# Patient Record
Sex: FEMALE | Race: OTHER | HISPANIC OR LATINO
[De-identification: names, ages, dates, MRNs, and addresses within clinical notes are randomized per-mention and may not be internally consistent; named-entity substitution may affect disease eponyms.]

---

## 2020-11-11 ENCOUNTER — TRANSCRIPTION ENCOUNTER (OUTPATIENT)
Age: 60
End: 2020-11-11

## 2020-12-01 ENCOUNTER — TRANSCRIPTION ENCOUNTER (OUTPATIENT)
Age: 60
End: 2020-12-01

## 2020-12-16 ENCOUNTER — APPOINTMENT (OUTPATIENT)
Dept: INTERNAL MEDICINE | Facility: CLINIC | Age: 60
End: 2020-12-16
Payer: MEDICAID

## 2020-12-16 VITALS
RESPIRATION RATE: 16 BRPM | SYSTOLIC BLOOD PRESSURE: 108 MMHG | DIASTOLIC BLOOD PRESSURE: 70 MMHG | WEIGHT: 129 LBS | HEIGHT: 59 IN | HEART RATE: 94 BPM | TEMPERATURE: 96.1 F | BODY MASS INDEX: 26 KG/M2 | OXYGEN SATURATION: 97 %

## 2020-12-16 PROCEDURE — 99072 ADDL SUPL MATRL&STAF TM PHE: CPT

## 2020-12-16 PROCEDURE — 99204 OFFICE O/P NEW MOD 45 MIN: CPT

## 2020-12-16 NOTE — HEALTH RISK ASSESSMENT
[Yes] : Yes [Monthly or less (1 pt)] : Monthly or less (1 point) [1 or 2 (0 pts)] : 1 or 2 (0 points) [Never (0 pts)] : Never (0 points) [No] : In the past 12 months have you used drugs other than those required for medical reasons? No [0] : 2) Feeling down, depressed, or hopeless: Not at all (0) [] : No [[id number]] : 0 [Patient reported mammogram was normal] : Patient reported mammogram was normal [Patient reported colonoscopy was abnormal] : Patient reported colonoscopy was abnormal [MammogramDate] : 10/20 [ColonoscopyDate] : 2018 [ColonoscopyComments] : polyps

## 2020-12-16 NOTE — HISTORY OF PRESENT ILLNESS
[FreeTextEntry1] : Patient comes in to establish care.\par  [de-identified] : Patient is a 52-year-old female history of diabetes mellitus type 2, CAD status post 3 stents, GERD, hypertension, hyperlipidemia, covid 19 infection comes in to establish care. Patient presented to the urgent care November 11 with fever and chills and tested positive for SARS CoV-2 on 11/11/20. Patient notes she had symptoms of fever chills and myalgias for about 7 days and then improved. She denies any cough or shortness of breath. Her daughter and daughter's wife both also tested positive as well.\par Patient has a history of familial polyposis and has multiple colonoscopies. She does need a new gastroenterologist and cardiologist. She has not seen her primary care physician for 6 months or more in [place] and is overdue for blood work as well.\par Patient denies any cp, sob,abdominal pain, nausea, vomiting, palpitations, fever, chills, constipation, diarrhea.\par

## 2020-12-16 NOTE — ASSESSMENT
[FreeTextEntry1] : 1.Covid 19 infection: Now resolved. Patient with positive antibodies and discussed significance of this. All questions answered.\par \par 2.diabetes mellitus type 2: Discussed diabetic diet, continue on metformin 1000 mg b.i.d. and recheck hemoglobin A1c.\par \par 3.CAD status post 3 stents: Continue on Plavix and statin and follow up with cardiology.\par \par 4.hyperlipidemia: Continue on statin and ezetimibe, recheck lipids.\par \par 5.familial polyposis: With history of colectomy. Given referral for new gastroenterologist this patient will need repeat colonoscopy at this time.\par \par 6.hypertension: Continue on metoprolol  mg once daily.

## 2021-01-11 LAB
25(OH)D3 SERPL-MCNC: 20.1 NG/ML
ALBUMIN SERPL ELPH-MCNC: 4.8 G/DL
ALP BLD-CCNC: 131 U/L
ALT SERPL-CCNC: 36 U/L
ANION GAP SERPL CALC-SCNC: 11 MMOL/L
AST SERPL-CCNC: 33 U/L
BASOPHILS # BLD AUTO: 0.07 K/UL
BASOPHILS NFR BLD AUTO: 1 %
BILIRUB SERPL-MCNC: 0.2 MG/DL
BUN SERPL-MCNC: 25 MG/DL
CALCIUM SERPL-MCNC: 9.6 MG/DL
CHLORIDE SERPL-SCNC: 104 MMOL/L
CHOLEST SERPL-MCNC: 301 MG/DL
CO2 SERPL-SCNC: 26 MMOL/L
CREAT SERPL-MCNC: 0.78 MG/DL
EOSINOPHIL # BLD AUTO: 0.68 K/UL
EOSINOPHIL NFR BLD AUTO: 10.2 %
ESTIMATED AVERAGE GLUCOSE: 157 MG/DL
GLUCOSE SERPL-MCNC: 102 MG/DL
HBA1C MFR BLD HPLC: 7.1 %
HCT VFR BLD CALC: 42.7 %
HDLC SERPL-MCNC: 46 MG/DL
HGB BLD-MCNC: 13.7 G/DL
IMM GRANULOCYTES NFR BLD AUTO: 0.3 %
LDLC SERPL CALC-MCNC: 209 MG/DL
LYMPHOCYTES # BLD AUTO: 2.33 K/UL
LYMPHOCYTES NFR BLD AUTO: 34.8 %
MAN DIFF?: NORMAL
MCHC RBC-ENTMCNC: 31.3 PG
MCHC RBC-ENTMCNC: 32.1 GM/DL
MCV RBC AUTO: 97.5 FL
MONOCYTES # BLD AUTO: 0.53 K/UL
MONOCYTES NFR BLD AUTO: 7.9 %
NEUTROPHILS # BLD AUTO: 3.06 K/UL
NEUTROPHILS NFR BLD AUTO: 45.8 %
NONHDLC SERPL-MCNC: 256 MG/DL
PLATELET # BLD AUTO: 226 K/UL
POTASSIUM SERPL-SCNC: 4.5 MMOL/L
PROT SERPL-MCNC: 8.2 G/DL
RBC # BLD: 4.38 M/UL
RBC # FLD: 12.8 %
SODIUM SERPL-SCNC: 140 MMOL/L
TRIGL SERPL-MCNC: 235 MG/DL
TSH SERPL-ACNC: 2.5 UIU/ML
VIT B12 SERPL-MCNC: 277 PG/ML
WBC # FLD AUTO: 6.69 K/UL

## 2021-01-13 RX ORDER — ROSUVASTATIN CALCIUM 40 MG/1
40 TABLET, FILM COATED ORAL DAILY
Refills: 0 | Status: DISCONTINUED | COMMUNITY
End: 2021-01-13

## 2021-01-14 ENCOUNTER — NON-APPOINTMENT (OUTPATIENT)
Age: 61
End: 2021-01-14

## 2021-01-29 ENCOUNTER — APPOINTMENT (OUTPATIENT)
Dept: CARDIOLOGY | Facility: CLINIC | Age: 61
End: 2021-01-29
Payer: MEDICAID

## 2021-01-29 VITALS
DIASTOLIC BLOOD PRESSURE: 83 MMHG | HEART RATE: 82 BPM | OXYGEN SATURATION: 99 % | BODY MASS INDEX: 27.82 KG/M2 | SYSTOLIC BLOOD PRESSURE: 126 MMHG | WEIGHT: 138 LBS | HEIGHT: 59 IN

## 2021-01-29 PROCEDURE — 93000 ELECTROCARDIOGRAM COMPLETE: CPT

## 2021-01-29 PROCEDURE — 99072 ADDL SUPL MATRL&STAF TM PHE: CPT

## 2021-01-29 PROCEDURE — 99204 OFFICE O/P NEW MOD 45 MIN: CPT

## 2021-01-29 NOTE — DISCUSSION/SUMMARY
[Likely Cardiac Ischemia (Hi Probability)] : likely cardiac ischemia (high probability) [Coronary Artery Disease] : coronary artery disease [Hyperlipidemia] : hyperlipidemia [Stable] : stable [Hypertension] : hypertension [Patient] : the patient [FreeTextEntry1] : Pt will be referred for nuclear ETT, and will begin Repatha twice monthly. Echo will be obtained to evaluate LV function

## 2021-01-29 NOTE — REASON FOR VISIT
[Initial Evaluation] : an initial evaluation of [Coronary Artery Disease] : coronary artery disease [Hyperlipidemia] : hyperlipidemia [Hypertension] : hypertension [Prior Myocardial Infarction] : a prior myocardial infarction

## 2021-01-29 NOTE — HISTORY OF PRESENT ILLNESS
[FreeTextEntry1] : 59 yo female presents for cardiac evaluation. Pt has a hisory of PCI x3. 4 years ago. Pt has a history of HTN and HLD. Pt reports that she has taken Lipitor, Crestor and Zetia which either was not effective or caused muscle pain. Pt reports recent onset of chest pain with exertion for the past 2 months. Pt doesn't smoke. Pt is a diabetic. Pt is referred to a GI by her PMD.

## 2021-01-29 NOTE — PHYSICAL EXAM
[Normal Conjunctiva] : the conjunctiva exhibited no abnormalities [Eyelids - No Xanthelasma] : the eyelids demonstrated no xanthelasmas [Normal Oral Mucosa] : normal oral mucosa [No Oral Pallor] : no oral pallor [No Oral Cyanosis] : no oral cyanosis [Normal Jugular Venous A Waves Present] : normal jugular venous A waves present [Normal Jugular Venous V Waves Present] : normal jugular venous V waves present [No Jugular Venous Faria A Waves] : no jugular venous faria A waves [Heart Rate And Rhythm] : heart rate and rhythm were normal [Heart Sounds] : normal S1 and S2 [Murmurs] : no murmurs present [Respiration, Rhythm And Depth] : normal respiratory rhythm and effort [Exaggerated Use Of Accessory Muscles For Inspiration] : no accessory muscle use [Auscultation Breath Sounds / Voice Sounds] : lungs were clear to auscultation bilaterally [Abdomen Soft] : soft [Abdomen Tenderness] : non-tender [Abdomen Mass (___ Cm)] : no abdominal mass palpated [Abnormal Walk] : normal gait [Gait - Sufficient For Exercise Testing] : the gait was sufficient for exercise testing [Nail Clubbing] : no clubbing of the fingernails [Cyanosis, Localized] : no localized cyanosis [Petechial Hemorrhages (___cm)] : no petechial hemorrhages [Skin Color & Pigmentation] : normal skin color and pigmentation [] : no rash [No Venous Stasis] : no venous stasis [Skin Lesions] : no skin lesions [No Skin Ulcers] : no skin ulcer [No Xanthoma] : no  xanthoma was observed [Oriented To Time, Place, And Person] : oriented to person, place, and time [Affect] : the affect was normal [Mood] : the mood was normal [No Anxiety] : not feeling anxious

## 2021-02-11 ENCOUNTER — APPOINTMENT (OUTPATIENT)
Dept: INTERNAL MEDICINE | Facility: CLINIC | Age: 61
End: 2021-02-11
Payer: MEDICAID

## 2021-02-11 ENCOUNTER — NON-APPOINTMENT (OUTPATIENT)
Age: 61
End: 2021-02-11

## 2021-02-11 DIAGNOSIS — M54.6 PAIN IN THORACIC SPINE: ICD-10-CM

## 2021-02-11 PROCEDURE — 99214 OFFICE O/P EST MOD 30 MIN: CPT | Mod: 95

## 2021-02-11 NOTE — HISTORY OF PRESENT ILLNESS
[Home] : at home, [unfilled] , at the time of the visit. [Medical Office: ([facility])___] : at the medical office located in  [Verbal consent obtained from patient] : the patient, [unfilled] [Family Member] : family member [FreeTextEntry1] : FU [de-identified] : LIBAN DIAZ is a 60 year F who calls in for a follow up visit.\par Patient is a 60-year-old female history of diabetes mellitus type 2, CAD status post stents, GERD, hypertension, hyperlipidemia called in for followup visit. Patient recently had covid 19 infection in November of 2020 and has since recovered. She has questions regarding the vaccine and if she is eligible at this time.\par Patient did see cardiology and was told to start on Repatha due to side effects of muscle aches with statins but has not had insurance approval yet.\par Patient also has been having upper thoracic back pain bilaterally and at the shoulders.\par She has been in menopause for about 10 years.\par She also notes hyperpigmentation changes over her upper back that are been going on for the past 4 months and at times is painful. No injury or trauma to this area.\par Patient denies any cp, sob,abdominal pain, nausea, vomiting, palpitations, fever, chills, constipation, diarrhea.\par

## 2021-02-11 NOTE — ASSESSMENT
[FreeTextEntry1] : 1.health maintenance: Will mail her covid 19 letter of eligibility for vaccine.\par \par 2.diabetes mellitus type 2: Discussed recent blood work. Hemoglobin A1c doing well at 7.1. Continue on metformin 1000 mg twice daily.\par \par 3.CAD status post stents: With uncontrolled lipids, she is on atorvastatin 80 mg from Crestor and is trying to get insurance approval for repack it.\par \par 4.thoracic back pain: Obtain thoracic x-ray to rule out any degenerative disc disease. Patient's daughter request for bone density as well.\par \par 5.hyperpigmentation: Discuss chronic skin changes, will refer to dermatology at this time.

## 2021-02-17 ENCOUNTER — APPOINTMENT (OUTPATIENT)
Dept: GASTROENTEROLOGY | Facility: CLINIC | Age: 61
End: 2021-02-17
Payer: MEDICAID

## 2021-02-17 VITALS
BODY MASS INDEX: 26.81 KG/M2 | DIASTOLIC BLOOD PRESSURE: 93 MMHG | SYSTOLIC BLOOD PRESSURE: 143 MMHG | WEIGHT: 133 LBS | HEART RATE: 89 BPM | TEMPERATURE: 98 F | HEIGHT: 59 IN

## 2021-02-17 PROCEDURE — 99072 ADDL SUPL MATRL&STAF TM PHE: CPT

## 2021-02-17 PROCEDURE — 99203 OFFICE O/P NEW LOW 30 MIN: CPT

## 2021-02-17 NOTE — ASSESSMENT
[FreeTextEntry1] : 59 yo female with history of polyposis syndrome. Will obtain outsider records, and arrange for colonoscopy and upper endoscopy.

## 2021-02-17 NOTE — HISTORY OF PRESENT ILLNESS
[de-identified] : Ms. LIBAN DIAZ is a 60 year old female with history of colon resection for cancer. Patient is unclear of history but may have familial polyposis and undergoes yearly colonoscopies. Patient has no complaints of abdominal pain. Patient does note frequent diarrhea which dates to her surgery many years ago. She does note frequent heartburn as well. Patient reports that the daughter underwent total colectomy. Patient has history of CAD and had last coronary stent six years ago. \par

## 2021-03-05 ENCOUNTER — NON-APPOINTMENT (OUTPATIENT)
Age: 61
End: 2021-03-05

## 2021-03-05 ENCOUNTER — APPOINTMENT (OUTPATIENT)
Dept: CARDIOLOGY | Facility: CLINIC | Age: 61
End: 2021-03-05
Payer: MEDICAID

## 2021-03-05 VITALS
HEART RATE: 77 BPM | HEIGHT: 59 IN | WEIGHT: 135 LBS | SYSTOLIC BLOOD PRESSURE: 134 MMHG | BODY MASS INDEX: 27.21 KG/M2 | OXYGEN SATURATION: 97 % | DIASTOLIC BLOOD PRESSURE: 76 MMHG

## 2021-03-05 PROCEDURE — 99214 OFFICE O/P EST MOD 30 MIN: CPT

## 2021-03-05 PROCEDURE — 93000 ELECTROCARDIOGRAM COMPLETE: CPT

## 2021-03-05 PROCEDURE — 99072 ADDL SUPL MATRL&STAF TM PHE: CPT

## 2021-03-05 NOTE — HISTORY OF PRESENT ILLNESS
[FreeTextEntry1] : 59 yo female presents for cardiac evaluation. Pt has a hisory of PCI x3. 4 years ago. Pt has a history of HTN and HLD. Pt reports that she has taken Lipitor, Crestor and Zetia which either was not effective or caused muscle pain. Pt has not yet scheduled an ETT or Echo. Pt plans to have EGD and colonoscopy. Pt has continued to have chest pain for the past two months.

## 2021-03-05 NOTE — PHYSICAL EXAM

## 2021-03-05 NOTE — DISCUSSION/SUMMARY
[Likely Cardiac Ischemia (Hi Probability)] : likely cardiac ischemia (high probability) [Coronary Artery Disease] : coronary artery disease [Hyperlipidemia] : hyperlipidemia [Stable] : stable [Hypertension] : hypertension [Patient] : the patient [FreeTextEntry1] : Pt will schedule ETT and Echo as previously discussed. F/U in 2 months. Pt can stop Plavix but not ASA prior to GI procedures.

## 2021-03-31 ENCOUNTER — APPOINTMENT (OUTPATIENT)
Dept: CARDIOLOGY | Facility: CLINIC | Age: 61
End: 2021-03-31
Payer: MEDICAID

## 2021-03-31 PROCEDURE — 99072 ADDL SUPL MATRL&STAF TM PHE: CPT

## 2021-03-31 PROCEDURE — 93306 TTE W/DOPPLER COMPLETE: CPT

## 2021-04-09 ENCOUNTER — APPOINTMENT (OUTPATIENT)
Dept: CARDIOLOGY | Facility: CLINIC | Age: 61
End: 2021-04-09
Payer: MEDICAID

## 2021-04-09 PROCEDURE — 93015 CV STRESS TEST SUPVJ I&R: CPT

## 2021-04-09 PROCEDURE — 99072 ADDL SUPL MATRL&STAF TM PHE: CPT

## 2021-04-24 ENCOUNTER — TRANSCRIPTION ENCOUNTER (OUTPATIENT)
Age: 61
End: 2021-04-24

## 2021-05-11 DIAGNOSIS — Z00.00 ENCOUNTER FOR GENERAL ADULT MEDICAL EXAMINATION W/OUT ABNORMAL FINDINGS: ICD-10-CM

## 2021-05-13 ENCOUNTER — APPOINTMENT (OUTPATIENT)
Dept: CARDIOLOGY | Facility: CLINIC | Age: 61
End: 2021-05-13

## 2021-05-18 ENCOUNTER — APPOINTMENT (OUTPATIENT)
Dept: INTERNAL MEDICINE | Facility: CLINIC | Age: 61
End: 2021-05-18

## 2021-06-03 ENCOUNTER — APPOINTMENT (OUTPATIENT)
Dept: CARDIOLOGY | Facility: CLINIC | Age: 61
End: 2021-06-03

## 2021-06-11 ENCOUNTER — TRANSCRIPTION ENCOUNTER (OUTPATIENT)
Age: 61
End: 2021-06-11

## 2021-06-25 ENCOUNTER — TRANSCRIPTION ENCOUNTER (OUTPATIENT)
Age: 61
End: 2021-06-25

## 2021-07-01 ENCOUNTER — APPOINTMENT (OUTPATIENT)
Dept: CARDIOLOGY | Facility: CLINIC | Age: 61
End: 2021-07-01
Payer: MEDICAID

## 2021-07-01 PROCEDURE — 93015 CV STRESS TEST SUPVJ I&R: CPT

## 2021-07-01 PROCEDURE — 78452 HT MUSCLE IMAGE SPECT MULT: CPT

## 2021-07-01 PROCEDURE — A9500: CPT

## 2021-07-08 ENCOUNTER — APPOINTMENT (OUTPATIENT)
Dept: INTERNAL MEDICINE | Facility: CLINIC | Age: 61
End: 2021-07-08
Payer: MEDICAID

## 2021-07-08 VITALS
TEMPERATURE: 98.2 F | WEIGHT: 130 LBS | DIASTOLIC BLOOD PRESSURE: 92 MMHG | SYSTOLIC BLOOD PRESSURE: 154 MMHG | OXYGEN SATURATION: 94 % | RESPIRATION RATE: 16 BRPM | HEART RATE: 101 BPM | HEIGHT: 59 IN | BODY MASS INDEX: 26.21 KG/M2

## 2021-07-08 VITALS — DIASTOLIC BLOOD PRESSURE: 88 MMHG | SYSTOLIC BLOOD PRESSURE: 140 MMHG

## 2021-07-08 PROCEDURE — 99214 OFFICE O/P EST MOD 30 MIN: CPT

## 2021-07-08 NOTE — ASSESSMENT
[FreeTextEntry1] : 1.left abdominal and flank pain: Discussed doing urinalysis, blood work and obtaining CT abdomen and pelvis to rule out acute pathology. Discussed signs and symptoms warrant urgent evaluation. Will followup regarding results with patient.\par Call for new or worsening symptoms.\par

## 2021-07-08 NOTE — HISTORY OF PRESENT ILLNESS
[FreeTextEntry8] : Ms. LIBAN DIAZ is a 61 year F who comes in for an acute visit.\par Patient has a history of colectomy over 30 years ago for history of familial polyposis. She's had increased amount of left flank and lower abdominal pain over the last one month. She states it's aching in nature and worsened when she walks. She's taken Tylenol and Aleve with mild relief of symptoms. She denies any associated constipation or diarrhea, nausea or vomiting, fever or chills.\par Patient denies any cp, sob, palpitations, cough.\par

## 2021-07-13 ENCOUNTER — TRANSCRIPTION ENCOUNTER (OUTPATIENT)
Age: 61
End: 2021-07-13

## 2021-07-13 ENCOUNTER — NON-APPOINTMENT (OUTPATIENT)
Age: 61
End: 2021-07-13

## 2021-07-14 ENCOUNTER — TRANSCRIPTION ENCOUNTER (OUTPATIENT)
Age: 61
End: 2021-07-14

## 2021-07-15 ENCOUNTER — APPOINTMENT (OUTPATIENT)
Dept: GASTROENTEROLOGY | Facility: CLINIC | Age: 61
End: 2021-07-15
Payer: MEDICAID

## 2021-07-15 VITALS
HEIGHT: 59 IN | HEART RATE: 97 BPM | SYSTOLIC BLOOD PRESSURE: 137 MMHG | WEIGHT: 128 LBS | DIASTOLIC BLOOD PRESSURE: 87 MMHG | BODY MASS INDEX: 25.8 KG/M2

## 2021-07-15 PROCEDURE — 99214 OFFICE O/P EST MOD 30 MIN: CPT

## 2021-07-15 NOTE — ASSESSMENT
[FreeTextEntry1] : Patient with subtotal colectomy with left lower quadrant pain and suggestion of anastomotic stricturing, as well as hydronephrosis. We'll begin Augmentin for any suggestion of diverticulitis use milk of magnesia to attempt to open up the stricture in a schedule a colonoscopy within several days after Augmentin has had a chance to quiet down any potential infection. She will also increase amount of fluid for the possibility of the kidney stone resulting in hydronephrosis and pain

## 2021-07-15 NOTE — PHYSICAL EXAM
[General Appearance - Alert] : alert [General Appearance - Well Nourished] : well nourished [PERRL With Normal Accommodation] : pupils were equal in size, round, and reactive to light [Hearing Threshold Finger Rub Not [place]] : hearing was normal [] : no respiratory distress [FreeTextEntry1] : midline scar no hernia present. Left lower quadrant fullness with tenderness no rebound No fluctuation [Cervical Lymph Nodes Enlarged Posterior Bilaterally] : posterior cervical [Cervical Lymph Nodes Enlarged Anterior Bilaterally] : anterior cervical [Supraclavicular Lymph Nodes Enlarged Bilaterally] : supraclavicular [Axillary Lymph Nodes Enlarged Bilaterally] : axillary [Femoral Lymph Nodes Enlarged Bilaterally] : femoral [Inguinal Lymph Nodes Enlarged Bilaterally] : inguinal

## 2021-07-15 NOTE — HISTORY OF PRESENT ILLNESS
[FreeTextEntry1] : The patient is a 61-year-old female who has had a subtotal colectomy for familial polyposis syndrome. 20 years ago. Her last colonoscopy was 3 years ago and reportedly polyps were present. She has been having left lower quadrant to left mid abdominal pain for 2 weeks and it is persistent and severe. She is moving her bowels. There is no nausea. She has been taking  mobic for pain with minimal assistance. She denies fevers, or rectal bleeding. She denies any significant change in bowel habits

## 2021-07-16 ENCOUNTER — NON-APPOINTMENT (OUTPATIENT)
Age: 61
End: 2021-07-16

## 2021-07-19 LAB
ALBUMIN SERPL ELPH-MCNC: 4.5 G/DL
ALP BLD-CCNC: 136 U/L
ALT SERPL-CCNC: 23 U/L
ANION GAP SERPL CALC-SCNC: 14 MMOL/L
APPEARANCE: CLEAR
AST SERPL-CCNC: 23 U/L
BACTERIA: NEGATIVE
BASOPHILS # BLD AUTO: 0.05 K/UL
BASOPHILS NFR BLD AUTO: 0.7 %
BILIRUB SERPL-MCNC: 0.3 MG/DL
BILIRUBIN URINE: NEGATIVE
BLOOD URINE: NEGATIVE
BUN SERPL-MCNC: 17 MG/DL
CALCIUM SERPL-MCNC: 10.3 MG/DL
CHLORIDE SERPL-SCNC: 101 MMOL/L
CHOLEST SERPL-MCNC: 294 MG/DL
CO2 SERPL-SCNC: 25 MMOL/L
COLOR: NORMAL
CREAT SERPL-MCNC: 0.91 MG/DL
EOSINOPHIL # BLD AUTO: 0.47 K/UL
EOSINOPHIL NFR BLD AUTO: 6.5 %
ESTIMATED AVERAGE GLUCOSE: 166 MG/DL
GLUCOSE QUALITATIVE U: NEGATIVE
GLUCOSE SERPL-MCNC: 164 MG/DL
HBA1C MFR BLD HPLC: 7.4 %
HCT VFR BLD CALC: 42 %
HDLC SERPL-MCNC: 47 MG/DL
HGB BLD-MCNC: 13.3 G/DL
HYALINE CASTS: 1 /LPF
IMM GRANULOCYTES NFR BLD AUTO: 0.4 %
KETONES URINE: NEGATIVE
LDLC SERPL CALC-MCNC: 181 MG/DL
LEUKOCYTE ESTERASE URINE: NEGATIVE
LYMPHOCYTES # BLD AUTO: 1.93 K/UL
LYMPHOCYTES NFR BLD AUTO: 26.5 %
MAN DIFF?: NORMAL
MCHC RBC-ENTMCNC: 31.1 PG
MCHC RBC-ENTMCNC: 31.7 GM/DL
MCV RBC AUTO: 98.1 FL
MICROSCOPIC-UA: NORMAL
MONOCYTES # BLD AUTO: 0.51 K/UL
MONOCYTES NFR BLD AUTO: 7 %
NEUTROPHILS # BLD AUTO: 4.29 K/UL
NEUTROPHILS NFR BLD AUTO: 58.9 %
NITRITE URINE: NEGATIVE
NONHDLC SERPL-MCNC: 247 MG/DL
PH URINE: 5
PLATELET # BLD AUTO: 216 K/UL
POTASSIUM SERPL-SCNC: 4.2 MMOL/L
PROT SERPL-MCNC: 7.9 G/DL
PROTEIN URINE: NEGATIVE
RBC # BLD: 4.28 M/UL
RBC # FLD: 12.7 %
RED BLOOD CELLS URINE: 0 /HPF
SODIUM SERPL-SCNC: 140 MMOL/L
SPECIFIC GRAVITY URINE: 1.01
SQUAMOUS EPITHELIAL CELLS: 1 /HPF
TRIGL SERPL-MCNC: 330 MG/DL
TSH SERPL-ACNC: 2.25 UIU/ML
UROBILINOGEN URINE: NORMAL
WBC # FLD AUTO: 7.28 K/UL
WHITE BLOOD CELLS URINE: 1 /HPF

## 2021-07-20 ENCOUNTER — NON-APPOINTMENT (OUTPATIENT)
Age: 61
End: 2021-07-20

## 2021-07-22 ENCOUNTER — NON-APPOINTMENT (OUTPATIENT)
Age: 61
End: 2021-07-22

## 2021-07-22 ENCOUNTER — APPOINTMENT (OUTPATIENT)
Dept: CARDIOLOGY | Facility: CLINIC | Age: 61
End: 2021-07-22
Payer: MEDICAID

## 2021-07-22 VITALS
HEART RATE: 79 BPM | DIASTOLIC BLOOD PRESSURE: 70 MMHG | HEIGHT: 59 IN | OXYGEN SATURATION: 98 % | WEIGHT: 126 LBS | SYSTOLIC BLOOD PRESSURE: 120 MMHG | BODY MASS INDEX: 25.4 KG/M2

## 2021-07-22 PROCEDURE — 93000 ELECTROCARDIOGRAM COMPLETE: CPT

## 2021-07-22 PROCEDURE — 99214 OFFICE O/P EST MOD 30 MIN: CPT

## 2021-07-22 RX ORDER — EVOLOCUMAB 140 MG/ML
140 INJECTION, SOLUTION SUBCUTANEOUS
Qty: 2 | Refills: 5 | Status: DISCONTINUED | COMMUNITY
Start: 2021-01-29 | End: 2021-07-22

## 2021-07-22 NOTE — DISCUSSION/SUMMARY
[FreeTextEntry1] : HLD: Recent labs reviewed Lipids poorly controlled on  Atorvastatin 80 MG will repeat Lipid panel now consider Repatha ( LDL goal 70 )\par \par CAD: No active cardiac complaints \par \par HTN: Controlled \par \par DM: Medical management refer to Endo Dr Quiñones \par \par OV 3 months

## 2021-07-22 NOTE — PHYSICAL EXAM
[Normal Conjunctiva] : normal conjunctiva [Normal S1, S2] : normal S1, S2 [Soft] : abdomen soft [Non Tender] : non-tender [Normal Gait] : normal gait [No Edema] : no edema [No Rash] : no rash [Alert and Oriented] : alert and oriented [Normal] : well developed, well nourished, no acute distress

## 2021-07-22 NOTE — HISTORY OF PRESENT ILLNESS
[FreeTextEntry1] : 62 Y/O female PMH: CAD S/P  PCI x3. approx 4 years ago, HTN,  HLD. Pt reports that she has taken Lipitor, Crestor and Zetia caused muscle pain. She is currently taking and tolerating Simvastatin.  Reports she was in [facility] ER with C/O LLQ discomfort had CT scan followed with GI Colonoscopy/Endoscopy advised \par \par Nuclear stress test 7/1/21 Non ischemic\par Echo NL LV FX

## 2021-07-28 ENCOUNTER — APPOINTMENT (OUTPATIENT)
Dept: COLORECTAL SURGERY | Facility: CLINIC | Age: 61
End: 2021-07-28
Payer: MEDICAID

## 2021-07-28 VITALS
TEMPERATURE: 97.8 F | OXYGEN SATURATION: 94 % | RESPIRATION RATE: 16 BRPM | DIASTOLIC BLOOD PRESSURE: 94 MMHG | HEIGHT: 59 IN | BODY MASS INDEX: 25.8 KG/M2 | HEART RATE: 90 BPM | WEIGHT: 128 LBS | SYSTOLIC BLOOD PRESSURE: 159 MMHG

## 2021-07-28 DIAGNOSIS — Z63.5 DISRUPTION OF FAMILY BY SEPARATION AND DIVORCE: ICD-10-CM

## 2021-07-28 DIAGNOSIS — Z86.79 PERSONAL HISTORY OF OTHER DISEASES OF THE CIRCULATORY SYSTEM: ICD-10-CM

## 2021-07-28 DIAGNOSIS — Z86.010 PERSONAL HISTORY OF COLONIC POLYPS: ICD-10-CM

## 2021-07-28 DIAGNOSIS — Z87.09 PERSONAL HISTORY OF OTHER DISEASES OF THE RESPIRATORY SYSTEM: ICD-10-CM

## 2021-07-28 DIAGNOSIS — Z82.3 FAMILY HISTORY OF STROKE: ICD-10-CM

## 2021-07-28 DIAGNOSIS — Z87.19 PERSONAL HISTORY OF OTHER DISEASES OF THE DIGESTIVE SYSTEM: ICD-10-CM

## 2021-07-28 PROCEDURE — 99204 OFFICE O/P NEW MOD 45 MIN: CPT

## 2021-07-28 SDOH — SOCIAL STABILITY - SOCIAL INSECURITY: DISRUPTION OF FAMILY BY SEPARATION AND DIVORCE: Z63.5

## 2021-07-28 NOTE — HISTORY OF PRESENT ILLNESS
[FreeTextEntry1] : Ms. Mejia to the office for consultation. She reports a 1 month history of left lower quadrant pain, bloating and decrease in bowel movements.  Approximately 30 years earlier, she had a total abdominal colectomy with ileorectal anastomosis at [facility].  Her baseline bowel function after the surgery was approximately 4 bowel movements per day.  1 month earlier, she began noticing increased difficulties with passing gas and stool with a decrease in bowel movements from 4 to 1 per day.  She was seen at [facility] where a CT was performed, placed on oral stool softeners which have been ineffective and advised to follow-up with a gastroenterologist for a scope.  She has since seen GI and is scheduled to undergo a flexible sigmoidoscopy soon.  In the interim, she continues to have symptoms.

## 2021-07-28 NOTE — PHYSICAL EXAM
[No Rash or Lesion] : No rash or lesion [Alert] : alert [Oriented to Person] : oriented to person [Oriented to Place] : oriented to place [Oriented to Time] : oriented to time [Calm] : calm [de-identified] : soft, NT, ND [de-identified] : No apparent distress [de-identified] : Normocephalic atraumatic [de-identified] : Moving all extremities x4

## 2021-07-28 NOTE — ASSESSMENT
[FreeTextEntry1] : Ms. Mejia presents to the office for for consultation secondary to a 1 month history of progressive bloating, decreased frequency in bowel movements and concern, by review of medical records, for anastomotic stricturing.  This would be a little unusual given the remote history of surgery.  Regardless, I agree with the work-up including flexible sigmoidoscopy.  We will await the results and in the interim, will obtain a CT A/P as additional work-up.  Once both results are available for me to review, she will return to the office for further discussion if surgery will be needed.  To address the issues of constipation, advised MiraLAX twice daily.  Patient and daughter understand and are agreeable with plan of care.

## 2021-08-02 ENCOUNTER — APPOINTMENT (OUTPATIENT)
Dept: GASTROENTEROLOGY | Facility: AMBULATORY MEDICAL SERVICES | Age: 61
End: 2021-08-02
Payer: MEDICAID

## 2021-08-02 ENCOUNTER — RESULT REVIEW (OUTPATIENT)
Age: 61
End: 2021-08-02

## 2021-08-02 PROCEDURE — 45380 COLONOSCOPY AND BIOPSY: CPT

## 2021-08-02 PROCEDURE — 43239 EGD BIOPSY SINGLE/MULTIPLE: CPT

## 2021-08-16 ENCOUNTER — OUTPATIENT (OUTPATIENT)
Dept: OUTPATIENT SERVICES | Facility: HOSPITAL | Age: 61
LOS: 1 days | End: 2021-08-16
Payer: MEDICAID

## 2021-08-16 ENCOUNTER — APPOINTMENT (OUTPATIENT)
Dept: MRI IMAGING | Facility: CLINIC | Age: 61
End: 2021-08-16
Payer: MEDICAID

## 2021-08-16 ENCOUNTER — APPOINTMENT (OUTPATIENT)
Dept: CT IMAGING | Facility: CLINIC | Age: 61
End: 2021-08-16
Payer: MEDICAID

## 2021-08-16 DIAGNOSIS — R10.30 LOWER ABDOMINAL PAIN, UNSPECIFIED: ICD-10-CM

## 2021-08-16 DIAGNOSIS — Z98.0 INTESTINAL BYPASS AND ANASTOMOSIS STATUS: ICD-10-CM

## 2021-08-16 PROCEDURE — 74183 MRI ABD W/O CNTR FLWD CNTR: CPT | Mod: 26

## 2021-08-16 PROCEDURE — 82565 ASSAY OF CREATININE: CPT

## 2021-08-16 PROCEDURE — 74177 CT ABD & PELVIS W/CONTRAST: CPT | Mod: 26

## 2021-08-16 PROCEDURE — 74177 CT ABD & PELVIS W/CONTRAST: CPT

## 2021-08-16 PROCEDURE — A9585: CPT

## 2021-08-16 PROCEDURE — 74183 MRI ABD W/O CNTR FLWD CNTR: CPT

## 2021-08-17 ENCOUNTER — TRANSCRIPTION ENCOUNTER (OUTPATIENT)
Age: 61
End: 2021-08-17

## 2021-08-19 ENCOUNTER — APPOINTMENT (OUTPATIENT)
Dept: INTERNAL MEDICINE | Facility: CLINIC | Age: 61
End: 2021-08-19
Payer: MEDICAID

## 2021-08-19 VITALS
BODY MASS INDEX: 25.4 KG/M2 | HEART RATE: 96 BPM | HEIGHT: 59 IN | OXYGEN SATURATION: 97 % | SYSTOLIC BLOOD PRESSURE: 116 MMHG | TEMPERATURE: 98.5 F | DIASTOLIC BLOOD PRESSURE: 78 MMHG | WEIGHT: 126 LBS

## 2021-08-19 DIAGNOSIS — Z87.898 PERSONAL HISTORY OF OTHER SPECIFIED CONDITIONS: ICD-10-CM

## 2021-08-19 DIAGNOSIS — R10.30 LOWER ABDOMINAL PAIN, UNSPECIFIED: ICD-10-CM

## 2021-08-19 DIAGNOSIS — Z87.19 PERSONAL HISTORY OF OTHER DISEASES OF THE DIGESTIVE SYSTEM: ICD-10-CM

## 2021-08-19 DIAGNOSIS — Z11.59 ENCOUNTER FOR SCREENING FOR OTHER VIRAL DISEASES: ICD-10-CM

## 2021-08-19 PROCEDURE — 99213 OFFICE O/P EST LOW 20 MIN: CPT

## 2021-08-19 RX ORDER — POLYETHYLENE GLYCOL 3350 17 G/17G
17 POWDER, FOR SOLUTION ORAL TWICE DAILY
Qty: 30 | Refills: 5 | Status: DISCONTINUED | COMMUNITY
Start: 2021-07-28 | End: 2021-08-19

## 2021-08-19 RX ORDER — OMEPRAZOLE 20 MG/1
20 CAPSULE, DELAYED RELEASE ORAL DAILY
Qty: 180 | Refills: 1 | Status: DISCONTINUED | COMMUNITY
End: 2021-08-19

## 2021-08-19 RX ORDER — CLOPIDOGREL BISULFATE 75 MG/1
75 TABLET, FILM COATED ORAL DAILY
Qty: 90 | Refills: 1 | Status: DISCONTINUED | COMMUNITY
End: 2021-08-19

## 2021-08-19 RX ORDER — MELOXICAM 15 MG/1
15 TABLET ORAL
Qty: 30 | Refills: 2 | Status: DISCONTINUED | COMMUNITY
Start: 2021-07-08 | End: 2021-08-19

## 2021-08-19 RX ORDER — EZETIMIBE 10 MG/1
10 TABLET ORAL DAILY
Qty: 90 | Refills: 1 | Status: DISCONTINUED | COMMUNITY
End: 2021-08-19

## 2021-08-19 NOTE — ASSESSMENT
[FreeTextEntry1] : F/up  with Dr. Esqueda , GI\par F/up Dr. Lee, cardiology , regarding  restarting Anticoagulation, off Plavix \par CBC drawn today \par Pt to get records from [facility] to be scanned in to chart \par f/up with results\par OV 6- 8 weeks

## 2021-08-19 NOTE — REVIEW OF SYSTEMS
[Negative] : Neurological [Abdominal Pain] : abdominal pain [Fever] : no fever [Chills] : no chills [Fatigue] : no fatigue [Nausea] : no nausea [Constipation] : no constipation [Diarrhea] : diarrhea [Vomiting] : no vomiting [Heartburn] : no heartburn [FreeTextEntry7] : see HPI

## 2021-08-19 NOTE — PHYSICAL EXAM
[No Acute Distress] : no acute distress [Well Nourished] : well nourished [Well Developed] : well developed [No Lymphadenopathy] : no lymphadenopathy [Supple] : supple [No Respiratory Distress] : no respiratory distress  [No Accessory Muscle Use] : no accessory muscle use [Clear to Auscultation] : lungs were clear to auscultation bilaterally [Normal Rate] : normal rate  [Regular Rhythm] : with a regular rhythm [Normal S1, S2] : normal S1 and S2 [Coordination Grossly Intact] : coordination grossly intact [No Focal Deficits] : no focal deficits [Normal Gait] : normal gait [Normal Affect] : the affect was normal [Alert and Oriented x3] : oriented to person, place, and time [Normal Insight/Judgement] : insight and judgment were intact [Soft] : abdomen soft [Non-distended] : non-distended [Normal Bowel Sounds] : normal bowel sounds [de-identified] : LLQ mildly tneder to deep palpation, no guarding

## 2021-08-19 NOTE — HISTORY OF PRESENT ILLNESS
[FreeTextEntry1] : f/u  [de-identified] : Pt here for followup. She is a 61 yr. old female with a h/ of sutotal colectomy for familial polyposis syndrome 20 years ago . She was hospitalized at [facility] recently for  rectal bleeding ,  s/p colonoscopy with Dr. Esqueda 8/2/21 multiple polyps were biopsied  in the colon. Pathology showed adenomatous polyps. Approximately  1 week  later, pt went to  the [facility] with rectal bleed. She required a transfusion. Was discharged , to followup with Dr. Esqueda and Dr. Lee. CT  of abdomen revealed extrahepatic biliary duct dilitation with questionable polypoid lesion  at ampulla level. \par Per pt , Plavix was dc'd at discharge. she arrives today , feels stable, no further rectal  bleeding ,  complains of fatigue . Denies N/V, diarrhea, constipation .

## 2021-08-22 LAB
BASOPHILS # BLD AUTO: 0.03 K/UL
BASOPHILS NFR BLD AUTO: 0.6 %
EOSINOPHIL # BLD AUTO: 0.69 K/UL
EOSINOPHIL NFR BLD AUTO: 14.5 %
HCT VFR BLD CALC: 31.1 %
HGB BLD-MCNC: 9.7 G/DL
IMM GRANULOCYTES NFR BLD AUTO: 0.2 %
LYMPHOCYTES # BLD AUTO: 1.48 K/UL
LYMPHOCYTES NFR BLD AUTO: 31 %
MAN DIFF?: NORMAL
MCHC RBC-ENTMCNC: 31.2 GM/DL
MCHC RBC-ENTMCNC: 31.2 PG
MCV RBC AUTO: 100 FL
MONOCYTES # BLD AUTO: 0.36 K/UL
MONOCYTES NFR BLD AUTO: 7.5 %
NEUTROPHILS # BLD AUTO: 2.2 K/UL
NEUTROPHILS NFR BLD AUTO: 46.2 %
PLATELET # BLD AUTO: 250 K/UL
RBC # BLD: 3.11 M/UL
RBC # FLD: 14.2 %
WBC # FLD AUTO: 4.77 K/UL

## 2021-08-23 ENCOUNTER — NON-APPOINTMENT (OUTPATIENT)
Age: 61
End: 2021-08-23

## 2021-08-24 ENCOUNTER — APPOINTMENT (OUTPATIENT)
Dept: CARDIOLOGY | Facility: CLINIC | Age: 61
End: 2021-08-24
Payer: MEDICAID

## 2021-08-24 VITALS
TEMPERATURE: 98.2 F | HEART RATE: 89 BPM | HEIGHT: 59 IN | BODY MASS INDEX: 25.8 KG/M2 | SYSTOLIC BLOOD PRESSURE: 120 MMHG | WEIGHT: 128 LBS | OXYGEN SATURATION: 99 % | DIASTOLIC BLOOD PRESSURE: 78 MMHG

## 2021-08-24 DIAGNOSIS — Z98.0 INTESTINAL BYPASS AND ANASTOMOSIS STATUS: ICD-10-CM

## 2021-08-24 PROCEDURE — 99214 OFFICE O/P EST MOD 30 MIN: CPT

## 2021-08-24 NOTE — HISTORY OF PRESENT ILLNESS
[FreeTextEntry1] : 60 Y/O female PMH: CAD S/P PCI x3. approx 4 years ago, HTN,  HLD. Pt has previously taken Lipitor, Crestor and Zetia which caused muscle pain. She is currently taking and tolerating atorvastatin.  Patient reports a 2 month history of left lower quadrant pain, bloating and decrease in bowel movements.  Approximately 30 years earlier, she had a total abdominal colectomy with ileorectal anastomosis at [facility]. She was seen [place] [facility] [place] was performed, she was placed on oral stool softeners which have been ineffective and advised to follow-up with a gastroenterologist for a scope.  She has since seen GI and is status post colonoscopy 8/2/21 with  which showed multiple polyps biopsied in the colon.  Pathology showed adematous polyps.  One week later she was admitted to [facility] with GIB which required transfusion.  CT Abdomen showed extrahepatic biliary duct dilitation with questionable polypoid lesion at ampulla level.  Patient had blood work done 8/19/21 which revealed H&H 9.7 and 31.1\par \par Presently she offers no complaints of cp/sob/palpitations/edema or syncope.  She fatigues easily.  She does not have a follow up with  until 10/28/21.  She has been off of her plavix since being discharged from the hospital.  I have requested hospital records.  \par \par Nuclear stress test 7/1/21 Non ischemic\par Echo NL LV FX\par \par

## 2021-08-24 NOTE — DISCUSSION/SUMMARY
[FreeTextEntry1] : Recent GIB: patient s/p colonoscopy (multiple polyps biopsied in colon; pathology showed adematous polyps) s/p hospitalization for GIB one week later which required blood transfusion.  Patient has been off plavix since hospitalization.  Most recent H&H 9.7 and 31.1.  Patient advised to follow up with GI either today or tomorrow.  Ideally, plavix should be restarted once deemed safe from GI standpoint.  Labs ordered\par \par HLD: Recent labs reviewed Lipids poorly controlled on  Atorvastatin 80 MG will repeat Lipid panel. Consider Repatha ( LDL goal 70 )\par \par CAD: No active cardiac complaints.  Continue statin, BB. Plavix on hold for GIB (see above)\par \par HTN: Controlled \par \par DM: Medical management refer to Endo Dr Quiñones \par \par OV 1 month

## 2021-08-24 NOTE — PHYSICAL EXAM
[Normal Conjunctiva] : normal conjunctiva [Normal S1, S2] : normal S1, S2 [Soft] : abdomen soft [Non Tender] : non-tender [Normal Gait] : normal gait [No Edema] : no edema [No Rash] : no rash [Normal] : moves all extremities, no focal deficits, normal speech [Alert and Oriented] : alert and oriented [Normal Venous Pressure] : normal venous pressure [No Carotid Bruit] : no carotid bruit [No Murmur] : no murmur [No Rub] : no rub [de-identified] : pale

## 2021-08-26 ENCOUNTER — RX RENEWAL (OUTPATIENT)
Age: 61
End: 2021-08-26

## 2021-08-27 ENCOUNTER — RX RENEWAL (OUTPATIENT)
Age: 61
End: 2021-08-27

## 2021-09-02 ENCOUNTER — LABORATORY RESULT (OUTPATIENT)
Age: 61
End: 2021-09-02

## 2021-09-02 ENCOUNTER — APPOINTMENT (OUTPATIENT)
Dept: OBGYN | Facility: CLINIC | Age: 61
End: 2021-09-02
Payer: MEDICAID

## 2021-09-02 VITALS
DIASTOLIC BLOOD PRESSURE: 66 MMHG | BODY MASS INDEX: 25.13 KG/M2 | HEIGHT: 60 IN | WEIGHT: 128 LBS | SYSTOLIC BLOOD PRESSURE: 112 MMHG

## 2021-09-02 DIAGNOSIS — Z01.419 ENCOUNTER FOR GYNECOLOGICAL EXAMINATION (GENERAL) (ROUTINE) W/OUT ABNORMAL FINDINGS: ICD-10-CM

## 2021-09-02 DIAGNOSIS — Z12.39 ENCOUNTER FOR OTHER SCREENING FOR MALIGNANT NEOPLASM OF BREAST: ICD-10-CM

## 2021-09-02 PROCEDURE — 99386 PREV VISIT NEW AGE 40-64: CPT

## 2021-09-03 LAB — HPV HIGH+LOW RISK DNA PNL CVX: DETECTED

## 2021-09-06 PROBLEM — Z01.419 ENCOUNTER FOR ANNUAL ROUTINE GYNECOLOGICAL EXAMINATION: Status: RESOLVED | Noted: 2021-09-02 | Resolved: 2021-09-16

## 2021-09-13 ENCOUNTER — NON-APPOINTMENT (OUTPATIENT)
Age: 61
End: 2021-09-13

## 2021-09-29 ENCOUNTER — RX RENEWAL (OUTPATIENT)
Age: 61
End: 2021-09-29

## 2021-09-29 LAB
25(OH)D3 SERPL-MCNC: 17.9 NG/ML
ALBUMIN SERPL ELPH-MCNC: 4.2 G/DL
ALP BLD-CCNC: 124 U/L
ALT SERPL-CCNC: 20 U/L
ANION GAP SERPL CALC-SCNC: 9 MMOL/L
APPEARANCE: CLEAR
AST SERPL-CCNC: 17 U/L
BACTERIA: NEGATIVE
BASOPHILS # BLD AUTO: 0.04 K/UL
BASOPHILS NFR BLD AUTO: 0.7 %
BILIRUB SERPL-MCNC: 0.2 MG/DL
BILIRUBIN URINE: NEGATIVE
BLOOD URINE: NEGATIVE
BUN SERPL-MCNC: 29 MG/DL
CALCIUM OXALATE CRYSTALS: ABNORMAL
CALCIUM SERPL-MCNC: 9.4 MG/DL
CHLORIDE SERPL-SCNC: 107 MMOL/L
CHOLEST SERPL-MCNC: 253 MG/DL
CO2 SERPL-SCNC: 26 MMOL/L
COLOR: YELLOW
CREAT SERPL-MCNC: 0.76 MG/DL
EOSINOPHIL # BLD AUTO: 0.66 K/UL
EOSINOPHIL NFR BLD AUTO: 11.8 %
ESTIMATED AVERAGE GLUCOSE: 137 MG/DL
GLUCOSE QUALITATIVE U: NEGATIVE
GLUCOSE SERPL-MCNC: 137 MG/DL
HBA1C MFR BLD HPLC: 6.4 %
HCT VFR BLD CALC: 31.6 %
HCV AB SER QL: NONREACTIVE
HCV S/CO RATIO: 0.15 S/CO
HDLC SERPL-MCNC: 40 MG/DL
HGB BLD-MCNC: 9.6 G/DL
HYALINE CASTS: 0 /LPF
IMM GRANULOCYTES NFR BLD AUTO: 0.2 %
KETONES URINE: NEGATIVE
LDLC SERPL CALC-MCNC: 176 MG/DL
LEUKOCYTE ESTERASE URINE: NEGATIVE
LYMPHOCYTES # BLD AUTO: 1.85 K/UL
LYMPHOCYTES NFR BLD AUTO: 33 %
MAN DIFF?: NORMAL
MCHC RBC-ENTMCNC: 28.9 PG
MCHC RBC-ENTMCNC: 30.4 GM/DL
MCV RBC AUTO: 95.2 FL
MICROSCOPIC-UA: NORMAL
MONOCYTES # BLD AUTO: 0.58 K/UL
MONOCYTES NFR BLD AUTO: 10.3 %
NEUTROPHILS # BLD AUTO: 2.47 K/UL
NEUTROPHILS NFR BLD AUTO: 44 %
NITRITE URINE: NEGATIVE
NONHDLC SERPL-MCNC: 213 MG/DL
PH URINE: 6
PLATELET # BLD AUTO: 204 K/UL
POTASSIUM SERPL-SCNC: 4.5 MMOL/L
PROT SERPL-MCNC: 7.3 G/DL
PROTEIN URINE: ABNORMAL
RBC # BLD: 3.32 M/UL
RBC # FLD: 14.1 %
RED BLOOD CELLS URINE: 3 /HPF
SODIUM SERPL-SCNC: 142 MMOL/L
SPECIFIC GRAVITY URINE: 1.03
SQUAMOUS EPITHELIAL CELLS: 13 /HPF
TRIGL SERPL-MCNC: 190 MG/DL
TSH SERPL-ACNC: 1.83 UIU/ML
UROBILINOGEN URINE: NORMAL
WBC # FLD AUTO: 5.61 K/UL
WHITE BLOOD CELLS URINE: 3 /HPF

## 2021-09-30 ENCOUNTER — NON-APPOINTMENT (OUTPATIENT)
Age: 61
End: 2021-09-30

## 2021-10-08 ENCOUNTER — NON-APPOINTMENT (OUTPATIENT)
Age: 61
End: 2021-10-08

## 2021-10-08 ENCOUNTER — RX CHANGE (OUTPATIENT)
Age: 61
End: 2021-10-08

## 2021-10-08 ENCOUNTER — APPOINTMENT (OUTPATIENT)
Dept: CARDIOLOGY | Facility: CLINIC | Age: 61
End: 2021-10-08
Payer: MEDICAID

## 2021-10-08 VITALS
WEIGHT: 128 LBS | HEIGHT: 60 IN | HEART RATE: 85 BPM | BODY MASS INDEX: 25.13 KG/M2 | SYSTOLIC BLOOD PRESSURE: 110 MMHG | OXYGEN SATURATION: 100 % | DIASTOLIC BLOOD PRESSURE: 70 MMHG

## 2021-10-08 PROCEDURE — 99215 OFFICE O/P EST HI 40 MIN: CPT

## 2021-10-08 PROCEDURE — 93000 ELECTROCARDIOGRAM COMPLETE: CPT

## 2021-10-08 RX ORDER — TRAMADOL HYDROCHLORIDE 50 MG/1
50 TABLET, COATED ORAL 3 TIMES DAILY
Qty: 15 | Refills: 0 | Status: DISCONTINUED | COMMUNITY
Start: 2021-07-15 | End: 2021-10-08

## 2021-10-08 NOTE — PHYSICAL EXAM
[Normal Conjunctiva] : normal conjunctiva [Normal Venous Pressure] : normal venous pressure [No Carotid Bruit] : no carotid bruit [Normal S1, S2] : normal S1, S2 [No Murmur] : no murmur [No Rub] : no rub [Soft] : abdomen soft [Non Tender] : non-tender [Normal Gait] : normal gait [No Edema] : no edema [No Rash] : no rash [Normal] : moves all extremities, no focal deficits, normal speech [Alert and Oriented] : alert and oriented [de-identified] : pale

## 2021-10-08 NOTE — HISTORY OF PRESENT ILLNESS
[FreeTextEntry1] : 62 Y/O female PMH: CAD S/P PCI x3. approx 4 years ago, HTN,  HLD. Pt has previously taken Lipitor, Crestor and Zetia which caused muscle pain. She is currently taking and tolerating atorvastatin but recent labs reveal inadequate results with elevated LDL on maximum dose .\par Pt reports feeling fatigued and has attributed to anemia\par Presently she offers no complaints of cp/sob/palpitations/edema or syncope.  .  She does not have a follow up with  until 10/28/21.  She has been off of her plavix since being discharged from the hospital but she is taking daily ASA . \par \par Nuclear stress test 7/1/21 Non ischemic\par Echo NL LV FX\par \par

## 2021-10-08 NOTE — DISCUSSION/SUMMARY
[FreeTextEntry1] : .  Patient advised to follow up with GI either today or tomorrow.  Pt will continue ASA\par HLD: Recent labs reviewed Lipids poorly controlled on  Atorvastatin 80 MG . Repatha ordered ( LDL goal 70 )\par \par CAD: No active cardiac complaints.  Continue statin 40 mg with Repatha BB. Plavix on hold for GIB (see above)\par \par HTN: Controlled \par \par DM: Medical management refer to Endo.\par OV 1 month

## 2021-10-12 ENCOUNTER — RX CHANGE (OUTPATIENT)
Age: 61
End: 2021-10-12

## 2021-10-21 ENCOUNTER — RX RENEWAL (OUTPATIENT)
Age: 61
End: 2021-10-21

## 2021-10-21 RX ORDER — EVOLOCUMAB 140 MG/ML
140 INJECTION, SOLUTION SUBCUTANEOUS
Qty: 6 | Refills: 5 | Status: DISCONTINUED | COMMUNITY
Start: 2021-10-08 | End: 2021-10-21

## 2021-10-28 ENCOUNTER — APPOINTMENT (OUTPATIENT)
Dept: GASTROENTEROLOGY | Facility: CLINIC | Age: 61
End: 2021-10-28
Payer: MEDICAID

## 2021-10-28 VITALS
SYSTOLIC BLOOD PRESSURE: 115 MMHG | WEIGHT: 127 LBS | DIASTOLIC BLOOD PRESSURE: 76 MMHG | BODY MASS INDEX: 24.94 KG/M2 | HEIGHT: 60 IN | HEART RATE: 80 BPM

## 2021-10-28 DIAGNOSIS — R10.32 LEFT LOWER QUADRANT PAIN: ICD-10-CM

## 2021-10-28 PROCEDURE — 99214 OFFICE O/P EST MOD 30 MIN: CPT

## 2021-10-28 NOTE — ASSESSMENT
[FreeTextEntry1] : #1 abnormal MRCP with periampullary lesion in a patient who is diagnosed with familial polyposis. Will schedule upper endoscopy for evaluation and consider EUS.\par #2 rectal adenomas we'll repeat colonoscopy in February, 2022\par #3 left lower quadrant, discomfort. We'll try a course of hyoscyamine

## 2021-10-28 NOTE — HISTORY OF PRESENT ILLNESS
[FreeTextEntry1] : Left lower quadrant discomfort persists. Recent MRCP done for abnormal CAT scan of the common bile duct showed possibly a wilma-ampullary lesion, and a common bile duct of 1.5. A recent colonoscopy for her rectal stump showed multiple adenomatous polyps. One of which had a villoglandular feature post polypectomy. She had bleeding secondary to Plavix use. She is now off Plavix. She denies any chest pain, shortness of breath. She denies any constipation, but her stools were soft on Metamucil

## 2021-10-28 NOTE — PHYSICAL EXAM
[General Appearance - Alert] : alert [General Appearance - Well Nourished] : well nourished [PERRL With Normal Accommodation] : pupils were equal in size, round, and reactive to light [Hearing Threshold Finger Rub Not [place]] : hearing was normal [FreeTextEntry1] : midline scar no hernia present. Left lower quadrant fullness with tenderness no rebound No fluctuation [Cervical Lymph Nodes Enlarged Posterior Bilaterally] : posterior cervical [Cervical Lymph Nodes Enlarged Anterior Bilaterally] : anterior cervical [Supraclavicular Lymph Nodes Enlarged Bilaterally] : supraclavicular [Axillary Lymph Nodes Enlarged Bilaterally] : axillary [Femoral Lymph Nodes Enlarged Bilaterally] : femoral [Inguinal Lymph Nodes Enlarged Bilaterally] : inguinal [Skin Color & Pigmentation] : normal skin color and pigmentation [Skin Turgor] : normal skin turgor [] : no rash

## 2021-10-30 ENCOUNTER — LABORATORY RESULT (OUTPATIENT)
Age: 61
End: 2021-10-30

## 2021-11-03 ENCOUNTER — APPOINTMENT (OUTPATIENT)
Dept: GASTROENTEROLOGY | Facility: AMBULATORY MEDICAL SERVICES | Age: 61
End: 2021-11-03
Payer: MEDICAID

## 2021-11-03 ENCOUNTER — RESULT REVIEW (OUTPATIENT)
Age: 61
End: 2021-11-03

## 2021-11-03 PROCEDURE — 43239 EGD BIOPSY SINGLE/MULTIPLE: CPT

## 2021-11-08 ENCOUNTER — APPOINTMENT (OUTPATIENT)
Dept: INTERNAL MEDICINE | Facility: CLINIC | Age: 61
End: 2021-11-08
Payer: MEDICAID

## 2021-11-08 VITALS
TEMPERATURE: 98.1 F | SYSTOLIC BLOOD PRESSURE: 112 MMHG | HEART RATE: 99 BPM | WEIGHT: 127 LBS | DIASTOLIC BLOOD PRESSURE: 76 MMHG | OXYGEN SATURATION: 97 % | RESPIRATION RATE: 16 BRPM | HEIGHT: 60 IN | BODY MASS INDEX: 24.94 KG/M2

## 2021-11-08 DIAGNOSIS — K62.5 HEMORRHAGE OF ANUS AND RECTUM: ICD-10-CM

## 2021-11-08 PROCEDURE — 99214 OFFICE O/P EST MOD 30 MIN: CPT | Mod: 25

## 2021-11-09 LAB
ALBUMIN SERPL ELPH-MCNC: 4.1 G/DL
ALP BLD-CCNC: 122 U/L
ALT SERPL-CCNC: 24 U/L
ANION GAP SERPL CALC-SCNC: 12 MMOL/L
AST SERPL-CCNC: 22 U/L
BASOPHILS # BLD AUTO: 0.04 K/UL
BASOPHILS NFR BLD AUTO: 0.6 %
BILIRUB SERPL-MCNC: 0.2 MG/DL
BUN SERPL-MCNC: 25 MG/DL
CALCIUM SERPL-MCNC: 9.6 MG/DL
CHLORIDE SERPL-SCNC: 105 MMOL/L
CK SERPL-CCNC: 236 U/L
CO2 SERPL-SCNC: 24 MMOL/L
CREAT SERPL-MCNC: 0.94 MG/DL
EOSINOPHIL # BLD AUTO: 0.81 K/UL
EOSINOPHIL NFR BLD AUTO: 11.6 %
ERYTHROCYTE [SEDIMENTATION RATE] IN BLOOD BY WESTERGREN METHOD: 49 MM/HR
GLUCOSE SERPL-MCNC: 149 MG/DL
HCT VFR BLD CALC: 36.9 %
HGB BLD-MCNC: 11.3 G/DL
IMM GRANULOCYTES NFR BLD AUTO: 0.1 %
LYMPHOCYTES # BLD AUTO: 2.22 K/UL
LYMPHOCYTES NFR BLD AUTO: 31.8 %
MAN DIFF?: NORMAL
MCHC RBC-ENTMCNC: 27.8 PG
MCHC RBC-ENTMCNC: 30.6 GM/DL
MCV RBC AUTO: 90.7 FL
MONOCYTES # BLD AUTO: 0.7 K/UL
MONOCYTES NFR BLD AUTO: 10 %
NEUTROPHILS # BLD AUTO: 3.21 K/UL
NEUTROPHILS NFR BLD AUTO: 45.9 %
PLATELET # BLD AUTO: 223 K/UL
POTASSIUM SERPL-SCNC: 4.6 MMOL/L
PROT SERPL-MCNC: 7.6 G/DL
RBC # BLD: 4.07 M/UL
RBC # FLD: 15.6 %
SODIUM SERPL-SCNC: 141 MMOL/L
URATE SERPL-MCNC: 4.8 MG/DL
WBC # FLD AUTO: 6.99 K/UL

## 2021-11-09 NOTE — HISTORY OF PRESENT ILLNESS
[FreeTextEntry8] : This is a 61-year-old female with a history of CAD, diabetes, hypertension, rectal bleed on Plavix in August 2021, abnormal MRCP with periampullary lesion followed by GI, previously has taken Lipitor, Crestor and Zetia which caused muscle pain, tolerating atorvastatin, and now also on Praluent.\par \par Patient awoke with pain in the right lateral ankle area 3 weeks ago.  Denies injury to the ankle.  She denies muscle pain.  Denies fever or chills.

## 2021-11-09 NOTE — PHYSICAL EXAM
[No Acute Distress] : no acute distress [Well Nourished] : well nourished [Well Developed] : well developed [Normal Sclera/Conjunctiva] : normal sclera/conjunctiva [Normal Outer Ear/Nose] : the outer ears and nose were normal in appearance [No JVD] : no jugular venous distention [No Lymphadenopathy] : no lymphadenopathy [Supple] : supple [No Respiratory Distress] : no respiratory distress  [No Accessory Muscle Use] : no accessory muscle use [Clear to Auscultation] : lungs were clear to auscultation bilaterally [Normal Rate] : normal rate  [Regular Rhythm] : with a regular rhythm [Normal S1, S2] : normal S1 and S2 [No Edema] : there was no peripheral edema [No Extremity Clubbing/Cyanosis] : no extremity clubbing/cyanosis [Soft] : abdomen soft [Non Tender] : non-tender [Non-distended] : non-distended [No HSM] : no HSM [Normal Bowel Sounds] : normal bowel sounds [Normal Anterior Cervical Nodes] : no anterior cervical lymphadenopathy [No Rash] : no rash [Coordination Grossly Intact] : coordination grossly intact [No Focal Deficits] : no focal deficits [Normal Gait] : normal gait [Normal Affect] : the affect was normal [Normal Insight/Judgement] : insight and judgment were intact [de-identified] : 2+ swelling of the lateral right ankle area.  Positive tenderness right lateral malleolus of R ankle.

## 2021-11-09 NOTE — ASSESSMENT
[FreeTextEntry1] : #1  3-week history of pain in the lateral right ankle area with swelling and tenderness on exam.  ? sprain, however patient denies injury.  ? gout, ? other.  Patient will rest right ankle, avoid weightbearing, ice as needed, compress with Ace wrap.  Try Tylenol as needed pain ( history of GI bleed on Plavix).  For x-ray of right ankle.  Labs for CBC, CMP, uric acid, CPK.  Pt referred to see Dr. Casarez, rheumatology,  ASAP. \par \par 11/9/21: pt also for US doppler of RLE.\par \par 11/9/21:  Case d/w'ed with rheumatology, Dr Casarez.  Recommend not stop lipitor.  Consider gout or pseudogout, ? autoimmune arthritis.  Pt given rx for medrol dose solange as recommended by Dr Casarez (instructed to take w food, continue omeprazole, strict no sweets while on, watch stools closely and ensure are brown.  all d/w'ed with patient's daughter on telephone today), pt to see Dr Mcneal, rheumatology, tomorrow AM, for evaluation and OK treating with medrol dose solange.

## 2021-11-10 ENCOUNTER — APPOINTMENT (OUTPATIENT)
Dept: RHEUMATOLOGY | Facility: CLINIC | Age: 61
End: 2021-11-10
Payer: MEDICAID

## 2021-11-10 ENCOUNTER — NON-APPOINTMENT (OUTPATIENT)
Age: 61
End: 2021-11-10

## 2021-11-10 VITALS
WEIGHT: 133 LBS | OXYGEN SATURATION: 96 % | DIASTOLIC BLOOD PRESSURE: 91 MMHG | BODY MASS INDEX: 26.11 KG/M2 | SYSTOLIC BLOOD PRESSURE: 150 MMHG | HEIGHT: 60 IN | TEMPERATURE: 97.2 F | HEART RATE: 106 BPM

## 2021-11-10 PROCEDURE — 99204 OFFICE O/P NEW MOD 45 MIN: CPT

## 2021-11-17 ENCOUNTER — APPOINTMENT (OUTPATIENT)
Dept: INTERNAL MEDICINE | Facility: CLINIC | Age: 61
End: 2021-11-17
Payer: MEDICAID

## 2021-11-17 VITALS
OXYGEN SATURATION: 99 % | TEMPERATURE: 98.4 F | SYSTOLIC BLOOD PRESSURE: 140 MMHG | WEIGHT: 133 LBS | HEART RATE: 86 BPM | DIASTOLIC BLOOD PRESSURE: 92 MMHG | BODY MASS INDEX: 26.11 KG/M2 | HEIGHT: 60 IN

## 2021-11-17 PROCEDURE — 99214 OFFICE O/P EST MOD 30 MIN: CPT | Mod: 25

## 2021-11-17 PROCEDURE — 96127 BRIEF EMOTIONAL/BEHAV ASSMT: CPT

## 2021-11-17 NOTE — ASSESSMENT
[FreeTextEntry1] : 1.right ankle pain and swelling: Repeat right ankle x-ray, obtain right lower leg venous duplex to rule out DVT as this was not done. All blood work reviewed. Uric acid negative with question gout. Discussed possibility of ligament or tendon injury however patient notes no trauma or fall. Will refer to orthopedic foot and ankle at this time as she may need MRI of the ankle. Discussed all with patient and her daughter.\par obtain RF factor.\par Call for new or worsening symptoms.\par Discussed signs and symptoms to warrant urgent evaluation with patient.\par

## 2021-11-17 NOTE — HISTORY OF PRESENT ILLNESS
[FreeTextEntry1] : FU [de-identified] : LIBAN DIAZ is a 61 year F who comes in for a follow up visit.\par Patient notes waking up on the weekend of November 6 with right lateral ankle swelling and pain. She's not states that she had any injury or common to this area. She did see urgent care an x-ray at that time was negative. She subsequently saw another doctor and repeat x-ray and ultrasound was ordered and she did not have these done. She saw rheumatology last week and all blood work done showed mildly elevated sedimentation rate. She was treated with Medrol Dosepak with mildly helped symptoms however after medications finishes having more pain and swelling again.

## 2021-11-18 ENCOUNTER — APPOINTMENT (OUTPATIENT)
Dept: RADIOLOGY | Facility: CLINIC | Age: 61
End: 2021-11-18
Payer: MEDICAID

## 2021-11-18 ENCOUNTER — RX RENEWAL (OUTPATIENT)
Age: 61
End: 2021-11-18

## 2021-11-18 ENCOUNTER — OUTPATIENT (OUTPATIENT)
Dept: OUTPATIENT SERVICES | Facility: HOSPITAL | Age: 61
LOS: 1 days | End: 2021-11-18
Payer: MEDICAID

## 2021-11-18 ENCOUNTER — APPOINTMENT (OUTPATIENT)
Dept: ORTHOPEDIC SURGERY | Facility: CLINIC | Age: 61
End: 2021-11-18
Payer: MEDICAID

## 2021-11-18 ENCOUNTER — APPOINTMENT (OUTPATIENT)
Dept: ULTRASOUND IMAGING | Facility: CLINIC | Age: 61
End: 2021-11-18
Payer: MEDICAID

## 2021-11-18 ENCOUNTER — NON-APPOINTMENT (OUTPATIENT)
Age: 61
End: 2021-11-18

## 2021-11-18 DIAGNOSIS — M25.571 PAIN IN RIGHT ANKLE AND JOINTS OF RIGHT FOOT: ICD-10-CM

## 2021-11-18 DIAGNOSIS — S86.309A UNSPECIFIED INJURY OF MUSCLE(S) AND TENDON(S) OF PERONEAL MUSCLE GROUP AT LOWER LEG LEVEL, UNSPECIFIED LEG, INITIAL ENCOUNTER: ICD-10-CM

## 2021-11-18 LAB — RHEUMATOID FACT SER QL: <10 IU/ML

## 2021-11-18 PROCEDURE — 99203 OFFICE O/P NEW LOW 30 MIN: CPT

## 2021-11-18 PROCEDURE — 73600 X-RAY EXAM OF ANKLE: CPT

## 2021-11-18 PROCEDURE — 73600 X-RAY EXAM OF ANKLE: CPT | Mod: 26,RT

## 2021-11-18 PROCEDURE — 93971 EXTREMITY STUDY: CPT | Mod: 26,RT

## 2021-11-18 PROCEDURE — 93971 EXTREMITY STUDY: CPT

## 2021-11-18 NOTE — HISTORY OF PRESENT ILLNESS
[FreeTextEntry1] : 61-year-old female presents to the office for right ankle pain.  Her pain began approximately 2 weeks ago and she cannot site any injury or trauma to attribute her pain.  Today she rates her pain an 8/10 and describes it as a sharp pain located along the lateral aspect of her right ankle that is worsened with walking and standing.  She reports that rest helps improve her pain and says that she has not attempted to take any medications or tried any topical anesthetic creams at this time.  She has no other complaints.

## 2021-11-18 NOTE — PHYSICAL EXAM
[de-identified] : Right Ankle Physical Examination:\par \par General: Alert and oriented x3.  In no acute distress.  Pleasant in nature with a normal affect.  No apparent respiratory distress. \par Erythema, Warmth, Rubor: Negative\par Swelling: Positive swelling lateral ankle over peroneal tendons.\par \par ROM: with pain\par 1. Dorsiflexion: 10 degrees\par 2. Plantarflexion: 40 degrees\par 3. Inversion: 10 degrees\par 4. Eversion: 10 degrees\par \par Tenderness to Palpation: \par 1. Lateral Malleolus: Negative\par 2. Medial Malleolus: Negative\par 3. Proximal Fibular Pain: Negative\par 4. Heel Pain: Negative\par 5. Cuboid: Positive\par 6. Navicular: Negative\par 7. Tibiotalar Joint: Negative\par 8. Subtalar Joint: Negative\par 9. Posterior Recess: Negative\par \par Tendon Pain:\par 1. Achilles: Negative\par 2. Peroneals: Positive\par 3. Posterior Tibialis: Negative\par 4. Tibialis Anterior: Negative\par \par Ligament Pain:\par 1. ATFL: Positive\par 2. CFL: Negative \par 3. PTFL: Negative\par 4. Deltoid Ligaments: Negative\par 5. Lis Franc Ligament: Negative\par \par Stability: \par 1. Anterior Drawer: Negative\par 2. Posterior Drawer: Negative\par \par Strength: 5/5 TA/GS/EHL\par \par Pulses: 2+ DP/PT Pulses\par \par Neuro: Intact motor and sensory\par \par Additional Test:\par 1. Calcaneal Squeeze Test: Negative\par 2. Syndesmosis Squeeze Test: Negative [de-identified] : No imaging performed.

## 2021-11-18 NOTE — REASON FOR VISIT
[Initial Visit] : an initial visit for [Ankle Pain] : ankle pain [FreeTextEntry2] : right ankle pain

## 2021-11-18 NOTE — DISCUSSION/SUMMARY
[de-identified] : Assessment: Right ankle injury/pain: \par \par Plan:\par 1.  Patient scheduled to receive an MRI of right ankle.\par 2.  NSAIDs/Tylenol as needed for pain. \par 3.  ASO brace provided to the patient, to be used as directed for stability and support.\par 4. Continue with ice and heat therapy. \par 5. All questions answered.  Follow-up post MRI to be reviewed in office. The patient understood the treatment plan.

## 2021-11-19 ENCOUNTER — NON-APPOINTMENT (OUTPATIENT)
Age: 61
End: 2021-11-19

## 2021-11-22 ENCOUNTER — NON-APPOINTMENT (OUTPATIENT)
Age: 61
End: 2021-11-22

## 2021-11-29 ENCOUNTER — APPOINTMENT (OUTPATIENT)
Dept: ORTHOPEDIC SURGERY | Facility: CLINIC | Age: 61
End: 2021-11-29
Payer: MEDICAID

## 2021-11-29 PROCEDURE — ZZZZZ: CPT

## 2021-12-02 ENCOUNTER — RX RENEWAL (OUTPATIENT)
Age: 61
End: 2021-12-02

## 2021-12-08 ENCOUNTER — APPOINTMENT (OUTPATIENT)
Dept: INTERNAL MEDICINE | Facility: CLINIC | Age: 61
End: 2021-12-08

## 2021-12-08 ENCOUNTER — APPOINTMENT (OUTPATIENT)
Dept: RHEUMATOLOGY | Facility: CLINIC | Age: 61
End: 2021-12-08

## 2021-12-08 ENCOUNTER — APPOINTMENT (OUTPATIENT)
Dept: ORTHOPEDIC SURGERY | Facility: CLINIC | Age: 61
End: 2021-12-08

## 2021-12-12 ENCOUNTER — APPOINTMENT (OUTPATIENT)
Dept: MRI IMAGING | Facility: CLINIC | Age: 61
End: 2021-12-12

## 2021-12-21 ENCOUNTER — APPOINTMENT (OUTPATIENT)
Dept: GASTROENTEROLOGY | Facility: CLINIC | Age: 61
End: 2021-12-21

## 2021-12-22 ENCOUNTER — RX RENEWAL (OUTPATIENT)
Age: 61
End: 2021-12-22

## 2021-12-23 ENCOUNTER — RX RENEWAL (OUTPATIENT)
Age: 61
End: 2021-12-23

## 2022-01-04 ENCOUNTER — APPOINTMENT (OUTPATIENT)
Dept: INTERNAL MEDICINE | Facility: CLINIC | Age: 62
End: 2022-01-04
Payer: MEDICAID

## 2022-01-04 VITALS
SYSTOLIC BLOOD PRESSURE: 144 MMHG | BODY MASS INDEX: 26.5 KG/M2 | HEIGHT: 60 IN | OXYGEN SATURATION: 99 % | WEIGHT: 135 LBS | TEMPERATURE: 98.1 F | HEART RATE: 85 BPM | DIASTOLIC BLOOD PRESSURE: 96 MMHG

## 2022-01-04 DIAGNOSIS — M25.571 PAIN IN RIGHT ANKLE AND JOINTS OF RIGHT FOOT: ICD-10-CM

## 2022-01-04 PROCEDURE — 99214 OFFICE O/P EST MOD 30 MIN: CPT

## 2022-01-04 NOTE — HISTORY OF PRESENT ILLNESS
[FreeTextEntry8] : Ms. LIBAN DIAZ is a 61 year F who comes in for an acute visit.\par Pt notes cough for the past week with productive yellow phlegm, wheezing. She has hx of asthma for which she used to take albuterol prn. SHe has not had exacerbation in over a year. Her 2 older sons are sick too but tested negative for covid. \par Pt with no known exposure to covid 19.\par \par She notes continued right ankle pain, MRI ankle ordered by ortho denied by insurance. She also has all over body/joint pain especially in hands. She wonders if cannabis would help her. \par Patient denies any cp, sob,abdominal pain, nausea, vomiting, palpitations, fever, chills, constipation, diarrhea.\par \par

## 2022-01-04 NOTE — ASSESSMENT
[FreeTextEntry1] : 1.Asthma exacerbation: start on albuterol prn, prednisone taper. Went over instructions and side effects of medication.\par Covid 19 PCR obtained in office, advised to quarantine until results return. \par Call for new or worsening symptoms.\par Discussed signs and symptoms to warrant urgent evaluation with patient.\par \par 2.Right ankle pain/swelling: f/u with ortho on treatment as MRI ankle denied, start on prednisone taper. \par \par 3.Joint pain: pt request referral for medical marijuana. \par

## 2022-01-06 ENCOUNTER — APPOINTMENT (OUTPATIENT)
Dept: CARDIOLOGY | Facility: CLINIC | Age: 62
End: 2022-01-06
Payer: MEDICAID

## 2022-01-06 ENCOUNTER — NON-APPOINTMENT (OUTPATIENT)
Age: 62
End: 2022-01-06

## 2022-01-06 VITALS
HEART RATE: 93 BPM | OXYGEN SATURATION: 97 % | SYSTOLIC BLOOD PRESSURE: 138 MMHG | BODY MASS INDEX: 26.31 KG/M2 | HEIGHT: 60 IN | DIASTOLIC BLOOD PRESSURE: 86 MMHG | WEIGHT: 134 LBS

## 2022-01-06 LAB — SARS-COV-2 N GENE NPH QL NAA+PROBE: NOT DETECTED

## 2022-01-06 PROCEDURE — 93000 ELECTROCARDIOGRAM COMPLETE: CPT

## 2022-01-06 PROCEDURE — 99214 OFFICE O/P EST MOD 30 MIN: CPT | Mod: 25

## 2022-01-06 NOTE — HISTORY OF PRESENT ILLNESS
[FreeTextEntry1] : 60 Y/O female PMH:  CAD S/P PCI x3, HTN, HLD, diabetes, rectal bleed not on Plavix here today in routine cardiac follow up claims to be feeling well no CP/SOB/Palpitations \par \par Nuclear stress test 7/1/21 Non ischemic\par Echo NL LV FX\par

## 2022-01-06 NOTE — DISCUSSION/SUMMARY
[FreeTextEntry1] : CAD: no active cardiac complaints \par \par HLD: Continue current medications will repeat lipid profile now\par \par HTN: Controlled\par \par Anemia/GI bleed followed with GI\par \par testing ordered \par \par OV 3 months

## 2022-01-17 ENCOUNTER — APPOINTMENT (OUTPATIENT)
Dept: ULTRASOUND IMAGING | Facility: CLINIC | Age: 62
End: 2022-01-17
Payer: MEDICAID

## 2022-01-17 ENCOUNTER — RESULT REVIEW (OUTPATIENT)
Age: 62
End: 2022-01-17

## 2022-01-17 ENCOUNTER — OUTPATIENT (OUTPATIENT)
Dept: OUTPATIENT SERVICES | Facility: HOSPITAL | Age: 62
LOS: 1 days | End: 2022-01-17
Payer: MEDICAID

## 2022-01-17 DIAGNOSIS — I25.10 ATHEROSCLEROTIC HEART DISEASE OF NATIVE CORONARY ARTERY WITHOUT ANGINA PECTORIS: ICD-10-CM

## 2022-01-17 PROCEDURE — 93880 EXTRACRANIAL BILAT STUDY: CPT

## 2022-01-17 PROCEDURE — 76775 US EXAM ABDO BACK WALL LIM: CPT

## 2022-01-17 PROCEDURE — 76775 US EXAM ABDO BACK WALL LIM: CPT | Mod: 26

## 2022-01-17 PROCEDURE — 93880 EXTRACRANIAL BILAT STUDY: CPT | Mod: 26

## 2022-01-18 LAB
CHOLEST SERPL-MCNC: 205 MG/DL
HDLC SERPL-MCNC: 51 MG/DL
LDLC SERPL CALC-MCNC: 127 MG/DL
NONHDLC SERPL-MCNC: 154 MG/DL
TRIGL SERPL-MCNC: 135 MG/DL

## 2022-01-21 DIAGNOSIS — K21.9 GASTRO-ESOPHAGEAL REFLUX DISEASE W/OUT ESOPHAGITIS: ICD-10-CM

## 2022-02-08 ENCOUNTER — RX RENEWAL (OUTPATIENT)
Age: 62
End: 2022-02-08

## 2022-03-21 ENCOUNTER — RX RENEWAL (OUTPATIENT)
Age: 62
End: 2022-03-21

## 2022-03-22 ENCOUNTER — RX RENEWAL (OUTPATIENT)
Age: 62
End: 2022-03-22

## 2022-04-01 ENCOUNTER — RX RENEWAL (OUTPATIENT)
Age: 62
End: 2022-04-01

## 2022-04-04 ENCOUNTER — NON-APPOINTMENT (OUTPATIENT)
Age: 62
End: 2022-04-04

## 2022-04-04 LAB
ALBUMIN SERPL ELPH-MCNC: 4.6 G/DL
ALP BLD-CCNC: 127 U/L
ALT SERPL-CCNC: 40 U/L
ANION GAP SERPL CALC-SCNC: 11 MMOL/L
AST SERPL-CCNC: 35 U/L
BILIRUB SERPL-MCNC: 0.3 MG/DL
BUN SERPL-MCNC: 17 MG/DL
CALCIUM SERPL-MCNC: 9.6 MG/DL
CHLORIDE SERPL-SCNC: 104 MMOL/L
CHOLEST SERPL-MCNC: 106 MG/DL
CK SERPL-CCNC: 197 U/L
CO2 SERPL-SCNC: 26 MMOL/L
CREAT SERPL-MCNC: 0.81 MG/DL
EGFR: 84 ML/MIN/1.73M2
GLUCOSE SERPL-MCNC: 158 MG/DL
HDLC SERPL-MCNC: 47 MG/DL
LDLC SERPL CALC-MCNC: 32 MG/DL
NONHDLC SERPL-MCNC: 59 MG/DL
POTASSIUM SERPL-SCNC: 4.5 MMOL/L
PROT SERPL-MCNC: 7.7 G/DL
SODIUM SERPL-SCNC: 142 MMOL/L
TRIGL SERPL-MCNC: 136 MG/DL

## 2022-04-20 ENCOUNTER — NON-APPOINTMENT (OUTPATIENT)
Age: 62
End: 2022-04-20

## 2022-05-12 ENCOUNTER — APPOINTMENT (OUTPATIENT)
Dept: CARDIOLOGY | Facility: CLINIC | Age: 62
End: 2022-05-12

## 2022-05-18 ENCOUNTER — RX RENEWAL (OUTPATIENT)
Age: 62
End: 2022-05-18

## 2022-05-26 ENCOUNTER — APPOINTMENT (OUTPATIENT)
Dept: INTERNAL MEDICINE | Facility: CLINIC | Age: 62
End: 2022-05-26
Payer: MEDICAID

## 2022-05-26 VITALS — DIASTOLIC BLOOD PRESSURE: 84 MMHG | SYSTOLIC BLOOD PRESSURE: 138 MMHG

## 2022-05-26 VITALS
OXYGEN SATURATION: 98 % | DIASTOLIC BLOOD PRESSURE: 90 MMHG | HEART RATE: 103 BPM | HEIGHT: 60 IN | BODY MASS INDEX: 25.91 KG/M2 | WEIGHT: 132 LBS | TEMPERATURE: 98.3 F | SYSTOLIC BLOOD PRESSURE: 146 MMHG

## 2022-05-26 PROCEDURE — 99214 OFFICE O/P EST MOD 30 MIN: CPT

## 2022-05-26 RX ORDER — PREDNISONE 20 MG/1
20 TABLET ORAL
Qty: 23 | Refills: 0 | Status: DISCONTINUED | COMMUNITY
Start: 2021-11-17 | End: 2022-05-26

## 2022-05-26 RX ORDER — DICLOFENAC SODIUM 20 MG/G
2 SOLUTION TOPICAL
Qty: 1 | Refills: 4 | Status: DISCONTINUED | COMMUNITY
Start: 2021-11-18 | End: 2022-05-26

## 2022-05-26 RX ORDER — MELOXICAM 15 MG/1
15 TABLET ORAL DAILY
Qty: 30 | Refills: 2 | Status: DISCONTINUED | COMMUNITY
Start: 2021-11-18 | End: 2022-05-26

## 2022-05-26 RX ORDER — METHYLPREDNISOLONE 4 MG/1
4 TABLET ORAL
Qty: 1 | Refills: 0 | Status: DISCONTINUED | COMMUNITY
Start: 2021-11-09 | End: 2022-05-26

## 2022-05-26 NOTE — HISTORY OF PRESENT ILLNESS
[FreeTextEntry1] : FU [de-identified] : NUBIA DIAZ is a 62 year F who comes in for a follow up visit.\par Pt notes about 3 weeks ago had "swelling all over body" that self resolved.\par She notes 1 week she had nausea/vomiting and dizziness and then stopped Metformin and felt better. She has been taking Metformin for many years. \par Per  she is on 2 medications for HLD. \par She does have hx of insomnia and doesn't sleep well for the past few months, averaging 3-4 hours/night.\par Patient denies any cp, sob,abdominal pain, nausea, vomiting, palpitations, fever, chills, constipation, diarrhea.\par

## 2022-05-26 NOTE — ASSESSMENT
[FreeTextEntry1] : 1.hypertension: Well-controlled on metoprolol  mg once daily. Check blood pressure daily, if greater than 150/90, call MD. Keep 2 gm low sodium diet, exercise as tolerated.\par \par 2.hyperlipidemia: Continue on Praluent 75 mg weekly and atorvastatin 80 mg and follow-up with cardiology, recent lipids reviewed. Patient advised on low cholesterol diet-decrease in white carbs and exercise 150 minutes per week.\par \par 3.diabetes mellitus type 2: Unable to tolerate metformin and recently stopped 1 week ago due to side effects, recheck hemoglobin A1c and will need to start new diabetic medication.\par \par 4.insomnia with depression: Discussed starting on trazodone 50 mg at bedtime. Went over instructions and side effects of medication.\par \par 5.familial polyposis: Overdue for colonoscopy since February 2022, repeat upper endoscopy and November 2022 with GI.  Advised to follow-up with GI promptly.

## 2022-06-03 ENCOUNTER — APPOINTMENT (OUTPATIENT)
Dept: CARDIOLOGY | Facility: CLINIC | Age: 62
End: 2022-06-03
Payer: MEDICAID

## 2022-06-03 ENCOUNTER — NON-APPOINTMENT (OUTPATIENT)
Age: 62
End: 2022-06-03

## 2022-06-03 VITALS
OXYGEN SATURATION: 97 % | WEIGHT: 132.28 LBS | HEART RATE: 94 BPM | BODY MASS INDEX: 25.97 KG/M2 | DIASTOLIC BLOOD PRESSURE: 90 MMHG | SYSTOLIC BLOOD PRESSURE: 130 MMHG | HEIGHT: 60 IN

## 2022-06-03 VITALS — DIASTOLIC BLOOD PRESSURE: 82 MMHG | SYSTOLIC BLOOD PRESSURE: 126 MMHG

## 2022-06-03 PROCEDURE — 93000 ELECTROCARDIOGRAM COMPLETE: CPT

## 2022-06-03 PROCEDURE — 99214 OFFICE O/P EST MOD 30 MIN: CPT

## 2022-06-03 RX ORDER — METFORMIN HYDROCHLORIDE 1000 MG/1
1000 TABLET, COATED ORAL TWICE DAILY
Qty: 180 | Refills: 1 | Status: DISCONTINUED | COMMUNITY
End: 2022-06-03

## 2022-06-03 NOTE — HISTORY OF PRESENT ILLNESS
[FreeTextEntry1] : 61 Y/O female PMH:  CAD S/P PCI x3, HTN, HLD, diabetes, rectal bleed not on Plavix here today in routine cardiac follow up claims to be feeling well no CP/SOB/Palpitations. C/o HA x 2 weeks ?seasonal allergies \par \par Nuclear stress test 7/1/21 Non ischemic\par Echo NL LV FX\par

## 2022-06-03 NOTE — PHYSICAL EXAM
[Normal S1, S2] : normal S1, S2 [Soft] : abdomen soft [Non Tender] : non-tender [Normal Gait] : normal gait [No Edema] : no edema [Normal] : moves all extremities, no focal deficits, normal speech [Alert and Oriented] : alert and oriented [Well Developed] : well developed [Well Nourished] : well nourished [Clear Lung Fields] : clear lung fields

## 2022-06-03 NOTE — DISCUSSION/SUMMARY
[FreeTextEntry1] : CAD: no active cardiac complaints.  EKG done to evaluate ischemia\par \par HLD: Continue current medications LDL at goal\par \par HTN: Controlled\par \par Anemia/GI bleed followed with GI\par \par HA: most likely due to seasonal allergies, recommend otc allegra or zyrtec without decongestant\par \par OV 3 months

## 2022-06-04 LAB
25(OH)D3 SERPL-MCNC: 17.5 NG/ML
BASOPHILS # BLD AUTO: 0.04 K/UL
BASOPHILS NFR BLD AUTO: 0.5 %
EOSINOPHIL # BLD AUTO: 1.25 K/UL
EOSINOPHIL NFR BLD AUTO: 16.7 %
ESTIMATED AVERAGE GLUCOSE: 180 MG/DL
HBA1C MFR BLD HPLC: 7.9 %
HCT VFR BLD CALC: 44.1 %
HGB BLD-MCNC: 13.9 G/DL
IMM GRANULOCYTES NFR BLD AUTO: 0.3 %
LYMPHOCYTES # BLD AUTO: 1.94 K/UL
LYMPHOCYTES NFR BLD AUTO: 25.9 %
MAN DIFF?: NORMAL
MCHC RBC-ENTMCNC: 30.5 PG
MCHC RBC-ENTMCNC: 31.5 GM/DL
MCV RBC AUTO: 96.7 FL
MONOCYTES # BLD AUTO: 0.57 K/UL
MONOCYTES NFR BLD AUTO: 7.6 %
NEUTROPHILS # BLD AUTO: 3.66 K/UL
NEUTROPHILS NFR BLD AUTO: 49 %
PLATELET # BLD AUTO: 228 K/UL
RBC # BLD: 4.56 M/UL
RBC # FLD: 13.8 %
TSH SERPL-ACNC: 1.85 UIU/ML
WBC # FLD AUTO: 7.48 K/UL

## 2022-06-06 ENCOUNTER — NON-APPOINTMENT (OUTPATIENT)
Age: 62
End: 2022-06-06

## 2022-06-10 ENCOUNTER — APPOINTMENT (OUTPATIENT)
Dept: INTERNAL MEDICINE | Facility: CLINIC | Age: 62
End: 2022-06-10
Payer: MEDICAID

## 2022-06-10 VITALS
SYSTOLIC BLOOD PRESSURE: 132 MMHG | OXYGEN SATURATION: 99 % | RESPIRATION RATE: 16 BRPM | HEIGHT: 60 IN | DIASTOLIC BLOOD PRESSURE: 80 MMHG | BODY MASS INDEX: 26.11 KG/M2 | TEMPERATURE: 97.7 F | WEIGHT: 133 LBS | HEART RATE: 98 BPM

## 2022-06-10 DIAGNOSIS — R68.89 OTHER GENERAL SYMPTOMS AND SIGNS: ICD-10-CM

## 2022-06-10 DIAGNOSIS — Z20.828 CONTACT WITH AND (SUSPECTED) EXPOSURE TO OTHER VIRAL COMMUNICABLE DISEASES: ICD-10-CM

## 2022-06-10 LAB
FLUAV SPEC QL CULT: NEGATIVE
FLUBV AG SPEC QL IA: NEGATIVE

## 2022-06-10 PROCEDURE — 87804 INFLUENZA ASSAY W/OPTIC: CPT | Mod: 59,QW

## 2022-06-10 PROCEDURE — 99214 OFFICE O/P EST MOD 30 MIN: CPT | Mod: 25

## 2022-06-10 NOTE — HISTORY OF PRESENT ILLNESS
[[employer] ] : provided by [employer]   [Interpreters_IDNumber] : [id number] [TWNoteComboBox1] : [place] [FreeTextEntry8] : \par 61 yo F DM, HTN, asthma, HLD, CAD, GERD, familial polyposis, insomnia, vit d def for acute visit\par \par cc: needs diabetes medication\par \par c/o nasal congestion x 3 d\par +HA x 2d\par +HA, body aches since this morning\par \par Taking Tylenol which helps sx's- last this morning\par \par Denies fever, chills, cough, CP, sob or dizziness.\par \par +sick grand-daughter who she takes care of her- was dx'd with flu infection\par \par Reports is socially distancing and using precautions for covid prevention.\par -hx pfizer series, hx booster 1/22\par -no hx flu shot 2021\par \par \par DM- reports hx metformin but off x 2 weeks- states stopped 2/2 GI upset, notes sx's since resolved.  States told by PMD can start new medication, but not sure of what.\par -wants to avoid metformin all together\par -no hx prior oral meds\par -no home fs checked, has no machine at home\par -adherent with ADA diet\par -exercise: walks 30 mins 3x/wk - done w/o sx's or limitations\par -does not see optho, needs referral.  Denies vision trouble- uses reading glasses.\par -denies foot complaints.\par \par Reports stable wt in past year.\par Reports nl appetite and BMs.\par Denies GI complaints.\par \par

## 2022-06-10 NOTE — ASSESSMENT
[FreeTextEntry1] : \par 61 yo F DM, HTN, asthma, HLD, CAD, GERD, familial polyposis, insomnia, vit d def for acute visit\par \par \par flu-sx's (s/p flu exposure) - day 3 of sx's.  POCT flu negative.  VSS and nontoxic appearing.\par -not candidate for Tamiflu\par -check RVP swab/collected\par -advised rest, increased hydration and OTC meds for sx's \par -advised prompt medica eval if sx's worsen or new sx's arise (advised to check for fever prior to taking Tylenol)\par \par DM- 5/22 A1c 7.9\par -hx intolerance to metformin 2/2 GI sx's, declines lower dose\par -willing to start Januvia 100mg qd\par -advised ADA diet, exercise, wt loss\par -advised home fs monitoring (Rx's for supplies escribed) and to f/u in 2 weeks with PMD for FS review\par -optho referral for screening\par \par Pt's cell: [phone]

## 2022-06-10 NOTE — PHYSICAL EXAM
[No Acute Distress] : no acute distress [Well-Appearing] : well-appearing [Normal Sclera/Conjunctiva] : normal sclera/conjunctiva [PERRL] : pupils equal round and reactive to light [EOMI] : extraocular movements intact [Normal Outer Ear/Nose] : the outer ears and nose were normal in appearance [Normal Oropharynx] : the oropharynx was normal [Normal TMs] : both tympanic membranes were normal [Normal Nasal Mucosa] : the nasal mucosa was normal [No Lymphadenopathy] : no lymphadenopathy [Supple] : supple [No Respiratory Distress] : no respiratory distress  [Clear to Auscultation] : lungs were clear to auscultation bilaterally [Normal Rate] : normal rate  [Regular Rhythm] : with a regular rhythm [Normal S1, S2] : normal S1 and S2 [No Murmur] : no murmur heard [No Edema] : there was no peripheral edema [Soft] : abdomen soft [Non Tender] : non-tender [No HSM] : no HSM [Normal Supraclavicular Nodes] : no supraclavicular lymphadenopathy [Normal Posterior Cervical Nodes] : no posterior cervical lymphadenopathy [Normal Anterior Cervical Nodes] : no anterior cervical lymphadenopathy [No CVA Tenderness] : no CVA  tenderness [No Spinal Tenderness] : no spinal tenderness [No Joint Swelling] : no joint swelling [Grossly Normal Strength/Tone] : grossly normal strength/tone [No Rash] : no rash [No Focal Deficits] : no focal deficits [Normal Gait] : normal gait [Normal Affect] : the affect was normal [Alert and Oriented x3] : oriented to person, place, and time [de-identified] : no photophobia [de-identified] : no sinus tenderness, no visible nasal d/c

## 2022-06-10 NOTE — REVIEW OF SYSTEMS
[Negative] : Integumentary [FreeTextEntry4] : see HPI [FreeTextEntry9] : see HPI [de-identified] : see HPI

## 2022-06-13 ENCOUNTER — NON-APPOINTMENT (OUTPATIENT)
Age: 62
End: 2022-06-13

## 2022-06-13 LAB
RAPID RVP RESULT: DETECTED
RV+EV RNA SPEC QL NAA+PROBE: DETECTED
SARS-COV-2 RNA PNL RESP NAA+PROBE: NOT DETECTED

## 2022-06-16 ENCOUNTER — RX RENEWAL (OUTPATIENT)
Age: 62
End: 2022-06-16

## 2022-06-20 ENCOUNTER — NON-APPOINTMENT (OUTPATIENT)
Age: 62
End: 2022-06-20

## 2022-06-21 ENCOUNTER — RX CHANGE (OUTPATIENT)
Age: 62
End: 2022-06-21

## 2022-06-30 ENCOUNTER — RX RENEWAL (OUTPATIENT)
Age: 62
End: 2022-06-30

## 2022-07-01 ENCOUNTER — RX RENEWAL (OUTPATIENT)
Age: 62
End: 2022-07-01

## 2022-07-05 ENCOUNTER — APPOINTMENT (OUTPATIENT)
Dept: INTERNAL MEDICINE | Facility: CLINIC | Age: 62
End: 2022-07-05

## 2022-07-05 VITALS — DIASTOLIC BLOOD PRESSURE: 90 MMHG | SYSTOLIC BLOOD PRESSURE: 138 MMHG

## 2022-07-05 VITALS
TEMPERATURE: 98.1 F | DIASTOLIC BLOOD PRESSURE: 94 MMHG | SYSTOLIC BLOOD PRESSURE: 122 MMHG | BODY MASS INDEX: 25.72 KG/M2 | OXYGEN SATURATION: 98 % | WEIGHT: 131 LBS | HEART RATE: 93 BPM | HEIGHT: 60 IN

## 2022-07-05 PROCEDURE — 99214 OFFICE O/P EST MOD 30 MIN: CPT

## 2022-07-05 PROCEDURE — 82962 GLUCOSE BLOOD TEST: CPT

## 2022-07-05 RX ORDER — SITAGLIPTIN 100 MG/1
100 TABLET, FILM COATED ORAL DAILY
Qty: 1 | Refills: 3 | Status: DISCONTINUED | COMMUNITY
Start: 2022-06-10 | End: 2022-07-05

## 2022-07-05 RX ORDER — LANCETS
EACH MISCELLANEOUS
Qty: 1 | Refills: 1 | Status: ACTIVE | COMMUNITY
Start: 2022-07-05 | End: 1900-01-01

## 2022-07-05 RX ORDER — BLOOD-GLUCOSE METER
W/DEVICE KIT MISCELLANEOUS
Qty: 1 | Refills: 0 | Status: ACTIVE | COMMUNITY
Start: 2022-07-05 | End: 1900-01-01

## 2022-07-05 NOTE — HISTORY OF PRESENT ILLNESS
[FreeTextEntry1] : FU [de-identified] : NUBIA DIAZ is a 62 year F who comes in for a follow up visit.\par Patient states she is feeling well on alogliptin and is not having any side effects.  She does not check her blood sugars as she states the pharmacy never received leukosis meter prescription.\par She notes trazodone 100 mg has not needed and her sleep and she continues to wake 3-4 times in evening.\par Patient denies any cp, sob,abdominal pain, nausea, vomiting, palpitations, fever, chills, constipation, diarrhea.\par

## 2022-07-05 NOTE — ASSESSMENT
[FreeTextEntry1] : 1.diabetes mellitus type 2: Doing well on alogliptin 25 mg once daily, recheck hemoglobin A1c in 2 to 3 months. Pt advised to keep diabetic diet, decrease carbs and increase dietary protein intake.\par Exercise as tolerated 3-4 times a week.\par Blood glucose 163, patient ate bagel today.  Discussed dietary changes to make.\par \par 2.insomnia: No relief with trazodone, melatonin and other over-the-counter medications.  Try Lunesta 1 to 2 mg as needed at bedtime.  Follow-up in 3 months. Went over instructions and side effects of medication.\par \par 3.depression: Counseling provided to the patient.

## 2022-07-06 LAB — GLUCOSE BLDC GLUCOMTR-MCNC: 163

## 2022-07-07 ENCOUNTER — RX CHANGE (OUTPATIENT)
Age: 62
End: 2022-07-07

## 2022-07-24 ENCOUNTER — NON-APPOINTMENT (OUTPATIENT)
Age: 62
End: 2022-07-24

## 2022-08-01 ENCOUNTER — NON-APPOINTMENT (OUTPATIENT)
Age: 62
End: 2022-08-01

## 2022-08-04 ENCOUNTER — RX RENEWAL (OUTPATIENT)
Age: 62
End: 2022-08-04

## 2022-08-04 ENCOUNTER — NON-APPOINTMENT (OUTPATIENT)
Age: 62
End: 2022-08-04

## 2022-08-08 ENCOUNTER — RX RENEWAL (OUTPATIENT)
Age: 62
End: 2022-08-08

## 2022-08-09 ENCOUNTER — APPOINTMENT (OUTPATIENT)
Dept: GASTROENTEROLOGY | Facility: CLINIC | Age: 62
End: 2022-08-09

## 2022-08-09 VITALS
SYSTOLIC BLOOD PRESSURE: 141 MMHG | WEIGHT: 130 LBS | HEIGHT: 60 IN | DIASTOLIC BLOOD PRESSURE: 91 MMHG | HEART RATE: 68 BPM | BODY MASS INDEX: 25.52 KG/M2

## 2022-08-09 PROCEDURE — 99214 OFFICE O/P EST MOD 30 MIN: CPT

## 2022-08-09 NOTE — REVIEW OF SYSTEMS
[Abdominal Pain] : abdominal pain [Negative] : Heme/Lymph [FreeTextEntry7] : Right lower quadrant discomfort, which has been there for several years. unchanged

## 2022-08-09 NOTE — HISTORY OF PRESENT ILLNESS
[FreeTextEntry1] : She has a history of familial polyposis had multiple polyps remaining in her remnant of the rectum who was supposed to have followup sigmoidoscopy 6 months ago. Appears today for her procedure. Will schedule as soon. She also had a dilated common bile duct. It is unclear whether she followed up with endoscopic ultrasound at this time will followup with sonogram. After her colonoscopy

## 2022-08-09 NOTE — PHYSICAL EXAM
[General Appearance - Alert] : alert [General Appearance - Well Nourished] : well nourished [PERRL With Normal Accommodation] : pupils were equal in size, round, and reactive to light [Hearing Threshold Finger Rub Not [place]] : hearing was normal [FreeTextEntry1] : midline scar no hernia present. Left lower quadrant fullness with tenderness no rebound No fluctuation [Cervical Lymph Nodes Enlarged Posterior Bilaterally] : posterior cervical [Cervical Lymph Nodes Enlarged Anterior Bilaterally] : anterior cervical [Supraclavicular Lymph Nodes Enlarged Bilaterally] : supraclavicular [Axillary Lymph Nodes Enlarged Bilaterally] : axillary [Femoral Lymph Nodes Enlarged Bilaterally] : femoral [Inguinal Lymph Nodes Enlarged Bilaterally] : inguinal [Skin Color & Pigmentation] : normal skin color and pigmentation [Skin Turgor] : normal skin turgor [] : no rash

## 2022-08-09 NOTE — ASSESSMENT
[FreeTextEntry1] : #1 familial polyposis syndrome, status post subtotal colectomy with rectal remnant containing multiple, polyps, we'll schedule sigmoidoscopy ASAP\par #2 dilated common bile duct after this procedure. We'll schedule sonogram and will repeat MRI

## 2022-08-12 ENCOUNTER — RX RENEWAL (OUTPATIENT)
Age: 62
End: 2022-08-12

## 2022-08-18 ENCOUNTER — RX CHANGE (OUTPATIENT)
Age: 62
End: 2022-08-18

## 2022-08-19 ENCOUNTER — RX CHANGE (OUTPATIENT)
Age: 62
End: 2022-08-19

## 2022-08-29 ENCOUNTER — RESULT REVIEW (OUTPATIENT)
Age: 62
End: 2022-08-29

## 2022-08-29 ENCOUNTER — APPOINTMENT (OUTPATIENT)
Dept: GASTROENTEROLOGY | Facility: AMBULATORY MEDICAL SERVICES | Age: 62
End: 2022-08-29

## 2022-08-29 PROCEDURE — 45380 COLONOSCOPY AND BIOPSY: CPT | Mod: GC

## 2022-08-30 ENCOUNTER — RX RENEWAL (OUTPATIENT)
Age: 62
End: 2022-08-30

## 2022-09-12 ENCOUNTER — RX RENEWAL (OUTPATIENT)
Age: 62
End: 2022-09-12

## 2022-09-29 ENCOUNTER — RX RENEWAL (OUTPATIENT)
Age: 62
End: 2022-09-29

## 2022-10-06 ENCOUNTER — APPOINTMENT (OUTPATIENT)
Dept: CARDIOLOGY | Facility: CLINIC | Age: 62
End: 2022-10-06

## 2022-10-06 VITALS
BODY MASS INDEX: 25.19 KG/M2 | TEMPERATURE: 96.3 F | OXYGEN SATURATION: 97 % | SYSTOLIC BLOOD PRESSURE: 150 MMHG | DIASTOLIC BLOOD PRESSURE: 78 MMHG | HEART RATE: 100 BPM | WEIGHT: 129 LBS

## 2022-10-06 PROCEDURE — 99214 OFFICE O/P EST MOD 30 MIN: CPT | Mod: 25

## 2022-10-06 PROCEDURE — 93000 ELECTROCARDIOGRAM COMPLETE: CPT

## 2022-10-06 NOTE — DISCUSSION/SUMMARY
[Coronary Artery Disease] : coronary artery disease [Stable] : stable [Hypertension] : hypertension [FreeTextEntry1] : CAD: no active cardiac complaints.  EKG done to evaluate ischemia\par \par HLD: Continue current medications LDL at goal\par \par HTN: Controlled\par \par Anemia/GI bleed followed with GI\par \par \par OV 4 months  [EKG obtained to assist in diagnosis and management of assessed problem(s)] : EKG obtained to assist in diagnosis and management of assessed problem(s)

## 2022-10-06 NOTE — PHYSICAL EXAM
[Well Developed] : well developed [Well Nourished] : well nourished [Normal S1, S2] : normal S1, S2 [Clear Lung Fields] : clear lung fields [Soft] : abdomen soft [Non Tender] : non-tender [Normal Gait] : normal gait [No Edema] : no edema [Normal] : moves all extremities, no focal deficits, normal speech [Alert and Oriented] : alert and oriented

## 2022-10-06 NOTE — HISTORY OF PRESENT ILLNESS
[FreeTextEntry1] : 61 Y/O female PMH:  CAD S/P PCI x3, HTN, HLD, diabetes, rectal bleed not on Plavix here today in routine cardiac follow up claims to be feeling well no CP/SOB/Palpitations. Pt is not exercising but is taking care of her grandchild. \par Nuclear stress test 7/1/21 Non ischemic\par Echo NL LV FX\par

## 2022-10-11 ENCOUNTER — APPOINTMENT (OUTPATIENT)
Dept: INTERNAL MEDICINE | Facility: CLINIC | Age: 62
End: 2022-10-11

## 2022-10-20 ENCOUNTER — APPOINTMENT (OUTPATIENT)
Dept: INTERNAL MEDICINE | Facility: CLINIC | Age: 62
End: 2022-10-20

## 2022-10-20 VITALS
HEART RATE: 69 BPM | SYSTOLIC BLOOD PRESSURE: 130 MMHG | BODY MASS INDEX: 25.72 KG/M2 | TEMPERATURE: 97.9 F | WEIGHT: 131 LBS | OXYGEN SATURATION: 99 % | HEIGHT: 60 IN | DIASTOLIC BLOOD PRESSURE: 90 MMHG

## 2022-10-20 DIAGNOSIS — D12.6 BENIGN NEOPLASM OF COLON, UNSPECIFIED: ICD-10-CM

## 2022-10-20 DIAGNOSIS — L65.9 NONSCARRING HAIR LOSS, UNSPECIFIED: ICD-10-CM

## 2022-10-20 DIAGNOSIS — Z23 ENCOUNTER FOR IMMUNIZATION: ICD-10-CM

## 2022-10-20 PROCEDURE — 90686 IIV4 VACC NO PRSV 0.5 ML IM: CPT

## 2022-10-20 PROCEDURE — 99214 OFFICE O/P EST MOD 30 MIN: CPT | Mod: 25

## 2022-10-20 PROCEDURE — G0008: CPT

## 2022-10-20 RX ORDER — ATORVASTATIN CALCIUM 80 MG/1
80 TABLET, FILM COATED ORAL DAILY
Qty: 30 | Refills: 2 | Status: DISCONTINUED | COMMUNITY
Start: 2021-01-13 | End: 2022-10-20

## 2022-10-20 RX ORDER — PRAMOXINE HYDROCHLORIDE HYDROCORTISONE ACETATE 100; 100 MG/10G; MG/10G
1-1 AEROSOL, FOAM TOPICAL TWICE DAILY
Qty: 30 | Refills: 3 | Status: DISCONTINUED | COMMUNITY
Start: 2021-08-02 | End: 2022-10-20

## 2022-10-20 RX ORDER — HYDROCORTISONE 25 MG/G
2.5 OINTMENT TOPICAL TWICE DAILY
Qty: 1 | Refills: 0 | Status: DISCONTINUED | COMMUNITY
Start: 2022-08-19 | End: 2022-10-20

## 2022-10-20 RX ORDER — NIRMATRELVIR AND RITONAVIR 300-100 MG
20 X 150 MG & KIT ORAL
Qty: 30 | Refills: 0 | Status: COMPLETED | COMMUNITY
Start: 2022-08-02

## 2022-10-20 NOTE — HISTORY OF PRESENT ILLNESS
[FreeTextEntry1] : FU [de-identified] : NUBIA DIAZ is a 62 year F who comes in for a follow up visit.\par Pt with hx of familial polyposis recently had colonoscopy done in August and will need repeat in 1 year.  She is also due for upper EGD next month and will need to make follow-up with GI.\par Pt with right lower back pain that began 2 weeks ago, non radiating that worsens when walking, no recent injury/trauma. She has tried Tylenol with only minimal pain relief. Denies any numbness/tingling, urinary or bowel incontinence or saddle anesthesia.  She also notes some pain in the left side of her neck.\par Patient also notes significant hair loss evenly distributed on her scalp for the past 3 months.\par Patient notes that Lunesta does give her about 4 hours of sleep and is willing to try higher dose medication.\par Patient denies any cp, sob,abdominal pain, nausea, vomiting, palpitations, fever, chills, constipation, diarrhea.\par

## 2022-10-20 NOTE — ASSESSMENT
[FreeTextEntry1] : 1.hair loss: Advised patient to restart on vitamin D weekly and given referral to dermatology at this time.\par \par 2.lower back and neck pain: Discussed heat therapy and to try tizanidine as needed and not to mix with Lunesta and she is agreeable to do so. Went over instructions and side effects of medication.\par \par 3.insomnia: Increase Lunesta to 4 mg nightly follow-up in 3 months, Went over instructions and side effects of medication.\par \par 4.familial polyposis: Recent colonoscopy reviewed, advised following up with GI for upper EGD and MRI of the abdomen as well.\par \par 5.  Diabetes mellitus type 2: Continue with medications and discussed blood work to obtain. Pt advised to keep diabetic diet, decrease carbs and increase dietary protein intake.\par Exercise as tolerated 3-4 times a week.\par

## 2022-10-21 ENCOUNTER — RX RENEWAL (OUTPATIENT)
Age: 62
End: 2022-10-21

## 2022-10-25 ENCOUNTER — RX RENEWAL (OUTPATIENT)
Age: 62
End: 2022-10-25

## 2022-11-02 LAB
25(OH)D3 SERPL-MCNC: 25.3 NG/ML
ALBUMIN SERPL ELPH-MCNC: 4.9 G/DL
ALP BLD-CCNC: 115 U/L
ALT SERPL-CCNC: 31 U/L
ANION GAP SERPL CALC-SCNC: 11 MMOL/L
AST SERPL-CCNC: 25 U/L
BASOPHILS # BLD AUTO: 0.04 K/UL
BASOPHILS NFR BLD AUTO: 0.6 %
BILIRUB SERPL-MCNC: 0.2 MG/DL
BUN SERPL-MCNC: 29 MG/DL
CALCIUM SERPL-MCNC: 10.2 MG/DL
CHLORIDE SERPL-SCNC: 106 MMOL/L
CHOLEST SERPL-MCNC: 240 MG/DL
CO2 SERPL-SCNC: 25 MMOL/L
CREAT SERPL-MCNC: 0.62 MG/DL
EGFR: 102 ML/MIN/1.73M2
EOSINOPHIL # BLD AUTO: 0.64 K/UL
EOSINOPHIL NFR BLD AUTO: 9.2 %
ESTIMATED AVERAGE GLUCOSE: 154 MG/DL
GLUCOSE SERPL-MCNC: 117 MG/DL
HBA1C MFR BLD HPLC: 7 %
HCT VFR BLD CALC: 41.4 %
HDLC SERPL-MCNC: 48 MG/DL
HGB BLD-MCNC: 13.2 G/DL
IMM GRANULOCYTES NFR BLD AUTO: 0.1 %
LDLC SERPL CALC-MCNC: 134 MG/DL
LYMPHOCYTES # BLD AUTO: 2.26 K/UL
LYMPHOCYTES NFR BLD AUTO: 32.5 %
MAN DIFF?: NORMAL
MCHC RBC-ENTMCNC: 31.5 PG
MCHC RBC-ENTMCNC: 31.9 GM/DL
MCV RBC AUTO: 98.8 FL
MONOCYTES # BLD AUTO: 0.69 K/UL
MONOCYTES NFR BLD AUTO: 9.9 %
NEUTROPHILS # BLD AUTO: 3.32 K/UL
NEUTROPHILS NFR BLD AUTO: 47.7 %
NONHDLC SERPL-MCNC: 192 MG/DL
PLATELET # BLD AUTO: 207 K/UL
POTASSIUM SERPL-SCNC: 4.3 MMOL/L
PROT SERPL-MCNC: 8.2 G/DL
RBC # BLD: 4.19 M/UL
RBC # FLD: 13.8 %
SODIUM SERPL-SCNC: 142 MMOL/L
TRIGL SERPL-MCNC: 293 MG/DL
TSH SERPL-ACNC: 2.46 UIU/ML
WBC # FLD AUTO: 6.96 K/UL

## 2022-11-03 ENCOUNTER — NON-APPOINTMENT (OUTPATIENT)
Age: 62
End: 2022-11-03

## 2022-11-08 ENCOUNTER — RX RENEWAL (OUTPATIENT)
Age: 62
End: 2022-11-08

## 2022-11-15 ENCOUNTER — RX RENEWAL (OUTPATIENT)
Age: 62
End: 2022-11-15

## 2022-11-17 ENCOUNTER — APPOINTMENT (OUTPATIENT)
Dept: INTERNAL MEDICINE | Facility: CLINIC | Age: 62
End: 2022-11-17

## 2022-11-22 ENCOUNTER — APPOINTMENT (OUTPATIENT)
Dept: INTERNAL MEDICINE | Facility: CLINIC | Age: 62
End: 2022-11-22

## 2022-11-22 ENCOUNTER — RESULT REVIEW (OUTPATIENT)
Age: 62
End: 2022-11-22

## 2022-11-22 VITALS
DIASTOLIC BLOOD PRESSURE: 90 MMHG | TEMPERATURE: 98.2 F | OXYGEN SATURATION: 98 % | HEIGHT: 60 IN | SYSTOLIC BLOOD PRESSURE: 124 MMHG | WEIGHT: 130 LBS | HEART RATE: 78 BPM | BODY MASS INDEX: 25.52 KG/M2

## 2022-11-22 DIAGNOSIS — M25.50 PAIN IN UNSPECIFIED JOINT: ICD-10-CM

## 2022-11-22 PROCEDURE — 99213 OFFICE O/P EST LOW 20 MIN: CPT

## 2022-11-22 RX ORDER — TIZANIDINE 2 MG/1
2 TABLET ORAL
Qty: 15 | Refills: 0 | Status: DISCONTINUED | COMMUNITY
Start: 2022-10-20 | End: 2022-11-22

## 2022-11-22 RX ORDER — ERGOCALCIFEROL 1.25 MG/1
1.25 MG CAPSULE ORAL
Qty: 8 | Refills: 0 | Status: DISCONTINUED | COMMUNITY
Start: 2022-06-06 | End: 2022-11-22

## 2022-11-23 NOTE — ASSESSMENT
[FreeTextEntry1] : 1.lower back pain: Advised to obtain lumbar spine x-rays at this time.  Start on Medrol Dosepak. Went over instructions and side effects of medication.\par \par 2.joint pain and hand pain: Obtain bilateral hand x-rays, discussed blood work to obtain and given referral to rheumatology.

## 2022-11-23 NOTE — HISTORY OF PRESENT ILLNESS
[FreeTextEntry8] : Ms. NUBIA DIAZ is a 62 year F who comes in for an acute visit.\par Patient states she continues to have allover body pain most painful in the lower back with numbness and tingling as well down her lower back.  She states that tizanidine did not help for pain relief.  She denies any urinary or bowel incontinence.  States that the pain has made it hard for her to go to work.\par She also notes bilateral hand pain and achiness for the past few months.\par Patient denies any cp, sob,abdominal pain, nausea, vomiting, palpitations, fever, chills, constipation, diarrhea.\par

## 2022-11-25 ENCOUNTER — OUTPATIENT (OUTPATIENT)
Dept: OUTPATIENT SERVICES | Facility: HOSPITAL | Age: 62
LOS: 1 days | End: 2022-11-25
Payer: MEDICAID

## 2022-11-25 ENCOUNTER — APPOINTMENT (OUTPATIENT)
Dept: RADIOLOGY | Facility: CLINIC | Age: 62
End: 2022-11-25

## 2022-11-25 DIAGNOSIS — Z00.8 ENCOUNTER FOR OTHER GENERAL EXAMINATION: ICD-10-CM

## 2022-11-25 PROCEDURE — 73120 X-RAY EXAM OF HAND: CPT | Mod: 26,50

## 2022-11-25 PROCEDURE — 72100 X-RAY EXAM L-S SPINE 2/3 VWS: CPT | Mod: 26

## 2022-11-25 PROCEDURE — 72100 X-RAY EXAM L-S SPINE 2/3 VWS: CPT

## 2022-11-25 PROCEDURE — 73120 X-RAY EXAM OF HAND: CPT

## 2022-12-06 ENCOUNTER — RX RENEWAL (OUTPATIENT)
Age: 62
End: 2022-12-06

## 2022-12-12 LAB
ANA SER IF-ACNC: NEGATIVE
CRP SERPL-MCNC: <3 MG/L
RHEUMATOID FACT SER QL: 11 IU/ML

## 2022-12-14 ENCOUNTER — NON-APPOINTMENT (OUTPATIENT)
Age: 62
End: 2022-12-14

## 2022-12-21 ENCOUNTER — RX RENEWAL (OUTPATIENT)
Age: 62
End: 2022-12-21

## 2023-01-10 ENCOUNTER — RX RENEWAL (OUTPATIENT)
Age: 63
End: 2023-01-10

## 2023-01-11 ENCOUNTER — RX RENEWAL (OUTPATIENT)
Age: 63
End: 2023-01-11

## 2023-01-16 ENCOUNTER — RX RENEWAL (OUTPATIENT)
Age: 63
End: 2023-01-16

## 2023-01-20 ENCOUNTER — APPOINTMENT (OUTPATIENT)
Dept: INTERNAL MEDICINE | Facility: CLINIC | Age: 63
End: 2023-01-20
Payer: MEDICAID

## 2023-01-20 VITALS
HEART RATE: 80 BPM | DIASTOLIC BLOOD PRESSURE: 64 MMHG | SYSTOLIC BLOOD PRESSURE: 126 MMHG | TEMPERATURE: 98 F | BODY MASS INDEX: 25.91 KG/M2 | WEIGHT: 132 LBS | OXYGEN SATURATION: 98 % | HEIGHT: 60 IN

## 2023-01-20 DIAGNOSIS — E55.9 VITAMIN D DEFICIENCY, UNSPECIFIED: ICD-10-CM

## 2023-01-20 DIAGNOSIS — M79.643 PAIN IN UNSPECIFIED HAND: ICD-10-CM

## 2023-01-20 DIAGNOSIS — M54.50 LOW BACK PAIN, UNSPECIFIED: ICD-10-CM

## 2023-01-20 PROCEDURE — 99214 OFFICE O/P EST MOD 30 MIN: CPT

## 2023-01-20 NOTE — HISTORY OF PRESENT ILLNESS
[FreeTextEntry1] : FU [de-identified] : NUBIA DIAZ is a 62 year F who comes in for a follow up visit.\par Pt had lumbar and hand xrays which we reviewed today which were negative.\par Also had inflammatory bw done which was normal as well.\par She continues to have numbness of b/l hands. \par She also continues to have lower back pain.  She noted hardened nodes? below her right knees especially when on her knees to to bend down.  She states that these have been near for many months.\par Patient denies any cp, sob,abdominal pain, nausea, vomiting, palpitations, fever, chills, constipation, diarrhea.\par

## 2023-01-20 NOTE — INTERPRETER SERVICES
[[employer] ] : provided by [employer]   [Interpreters_IDNumber] : [id number] [Interpreters_FullName] : Kellie [TWNoteComboBox1] : [place]

## 2023-01-20 NOTE — ASSESSMENT
[FreeTextEntry1] : 1.lower back pain: Recent x-rays reviewed, she is agreeable to start with physical therapy at this time..\par \par 2.joint pain and hand pain: Recent hand x-ray reviewed.  Discussed orthopedic referral at this time for upper extremity.  She will follow-up with rheumatology as well next month regarding knees and does not wish for additional imaging at this time.

## 2023-01-23 ENCOUNTER — RX RENEWAL (OUTPATIENT)
Age: 63
End: 2023-01-23

## 2023-01-27 ENCOUNTER — RX RENEWAL (OUTPATIENT)
Age: 63
End: 2023-01-27

## 2023-01-31 ENCOUNTER — NON-APPOINTMENT (OUTPATIENT)
Age: 63
End: 2023-01-31

## 2023-01-31 ENCOUNTER — APPOINTMENT (OUTPATIENT)
Dept: DERMATOLOGY | Facility: CLINIC | Age: 63
End: 2023-01-31
Payer: MEDICAID

## 2023-01-31 ENCOUNTER — RX RENEWAL (OUTPATIENT)
Age: 63
End: 2023-01-31

## 2023-01-31 DIAGNOSIS — L65.8 OTHER SPECIFIED NONSCARRING HAIR LOSS: ICD-10-CM

## 2023-01-31 DIAGNOSIS — L43.8 OTHER LICHEN PLANUS: ICD-10-CM

## 2023-01-31 PROCEDURE — 99203 OFFICE O/P NEW LOW 30 MIN: CPT

## 2023-01-31 RX ORDER — METHYLPREDNISOLONE 4 MG/1
4 TABLET ORAL
Qty: 1 | Refills: 0 | Status: DISCONTINUED | COMMUNITY
Start: 2022-11-22 | End: 2023-01-31

## 2023-01-31 NOTE — PHYSICAL EXAM
[FreeTextEntry3] : AAOx3, pleasant, NAD, no visual lymphadenopathy\par hair, scalp, face, nose, eyelids, ears, lips, oropharynx, neck, chest, abdomen, back, right arm, left arm, nails, and hands examined with all normal findings,\par pertinent findings include:\par \par thinning of hair\par hyperpigmentation on b/l temples and upper back

## 2023-01-31 NOTE — HISTORY OF PRESENT ILLNESS
[FreeTextEntry1] : hair loss [de-identified] : 62 year old female here for hair loss and pigmentary loss.

## 2023-01-31 NOTE — ASSESSMENT
[FreeTextEntry1] : hair loss\par female pattern hair loss\par education and prognosis\par discussed topical minoxidil; SED\par defers PO maylin and oral minoxidil\par defers PRP\par \par LPP\par education and prognosis

## 2023-02-09 ENCOUNTER — RX RENEWAL (OUTPATIENT)
Age: 63
End: 2023-02-09

## 2023-02-14 ENCOUNTER — APPOINTMENT (OUTPATIENT)
Dept: RHEUMATOLOGY | Facility: CLINIC | Age: 63
End: 2023-02-14
Payer: MEDICAID

## 2023-02-14 VITALS
OXYGEN SATURATION: 99 % | SYSTOLIC BLOOD PRESSURE: 138 MMHG | TEMPERATURE: 96.9 F | BODY MASS INDEX: 25.52 KG/M2 | DIASTOLIC BLOOD PRESSURE: 80 MMHG | WEIGHT: 130 LBS | HEART RATE: 101 BPM | HEIGHT: 60 IN

## 2023-02-14 PROCEDURE — 99214 OFFICE O/P EST MOD 30 MIN: CPT | Mod: 25

## 2023-02-14 PROCEDURE — 96372 THER/PROPH/DIAG INJ SC/IM: CPT

## 2023-02-14 RX ORDER — TIZANIDINE 4 MG/1
4 TABLET ORAL
Qty: 30 | Refills: 0 | Status: DISCONTINUED | COMMUNITY
Start: 2023-02-14 | End: 2023-02-14

## 2023-02-14 RX ORDER — TRIAMCINOLONE ACETONIDE 80 MG/ML
80 INJECTION, SUSPENSION INTRA-ARTICULAR; INTRAMUSCULAR
Qty: 8 | Refills: 0 | Status: COMPLETED | OUTPATIENT
Start: 2023-02-14

## 2023-02-14 RX ORDER — TRAZODONE HYDROCHLORIDE 50 MG/1
50 TABLET ORAL
Qty: 60 | Refills: 3 | Status: DISCONTINUED | COMMUNITY
Start: 2022-05-26 | End: 2023-02-14

## 2023-02-14 RX ADMIN — TRIAMCINOLONE ACETONIDE 0 MG/ML: 80 INJECTION, SUSPENSION INTRA-ARTICULAR; INTRAMUSCULAR at 00:00

## 2023-02-16 ENCOUNTER — NON-APPOINTMENT (OUTPATIENT)
Age: 63
End: 2023-02-16

## 2023-02-16 ENCOUNTER — APPOINTMENT (OUTPATIENT)
Dept: ORTHOPEDIC SURGERY | Facility: CLINIC | Age: 63
End: 2023-02-16
Payer: MEDICAID

## 2023-02-16 VITALS — WEIGHT: 132 LBS | HEIGHT: 59 IN | BODY MASS INDEX: 26.61 KG/M2

## 2023-02-16 DIAGNOSIS — M76.51 PATELLAR TENDINITIS, RIGHT KNEE: ICD-10-CM

## 2023-02-16 DIAGNOSIS — M25.561 PAIN IN RIGHT KNEE: ICD-10-CM

## 2023-02-16 DIAGNOSIS — M76.52 PATELLAR TENDINITIS, RIGHT KNEE: ICD-10-CM

## 2023-02-16 DIAGNOSIS — M25.562 PAIN IN RIGHT KNEE: ICD-10-CM

## 2023-02-16 PROCEDURE — 99213 OFFICE O/P EST LOW 20 MIN: CPT

## 2023-02-16 PROCEDURE — 73564 X-RAY EXAM KNEE 4 OR MORE: CPT | Mod: 50

## 2023-02-16 NOTE — PHYSICAL EXAM
[de-identified] : General Appearance: normal without acute distress\par Mental: Alert and oriented x 3\par Psych/affect: appropriate, cooperative\par Gait: Normal gait\par \par Bilateral knee\par Inspection: no effusion, no erythema.  Prominent tibial tubercles with callus\par Wounds: None.\par Alignment: neutral.\par Palpation: Tender palpation over tibial tubercle and patella tendon.\par ROM active (in degrees): 0-140 without crepitus or pain through the arc of motion\par Ligamentous laxity: stable to varus stress test, stable to valgus stress test. Negative Lachman's test\par Meniscal Test: negative McMurrays, negative Adriana.\par Muscle Test: good quad strength.  No pain with resisted extension [de-identified] : 2/16/2023–bilateral knee xrays, standing AP/Lateral and Merchant films, and 45 degree PA standing view taken today in the office are reviewed and demonstrate preserved joint space, no abnormalities

## 2023-02-16 NOTE — DISCUSSION/SUMMARY
[de-identified] : Bilateral patella tendinitis\par \par Extensive discussion of the natural history of this issue was had with the patient.  We discussed the treatment options focusing on conservative therapy which includes anti-inflammatories, physical therapy/home exercise, & activity modification.  Suggested Voltaren gel and Cho-Pat straps.  Patient to follow-up as needed.

## 2023-02-16 NOTE — HISTORY OF PRESENT ILLNESS
[de-identified] : Patient presents with bilateral knee pain.  States its on the anterior aspect of her knee around her patella tendon and tibial tubercle.  Has been going on for about 6 months.  Denies any inciting event pain with squatting and kneeling.  Takes Advil and Aleve but does not help that much.  Is doing physical therapy with no help.  Said she tried mobile pick but no relief in that either.  Has diabetes.  Recently underwent rheumatologic work-up, results are not yet back.

## 2023-02-20 ENCOUNTER — RX RENEWAL (OUTPATIENT)
Age: 63
End: 2023-02-20

## 2023-02-21 LAB
25(OH)D3 SERPL-MCNC: 43.7 NG/ML
ALBUMIN SERPL ELPH-MCNC: 4.5 G/DL
ALP BLD-CCNC: 131 U/L
ALT SERPL-CCNC: 28 U/L
ANION GAP SERPL CALC-SCNC: 15 MMOL/L
AST SERPL-CCNC: 23 U/L
BASOPHILS # BLD AUTO: 0.07 K/UL
BASOPHILS NFR BLD AUTO: 0.8 %
BILIRUB SERPL-MCNC: 0.2 MG/DL
BUN SERPL-MCNC: 22 MG/DL
CALCIUM SERPL-MCNC: 10.3 MG/DL
CHLORIDE SERPL-SCNC: 102 MMOL/L
CHOLEST SERPL-MCNC: 190 MG/DL
CO2 SERPL-SCNC: 23 MMOL/L
CREAT SERPL-MCNC: 0.81 MG/DL
EGFR: 83 ML/MIN/1.73M2
EOSINOPHIL # BLD AUTO: 0.42 K/UL
EOSINOPHIL NFR BLD AUTO: 4.5 %
ESTIMATED AVERAGE GLUCOSE: 146 MG/DL
GLUCOSE SERPL-MCNC: 169 MG/DL
HBA1C MFR BLD HPLC: 6.7 %
HCT VFR BLD CALC: 42.5 %
HDLC SERPL-MCNC: 54 MG/DL
HGB BLD-MCNC: 13.5 G/DL
IMM GRANULOCYTES NFR BLD AUTO: 0.3 %
LDLC SERPL CALC-MCNC: 94 MG/DL
LYMPHOCYTES # BLD AUTO: 2.3 K/UL
LYMPHOCYTES NFR BLD AUTO: 24.7 %
MAN DIFF?: NORMAL
MCHC RBC-ENTMCNC: 31.8 GM/DL
MCHC RBC-ENTMCNC: 32.1 PG
MCV RBC AUTO: 101 FL
MONOCYTES # BLD AUTO: 0.75 K/UL
MONOCYTES NFR BLD AUTO: 8.1 %
NEUTROPHILS # BLD AUTO: 5.74 K/UL
NEUTROPHILS NFR BLD AUTO: 61.6 %
NONHDLC SERPL-MCNC: 136 MG/DL
PLATELET # BLD AUTO: 225 K/UL
POTASSIUM SERPL-SCNC: 5 MMOL/L
PROT SERPL-MCNC: 7.9 G/DL
RBC # BLD: 4.21 M/UL
RBC # FLD: 13.1 %
SODIUM SERPL-SCNC: 140 MMOL/L
TRIGL SERPL-MCNC: 212 MG/DL
TSH SERPL-ACNC: 2.34 UIU/ML
WBC # FLD AUTO: 9.31 K/UL

## 2023-02-22 ENCOUNTER — NON-APPOINTMENT (OUTPATIENT)
Age: 63
End: 2023-02-22

## 2023-03-02 ENCOUNTER — RX RENEWAL (OUTPATIENT)
Age: 63
End: 2023-03-02

## 2023-03-07 ENCOUNTER — APPOINTMENT (OUTPATIENT)
Dept: RHEUMATOLOGY | Facility: CLINIC | Age: 63
End: 2023-03-07
Payer: MEDICAID

## 2023-03-10 ENCOUNTER — OUTPATIENT (OUTPATIENT)
Dept: OUTPATIENT SERVICES | Facility: HOSPITAL | Age: 63
LOS: 1 days | End: 2023-03-10
Payer: MEDICAID

## 2023-03-10 ENCOUNTER — APPOINTMENT (OUTPATIENT)
Dept: RADIOLOGY | Facility: CLINIC | Age: 63
End: 2023-03-10
Payer: MEDICAID

## 2023-03-10 DIAGNOSIS — M25.50 PAIN IN UNSPECIFIED JOINT: ICD-10-CM

## 2023-03-10 PROCEDURE — 73110 X-RAY EXAM OF WRIST: CPT

## 2023-03-10 PROCEDURE — 73130 X-RAY EXAM OF HAND: CPT | Mod: 26,50

## 2023-03-10 PROCEDURE — 73130 X-RAY EXAM OF HAND: CPT

## 2023-03-10 PROCEDURE — 73110 X-RAY EXAM OF WRIST: CPT | Mod: 26,50

## 2023-03-14 ENCOUNTER — APPOINTMENT (OUTPATIENT)
Dept: RHEUMATOLOGY | Facility: CLINIC | Age: 63
End: 2023-03-14
Payer: MEDICAID

## 2023-03-14 ENCOUNTER — LABORATORY RESULT (OUTPATIENT)
Age: 63
End: 2023-03-14

## 2023-03-14 VITALS
BODY MASS INDEX: 26.26 KG/M2 | TEMPERATURE: 97.6 F | HEART RATE: 76 BPM | SYSTOLIC BLOOD PRESSURE: 126 MMHG | DIASTOLIC BLOOD PRESSURE: 76 MMHG | OXYGEN SATURATION: 96 % | WEIGHT: 130 LBS

## 2023-03-14 PROCEDURE — 99214 OFFICE O/P EST MOD 30 MIN: CPT

## 2023-03-15 LAB
ALBUMIN SERPL ELPH-MCNC: 4.3 G/DL
ALP BLD-CCNC: 121 U/L
ALT SERPL-CCNC: 26 U/L
ANION GAP SERPL CALC-SCNC: 14 MMOL/L
AST SERPL-CCNC: 20 U/L
BASOPHILS # BLD AUTO: 0.06 K/UL
BASOPHILS NFR BLD AUTO: 0.9 %
BILIRUB SERPL-MCNC: 0.2 MG/DL
BUN SERPL-MCNC: 25 MG/DL
CALCIUM SERPL-MCNC: 9.8 MG/DL
CHLORIDE SERPL-SCNC: 105 MMOL/L
CO2 SERPL-SCNC: 23 MMOL/L
CREAT SERPL-MCNC: 0.85 MG/DL
CRP SERPL-MCNC: <3 MG/L
EGFR: 78 ML/MIN/1.73M2
EOSINOPHIL # BLD AUTO: 1 K/UL
EOSINOPHIL NFR BLD AUTO: 14.7 %
ERYTHROCYTE [SEDIMENTATION RATE] IN BLOOD BY WESTERGREN METHOD: 31 MM/HR
GLUCOSE SERPL-MCNC: 155 MG/DL
HCT VFR BLD CALC: 42.4 %
HGB BLD-MCNC: 13.1 G/DL
IMM GRANULOCYTES NFR BLD AUTO: 0.4 %
LYMPHOCYTES # BLD AUTO: 2.24 K/UL
LYMPHOCYTES NFR BLD AUTO: 32.8 %
MAN DIFF?: NORMAL
MCHC RBC-ENTMCNC: 30.9 GM/DL
MCHC RBC-ENTMCNC: 31.6 PG
MCV RBC AUTO: 102.2 FL
MONOCYTES # BLD AUTO: 0.5 K/UL
MONOCYTES NFR BLD AUTO: 7.3 %
NEUTROPHILS # BLD AUTO: 2.99 K/UL
NEUTROPHILS NFR BLD AUTO: 43.9 %
PLATELET # BLD AUTO: 197 K/UL
POTASSIUM SERPL-SCNC: 4.6 MMOL/L
PROT SERPL-MCNC: 7.4 G/DL
RBC # BLD: 4.15 M/UL
RBC # FLD: 13.1 %
RHEUMATOID FACT SER QL: 10 IU/ML
SODIUM SERPL-SCNC: 142 MMOL/L
WBC # FLD AUTO: 6.82 K/UL

## 2023-03-16 ENCOUNTER — RX RENEWAL (OUTPATIENT)
Age: 63
End: 2023-03-16

## 2023-03-16 LAB
ALBUMIN MFR SERPL ELPH: 56.4 %
ALBUMIN SERPL-MCNC: 4.2 G/DL
ALBUMIN/GLOB SERPL: 1.3 RATIO
ALPHA1 GLOB MFR SERPL ELPH: 3.7 %
ALPHA1 GLOB SERPL ELPH-MCNC: 0.3 G/DL
ALPHA2 GLOB MFR SERPL ELPH: 9.9 %
ALPHA2 GLOB SERPL ELPH-MCNC: 0.7 G/DL
B-GLOBULIN MFR SERPL ELPH: 13.2 %
B-GLOBULIN SERPL ELPH-MCNC: 1 G/DL
CCP AB SER IA-ACNC: <8 UNITS
GAMMA GLOB FLD ELPH-MCNC: 1.2 G/DL
GAMMA GLOB MFR SERPL ELPH: 16.8 %
INTERPRETATION SERPL IEP-IMP: NORMAL
M PROTEIN SPEC IFE-MCNC: NORMAL
PROT SERPL-MCNC: 7.4 G/DL
PROT SERPL-MCNC: 7.4 G/DL
RF+CCP IGG SER-IMP: NEGATIVE

## 2023-03-21 LAB — G6PD SER-CCNC: 16.5 U/G HGB

## 2023-03-31 ENCOUNTER — APPOINTMENT (OUTPATIENT)
Dept: RHEUMATOLOGY | Facility: CLINIC | Age: 63
End: 2023-03-31
Payer: MEDICAID

## 2023-03-31 VITALS
HEART RATE: 112 BPM | OXYGEN SATURATION: 98 % | BODY MASS INDEX: 26.66 KG/M2 | DIASTOLIC BLOOD PRESSURE: 80 MMHG | WEIGHT: 132 LBS | SYSTOLIC BLOOD PRESSURE: 122 MMHG | TEMPERATURE: 97.6 F

## 2023-03-31 PROCEDURE — 99214 OFFICE O/P EST MOD 30 MIN: CPT

## 2023-04-04 ENCOUNTER — RX RENEWAL (OUTPATIENT)
Age: 63
End: 2023-04-04

## 2023-04-18 ENCOUNTER — RX RENEWAL (OUTPATIENT)
Age: 63
End: 2023-04-18

## 2023-04-18 RX ORDER — ESZOPICLONE 3 MG/1
3 TABLET, FILM COATED ORAL
Qty: 30 | Refills: 1 | Status: DISCONTINUED | COMMUNITY
Start: 2022-07-05 | End: 2023-04-18

## 2023-04-20 ENCOUNTER — APPOINTMENT (OUTPATIENT)
Dept: INTERNAL MEDICINE | Facility: CLINIC | Age: 63
End: 2023-04-20
Payer: MEDICAID

## 2023-04-20 VITALS
WEIGHT: 133 LBS | TEMPERATURE: 98.6 F | HEIGHT: 59 IN | DIASTOLIC BLOOD PRESSURE: 88 MMHG | SYSTOLIC BLOOD PRESSURE: 128 MMHG | HEART RATE: 96 BPM | BODY MASS INDEX: 26.81 KG/M2 | OXYGEN SATURATION: 98 %

## 2023-04-20 DIAGNOSIS — M79.672 PAIN IN LEFT FOOT: ICD-10-CM

## 2023-04-20 PROCEDURE — 99213 OFFICE O/P EST LOW 20 MIN: CPT

## 2023-04-20 NOTE — HISTORY OF PRESENT ILLNESS
[FreeTextEntry1] : FU [de-identified] : NUBIA DIAZ is a 62 year F who comes in for a follow up visit.\par Pt had fall on Sunday 4/16 when walking down stairs, missed step and fell down few steps, causing foot to bend forward with hyperextension. No other injury, head trauma or LOC.  PT went to  and left foot and ankle xray negative. \par Patient denies any cp, sob,abdominal pain, nausea, vomiting, palpitations, fever, chills, constipation, diarrhea.\par

## 2023-04-20 NOTE — ASSESSMENT
[FreeTextEntry1] : 1.Foot pain: s/p fall, recent left foot and ankle xray at  negative per patient. advised Rest, Ice, Compression and elevation, start on naproxen 500 mg bid, Went over instructions and side effects of medication.\par if no improvement, refer to ortho foot/ankle dr carrera. \par All questions answered.\par

## 2023-05-02 ENCOUNTER — EMERGENCY (EMERGENCY)
Facility: HOSPITAL | Age: 63
LOS: 1 days | Discharge: HOME | End: 2023-05-02
Attending: STUDENT IN AN ORGANIZED HEALTH CARE EDUCATION/TRAINING PROGRAM | Admitting: EMERGENCY MEDICINE
Payer: MEDICAID

## 2023-05-02 VITALS
OXYGEN SATURATION: 97 % | TEMPERATURE: 98 F | DIASTOLIC BLOOD PRESSURE: 86 MMHG | SYSTOLIC BLOOD PRESSURE: 160 MMHG | WEIGHT: 132.94 LBS | HEART RATE: 83 BPM | RESPIRATION RATE: 18 BRPM | HEIGHT: 59 IN

## 2023-05-02 LAB
BASOPHILS # BLD AUTO: 0.05 K/UL — SIGNIFICANT CHANGE UP (ref 0–0.2)
BASOPHILS NFR BLD AUTO: 0.7 % — SIGNIFICANT CHANGE UP (ref 0–2)
EOSINOPHIL # BLD AUTO: 0.62 K/UL — HIGH (ref 0–0.5)
EOSINOPHIL NFR BLD AUTO: 8.8 % — HIGH (ref 0–6)
HCT VFR BLD CALC: 39.4 % — SIGNIFICANT CHANGE UP (ref 34.5–45)
HGB BLD-MCNC: 12.9 G/DL — SIGNIFICANT CHANGE UP (ref 11.5–15.5)
IMM GRANULOCYTES NFR BLD AUTO: 0.3 % — SIGNIFICANT CHANGE UP (ref 0–0.9)
LYMPHOCYTES # BLD AUTO: 2.28 K/UL — SIGNIFICANT CHANGE UP (ref 1–3.3)
LYMPHOCYTES # BLD AUTO: 32.4 % — SIGNIFICANT CHANGE UP (ref 13–44)
MCHC RBC-ENTMCNC: 31.7 PG — SIGNIFICANT CHANGE UP (ref 27–34)
MCHC RBC-ENTMCNC: 32.7 GM/DL — SIGNIFICANT CHANGE UP (ref 32–36)
MCV RBC AUTO: 96.8 FL — SIGNIFICANT CHANGE UP (ref 80–100)
MONOCYTES # BLD AUTO: 0.62 K/UL — SIGNIFICANT CHANGE UP (ref 0–0.9)
MONOCYTES NFR BLD AUTO: 8.8 % — SIGNIFICANT CHANGE UP (ref 2–14)
NEUTROPHILS # BLD AUTO: 3.45 K/UL — SIGNIFICANT CHANGE UP (ref 1.8–7.4)
NEUTROPHILS NFR BLD AUTO: 49 % — SIGNIFICANT CHANGE UP (ref 43–77)
NRBC # BLD: 0 /100 WBCS — SIGNIFICANT CHANGE UP (ref 0–0)
PLATELET # BLD AUTO: 198 K/UL — SIGNIFICANT CHANGE UP (ref 150–400)
RBC # BLD: 4.07 M/UL — SIGNIFICANT CHANGE UP (ref 3.8–5.2)
RBC # FLD: 13 % — SIGNIFICANT CHANGE UP (ref 10.3–14.5)
WBC # BLD: 7.04 K/UL — SIGNIFICANT CHANGE UP (ref 3.8–10.5)
WBC # FLD AUTO: 7.04 K/UL — SIGNIFICANT CHANGE UP (ref 3.8–10.5)

## 2023-05-02 PROCEDURE — 93010 ELECTROCARDIOGRAM REPORT: CPT

## 2023-05-02 PROCEDURE — 99285 EMERGENCY DEPT VISIT HI MDM: CPT

## 2023-05-02 PROCEDURE — 71045 X-RAY EXAM CHEST 1 VIEW: CPT | Mod: 26

## 2023-05-02 NOTE — ED PROVIDER NOTE - NSFOLLOWUPINSTRUCTIONS_ED_ALL_ED_FT
-- You should update your primary care physician on your Emergency Department visit and follow up with them.  If you do not have a physician or have difficulty following up, please call: [phone] to obtain a [facility] doctor or specialist who can provide follow up.    -- Return to the ER for worsening or persistent symptoms, and/or ANY NEW OR CONCERNING SYMPTOMS.

## 2023-05-02 NOTE — ED PROVIDER NOTE - NSICDXPASTMEDICALHX_GEN_ALL_CORE_FT
PAST MEDICAL HISTORY:  Asthma     CAD (coronary artery disease)     DM (diabetes mellitus)     HTN (hypertension)

## 2023-05-02 NOTE — ED ADULT NURSE NOTE - NSICDXPASTMEDICALHX_GEN_ALL_CORE_FT
Addendum Note by Ham Churchill MD at 1/23/2018 11:59 PM     Author: Ham Churchill MD Service: -- Author Type: Physician    Filed: 1/26/2018  1:13 PM Date of Service: 1/23/2018 11:59 PM Status: Signed    : Ham Churchill MD (Physician)    Encounter addended by: Ham Churchill MD on: 1/26/2018  1:13 PM<BR>     Actions taken: Diagnosis association updated,  activity accessed              
English
PAST MEDICAL HISTORY:  Asthma     CAD (coronary artery disease)     DM (diabetes mellitus)     HTN (hypertension)

## 2023-05-02 NOTE — ED ADULT NURSE NOTE - OBJECTIVE STATEMENT
received pt stable alert oriented x4 no distress c/o chest pain on and off for few days pt evaluated and placed on cardiac monitor ekg done and blood work drawned and sent to lab

## 2023-05-02 NOTE — ED ADULT NURSE NOTE - NSIMPLEMENTINTERV_GEN_ALL_ED
Implemented All Fall Risk Interventions:  [place] to call system. Call bell, personal items and telephone within reach. Instruct patient to call for assistance. Room bathroom lighting operational. Non-slip footwear when patient is off stretcher. Physically safe environment: no spills, clutter or unnecessary equipment. Stretcher in lowest position, wheels locked, appropriate side rails in place. Provide visual cue, wrist band, yellow gown, etc. Monitor gait and stability. Monitor for mental status changes and reorient to person, place, and time. Review medications for side effects contributing to fall risk. Reinforce activity limits and safety measures with patient and family.

## 2023-05-02 NOTE — ED PROVIDER NOTE - PATIENT PORTAL LINK FT
You can access the FollowMyHealth Patient Portal offered by [facility] by registering at the following website: [email or web address]. By joining [email or web address]’s FollowMyHealth portal, you will also be able to view your health information using other applications (apps) compatible with our system.

## 2023-05-02 NOTE — ED PROVIDER NOTE - CLINICAL SUMMARY MEDICAL DECISION MAKING FREE TEXT BOX
62 year old female with a history of DM, HLD, HTN presents with chest pain x 1 week.  H/o 3 stents placed 7 years ago.  Today pan radiated to left arm,  Check labs, CE x 2, CXR, EKG, Am cardiology consult

## 2023-05-02 NOTE — ED PROVIDER NOTE - OBJECTIVE STATEMENT
62-year-old female with a history of DM, HLD, CAD with 3 stents, asthma presents with chest pain.  Italian translation provided by daughter at bedside.  Patient reports intermittent chest pain for 1 week that worsened today.  Patient states sharp and burning left-sided chest pain that radiates to her left upper arm.  No radiation to the jaw or back.  She has been taking naproxen with no relief, last taken 3 days ago.  Associated with nausea, no V/D, SOB/diaphoresis.  Patient had 3 stents placed 7 years ago.  PMD Dr. Prasad, cardiology Dr. Lee in [place]

## 2023-05-02 NOTE — ED PROVIDER NOTE - RESPIRATORY, MLM
Notification Instructions: Patient will be notified of biopsy results. However, patient instructed to call the office if not contacted within 2 weeks. Breath sounds clear and equal bilaterally.

## 2023-05-03 VITALS
RESPIRATION RATE: 17 BRPM | TEMPERATURE: 98 F | OXYGEN SATURATION: 96 % | DIASTOLIC BLOOD PRESSURE: 72 MMHG | SYSTOLIC BLOOD PRESSURE: 118 MMHG | HEART RATE: 71 BPM

## 2023-05-03 LAB
ALBUMIN SERPL ELPH-MCNC: 3.5 G/DL — SIGNIFICANT CHANGE UP (ref 3.3–5)
ALP SERPL-CCNC: 115 U/L — SIGNIFICANT CHANGE UP (ref 40–120)
ALT FLD-CCNC: 38 U/L — SIGNIFICANT CHANGE UP (ref 12–78)
ANION GAP SERPL CALC-SCNC: 3 MMOL/L — LOW (ref 5–17)
APTT BLD: 29.1 SEC — SIGNIFICANT CHANGE UP (ref 27.5–35.5)
AST SERPL-CCNC: 34 U/L — SIGNIFICANT CHANGE UP (ref 15–37)
BILIRUB SERPL-MCNC: 0.3 MG/DL — SIGNIFICANT CHANGE UP (ref 0.2–1.2)
BUN SERPL-MCNC: 26 MG/DL — HIGH (ref 7–23)
CALCIUM SERPL-MCNC: 9.1 MG/DL — SIGNIFICANT CHANGE UP (ref 8.5–10.1)
CHLORIDE SERPL-SCNC: 103 MMOL/L — SIGNIFICANT CHANGE UP (ref 96–108)
CK MB BLD-MCNC: 1.2 % — SIGNIFICANT CHANGE UP (ref 0–3.5)
CK MB BLD-MCNC: 1.3 % — SIGNIFICANT CHANGE UP (ref 0–3.5)
CK MB CFR SERPL CALC: 1.8 NG/ML — SIGNIFICANT CHANGE UP (ref 0–3.6)
CK MB CFR SERPL CALC: 1.9 NG/ML — SIGNIFICANT CHANGE UP (ref 0–3.6)
CK SERPL-CCNC: 137 U/L — SIGNIFICANT CHANGE UP (ref 26–192)
CK SERPL-CCNC: 164 U/L — SIGNIFICANT CHANGE UP (ref 26–192)
CO2 SERPL-SCNC: 32 MMOL/L — HIGH (ref 22–31)
CREAT SERPL-MCNC: 0.94 MG/DL — SIGNIFICANT CHANGE UP (ref 0.5–1.3)
D DIMER BLD IA.RAPID-MCNC: 155 NG/ML DDU — SIGNIFICANT CHANGE UP
EGFR: 69 ML/MIN/1.73M2 — SIGNIFICANT CHANGE UP
GLUCOSE SERPL-MCNC: 266 MG/DL — HIGH (ref 70–99)
INR BLD: 0.91 RATIO — SIGNIFICANT CHANGE UP (ref 0.88–1.16)
NT-PROBNP SERPL-SCNC: 17 PG/ML — SIGNIFICANT CHANGE UP (ref 0–125)
POTASSIUM SERPL-MCNC: 4.4 MMOL/L — SIGNIFICANT CHANGE UP (ref 3.5–5.3)
POTASSIUM SERPL-SCNC: 4.4 MMOL/L — SIGNIFICANT CHANGE UP (ref 3.5–5.3)
PROT SERPL-MCNC: 7.4 G/DL — SIGNIFICANT CHANGE UP (ref 6–8.3)
PROTHROM AB SERPL-ACNC: 10.6 SEC — SIGNIFICANT CHANGE UP (ref 10.5–13.4)
SODIUM SERPL-SCNC: 138 MMOL/L — SIGNIFICANT CHANGE UP (ref 135–145)
TROPONIN I, HIGH SENSITIVITY RESULT: 3.2 NG/L — SIGNIFICANT CHANGE UP
TROPONIN I, HIGH SENSITIVITY RESULT: 3.9 NG/L — SIGNIFICANT CHANGE UP

## 2023-05-03 PROCEDURE — 84484 ASSAY OF TROPONIN QUANT: CPT

## 2023-05-03 PROCEDURE — 80053 COMPREHEN METABOLIC PANEL: CPT

## 2023-05-03 PROCEDURE — 82550 ASSAY OF CK (CPK): CPT

## 2023-05-03 PROCEDURE — 36415 COLL VENOUS BLD VENIPUNCTURE: CPT

## 2023-05-03 PROCEDURE — 93005 ELECTROCARDIOGRAM TRACING: CPT

## 2023-05-03 PROCEDURE — 96374 THER/PROPH/DIAG INJ IV PUSH: CPT

## 2023-05-03 PROCEDURE — 71045 X-RAY EXAM CHEST 1 VIEW: CPT

## 2023-05-03 PROCEDURE — 83880 ASSAY OF NATRIURETIC PEPTIDE: CPT

## 2023-05-03 PROCEDURE — 99284 EMERGENCY DEPT VISIT MOD MDM: CPT

## 2023-05-03 PROCEDURE — 85379 FIBRIN DEGRADATION QUANT: CPT

## 2023-05-03 PROCEDURE — 85610 PROTHROMBIN TIME: CPT

## 2023-05-03 PROCEDURE — 99285 EMERGENCY DEPT VISIT HI MDM: CPT | Mod: 25

## 2023-05-03 PROCEDURE — 82553 CREATINE MB FRACTION: CPT

## 2023-05-03 PROCEDURE — 85025 COMPLETE CBC W/AUTO DIFF WBC: CPT

## 2023-05-03 PROCEDURE — 85730 THROMBOPLASTIN TIME PARTIAL: CPT

## 2023-05-03 RX ORDER — ASPIRIN/CALCIUM CARB/MAGNESIUM 324 MG
324 TABLET ORAL ONCE
Refills: 0 | Status: COMPLETED | OUTPATIENT
Start: 2023-05-03 | End: 2023-05-03

## 2023-05-03 RX ORDER — FAMOTIDINE 10 MG/ML
20 INJECTION INTRAVENOUS ONCE
Refills: 0 | Status: COMPLETED | OUTPATIENT
Start: 2023-05-03 | End: 2023-05-03

## 2023-05-03 RX ADMIN — Medication 30 MILLILITER(S): at 01:09

## 2023-05-03 RX ADMIN — Medication 324 MILLIGRAM(S): at 00:41

## 2023-05-03 RX ADMIN — FAMOTIDINE 20 MILLIGRAM(S): 10 INJECTION INTRAVENOUS at 01:09

## 2023-05-03 NOTE — CONSULT NOTE ADULT - ASSESSMENT
62-year-old female with a history of DM, HLD, CAD with 3 stents, asthma presents with chest pain.  Mohawk translation provided by daughter at bedside.  Patient reports intermittent chest pain for 1 week that worsened today.  Patient states sharp and burning left-sided chest pain that radiates to her left upper arm.  No radiation to the jaw or back.  She has been taking naproxen with no relief, last taken 3 days ago.  Associated with nausea, no V/D, SOB/diaphoresis.  Patient had 3 stents placed 7 years ago.  PMD Dr. Prasad, cardiology Dr. Lee in [place]    CP:  atypical in nature  CP worse with palpation on exam  cardiac enzymes neg x2  ECG shows NSR without ST-T wave abnormalities - unchanged from previous 06/2022  TTE 03/2021 EF 60%, mild MR/TR  Nuc stress test 07/2021 normal myocardial perfusion  There are no s/s HF or acute ischemia  Pt can safely be discharged from a cardiac standpoint.  I advised that the pt f/u with her outpt cardiologist, Dr FAM Lee    Thank you for this consult!

## 2023-05-03 NOTE — CONSULT NOTE ADULT - SUBJECTIVE AND OBJECTIVE BOX
[facility] - Tania Bright, Levi, Eloise Burns Goodger  Office Number: [phone]    Initial Consult Note    CHIEF COMPLAINT: Patient is a 62y old  Female who presents with a chief complaint of CP    HPI: 62-year-old female with a history of DM, HLD, CAD with 3 stents, asthma presents with chest pain.  Hebrew translation provided by daughter at bedside.  Patient reports intermittent chest pain for 1 week that worsened today.  Patient states sharp and burning left-sided chest pain that radiates to her left upper arm.  No radiation to the jaw or back.  She has been taking naproxen with no relief, last taken 3 days ago.  Associated with nausea, no V/D, SOB/diaphoresis.  Patient had 3 stents placed 7 years ago.  PMD Dr. Prasad, cardiology Dr. Lee in [place]      PAST MEDICAL & SURGICAL HISTORY:  CAD (coronary artery disease)      DM (diabetes mellitus)      HTN (hypertension)      Asthma          SOCIAL HISTORY:  No tobacco, ethanol, or drug abuse.    FAMILY HISTORY:    No family history of acute MI or sudden cardiac death.    MEDICATIONS  (STANDING):    MEDICATIONS  (PRN):      Allergies    No Known Allergies    Intolerances        REVIEW OF SYSTEMS:    CONSTITUTIONAL: No weakness, fevers or chills  EYES/ENT: No visual changes;  No vertigo or throat pain   NECK: No pain or stiffness  RESPIRATORY: No cough, wheezing, hemoptysis; No shortness of breath  CARDIOVASCULAR: + chest pain, no palpitations  GASTROINTESTINAL: No abdominal pain. No nausea, vomiting, or hematemesis; No diarrhea or constipation. No melena or hematochezia.  GENITOURINARY: No dysuria, frequency or hematuria  NEUROLOGICAL: No numbness or weakness  SKIN: No itching or rash  All other review of systems is negative unless indicated above    VITAL SIGNS:   Vital Signs Last 24 Hrs  T(C): 36.4 (03 May 2023 06:55), Max: 36.4 (02 May 2023 22:59)  T(F): 97.6 (03 May 2023 06:55), Max: 97.6 (03 May 2023 06:55)  HR: 71 (03 May 2023 06:55) (71 - 84)  BP: 118/72 (03 May 2023 06:55) (117/63 - 160/86)  BP(mean): --  RR: 17 (03 May 2023 06:55) (16 - 18)  SpO2: 96% (03 May 2023 06:55) (96% - 98%)    Parameters below as of 03 May 2023 06:55  Patient On (Oxygen Delivery Method): room air        I&O's Summary      On Exam:    Constitutional: NAD, alert and oriented x 3  Lungs:  Non-labored, breath sounds are clear bilaterally, No wheezing, rales or rhonchi  Cardiovascular: RRR.  S1 and S2 positive.  No murmurs, rubs, gallops or clicks  Gastrointestinal: Bowel Sounds present, soft, nontender.   Lymph: No peripheral edema. No cervical lymphadenopathy.  Neurological: Alert, no focal deficits  Skin: No rashes or ulcers   Psych:  Mood & affect appropriate.    LABS: All Labs Reviewed:                        12.9   7.04  )-----------( 198      ( 02 May 2023 23:40 )             39.4     02 May 2023 23:40    138    |  103    |  26     ----------------------------<  266    4.4     |  32     |  0.94     Ca    9.1        02 May 2023 23:40    TPro  7.4    /  Alb  3.5    /  TBili  0.3    /  DBili  x      /  AST  34     /  ALT  38     /  AlkPhos  115    02 May 2023 23:40    PT/INR - ( 02 May 2023 23:40 )   PT: 10.6 sec;   INR: 0.91 ratio         PTT - ( 02 May 2023 23:40 )  PTT:29.1 sec  CARDIAC MARKERS ( 03 May 2023 02:37 )  x     / x     / 137 U/L / x     / 1.8 ng/mL  CARDIAC MARKERS ( 02 May 2023 23:40 )  x     / x     / 164 U/L / x     / 1.9 ng/mL      Blood Culture:         RADIOLOGY:    EKG:

## 2023-05-03 NOTE — ED ADULT NURSE REASSESSMENT NOTE - NS ED NURSE REASSESS COMMENT FT1
pt medicated as ordered and awaiting cardio in am and repeat troponin done awaiting result pt with no c/o of pain at present

## 2023-05-03 NOTE — ED ADULT NURSE REASSESSMENT NOTE - NS ED NURSE REASSESS COMMENT FT1
Pt received lying comfortable in bed breathing room air in no acute distress. A&OX4 and denies pain. Pt awaiting cardiology in the am. Maintained on cardiac monitoring, NSR. Monitoring ongoing. DESHAWN ColindresRN

## 2023-05-18 ENCOUNTER — RX RENEWAL (OUTPATIENT)
Age: 63
End: 2023-05-18

## 2023-05-19 ENCOUNTER — NON-APPOINTMENT (OUTPATIENT)
Age: 63
End: 2023-05-19

## 2023-05-19 ENCOUNTER — APPOINTMENT (OUTPATIENT)
Dept: CARDIOLOGY | Facility: CLINIC | Age: 63
End: 2023-05-19
Payer: MEDICAID

## 2023-05-19 VITALS
HEART RATE: 98 BPM | OXYGEN SATURATION: 98 % | WEIGHT: 136 LBS | BODY MASS INDEX: 27.42 KG/M2 | SYSTOLIC BLOOD PRESSURE: 132 MMHG | HEIGHT: 59 IN | DIASTOLIC BLOOD PRESSURE: 62 MMHG

## 2023-05-19 PROCEDURE — 93000 ELECTROCARDIOGRAM COMPLETE: CPT

## 2023-05-19 PROCEDURE — 99214 OFFICE O/P EST MOD 30 MIN: CPT | Mod: 25

## 2023-05-19 NOTE — DISCUSSION/SUMMARY
[Coronary Artery Disease] : coronary artery disease [Stable] : stable [Hypertension] : hypertension [FreeTextEntry1] : CAD: Pt reports chest symptoms similar to those prior to her PCI 8 years ago. Suggest cardiac catheterization\par \par HLD: Continue current medications LDL at goal\par \par HTN: Controlled\par \par \par \par \par OV 4 months  [EKG obtained to assist in diagnosis and management of assessed problem(s)] : EKG obtained to assist in diagnosis and management of assessed problem(s)

## 2023-05-19 NOTE — HISTORY OF PRESENT ILLNESS
[FreeTextEntry1] : 64 Y/O female PMH:  CAD S/P PCI x3, HTN, HLD, diabetes, rectal bleed not on Plavix here today in routine cardiac follow up claims to be feeling chest discomfort at rest and with exertion. Pt notices this when she taking care of her grandchildren. Pt intermittently reports palpitations without syncope. She has reported chest discomfort for 1 month.

## 2023-05-24 LAB
ALBUMIN SERPL ELPH-MCNC: 4.5 G/DL
ALP BLD-CCNC: 89 U/L
ALT SERPL-CCNC: 35 U/L
ANION GAP SERPL CALC-SCNC: 15 MMOL/L
AST SERPL-CCNC: 33 U/L
BILIRUB SERPL-MCNC: 0.4 MG/DL
BUN SERPL-MCNC: 15 MG/DL
CALCIUM SERPL-MCNC: 10.1 MG/DL
CHLORIDE SERPL-SCNC: 103 MMOL/L
CO2 SERPL-SCNC: 23 MMOL/L
CREAT SERPL-MCNC: 0.85 MG/DL
CRP SERPL-MCNC: 4 MG/L
EGFR: 78 ML/MIN/1.73M2
ERYTHROCYTE [SEDIMENTATION RATE] IN BLOOD BY WESTERGREN METHOD: 21 MM/HR
GLUCOSE SERPL-MCNC: 166 MG/DL
HAV IGM SER QL: NONREACTIVE
HBV CORE IGM SER QL: NONREACTIVE
HBV SURFACE AG SER QL: NONREACTIVE
HCV AB SER QL: NONREACTIVE
HCV S/CO RATIO: 0.09 S/CO
POTASSIUM SERPL-SCNC: 4.6 MMOL/L
PROT SERPL-MCNC: 7.4 G/DL
SODIUM SERPL-SCNC: 141 MMOL/L

## 2023-05-25 LAB
ALBUMIN SERPL ELPH-MCNC: 4.7 G/DL
ALP BLD-CCNC: 92 U/L
ALT SERPL-CCNC: 35 U/L
ANION GAP SERPL CALC-SCNC: 14 MMOL/L
APTT BLD: 29.6 SEC
AST SERPL-CCNC: 31 U/L
BILIRUB SERPL-MCNC: 0.4 MG/DL
BUN SERPL-MCNC: 15 MG/DL
CALCIUM SERPL-MCNC: 9.9 MG/DL
CHLORIDE SERPL-SCNC: 104 MMOL/L
CO2 SERPL-SCNC: 24 MMOL/L
CREAT SERPL-MCNC: 0.85 MG/DL
EGFR: 78 ML/MIN/1.73M2
GLUCOSE SERPL-MCNC: 161 MG/DL
INR PPP: 0.98 RATIO
POTASSIUM SERPL-SCNC: 4.6 MMOL/L
PROT SERPL-MCNC: 7.3 G/DL
PT BLD: 11.4 SEC
SODIUM SERPL-SCNC: 143 MMOL/L

## 2023-05-26 ENCOUNTER — RX RENEWAL (OUTPATIENT)
Age: 63
End: 2023-05-26

## 2023-05-30 ENCOUNTER — APPOINTMENT (OUTPATIENT)
Dept: RHEUMATOLOGY | Facility: CLINIC | Age: 63
End: 2023-05-30
Payer: MEDICAID

## 2023-05-30 VITALS
BODY MASS INDEX: 27.21 KG/M2 | TEMPERATURE: 97.2 F | SYSTOLIC BLOOD PRESSURE: 114 MMHG | OXYGEN SATURATION: 97 % | HEART RATE: 97 BPM | HEIGHT: 59 IN | DIASTOLIC BLOOD PRESSURE: 76 MMHG | WEIGHT: 135 LBS

## 2023-05-30 DIAGNOSIS — M79.10 MYALGIA, UNSPECIFIED SITE: ICD-10-CM

## 2023-05-30 LAB
M TB IFN-G BLD-IMP: NEGATIVE
QUANTIFERON TB PLUS MITOGEN MINUS NIL: >10 IU/ML
QUANTIFERON TB PLUS NIL: 0.03 IU/ML
QUANTIFERON TB PLUS TB1 MINUS NIL: 0 IU/ML
QUANTIFERON TB PLUS TB2 MINUS NIL: -0.01 IU/ML

## 2023-05-30 PROCEDURE — 20552 NJX 1/MLT TRIGGER POINT 1/2: CPT

## 2023-05-30 PROCEDURE — 99214 OFFICE O/P EST MOD 30 MIN: CPT | Mod: 25

## 2023-05-30 RX ORDER — CYCLOBENZAPRINE HYDROCHLORIDE 5 MG/1
5 TABLET, FILM COATED ORAL
Qty: 60 | Refills: 0 | Status: DISCONTINUED | COMMUNITY
Start: 2023-02-14 | End: 2023-05-30

## 2023-05-30 RX ORDER — LIDOCAINE HYDROCHLORIDE 20 MG/ML
2 INJECTION, SOLUTION INFILTRATION; PERINEURAL
Refills: 0 | Status: COMPLETED | OUTPATIENT
Start: 2023-05-30

## 2023-05-30 RX ADMIN — LIDOCAINE HYDROCHLORIDE %: 20 INJECTION, SOLUTION INFILTRATION; PERINEURAL at 00:00

## 2023-05-30 NOTE — ASU PATIENT PROFILE, ADULT - FALL HARM RISK - UNIVERSAL INTERVENTIONS
Bed in lowest position, wheels locked, appropriate side rails in place/Call bell, personal items and telephone in reach/Instruct patient to call for assistance before getting out of bed or chair/Non-slip footwear when patient is out of bed/[place] to call system/Physically safe environment - no spills, clutter or unnecessary equipment/Purposeful Proactive Rounding/Room/bathroom lighting operational, light cord in reach

## 2023-05-31 ENCOUNTER — OUTPATIENT (OUTPATIENT)
Dept: OUTPATIENT SERVICES | Facility: HOSPITAL | Age: 63
LOS: 1 days | Discharge: HOME | End: 2023-05-31
Payer: MEDICAID

## 2023-05-31 ENCOUNTER — RX RENEWAL (OUTPATIENT)
Age: 63
End: 2023-05-31

## 2023-05-31 VITALS
OXYGEN SATURATION: 100 % | SYSTOLIC BLOOD PRESSURE: 142 MMHG | RESPIRATION RATE: 16 BRPM | HEIGHT: 59 IN | TEMPERATURE: 98 F | DIASTOLIC BLOOD PRESSURE: 74 MMHG | WEIGHT: 134.92 LBS | HEART RATE: 77 BPM

## 2023-05-31 VITALS
DIASTOLIC BLOOD PRESSURE: 81 MMHG | RESPIRATION RATE: 16 BRPM | SYSTOLIC BLOOD PRESSURE: 153 MMHG | HEART RATE: 95 BPM | OXYGEN SATURATION: 99 %

## 2023-05-31 DIAGNOSIS — I25.10 ATHEROSCLEROTIC HEART DISEASE OF NATIVE CORONARY ARTERY WITHOUT ANGINA PECTORIS: ICD-10-CM

## 2023-05-31 PROCEDURE — 93458 L HRT ARTERY/VENTRICLE ANGIO: CPT

## 2023-05-31 PROCEDURE — 92978 ENDOLUMINL IVUS OCT C 1ST: CPT | Mod: 26,LC

## 2023-05-31 PROCEDURE — C1725: CPT

## 2023-05-31 PROCEDURE — C1894: CPT

## 2023-05-31 PROCEDURE — 93572 IV DOP VEL&/PRESS C FLO EA: CPT | Mod: 26,52,LC

## 2023-05-31 PROCEDURE — C1769: CPT

## 2023-05-31 PROCEDURE — C1753: CPT

## 2023-05-31 PROCEDURE — 93799 UNLISTED CV SVC/PROCEDURE: CPT | Mod: LC

## 2023-05-31 PROCEDURE — 93571 IV DOP VEL&/PRESS C FLO 1ST: CPT | Mod: 26,52,LD

## 2023-05-31 PROCEDURE — 92978 ENDOLUMINL IVUS OCT C 1ST: CPT | Mod: LC

## 2023-05-31 PROCEDURE — C1887: CPT

## 2023-05-31 PROCEDURE — 93458 L HRT ARTERY/VENTRICLE ANGIO: CPT | Mod: 26

## 2023-05-31 RX ORDER — CLOPIDOGREL BISULFATE 75 MG/1
600 TABLET, FILM COATED ORAL ONCE
Refills: 0 | Status: COMPLETED | OUTPATIENT
Start: 2023-05-31 | End: 2023-05-31

## 2023-05-31 RX ORDER — SODIUM CHLORIDE 9 MG/ML
1000 INJECTION INTRAMUSCULAR; INTRAVENOUS; SUBCUTANEOUS
Refills: 0 | Status: DISCONTINUED | OUTPATIENT
Start: 2023-05-31 | End: 2023-05-31

## 2023-05-31 RX ORDER — CLOPIDOGREL BISULFATE 75 MG/1
1 TABLET, FILM COATED ORAL
Qty: 30 | Refills: 8
Start: 2023-05-31

## 2023-05-31 RX ORDER — SODIUM CHLORIDE 9 MG/ML
250 INJECTION INTRAMUSCULAR; INTRAVENOUS; SUBCUTANEOUS ONCE
Refills: 0 | Status: COMPLETED | OUTPATIENT
Start: 2023-05-31 | End: 2023-05-31

## 2023-05-31 RX ORDER — ASPIRIN/CALCIUM CARB/MAGNESIUM 324 MG
81 TABLET ORAL ONCE
Refills: 0 | Status: COMPLETED | OUTPATIENT
Start: 2023-05-31 | End: 2023-05-31

## 2023-05-31 RX ORDER — ACETAMINOPHEN 500 MG
1000 TABLET ORAL ONCE
Refills: 0 | Status: COMPLETED | OUTPATIENT
Start: 2023-05-31 | End: 2023-05-31

## 2023-05-31 RX ADMIN — SODIUM CHLORIDE 120 MILLILITER(S): 9 INJECTION INTRAMUSCULAR; INTRAVENOUS; SUBCUTANEOUS at 16:23

## 2023-05-31 RX ADMIN — Medication 81 MILLIGRAM(S): at 17:32

## 2023-05-31 RX ADMIN — CLOPIDOGREL BISULFATE 600 MILLIGRAM(S): 75 TABLET, FILM COATED ORAL at 17:31

## 2023-05-31 RX ADMIN — Medication 1000 MILLIGRAM(S): at 17:51

## 2023-05-31 RX ADMIN — SODIUM CHLORIDE 250 MILLILITER(S): 9 INJECTION INTRAMUSCULAR; INTRAVENOUS; SUBCUTANEOUS at 13:30

## 2023-05-31 RX ADMIN — Medication 400 MILLIGRAM(S): at 17:42

## 2023-05-31 NOTE — PACU DISCHARGE NOTE - COMMENTS
Patient alert and oriented x 4, no c/o chest pain or sob.  vs baseline.  Pain in right wrist now is 4/10 s/p IV Tylenol.  Patient instructed to use warm compress this evening if she experiences wrist discomfort.  Patient discharge home as per orders. PIVL removed. No evidence of infiltration noted at discharge. Patient skin intact, pt ambulating around unit tolerating well. All paperwork reviewed with pt Rx given with understanding, pt to follow up as directed patient verbalized understanding to all and without concerns at this time and medication instructions

## 2023-05-31 NOTE — H&P ADULT - NSICDXPASTMEDICALHX_GEN_ALL_CORE_FT
PAST MEDICAL HISTORY:  Benign essential HTN     CAD (coronary artery disease)     HLD (hyperlipidemia)     Stented coronary artery     Type 2 diabetes mellitus     
PAST MEDICAL HISTORY:  Benign essential HTN     CAD (coronary artery disease)     HLD (hyperlipidemia)     Stented coronary artery     Type 2 diabetes mellitus
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
## 2023-05-31 NOTE — H&P ADULT - ASSESSMENT
62 y/o F with PMHx of CAD/PCI, DM, HLd, GERD, HTN, Lichen pigmentosus presented to cardiology with c/o CP/ palpitations at rest and exertion x 1 month similar in nature of when she lasted needed stents 8 years ago. Exercise stress test done, patient with ST depressions aVF V4-V6 during exercise. Symptoms persistent, patient referred for cardiac cath      ASA class: II  Creatinine: 0.85  GFR: 78  Bleeding  Risk score: 1.0  Mauri Score: 4

## 2023-05-31 NOTE — H&P ADULT - PROBLEM SELECTOR PLAN 1
a/w CP, palpitations   -plan for cardiac cath for ischemic work up  - LUZ protocol pre hydration: NS 250cc IV bolus x1   -Consent obtained for cardiac catheterization w/ coronary angiogram and possible stent placement, with possible sedation and analgia. Pt is competent, has capacity, and understands risks and benefits of procedure. Risks and benefits discussed. Risk discussed included, but not limited to MI, stroke, mortality, major bleeding, arrythmia, or infection. All questions answered

## 2023-05-31 NOTE — H&P ADULT - HISTORY OF PRESENT ILLNESS
64 y/o F with PMHx of CAD/PCI, DM, HLd, GERD, HTN, Lichen pigmentosus presented to cardiology with c/o CP/ palpitations at rest and exertion x 1 month similar in nature of when she lasted needed stents 8 years ago. Exercise stress test done, patient with ST depressions aVF V4-V6 during exercise. Symptoms persistent, patient referred for cardiac cath  
63yr old female PMH HTN, HLD, T2DM, CAD, stents, rectal bleed

## 2023-05-31 NOTE — PROGRESS NOTE ADULT - SUBJECTIVE AND OBJECTIVE BOX
Nurse Practitioner Progress note:     HPI:  62 y/o F with PMHx of CAD/PCI, DM, HLd, GERD, HTN, Lichen pigmentosus presented to cardiology with c/o CP/ palpitations at rest and exertion x 1 month similar in nature of when she lasted needed stents 8 years ago. Exercise stress test done, patient with ST depressions aVF V4-V6 during exercise. Symptoms persistent, patient referred for cardiac cath   (31 May 2023 13:10)      T(C): 36.6 (05-31-23 @ 13:11), Max: 36.6 (05-31-23 @ 13:11)  HR: 79 (05-31-23 @ 17:10) (71 - 83)  BP: 146/84 (05-31-23 @ 17:10) (111/69 - 146/84)  RR: 16 (05-31-23 @ 17:10) (16 - 16)  SpO2: 99% (05-31-23 @ 17:10) (95% - 100%)  Wt(kg): --    PHYSICAL EXAM:  Neurologic: Non-focal, AxOx3.  No neuro deficits  Vascular: Peripheral pulses palpable 2+ bilaterally  Procedure Site: Rt. radial band removed by RN manual pressure applied x20 minutes site benign soft no bleeding no hematoma +1 radial no c/o numbness/tingling, <3sec cap refill, fingers/hand warm to touch     	  PROCEDURE RESULTS:  S/P C  LAD/Circ lesion noted failed PCI unable to cross with [place].        ASSESSMENT/PLAN: 	  62 y/o F with PMHx of CAD/PCI, DM, HLd, GERD, HTN, Lichen pigmentosus presented to cardiology with c/o CP/ palpitations at rest and exertion x 1 month similar in nature of when she lasted needed stents 8 years ago. Exercise stress test done, patient with ST depressions aVF V4-V6 during exercise. Symptoms persistent, patient referred for cardiac cath   S/P LHC  LAD/Circ lesion noted failed PCI unable to cross with [place]. Pt. scheduled to have staged PCI at [facility] 6/12/23    -VS, labs, diet, activity as per post cath orders  -IV hydration  -Encourage PO fluids  -Sedation instructions reviewed with patient   -Pt. loaded with Plavix 600mg PO x1 RX for Plavix 75mg daily submitted to pharmacy   -Continue current medications  -Pt. to be discharged home today  -Plan of care D/W pt. and MD  -Post cath instructions reviewed with pt., pt. verbalizes and understands instructions  -Follow-up with attending        Nurse Practitioner Progress note:     HPI:  62 y/o F with PMHx of CAD/PCI, DM, HLd, GERD, HTN, Lichen pigmentosus presented to cardiology with c/o CP/ palpitations at rest and exertion x 1 month similar in nature of when she lasted needed stents 8 years ago. Exercise stress test done, patient with ST depressions aVF V4-V6 during exercise. Symptoms persistent, patient referred for cardiac cath   (31 May 2023 13:10)      T(C): 36.6 (05-31-23 @ 13:11), Max: 36.6 (05-31-23 @ 13:11)  HR: 79 (05-31-23 @ 17:10) (71 - 83)  BP: 146/84 (05-31-23 @ 17:10) (111/69 - 146/84)  RR: 16 (05-31-23 @ 17:10) (16 - 16)  SpO2: 99% (05-31-23 @ 17:10) (95% - 100%)  Wt(kg): --    PHYSICAL EXAM:  Neurologic: Non-focal, AxOx3.  No neuro deficits  Vascular: Peripheral pulses palpable 2+ bilaterally  Procedure Site: Rt. radial band removed by RN manual pressure applied x20 minutes site benign soft no bleeding no hematoma +1 radial no c/o numbness/tingling, <3sec cap refill, fingers/hand warm to touch     	  PROCEDURE RESULTS:  S/P LHC  LAD/Circ lesion noted failed PCI unable to cross with [place].  < from: Cardiac Catheterization (05.31.23 @ 14:23) >   Impression     Diagnostic Conclusions   1. Severe focal calcified OM1 (IVUS revealing 270 degree of calcium) with   IFR of .88, with focal step up at this location.   2. Physiologically significant disease of LAD with angiographically   moderate disease, however diffuse pattern from distal LAD to proximal.   IFR   .76.   Interventional Conclusions  . Recommend PCI of focal Cx lesion which is distribution of ST segment   changes. WIll need rotational atherectomy and femoral approach.  Medical management of LAD diffuse disease at this time.          ASSESSMENT/PLAN: 	  62 y/o F with PMHx of CAD/PCI, DM, HLd, GERD, HTN, Lichen pigmentosus presented to cardiology with c/o CP/ palpitations at rest and exertion x 1 month similar in nature of when she lasted needed stents 8 years ago. Exercise stress test done, patient with ST depressions aVF V4-V6 during exercise. Symptoms persistent, patient referred for cardiac cath   S/P [facility]  LAD/Circ lesion noted failed PCI unable to cross with [place]. Pt. scheduled to have staged PCI at [facility] 6/12/23    -VS, labs, diet, activity as per post cath orders  -IV hydration  -Encourage PO fluids  -Sedation instructions reviewed with patient   -Pt. loaded with Plavix 600mg PO x1 RX for Plavix 75mg daily submitted to pharmacy   -Continue current medications  -Pt. to be discharged home today  -Plan of care D/W pt. and MD  -Post cath instructions reviewed with pt., pt. verbalizes and understands instructions  -Follow-up with attending/cardiologist         Nurse Practitioner Progress note:     HPI:  62 y/o F with PMHx of CAD/PCI, DM, HLd, GERD, HTN, Lichen pigmentosus presented to cardiology with c/o CP/ palpitations at rest and exertion x 1 month similar in nature of when she lasted needed stents 8 years ago. Exercise stress test done, patient with ST depressions aVF V4-V6 during exercise. Symptoms persistent, patient referred for cardiac cath   (31 May 2023 13:10)      T(C): 36.6 (05-31-23 @ 13:11), Max: 36.6 (05-31-23 @ 13:11)  HR: 79 (05-31-23 @ 17:10) (71 - 83)  BP: 146/84 (05-31-23 @ 17:10) (111/69 - 146/84)  RR: 16 (05-31-23 @ 17:10) (16 - 16)  SpO2: 99% (05-31-23 @ 17:10) (95% - 100%)  Wt(kg): --    PHYSICAL EXAM:  Neurologic: Non-focal, AxOx3.  No neuro deficits  Vascular: Peripheral pulses palpable 2+ bilaterally  Procedure Site: Rt. radial band removed by RN manual pressure applied x20 minutes site benign soft no bleeding no hematoma +1 radial no c/o numbness/tingling, <3sec cap refill, fingers/hand warm to touch     	  PROCEDURE RESULTS:  S/P LHC  LAD/Circ lesion noted failed PCI unable to cross with [place].  < from: Cardiac Catheterization (05.31.23 @ 14:23) >   Impression     Diagnostic Conclusions   1. Severe focal calcified OM1 (IVUS revealing 270 degree of calcium) with   IFR of .88, with focal step up at this location.   2. Physiologically significant disease of LAD with angiographically   moderate disease, however diffuse pattern from distal LAD to proximal.   IFR   .76.   Interventional Conclusions  . Recommend PCI of focal Cx lesion which is distribution of ST segment   changes. WIll need rotational atherectomy and femoral approach.  Medical management of LAD diffuse disease at this time.          ASSESSMENT/PLAN: 	  62 y/o F with PMHx of CAD/PCI, DM, HLd, GERD, HTN, Lichen pigmentosus presented to cardiology with c/o CP/ palpitations at rest and exertion x 1 month similar in nature of when she lasted needed stents 8 years ago. Exercise stress test done, patient with ST depressions aVF V4-V6 during exercise. Symptoms persistent, patient referred for cardiac cath   S/P [facility]  LAD/Circ lesion noted failed PCI unable to cross with [place]. Pt. scheduled to have staged PCI at [facility] 6/12/23  Pt. can not tolerate statins, therefor she is on Praluent for her HLD     -VS, labs, diet, activity as per post cath orders  -IV hydration  -Encourage PO fluids  -Sedation instructions reviewed with patient   -Pt. loaded with Plavix 600mg PO x1 RX for Plavix 75mg daily submitted to pharmacy   -Continue current medications  -Pt. to be discharged home today  -Plan of care D/W pt. and MD  -Post cath instructions reviewed with pt., pt. verbalizes and understands instructions  -Follow-up with attending/cardiologist

## 2023-06-01 PROBLEM — I10 ESSENTIAL (PRIMARY) HYPERTENSION: Chronic | Status: ACTIVE | Noted: 2023-05-30

## 2023-06-01 PROBLEM — E11.9 TYPE 2 DIABETES MELLITUS WITHOUT COMPLICATIONS: Chronic | Status: ACTIVE | Noted: 2023-05-30

## 2023-06-01 PROBLEM — E78.5 HYPERLIPIDEMIA, UNSPECIFIED: Chronic | Status: ACTIVE | Noted: 2023-05-30

## 2023-06-01 PROBLEM — I25.10 ATHEROSCLEROTIC HEART DISEASE OF NATIVE CORONARY ARTERY WITHOUT ANGINA PECTORIS: Chronic | Status: ACTIVE | Noted: 2023-05-30

## 2023-06-01 PROBLEM — Z95.5 PRESENCE OF CORONARY ANGIOPLASTY IMPLANT AND GRAFT: Chronic | Status: ACTIVE | Noted: 2023-05-30

## 2023-06-02 ENCOUNTER — APPOINTMENT (OUTPATIENT)
Dept: CARDIOLOGY | Facility: CLINIC | Age: 63
End: 2023-06-02
Payer: MEDICAID

## 2023-06-02 VITALS
HEART RATE: 99 BPM | WEIGHT: 136 LBS | SYSTOLIC BLOOD PRESSURE: 126 MMHG | BODY MASS INDEX: 27.42 KG/M2 | OXYGEN SATURATION: 96 % | DIASTOLIC BLOOD PRESSURE: 70 MMHG | HEIGHT: 59 IN

## 2023-06-02 DIAGNOSIS — I10 ESSENTIAL (PRIMARY) HYPERTENSION: ICD-10-CM

## 2023-06-02 DIAGNOSIS — Z95.5 PRESENCE OF CORONARY ANGIOPLASTY IMPLANT AND GRAFT: ICD-10-CM

## 2023-06-02 DIAGNOSIS — I25.118 ATHEROSCLEROTIC HEART DISEASE OF NATIVE CORONARY ARTERY WITH OTHER FORMS OF ANGINA PECTORIS: ICD-10-CM

## 2023-06-02 PROCEDURE — 93000 ELECTROCARDIOGRAM COMPLETE: CPT

## 2023-06-02 PROCEDURE — 99214 OFFICE O/P EST MOD 30 MIN: CPT | Mod: 25

## 2023-06-02 NOTE — DISCUSSION/SUMMARY
[Coronary Artery Disease] : coronary artery disease [Stable] : stable [Hypertension] : hypertension [FreeTextEntry1] : CAD: Plan rotational athrectomy.\par \par HLD: Continue current medications LDL at goal\par \par HTN: Controlled\par \par \par \par \par OV 2 months  [EKG obtained to assist in diagnosis and management of assessed problem(s)] : EKG obtained to assist in diagnosis and management of assessed problem(s)

## 2023-06-02 NOTE — HISTORY OF PRESENT ILLNESS
[FreeTextEntry1] : 64 Y/O female PMH:  CAD S/P PCI x3, HTN, HLD, diabetes, rectal bleed not on Plavix here today after recent cardiac cath revealing diffuse calcification. Pt anticipates rotational athrectomy 6/12 at [place]. Pt denies chest  pain at this time. Dtr present at visit.

## 2023-06-12 ENCOUNTER — INPATIENT (INPATIENT)
Facility: HOSPITAL | Age: 63
LOS: 0 days | Discharge: HOME | DRG: 247 | End: 2023-06-13
Attending: STUDENT IN AN ORGANIZED HEALTH CARE EDUCATION/TRAINING PROGRAM | Admitting: STUDENT IN AN ORGANIZED HEALTH CARE EDUCATION/TRAINING PROGRAM
Payer: MEDICAID

## 2023-06-12 ENCOUNTER — TRANSCRIPTION ENCOUNTER (OUTPATIENT)
Age: 63
End: 2023-06-12

## 2023-06-12 VITALS
TEMPERATURE: 98 F | SYSTOLIC BLOOD PRESSURE: 141 MMHG | RESPIRATION RATE: 16 BRPM | OXYGEN SATURATION: 98 % | HEART RATE: 77 BPM | DIASTOLIC BLOOD PRESSURE: 73 MMHG | HEIGHT: 59 IN | WEIGHT: 134.92 LBS

## 2023-06-12 DIAGNOSIS — Z95.5 PRESENCE OF CORONARY ANGIOPLASTY IMPLANT AND GRAFT: Chronic | ICD-10-CM

## 2023-06-12 DIAGNOSIS — Z90.49 ACQUIRED ABSENCE OF OTHER SPECIFIED PARTS OF DIGESTIVE TRACT: Chronic | ICD-10-CM

## 2023-06-12 DIAGNOSIS — I25.10 ATHEROSCLEROTIC HEART DISEASE OF NATIVE CORONARY ARTERY WITHOUT ANGINA PECTORIS: ICD-10-CM

## 2023-06-12 LAB
ALBUMIN SERPL ELPH-MCNC: 4.6 G/DL — SIGNIFICANT CHANGE UP (ref 3.3–5)
ALP SERPL-CCNC: 106 U/L — SIGNIFICANT CHANGE UP (ref 40–120)
ALT FLD-CCNC: 32 U/L — SIGNIFICANT CHANGE UP (ref 10–45)
ANION GAP SERPL CALC-SCNC: 14 MMOL/L — SIGNIFICANT CHANGE UP (ref 5–17)
AST SERPL-CCNC: 28 U/L — SIGNIFICANT CHANGE UP (ref 10–40)
BILIRUB SERPL-MCNC: 0.3 MG/DL — SIGNIFICANT CHANGE UP (ref 0.2–1.2)
BUN SERPL-MCNC: 22 MG/DL — SIGNIFICANT CHANGE UP (ref 7–23)
CALCIUM SERPL-MCNC: 9.2 MG/DL — SIGNIFICANT CHANGE UP (ref 8.4–10.5)
CHLORIDE SERPL-SCNC: 104 MMOL/L — SIGNIFICANT CHANGE UP (ref 96–108)
CO2 SERPL-SCNC: 24 MMOL/L — SIGNIFICANT CHANGE UP (ref 22–31)
CREAT SERPL-MCNC: 0.77 MG/DL — SIGNIFICANT CHANGE UP (ref 0.5–1.3)
EGFR: 88 ML/MIN/1.73M2 — SIGNIFICANT CHANGE UP
GLUCOSE BLDC GLUCOMTR-MCNC: 101 MG/DL — HIGH (ref 70–99)
GLUCOSE BLDC GLUCOMTR-MCNC: 129 MG/DL — HIGH (ref 70–99)
GLUCOSE BLDC GLUCOMTR-MCNC: 132 MG/DL — HIGH (ref 70–99)
GLUCOSE SERPL-MCNC: 128 MG/DL — HIGH (ref 70–99)
HCT VFR BLD CALC: 41.5 % — SIGNIFICANT CHANGE UP (ref 34.5–45)
HGB BLD-MCNC: 13.2 G/DL — SIGNIFICANT CHANGE UP (ref 11.5–15.5)
MCHC RBC-ENTMCNC: 31.8 GM/DL — LOW (ref 32–36)
MCHC RBC-ENTMCNC: 31.9 PG — SIGNIFICANT CHANGE UP (ref 27–34)
MCV RBC AUTO: 100.2 FL — HIGH (ref 80–100)
NRBC # BLD: 0 /100 WBCS — SIGNIFICANT CHANGE UP (ref 0–0)
PLATELET # BLD AUTO: 204 K/UL — SIGNIFICANT CHANGE UP (ref 150–400)
POTASSIUM SERPL-MCNC: 3.5 MMOL/L — SIGNIFICANT CHANGE UP (ref 3.5–5.3)
POTASSIUM SERPL-SCNC: 3.5 MMOL/L — SIGNIFICANT CHANGE UP (ref 3.5–5.3)
PROT SERPL-MCNC: 7.5 G/DL — SIGNIFICANT CHANGE UP (ref 6–8.3)
RBC # BLD: 4.14 M/UL — SIGNIFICANT CHANGE UP (ref 3.8–5.2)
RBC # FLD: 12.9 % — SIGNIFICANT CHANGE UP (ref 10.3–14.5)
SODIUM SERPL-SCNC: 142 MMOL/L — SIGNIFICANT CHANGE UP (ref 135–145)
WBC # BLD: 6.39 K/UL — SIGNIFICANT CHANGE UP (ref 3.8–10.5)
WBC # FLD AUTO: 6.39 K/UL — SIGNIFICANT CHANGE UP (ref 3.8–10.5)

## 2023-06-12 PROCEDURE — 92928 PRQ TCAT PLMT NTRAC ST 1 LES: CPT | Mod: LC

## 2023-06-12 PROCEDURE — 99152 MOD SED SAME PHYS/QHP 5/>YRS: CPT

## 2023-06-12 PROCEDURE — 93010 ELECTROCARDIOGRAM REPORT: CPT

## 2023-06-12 RX ORDER — ISOSORBIDE MONONITRATE 60 MG/1
30 TABLET, EXTENDED RELEASE ORAL DAILY
Refills: 0 | Status: DISCONTINUED | OUTPATIENT
Start: 2023-06-12 | End: 2023-06-13

## 2023-06-12 RX ORDER — METOPROLOL TARTRATE 50 MG
100 TABLET ORAL DAILY
Refills: 0 | Status: DISCONTINUED | OUTPATIENT
Start: 2023-06-13 | End: 2023-06-13

## 2023-06-12 RX ORDER — ASPIRIN/CALCIUM CARB/MAGNESIUM 324 MG
81 TABLET ORAL ONCE
Refills: 0 | Status: COMPLETED | OUTPATIENT
Start: 2023-06-12 | End: 2023-06-12

## 2023-06-12 RX ORDER — ASPIRIN/CALCIUM CARB/MAGNESIUM 324 MG
81 TABLET ORAL DAILY
Refills: 0 | Status: DISCONTINUED | OUTPATIENT
Start: 2023-06-13 | End: 2023-06-13

## 2023-06-12 RX ORDER — PANTOPRAZOLE SODIUM 20 MG/1
40 TABLET, DELAYED RELEASE ORAL
Refills: 0 | Status: DISCONTINUED | OUTPATIENT
Start: 2023-06-12 | End: 2023-06-13

## 2023-06-12 RX ORDER — HYOSCYAMINE SULFATE 0.13 MG
1 TABLET ORAL
Refills: 0 | DISCHARGE

## 2023-06-12 RX ORDER — OMEPRAZOLE 10 MG/1
1 CAPSULE, DELAYED RELEASE ORAL
Refills: 0 | DISCHARGE

## 2023-06-12 RX ORDER — SODIUM CHLORIDE 9 MG/ML
1000 INJECTION INTRAMUSCULAR; INTRAVENOUS; SUBCUTANEOUS
Refills: 0 | Status: DISCONTINUED | OUTPATIENT
Start: 2023-06-12 | End: 2023-06-13

## 2023-06-12 RX ORDER — CLOPIDOGREL BISULFATE 75 MG/1
1 TABLET, FILM COATED ORAL
Refills: 0 | DISCHARGE

## 2023-06-12 RX ORDER — CLOPIDOGREL BISULFATE 75 MG/1
75 TABLET, FILM COATED ORAL DAILY
Refills: 0 | Status: DISCONTINUED | OUTPATIENT
Start: 2023-06-12 | End: 2023-06-13

## 2023-06-12 RX ORDER — ACETAMINOPHEN 500 MG
1000 TABLET ORAL ONCE
Refills: 0 | Status: COMPLETED | OUTPATIENT
Start: 2023-06-12 | End: 2023-06-12

## 2023-06-12 RX ORDER — CYCLOBENZAPRINE HYDROCHLORIDE 10 MG/1
1 TABLET, FILM COATED ORAL
Refills: 0 | DISCHARGE

## 2023-06-12 RX ORDER — ISOSORBIDE MONONITRATE 60 MG/1
1 TABLET, EXTENDED RELEASE ORAL
Qty: 30 | Refills: 0
Start: 2023-06-12 | End: 2023-07-11

## 2023-06-12 RX ORDER — SODIUM CHLORIDE 9 MG/ML
250 INJECTION INTRAMUSCULAR; INTRAVENOUS; SUBCUTANEOUS ONCE
Refills: 0 | Status: COMPLETED | OUTPATIENT
Start: 2023-06-12 | End: 2023-06-12

## 2023-06-12 RX ADMIN — Medication 81 MILLIGRAM(S): at 11:45

## 2023-06-12 RX ADMIN — Medication 400 MILLIGRAM(S): at 19:28

## 2023-06-12 RX ADMIN — ISOSORBIDE MONONITRATE 30 MILLIGRAM(S): 60 TABLET, EXTENDED RELEASE ORAL at 19:28

## 2023-06-12 RX ADMIN — CLOPIDOGREL BISULFATE 75 MILLIGRAM(S): 75 TABLET, FILM COATED ORAL at 11:45

## 2023-06-12 RX ADMIN — SODIUM CHLORIDE 75 MILLILITER(S): 9 INJECTION INTRAMUSCULAR; INTRAVENOUS; SUBCUTANEOUS at 14:47

## 2023-06-12 RX ADMIN — Medication 1000 MILLIGRAM(S): at 19:43

## 2023-06-12 RX ADMIN — SODIUM CHLORIDE 500 MILLILITER(S): 9 INJECTION INTRAMUSCULAR; INTRAVENOUS; SUBCUTANEOUS at 11:25

## 2023-06-12 RX ADMIN — SODIUM CHLORIDE 75 MILLILITER(S): 9 INJECTION INTRAMUSCULAR; INTRAVENOUS; SUBCUTANEOUS at 11:25

## 2023-06-12 NOTE — H&P CARDIOLOGY - NSICDXPASTMEDICALHX_GEN_ALL_CORE_FT
PAST MEDICAL HISTORY:  Benign essential HTN     CAD (coronary artery disease)     HLD (hyperlipidemia)     Stented coronary artery     Type 2 diabetes mellitus      PAST MEDICAL HISTORY:  Benign essential HTN     CAD (coronary artery disease)     FAP (familial adenomatous polyposis)     GERD (gastroesophageal reflux disease)     H/O lichen planus     History of rectal bleeding     HLD (hyperlipidemia)     Insomnia     Stented coronary artery     Type 2 diabetes mellitus

## 2023-06-12 NOTE — DISCHARGE NOTE PROVIDER - HOSPITAL COURSE
HPI:  62 y/o female with PMH of CAD/PCI, DM2, HLD, GERD, HTN, FAP, rectal bleed, Lichen pigmentosus presented to [facility] 5/31/23 with c/o CP/ palpitations at rest and exertion x 1 month similar in nature of when she lasted needed stents 8 years ago. Exercise stress test done, patient with ST depressions aVF V4-V6 during exercise. On ASA/Plavix. Pt is s/p unsuccessful PCI on 5/31/23.    PROCEDURE RESULTS:  S/P [facility]  LAD/Circ lesion noted failed PCI unable to cross with [place].  < from: Cardiac Catheterization (05.31.23 @ 14:23) >   Impression     Diagnostic Conclusions   1. Severe focal calcified OM1 (IVUS revealing 270 degree of calcium) with   IFR of .88, with focal step up at this location.   2. Physiologically significant disease of LAD with angiographically   moderate disease, however diffuse pattern from distal LAD to proximal.   IFR   .76.   Interventional Conclusions  . Recommend PCI of focal Cx lesion which is distribution of ST segment   changes. WIll need rotational atherectomy and femoral approach.  Medical management of LAD diffuse disease at this time.      Pt presents for PCI today. (12 Jun 2023 10:52)    Patient successfully underwent [facility] - 1 KATLIN to Cx via RFA  RFA access site stable without bleeding, edema, or hematoma  c/o chest pain without ischemic changes on EKG, Imdur 30mg daily added to the regimen, and patient tolerated well  Patient remains stable for D/C with close follow up with Cards in 2 weeks.

## 2023-06-12 NOTE — DISCHARGE NOTE PROVIDER - NSDCCPTREATMENT_GEN_ALL_CORE_FT
PRINCIPAL PROCEDURE  Procedure: Left heart cardiac cath  Findings and Treatment: Cath Lab Report    Interventional Cardiologist:   Ryan Blackmon MD   Fellow:                        Love Santillan MD   Referring Physician:           Shelton Lee MD   Procedures Performed   Procedures:               1.    Arterial Access - Right Femoral   2.    Ultrasound Guided Access   3.    PCI: KATLIN   Indications:               Atypical chest pain   Staged Procedures   PCI Status:               elective   Conclusions:   Successul KATLIN placement to Cx resulting in XIOMARA 3 flow and no chest  pain at the end of the case.  Recommendations:   Maintain on DAPT for 6 months. Recommed aggressive medical management  for CAD as per ACC/AHA guidelines. Findings  relayed to patient and patient's cardiologist.

## 2023-06-12 NOTE — DISCHARGE NOTE PROVIDER - NSDCMRMEDTOKEN_GEN_ALL_CORE_FT
Albuterol (Eqv-Proventil HFA) 90 mcg/inh inhalation aerosol: 2 puff(s) inhaled every 6 hours as needed for  bronchospasm  alogliptin 25 mg oral tablet: 1 tab(s) orally once a day  aspirin 81 mg oral tablet: 1 tab(s) orally once a day  clopidogrel 75 mg oral tablet: 1 tab(s) orally once a day MDD: 30  cyclobenzaprine 5 mg oral tablet: 1 tab(s) orally once a day (at bedtime) as needed for  sleep  eszopiclone 2 mg oral tablet: 1 tab(s) orally once a day (at bedtime) as needed for  sleep  hyoscyamine 0.375 mg oral capsule, extended release: 1 tab(s) orally once a day  metoprolol succinate 100 mg oral capsule, extended release: 1 cap(s) orally once a day  omeprazole 40 mg oral delayed release capsule: 1 tab(s) orally once a day  Praluent Pen 75 mg/mL subcutaneous solution: 75 milligram(s) subcutaneously every 2 weeks   Albuterol (Eqv-Proventil HFA) 90 mcg/inh inhalation aerosol: 2 puff(s) inhaled every 6 hours as needed for  bronchospasm  alogliptin 25 mg oral tablet: 1 tab(s) orally once a day  aspirin 81 mg oral tablet: 1 tab(s) orally once a day  clopidogrel 75 mg oral tablet: 1 tab(s) orally once a day MDD: 30  cyclobenzaprine 5 mg oral tablet: 1 tab(s) orally once a day (at bedtime) as needed for  sleep  eszopiclone 2 mg oral tablet: 1 tab(s) orally once a day (at bedtime) as needed for  sleep  hyoscyamine 0.375 mg oral capsule, extended release: 1 tab(s) orally once a day  isosorbide mononitrate 30 mg oral tablet, extended release: 1 tab(s) orally once a day  metoprolol succinate 100 mg oral capsule, extended release: 1 cap(s) orally once a day  omeprazole 40 mg oral delayed release capsule: 1 tab(s) orally once a day  Praluent Pen 75 mg/mL subcutaneous solution: 75 milligram(s) subcutaneously every 2 weeks

## 2023-06-12 NOTE — CHART NOTE - NSCHARTNOTEFT_GEN_A_CORE
Removal of Femoral Sheath    Pulses in the right lower extremity are palpable. The patient was placed in the supine position. The insertion site was identified and the sutures were removed per protocol.  The 6_ [place] femoral sheath was then removed. Direct pressure was applied for  __20____ minutes by PERCY Hawk ACP.     Monitoring of the right groin and both lower extremities including neuro-vascular checks and vital signs every 15 minutes x 4, then every 30 minutes x 2, then every 1 hour x 2 then q4 x 72 hours was ordered.    Complications: None    Comments: Patient straight catheterized prior to sheath pull due to inability to void. All reportable signs and symptoms educated to patient via teach back.   Pulse oximeter placed on ipsilateral extremity to sheath.
Endorsed chest pain 5/10 radiating to her back, which is similar to her previous presentation before [facility] today.  Trial of IV Tylenol given.  12 leads EKG without ischemic changes  Patient seen and evaluated at bedside, eating dinner in NAD, VSS    Discussed with Dr. Blackmon, who also evaluated the patient at bedside.  Plans to keep her overnight with Imdur 30mg daily STAT dose now.  Likely D/C in AM  Patient and family made aware of the plan and agrees on the plan above

## 2023-06-12 NOTE — H&P CARDIOLOGY - HISTORY OF PRESENT ILLNESS
62 y/o female with PMH of CAD/PCI, DM2, HLD, GERD, HTN, Lichen pigmentosus presented to [facility] 5/31/23 with c/o CP/ palpitations at rest and exertion x 1 month similar in nature of when she lasted needed stents 8 years ago. Exercise stress test done, patient with ST depressions aVF V4-V6 during exercise. Pt is s/p unsuccessful PCI on 5/31/23.    PROCEDURE RESULTS:  S/P C  LAD/Circ lesion noted failed PCI unable to cross with [place].  < from: Cardiac Catheterization (05.31.23 @ 14:23) >   Impression     Diagnostic Conclusions   1. Severe focal calcified OM1 (IVUS revealing 270 degree of calcium) with   IFR of .88, with focal step up at this location.   2. Physiologically significant disease of LAD with angiographically   moderate disease, however diffuse pattern from distal LAD to proximal.   IFR   .76.   Interventional Conclusions  . Recommend PCI of focal Cx lesion which is distribution of ST segment   changes. WIll need rotational atherectomy and femoral approach.  Medical management of LAD diffuse disease at this time.      Pt presents for PCI today. 64 y/o female with PMH of CAD/PCI, DM2, HLD, GERD, HTN, FAP, rectal bleed, Lichen pigmentosus presented to [facility] 5/31/23 with c/o CP/ palpitations at rest and exertion x 1 month similar in nature of when she lasted needed stents 8 years ago. Exercise stress test done, patient with ST depressions aVF V4-V6 during exercise. On ASA/Plavix. Pt is s/p unsuccessful PCI on 5/31/23.    PROCEDURE RESULTS:  S/P C  LAD/Circ lesion noted failed PCI unable to cross with [place].  < from: Cardiac Catheterization (05.31.23 @ 14:23) >   Impression     Diagnostic Conclusions   1. Severe focal calcified OM1 (IVUS revealing 270 degree of calcium) with   IFR of .88, with focal step up at this location.   2. Physiologically significant disease of LAD with angiographically   moderate disease, however diffuse pattern from distal LAD to proximal.   IFR   .76.   Interventional Conclusions  . Recommend PCI of focal Cx lesion which is distribution of ST segment   changes. WIll need rotational atherectomy and femoral approach.  Medical management of LAD diffuse disease at this time.      Pt presents for PCI today.

## 2023-06-12 NOTE — DISCHARGE NOTE PROVIDER - CARE PROVIDER_API CALL
Shelton Lee  Cardiovascular Disease  [address], [address]  [place] [address]  Phone: [phone]  Fax: [phone]  Follow Up Time: 2 weeks

## 2023-06-12 NOTE — DISCHARGE NOTE PROVIDER - NSDCFUADDINST_GEN_ALL_CORE_FT
Wound Care:   the day AFTER your procedure remove bandage GENTLY, and clean using  mild soap and gentle warm, water stream, pat dry. leave OPEN to air. YOU MAY SHOWER   DO NOT apply lotions, creams, ointments, powder, perfumes to your incision site  DO NOT SOAK your site for 1 week ( no baths, no pools, no tubs, etc...)  Check  your groin and /or wrist daily. A small amount of bruising, and soreness are normal    ACTIVITY: for 24 hours   - DO NOT DRIVE  - DO NOT make any important decisions or sign legal documents   - DO NOT operate heavy machineries   - you may resume sexual activity in 48 hours, unless otherwise instructed by your cardiologist     Your procedure was done through the GROIN: for the NEXT 5 DAYS  - Limit climbing stairs, DO NOT soak in bathtub or pool  - no strenuous activities, pushing, pulling, straining  - Do not lift anything 10lbs or heavier     MEDICATION:   take your medications as explained ( see discharge paperwork)   If you received a STENT, you will be taking antiplatelet medications to KEEP YOUR STENT OPEN ( eg: Aspirin, Plavix, Brilinta, Effient, etc).  Take as prescribed DO NOT STOP taking them without consulting with your cardiologist first.     Follow heart healthy diet recommended by your doctor, if you smoke STOP SMOKING ( may call [phone] for center of tobacco control if you need assistance)     CALL your doctor to make appointment in 2 WEEKS     ***CALL YOUR DOCTOR***  if you experience: fever, chills, body aches, or severe pain, swelling, redness, heat or yellow discharge at incision site  If you experience bleeding or excruciating pain at the procedural site, swelling ( golf ball size) at your procedural site  If you experience CHEST PAIN  If you experience extremity numbness, tingling, temperature change ( of your procedural site)   If you are unable to reach your doctor, you may contact:   -Cardiology Office at [facility] at [phone] or   - [facility] [phone]  - [facility] [phone]

## 2023-06-12 NOTE — ASU DISCHARGE PLAN (ADULT/PEDIATRIC) - ASU DC SPECIAL INSTRUCTIONSFT
Wound Care:   the day AFTER your procedure remove bandage GENTLY, and clean using  mild soap and gentle warm, water stream, pat dry. leave OPEN to air. YOU MAY SHOWER   DO NOT apply lotions, creams, ointments, powder, perfumes to your incision site  DO NOT SOAK your site for 1 week ( no baths, no pools, no tubs, etc...)  Check  your groin and /or wrist daily.A small amount of bruising, and soarness are normal    ACTIVITY: for 24 hours   - DO NOT DRIVE  - DO NOT make any important decisions or sign legal documents   - DO NOT operate heavy machineries   - you may resume sexual activity in 48 hours, unless otherwise instructed by your cardiologist     If your procedure was done through the WRIST: for the NEXT 3DAYS:  - avoid pushing, pulling, with that affected wrist   - avoid repeated movement of that hand and wrist ( eg: typing, hammering)  - DO NOT LIFT anything more than 5 lbs     If your procedure was done through the GROIN: for the NEXT 5 DAYS  - Limit climbing stairs, DO NOT soak in bathtub or pool  - no strenuous activities, pushing, pulling, straining  - Do not lift anything 10lbs or heavier     MEDICATION:   take your medications as explained ( see discharge paperwork)   If you received a STENT, you will be taking antiplatelet medications to KEEP YOUR STENT OPEN ( eg: Aspirin, Plavix, Brilinta, Effient, etc).  Take as prescribed DO NOT STOP taking them without consulting with your cardiologist first.     Follow heart healthy diet recommended by your doctor, , if you smoke STOP SMOKING ( may call [phone] for center of tobacco control if you need assistance)     CALL your doctor to make appointment in 2 WEEKS     ***CALL YOUR DOCTOR***  if you experience: fever, chills, body aches, or severe pain, swelling, redness, heat or yellow discharge at incision site  If you experience Bleeding or excruciating pain at the procedural site, swelling ( golf ball size) at your procedural site  If you experience CHEST PAIN  If you experience extremity numbness, tingling, temperature change ( of your procedural site)   If you are unable to reach your doctor, you may contact:   -Cardiology Office at [facility] at [phone] or   - [facility] [phone]  - [facility] [phone]

## 2023-06-12 NOTE — ASU DISCHARGE PLAN (ADULT/PEDIATRIC) - CARE PROVIDER_API CALL
Shelton Lee  Cardiovascular Disease  [address], [address]  [place] [address]  Phone: [phone]  Fax: [phone]  Follow Up Time:

## 2023-06-12 NOTE — DISCHARGE NOTE PROVIDER - NSDCFUSCHEDAPPT_GEN_ALL_CORE_FT
Gerda Mcneal  [facility]  [address]  Scheduled Appointment: 08/08/2023

## 2023-06-12 NOTE — ASU PATIENT PROFILE, ADULT - FALL HARM RISK - UNIVERSAL INTERVENTIONS
Bed in lowest position, wheels locked, appropriate side rails in place/Call bell, personal items and telephone in reach/Instruct patient to call for assistance before getting out of bed or chair/Non-slip footwear when patient is out of bed/[place] to call system/Physically safe environment - no spills, clutter or unnecessary equipment/Purposeful Proactive Rounding/Room/bathroom lighting operational, light cord in reach

## 2023-06-12 NOTE — ASU PATIENT PROFILE, ADULT - NSICDXPASTMEDICALHX_GEN_ALL_CORE_FT
PAST MEDICAL HISTORY:  Benign essential HTN     CAD (coronary artery disease)     HLD (hyperlipidemia)     Stented coronary artery     Type 2 diabetes mellitus

## 2023-06-12 NOTE — DISCHARGE NOTE PROVIDER - NSDCCPCAREPLAN_GEN_ALL_CORE_FT
PRINCIPAL DISCHARGE DIAGNOSIS  Diagnosis: CAD (coronary artery disease)  Assessment and Plan of Treatment: Coronary artery disease is a condition where the arteries the supply the heart muscle get clogged with fatty deposits & puts you at risk for a heart attack  Call your doctor if you have any new pain, pressure, or discomfort in the center of your chest, pain, tingling or discomfort in arms, back, neck, jaw, or stomach, shortness of breath, nausea, vomiting, burping or heartburn, sweating, cold and clammy skin, racing or abnormal heartbeat for more than 10 minutes or if they keep coming & going.  Call 911 and do not tr to get to hospital by care  You can help yourself with lifestyle changes (quitting smoking if you smoke), eat lots of fruits & vegetables & low fat dairy products, not a lot of meat & fatty foods, walk or some form of physical activity most days of the week, lose weight if you are overweight  Take your cardiac medication as prescribed to lower cholesterol, to lower blood pressure, aspirin to prevent blood clots, and diabetes control  Make sure to keep appointments with doctor for cardiac follow up care        SECONDARY DISCHARGE DIAGNOSES  Diagnosis: HLD (hyperlipidemia)  Assessment and Plan of Treatment: Cholesterol is a substance that is found in the blood. Everyone has some. It is needed for good health. The problem is, people sometimes have too much cholesterol. Compared with people with normal cholesterol, people with high cholesterol have a higher risk of heart attack, stroke, and other health problems. The higher your cholesterol, the higher your risk of these problems.  Not everyone who has high cholesterol needs medicines. Your doctor will decide if you need them based on your age, family history, and other health concerns.  The medicines most often used to treat high cholesterol are called "statins."  You should probably take a statin if you:   - Already had a heart attack or stroke   - Have known heart disease   - Have diabetes   - Have a condition called" peripheral artery disease," which makes it painful to walk, and happens when the arteries in your legs get clogged with fatty deposits   - Have an "abdominal aortic aneurysm," which is a widening of the main artery in the belly  Most people with any of the conditions listed above should take a statin no matter what their cholesterol level is.  If your doctor or nurse prescribes a statin, it's important to keep taking it. The medicine might not make you feel any different. But it can help prevent heart attack, stroke, and death  You can help lower your cholesterol by doing these things:   - You can lower your LDL, or "bad," cholesterol by avoiding red meat, butter, fried foods, cheese, and other foods that have a lot of saturated fat.   - You can lower triglycerides by avoiding sugary foods, fried foods, and excess alcohol.   - If you are overweight, it can help to lose weight. Your doctor or nurse can help you do this in a healthy way.   - Try to get regular physical activity. Even gentle forms of exercise, like walking, are good for your health.  Even if these steps do little to change your cholesterol, they can improve your health in many other ways      Diagnosis: HTN (hypertension)  Assessment and Plan of Treatment: Hypertension, also known simply as "high blood pressure" is very common, however can lead to many significant complications if left uncontrolled. When the blood pressure is elevated, the force the blood puts on the walls of the arteries is high and can lead to artery damage. Also, when the heart muscle has to pump blood against a high blood pressure, it thickens and enlarges, just like any muscle does when it has to do more work (think of a weight ). When the blood pressure is very high, people may feel a headache or tired. Some people can feel pounding in their head or have blurry vision. Hearing the heart beating in the ear especially at night can be a sign of high blood pressure. Eventually, symptoms of stroke, heart attack, heart failure or irregular heartbeats can occur  - Exercise: Doing cardiovascular exercise such as running, biking or swimming at least 30 minutes per day most days of the week is recommended to help keep blood pressure healthy  - Lose weight: Maintaining a normal BMI (body mass index) is very important in keeping blood pressure readings normal   - Avoid salt: Sodium in the diet increases the blood pressure in many ways. Salt comes in many foods, so just because you don't add salt to your food it does not mean that you are eating a low salt diet. Read labels and keep sodium intake to less than 2000 mg per day   - Avoid alcohol: Even 1 or 2 alcoholic drinks can significantly increase blood pressure   - DASH Diet: The DASH diet has been shown to reduce blood pressure   - Take all medication as prescribed.   - Follow up with your medical doctor for routine blood pressure monitoring at your next visit.   - Notify your doctor if you have any of the following symptoms:    - Dizziness, Lightheadedness, Blurry vision, Headache, Chest pain, Shortness of breath

## 2023-06-13 ENCOUNTER — TRANSCRIPTION ENCOUNTER (OUTPATIENT)
Age: 63
End: 2023-06-13

## 2023-06-13 VITALS
RESPIRATION RATE: 17 BRPM | TEMPERATURE: 99 F | DIASTOLIC BLOOD PRESSURE: 62 MMHG | SYSTOLIC BLOOD PRESSURE: 108 MMHG | OXYGEN SATURATION: 100 % | HEART RATE: 86 BPM

## 2023-06-13 PROCEDURE — 80053 COMPREHEN METABOLIC PANEL: CPT

## 2023-06-13 PROCEDURE — 36415 COLL VENOUS BLD VENIPUNCTURE: CPT

## 2023-06-13 PROCEDURE — 85027 COMPLETE CBC AUTOMATED: CPT

## 2023-06-13 PROCEDURE — C1894: CPT

## 2023-06-13 PROCEDURE — 93005 ELECTROCARDIOGRAM TRACING: CPT

## 2023-06-13 PROCEDURE — C1874: CPT

## 2023-06-13 PROCEDURE — C1769: CPT

## 2023-06-13 PROCEDURE — 82962 GLUCOSE BLOOD TEST: CPT

## 2023-06-13 PROCEDURE — C9600: CPT | Mod: LC

## 2023-06-13 PROCEDURE — C1887: CPT

## 2023-06-13 PROCEDURE — C1725: CPT

## 2023-06-13 RX ADMIN — PANTOPRAZOLE SODIUM 40 MILLIGRAM(S): 20 TABLET, DELAYED RELEASE ORAL at 06:13

## 2023-06-13 RX ADMIN — Medication 81 MILLIGRAM(S): at 06:13

## 2023-06-13 RX ADMIN — CLOPIDOGREL BISULFATE 75 MILLIGRAM(S): 75 TABLET, FILM COATED ORAL at 06:13

## 2023-06-13 NOTE — DISCHARGE NOTE NURSING/CASE MANAGEMENT/SOCIAL WORK - PATIENT PORTAL LINK FT
You can access the FollowMyHealth Patient Portal offered by [facility] by registering at the following website: [email or web address]. By joining [employer]’s FollowMyHealth portal, you will also be able to view your health information using other applications (apps) compatible with our system.

## 2023-06-13 NOTE — PROGRESS NOTE ADULT - ASSESSMENT
62 y/o female with PMH of CAD/PCI, DM2, HLD, GERD, HTN, FAP, rectal bleed, Lichen pigmentosus transferred from [facility] with CP/ palpitations with unsuccessful PCI on 5/31 is now transferred to [facility] s/p [facility] with 1 KATLIN to Cx.     # CAD   s/p [facility] - 1 KATLIN to LCx via RFA on 6/12/23 with Dr. Blackmon     - Right femoral access site is stable without swelling, bleeding, or hematoma    - Continue close monitoring of femoral access site and clinical status     - Continue Aspirin and Plavix     - Continue Pralvent 75mg every 2 weeks     - Imdur 30mg daily added to the regimen, and patient tolerated well and no further CP reported    # HTN    - Normotensive     - Continue antihypertensive medications    - DASH diet       - Recommends heart healthy diet which includes a variety of fruits and vegetables, whole grains, low fat dairy products, legumes and skinless poultry and fish; food prepared with little or no salt and minimize processed foods    - Avoid using NSAIDs  (Aleve, Motrin, ibuprofen, naproxen) while on DAPT, please utilize Tylenol for pain control (not to exceed 4gm in 24 hours)    - Keep K > 4 and Mg > 2    - Patient to follow up with Cards in 2 weeks post discharge      - Reviewed and reinforced with patient:  wound care instructions, activities dos and donts, medication compliance specifically antiplatelet therapy given stent/s.    - Patient aware to take DAPT  as prescribed and DO NOT STOP taking without consulting cardiologist first or STENT/s WILL CLOSE  - Reviewed and reinforced with patient:  site complications ( eg: bleeding, excruciating pain at the procedural site, large swelling (golf ball sized), extremity numbness, tingling, temperature change), or CHEST PAIN; pt aware that if any of those occur he/she must call cardiologist IMMEDIATELY or 911 or go to nearest emergency room   - Reviewed and reinforced a heart healthy diet, Smoking Cessation  - Patient verbalizes understanding of ALL OF THE ABOVE, and gives positive feedback     # dispo    - Anticipating early D/C in AM

## 2023-06-13 NOTE — DISCHARGE NOTE NURSING/CASE MANAGEMENT/SOCIAL WORK - NSDCPEFALRISK_GEN_ALL_CORE
For information on Fall & Injury Prevention, visit: [email or web address] OR  [email or web address] OR  https://www.cdc.gov/steadi/patient.html

## 2023-06-13 NOTE — PROGRESS NOTE ADULT - SUBJECTIVE AND OBJECTIVE BOX
[facility] INVASIVE CARDIOLOGY- (Javier Reynolds, Kimberlee, Tia, Linsey, Dayo, Neymar, Jimmy, Santos)   CARDIAC [facility] TEAM   [phone]      CHIEF COMPLAINT: Patient is a 63y old  Female who presents with a chief complaint of chest pain  HPI:  62 y/o female with PMH of CAD/PCI, DM2, HLD, GERD, HTN, FAP, rectal bleed, Lichen pigmentosus presented to [facility] 5/31/23 with c/o CP/ palpitations at rest and exertion x 1 month similar in nature of when she lasted needed stents 8 years ago. Exercise stress test done, patient with ST depressions aVF V4-V6 during exercise. On ASA/Plavix. Pt is s/p unsuccessful PCI on 5/31/23.    PROCEDURE RESULTS:  S/P LHC  LAD/Circ lesion noted failed PCI unable to cross with [place].  < from: Cardiac Catheterization (05.31.23 @ 14:23) >   Impression     Diagnostic Conclusions   1. Severe focal calcified OM1 (IVUS revealing 270 degree of calcium) with   IFR of .88, with focal step up at this location.   2. Physiologically significant disease of LAD with angiographically   moderate disease, however diffuse pattern from distal LAD to proximal.   IFR   .76.   Interventional Conclusions  . Recommend PCI of focal Cx lesion which is distribution of ST segment   changes. WIll need rotational atherectomy and femoral approach.  Medical management of LAD diffuse disease at this time.      Pt presents for PCI today. (12 Jun 2023 10:52)      Subjective/Observations:   Patient feels well - no current complaints- Denies chest pain, SOB, palpitation, N/V  No pain, numbness, tingling or swelling around the access site      Review of Systems all WNL except below indicated:      MEDICATIONS  (STANDING):  Aspirin 81 milliGRAM(s) Oral daily  clopidogrel Tablet 75 milliGRAM(s) Oral daily  isosorbide   mononitrate ER Tablet (IMDUR) 30 milliGRAM(s) Oral daily  sodium chloride 0.9%. 1000 milliLiter(s) (75 mL/Hr) IV Continuous <Continuous>  sodium chloride 0.9%. 1000 milliLiter(s) (75 mL/Hr) IV Continuous <Continuous>    MEDICATIONS  (PRN):      Allergies    No Known Allergies    Intolerances        Vital Signs Last 24 Hrs  T(C): 36.7 (12 Jun 2023 20:20), Max: 36.7 (12 Jun 2023 20:20)  T(F): 98 (12 Jun 2023 20:20), Max: 98 (12 Jun 2023 20:20)  HR: 86 (12 Jun 2023 20:20) (64 - 98)  BP: 109/72 (12 Jun 2023 20:20) (99/61 - 144/77)  BP(mean): 84 (12 Jun 2023 20:20) (73 - 103)  RR: 17 (12 Jun 2023 20:20) (15 - 18)  SpO2: 100% (12 Jun 2023 20:20) (95% - 100%)    Parameters below as of 12 Jun 2023 20:20  Patient On (Oxygen Delivery Method): room air        FOCUSED PHYSICAL EXAM:  Cardiovascular: Regular, S1 and S2, No murmurs, rubs, gallops or clicks  Pulmonary: Non-labored, breath sounds are clear bilaterally, No wheezing, rales or rhonchi  cath site: Right groin access site - stable without edema, bleeding or hematoma. soft, + pulses     LABS: All Labs Reviewed:                        13.2   6.39  )-----------( 204      ( 12 Jun 2023 11:46 )             41.5     12 Jun 2023 11:46    142    |  104    |  22     ----------------------------<  128    3.5     |  24     |  0.77     Ca    9.2        12 Jun 2023 11:46    TPro  7.5    /  Alb  4.6    /  TBili  0.3    /  DBili  x      /  AST  28     /  ALT  32     /  AlkPhos  106    12 Jun 2023 11:46              RESULTS:    CATH REPORT:  < from: Cardiac Catheterization (06.12.23 @ 12:47) >  Cath Lab Report    Interventional Cardiologist:   Ryan Blackmon MD   Fellow:                        Love Santillan MD   Referring Physician:           Shelton Lee MD     Procedures Performed   Procedures:               1.    Arterial Access - Right Femoral     2. Ultrasound Guided Access   3.    PCI: KATLIN     Indications:               Atypical chest pain   Staged Procedures     PCI Status:               elective     Conclusions:   Successul KATLIN placement to Cx resulting in XIOMARA 3 flow and no chest  pain at the end of the case.    Recommendations:     Maintain on DAPT for 6 months. Recommed aggressive medical management  for CAD as per ACC/AHA guidelines. Findings  relayed to patient and patient's cardiologist.        < end of copied text >

## 2023-06-14 ENCOUNTER — NON-APPOINTMENT (OUTPATIENT)
Age: 63
End: 2023-06-14

## 2023-06-14 PROBLEM — D12.6 BENIGN NEOPLASM OF COLON, UNSPECIFIED: Chronic | Status: ACTIVE | Noted: 2023-06-12

## 2023-06-14 PROBLEM — Z87.19 PERSONAL HISTORY OF OTHER DISEASES OF THE DIGESTIVE SYSTEM: Chronic | Status: ACTIVE | Noted: 2023-06-12

## 2023-06-14 PROBLEM — K21.9 GASTRO-ESOPHAGEAL REFLUX DISEASE WITHOUT ESOPHAGITIS: Chronic | Status: ACTIVE | Noted: 2023-06-12

## 2023-06-14 PROBLEM — G47.00 INSOMNIA, UNSPECIFIED: Chronic | Status: ACTIVE | Noted: 2023-06-12

## 2023-06-14 PROBLEM — Z87.2 PERSONAL HISTORY OF DISEASES OF THE SKIN AND SUBCUTANEOUS TISSUE: Chronic | Status: ACTIVE | Noted: 2023-06-12

## 2023-06-15 ENCOUNTER — APPOINTMENT (OUTPATIENT)
Dept: INTERNAL MEDICINE | Facility: CLINIC | Age: 63
End: 2023-06-15

## 2023-06-19 ENCOUNTER — INPATIENT (INPATIENT)
Facility: HOSPITAL | Age: 63
LOS: 0 days | Discharge: HOME | DRG: 313 | End: 2023-06-20
Attending: HOSPITALIST | Admitting: HOSPITALIST
Payer: MEDICAID

## 2023-06-19 VITALS
RESPIRATION RATE: 20 BRPM | HEART RATE: 91 BPM | TEMPERATURE: 98 F | DIASTOLIC BLOOD PRESSURE: 98 MMHG | WEIGHT: 134.92 LBS | HEIGHT: 59 IN | SYSTOLIC BLOOD PRESSURE: 168 MMHG | OXYGEN SATURATION: 98 %

## 2023-06-19 DIAGNOSIS — Z90.49 ACQUIRED ABSENCE OF OTHER SPECIFIED PARTS OF DIGESTIVE TRACT: Chronic | ICD-10-CM

## 2023-06-19 DIAGNOSIS — Z95.5 PRESENCE OF CORONARY ANGIOPLASTY IMPLANT AND GRAFT: Chronic | ICD-10-CM

## 2023-06-19 LAB
BASE EXCESS BLDV CALC-SCNC: 3.2 MMOL/L — HIGH (ref -2–3)
CA-I SERPL-SCNC: 1.25 MMOL/L — SIGNIFICANT CHANGE UP (ref 1.15–1.33)
CHLORIDE BLDV-SCNC: 106 MMOL/L — SIGNIFICANT CHANGE UP (ref 96–108)
CO2 BLDV-SCNC: 32 MMOL/L — HIGH (ref 22–26)
GAS PNL BLDV: 138 MMOL/L — SIGNIFICANT CHANGE UP (ref 136–145)
GAS PNL BLDV: SIGNIFICANT CHANGE UP
GAS PNL BLDV: SIGNIFICANT CHANGE UP
GLUCOSE BLDV-MCNC: 149 MG/DL — HIGH (ref 70–99)
HCO3 BLDV-SCNC: 30 MMOL/L — HIGH (ref 22–29)
HCT VFR BLDA CALC: 42 % — SIGNIFICANT CHANGE UP (ref 34.5–46.5)
HGB BLD CALC-MCNC: 13.9 G/DL — SIGNIFICANT CHANGE UP (ref 11.7–16.1)
LACTATE BLDV-MCNC: 0.7 MMOL/L — SIGNIFICANT CHANGE UP (ref 0.5–2)
PCO2 BLDV: 53 MMHG — HIGH (ref 39–42)
PH BLDV: 7.36 — SIGNIFICANT CHANGE UP (ref 7.32–7.43)
PO2 BLDV: 22 MMHG — LOW (ref 25–45)
POTASSIUM BLDV-SCNC: 4.2 MMOL/L — SIGNIFICANT CHANGE UP (ref 3.5–5.1)
SAO2 % BLDV: 25.2 % — LOW (ref 67–88)

## 2023-06-19 PROCEDURE — 99285 EMERGENCY DEPT VISIT HI MDM: CPT

## 2023-06-19 RX ORDER — ASPIRIN/CALCIUM CARB/MAGNESIUM 324 MG
243 TABLET ORAL ONCE
Refills: 0 | Status: COMPLETED | OUTPATIENT
Start: 2023-06-19 | End: 2023-06-19

## 2023-06-19 RX ADMIN — Medication 243 MILLIGRAM(S): at 23:45

## 2023-06-19 NOTE — ED PROVIDER NOTE - NSTIMEPROVIDERCAREINITIATE_GEN_ER
19-Jun-2023 23:34
No cyanosis/No edema/No splints/No immobilization/No casts/Full range of motion with no contractures/No tenderness/No erythema/No clubbing

## 2023-06-19 NOTE — ED PROVIDER NOTE - CLINICAL SUMMARY MEDICAL DECISION MAKING FREE TEXT BOX
63-year-old female with a past medical history of CAD s/p stent (6/12/2023, T2DM, HLD, GERD, HTN, FAP, presents with 1 week of chest pain after her cardiac catheterization.  Concern for ongoing ischemia given patient is somewhat ill-appearing.  Labs, EKG, CXR, Tylenol, Aspirin ordered.

## 2023-06-19 NOTE — ED PROVIDER NOTE - NSICDXPASTMEDICALHX_GEN_ALL_CORE_FT
PAST MEDICAL HISTORY:  Benign essential HTN     CAD (coronary artery disease)     FAP (familial adenomatous polyposis)     GERD (gastroesophageal reflux disease)     H/O lichen planus     History of rectal bleeding     HLD (hyperlipidemia)     Insomnia     Stented coronary artery     Type 2 diabetes mellitus

## 2023-06-19 NOTE — ED PROVIDER NOTE - RAPID ASSESSMENT
Santa Hong, Attending Physician: 63F y/o female with PMH of CAD/PCI, DM2, HLD, GERD, HTN, FAP, rectal bleed, Lichen pigmentosus presented s/p PCI 6/12/2023 1 KATLIN to Cx here for palpitations and headache. Patient on new medication, Imdur as of recent discharge. No bloody stools. +malaise.     Patient was rapidly assessed via a telemedicine and/or role of Quick Triage Doctor; a limited history, physical exam and assessment was performed. The patient will be seen and further evaluated in the main emergency department. The remainder of care and evaluation will be conducted by the primary emergency medicine team. Receiving team will follow up on labs, imaging and serially reassess patient as indicated. All further decisions regarding patient care, evaluation and disposition are at the discretion of the receiving primary emergency department team.

## 2023-06-19 NOTE — ED PROVIDER NOTE - PHYSICAL EXAMINATION
GENERAL: well appearing in no acute distress, ill-appearing  HEAD: normocephalic, atraumatic  HEENT: normal conjunctiva, oral mucosa moist, uvula midline  CARDIAC: regular rate and rhythm, normal S1S2, no appreciable murmurs  PULM: normal breath sounds, clear to ascultation bilaterally, no rales, rhonchi, wheezing  GI: abdomen nondistended, soft, nontender  : no suprapubic tenderness  NEURO: moving all 4 extremities, no focal deficits, normal speech, AAOx3   MSK: no peripheral edema, no calf tenderness b/l  SKIN: well-perfused, extremities warm  PSYCH: appropriate mood and affect

## 2023-06-19 NOTE — ED PROVIDER NOTE - IV ALTEPLASE INCLUSION HIDDEN
show
PRINCIPAL DISCHARGE DIAGNOSIS  Diagnosis: Back pain  Assessment and Plan of Treatment: -You were found to have disc buldging with pain likely related to muscle spasms. PLease continue with physical therapy, please follow up with neurology and pain management as soon as possible once discharged      SECONDARY DISCHARGE DIAGNOSES  Diagnosis: Hyperlipidemia  Assessment and Plan of Treatment:     Diagnosis: Essential hypertension  Assessment and Plan of Treatment:     Diagnosis: Type 2 diabetes mellitus  Assessment and Plan of Treatment:

## 2023-06-19 NOTE — ED PROVIDER NOTE - OBJECTIVE STATEMENT
63-year-old female with a history of CAD s/p PCI, T2DM, HLD, GERD, HTN, FAP presents with 1 week of chest pain.  Patient was admitted here 1 week ago for cardiac cath s/p KATLIN placement to left circumflex.  Patient reports she feels poorly similarly to how she felt before the stent was placed.  Patient's daughter notes that she has looked more pale and more tired before convincing her mother to present to the ED this afternoon.  Patient also endorses a mild headache.  Patient has no SOB, lightheadedness, V/D/abdominal pain, dysuria, peripheral edema, fever/chills.

## 2023-06-19 NOTE — ED PROVIDER NOTE - PROGRESS NOTE DETAILS
Repeat EKG unremarkable. Patient feeling nauseous.  Repeat EKG, Zofran ordered.  Spoke with cardiology fellow.  Cardiology will be down to evaluate patient. Cardiology evaluated patient and would like admission for telemetry monitoring.  Patient continues to have a headache, CT head ordered. Patient is admitted to medicine telemetry for ongoing cardiology follow-up. Repeat troponin ordered for trend.

## 2023-06-19 NOTE — ED PROVIDER NOTE - ATTENDING CONTRIBUTION TO CARE
MD Pearson:  patient seen and evaluated personally.   I agree with the History & Physical,  Impression & Plan other than what was detailed in my note.  MD Pearson  64 y/o female with PMH of CAD/PCI, DM2, HLD, GERD, HTN, FAP, rectal bleed, s/p KATLIN to circumflex 1 week ago by Dr. Walter, presenting to ed w/ cc of chest pain, afebrile vitals stable  non toxic well appearing, NC/AT,  conjunctiva non conjected, sclera anicteric, moist mucous membranes, neck supple, heart sounds, normal, no mrg, lungs cta b/l no wrr, abd soft non distended w/ no tenderness, no visual deformities of extremities, axox3, , normal mood and affect, no s/o psuedoaneurysm, ekg as read by me is nsr w/ no sig st changes, agree w/ qdoc workup cbc, cmp, trop, cxr, ekg, will discuss w/ cardiology . MD Pearson:  patient seen and evaluated personally.   I agree with the History & Physical,  Impression & Plan other than what was detailed in my note.  MD Pearson  62 y/o female with PMH of CAD/PCI, DM2, HLD, GERD, HTN, FAP, rectal bleed, s/p KATLIN to circumflex 1 week ago by Dr. Walter, presenting to ed w/ cc of chest pain feels like burning, no associated f/c n/v, sob, feels like prior episodes, has been going on x 1 week,  also having mild slow onset posterior ha, afebrile vitals stable  non toxic well appearing, NC/AT,  conjunctiva non conjected, sclera anicteric, moist mucous membranes, neck supple, heart sounds, normal, no mrg, lungs cta b/l no wrr, abd soft non distended w/ no tenderness, no visual deformities of extremities, axox3, , normal mood and affect, no s/o psuedoaneurysm, good femoral pulse, some bruising to leg  ekg as read by me is nsr w/ no sig st changes, agree w/ qdoc workup cbc, cmp, trop, cxr, ekg, will discuss w/ cardiology .

## 2023-06-20 ENCOUNTER — TRANSCRIPTION ENCOUNTER (OUTPATIENT)
Age: 63
End: 2023-06-20

## 2023-06-20 ENCOUNTER — RX RENEWAL (OUTPATIENT)
Age: 63
End: 2023-06-20

## 2023-06-20 VITALS
SYSTOLIC BLOOD PRESSURE: 122 MMHG | HEART RATE: 69 BPM | RESPIRATION RATE: 18 BRPM | TEMPERATURE: 98 F | OXYGEN SATURATION: 95 % | DIASTOLIC BLOOD PRESSURE: 79 MMHG

## 2023-06-20 DIAGNOSIS — R07.9 CHEST PAIN, UNSPECIFIED: ICD-10-CM

## 2023-06-20 DIAGNOSIS — K21.9 GASTRO-ESOPHAGEAL REFLUX DISEASE WITHOUT ESOPHAGITIS: ICD-10-CM

## 2023-06-20 DIAGNOSIS — Z29.9 ENCOUNTER FOR PROPHYLACTIC MEASURES, UNSPECIFIED: ICD-10-CM

## 2023-06-20 DIAGNOSIS — E11.9 TYPE 2 DIABETES MELLITUS WITHOUT COMPLICATIONS: ICD-10-CM

## 2023-06-20 DIAGNOSIS — E78.5 HYPERLIPIDEMIA, UNSPECIFIED: ICD-10-CM

## 2023-06-20 DIAGNOSIS — I25.119 ATHEROSCLEROTIC HEART DISEASE OF NATIVE CORONARY ARTERY WITH UNSPECIFIED ANGINA PECTORIS: ICD-10-CM

## 2023-06-20 DIAGNOSIS — G43.909 MIGRAINE, UNSPECIFIED, NOT INTRACTABLE, WITHOUT STATUS MIGRAINOSUS: ICD-10-CM

## 2023-06-20 DIAGNOSIS — Z87.898 PERSONAL HISTORY OF OTHER SPECIFIED CONDITIONS: ICD-10-CM

## 2023-06-20 DIAGNOSIS — I10 ESSENTIAL (PRIMARY) HYPERTENSION: ICD-10-CM

## 2023-06-20 LAB
ALBUMIN SERPL ELPH-MCNC: 4.5 G/DL — SIGNIFICANT CHANGE UP (ref 3.3–5)
ALP SERPL-CCNC: 106 U/L — SIGNIFICANT CHANGE UP (ref 40–120)
ALT FLD-CCNC: 34 U/L — SIGNIFICANT CHANGE UP (ref 10–45)
ANION GAP SERPL CALC-SCNC: 13 MMOL/L — SIGNIFICANT CHANGE UP (ref 5–17)
APTT BLD: 29.4 SEC — SIGNIFICANT CHANGE UP (ref 27.5–35.5)
AST SERPL-CCNC: 29 U/L — SIGNIFICANT CHANGE UP (ref 10–40)
BASOPHILS # BLD AUTO: 0.05 K/UL — SIGNIFICANT CHANGE UP (ref 0–0.2)
BASOPHILS NFR BLD AUTO: 0.8 % — SIGNIFICANT CHANGE UP (ref 0–2)
BILIRUB SERPL-MCNC: 0.2 MG/DL — SIGNIFICANT CHANGE UP (ref 0.2–1.2)
BUN SERPL-MCNC: 16 MG/DL — SIGNIFICANT CHANGE UP (ref 7–23)
CALCIUM SERPL-MCNC: 9.6 MG/DL — SIGNIFICANT CHANGE UP (ref 8.4–10.5)
CHLORIDE SERPL-SCNC: 105 MMOL/L — SIGNIFICANT CHANGE UP (ref 96–108)
CK MB BLD-MCNC: 1.9 % — SIGNIFICANT CHANGE UP (ref 0–3.5)
CK MB CFR SERPL CALC: 2.6 NG/ML — SIGNIFICANT CHANGE UP (ref 0–3.8)
CK SERPL-CCNC: 134 U/L — SIGNIFICANT CHANGE UP (ref 25–170)
CO2 SERPL-SCNC: 23 MMOL/L — SIGNIFICANT CHANGE UP (ref 22–31)
CREAT SERPL-MCNC: 0.75 MG/DL — SIGNIFICANT CHANGE UP (ref 0.5–1.3)
EGFR: 91 ML/MIN/1.73M2 — SIGNIFICANT CHANGE UP
EOSINOPHIL # BLD AUTO: 0.55 K/UL — HIGH (ref 0–0.5)
EOSINOPHIL NFR BLD AUTO: 9 % — HIGH (ref 0–6)
GLUCOSE BLDC GLUCOMTR-MCNC: 105 MG/DL — HIGH (ref 70–99)
GLUCOSE BLDC GLUCOMTR-MCNC: 111 MG/DL — HIGH (ref 70–99)
GLUCOSE BLDC GLUCOMTR-MCNC: 93 MG/DL — SIGNIFICANT CHANGE UP (ref 70–99)
GLUCOSE SERPL-MCNC: 147 MG/DL — HIGH (ref 70–99)
HCT VFR BLD CALC: 41.3 % — SIGNIFICANT CHANGE UP (ref 34.5–45)
HGB BLD-MCNC: 13.3 G/DL — SIGNIFICANT CHANGE UP (ref 11.5–15.5)
IMM GRANULOCYTES NFR BLD AUTO: 0.3 % — SIGNIFICANT CHANGE UP (ref 0–0.9)
INR BLD: 0.96 RATIO — SIGNIFICANT CHANGE UP (ref 0.88–1.16)
LYMPHOCYTES # BLD AUTO: 2.52 K/UL — SIGNIFICANT CHANGE UP (ref 1–3.3)
LYMPHOCYTES # BLD AUTO: 41.4 % — SIGNIFICANT CHANGE UP (ref 13–44)
MCHC RBC-ENTMCNC: 31.9 PG — SIGNIFICANT CHANGE UP (ref 27–34)
MCHC RBC-ENTMCNC: 32.2 GM/DL — SIGNIFICANT CHANGE UP (ref 32–36)
MCV RBC AUTO: 99 FL — SIGNIFICANT CHANGE UP (ref 80–100)
MONOCYTES # BLD AUTO: 0.54 K/UL — SIGNIFICANT CHANGE UP (ref 0–0.9)
MONOCYTES NFR BLD AUTO: 8.9 % — SIGNIFICANT CHANGE UP (ref 2–14)
NEUTROPHILS # BLD AUTO: 2.4 K/UL — SIGNIFICANT CHANGE UP (ref 1.8–7.4)
NEUTROPHILS NFR BLD AUTO: 39.6 % — LOW (ref 43–77)
NRBC # BLD: 0 /100 WBCS — SIGNIFICANT CHANGE UP (ref 0–0)
NT-PROBNP SERPL-SCNC: <36 PG/ML — SIGNIFICANT CHANGE UP (ref 0–300)
PLATELET # BLD AUTO: 209 K/UL — SIGNIFICANT CHANGE UP (ref 150–400)
POTASSIUM SERPL-MCNC: 4.7 MMOL/L — SIGNIFICANT CHANGE UP (ref 3.5–5.3)
POTASSIUM SERPL-SCNC: 4.7 MMOL/L — SIGNIFICANT CHANGE UP (ref 3.5–5.3)
PROT SERPL-MCNC: 7.7 G/DL — SIGNIFICANT CHANGE UP (ref 6–8.3)
PROTHROM AB SERPL-ACNC: 11.1 SEC — SIGNIFICANT CHANGE UP (ref 10.5–13.4)
RBC # BLD: 4.17 M/UL — SIGNIFICANT CHANGE UP (ref 3.8–5.2)
RBC # FLD: 12.8 % — SIGNIFICANT CHANGE UP (ref 10.3–14.5)
SODIUM SERPL-SCNC: 141 MMOL/L — SIGNIFICANT CHANGE UP (ref 135–145)
TROPONIN T, HIGH SENSITIVITY RESULT: 7 NG/L — SIGNIFICANT CHANGE UP (ref 0–51)
TROPONIN T, HIGH SENSITIVITY RESULT: 7 NG/L — SIGNIFICANT CHANGE UP (ref 0–51)
WBC # BLD: 6.08 K/UL — SIGNIFICANT CHANGE UP (ref 3.8–10.5)
WBC # FLD AUTO: 6.08 K/UL — SIGNIFICANT CHANGE UP (ref 3.8–10.5)

## 2023-06-20 PROCEDURE — 84132 ASSAY OF SERUM POTASSIUM: CPT

## 2023-06-20 PROCEDURE — 82550 ASSAY OF CK (CPK): CPT

## 2023-06-20 PROCEDURE — 82553 CREATINE MB FRACTION: CPT

## 2023-06-20 PROCEDURE — 83605 ASSAY OF LACTIC ACID: CPT

## 2023-06-20 PROCEDURE — 99285 EMERGENCY DEPT VISIT HI MDM: CPT

## 2023-06-20 PROCEDURE — 70450 CT HEAD/BRAIN W/O DYE: CPT | Mod: MA

## 2023-06-20 PROCEDURE — 82947 ASSAY GLUCOSE BLOOD QUANT: CPT

## 2023-06-20 PROCEDURE — 82803 BLOOD GASES ANY COMBINATION: CPT

## 2023-06-20 PROCEDURE — 93306 TTE W/DOPPLER COMPLETE: CPT

## 2023-06-20 PROCEDURE — 85610 PROTHROMBIN TIME: CPT

## 2023-06-20 PROCEDURE — 82330 ASSAY OF CALCIUM: CPT

## 2023-06-20 PROCEDURE — 93356 MYOCRD STRAIN IMG SPCKL TRCK: CPT

## 2023-06-20 PROCEDURE — 99223 1ST HOSP IP/OBS HIGH 75: CPT

## 2023-06-20 PROCEDURE — 85014 HEMATOCRIT: CPT

## 2023-06-20 PROCEDURE — 99222 1ST HOSP IP/OBS MODERATE 55: CPT

## 2023-06-20 PROCEDURE — 82962 GLUCOSE BLOOD TEST: CPT

## 2023-06-20 PROCEDURE — 83880 ASSAY OF NATRIURETIC PEPTIDE: CPT

## 2023-06-20 PROCEDURE — 85730 THROMBOPLASTIN TIME PARTIAL: CPT

## 2023-06-20 PROCEDURE — 85025 COMPLETE CBC W/AUTO DIFF WBC: CPT

## 2023-06-20 PROCEDURE — 71046 X-RAY EXAM CHEST 2 VIEWS: CPT | Mod: 26

## 2023-06-20 PROCEDURE — 80053 COMPREHEN METABOLIC PANEL: CPT

## 2023-06-20 PROCEDURE — 71046 X-RAY EXAM CHEST 2 VIEWS: CPT

## 2023-06-20 PROCEDURE — 93306 TTE W/DOPPLER COMPLETE: CPT | Mod: 26

## 2023-06-20 PROCEDURE — 85018 HEMOGLOBIN: CPT

## 2023-06-20 PROCEDURE — 70450 CT HEAD/BRAIN W/O DYE: CPT | Mod: 26,MA

## 2023-06-20 PROCEDURE — 84484 ASSAY OF TROPONIN QUANT: CPT

## 2023-06-20 PROCEDURE — 82435 ASSAY OF BLOOD CHLORIDE: CPT

## 2023-06-20 PROCEDURE — 84295 ASSAY OF SERUM SODIUM: CPT

## 2023-06-20 RX ORDER — DEXTROSE 50 % IN WATER 50 %
15 SYRINGE (ML) INTRAVENOUS ONCE
Refills: 0 | Status: DISCONTINUED | OUTPATIENT
Start: 2023-06-20 | End: 2023-06-20

## 2023-06-20 RX ORDER — DEXTROSE 50 % IN WATER 50 %
25 SYRINGE (ML) INTRAVENOUS ONCE
Refills: 0 | Status: DISCONTINUED | OUTPATIENT
Start: 2023-06-20 | End: 2023-06-20

## 2023-06-20 RX ORDER — ACETAMINOPHEN 500 MG
975 TABLET ORAL ONCE
Refills: 0 | Status: COMPLETED | OUTPATIENT
Start: 2023-06-20 | End: 2023-06-20

## 2023-06-20 RX ORDER — DEXTROSE 50 % IN WATER 50 %
12.5 SYRINGE (ML) INTRAVENOUS ONCE
Refills: 0 | Status: DISCONTINUED | OUTPATIENT
Start: 2023-06-20 | End: 2023-06-20

## 2023-06-20 RX ORDER — ZOLPIDEM TARTRATE 10 MG/1
5 TABLET ORAL AT BEDTIME
Refills: 0 | Status: DISCONTINUED | OUTPATIENT
Start: 2023-06-20 | End: 2023-06-20

## 2023-06-20 RX ORDER — GLUCAGON INJECTION, SOLUTION 0.5 MG/.1ML
1 INJECTION, SOLUTION SUBCUTANEOUS ONCE
Refills: 0 | Status: DISCONTINUED | OUTPATIENT
Start: 2023-06-20 | End: 2023-06-20

## 2023-06-20 RX ORDER — METOPROLOL TARTRATE 50 MG
100 TABLET ORAL DAILY
Refills: 0 | Status: DISCONTINUED | OUTPATIENT
Start: 2023-06-20 | End: 2023-06-20

## 2023-06-20 RX ORDER — INSULIN LISPRO 100/ML
VIAL (ML) SUBCUTANEOUS
Refills: 0 | Status: DISCONTINUED | OUTPATIENT
Start: 2023-06-20 | End: 2023-06-20

## 2023-06-20 RX ORDER — HYOSCYAMINE SULFATE 0.13 MG
0.25 TABLET ORAL DAILY
Refills: 0 | Status: DISCONTINUED | OUTPATIENT
Start: 2023-06-20 | End: 2023-06-20

## 2023-06-20 RX ORDER — LANOLIN ALCOHOL/MO/W.PET/CERES
3 CREAM (GRAM) TOPICAL AT BEDTIME
Refills: 0 | Status: DISCONTINUED | OUTPATIENT
Start: 2023-06-20 | End: 2023-06-20

## 2023-06-20 RX ORDER — MAGNESIUM SULFATE 500 MG/ML
2 VIAL (ML) INJECTION ONCE
Refills: 0 | Status: COMPLETED | OUTPATIENT
Start: 2023-06-20 | End: 2023-06-20

## 2023-06-20 RX ORDER — CLOPIDOGREL BISULFATE 75 MG/1
75 TABLET, FILM COATED ORAL DAILY
Refills: 0 | Status: DISCONTINUED | OUTPATIENT
Start: 2023-06-20 | End: 2023-06-20

## 2023-06-20 RX ORDER — KETOROLAC TROMETHAMINE 30 MG/ML
15 SYRINGE (ML) INJECTION EVERY 8 HOURS
Refills: 0 | Status: DISCONTINUED | OUTPATIENT
Start: 2023-06-20 | End: 2023-06-20

## 2023-06-20 RX ORDER — SODIUM CHLORIDE 9 MG/ML
1000 INJECTION, SOLUTION INTRAVENOUS
Refills: 0 | Status: DISCONTINUED | OUTPATIENT
Start: 2023-06-20 | End: 2023-06-20

## 2023-06-20 RX ORDER — CYCLOBENZAPRINE HYDROCHLORIDE 10 MG/1
1 TABLET, FILM COATED ORAL
Qty: 21 | Refills: 0
Start: 2023-06-20 | End: 2023-06-26

## 2023-06-20 RX ORDER — CYCLOBENZAPRINE HYDROCHLORIDE 10 MG/1
5 TABLET, FILM COATED ORAL THREE TIMES A DAY
Refills: 0 | Status: DISCONTINUED | OUTPATIENT
Start: 2023-06-20 | End: 2023-06-20

## 2023-06-20 RX ORDER — DIPHENHYDRAMINE HCL 50 MG
25 CAPSULE ORAL ONCE
Refills: 0 | Status: COMPLETED | OUTPATIENT
Start: 2023-06-20 | End: 2023-06-20

## 2023-06-20 RX ORDER — IBUPROFEN 200 MG
1 TABLET ORAL
Qty: 3 | Refills: 0
Start: 2023-06-20 | End: 2023-06-20

## 2023-06-20 RX ORDER — ONDANSETRON 8 MG/1
4 TABLET, FILM COATED ORAL ONCE
Refills: 0 | Status: COMPLETED | OUTPATIENT
Start: 2023-06-20 | End: 2023-06-20

## 2023-06-20 RX ORDER — CYCLOBENZAPRINE HYDROCHLORIDE 10 MG/1
1 TABLET, FILM COATED ORAL
Refills: 0 | DISCHARGE

## 2023-06-20 RX ORDER — ALBUTEROL 90 UG/1
2 AEROSOL, METERED ORAL EVERY 6 HOURS
Refills: 0 | Status: DISCONTINUED | OUTPATIENT
Start: 2023-06-20 | End: 2023-06-20

## 2023-06-20 RX ORDER — PANTOPRAZOLE SODIUM 20 MG/1
40 TABLET, DELAYED RELEASE ORAL
Refills: 0 | Status: DISCONTINUED | OUTPATIENT
Start: 2023-06-20 | End: 2023-06-20

## 2023-06-20 RX ORDER — INSULIN LISPRO 100/ML
VIAL (ML) SUBCUTANEOUS AT BEDTIME
Refills: 0 | Status: DISCONTINUED | OUTPATIENT
Start: 2023-06-20 | End: 2023-06-20

## 2023-06-20 RX ORDER — ACETAMINOPHEN 500 MG
650 TABLET ORAL EVERY 6 HOURS
Refills: 0 | Status: DISCONTINUED | OUTPATIENT
Start: 2023-06-20 | End: 2023-06-20

## 2023-06-20 RX ORDER — ASPIRIN/CALCIUM CARB/MAGNESIUM 324 MG
81 TABLET ORAL DAILY
Refills: 0 | Status: DISCONTINUED | OUTPATIENT
Start: 2023-06-20 | End: 2023-06-20

## 2023-06-20 RX ADMIN — Medication 0.25 MILLIGRAM(S): at 12:43

## 2023-06-20 RX ADMIN — Medication 15 MILLIGRAM(S): at 11:38

## 2023-06-20 RX ADMIN — ONDANSETRON 4 MILLIGRAM(S): 8 TABLET, FILM COATED ORAL at 01:51

## 2023-06-20 RX ADMIN — CLOPIDOGREL BISULFATE 75 MILLIGRAM(S): 75 TABLET, FILM COATED ORAL at 11:37

## 2023-06-20 RX ADMIN — Medication 975 MILLIGRAM(S): at 01:48

## 2023-06-20 RX ADMIN — Medication 100 MILLIGRAM(S): at 11:37

## 2023-06-20 RX ADMIN — Medication 25 GRAM(S): at 06:11

## 2023-06-20 RX ADMIN — Medication 975 MILLIGRAM(S): at 09:00

## 2023-06-20 RX ADMIN — Medication 25 MILLIGRAM(S): at 01:47

## 2023-06-20 RX ADMIN — Medication 81 MILLIGRAM(S): at 11:37

## 2023-06-20 NOTE — H&P ADULT - NSHPREVIEWOFSYSTEMS_GEN_ALL_CORE
Review of Systems:   CONSTITUTIONAL: No fever or chills   EYES: No eye pain, visual disturbances  ENMT:  No difficulty hearing,   NECK: No pain or stiffness  RESPIRATORY: No cough, No shortness of breath  CARDIOVASCULAR: + chest pain, no palpitations,  GASTROINTESTINAL: No abdominal or epigastric pain. No nausea, vomiting,  GENITOURINARY: No dysuria,   NEUROLOGICAL: No headaches,   SKIN: No rashes  ENDOCRINE: No heat or cold intolerance  MUSCULOSKELETAL: No joint pain or swelling;   PSYCHIATRIC: No depression,   ALLERY AND IMMUNOLOGIC: No hives or eczema

## 2023-06-20 NOTE — H&P ADULT - NSHPADDITIONALINFOADULT_GEN_ALL_CORE
d/w [facility] Reid Xolair Pregnancy And Lactation Text: This medication is Pregnancy Category B and is considered safe during pregnancy. This medication is excreted in breast milk.

## 2023-06-20 NOTE — DISCHARGE NOTE PROVIDER - NSDCFUADDAPPT_GEN_ALL_CORE_FT
Follow up with cardiology in 3 days of discharge. Follow up with cardiology in 3 days of discharge.  Hold Imdur and discuss side effects of a Headache with cardiologist before resuming.  Take Motrin x 1 days ( take with food)and follow up with cardiologist.  Return to the ER if symptoms worsens

## 2023-06-20 NOTE — ED ADULT NURSE NOTE - NSFALLUNIVINTERV_ED_ALL_ED
Bed/Stretcher in lowest position, wheels locked, appropriate side rails in place/Call bell, personal items and telephone in reach/Instruct patient to call for assistance before getting out of bed/chair/stretcher/Non-slip footwear applied when patient is off stretcher/[place] to call system/Physically safe environment - no spills, clutter or unnecessary equipment/Purposeful proactive rounding/Room/bathroom lighting operational, light cord in reach

## 2023-06-20 NOTE — H&P ADULT - ASSESSMENT
63F h/o HTN, HLD, DM2, GERD, FAP s/p colon resection, h/o GI bleed from FAP, Lichen pigmentosis, CAD s/p recent PCI to cx on 6/13/23 presented with atypical chest pain.

## 2023-06-20 NOTE — H&P ADULT - PROBLEM SELECTOR PLAN 1
recent PCI - EKG without acute ischemic changes and troponin negative x 2  chest pain atypical and unlikely due to ischemia - pain reproducible on exam   will give toradol 15mg q8 atc - discussed possible reflux symptoms with medication and to notify for symptoms   increase flexeril tid prn   TTE per cardiology  c/w asa, plavix, bb

## 2023-06-20 NOTE — DISCHARGE NOTE PROVIDER - HOSPITAL COURSE
63F h/o HTN, HLD, DM2, GERD, FAP s/p colon resection, h/o GI bleed from FAP, Lichen pigmentosis, CAD s/p recent PCI to cx on 6/13/23 presented with atypical chest pain.        Problem/Plan - 1:  ·  Problem: Coronary artery disease with angina pectoris.   ·  Plan: recent PCI - EKG without acute ischemic changes and troponin negative x 2  chest pain atypical and unlikely due to ischemia - pain reproducible on exam   increase flexeril tid prn   TTE per cardiology  c/w asa, plavix, bb.     Problem/Plan - 2:  ·  Problem: History of headache.   ·  Plan: likely due to Imdur   will hold for now.     Problem/Plan - 3:  ·  Problem: DM2 (diabetes mellitus, type 2).   ·  Plan: monitor FS  Continue  home alogliptin.     Problem/Plan - 4:  ·  Problem: HTN (hypertension).   ·  Plan: monitor bp   c/w toprol.     Problem/Plan - 5:  ·  Problem: GERD (gastroesophageal reflux disease).   ·  Plan: c/w PPI      Problem/Plan - 6:  ·  Problem: HLD (hyperlipidemia).   ·  Plan: on praluent [id number] at home.     Problem/Plan - 7:  ·  Problem: Prophylactic measure.   ·  Plan: dvt proph - encourage ambulation                      63F h/o HTN, HLD, DM2, GERD, FAP s/p colon resection, h/o GI bleed from FAP, Lichen pigmentosis, CAD s/p recent PCI to cx on 6/13/23 presented with atypical chest pain.        Problem/Plan - 1:  ·  Problem: Coronary artery disease with angina pectoris.   ·  Plan: recent PCI - EKG without acute ischemic changes and troponin negative x 2  chest pain atypical and unlikely due to ischemia - pain reproducible on exam   increase flexeril tid prn   Motrin 400mg every 8hrs x 1 day- take with food  TTE per cardiology  c/w asa, plavix, bb.     Problem/Plan - 2:  ·  Problem: History of headache.   ·  Plan: likely due to Imdur   will hold for now.     Problem/Plan - 3:  ·  Problem: DM2 (diabetes mellitus, type 2).   ·  Plan: monitor FS  Continue  home alogliptin.     Problem/Plan - 4:  ·  Problem: HTN (hypertension).   ·  Plan: monitor bp   c/w toprol.     Problem/Plan - 5:  ·  Problem: GERD (gastroesophageal reflux disease).   ·  Plan: c/w PPI      Problem/Plan - 6:  ·  Problem: HLD (hyperlipidemia).   ·  Plan: on praluent [id number] at home.     Problem/Plan - 7:  ·  Problem: Prophylactic measure.   ·  Plan: dvt proph - encourage ambulation

## 2023-06-20 NOTE — H&P ADULT - PROBLEM SELECTOR PROBLEM 6
Is the patient currently in the state of [place]? YES    Visit mode:VIDEO    If the visit is dropped, the patient can be reconnected by: VIDEO VISIT: Send to e-mail at: [email or web address]    Will anyone else be joining the visit? NO      How would you like to obtain your AVS? MyChart    Are changes needed to the allergy or medication list? NO    Reason for visit: Follow up, discuss medication related to depression and anxiety         HLD (hyperlipidemia)

## 2023-06-20 NOTE — ED ADULT NURSE REASSESSMENT NOTE - NS ED NURSE REASSESS COMMENT FT1
Report received from Mart OSORIO. Patient seen resting in bed, easily arousable. Patient reports headache at this time--magnesium still infusing. Patient otherwise has no complaints at this time. Safety and comfort provided.

## 2023-06-20 NOTE — DISCHARGE NOTE PROVIDER - NSDCCPCAREPLAN_GEN_ALL_CORE_FT
PRINCIPAL DISCHARGE DIAGNOSIS  Diagnosis: Chest pain  Assessment and Plan of Treatment:   HOME CARE INSTRUCTIONS  For the next few days, avoid physical activities that bring on chest pain. Continue physical activities as directed.  Do not smoke.  Avoid drinking alcohol.   Only take over-the-counter or prescription medicine for pain, discomfort, or fever as directed by your caregiver.  Follow your caregiver's suggestions for further testing if your chest pain does not go away.  Keep any follow-up appointments you made. If you do not go to an appointment, you could develop lasting (chronic) problems with pain. If there is any problem keeping an appointment, you must call to reschedule.   SEEK MEDICAL CARE IF:  You think you are having problems from the medicine you are taking. Read your medicine instructions carefully.  Your chest pain does not go away, even after treatment.  You develop a rash with blisters on your chest.  SEEK IMMEDIATE MEDICAL CARE IF:  You have increased chest pain or pain that spreads to your arm, neck, jaw, back, or abdomen.   You develop shortness of breath, an increasing cough, or you are coughing up blood.  You have severe back or abdominal pain, feel nauseous, or vomit.  You develop severe weakness, fainting, or chills.  You have a fever.  THIS IS AN EMERGENCY. Do not wait to see if the pain will go away. Get medical help at once. Call your local emergency services (_____________________). Do not drive yourself to the hospital.        SECONDARY DISCHARGE DIAGNOSES  Diagnosis: Migraine headache  Assessment and Plan of Treatment: Improved    Diagnosis: DM2 (diabetes mellitus, type 2)  Assessment and Plan of Treatment: Your next appointment with endocrinologist ([facility] endocrine ph: [phone])/PMD is -   HgA1C this admission -  Make sure you get your HgA1c checked every three months.  If you take oral diabetes medications, check your blood glucose two times a day.  If you take insulin, check your blood glucose before meals and at bedtime.  It's important not to skip any meals.  Keep a log of your blood glucose results and always take it with you to your doctor appointments.  Keep a list of your current medications including injectables and over the counter medications and bring this medication list with you to all your doctor appointments.  If you have not seen your ophthalmologist this year call for appointment.  Check your feet daily for redness, sores, or openings. Do not self treat. If no improvement in two days call your primary care physician for an appointment.  Low blood sugar (hypoglycemia) is a blood sugar below 70mg/dl. Check your blood sugar if you feel signs/symptoms of hypoglycemia. If your blood sugar is below 70 take 15 grams of carbohydrates (ex 4 oz of apple juice, 3-4 glucose tablets, or 4-6 oz of regular soda) wait 15 minutes and repeat blood sugar to make sure it comes up above 70.  If your blood sugar is above 70 and you are due for a meal, have a meal.  If you are not due for a meal have a snack.  This snack helps keeps your blood sugar at a safe range.      Diagnosis: HTN (hypertension)  Assessment and Plan of Treatment:     Diagnosis: HLD (hyperlipidemia)  Assessment and Plan of Treatment:     Diagnosis: Coronary artery disease with angina pectoris  Assessment and Plan of Treatment:

## 2023-06-20 NOTE — H&P ADULT - HISTORY OF PRESENT ILLNESS
63F h/o HTN, HLD, DM2, GERD, FAP s/p colon resection, h/o GI bleed from FAP, Lichen pigmentosis, CAD s/p recent PCI to cx on 6/13/23 presented with persistent chest pain. the patient states she has similar chest pain as at the time of the stent but upon discharge still had the chest pain which did not resolve or change in intensity. denies sob. She states its 5/10 and does not worsen with exertion or movement. She denies any associated diaphoresis or nausea. she does have 6/10 headache (which was worse when she first presented to ED). The headache is occipital and began after getting her stent (at which time she started the Imdur). No recent illness, fevers, chills.     Of note she had moderate LAD disease at the time of cath and recommended for medical management.

## 2023-06-20 NOTE — H&P ADULT - NSHPPHYSICALEXAM_GEN_ALL_CORE
Vital Signs Last 24 Hrs  T(C): 36.8 (20 Jun 2023 10:10), Max: 36.8 (20 Jun 2023 10:10)  T(F): 98.2 (20 Jun 2023 10:10), Max: 98.2 (20 Jun 2023 10:10)  HR: 95 (20 Jun 2023 10:10) (76 - 98)  BP: 117/76 (20 Jun 2023 10:10) (112/86 - 168/98)  BP(mean): --  RR: 18 (20 Jun 2023 10:10) (16 - 20)  SpO2: 98% (20 Jun 2023 10:10) (96% - 98%)    Parameters below as of 20 Jun 2023 10:10  Patient On (Oxygen Delivery Method): room air    PHYSICAL EXAM:  GENERAL: NAD, breathing normal  HEAD:  Atraumatic, Normocephalic  EYES: conjunctiva and sclera clear  NECK: supple, No JVD  CHEST/LUNG: CTA b/l, no wheezing or crackles appreciated. tenderness on palpation at site with chest pain   HEART: S1 S2 RRR  ABDOMEN: +BS Soft, NT/ND  EXTREMITIES:  2+ DP Pulses, No c/c.no LE edema  NEUROLOGY: AAOx3, no facial droop, no focal deficits   SKIN: birthmark around left eye - appears as bruising

## 2023-06-20 NOTE — CONSULT NOTE ADULT - SUBJECTIVE AND OBJECTIVE BOX
CARDIOLOGY FELLOW CONSULT NOTE      HPI:   The patient is a 63-year-old female with a PMH of CAD s/p PCI, T2DM, HLD, GERD, HTN, FAP presenting  with symptoms of generalized malaise, anxiety, palpitations, burning sensation in her chest and headache. She is accompanied by her daughter who provides most of the history and interpretation. The patient had a PCI with  KATLIN placement to Cx on 6/12 and states most of her symptoms started post procedure with headache onset 5 days ago. At bedside evaluation, the patient reports generalized pain, tenderness to palpation of her chest, with her most significant symptom being headache. Reports compliance with DAPT and has also been taking Isosorbide mononitrate which is a new medication for her. Denies any trauma, falls, dyspnea, syncope, light-headedness.     PMHx:   Benign essential HTN    HLD (hyperlipidemia)    Type 2 diabetes mellitus    CAD (coronary artery disease)    Stented coronary artery    FAP (familial adenomatous polyposis)    Insomnia    GERD (gastroesophageal reflux disease)    H/O lichen planus    History of rectal bleeding        PSHx:   History of colon resection    Stented coronary artery        Allergies:  No Known Allergies        Social History:  Smoking History: None   Alcohol Use: None   Drug Use: None     REVIEW OF SYSTEMS:  CONSTITUTIONAL: No weakness, fevers or chills  EYES/ENT: No visual changes;  No dysphagia  NECK: No pain or stiffness  RESPIRATORY: No cough, wheezing, hemoptysis; No shortness of breath  CARDIOVASCULAR: + chest pain or palpitations; intermittent lower extremity edema  GASTROINTESTINAL: No abdominal or epigastric pain. No nausea, vomiting, or hematemesis; No diarrhea or constipation. No melena or hematochezia.  BACK: No back pain  GENITOURINARY: No dysuria, frequency or hematuria  NEUROLOGICAL: No numbness or weakness, + headache   SKIN: No itching, burning, rashes, or lesions   All other review of systems is negative unless indicated above.    Physical Exam:  T(F): 98 (06-20), Max: 98 (06-20)  HR: 76 (06-20) (76 - 91)  BP: 145/86 (06-20) (145/86 - 168/98)  RR: 18 (06-20)  SpO2: 96% (06-20)      Appearance: Appears to be uncomfortable secondary to headche  Eyes: pink conjunctiva  HEENT: AT/NC   Cardiovascular: RRR, S1, S2, no murmurs, rubs, or gallops; no edema; no JVD, tender to chest palpation   Respiratory: Clear to auscultation bilaterally  Gastrointestinal: soft, non-tender, non-distended   Musculoskeletal: No clubbing; no joint deformity   Neurologic: Normal speech, no facial asymmetry  Psychiatry: AAOx3, mood & affect appropriate  Skin: No rashes, ecchymoses, or cyanosis of exposed skin                                13.3   6.08  )-----------( 209      ( 19 Jun 2023 23:51 )             41.3     06-19    141  |  105  |  16  ----------------------------<  147<H>  4.7   |  23  |  0.75    Ca    9.6      19 Jun 2023 23:51    TPro  7.7  /  Alb  4.5  /  TBili  0.2  /  DBili  x   /  AST  29  /  ALT  34  /  AlkPhos  106  06-19    PT/INR - ( 19 Jun 2023 23:51 )   PT: 11.1 sec;   INR: 0.96 ratio         PTT - ( 19 Jun 2023 23:51 )  PTT:29.4 sec    CARDIAC MARKERS ( 19 Jun 2023 23:51 )  7 ng/L / x     / x     / x     / x     / x

## 2023-06-20 NOTE — H&P ADULT - PROBLEM SELECTOR PLAN 7
dvt proph - encourage ambulation and pas in bed   will hold a/c given dual antiplatelet and treatement with toradol

## 2023-06-20 NOTE — DISCHARGE NOTE NURSING/CASE MANAGEMENT/SOCIAL WORK - NSDCFUADDAPPT_GEN_ALL_CORE_FT
Follow up with cardiology in 3 days of discharge.  Hold Imdur and discuss side effects of a Headache with cardiologist before resuming.  Take Motrin x 1 days ( take with food)and follow up with cardiologist.  Return to the ER if symptoms worsens

## 2023-06-20 NOTE — ED ADULT NURSE NOTE - OBJECTIVE STATEMENT
63y female A&OX4 coming in through triage complaining of chest pain. PMHX stent, DM, HTN, HLD. Pt reports having a cath done last Monday and one stent placed in. Pt states she has had discomfort with pain and palpation. Pt states she let her cardiology know and they couldn't see her and if her pain and discomfort to come to ED. Pt states it just got unbearable and decided to get it checked out. Pt denies abd pain, N/V/D, fever, cough. Labs was drawn and sent to lab. Pt pending dispo.

## 2023-06-20 NOTE — H&P ADULT - TIME BILLING
chart reviewed. history and home medications reviewed. patient examined at bedside. findings and plan of care discussed with patient and documented. orders placed. discussed plan of care with ACP.

## 2023-06-20 NOTE — DISCHARGE NOTE PROVIDER - NSDCFUSCHEDAPPT_GEN_ALL_CORE_FT
Shelton Lee  [facility]  CARDIOLOGY [address]  Scheduled Appointment: 07/03/2023    Gerda Mcneal  [facility]  RHEUM [address] [facility]  Scheduled Appointment: 08/08/2023

## 2023-06-20 NOTE — DISCHARGE NOTE NURSING/CASE MANAGEMENT/SOCIAL WORK - PATIENT PORTAL LINK FT
You can access the FollowMyHealth Patient Portal offered by [facility] by registering at the following website: [email or web address]. By joining [facility]’s FollowMyHealth portal, you will also be able to view your health information using other applications (apps) compatible with our system.

## 2023-06-20 NOTE — DISCHARGE NOTE PROVIDER - NSDCMRMEDTOKEN_GEN_ALL_CORE_FT
Albuterol (Eqv-Proventil HFA) 90 mcg/inh inhalation aerosol: 2 puff(s) inhaled every 6 hours as needed for  bronchospasm  alogliptin 25 mg oral tablet: 1 tab(s) orally once a day  aspirin 81 mg oral tablet: 1 tab(s) orally once a day  clopidogrel 75 mg oral tablet: 1 tab(s) orally once a day MDD: 30  cyclobenzaprine 5 mg oral tablet: 1 tab(s) orally 3 times a day as needed for  muscle spasm  eszopiclone 2 mg oral tablet: 1 tab(s) orally once a day (at bedtime) as needed for  sleep  hyoscyamine 0.375 mg oral capsule, extended release: 1 tab(s) orally once a day  metoprolol succinate 100 mg oral capsule, extended release: 1 cap(s) orally once a day  omeprazole 40 mg oral delayed release capsule: 1 tab(s) orally once a day  Praluent Pen 75 mg/mL subcutaneous solution: 75 milligram(s) subcutaneously every 2 weeks   Albuterol (Eqv-Proventil HFA) 90 mcg/inh inhalation aerosol: 2 puff(s) inhaled every 6 hours as needed for  bronchospasm  alogliptin 25 mg oral tablet: 1 tab(s) orally once a day  aspirin 81 mg oral tablet: 1 tab(s) orally once a day  clopidogrel 75 mg oral tablet: 1 tab(s) orally once a day MDD: 30  cyclobenzaprine 5 mg oral tablet: 1 tab(s) orally 3 times a day as needed for  muscle spasm  eszopiclone 2 mg oral tablet: 1 tab(s) orally once a day (at bedtime) as needed for  sleep  hyoscyamine 0.375 mg oral capsule, extended release: 1 tab(s) orally once a day  ibuprofen 400 mg oral tablet: 1 tab(s) orally every 8 hours take with food  metoprolol succinate 100 mg oral capsule, extended release: 1 cap(s) orally once a day  omeprazole 40 mg oral delayed release capsule: 1 tab(s) orally once a day  Praluent Pen 75 mg/mL subcutaneous solution: 75 milligram(s) subcutaneously every 2 weeks

## 2023-06-20 NOTE — DISCHARGE NOTE PROVIDER - CARE PROVIDER_API CALL
Shelton Lee  Cardiovascular Disease  [address], [address]  [place] [address]  Phone: [phone]  Fax: [phone]  Follow Up Time:

## 2023-06-20 NOTE — CONSULT NOTE ADULT - ASSESSMENT
The patient is a 63-year-old female with a PMH of CAD s/p PCI, T2DM, HLD, GERD, HTN, FAP presenting  with generalized malaise and multiple symptoms including headache and chest discomfort. She is s/p PCI to the Cx on 6/12.     Impression and Recommendations   - The patient's symptoms do not appear to be related to her PCI   - ECG: Sinus rhythm, no significant change from prior  - Telemetry: Sinus rhythm, HR 70s-80s  - Troponin 7, BNP <6  - Please obtain CK, CKMB   - Serial EKG to assess for resolution of ST changes  - Monitor vital signs to monitor hemodynamic stability  - Continuous cardiac monitoring to monitor for arrhythmias  - Please obtain serial cardiac enzymes, Troponin to peak and  risk factor profile to include TSH, HGBA1c, Lipid profile, CMP, Mag, Phos, CBC, pBNP. Serial EKG, PRN chest pain  - Echo in AM to evaluate LV function and wall motion abnormality  - Aspirin 81 PO daily, Clopidogrel 75 PO daily  - Headaches may be related to isosorbide mononitrate use and can improve with continued use if this is truly a medication side effect, however severity of headache may warrant further workup    Sadia Lizarraga MD   Cardiology Fellow     This consult note is preliminary until cosigned by the attending cardiologist. The patient is a 63-year-old female with a PMH of CAD s/p PCI, T2DM, HLD, GERD, HTN, FAP presenting  with generalized malaise and multiple symptoms including headache and chest discomfort. She is s/p PCI to the Cx on 6/12.     Impression and Recommendations   - The patient's symptoms do not appear to be related to her PCI   - ECG: Sinus rhythm, no significant change from prior  - Telemetry: Sinus rhythm, HR 70s-80s  - Troponin 7, BNP <6  - Please obtain CK, CKMB   - Serial EKG to assess for resolution of ST changes  - Monitor vital signs to monitor hemodynamic stability  - Continuous cardiac monitoring to monitor for arrhythmias  - Please obtain serial cardiac enzymes, Troponin to peak and  risk factor profile to include TSH, HGBA1c, Lipid profile, CMP, Mag, Phos, CBC, pBNP. Serial EKG, PRN chest pain  - Echo in AM to evaluate LV function and wall motion abnormality  - Aspirin 81 PO daily, Clopidogrel 75 PO daily  - Headaches may be related to isosorbide mononitrate use and can improve with continued use if this is truly a medication side effect, however severity of headache may warrant further workup    Sadia Lizarraga MD   Cardiology Fellow     This consult note is preliminary until cosigned by the attending cardiologist.    This patient was seen and examined personally by me and the plan was discussed with the fellow and/or resident above. Amendments were made as necessary to the above. Agree with the excellent note and plan above. 63F w recent CAD s/p PCI 6/12, HTN, HL, DM2 here w ha and CP. ECG non-ischemic, trops flat. TTE pending. Cont DAPT    Yonas Phoenix MD, MPhil, [facility]  Cardiologist, [facility]  ; [facility] and [facility]  Email: [email or web address] on-service contact/call information, go to [email or web address] and use [email or web address]" to login.  Outpatient Cardiology appointments, call [phone] to arrange with a colleague; I do not have outpatient Cardiology clinic.

## 2023-06-20 NOTE — ED ADULT NURSE REASSESSMENT NOTE - NS ED NURSE REASSESS COMMENT FT1
patient reporting 6/10 headache pain at this time. Reports partial relief and feels better than before.

## 2023-06-20 NOTE — DISCHARGE NOTE NURSING/CASE MANAGEMENT/SOCIAL WORK - NSDCPEFALRISK_GEN_ALL_CORE
For information on Fall & Injury Prevention, visit: [email or web address] OR  [email or web address] OR  https://www.cdc.gov/steadi/patient.html

## 2023-06-20 NOTE — H&P ADULT - NSHPLABSRESULTS_GEN_ALL_CORE
LABS:                        13.3   6.08  )-----------( 209      ( 19 Jun 2023 23:51 )             41.3     06-19    141  |  105  |  16  ----------------------------<  147<H>  4.7   |  23  |  0.75    Ca    9.6      19 Jun 2023 23:51    TPro  7.7  /  Alb  4.5  /  TBili  0.2  /  DBili  x   /  AST  29  /  ALT  34  /  AlkPhos  106  06-19    PT/INR - ( 19 Jun 2023 23:51 )   PT: 11.1 sec;   INR: 0.96 ratio         PTT - ( 19 Jun 2023 23:51 )  PTT:29.4 sec          RADIOLOGY & ADDITIONAL TESTS:    Imaging Personally Reviewed: EKg tracing reviewed NSR@72bpm, Qv1-v2, qtc 438  CXR film reviewed - clear lungs  CT head reviewed no acute infarct or hemorrhage   Consultant(s) Notes Reviewed:    Care Discussed with Consultants/Other Providers:

## 2023-06-20 NOTE — H&P ADULT - NSICDXFAMILYHX_GEN_ALL_CORE_FT
FAMILY HISTORY:  Mother  Still living? Unknown  FH: heart disease, Age at diagnosis: Age Unknown    Sibling  Still living? Unknown  FH: heart disease, Age at diagnosis: Age Unknown

## 2023-06-21 ENCOUNTER — NON-APPOINTMENT (OUTPATIENT)
Age: 63
End: 2023-06-21

## 2023-06-22 ENCOUNTER — APPOINTMENT (OUTPATIENT)
Dept: INTERNAL MEDICINE | Facility: CLINIC | Age: 63
End: 2023-06-22
Payer: MEDICAID

## 2023-06-22 VITALS
WEIGHT: 130 LBS | HEART RATE: 98 BPM | DIASTOLIC BLOOD PRESSURE: 80 MMHG | SYSTOLIC BLOOD PRESSURE: 122 MMHG | OXYGEN SATURATION: 98 % | TEMPERATURE: 97.8 F | HEIGHT: 59 IN | RESPIRATION RATE: 16 BRPM | BODY MASS INDEX: 26.21 KG/M2

## 2023-06-22 DIAGNOSIS — R06.83 SNORING: ICD-10-CM

## 2023-06-22 PROCEDURE — 99496 TRANSJ CARE MGMT HIGH F2F 7D: CPT | Mod: 25

## 2023-06-22 RX ORDER — NAPROXEN 500 MG/1
500 TABLET ORAL
Qty: 60 | Refills: 0 | Status: DISCONTINUED | COMMUNITY
Start: 2023-04-20 | End: 2023-06-22

## 2023-06-22 RX ORDER — PREDNISONE 5 MG/1
5 TABLET ORAL
Qty: 50 | Refills: 0 | Status: DISCONTINUED | COMMUNITY
Start: 2023-03-14 | End: 2023-06-22

## 2023-06-22 NOTE — ASSESSMENT
[FreeTextEntry1] : \par 62 yo F asthma, DM, HTN, HLD, CAD s/p PCI 6/12/23, RA, IBS, GERD, familial polyposis, insomnia, vit d def for post hospital f/u\par \par \par CAD, RA- hx atypical left CP s/p inpt stay with negative w/u.  Flet CP c/w MSK etiology. Improving.\par -Continue Flexeril as needed, advised to avoid use concurrent with other sedating medication (Lunesta)\par -Continue heat/ice, advised to lessen ibuprofen use due to risk for bleeding and alternate with Tylenol as able\par -Rheum f/u pending 8/23\par -advised prompt medical evaluation if symptoms worsen or new symptoms arise\par \par hx HA- reports stable since hospital, feels has improved since off Imdur.  Nonfocal exam. c/w muscle tension. +snoring\par -6/23 CTH: no acute intracranial pathology\par -Advised heat/ice/stretching\par -Advised Tylenol as needed\par -Advised symptom diary to ID triggers\par -Advised increase hydration\par -Yearly ophtho screening advised\par -Sleep referral for r/o HORTENCIA\par -Cardiology follow-up pending 7/3/23 re: Imdur.  Office to assist with appt sooner.\par -advised prompt medical evaluation if symptoms worsen or new symptoms arise\par \par DM, HLD- 2/23 A1c 6.7\par -hx metformin in past, stopped due to n/v with use\par -on allogliptin x many yrs, denies SEs\par -on Praluent per cardiology\par -cont home fs monitoring\par -ADA diet advised\par -exercise as per cardiology clearance\par -yearly optho screening advised\par -check A1c, microalbumin\par -fasting lipids at nv\par \par \par Pt advised to f/u with PMD, Dr. Prasad for cont'd medical care\par -will relay visit to PMD\par \par Pt's cell: [phone]\par \par \par Labs drawn in office today.\par

## 2023-06-22 NOTE — PHYSICAL EXAM
[No Acute Distress] : no acute distress [Well-Appearing] : well-appearing [Normal Sclera/Conjunctiva] : normal sclera/conjunctiva [PERRL] : pupils equal round and reactive to light [EOMI] : extraocular movements intact [Normal Outer Ear/Nose] : the outer ears and nose were normal in appearance [Normal Oropharynx] : the oropharynx was normal [Normal Nasal Mucosa] : the nasal mucosa was normal [No Lymphadenopathy] : no lymphadenopathy [Supple] : supple [No Respiratory Distress] : no respiratory distress  [Clear to Auscultation] : lungs were clear to auscultation bilaterally [Normal Rate] : normal rate  [Regular Rhythm] : with a regular rhythm [Normal S1, S2] : normal S1 and S2 [No Murmur] : no murmur heard [Pedal Pulses Present] : the pedal pulses are present [No Edema] : there was no peripheral edema [Soft] : abdomen soft [Non Tender] : non-tender [No HSM] : no HSM [Normal Supraclavicular Nodes] : no supraclavicular lymphadenopathy [Normal Posterior Cervical Nodes] : no posterior cervical lymphadenopathy [Normal Anterior Cervical Nodes] : no anterior cervical lymphadenopathy [No CVA Tenderness] : no CVA  tenderness [No Spinal Tenderness] : no spinal tenderness [No Joint Swelling] : no joint swelling [Grossly Normal Strength/Tone] : grossly normal strength/tone [No Rash] : no rash [No Focal Deficits] : no focal deficits [Normal Gait] : normal gait [Normal Affect] : the affect was normal [Alert and Oriented x3] : oriented to person, place, and time [de-identified] : No temporal tenderness bilaterally [de-identified] : No photophobia [de-identified] : FROM, mild diffuse tenderness at left occipital areas along muscles, no rash or lesions [de-identified] : Mild diffuse left chest tenderness, no rash or lesions

## 2023-06-22 NOTE — HEALTH RISK ASSESSMENT
[0] : 2) Feeling down, depressed, or hopeless: Not at all (0) [PHQ-2 Negative - No further assessment needed] : PHQ-2 Negative - No further assessment needed [CXY2Bedfn] : 0

## 2023-06-22 NOTE — HISTORY OF PRESENT ILLNESS
[Post-hospitalization from ___ Hospital] : Post-hospitalization from [unfilled] Hospital [Admitted on: ___] : The patient was admitted on [unfilled] [Discharged on ___] : discharged on [unfilled] [Discharge Summary] : discharge summary [Pertinent Labs] : pertinent labs [Radiology Findings] : radiology findings [Patient Contacted By: ____] : and contacted by [unfilled] [FreeTextEntry2] : Pt declines formal .\par \par 64 yo F asthma, DM, HTN, HLD, CAD s/p PCI 6/12/23, RA, IBS, GERD, familial polyposis, insomnia, vit d def for post hospital f/u\par \par Last seen by PMD, Dr. Prasad 4/20/23 for f/u. \par \par Hx inpt at [facility] 6/19 - 6/20/22 c/o HA and chest pain\par -seen by cardio, had labs and echo- felt noncardiac CP and HA \par -states told HA possibly due to Imdur and was stopped and advised to f/u with cardiology outpt about this, has appt pending 7/3/23.  States told may also have migraine HAs, advised Motrin prn\par \par "Hospital Course: \par Discharge Date	20-Jun-2023 \par Admission Date	20-Jun-2023 05:47 \par Reason for Admission	chest pain \par Hospital Course	 \par 63F h/o HTN, HLD, DM2, GERD, FAP s/p colon resection, h/o GI bleed from FAP, \par Lichen pigmentosis, CAD s/p recent PCI to cx on 6/13/23 presented with atypical \par chest pain. \par \par \par  Problem/Plan - 1: \par -  Problem: Coronary artery disease with angina pectoris. \par -  Plan: recent PCI - EKG without acute ischemic changes and troponin negative \par x 2 \par chest pain atypical and unlikely due to ischemia - pain reproducible on exam \par increase flexeril tid prn \par Motrin 400mg every 8hrs x 1 day- take with food \par TTE per cardiology \par c/w asa, plavix, bb. \par \par Problem/Plan - 2: \par -  Problem: History of headache. \par -  Plan: likely due to Imdur \par will hold for now. \par \par  Problem/Plan - 3: \par -  Problem: DM2 (diabetes mellitus, type 2). \par -  Plan: monitor FS \par Continue  home alogliptin. \par \par  Problem/Plan - 4: \par -  Problem: HTN (hypertension). \par -  Plan: monitor bp \par c/w toprol. \par \par  Problem/Plan - 5: \par -  Problem: GERD (gastroesophageal reflux disease). \par -  Plan: c/w PPI \par \par  Problem/Plan - 6: \par -  Problem: HLD (hyperlipidemia). \par -  Plan: on praluent [id number] at home. "\par __________________________________________________________________________\par \par 6/19/23 \par cbc wnl\par cmp wnl\par , wnl\par proBNP  <36\par Trop 7--> 7\par PT/INR/PTT wnl\par \par cxr: clear lungs\par \par CTH: no acute intracranial pathology\par \par TTE (limited): nl LV fxn and size\par ________________________________________________________________\par \par Today reports is overall feeling well.\par Does not need med refills.\par \par hx atypical left CP- reports better since was in hospital.  Denies known inciting events/heavy lifting or focal trauma.\par -on Flexeril prn with help f\par -On Motrin as needed with help\par -Applying heat/ice with help\par -Cardio f/u pending 7/3/23\par -Denies exertional chest pain, dizziness, SOB or breast complaints\par \par hx HA- reports stable since hospital, feels has improved since off Imdur\par -Mainly at left posterior head area, feels "hot"\par -helped by Motrin- taking with help\par -denies dizziness, vision trouble, dysarthria, imbalance, LOC or focal weakness\par -last optho eval 2 yrs ago, plans to f/u with MD soon\par \par +snoring, +night awakenings for no clear reason\par +AM fatigue, no daytime napping\par no hx sleep eval\par \par Denies clinical bleeding.\par Denies GI or  complaints.\par Denies recent illnesses, fever, chills or cough.\par \par DM- requests referral to see endocrinologist for her diabetes\par -hx metformin in past, stopped due to n/v with use\par -on allogliptin x many yrs, denies SEs\par -adherent with ADA diet\par -fs: , denies hypoglycemia sx's\par -exercise: walks 1 h per day - last done 1.5 weeks ago\par

## 2023-06-23 ENCOUNTER — NON-APPOINTMENT (OUTPATIENT)
Age: 63
End: 2023-06-23

## 2023-06-23 LAB
CREAT SPEC-SCNC: 57 MG/DL
ESTIMATED AVERAGE GLUCOSE: 154 MG/DL
HBA1C MFR BLD HPLC: 7 %
MICROALBUMIN 24H UR DL<=1MG/L-MCNC: <1.2 MG/DL
MICROALBUMIN/CREAT 24H UR-RTO: NORMAL MG/G

## 2023-07-03 ENCOUNTER — RX RENEWAL (OUTPATIENT)
Age: 63
End: 2023-07-03

## 2023-07-03 ENCOUNTER — APPOINTMENT (OUTPATIENT)
Dept: CARDIOLOGY | Facility: CLINIC | Age: 63
End: 2023-07-03

## 2023-07-05 RX ORDER — LANCETS 28 GAUGE
EACH MISCELLANEOUS
Qty: 1 | Refills: 5 | Status: ACTIVE | COMMUNITY
Start: 2022-11-15 | End: 1900-01-01

## 2023-07-19 ENCOUNTER — RX RENEWAL (OUTPATIENT)
Age: 63
End: 2023-07-19

## 2023-07-21 ENCOUNTER — APPOINTMENT (OUTPATIENT)
Dept: INTERNAL MEDICINE | Facility: CLINIC | Age: 63
End: 2023-07-21

## 2023-08-02 ENCOUNTER — APPOINTMENT (OUTPATIENT)
Dept: ENDOCRINOLOGY | Facility: CLINIC | Age: 63
End: 2023-08-02
Payer: MEDICAID

## 2023-08-02 VITALS
DIASTOLIC BLOOD PRESSURE: 70 MMHG | WEIGHT: 133 LBS | TEMPERATURE: 97.6 F | SYSTOLIC BLOOD PRESSURE: 120 MMHG | HEIGHT: 59 IN | HEART RATE: 87 BPM | BODY MASS INDEX: 26.81 KG/M2 | OXYGEN SATURATION: 97 %

## 2023-08-02 PROCEDURE — 99204 OFFICE O/P NEW MOD 45 MIN: CPT

## 2023-08-02 NOTE — PHYSICAL EXAM
[de-identified] : General: No distress, well nourished Eyes: Normal Sclera, EOMI, PERRL ENT: Normal appearance of the nose, normal oropharynx Neck/Thyroid: No cervical lymphadenopathy, thyroid gland 20 g in size, no thyroid nodules, non-tender Respiratory: No use of accessory muscles of respiration, vesicular breath sounds heard bilaterally, no crepitations or ronchi Cardiovascular: S1 and S2 heard and normal, no S3 or S4, no murmurs, radial pulse normal bilaterally Abdomen: soft, non-tender, no masses, normal bowel sounds Musculoskeletal: No swelling or deformities of joints of hands, no pedal edema Neurological: Normal range of motion in the hands, Normal brachioradialis reflexes bilaterally Psychiatry: Patient converses normally, good judgement and insight Skin: No rashes in hands, no nodules palpated in hands  [de-identified] : DM foot exam done on 08/02/2023: No ulcers seen in feet Dorsalis pedis pulses normal bilaterally Sensation to 10 g monofilament normal in feet bilaterally

## 2023-08-02 NOTE — HISTORY OF PRESENT ILLNESS
[FreeTextEntry1] : Problems: 1. DM type 2 2. Hypertension 3. Hyperlipidemia - I defer management of this to Cardiology  DM type 2 1. Diagnosed in 2018 2. Meds: Alogliptin 25 mg po daily (no pancreatitis) No UTIs or genital candidiasis Patient had nausea and vomiting with metformin.  3. Fingersticks done twice per day - 140 to 170 , no hypoglycemic unawareness 4. On Aspirin 81 mg po daily, on atorvastatin (dose unknown), on Praluent, not ACEi/ARB 5. Complications: No DM nephropathy (normal creatinine, neg urine microalbumin panel in June 2023) No DM retinopathy (last eye exam was in 2021, patient advised on the need for annual DM eye exam) Has CAD s/p stents, had stroke in the past, No other ASCVD No foot ulcers/amputation 6. Patient never smoked cigarettes

## 2023-08-02 NOTE — REVIEW OF SYSTEMS
[FreeTextEntry2] : Constitutional: No Fever Eyes: No visual difficulty ENT: no difficulty hearing Cardiovascular: No palpitations Respiratory: No Cough Gastrointestinal: No nausea Genitourinary: No dysuria Musculoskeletal: No joint pain Neurological: No headaches Psychiatry: No sleep abnormalities Skin: No rashes Hematology: No bleeding

## 2023-08-02 NOTE — ASSESSMENT
[FreeTextEntry1] : Target: HbA1c < 7%, BP < 130/80  HbA1c is above goal so I prescribed Farxiga.  BP is at goal.   Patient is on Aspirin and statin, no indication for ACEi/ARB  Last lipid panel - Feb 2023 - LDL 94, Trig 212 Last HbA1c - 06/23/2023 - 7% Last Vitamin B12 - N/A Last urine albumin panel - June 2023 - neg Last BMP/CMP - June 2023 - Cr, K, AST, ALT N  Plan: 1. Start Farxiga 10 mg po daily 2. Fingersticks to be done once daily 3. Labs to be done in 3 months - CBC, CMP, HbA1c 4. Follow up in 3 months to review results and meter

## 2023-08-08 ENCOUNTER — RX RENEWAL (OUTPATIENT)
Age: 63
End: 2023-08-08

## 2023-08-10 ENCOUNTER — APPOINTMENT (OUTPATIENT)
Dept: INTERNAL MEDICINE | Facility: CLINIC | Age: 63
End: 2023-08-10
Payer: MEDICAID

## 2023-08-10 ENCOUNTER — NON-APPOINTMENT (OUTPATIENT)
Age: 63
End: 2023-08-10

## 2023-08-10 VITALS
TEMPERATURE: 98.2 F | HEART RATE: 85 BPM | OXYGEN SATURATION: 98 % | SYSTOLIC BLOOD PRESSURE: 122 MMHG | WEIGHT: 130 LBS | BODY MASS INDEX: 26.21 KG/M2 | HEIGHT: 59 IN | DIASTOLIC BLOOD PRESSURE: 82 MMHG

## 2023-08-10 DIAGNOSIS — G47.00 INSOMNIA, UNSPECIFIED: ICD-10-CM

## 2023-08-10 DIAGNOSIS — J45.909 UNSPECIFIED ASTHMA, UNCOMPLICATED: ICD-10-CM

## 2023-08-10 PROCEDURE — 99214 OFFICE O/P EST MOD 30 MIN: CPT

## 2023-08-10 NOTE — HISTORY OF PRESENT ILLNESS
[FreeTextEntry1] : FU [de-identified] : NUBIA DIAZ is a 63 year F who comes in for a follow up visit. Pt with hx of asthma, HTN, HLD, RA, IBS, GERD, insomnia recently admitted with chest pain and HA. Pt with CAD s/p PCI to Cx 6/23.  Pt called for Eszopiclone 2mg refill which was refilled on 7/26/23 by [facility] but per pt pharmacy does not have rx on file. Pt awoke with some chest heaviness this morning, on/off, with no associated sob, palpitations, ha or dizziness.  Patient denies any abdominal pain, nausea, vomiting, palpitations, fever, chills, constipation, diarrhea.

## 2023-08-10 NOTE — ASSESSMENT
[FreeTextEntry1] : 1.Chest pain: EKG stable, likely due to costochondritis, advised rest, tylenol and heat therapy as needed.   2.CAD: s/p PCI, advised f/u with cardiology. continue with asa and praluent.   3.DM-2: continue with alogliptin 25 mg daily and farxia 10 mg daily. Pt advised to keep diabetic diet, decrease carbs and increase dietary protein intake. Exercise as tolerated 3-4 times a week.  4.Insomnia: will check with [facility] if refill received of Lunesta 2 mg. Went over instructions and side effects of medication. [facility] I-stop reviewed [id number]

## 2023-09-15 ENCOUNTER — RX RENEWAL (OUTPATIENT)
Age: 63
End: 2023-09-15

## 2023-09-18 ENCOUNTER — RX RENEWAL (OUTPATIENT)
Age: 63
End: 2023-09-18

## 2023-10-10 ENCOUNTER — TRANSCRIPTION ENCOUNTER (OUTPATIENT)
Age: 63
End: 2023-10-10

## 2023-10-13 ENCOUNTER — INPATIENT (INPATIENT)
Facility: HOSPITAL | Age: 63
LOS: 1 days | Discharge: HOME | DRG: 199 | End: 2023-10-15
Attending: HOSPITALIST | Admitting: HOSPITALIST
Payer: MEDICAID

## 2023-10-13 ENCOUNTER — APPOINTMENT (OUTPATIENT)
Dept: CARDIOLOGY | Facility: CLINIC | Age: 63
End: 2023-10-13
Payer: MEDICAID

## 2023-10-13 VITALS
DIASTOLIC BLOOD PRESSURE: 90 MMHG | HEART RATE: 117 BPM | WEIGHT: 132 LBS | BODY MASS INDEX: 26.66 KG/M2 | OXYGEN SATURATION: 98 % | SYSTOLIC BLOOD PRESSURE: 150 MMHG

## 2023-10-13 VITALS — HEIGHT: 59 IN | WEIGHT: 132.06 LBS

## 2023-10-13 DIAGNOSIS — R07.9 CHEST PAIN, UNSPECIFIED: ICD-10-CM

## 2023-10-13 DIAGNOSIS — Z95.5 PRESENCE OF CORONARY ANGIOPLASTY IMPLANT AND GRAFT: Chronic | ICD-10-CM

## 2023-10-13 DIAGNOSIS — Z90.49 ACQUIRED ABSENCE OF OTHER SPECIFIED PARTS OF DIGESTIVE TRACT: Chronic | ICD-10-CM

## 2023-10-13 LAB
ALBUMIN SERPL ELPH-MCNC: 4.3 G/DL — SIGNIFICANT CHANGE UP (ref 3.3–5)
ALP SERPL-CCNC: 106 U/L — SIGNIFICANT CHANGE UP (ref 40–120)
ALT FLD-CCNC: 65 U/L — SIGNIFICANT CHANGE UP (ref 12–78)
ANION GAP SERPL CALC-SCNC: 8 MMOL/L — SIGNIFICANT CHANGE UP (ref 5–17)
APTT BLD: 31.4 SEC — SIGNIFICANT CHANGE UP (ref 24.5–35.6)
AST SERPL-CCNC: 34 U/L — SIGNIFICANT CHANGE UP (ref 15–37)
BASOPHILS # BLD AUTO: 0.07 K/UL — SIGNIFICANT CHANGE UP (ref 0–0.2)
BASOPHILS NFR BLD AUTO: 0.9 % — SIGNIFICANT CHANGE UP (ref 0–2)
BILIRUB SERPL-MCNC: 0.5 MG/DL — SIGNIFICANT CHANGE UP (ref 0.2–1.2)
BUN SERPL-MCNC: 21 MG/DL — SIGNIFICANT CHANGE UP (ref 7–23)
CALCIUM SERPL-MCNC: 9.6 MG/DL — SIGNIFICANT CHANGE UP (ref 8.5–10.1)
CHLORIDE SERPL-SCNC: 104 MMOL/L — SIGNIFICANT CHANGE UP (ref 96–108)
CO2 SERPL-SCNC: 27 MMOL/L — SIGNIFICANT CHANGE UP (ref 22–31)
CREAT SERPL-MCNC: 1.03 MG/DL — SIGNIFICANT CHANGE UP (ref 0.5–1.3)
D DIMER BLD IA.RAPID-MCNC: 157 NG/ML DDU — SIGNIFICANT CHANGE UP
EGFR: 62 ML/MIN/1.73M2 — SIGNIFICANT CHANGE UP
EOSINOPHIL # BLD AUTO: 0.6 K/UL — HIGH (ref 0–0.5)
EOSINOPHIL NFR BLD AUTO: 7.8 % — HIGH (ref 0–6)
GLUCOSE SERPL-MCNC: 139 MG/DL — HIGH (ref 70–99)
HCT VFR BLD CALC: 44.3 % — SIGNIFICANT CHANGE UP (ref 34.5–45)
HGB BLD-MCNC: 14.8 G/DL — SIGNIFICANT CHANGE UP (ref 11.5–15.5)
IMM GRANULOCYTES NFR BLD AUTO: 0.1 % — SIGNIFICANT CHANGE UP (ref 0–0.9)
INR BLD: 0.91 RATIO — SIGNIFICANT CHANGE UP (ref 0.85–1.18)
LYMPHOCYTES # BLD AUTO: 2.47 K/UL — SIGNIFICANT CHANGE UP (ref 1–3.3)
LYMPHOCYTES # BLD AUTO: 32.1 % — SIGNIFICANT CHANGE UP (ref 13–44)
MCHC RBC-ENTMCNC: 31.9 PG — SIGNIFICANT CHANGE UP (ref 27–34)
MCHC RBC-ENTMCNC: 33.4 GM/DL — SIGNIFICANT CHANGE UP (ref 32–36)
MCV RBC AUTO: 95.5 FL — SIGNIFICANT CHANGE UP (ref 80–100)
MONOCYTES # BLD AUTO: 0.76 K/UL — SIGNIFICANT CHANGE UP (ref 0–0.9)
MONOCYTES NFR BLD AUTO: 9.9 % — SIGNIFICANT CHANGE UP (ref 2–14)
NEUTROPHILS # BLD AUTO: 3.78 K/UL — SIGNIFICANT CHANGE UP (ref 1.8–7.4)
NEUTROPHILS NFR BLD AUTO: 49.2 % — SIGNIFICANT CHANGE UP (ref 43–77)
PLATELET # BLD AUTO: 202 K/UL — SIGNIFICANT CHANGE UP (ref 150–400)
POTASSIUM SERPL-MCNC: 3.7 MMOL/L — SIGNIFICANT CHANGE UP (ref 3.5–5.3)
POTASSIUM SERPL-SCNC: 3.7 MMOL/L — SIGNIFICANT CHANGE UP (ref 3.5–5.3)
PROT SERPL-MCNC: 8.8 GM/DL — HIGH (ref 6–8.3)
PROTHROM AB SERPL-ACNC: 10.3 SEC — SIGNIFICANT CHANGE UP (ref 9.5–13)
RBC # BLD: 4.64 M/UL — SIGNIFICANT CHANGE UP (ref 3.8–5.2)
RBC # FLD: 13.2 % — SIGNIFICANT CHANGE UP (ref 10.3–14.5)
SODIUM SERPL-SCNC: 139 MMOL/L — SIGNIFICANT CHANGE UP (ref 135–145)
TROPONIN I, HIGH SENSITIVITY RESULT: 5.47 NG/L — SIGNIFICANT CHANGE UP
WBC # BLD: 7.69 K/UL — SIGNIFICANT CHANGE UP (ref 3.8–10.5)
WBC # FLD AUTO: 7.69 K/UL — SIGNIFICANT CHANGE UP (ref 3.8–10.5)

## 2023-10-13 PROCEDURE — 93000 ELECTROCARDIOGRAM COMPLETE: CPT

## 2023-10-13 PROCEDURE — 84443 ASSAY THYROID STIM HORMONE: CPT

## 2023-10-13 PROCEDURE — 84100 ASSAY OF PHOSPHORUS: CPT

## 2023-10-13 PROCEDURE — 85025 COMPLETE CBC W/AUTO DIFF WBC: CPT

## 2023-10-13 PROCEDURE — 80053 COMPREHEN METABOLIC PANEL: CPT

## 2023-10-13 PROCEDURE — 36415 COLL VENOUS BLD VENIPUNCTURE: CPT

## 2023-10-13 PROCEDURE — 93010 ELECTROCARDIOGRAM REPORT: CPT

## 2023-10-13 PROCEDURE — 99285 EMERGENCY DEPT VISIT HI MDM: CPT

## 2023-10-13 PROCEDURE — 71046 X-RAY EXAM CHEST 2 VIEWS: CPT | Mod: 26

## 2023-10-13 PROCEDURE — 84484 ASSAY OF TROPONIN QUANT: CPT

## 2023-10-13 PROCEDURE — 80061 LIPID PANEL: CPT

## 2023-10-13 PROCEDURE — 93306 TTE W/DOPPLER COMPLETE: CPT

## 2023-10-13 PROCEDURE — 83036 HEMOGLOBIN GLYCOSYLATED A1C: CPT

## 2023-10-13 PROCEDURE — G0378: CPT

## 2023-10-13 PROCEDURE — 93005 ELECTROCARDIOGRAM TRACING: CPT

## 2023-10-13 PROCEDURE — 86803 HEPATITIS C AB TEST: CPT

## 2023-10-13 PROCEDURE — 80076 HEPATIC FUNCTION PANEL: CPT

## 2023-10-13 PROCEDURE — 83735 ASSAY OF MAGNESIUM: CPT

## 2023-10-13 PROCEDURE — 99215 OFFICE O/P EST HI 40 MIN: CPT | Mod: 25

## 2023-10-13 PROCEDURE — 82962 GLUCOSE BLOOD TEST: CPT

## 2023-10-13 RX ORDER — MORPHINE SULFATE 50 MG/1
2 CAPSULE, EXTENDED RELEASE ORAL ONCE
Refills: 0 | Status: DISCONTINUED | OUTPATIENT
Start: 2023-10-13 | End: 2023-10-13

## 2023-10-13 RX ORDER — ASPIRIN/CALCIUM CARB/MAGNESIUM 324 MG
324 TABLET ORAL ONCE
Refills: 0 | Status: COMPLETED | OUTPATIENT
Start: 2023-10-13 | End: 2023-10-13

## 2023-10-13 RX ORDER — ONDANSETRON 8 MG/1
4 TABLET, FILM COATED ORAL EVERY 4 HOURS
Refills: 0 | Status: DISCONTINUED | OUTPATIENT
Start: 2023-10-13 | End: 2023-10-14

## 2023-10-13 RX ORDER — ACETAMINOPHEN 500 MG
650 TABLET ORAL EVERY 6 HOURS
Refills: 0 | Status: DISCONTINUED | OUTPATIENT
Start: 2023-10-13 | End: 2023-10-15

## 2023-10-13 RX ADMIN — Medication 324 MILLIGRAM(S): at 20:56

## 2023-10-13 NOTE — ED STATDOCS - CLINICAL SUMMARY MEDICAL DECISION MAKING FREE TEXT BOX
EKG nonischemic.  CXR clear.  Initial Troponin WNL.  No concern for aortic dissection, PE.  Concern for possible unstable angina.  Given full ASA.  Admit to medicine tele discussed with Dr. Flower.

## 2023-10-13 NOTE — ED ADULT NURSE NOTE - CHIEF COMPLAINT QUOTE
Sent in by MD Lucas for evaluation of CP and HTN. States BP was 140/90 at the office, hx HTN takes Metoprolol. Self ambulated into ED with no distress noted.

## 2023-10-13 NOTE — ED STATDOCS - OBJECTIVE STATEMENT
64 y/o female with a PMHx of  HTN on Metoprolol, h/o lichen planus, GERD, FAP, CAD s/p stents, HLD, DM2, RA,  h/o colon resection; presenting to the ED sent in by Dr. Lee for evaluation of chest pain onset this morning and HTN, states BP was 140/90 in office. Endorses SOB, fatigue, LE swelling. Denies cough. Took 81mg ASA in the morning

## 2023-10-13 NOTE — ED STATDOCS - PROGRESS NOTE DETAILS
Patient seen and evaluated, ED attending note and orders reviewed, will continue with patient follow up and care -Beatriz Resendiz PA-C labs WNL, trop negative, CXR with NAD, given pt's high risk hx of CAD with stents and continued pain radiating to L arm will admit pt to tele for further evaluation  Beatriz Resendiz PA-C

## 2023-10-13 NOTE — ED ADULT NURSE NOTE - OBJECTIVE STATEMENT
Pt presents to ED c/o chest pain since this morning. Pt was sent in for eval by Dr Lee from office. Pt states bp was 140/90  in office. Endorses SOB and LE swelling.

## 2023-10-14 DIAGNOSIS — R07.9 CHEST PAIN, UNSPECIFIED: ICD-10-CM

## 2023-10-14 DIAGNOSIS — I16.0 HYPERTENSIVE URGENCY: ICD-10-CM

## 2023-10-14 LAB
A1C WITH ESTIMATED AVERAGE GLUCOSE RESULT: 7.2 % — HIGH (ref 4–5.6)
ALBUMIN SERPL ELPH-MCNC: 3.6 G/DL — SIGNIFICANT CHANGE UP (ref 3.3–5)
ALP SERPL-CCNC: 101 U/L — SIGNIFICANT CHANGE UP (ref 40–120)
ALT FLD-CCNC: 110 U/L — HIGH (ref 12–78)
ANION GAP SERPL CALC-SCNC: 8 MMOL/L — SIGNIFICANT CHANGE UP (ref 5–17)
AST SERPL-CCNC: 154 U/L — HIGH (ref 15–37)
BASOPHILS # BLD AUTO: 0.05 K/UL — SIGNIFICANT CHANGE UP (ref 0–0.2)
BASOPHILS NFR BLD AUTO: 0.6 % — SIGNIFICANT CHANGE UP (ref 0–2)
BILIRUB SERPL-MCNC: 0.8 MG/DL — SIGNIFICANT CHANGE UP (ref 0.2–1.2)
BUN SERPL-MCNC: 22 MG/DL — SIGNIFICANT CHANGE UP (ref 7–23)
CALCIUM SERPL-MCNC: 9 MG/DL — SIGNIFICANT CHANGE UP (ref 8.5–10.1)
CHLORIDE SERPL-SCNC: 106 MMOL/L — SIGNIFICANT CHANGE UP (ref 96–108)
CHOLEST SERPL-MCNC: 224 MG/DL — HIGH
CO2 SERPL-SCNC: 24 MMOL/L — SIGNIFICANT CHANGE UP (ref 22–31)
CREAT SERPL-MCNC: 0.8 MG/DL — SIGNIFICANT CHANGE UP (ref 0.5–1.3)
EGFR: 84 ML/MIN/1.73M2 — SIGNIFICANT CHANGE UP
EOSINOPHIL # BLD AUTO: 0.63 K/UL — HIGH (ref 0–0.5)
EOSINOPHIL NFR BLD AUTO: 8.2 % — HIGH (ref 0–6)
ESTIMATED AVERAGE GLUCOSE: 160 MG/DL — HIGH (ref 68–114)
GLUCOSE BLDC GLUCOMTR-MCNC: 109 MG/DL — HIGH (ref 70–99)
GLUCOSE BLDC GLUCOMTR-MCNC: 124 MG/DL — HIGH (ref 70–99)
GLUCOSE BLDC GLUCOMTR-MCNC: 150 MG/DL — HIGH (ref 70–99)
GLUCOSE SERPL-MCNC: 157 MG/DL — HIGH (ref 70–99)
HCT VFR BLD CALC: 40.5 % — SIGNIFICANT CHANGE UP (ref 34.5–45)
HCV AB S/CO SERPL IA: 0.07 S/CO — SIGNIFICANT CHANGE UP (ref 0–0.99)
HCV AB SERPL-IMP: SIGNIFICANT CHANGE UP
HDLC SERPL-MCNC: 48 MG/DL — LOW
HGB BLD-MCNC: 13.5 G/DL — SIGNIFICANT CHANGE UP (ref 11.5–15.5)
IMM GRANULOCYTES NFR BLD AUTO: 0.4 % — SIGNIFICANT CHANGE UP (ref 0–0.9)
LIPID PNL WITH DIRECT LDL SERPL: 145 MG/DL — HIGH
LYMPHOCYTES # BLD AUTO: 1.46 K/UL — SIGNIFICANT CHANGE UP (ref 1–3.3)
LYMPHOCYTES # BLD AUTO: 18.9 % — SIGNIFICANT CHANGE UP (ref 13–44)
MAGNESIUM SERPL-MCNC: 2.2 MG/DL — SIGNIFICANT CHANGE UP (ref 1.6–2.6)
MCHC RBC-ENTMCNC: 32 PG — SIGNIFICANT CHANGE UP (ref 27–34)
MCHC RBC-ENTMCNC: 33.3 GM/DL — SIGNIFICANT CHANGE UP (ref 32–36)
MCV RBC AUTO: 96 FL — SIGNIFICANT CHANGE UP (ref 80–100)
MONOCYTES # BLD AUTO: 0.59 K/UL — SIGNIFICANT CHANGE UP (ref 0–0.9)
MONOCYTES NFR BLD AUTO: 7.7 % — SIGNIFICANT CHANGE UP (ref 2–14)
NEUTROPHILS # BLD AUTO: 4.95 K/UL — SIGNIFICANT CHANGE UP (ref 1.8–7.4)
NEUTROPHILS NFR BLD AUTO: 64.2 % — SIGNIFICANT CHANGE UP (ref 43–77)
NON HDL CHOLESTEROL: 176 MG/DL — HIGH
PHOSPHATE SERPL-MCNC: 4.3 MG/DL — SIGNIFICANT CHANGE UP (ref 2.5–4.5)
PLATELET # BLD AUTO: 183 K/UL — SIGNIFICANT CHANGE UP (ref 150–400)
POTASSIUM SERPL-MCNC: 3.9 MMOL/L — SIGNIFICANT CHANGE UP (ref 3.5–5.3)
POTASSIUM SERPL-SCNC: 3.9 MMOL/L — SIGNIFICANT CHANGE UP (ref 3.5–5.3)
PROT SERPL-MCNC: 7.5 GM/DL — SIGNIFICANT CHANGE UP (ref 6–8.3)
RBC # BLD: 4.22 M/UL — SIGNIFICANT CHANGE UP (ref 3.8–5.2)
RBC # FLD: 13.2 % — SIGNIFICANT CHANGE UP (ref 10.3–14.5)
SODIUM SERPL-SCNC: 138 MMOL/L — SIGNIFICANT CHANGE UP (ref 135–145)
TRIGL SERPL-MCNC: 172 MG/DL — HIGH
TROPONIN I, HIGH SENSITIVITY RESULT: 4.94 NG/L — SIGNIFICANT CHANGE UP
TSH SERPL-MCNC: 1.68 UU/ML — SIGNIFICANT CHANGE UP (ref 0.34–4.82)
WBC # BLD: 7.71 K/UL — SIGNIFICANT CHANGE UP (ref 3.8–10.5)
WBC # FLD AUTO: 7.71 K/UL — SIGNIFICANT CHANGE UP (ref 3.8–10.5)

## 2023-10-14 PROCEDURE — 93306 TTE W/DOPPLER COMPLETE: CPT | Mod: 26

## 2023-10-14 PROCEDURE — 99233 SBSQ HOSP IP/OBS HIGH 50: CPT

## 2023-10-14 PROCEDURE — 93010 ELECTROCARDIOGRAM REPORT: CPT

## 2023-10-14 PROCEDURE — 99497 ADVNCD CARE PLAN 30 MIN: CPT | Mod: 25

## 2023-10-14 PROCEDURE — 99223 1ST HOSP IP/OBS HIGH 75: CPT

## 2023-10-14 RX ORDER — ALOGLIPTIN 12.5 MG/1
1 TABLET, FILM COATED ORAL
Refills: 0 | DISCHARGE

## 2023-10-14 RX ORDER — OMEPRAZOLE 10 MG/1
1 CAPSULE, DELAYED RELEASE ORAL
Refills: 0 | DISCHARGE

## 2023-10-14 RX ORDER — GLUCAGON INJECTION, SOLUTION 0.5 MG/.1ML
1 INJECTION, SOLUTION SUBCUTANEOUS ONCE
Refills: 0 | Status: DISCONTINUED | OUTPATIENT
Start: 2023-10-14 | End: 2023-10-15

## 2023-10-14 RX ORDER — METOPROLOL TARTRATE 50 MG
5 TABLET ORAL ONCE
Refills: 0 | Status: COMPLETED | OUTPATIENT
Start: 2023-10-14 | End: 2023-10-14

## 2023-10-14 RX ORDER — METOPROLOL TARTRATE 50 MG
50 TABLET ORAL DAILY
Refills: 0 | Status: DISCONTINUED | OUTPATIENT
Start: 2023-10-14 | End: 2023-10-15

## 2023-10-14 RX ORDER — DEXTROSE 50 % IN WATER 50 %
25 SYRINGE (ML) INTRAVENOUS ONCE
Refills: 0 | Status: DISCONTINUED | OUTPATIENT
Start: 2023-10-14 | End: 2023-10-15

## 2023-10-14 RX ORDER — DEXTROSE 50 % IN WATER 50 %
15 SYRINGE (ML) INTRAVENOUS ONCE
Refills: 0 | Status: DISCONTINUED | OUTPATIENT
Start: 2023-10-14 | End: 2023-10-15

## 2023-10-14 RX ORDER — ALBUTEROL 90 UG/1
2 AEROSOL, METERED ORAL EVERY 6 HOURS
Refills: 0 | Status: DISCONTINUED | OUTPATIENT
Start: 2023-10-14 | End: 2023-10-15

## 2023-10-14 RX ORDER — ASPIRIN/CALCIUM CARB/MAGNESIUM 324 MG
81 TABLET ORAL DAILY
Refills: 0 | Status: DISCONTINUED | OUTPATIENT
Start: 2023-10-14 | End: 2023-10-15

## 2023-10-14 RX ORDER — SODIUM CHLORIDE 9 MG/ML
1000 INJECTION, SOLUTION INTRAVENOUS
Refills: 0 | Status: DISCONTINUED | OUTPATIENT
Start: 2023-10-14 | End: 2023-10-15

## 2023-10-14 RX ORDER — ALPRAZOLAM 0.25 MG
0.25 TABLET ORAL ONCE
Refills: 0 | Status: DISCONTINUED | OUTPATIENT
Start: 2023-10-14 | End: 2023-10-14

## 2023-10-14 RX ORDER — ESZOPICLONE 2 MG/1
1 TABLET, COATED ORAL
Refills: 0 | DISCHARGE

## 2023-10-14 RX ORDER — ENOXAPARIN SODIUM 100 MG/ML
40 INJECTION SUBCUTANEOUS EVERY 24 HOURS
Refills: 0 | Status: DISCONTINUED | OUTPATIENT
Start: 2023-10-14 | End: 2023-10-15

## 2023-10-14 RX ORDER — NITROGLYCERIN 6.5 MG
0.4 CAPSULE, EXTENDED RELEASE ORAL
Refills: 0 | Status: DISCONTINUED | OUTPATIENT
Start: 2023-10-14 | End: 2023-10-15

## 2023-10-14 RX ORDER — TRAMADOL HYDROCHLORIDE 50 MG/1
25 TABLET ORAL ONCE
Refills: 0 | Status: DISCONTINUED | OUTPATIENT
Start: 2023-10-14 | End: 2023-10-14

## 2023-10-14 RX ORDER — HYOSCYAMINE SULFATE 0.13 MG
1 TABLET ORAL
Refills: 0 | DISCHARGE

## 2023-10-14 RX ORDER — CLOPIDOGREL BISULFATE 75 MG/1
75 TABLET, FILM COATED ORAL DAILY
Refills: 0 | Status: DISCONTINUED | OUTPATIENT
Start: 2023-10-14 | End: 2023-10-15

## 2023-10-14 RX ORDER — ACETAMINOPHEN 500 MG
1000 TABLET ORAL ONCE
Refills: 0 | Status: COMPLETED | OUTPATIENT
Start: 2023-10-14 | End: 2023-10-14

## 2023-10-14 RX ORDER — ALIROCUMAB 75 MG/ML
75 INJECTION, SOLUTION SUBCUTANEOUS
Refills: 0 | DISCHARGE

## 2023-10-14 RX ORDER — ALBUTEROL 90 UG/1
2 AEROSOL, METERED ORAL
Refills: 0 | DISCHARGE

## 2023-10-14 RX ORDER — DEXTROSE 50 % IN WATER 50 %
12.5 SYRINGE (ML) INTRAVENOUS ONCE
Refills: 0 | Status: DISCONTINUED | OUTPATIENT
Start: 2023-10-14 | End: 2023-10-15

## 2023-10-14 RX ORDER — ZOLPIDEM TARTRATE 10 MG/1
5 TABLET ORAL AT BEDTIME
Refills: 0 | Status: DISCONTINUED | OUTPATIENT
Start: 2023-10-14 | End: 2023-10-15

## 2023-10-14 RX ORDER — METOPROLOL TARTRATE 50 MG
1 TABLET ORAL
Refills: 0 | DISCHARGE

## 2023-10-14 RX ORDER — INSULIN LISPRO 100/ML
VIAL (ML) SUBCUTANEOUS
Refills: 0 | Status: DISCONTINUED | OUTPATIENT
Start: 2023-10-14 | End: 2023-10-15

## 2023-10-14 RX ORDER — TRAMADOL HYDROCHLORIDE 50 MG/1
25 TABLET ORAL DAILY
Refills: 0 | Status: DISCONTINUED | OUTPATIENT
Start: 2023-10-14 | End: 2023-10-15

## 2023-10-14 RX ORDER — IBUPROFEN 200 MG
400 TABLET ORAL ONCE
Refills: 0 | Status: COMPLETED | OUTPATIENT
Start: 2023-10-14 | End: 2023-10-14

## 2023-10-14 RX ADMIN — ENOXAPARIN SODIUM 40 MILLIGRAM(S): 100 INJECTION SUBCUTANEOUS at 20:13

## 2023-10-14 RX ADMIN — ONDANSETRON 4 MILLIGRAM(S): 8 TABLET, FILM COATED ORAL at 10:49

## 2023-10-14 RX ADMIN — Medication 400 MILLIGRAM(S): at 01:45

## 2023-10-14 RX ADMIN — Medication 81 MILLIGRAM(S): at 10:50

## 2023-10-14 RX ADMIN — Medication 400 MILLIGRAM(S): at 13:29

## 2023-10-14 RX ADMIN — ONDANSETRON 4 MILLIGRAM(S): 8 TABLET, FILM COATED ORAL at 01:54

## 2023-10-14 RX ADMIN — Medication 0.4 MILLIGRAM(S): at 01:20

## 2023-10-14 RX ADMIN — Medication 1000 MILLIGRAM(S): at 02:15

## 2023-10-14 RX ADMIN — CLOPIDOGREL BISULFATE 75 MILLIGRAM(S): 75 TABLET, FILM COATED ORAL at 10:49

## 2023-10-14 RX ADMIN — ZOLPIDEM TARTRATE 5 MILLIGRAM(S): 10 TABLET ORAL at 20:12

## 2023-10-14 RX ADMIN — Medication 650 MILLIGRAM(S): at 20:11

## 2023-10-14 RX ADMIN — TRAMADOL HYDROCHLORIDE 25 MILLIGRAM(S): 50 TABLET ORAL at 18:03

## 2023-10-14 RX ADMIN — Medication 50 MILLIGRAM(S): at 10:50

## 2023-10-14 RX ADMIN — TRAMADOL HYDROCHLORIDE 25 MILLIGRAM(S): 50 TABLET ORAL at 14:36

## 2023-10-14 RX ADMIN — MORPHINE SULFATE 2 MILLIGRAM(S): 50 CAPSULE, EXTENDED RELEASE ORAL at 01:12

## 2023-10-14 RX ADMIN — MORPHINE SULFATE 2 MILLIGRAM(S): 50 CAPSULE, EXTENDED RELEASE ORAL at 00:05

## 2023-10-14 RX ADMIN — Medication 5 MILLIGRAM(S): at 01:08

## 2023-10-14 RX ADMIN — Medication 0.25 MILLIGRAM(S): at 02:04

## 2023-10-14 RX ADMIN — ENOXAPARIN SODIUM 40 MILLIGRAM(S): 100 INJECTION SUBCUTANEOUS at 01:07

## 2023-10-14 RX ADMIN — Medication 400 MILLIGRAM(S): at 12:09

## 2023-10-14 RX ADMIN — Medication 0.4 MILLIGRAM(S): at 01:08

## 2023-10-14 RX ADMIN — Medication 650 MILLIGRAM(S): at 06:41

## 2023-10-14 NOTE — PROGRESS NOTE ADULT - ASSESSMENT
63-year-old female with a past medical history of hypertension hyperlipidemia diabetes type 2 GERD FAP status post colon resection history of GI bleed from FAP lichen pigment ptosis CAD status post PCI who presents to ED secondary to persistent chest pain. Admitted due to:    #Chest pain concerning for ACS  #Hypertensive urgency  -trops neg  -appreciate cards input, ok for outpt f/u    #Headache  -tylenol prn    #Diabetes with hyperglycemia  -A1c 7.2  -SSI, monitor BG    #transaminitis  -trend, possibly in setting of transient hypotension    #DVT ppx- SCDs    Possible d/c 10/15 if LFTs downtrending

## 2023-10-14 NOTE — H&P ADULT - ASSESSMENT
Patient is a 63-year-old female with a past medical history of hypertension hyperlipidemia diabetes type 2 GERD FAP status post colon resection history of GI bleed from FAP lichen pigment ptosis CAD status post PCI who presents to ED secondary to persistent chest pain. Admitted due to:      #Chest pain concerning for ACS  #Hypertensive urgency  Admit to telemetry  Continue with aspirin and beta-blocker  Cardiology consult placed  Echocardiogram for a.m.  N.p.o. after midnight in case of intervention  Nitroglycerin as needed chest pain  EKG as needed chest pain  Zofran as needed nausea/vomiting  Tylenol as needed pain, fever  Continue with Plavix  Lovenox for DVT prophylaxis    #Headache  Resolved status post IV Tylenol  Exacerbated by nitroglycerin  Tylenol as needed  Patient neurologically intact    #Diabetes with hyperglycemia  When diet advanced, carb restricted diet  Insulin sliding scale  Follow-up A1c  Accu-Cheks every 6 hours while n.p.o.  Accu-Cheks before every meal and at bedtime once diet advanced

## 2023-10-14 NOTE — H&P ADULT - TIME BILLING
A minimum of 75 min was spent completing this admission not including time spent discussing advanced care planning or discussing goals of care with a minimum of 36 min spent face to face with patient while in ED unit on admission, counseling patient on current acute on chronic conditions and discussing plan and coordination of care including diagnostic imaging such as TTE, diagnostic lab values which were ordered and reasoning behind each and consult for cardiology.

## 2023-10-14 NOTE — H&P ADULT - NEUROLOGICAL
normal/cranial nerves II-XII intact/sensation intact/responds to pain/responds to verbal commands/cranial nerves intact/no spontaneous movement

## 2023-10-14 NOTE — PATIENT PROFILE ADULT - FALL HARM RISK - RISK INTERVENTIONS
Bed in lowest position, wheels locked, appropriate side rails in place/Call bell, personal items and telephone in reach/Instruct patient to call for assistance before getting out of bed or chair/Non-slip footwear when patient is out of bed/Gay to call system/Physically safe environment - no spills, clutter or unnecessary equipment/Purposeful Proactive Rounding/Room/bathroom lighting operational, light cord in reach

## 2023-10-14 NOTE — H&P ADULT - HISTORY OF PRESENT ILLNESS
Patient is a 63-year-old female with a past medical history of hypertension hyperlipidemia diabetes type 2 GERD FAP status post colon resection history of GI bleed from FAP lichen pigment ptosis CAD status post PCI who presents to ED secondary to persistent chest pain.  Patient states pain is present throughout chest, pressure-like, nonradiating, associated with headache, and elevated blood pressure.  Patient states that she came from cardiologist office after having noted to have elevated blood pressure.    Review of systems-denies palpitations, shortness of breath, nausea, vomiting, abdominal pain, dysuria, leg swelling, jaw claudication, cough, weight gain, increased abdominal girth, orthopnea, constipation/diarrhea, visual/hearing/gait disturbance, paresthesias.  Patient endorses chest pain, generalized headache, elevated blood pressure.    ED course: Patient seen and evaluated in ED.  First troponin within normal limits.  EKG within normal limits.  Patient noted to have elevated blood pressure to 155/106.  Patient admitted to medicine for rule out ACS.      Hospital course: Patient seen and evaluated in ED by hospitalist.  At time of exam patient wanting to leave AMA.  Patient stated she had headache and was not treated for headache or blood pressure.  Patient given 1 mg of morphine with Zofran and Tylenol and stated relief.  Patient arrived to floor with blood pressure of 167/103.  5 mg IV Lopressor ordered.  Patient remains hyperspace tensive despite this.  Patient complains of chest pain and was given nitroglycerin x2.  Blood pressure dropped to 97/61 and headache recurred.  Patient given IV Tylenol with relief of headache.  EKG repeated and normal sinus rhythm without any ST-T abnormalities.  Blood pressure improved to 111/61.  Patient given 0.25 of p.o. Xanax for anxiety and insomnia.

## 2023-10-15 ENCOUNTER — TRANSCRIPTION ENCOUNTER (OUTPATIENT)
Age: 63
End: 2023-10-15

## 2023-10-15 VITALS
DIASTOLIC BLOOD PRESSURE: 65 MMHG | TEMPERATURE: 98 F | SYSTOLIC BLOOD PRESSURE: 116 MMHG | HEART RATE: 91 BPM | RESPIRATION RATE: 19 BRPM | OXYGEN SATURATION: 95 %

## 2023-10-15 LAB
ALBUMIN SERPL ELPH-MCNC: 3.4 G/DL — SIGNIFICANT CHANGE UP (ref 3.3–5)
ALP SERPL-CCNC: 109 U/L — SIGNIFICANT CHANGE UP (ref 40–120)
ALT FLD-CCNC: 92 U/L — HIGH (ref 12–78)
AST SERPL-CCNC: 48 U/L — HIGH (ref 15–37)
BILIRUB DIRECT SERPL-MCNC: <0.1 MG/DL — SIGNIFICANT CHANGE UP (ref 0–0.3)
BILIRUB INDIRECT FLD-MCNC: >0.3 MG/DL — SIGNIFICANT CHANGE UP (ref 0.2–1)
BILIRUB SERPL-MCNC: 0.4 MG/DL — SIGNIFICANT CHANGE UP (ref 0.2–1.2)
GLUCOSE BLDC GLUCOMTR-MCNC: 153 MG/DL — HIGH (ref 70–99)
GLUCOSE BLDC GLUCOMTR-MCNC: 181 MG/DL — HIGH (ref 70–99)
PROT SERPL-MCNC: 7.4 GM/DL — SIGNIFICANT CHANGE UP (ref 6–8.3)

## 2023-10-15 PROCEDURE — 99233 SBSQ HOSP IP/OBS HIGH 50: CPT

## 2023-10-15 PROCEDURE — 99239 HOSP IP/OBS DSCHRG MGMT >30: CPT

## 2023-10-15 RX ORDER — TRAMADOL HYDROCHLORIDE 50 MG/1
25 TABLET ORAL ONCE
Refills: 0 | Status: DISCONTINUED | OUTPATIENT
Start: 2023-10-15 | End: 2023-10-15

## 2023-10-15 RX ADMIN — Medication 50 MILLIGRAM(S): at 09:32

## 2023-10-15 RX ADMIN — Medication 2: at 12:18

## 2023-10-15 RX ADMIN — Medication 81 MILLIGRAM(S): at 09:32

## 2023-10-15 RX ADMIN — CLOPIDOGREL BISULFATE 75 MILLIGRAM(S): 75 TABLET, FILM COATED ORAL at 09:33

## 2023-10-15 RX ADMIN — TRAMADOL HYDROCHLORIDE 25 MILLIGRAM(S): 50 TABLET ORAL at 12:13

## 2023-10-15 RX ADMIN — TRAMADOL HYDROCHLORIDE 25 MILLIGRAM(S): 50 TABLET ORAL at 05:01

## 2023-10-15 RX ADMIN — TRAMADOL HYDROCHLORIDE 25 MILLIGRAM(S): 50 TABLET ORAL at 05:30

## 2023-10-15 RX ADMIN — Medication 2: at 08:20

## 2023-10-15 NOTE — DISCHARGE NOTE NURSING/CASE MANAGEMENT/SOCIAL WORK - PATIENT PORTAL LINK FT
You can access the FollowMyHealth Patient Portal offered by [facility] by registering at the following website: [email or web address]. By joining [employer]’s FollowMyHealth portal, you will also be able to view your health information using other applications (apps) compatible with our system.

## 2023-10-15 NOTE — PROGRESS NOTE ADULT - PROBLEM SELECTOR PLAN 2
No further symptoms. ECG normal. No arrhythmia. Doubt ACS. Follow labs, elevated LFTs.
No further symptoms. ECG normal. No arrhythmia. Doubt ACS. Follow labs, elevated LFTs.

## 2023-10-15 NOTE — PROGRESS NOTE ADULT - SUBJECTIVE AND OBJECTIVE BOX
HOSPITALIST ATTENDING PROGRESS NOTE    Chart and meds reviewed.  Patient seen and examined.    CC: CP    Subjective: Denies CP, SOB.     All other systems reviewed and found to be negative with the exception of what has been described above.    MEDICATIONS  (STANDING):  aspirin enteric coated 81 milliGRAM(s) Oral daily  clopidogrel Tablet 75 milliGRAM(s) Oral daily  dextrose 5%. 1000 milliLiter(s) (50 mL/Hr) IV Continuous <Continuous>  dextrose 5%. 1000 milliLiter(s) (100 mL/Hr) IV Continuous <Continuous>  dextrose 50% Injectable 25 Gram(s) IV Push once  dextrose 50% Injectable 25 Gram(s) IV Push once  dextrose 50% Injectable 12.5 Gram(s) IV Push once  enoxaparin Injectable 40 milliGRAM(s) SubCutaneous every 24 hours  glucagon  Injectable 1 milliGRAM(s) IntraMuscular once  insulin lispro (ADMELOG) corrective regimen sliding scale   SubCutaneous three times a day before meals  metoprolol succinate ER 50 milliGRAM(s) Oral daily  traMADol 25 milliGRAM(s) Oral daily  zolpidem 5 milliGRAM(s) Oral at bedtime    MEDICATIONS  (PRN):  acetaminophen     Tablet .. 650 milliGRAM(s) Oral every 6 hours PRN Temp greater or equal to 38C (100.4F), Mild Pain (1 - 3), Moderate Pain (4 - 6)  albuterol    90 MICROgram(s) HFA Inhaler 2 Puff(s) Inhalation every 6 hours PRN Shortness of Breath and/or Wheezing  dextrose Oral Gel 15 Gram(s) Oral once PRN Blood Glucose LESS THAN 70 milliGRAM(s)/deciliter  nitroglycerin     SubLingual 0.4 milliGRAM(s) SubLingual every 5 minutes PRN Chest Pain      VITALS:  T(F): 97.4 (10-14-23 @ 10:54), Max: 98.2 (10-13-23 @ 19:47)  HR: 87 (10-14-23 @ 10:54) (79 - 104)  BP: 131/73 (10-14-23 @ 10:54) (97/61 - 167/103)  RR: 18 (10-14-23 @ 10:54) (16 - 18)  SpO2: 97% (10-14-23 @ 10:54) (97% - 98%)  Wt(kg): --    I&O's Summary      CAPILLARY BLOOD GLUCOSE      POCT Blood Glucose.: 109 mg/dL (14 Oct 2023 12:34)  POCT Blood Glucose.: 124 mg/dL (14 Oct 2023 08:54)  POCT Blood Glucose.: 150 mg/dL (14 Oct 2023 01:18)      PHYSICAL EXAM:  Gen: NAD  HEENT:  pupils equal and reactive, EOMI, no oropharyngeal lesions, erythema, exudates, oral thrush  NECK:   supple, no carotid bruits, no palpable lymph nodes, no thyromegaly  CV:  +S1, +S2, regular, no murmurs or rubs  RESP:   lungs clear to auscultation bilaterally, no wheezing, rales, rhonchi, good air entry bilaterally  BREAST:  not examined  GI:  abdomen soft, non-tender, non-distended, normal BS, no bruits, no abdominal masses, no palpable masses  RECTAL:  not examined  :  not examined  MSK:   normal muscle tone, no atrophy, no rigidity, no contractions  EXT:  no clubbing, no cyanosis, no edema, no calf pain, swelling or erythema  VASCULAR:  pulses equal and symmetric in the upper and lower extremities  NEURO:  AAOX3, no focal neurological deficits, follows all commands, able to move extremities spontaneously  SKIN:  no ulcers, lesions or rashes    LABS:                            13.5   7.71  )-----------( 183      ( 14 Oct 2023 06:33 )             40.5     10-14    138  |  106  |  22  ----------------------------<  157<H>  3.9   |  24  |  0.80    Ca    9.0      14 Oct 2023 06:33  Phos  4.3     10-14  Mg     2.2     10-14    TPro  7.5  /  Alb  3.6  /  TBili  0.8  /  DBili  x   /  AST  154<H>  /  ALT  110<H>  /  AlkPhos  101  10-14        LIVER FUNCTIONS - ( 14 Oct 2023 06:33 )  Alb: 3.6 g/dL / Pro: 7.5 gm/dL / ALK PHOS: 101 U/L / ALT: 110 U/L / AST: 154 U/L / GGT: x           PT/INR - ( 13 Oct 2023 20:12 )   PT: 10.3 sec;   INR: 0.91 ratio         PTT - ( 13 Oct 2023 20:12 )  PTT:31.4 sec  Urinalysis Basic - ( 14 Oct 2023 06:33 )    Color: x / Appearance: x / SG: x / pH: x  Gluc: 157 mg/dL / Ketone: x  / Bili: x / Urobili: x   Blood: x / Protein: x / Nitrite: x   Leuk Esterase: x / RBC: x / WBC x   Sq Epi: x / Non Sq Epi: x / Bacteria: x    CULTURES:  no new    Additional results/Imaging, I have personally reviewed:  Echo 10/14/23; Estimated left ventricular ejection fraction is 55-60 %. The left ventricle is normal in size, wall motion and contractility as seen in limited views. Mild to moderate concentric left ventricular hypertrophy is present. The aortic valve is trileaflet with thin pliable leaflets. The mitral valve leaflets appear thin and normal. Mild (1+) mitral regurgitation is present. EA reversal of the mitral inflow consistent with reduced compliance of the left ventricle. Normal appearing tricuspid valve structure. Mild (1+) tricuspid valve regurgitation is present.    Telemetry, personally reviewed:  10/14/23: sinus 70-90, d/c tele
PCP:    REQUESTING PHYSICIAN:    REASON FOR CONSULT:    CHIEF COMPLAINT:    HPI:    Patient is a 63-year-old female with a past medical history of hypertension hyperlipidemia diabetes type 2 GERD FAP status post colon resection history of GI bleed from FAP lichen pigment ptosis CAD status post PCI who presents to ED secondary to persistent chest pain.  Patient states pain is present throughout chest, pressure-like, nonradiating, associated with headache, and elevated blood pressure.  Patient states that she came from cardiologist office after having noted to have elevated blood pressure.    Review of systems-denies palpitations, shortness of breath, nausea, vomiting, abdominal pain, dysuria, leg swelling, jaw claudication, cough, weight gain, increased abdominal girth, orthopnea, constipation/diarrhea, visual/hearing/gait disturbance, paresthesias.  Patient endorses chest pain, generalized headache, elevated blood pressure.    ED course: Patient seen and evaluated in ED.  First troponin within normal limits.  EKG within normal limits.  Patient noted to have elevated blood pressure to 155/106.  Patient admitted to medicine for rule out ACS.      Hospital course: Patient seen and evaluated in ED by hospitalist.  At time of exam patient wanting to leave AMA.  Patient stated she had headache and was not treated for headache or blood pressure.  Patient given 1 mg of morphine with Zofran and Tylenol and stated relief.  Patient arrived to floor with blood pressure of 167/103.  5 mg IV Lopressor ordered.  Patient remains hyperspace tensive despite this.  Patient complains of chest pain and was given nitroglycerin x2.  Blood pressure dropped to 97/61 and headache recurred.  Patient given IV Tylenol with relief of headache.  EKG repeated and normal sinus rhythm without any ST-T abnormalities.  Blood pressure improved to 111/61.  Patient given 0.25 of p.o. Xanax for anxiety and insomnia. Cardiology follow up. Seen in the office yesterday with above complaints. Feels improved this am. No chest pain. NSR. Labs reviewed.    (14 Oct 2023 02:10)  10/15/23: Pt feels improved. BP improved. No chest pain     PAST MEDICAL & SURGICAL HISTORY:  Benign essential HTN      HLD (hyperlipidemia)      Type 2 diabetes mellitus      CAD (coronary artery disease)      Stented coronary artery      FAP (familial adenomatous polyposis)      Insomnia      GERD (gastroesophageal reflux disease)      H/O lichen planus      History of rectal bleeding      History of colon resection      Stented coronary artery          SOCIAL HISTORY:    FAMILY HISTORY:  FH: heart disease (Mother, Sibling)        ALLERGIES:  Allergies    No Known Allergies    Intolerances        MEDICATIONS:    MEDICATIONS  (STANDING):  aspirin enteric coated 81 milliGRAM(s) Oral daily  clopidogrel Tablet 75 milliGRAM(s) Oral daily  dextrose 5%. 1000 milliLiter(s) (50 mL/Hr) IV Continuous <Continuous>  dextrose 5%. 1000 milliLiter(s) (100 mL/Hr) IV Continuous <Continuous>  dextrose 50% Injectable 25 Gram(s) IV Push once  dextrose 50% Injectable 25 Gram(s) IV Push once  dextrose 50% Injectable 12.5 Gram(s) IV Push once  enoxaparin Injectable 40 milliGRAM(s) SubCutaneous every 24 hours  glucagon  Injectable 1 milliGRAM(s) IntraMuscular once  insulin lispro (ADMELOG) corrective regimen sliding scale   SubCutaneous three times a day before meals  metoprolol succinate ER 50 milliGRAM(s) Oral daily  traMADol 25 milliGRAM(s) Oral daily  zolpidem 5 milliGRAM(s) Oral at bedtime    MEDICATIONS  (PRN):  acetaminophen     Tablet .. 650 milliGRAM(s) Oral every 6 hours PRN Temp greater or equal to 38C (100.4F), Mild Pain (1 - 3), Moderate Pain (4 - 6)  albuterol    90 MICROgram(s) HFA Inhaler 2 Puff(s) Inhalation every 6 hours PRN Shortness of Breath and/or Wheezing  dextrose Oral Gel 15 Gram(s) Oral once PRN Blood Glucose LESS THAN 70 milliGRAM(s)/deciliter  nitroglycerin     SubLingual 0.4 milliGRAM(s) SubLingual every 5 minutes PRN Chest Pain        Vital Signs Last 24 Hrs  T(C): 36.6 (15 Oct 2023 08:25), Max: 36.6 (15 Oct 2023 08:25)  T(F): 97.8 (15 Oct 2023 08:25), Max: 97.8 (15 Oct 2023 08:25)  HR: 91 (15 Oct 2023 08:25) (82 - 91)  BP: 116/65 (15 Oct 2023 08:25) (116/65 - 119/72)  BP(mean): --  RR: 19 (15 Oct 2023 08:25) (18 - 19)  SpO2: 95% (15 Oct 2023 08:25) (95% - 95%)    Parameters below as of 15 Oct 2023 08:25  Patient On (Oxygen Delivery Method): room air    Daily     Daily I&O's Summary      PHYSICAL EXAM:    Constitutional: NAD, awake and alert, well-developed  HEENT: PERR, EOMI,  No oral cyananosis.  Neck:  supple,  No JVD  Respiratory: Breath sounds are clear bilaterally, No wheezing, rales or rhonchi  Cardiovascular: S1 and S2, regular rate and rhythm, no Murmurs, gallops or rubs  Gastrointestinal: Bowel Sounds present, soft, nontender.   Extremities: No peripheral edema. No clubbing or cyanosis.  Vascular: 2+ peripheral pulses  Neurological: A/O x 3, no focal deficits  Musculoskeletal: no calf tenderness.  Skin: No rashes.      LABS: All Labs Reviewed:                        13.5   7.71  )-----------( 183      ( 14 Oct 2023 06:33 )             40.5                         14.8   7.69  )-----------( 202      ( 13 Oct 2023 20:12 )             44.3     14 Oct 2023 06:33    138    |  106    |  22     ----------------------------<  157    3.9     |  24     |  0.80   13 Oct 2023 20:12    139    |  104    |  21     ----------------------------<  139    3.7     |  27     |  1.03     Ca    9.0        14 Oct 2023 06:33  Ca    9.6        13 Oct 2023 20:12  Phos  4.3       14 Oct 2023 06:33  Mg     2.2       14 Oct 2023 06:33    TPro  7.4    /  Alb  3.4    /  TBili  0.4    /  DBili  <0.1   /  AST  48     /  ALT  92     /  AlkPhos  109    15 Oct 2023 06:19  TPro  7.5    /  Alb  3.6    /  TBili  0.8    /  DBili  x      /  AST  154    /  ALT  110    /  AlkPhos  101    14 Oct 2023 06:33  TPro  8.8    /  Alb  4.3    /  TBili  0.5    /  DBili  x      /  AST  34     /  ALT  65     /  AlkPhos  106    13 Oct 2023 20:12    PT/INR - ( 13 Oct 2023 20:12 )   PT: 10.3 sec;   INR: 0.91 ratio         PTT - ( 13 Oct 2023 20:12 )  PTT:31.4 sec      Blood Culture:     10-14 @ 06:33  TSH: 1.68      RADIOLOGY/EKG:< from: 12 Lead ECG (06.19.23 @ 19:49) >  Diagnosis Line NORMAL SINUS RHYTHM  NONSPECIFIC ST ABNORMALITY  ABNORMAL ECG  WHEN COMPARED WITH ECG OF 12-JUN-2023 18:42,  NO SIGNIFICANT CHANGE WAS FOUND  Confirmed by MOE Oshea Zlata ([id number]) on 6/20/2023 5:30:03 PM    < end of copied text >        ECHO/CARDIAC CATHTERIZATION/STRESS TEST:  
PCP:    REQUESTING PHYSICIAN:    REASON FOR CONSULT:    CHIEF COMPLAINT:    HPI:    Patient is a 63-year-old female with a past medical history of hypertension hyperlipidemia diabetes type 2 GERD FAP status post colon resection history of GI bleed from FAP lichen pigment ptosis CAD status post PCI who presents to ED secondary to persistent chest pain.  Patient states pain is present throughout chest, pressure-like, nonradiating, associated with headache, and elevated blood pressure.  Patient states that she came from cardiologist office after having noted to have elevated blood pressure.    Review of systems-denies palpitations, shortness of breath, nausea, vomiting, abdominal pain, dysuria, leg swelling, jaw claudication, cough, weight gain, increased abdominal girth, orthopnea, constipation/diarrhea, visual/hearing/gait disturbance, paresthesias.  Patient endorses chest pain, generalized headache, elevated blood pressure.    ED course: Patient seen and evaluated in ED.  First troponin within normal limits.  EKG within normal limits.  Patient noted to have elevated blood pressure to 155/106.  Patient admitted to medicine for rule out ACS.      Hospital course: Patient seen and evaluated in ED by hospitalist.  At time of exam patient wanting to leave AMA.  Patient stated she had headache and was not treated for headache or blood pressure.  Patient given 1 mg of morphine with Zofran and Tylenol and stated relief.  Patient arrived to floor with blood pressure of 167/103.  5 mg IV Lopressor ordered.  Patient remains hyperspace tensive despite this.  Patient complains of chest pain and was given nitroglycerin x2.  Blood pressure dropped to 97/61 and headache recurred.  Patient given IV Tylenol with relief of headache.  EKG repeated and normal sinus rhythm without any ST-T abnormalities.  Blood pressure improved to 111/61.  Patient given 0.25 of p.o. Xanax for anxiety and insomnia. Cardiology follow up. Seen in the office yesterday with above complaints. Feels improved this am. No chest pain. NSR. Labs reviewed.    (14 Oct 2023 02:10)      PAST MEDICAL & SURGICAL HISTORY:  Benign essential HTN      HLD (hyperlipidemia)      Type 2 diabetes mellitus      CAD (coronary artery disease)      Stented coronary artery      FAP (familial adenomatous polyposis)      Insomnia      GERD (gastroesophageal reflux disease)      H/O lichen planus      History of rectal bleeding      History of colon resection      Stented coronary artery          SOCIAL HISTORY:    FAMILY HISTORY:  FH: heart disease (Mother, Sibling)        ALLERGIES:  Allergies    No Known Allergies    Intolerances        MEDICATIONS:    MEDICATIONS  (STANDING):  aspirin enteric coated 81 milliGRAM(s) Oral daily  clopidogrel Tablet 75 milliGRAM(s) Oral daily  dextrose 5%. 1000 milliLiter(s) (50 mL/Hr) IV Continuous <Continuous>  dextrose 5%. 1000 milliLiter(s) (100 mL/Hr) IV Continuous <Continuous>  dextrose 50% Injectable 25 Gram(s) IV Push once  dextrose 50% Injectable 25 Gram(s) IV Push once  dextrose 50% Injectable 12.5 Gram(s) IV Push once  enoxaparin Injectable 40 milliGRAM(s) SubCutaneous every 24 hours  glucagon  Injectable 1 milliGRAM(s) IntraMuscular once  insulin lispro (ADMELOG) corrective regimen sliding scale   SubCutaneous three times a day before meals  metoprolol succinate ER 50 milliGRAM(s) Oral daily  ondansetron Injectable 4 milliGRAM(s) IV Push every 4 hours  zolpidem 5 milliGRAM(s) Oral at bedtime    MEDICATIONS  (PRN):  acetaminophen     Tablet .. 650 milliGRAM(s) Oral every 6 hours PRN Temp greater or equal to 38C (100.4F), Mild Pain (1 - 3), Moderate Pain (4 - 6)  albuterol    90 MICROgram(s) HFA Inhaler 2 Puff(s) Inhalation every 6 hours PRN Shortness of Breath and/or Wheezing  dextrose Oral Gel 15 Gram(s) Oral once PRN Blood Glucose LESS THAN 70 milliGRAM(s)/deciliter  nitroglycerin     SubLingual 0.4 milliGRAM(s) SubLingual every 5 minutes PRN Chest Pain        Vital Signs Last 24 Hrs  T(C): 36.5 (14 Oct 2023 00:58), Max: 36.8 (13 Oct 2023 19:47)  T(F): 97.7 (14 Oct 2023 00:58), Max: 98.2 (13 Oct 2023 19:47)  HR: 79 (14 Oct 2023 01:40) (79 - 104)  BP: 111/61 (14 Oct 2023 01:40) (97/61 - 167/103)  BP(mean): 132 (14 Oct 2023 00:33) (132 - 132)  RR: 18 (14 Oct 2023 00:58) (16 - 18)  SpO2: 97% (14 Oct 2023 00:58) (97% - 98%)    Parameters below as of 14 Oct 2023 00:58  Patient On (Oxygen Delivery Method): room air    Daily Height in cm: 149.86 (13 Oct 2023 19:37)    Daily I&O's Summary      PHYSICAL EXAM:    Constitutional: NAD, awake and alert, well-developed  HEENT: PERR, EOMI,  No oral cyananosis.  Neck:  supple,  No JVD  Respiratory: Breath sounds are clear bilaterally, No wheezing, rales or rhonchi  Cardiovascular: S1 and S2, regular rate and rhythm, no Murmurs, gallops or rubs  Gastrointestinal: Bowel Sounds present, soft, nontender.   Extremities: No peripheral edema. No clubbing or cyanosis.  Vascular: 2+ peripheral pulses  Neurological: A/O x 3, no focal deficits  Musculoskeletal: no calf tenderness.  Skin: No rashes.      LABS: All Labs Reviewed:                        13.5   7.71  )-----------( 183      ( 14 Oct 2023 06:33 )             40.5                         14.8   7.69  )-----------( 202      ( 13 Oct 2023 20:12 )             44.3     14 Oct 2023 06:33    138    |  106    |  22     ----------------------------<  157    3.9     |  24     |  0.80   13 Oct 2023 20:12    139    |  104    |  21     ----------------------------<  139    3.7     |  27     |  1.03     Ca    9.0        14 Oct 2023 06:33  Ca    9.6        13 Oct 2023 20:12  Phos  4.3       14 Oct 2023 06:33  Mg     2.2       14 Oct 2023 06:33    TPro  7.5    /  Alb  3.6    /  TBili  0.8    /  DBili  x      /  AST  154    /  ALT  110    /  AlkPhos  101    14 Oct 2023 06:33  TPro  8.8    /  Alb  4.3    /  TBili  0.5    /  DBili  x      /  AST  34     /  ALT  65     /  AlkPhos  106    13 Oct 2023 20:12    PT/INR - ( 13 Oct 2023 20:12 )   PT: 10.3 sec;   INR: 0.91 ratio         PTT - ( 13 Oct 2023 20:12 )  PTT:31.4 sec      Blood Culture:     10-14 @ 06:33  TSH: 1.68      RADIOLOGY/EKG:      ECHO/CARDIAC CATHTERIZATION/STRESS TEST:

## 2023-10-15 NOTE — DISCHARGE NOTE PROVIDER - NSDCCPCAREPLAN_GEN_ALL_CORE_FT
PRINCIPAL DISCHARGE DIAGNOSIS  Diagnosis: Chest pain  Assessment and Plan of Treatment: Follow up in the office with cardiology.

## 2023-10-15 NOTE — DISCHARGE NOTE NURSING/CASE MANAGEMENT/SOCIAL WORK - NSDCPEFALRISK_GEN_ALL_CORE
For information on Fall & Injury Prevention, visit: [email or web address] OR  [email or web address] OR  https://www.cdc.gov/steadi/patient.html

## 2023-10-15 NOTE — DISCHARGE NOTE PROVIDER - HOSPITAL COURSE
CC: CP  HPI and Hospital Course: 63-year-old female with a past medical history of hypertension hyperlipidemia diabetes type 2 GERD FAP status post colon resection history of GI bleed from FAP lichen pigment ptosis CAD status post PCI who presents to ED secondary to persistent chest pain. Admitted due to:    #Chest pain concerning for ACS  #Hypertensive urgency  -trops neg  -appreciate cards input, ok for outpt f/u    #Headache  -tylenol prn    #Diabetes with hyperglycemia  -A1c 7.2  -SSI, monitor BG    #transaminitis  -possibly in setting of transient hypotension  -resolved    #DVT ppx- SCDs    No needs on d/c.  I have spent 31 min on day of d/c coordinating care.

## 2023-10-15 NOTE — DISCHARGE NOTE PROVIDER - NSDCPNSUBOBJ_GEN_ALL_CORE
VITALS:  T(F): 97.8 (10-15-23 @ 08:25), Max: 97.8 (10-15-23 @ 08:25)  HR: 91 (10-15-23 @ 08:25) (82 - 91)  BP: 116/65 (10-15-23 @ 08:25) (116/65 - 119/72)  RR: 19 (10-15-23 @ 08:25) (18 - 19)  SpO2: 95% (10-15-23 @ 08:25) (95% - 95%)    PHYSICAL EXAM:  Gen: NAD  HEENT:  pupils equal and reactive, EOMI, no oropharyngeal lesions, erythema, exudates, oral thrush  NECK:   supple, no carotid bruits, no palpable lymph nodes, no thyromegaly  CV:  +S1, +S2, regular, no murmurs or rubs  RESP:   lungs clear to auscultation bilaterally, no wheezing, rales, rhonchi, good air entry bilaterally  BREAST:  not examined  GI:  abdomen soft, non-tender, non-distended, normal BS, no bruits, no abdominal masses, no palpable masses  RECTAL:  not examined  :  not examined  MSK:   normal muscle tone, no atrophy, no rigidity, no contractions  EXT:  no clubbing, no cyanosis, no edema, no calf pain, swelling or erythema  VASCULAR:  pulses equal and symmetric in the upper and lower extremities  NEURO:  AAOX3, no focal neurological deficits, follows all commands, able to move extremities spontaneously  SKIN:  no ulcers, lesions or rashes    Echo 10/14/23; Estimated left ventricular ejection fraction is 55-60 %. The left ventricle is normal in size, wall motion and contractility as seen in limited views. Mild to moderate concentric left ventricular hypertrophy is present. The aortic valve is trileaflet with thin pliable leaflets. The mitral valve leaflets appear thin and normal. Mild (1+) mitral regurgitation is present. EA reversal of the mitral inflow consistent with reduced compliance of the left ventricle. Normal appearing tricuspid valve structure. Mild (1+) tricuspid valve regurgitation is present.

## 2023-10-15 NOTE — DISCHARGE NOTE PROVIDER - CARE PROVIDER_API CALL
Shelton Lee  Cardiovascular Disease  [address], [address]  [place] [address]  Phone: [phone]  Fax: [phone]  Follow Up Time: 1 week

## 2023-10-15 NOTE — DISCHARGE NOTE PROVIDER - NSDCADMDATE_GEN_ALL_CORE_FT
Please call pt, ensure that he has restarted his blood pressure and cholesterol medication.  Emphasize that his kidney function has worsened from previous and his cholesterol is high.  We should recheck his labs in 3 months, sooner if symptomatic.     YAMILETH Tineo 13-Oct-2023 23:46

## 2023-10-15 NOTE — DISCHARGE NOTE PROVIDER - NSDCFUSCHEDAPPT_GEN_ALL_CORE_FT
Gerda Mcneal  [facility]  RHEUM [address]  Scheduled Appointment: 10/24/2023    Rosa Liao  [facility]  ENDOCRIN [address]  Scheduled Appointment: 11/02/2023    Shannan Prasad  [facility]  INTMED [address]  Scheduled Appointment: 11/09/2023    Nisha Hawthorne  [facility]  GASTRO [address]  Scheduled Appointment: 11/30/2023

## 2023-10-15 NOTE — PROGRESS NOTE ADULT - PROBLEM SELECTOR PLAN 1
Blood pressure improved today. Continue current medications. Echo reviewed. Will follow as opt
Blood pressure improved today. Continue current medications. Echo pending.

## 2023-10-15 NOTE — ED ADULT TRIAGE NOTE - CHIEF COMPLAINT QUOTE
Sent in by MD Lucas for evaluation of CP and HTN. States BP was 140/90 at the office, hx HTN takes Metoprolol. Self ambulated into ED with no distress noted. (E4) spontaneous

## 2023-10-15 NOTE — DISCHARGE NOTE PROVIDER - NSDCMRMEDTOKEN_GEN_ALL_CORE_FT
ALBUTEROL HFA 90 MCG INHALER: INHALE 1 TO 2 PUFFS BY MOUTH EVERY 4 TO 6 HOURS AS NEEDED  aspirin 81 mg oral tablet: 1 tab(s) orally once a day  CLOPIDOGREL 75 MG TABLET: TAKE 1 TABLET BY MOUTH EVERY DAY  ESZOPICLONE 2 MG TABLET: TAKE 1 TABLET AT BEDTIME AS NEEDED FOR SLEEP.  FARXIGA 10 MG TABLET: TAKE 1 TABLET BY MOUTH EVERY DAY IN THE MORNING  METOPROLOL SUCC  MG TAB: TAKE 1 TABLET BY MOUTH EVERY DAY  PRALUENT 75 MG/ML PEN: INJECT CONTENTS OF PREFILLED AUTO INJECTOR EVERY TWO WEEKS  TIZANIDINE HCL 4 MG TABLET: TAKE 1 TAB AT BEDTIME AS NEEDED FOR MYALGIA

## 2023-10-17 ENCOUNTER — RX RENEWAL (OUTPATIENT)
Age: 63
End: 2023-10-17

## 2023-10-18 DIAGNOSIS — Z79.82 LONG TERM (CURRENT) USE OF ASPIRIN: ICD-10-CM

## 2023-10-18 DIAGNOSIS — Z79.02 LONG TERM (CURRENT) USE OF ANTITHROMBOTICS/ANTIPLATELETS: ICD-10-CM

## 2023-10-18 DIAGNOSIS — R74.01 ELEVATION OF LEVELS OF LIVER TRANSAMINASE LEVELS: ICD-10-CM

## 2023-10-18 DIAGNOSIS — I10 ESSENTIAL (PRIMARY) HYPERTENSION: ICD-10-CM

## 2023-10-18 DIAGNOSIS — F41.9 ANXIETY DISORDER, UNSPECIFIED: ICD-10-CM

## 2023-10-18 DIAGNOSIS — G47.00 INSOMNIA, UNSPECIFIED: ICD-10-CM

## 2023-10-18 DIAGNOSIS — E78.5 HYPERLIPIDEMIA, UNSPECIFIED: ICD-10-CM

## 2023-10-18 DIAGNOSIS — E11.65 TYPE 2 DIABETES MELLITUS WITH HYPERGLYCEMIA: ICD-10-CM

## 2023-10-18 DIAGNOSIS — I16.0 HYPERTENSIVE URGENCY: ICD-10-CM

## 2023-10-18 LAB
ALBUMIN SERPL ELPH-MCNC: 4.8 G/DL
ALP BLD-CCNC: 112 U/L
ALT SERPL-CCNC: 58 U/L
ANION GAP SERPL CALC-SCNC: 13 MMOL/L
AST SERPL-CCNC: 40 U/L
BILIRUB SERPL-MCNC: 0.5 MG/DL
BUN SERPL-MCNC: 20 MG/DL
CALCIUM SERPL-MCNC: 10 MG/DL
CHLORIDE SERPL-SCNC: 98 MMOL/L
CO2 SERPL-SCNC: 26 MMOL/L
CREAT SERPL-MCNC: 0.89 MG/DL
EGFR: 74 ML/MIN/1.73M2
GLUCOSE SERPL-MCNC: 144 MG/DL
POTASSIUM SERPL-SCNC: 5 MMOL/L
PROT SERPL-MCNC: 7.9 G/DL
SODIUM SERPL-SCNC: 137 MMOL/L

## 2023-10-19 ENCOUNTER — NON-APPOINTMENT (OUTPATIENT)
Age: 63
End: 2023-10-19

## 2023-10-19 LAB
ESTIMATED AVERAGE GLUCOSE: 169 MG/DL
HBA1C MFR BLD HPLC: 7.5 %

## 2023-10-23 ENCOUNTER — NON-APPOINTMENT (OUTPATIENT)
Age: 63
End: 2023-10-23

## 2023-10-23 ENCOUNTER — APPOINTMENT (OUTPATIENT)
Dept: CARDIOLOGY | Facility: CLINIC | Age: 63
End: 2023-10-23
Payer: MEDICAID

## 2023-10-23 ENCOUNTER — APPOINTMENT (OUTPATIENT)
Dept: FAMILY MEDICINE | Facility: CLINIC | Age: 63
End: 2023-10-23

## 2023-10-23 VITALS
HEART RATE: 74 BPM | OXYGEN SATURATION: 99 % | WEIGHT: 128 LBS | SYSTOLIC BLOOD PRESSURE: 118 MMHG | HEIGHT: 59 IN | DIASTOLIC BLOOD PRESSURE: 70 MMHG | BODY MASS INDEX: 25.8 KG/M2

## 2023-10-23 DIAGNOSIS — R05.9 COUGH, UNSPECIFIED: ICD-10-CM

## 2023-10-23 DIAGNOSIS — Z09 ENCOUNTER FOR FOLLOW-UP EXAMINATION AFTER COMPLETED TREATMENT FOR CONDITIONS OTHER THAN MALIGNANT NEOPLASM: ICD-10-CM

## 2023-10-23 PROCEDURE — 93000 ELECTROCARDIOGRAM COMPLETE: CPT

## 2023-10-23 PROCEDURE — 99214 OFFICE O/P EST MOD 30 MIN: CPT | Mod: 25

## 2023-10-23 RX ORDER — TIZANIDINE 4 MG/1
4 TABLET ORAL
Qty: 30 | Refills: 1 | Status: DISCONTINUED | COMMUNITY
Start: 2023-05-30 | End: 2023-10-23

## 2023-10-23 RX ORDER — CHOLECALCIFEROL (VITAMIN D3) 1250 MCG
1.25 MG CAPSULE ORAL
Qty: 8 | Refills: 5 | Status: DISCONTINUED | COMMUNITY
Start: 2022-10-20 | End: 2023-10-23

## 2023-10-23 RX ORDER — LEFLUNOMIDE 10 MG/1
10 TABLET, FILM COATED ORAL DAILY
Qty: 30 | Refills: 1 | Status: DISCONTINUED | COMMUNITY
Start: 2023-05-30 | End: 2023-10-23

## 2023-10-23 RX ORDER — CYCLOBENZAPRINE HCL 5 MG
TABLET ORAL
Refills: 0 | Status: DISCONTINUED | COMMUNITY
End: 2023-10-23

## 2023-10-24 ENCOUNTER — APPOINTMENT (OUTPATIENT)
Dept: RHEUMATOLOGY | Facility: CLINIC | Age: 63
End: 2023-10-24
Payer: MEDICAID

## 2023-10-24 VITALS
BODY MASS INDEX: 25.8 KG/M2 | DIASTOLIC BLOOD PRESSURE: 76 MMHG | HEIGHT: 59 IN | SYSTOLIC BLOOD PRESSURE: 110 MMHG | HEART RATE: 84 BPM | WEIGHT: 128 LBS | OXYGEN SATURATION: 95 % | TEMPERATURE: 98 F

## 2023-10-24 PROCEDURE — 99214 OFFICE O/P EST MOD 30 MIN: CPT

## 2023-10-25 ENCOUNTER — APPOINTMENT (OUTPATIENT)
Dept: FAMILY MEDICINE | Facility: CLINIC | Age: 63
End: 2023-10-25

## 2023-10-31 LAB
ALBUMIN SERPL ELPH-MCNC: 4.5 G/DL
ALP BLD-CCNC: 102 U/L
ALT SERPL-CCNC: 25 U/L
ANION GAP SERPL CALC-SCNC: 11 MMOL/L
AST SERPL-CCNC: 21 U/L
BASOPHILS # BLD AUTO: 0.04 K/UL
BASOPHILS NFR BLD AUTO: 0.5 %
BILIRUB SERPL-MCNC: 0.3 MG/DL
BUN SERPL-MCNC: 21 MG/DL
CALCIUM SERPL-MCNC: 9.7 MG/DL
CHLORIDE SERPL-SCNC: 104 MMOL/L
CO2 SERPL-SCNC: 24 MMOL/L
CREAT SERPL-MCNC: 0.86 MG/DL
CRP SERPL-MCNC: <3 MG/L
EGFR: 77 ML/MIN/1.73M2
EOSINOPHIL # BLD AUTO: 0.82 K/UL
EOSINOPHIL NFR BLD AUTO: 10.3 %
GLUCOSE SERPL-MCNC: 119 MG/DL
HCT VFR BLD CALC: 41.2 %
HGB BLD-MCNC: 13.8 G/DL
IMM GRANULOCYTES NFR BLD AUTO: 0.4 %
LYMPHOCYTES # BLD AUTO: 2.2 K/UL
LYMPHOCYTES NFR BLD AUTO: 27.7 %
MAN DIFF?: NORMAL
MCHC RBC-ENTMCNC: 32.3 PG
MCHC RBC-ENTMCNC: 33.5 GM/DL
MCV RBC AUTO: 96.5 FL
MONOCYTES # BLD AUTO: 0.57 K/UL
MONOCYTES NFR BLD AUTO: 7.2 %
NEUTROPHILS # BLD AUTO: 4.27 K/UL
NEUTROPHILS NFR BLD AUTO: 53.9 %
PLATELET # BLD AUTO: 202 K/UL
POTASSIUM SERPL-SCNC: 4.8 MMOL/L
PROT SERPL-MCNC: 7.6 G/DL
RBC # BLD: 4.27 M/UL
RBC # FLD: 13.3 %
SODIUM SERPL-SCNC: 139 MMOL/L
WBC # FLD AUTO: 7.93 K/UL

## 2023-11-01 LAB — ERYTHROCYTE [SEDIMENTATION RATE] IN BLOOD BY WESTERGREN METHOD: 41 MM/HR

## 2023-11-02 ENCOUNTER — APPOINTMENT (OUTPATIENT)
Dept: ENDOCRINOLOGY | Facility: CLINIC | Age: 63
End: 2023-11-02
Payer: MEDICAID

## 2023-11-02 VITALS
HEIGHT: 59 IN | WEIGHT: 129 LBS | HEART RATE: 73 BPM | BODY MASS INDEX: 26 KG/M2 | OXYGEN SATURATION: 97 % | RESPIRATION RATE: 17 BRPM | SYSTOLIC BLOOD PRESSURE: 118 MMHG | DIASTOLIC BLOOD PRESSURE: 74 MMHG | TEMPERATURE: 97.2 F

## 2023-11-02 PROCEDURE — 99214 OFFICE O/P EST MOD 30 MIN: CPT

## 2023-11-09 ENCOUNTER — APPOINTMENT (OUTPATIENT)
Dept: INTERNAL MEDICINE | Facility: CLINIC | Age: 63
End: 2023-11-09

## 2023-11-30 ENCOUNTER — APPOINTMENT (OUTPATIENT)
Dept: GASTROENTEROLOGY | Facility: CLINIC | Age: 63
End: 2023-11-30

## 2023-12-11 RX ORDER — ALOGLIPTIN 25 MG/1
25 TABLET, FILM COATED ORAL
Qty: 90 | Refills: 3 | Status: ACTIVE | COMMUNITY
Start: 2022-06-20 | End: 1900-01-01

## 2024-01-03 ENCOUNTER — NON-APPOINTMENT (OUTPATIENT)
Age: 64
End: 2024-01-03

## 2024-01-04 ENCOUNTER — APPOINTMENT (OUTPATIENT)
Dept: CARDIOLOGY | Facility: CLINIC | Age: 64
End: 2024-01-04
Payer: MEDICAID

## 2024-01-04 PROCEDURE — 78452 HT MUSCLE IMAGE SPECT MULT: CPT

## 2024-01-04 PROCEDURE — A9500: CPT

## 2024-01-04 PROCEDURE — 93015 CV STRESS TEST SUPVJ I&R: CPT

## 2024-01-25 LAB
ALBUMIN SERPL ELPH-MCNC: 4.4 G/DL
ALP BLD-CCNC: 126 U/L
ALT SERPL-CCNC: 36 U/L
ANION GAP SERPL CALC-SCNC: 13 MMOL/L
AST SERPL-CCNC: 35 U/L
BASOPHILS # BLD AUTO: 0.04 K/UL
BASOPHILS NFR BLD AUTO: 0.6 %
BILIRUB SERPL-MCNC: 0.2 MG/DL
BUN SERPL-MCNC: 22 MG/DL
CALCIUM SERPL-MCNC: 9.5 MG/DL
CHLORIDE SERPL-SCNC: 103 MMOL/L
CHOLEST SERPL-MCNC: 235 MG/DL
CO2 SERPL-SCNC: 25 MMOL/L
CREAT SERPL-MCNC: 0.9 MG/DL
EGFR: 73 ML/MIN/1.73M2
EOSINOPHIL # BLD AUTO: 1.09 K/UL
EOSINOPHIL NFR BLD AUTO: 17.1 %
ESTIMATED AVERAGE GLUCOSE: 131 MG/DL
GLUCOSE SERPL-MCNC: 125 MG/DL
HBA1C MFR BLD HPLC: 6.2 %
HCT VFR BLD CALC: 46.4 %
HDLC SERPL-MCNC: 43 MG/DL
HGB BLD-MCNC: 14.4 G/DL
IMM GRANULOCYTES NFR BLD AUTO: 0.2 %
LDLC SERPL CALC-MCNC: 154 MG/DL
LYMPHOCYTES # BLD AUTO: 1.97 K/UL
LYMPHOCYTES NFR BLD AUTO: 31 %
MAN DIFF?: NORMAL
MCHC RBC-ENTMCNC: 31 GM/DL
MCHC RBC-ENTMCNC: 31.2 PG
MCV RBC AUTO: 100.7 FL
MONOCYTES # BLD AUTO: 0.61 K/UL
MONOCYTES NFR BLD AUTO: 9.6 %
NEUTROPHILS # BLD AUTO: 2.64 K/UL
NEUTROPHILS NFR BLD AUTO: 41.5 %
NONHDLC SERPL-MCNC: 192 MG/DL
PLATELET # BLD AUTO: 216 K/UL
POTASSIUM SERPL-SCNC: 4.7 MMOL/L
PROT SERPL-MCNC: 7.3 G/DL
RBC # BLD: 4.61 M/UL
RBC # FLD: 12.8 %
SODIUM SERPL-SCNC: 141 MMOL/L
TRIGL SERPL-MCNC: 211 MG/DL
WBC # FLD AUTO: 6.36 K/UL

## 2024-01-26 ENCOUNTER — APPOINTMENT (OUTPATIENT)
Dept: RHEUMATOLOGY | Facility: CLINIC | Age: 64
End: 2024-01-26

## 2024-01-26 VITALS
OXYGEN SATURATION: 96 % | SYSTOLIC BLOOD PRESSURE: 122 MMHG | BODY MASS INDEX: 24.8 KG/M2 | HEART RATE: 86 BPM | HEIGHT: 59 IN | WEIGHT: 123 LBS | DIASTOLIC BLOOD PRESSURE: 82 MMHG

## 2024-01-26 RX ORDER — LIDOCAINE HYDROCHLORIDE 20 MG/ML
2 INJECTION, SOLUTION INFILTRATION; PERINEURAL
Refills: 0 | Status: COMPLETED | OUTPATIENT
Start: 2024-01-26

## 2024-01-26 RX ADMIN — LIDOCAINE HYDROCHLORIDE %: 20 INJECTION, SOLUTION INFILTRATION; PERINEURAL at 00:00

## 2024-02-02 ENCOUNTER — NON-APPOINTMENT (OUTPATIENT)
Age: 64
End: 2024-02-02

## 2024-02-02 ENCOUNTER — APPOINTMENT (OUTPATIENT)
Dept: ENDOCRINOLOGY | Facility: CLINIC | Age: 64
End: 2024-02-02
Payer: MEDICAID

## 2024-02-02 ENCOUNTER — APPOINTMENT (OUTPATIENT)
Dept: CARDIOLOGY | Facility: CLINIC | Age: 64
End: 2024-02-02
Payer: MEDICAID

## 2024-02-02 VITALS
OXYGEN SATURATION: 99 % | BODY MASS INDEX: 25.04 KG/M2 | WEIGHT: 124 LBS | DIASTOLIC BLOOD PRESSURE: 80 MMHG | SYSTOLIC BLOOD PRESSURE: 124 MMHG | HEART RATE: 84 BPM

## 2024-02-02 VITALS
WEIGHT: 124 LBS | OXYGEN SATURATION: 98 % | BODY MASS INDEX: 25 KG/M2 | HEART RATE: 100 BPM | TEMPERATURE: 97.4 F | DIASTOLIC BLOOD PRESSURE: 70 MMHG | HEIGHT: 59 IN | SYSTOLIC BLOOD PRESSURE: 120 MMHG | RESPIRATION RATE: 18 BRPM

## 2024-02-02 DIAGNOSIS — R79.89 OTHER SPECIFIED ABNORMAL FINDINGS OF BLOOD CHEMISTRY: ICD-10-CM

## 2024-02-02 PROCEDURE — G2211 COMPLEX E/M VISIT ADD ON: CPT | Mod: NC,1L

## 2024-02-02 PROCEDURE — 99214 OFFICE O/P EST MOD 30 MIN: CPT | Mod: 25

## 2024-02-02 PROCEDURE — 93000 ELECTROCARDIOGRAM COMPLETE: CPT

## 2024-02-02 PROCEDURE — 99214 OFFICE O/P EST MOD 30 MIN: CPT

## 2024-02-02 RX ORDER — ROSUVASTATIN CALCIUM 10 MG/1
10 TABLET, FILM COATED ORAL
Qty: 90 | Refills: 3 | Status: ACTIVE | COMMUNITY
Start: 2024-02-02 | End: 1900-01-01

## 2024-02-02 NOTE — HISTORY OF PRESENT ILLNESS
[FreeTextEntry1] : Problems: 1. DM type 2 2. Hypertension 3. Hyperlipidemia - I defer management of this to Cardiology  DM type 2 1. Diagnosed in 2018 2. Meds: Alogliptin 25 mg po daily (no pancreatitis) Farxiga 10 mg po daily - No UTIs or genital candidiasis Patient had nausea and vomiting with metformin.  3. Fingersticks done twice per day - 150s , no hypoglycemic unawareness 4. On Aspirin 81 mg po daily, not on statin, on Praluent, not ACEi/ARB 5. Complications: No DM nephropathy (normal creatinine, neg urine microalbumin panel in June 2023) No DM retinopathy (last eye exam was in October 2023, patient advised on the need for annual DM eye exam) Has CAD s/p stents, had stroke in the past, No other ASCVD No foot ulcers/amputation 6. Patient never smoked cigarettes

## 2024-02-02 NOTE — HISTORY OF PRESENT ILLNESS
[FreeTextEntry1] : This is a 62 y/o female PMH: hypertension hyperlipidemia, DM, GERD, status post colon resection history of GI bleed, CAD status post PCI-Cx 6/2023 here today for follow up. She denies chest pain or shortness of breath. Pt reports that she exercises by walking. No tobacco. Medications were reviewed. Endo and Rheum notes were reviewed.

## 2024-02-02 NOTE — PHYSICAL EXAM

## 2024-02-02 NOTE — DISCUSSION/SUMMARY
[FreeTextEntry1] : 63-year-old female with a past medical history of CAD multivessel CAD s/p PCI KATLIN Cx 6/2023 with 60% LAD disease, Pt is hemodynamically stable. She is anticipating colonoscopy. She will wait until her year anniversary and suspend Plavix for 5 days and continue ASA. Continue to exercise. [EKG obtained to assist in diagnosis and management of assessed problem(s)] : EKG obtained to assist in diagnosis and management of assessed problem(s)

## 2024-02-02 NOTE — ASSESSMENT
[FreeTextEntry1] : Target: HbA1c < 7%, BP < 130/80  HbA1c is at goal BP is at goal.  Patient is on Aspirin, no indication for ACEi/ARB - I defer management of statin to Cardiology who prescribes Praluent  Last lipid panel - Jan 2024 - , Trig 211 Last HbA1c - 01/24/2024 - 6.2% Last Vitamin B12 - N/A Last urine albumin panel - June 2023 - neg Last BMP/CMP - Jan 2024 - Cr, K, AST, ALT N  Plan: 1. Continue Farxiga 10 mg po daily and Alogliptin 2. Fingersticks to be done once daily 3. Labs to be done in 3 months - CBC, CMP, HbA1c 4. Follow up in 3 months to review results and meter.

## 2024-02-07 RX ORDER — ALIROCUMAB 150 MG/ML
150 INJECTION, SOLUTION SUBCUTANEOUS
Qty: 6 | Refills: 2 | Status: ACTIVE | COMMUNITY
Start: 2021-10-20 | End: 1900-01-01

## 2024-03-07 ENCOUNTER — RX RENEWAL (OUTPATIENT)
Age: 64
End: 2024-03-07

## 2024-03-11 ENCOUNTER — APPOINTMENT (OUTPATIENT)
Dept: INTERNAL MEDICINE | Facility: CLINIC | Age: 64
End: 2024-03-11
Payer: MEDICAID

## 2024-03-11 VITALS
SYSTOLIC BLOOD PRESSURE: 122 MMHG | DIASTOLIC BLOOD PRESSURE: 74 MMHG | HEART RATE: 91 BPM | TEMPERATURE: 97.8 F | HEIGHT: 59 IN | OXYGEN SATURATION: 96 % | BODY MASS INDEX: 25.6 KG/M2 | WEIGHT: 127 LBS

## 2024-03-11 DIAGNOSIS — G47.9 SLEEP DISORDER, UNSPECIFIED: ICD-10-CM

## 2024-03-11 PROCEDURE — 99214 OFFICE O/P EST MOD 30 MIN: CPT

## 2024-03-11 PROCEDURE — G2211 COMPLEX E/M VISIT ADD ON: CPT | Mod: NC,1L

## 2024-03-11 RX ORDER — ALCOHOL ANTISEPTIC PADS
70 PADS, MEDICATED (EA) TOPICAL
Qty: 1 | Refills: 5 | Status: ACTIVE | COMMUNITY
Start: 2022-06-10 | End: 1900-01-01

## 2024-03-11 RX ORDER — BLOOD SUGAR DIAGNOSTIC
STRIP MISCELLANEOUS
Qty: 100 | Refills: 5 | Status: ACTIVE | COMMUNITY
Start: 2022-07-05 | End: 1900-01-01

## 2024-03-11 NOTE — HISTORY OF PRESENT ILLNESS
[FreeTextEntry1] : Follow up. [de-identified] : 63 year female presents to the office for follow up on HTN, HLD, DM, CAD.  Recent labs done by Rheum reviewed. , , Praluent increased by Cardiology to 150mg every 2 weeks. Continues on Rosuvastatin 10mg daily. Continues on Metoprolol 100mg daily for HTN. Last A1C 6.2, currently taking Alogliptin 25mg daily and Farxiga 10mg daily, follows Endo. Denies chest pain, palpitations, SOB, fever, chills, dizziness, lightheadedness, blurry vision.

## 2024-03-11 NOTE — ASSESSMENT
[FreeTextEntry1] : 1.HTN: Continue Metoprolol 100mg daily. Limit sodium intake to 2g daily, weekly exercise of 150 minutes or more.  2. HLD: Praluent increased by Cardio, continue Rosuvastatin, low fat/low cholesterol diet.  3. DM: Continue Alogliptin and Farxiga, A1C improved per labs. Continue follow up with Endo. Low carb, low sugar diet.  4. Sleep Disorder:  The patient has a moderate to high pre-test probability of Obstructive Sleep Apnea. This assessment is based on a high STOP-BANG score, medical history and clinical examination. As a result, diagnostic polysomnography is recommended to determine the presence and severity of this disorder. Patient consent received for WATCHPAT.  Follow up 3 months, CPE due.

## 2024-03-11 NOTE — PHYSICAL EXAM
[Normal] : normal rate, regular rhythm, normal S1 and S2 and no murmur heard [Coordination Grossly Intact] : coordination grossly intact [Normal Gait] : normal gait

## 2024-03-13 RX ORDER — METOPROLOL SUCCINATE 100 MG/1
100 TABLET, EXTENDED RELEASE ORAL
Qty: 90 | Refills: 1 | Status: ACTIVE | COMMUNITY
Start: 2021-11-18 | End: 1900-01-01

## 2024-03-18 RX ORDER — DAPAGLIFLOZIN 10 MG/1
10 TABLET, FILM COATED ORAL DAILY
Qty: 90 | Refills: 3 | Status: ACTIVE | COMMUNITY
Start: 2023-08-02 | End: 1900-01-01

## 2024-03-31 ENCOUNTER — INPATIENT (INPATIENT)
Facility: HOSPITAL | Age: 64
LOS: 1 days | Discharge: HOME | DRG: 54 | End: 2024-04-02
Attending: STUDENT IN AN ORGANIZED HEALTH CARE EDUCATION/TRAINING PROGRAM | Admitting: INTERNAL MEDICINE
Payer: MEDICAID

## 2024-03-31 VITALS — WEIGHT: 132.94 LBS | HEIGHT: 59 IN

## 2024-03-31 DIAGNOSIS — Z95.5 PRESENCE OF CORONARY ANGIOPLASTY IMPLANT AND GRAFT: Chronic | ICD-10-CM

## 2024-03-31 DIAGNOSIS — Z90.49 ACQUIRED ABSENCE OF OTHER SPECIFIED PARTS OF DIGESTIVE TRACT: Chronic | ICD-10-CM

## 2024-03-31 DIAGNOSIS — G45.9 TRANSIENT CEREBRAL ISCHEMIC ATTACK, UNSPECIFIED: ICD-10-CM

## 2024-03-31 LAB
ALBUMIN SERPL ELPH-MCNC: 3.6 G/DL — SIGNIFICANT CHANGE UP (ref 3.3–5)
ALP SERPL-CCNC: 118 U/L — SIGNIFICANT CHANGE UP (ref 40–120)
ALT FLD-CCNC: 35 U/L — SIGNIFICANT CHANGE UP (ref 12–78)
ANION GAP SERPL CALC-SCNC: 2 MMOL/L — LOW (ref 5–17)
APTT BLD: 27.9 SEC — SIGNIFICANT CHANGE UP (ref 24.5–35.6)
AST SERPL-CCNC: 31 U/L — SIGNIFICANT CHANGE UP (ref 15–37)
BASOPHILS # BLD AUTO: 0.05 K/UL — SIGNIFICANT CHANGE UP (ref 0–0.2)
BASOPHILS NFR BLD AUTO: 0.7 % — SIGNIFICANT CHANGE UP (ref 0–2)
BILIRUB SERPL-MCNC: 0.3 MG/DL — SIGNIFICANT CHANGE UP (ref 0.2–1.2)
BLD GP AB SCN SERPL QL: SIGNIFICANT CHANGE UP
BUN SERPL-MCNC: 19 MG/DL — SIGNIFICANT CHANGE UP (ref 7–23)
CALCIUM SERPL-MCNC: 9.4 MG/DL — SIGNIFICANT CHANGE UP (ref 8.5–10.1)
CHLORIDE SERPL-SCNC: 109 MMOL/L — HIGH (ref 96–108)
CO2 SERPL-SCNC: 29 MMOL/L — SIGNIFICANT CHANGE UP (ref 22–31)
CREAT SERPL-MCNC: 0.81 MG/DL — SIGNIFICANT CHANGE UP (ref 0.5–1.3)
EGFR: 83 ML/MIN/1.73M2 — SIGNIFICANT CHANGE UP
EOSINOPHIL # BLD AUTO: 1.26 K/UL — HIGH (ref 0–0.5)
EOSINOPHIL NFR BLD AUTO: 18.6 % — HIGH (ref 0–6)
GLUCOSE BLDC GLUCOMTR-MCNC: 172 MG/DL — HIGH (ref 70–99)
GLUCOSE SERPL-MCNC: 205 MG/DL — HIGH (ref 70–99)
HCT VFR BLD CALC: 40.8 % — SIGNIFICANT CHANGE UP (ref 34.5–45)
HGB BLD-MCNC: 13.1 G/DL — SIGNIFICANT CHANGE UP (ref 11.5–15.5)
IMM GRANULOCYTES NFR BLD AUTO: 0.4 % — SIGNIFICANT CHANGE UP (ref 0–0.9)
INR BLD: 0.87 RATIO — SIGNIFICANT CHANGE UP (ref 0.85–1.18)
LYMPHOCYTES # BLD AUTO: 2.04 K/UL — SIGNIFICANT CHANGE UP (ref 1–3.3)
LYMPHOCYTES # BLD AUTO: 30 % — SIGNIFICANT CHANGE UP (ref 13–44)
MCHC RBC-ENTMCNC: 30.7 PG — SIGNIFICANT CHANGE UP (ref 27–34)
MCHC RBC-ENTMCNC: 32.1 GM/DL — SIGNIFICANT CHANGE UP (ref 32–36)
MCV RBC AUTO: 95.6 FL — SIGNIFICANT CHANGE UP (ref 80–100)
MONOCYTES # BLD AUTO: 0.68 K/UL — SIGNIFICANT CHANGE UP (ref 0–0.9)
MONOCYTES NFR BLD AUTO: 10 % — SIGNIFICANT CHANGE UP (ref 2–14)
NEUTROPHILS # BLD AUTO: 2.73 K/UL — SIGNIFICANT CHANGE UP (ref 1.8–7.4)
NEUTROPHILS NFR BLD AUTO: 40.3 % — LOW (ref 43–77)
PLATELET # BLD AUTO: 185 K/UL — SIGNIFICANT CHANGE UP (ref 150–400)
POTASSIUM SERPL-MCNC: 3.9 MMOL/L — SIGNIFICANT CHANGE UP (ref 3.5–5.3)
POTASSIUM SERPL-SCNC: 3.9 MMOL/L — SIGNIFICANT CHANGE UP (ref 3.5–5.3)
PROT SERPL-MCNC: 7.8 GM/DL — SIGNIFICANT CHANGE UP (ref 6–8.3)
PROTHROM AB SERPL-ACNC: 9.9 SEC — SIGNIFICANT CHANGE UP (ref 9.5–13)
RBC # BLD: 4.27 M/UL — SIGNIFICANT CHANGE UP (ref 3.8–5.2)
RBC # FLD: 13.4 % — SIGNIFICANT CHANGE UP (ref 10.3–14.5)
SODIUM SERPL-SCNC: 140 MMOL/L — SIGNIFICANT CHANGE UP (ref 135–145)
TROPONIN I, HIGH SENSITIVITY RESULT: 3.99 NG/L — SIGNIFICANT CHANGE UP
WBC # BLD: 6.79 K/UL — SIGNIFICANT CHANGE UP (ref 3.8–10.5)
WBC # FLD AUTO: 6.79 K/UL — SIGNIFICANT CHANGE UP (ref 3.8–10.5)

## 2024-03-31 PROCEDURE — 82962 GLUCOSE BLOOD TEST: CPT

## 2024-03-31 PROCEDURE — 97163 PT EVAL HIGH COMPLEX 45 MIN: CPT | Mod: GP

## 2024-03-31 PROCEDURE — 70496 CT ANGIOGRAPHY HEAD: CPT | Mod: 26,MC

## 2024-03-31 PROCEDURE — 85730 THROMBOPLASTIN TIME PARTIAL: CPT

## 2024-03-31 PROCEDURE — 36415 COLL VENOUS BLD VENIPUNCTURE: CPT

## 2024-03-31 PROCEDURE — 85610 PROTHROMBIN TIME: CPT

## 2024-03-31 PROCEDURE — 70450 CT HEAD/BRAIN W/O DYE: CPT | Mod: 26,MC,59

## 2024-03-31 PROCEDURE — 83036 HEMOGLOBIN GLYCOSYLATED A1C: CPT

## 2024-03-31 PROCEDURE — 99291 CRITICAL CARE FIRST HOUR: CPT

## 2024-03-31 PROCEDURE — 0042T: CPT | Mod: MC

## 2024-03-31 PROCEDURE — 93005 ELECTROCARDIOGRAM TRACING: CPT

## 2024-03-31 PROCEDURE — 70551 MRI BRAIN STEM W/O DYE: CPT | Mod: MC

## 2024-03-31 PROCEDURE — 80048 BASIC METABOLIC PNL TOTAL CA: CPT

## 2024-03-31 PROCEDURE — 80061 LIPID PANEL: CPT

## 2024-03-31 PROCEDURE — 93010 ELECTROCARDIOGRAM REPORT: CPT

## 2024-03-31 PROCEDURE — 97116 GAIT TRAINING THERAPY: CPT | Mod: GP

## 2024-03-31 PROCEDURE — 70498 CT ANGIOGRAPHY NECK: CPT | Mod: 26,MC

## 2024-03-31 PROCEDURE — 93306 TTE W/DOPPLER COMPLETE: CPT

## 2024-03-31 PROCEDURE — 93971 EXTREMITY STUDY: CPT | Mod: LT

## 2024-03-31 PROCEDURE — 93970 EXTREMITY STUDY: CPT

## 2024-03-31 PROCEDURE — 92523 SPEECH SOUND LANG COMPREHEN: CPT | Mod: GN

## 2024-03-31 PROCEDURE — 92610 EVALUATE SWALLOWING FUNCTION: CPT | Mod: GN

## 2024-03-31 RX ORDER — ASPIRIN/CALCIUM CARB/MAGNESIUM 324 MG
324 TABLET ORAL ONCE
Refills: 0 | Status: COMPLETED | OUTPATIENT
Start: 2024-03-31 | End: 2024-03-31

## 2024-03-31 RX ORDER — ACETAMINOPHEN 500 MG
1000 TABLET ORAL ONCE
Refills: 0 | Status: COMPLETED | OUTPATIENT
Start: 2024-03-31 | End: 2024-04-01

## 2024-03-31 RX ADMIN — Medication 324 MILLIGRAM(S): at 22:34

## 2024-03-31 NOTE — ED PROVIDER NOTE - OBJECTIVE STATEMENT
64 y/o female with PMHx of CAD s/p x5 stents, DM2, HTN, HLD, s/p colon resection; presents to ED c/o sudden onset LUE weakness and numbness, LLE numbness at 14:00 while on cruise with family yesterday. Arrived in [place] today and took patient directly to ED for evaluation.

## 2024-03-31 NOTE — ED PROVIDER NOTE - CRITICAL CARE ATTENDING CONTRIBUTION TO CARE
Critical care time spent evaluating and reevaluating patient, ordering and interpreting diagnostics, ordering therapeutics, speaking to pt family admitting and consulting MDs and documenting. Aleks FARRELL

## 2024-03-31 NOTE — ED PROVIDER NOTE - CLINICAL SUMMARY MEDICAL DECISION MAKING FREE TEXT BOX
Pt with hx htn, dm, cardiac stents who was on cruise yesterday and suddenly developed right arm and right leg numbness and right leg weakness. Code stroke called. Labs, ct. telestoke eval. Admission. Pt with hx htn, dm, cardiac stents who was on cruise yesterday and suddenly developed  left arm and left leg numbness and left arm weakness. Code stroke called. Labs, ct. telestroke eval. Admission.

## 2024-03-31 NOTE — ED ADULT TRIAGE NOTE - CHIEF COMPLAINT QUOTE
Patient ambulatory to ER c/o left arm numbness/weakness. Patient reports symptoms starting yesterday. +BEFAST. Patient endorsing SOB and rapid heart rate. Hx of 5 stents, HTN, on Plavix. Recent travel on cruise and airplane. Dr. Fink called for stroke eval. Code stroke called @ 2116

## 2024-04-01 LAB
A1C WITH ESTIMATED AVERAGE GLUCOSE RESULT: 6.9 % — HIGH (ref 4–5.6)
ABO RH CONFIRMATION: SIGNIFICANT CHANGE UP
ANION GAP SERPL CALC-SCNC: 5 MMOL/L — SIGNIFICANT CHANGE UP (ref 5–17)
APTT BLD: 27.6 SEC — SIGNIFICANT CHANGE UP (ref 24.5–35.6)
BUN SERPL-MCNC: 21 MG/DL — SIGNIFICANT CHANGE UP (ref 7–23)
CALCIUM SERPL-MCNC: 9.1 MG/DL — SIGNIFICANT CHANGE UP (ref 8.5–10.1)
CHLORIDE SERPL-SCNC: 112 MMOL/L — HIGH (ref 96–108)
CHOLEST SERPL-MCNC: 118 MG/DL — SIGNIFICANT CHANGE UP
CO2 SERPL-SCNC: 25 MMOL/L — SIGNIFICANT CHANGE UP (ref 22–31)
CREAT SERPL-MCNC: 0.77 MG/DL — SIGNIFICANT CHANGE UP (ref 0.5–1.3)
EGFR: 88 ML/MIN/1.73M2 — SIGNIFICANT CHANGE UP
ESTIMATED AVERAGE GLUCOSE: 151 MG/DL — HIGH (ref 68–114)
GLUCOSE BLDC GLUCOMTR-MCNC: 148 MG/DL — HIGH (ref 70–99)
GLUCOSE BLDC GLUCOMTR-MCNC: 154 MG/DL — HIGH (ref 70–99)
GLUCOSE BLDC GLUCOMTR-MCNC: 164 MG/DL — HIGH (ref 70–99)
GLUCOSE SERPL-MCNC: 159 MG/DL — HIGH (ref 70–99)
HDLC SERPL-MCNC: 42 MG/DL — LOW
INR BLD: 0.85 RATIO — SIGNIFICANT CHANGE UP (ref 0.85–1.18)
LIPID PNL WITH DIRECT LDL SERPL: 48 MG/DL — SIGNIFICANT CHANGE UP
NON HDL CHOLESTEROL: 77 MG/DL — SIGNIFICANT CHANGE UP
POTASSIUM SERPL-MCNC: 3.8 MMOL/L — SIGNIFICANT CHANGE UP (ref 3.5–5.3)
POTASSIUM SERPL-SCNC: 3.8 MMOL/L — SIGNIFICANT CHANGE UP (ref 3.5–5.3)
PROTHROM AB SERPL-ACNC: 9.6 SEC — SIGNIFICANT CHANGE UP (ref 9.5–13)
SODIUM SERPL-SCNC: 142 MMOL/L — SIGNIFICANT CHANGE UP (ref 135–145)
TRIGL SERPL-MCNC: 170 MG/DL — HIGH

## 2024-04-01 PROCEDURE — 93970 EXTREMITY STUDY: CPT | Mod: 26

## 2024-04-01 PROCEDURE — 93971 EXTREMITY STUDY: CPT | Mod: 26,LT

## 2024-04-01 PROCEDURE — 93010 ELECTROCARDIOGRAM REPORT: CPT

## 2024-04-01 PROCEDURE — 99223 1ST HOSP IP/OBS HIGH 75: CPT

## 2024-04-01 PROCEDURE — 93306 TTE W/DOPPLER COMPLETE: CPT | Mod: 26

## 2024-04-01 PROCEDURE — 70551 MRI BRAIN STEM W/O DYE: CPT | Mod: 26

## 2024-04-01 RX ORDER — ASPIRIN/CALCIUM CARB/MAGNESIUM 324 MG
81 TABLET ORAL DAILY
Refills: 0 | Status: DISCONTINUED | OUTPATIENT
Start: 2024-04-01 | End: 2024-04-02

## 2024-04-01 RX ORDER — MIRTAZAPINE 45 MG/1
15 TABLET, ORALLY DISINTEGRATING ORAL AT BEDTIME
Refills: 0 | Status: DISCONTINUED | OUTPATIENT
Start: 2024-04-01 | End: 2024-04-02

## 2024-04-01 RX ORDER — ALBUTEROL 90 UG/1
2 AEROSOL, METERED ORAL EVERY 6 HOURS
Refills: 0 | Status: DISCONTINUED | OUTPATIENT
Start: 2024-04-01 | End: 2024-04-02

## 2024-04-01 RX ORDER — SODIUM CHLORIDE 9 MG/ML
1000 INJECTION, SOLUTION INTRAVENOUS
Refills: 0 | Status: DISCONTINUED | OUTPATIENT
Start: 2024-04-01 | End: 2024-04-02

## 2024-04-01 RX ORDER — TIZANIDINE 4 MG/1
0 TABLET ORAL
Qty: 0 | Refills: 0 | DISCHARGE

## 2024-04-01 RX ORDER — DEXTROSE 50 % IN WATER 50 %
25 SYRINGE (ML) INTRAVENOUS ONCE
Refills: 0 | Status: DISCONTINUED | OUTPATIENT
Start: 2024-04-01 | End: 2024-04-02

## 2024-04-01 RX ORDER — ATORVASTATIN CALCIUM 80 MG/1
80 TABLET, FILM COATED ORAL AT BEDTIME
Refills: 0 | Status: DISCONTINUED | OUTPATIENT
Start: 2024-04-01 | End: 2024-04-02

## 2024-04-01 RX ORDER — GLUCAGON INJECTION, SOLUTION 0.5 MG/.1ML
1 INJECTION, SOLUTION SUBCUTANEOUS ONCE
Refills: 0 | Status: DISCONTINUED | OUTPATIENT
Start: 2024-04-01 | End: 2024-04-02

## 2024-04-01 RX ORDER — INSULIN LISPRO 100/ML
VIAL (ML) SUBCUTANEOUS AT BEDTIME
Refills: 0 | Status: DISCONTINUED | OUTPATIENT
Start: 2024-04-01 | End: 2024-04-02

## 2024-04-01 RX ORDER — INFLUENZA VIRUS VACCINE 15; 15; 15; 15 UG/.5ML; UG/.5ML; UG/.5ML; UG/.5ML
0.5 SUSPENSION INTRAMUSCULAR ONCE
Refills: 0 | Status: DISCONTINUED | OUTPATIENT
Start: 2024-04-01 | End: 2024-04-02

## 2024-04-01 RX ORDER — DEXTROSE 50 % IN WATER 50 %
15 SYRINGE (ML) INTRAVENOUS ONCE
Refills: 0 | Status: DISCONTINUED | OUTPATIENT
Start: 2024-04-01 | End: 2024-04-02

## 2024-04-01 RX ORDER — ACETAMINOPHEN 500 MG
650 TABLET ORAL EVERY 6 HOURS
Refills: 0 | Status: DISCONTINUED | OUTPATIENT
Start: 2024-04-01 | End: 2024-04-02

## 2024-04-01 RX ORDER — DEXTROSE 50 % IN WATER 50 %
12.5 SYRINGE (ML) INTRAVENOUS ONCE
Refills: 0 | Status: DISCONTINUED | OUTPATIENT
Start: 2024-04-01 | End: 2024-04-02

## 2024-04-01 RX ORDER — INSULIN LISPRO 100/ML
VIAL (ML) SUBCUTANEOUS
Refills: 0 | Status: DISCONTINUED | OUTPATIENT
Start: 2024-04-01 | End: 2024-04-02

## 2024-04-01 RX ORDER — PANTOPRAZOLE SODIUM 20 MG/1
40 TABLET, DELAYED RELEASE ORAL
Refills: 0 | Status: DISCONTINUED | OUTPATIENT
Start: 2024-04-01 | End: 2024-04-02

## 2024-04-01 RX ORDER — CLOPIDOGREL BISULFATE 75 MG/1
75 TABLET, FILM COATED ORAL DAILY
Refills: 0 | Status: DISCONTINUED | OUTPATIENT
Start: 2024-04-01 | End: 2024-04-02

## 2024-04-01 RX ORDER — ATORVASTATIN CALCIUM 80 MG/1
40 TABLET, FILM COATED ORAL AT BEDTIME
Refills: 0 | Status: DISCONTINUED | OUTPATIENT
Start: 2024-04-01 | End: 2024-04-01

## 2024-04-01 RX ADMIN — Medication 650 MILLIGRAM(S): at 23:15

## 2024-04-01 RX ADMIN — Medication 1: at 13:54

## 2024-04-01 RX ADMIN — PANTOPRAZOLE SODIUM 40 MILLIGRAM(S): 20 TABLET, DELAYED RELEASE ORAL at 06:01

## 2024-04-01 RX ADMIN — Medication 650 MILLIGRAM(S): at 22:14

## 2024-04-01 RX ADMIN — Medication 1: at 08:35

## 2024-04-01 RX ADMIN — ATORVASTATIN CALCIUM 80 MILLIGRAM(S): 80 TABLET, FILM COATED ORAL at 22:14

## 2024-04-01 RX ADMIN — Medication 650 MILLIGRAM(S): at 11:41

## 2024-04-01 RX ADMIN — CLOPIDOGREL BISULFATE 75 MILLIGRAM(S): 75 TABLET, FILM COATED ORAL at 09:08

## 2024-04-01 RX ADMIN — Medication 81 MILLIGRAM(S): at 09:08

## 2024-04-01 RX ADMIN — Medication 400 MILLIGRAM(S): at 00:51

## 2024-04-01 RX ADMIN — MIRTAZAPINE 15 MILLIGRAM(S): 45 TABLET, ORALLY DISINTEGRATING ORAL at 22:15

## 2024-04-01 NOTE — SWALLOW BEDSIDE ASSESSMENT ADULT - COMMENTS
The pt was admitted to  with headache, LUE/LLE numbness, LUE swelling and bilateral lower extremity swelling, This profile is superimposed upon a history of HTN, HLD, DM type 2, CAD s/p stent placement, GERD, lichen planus, familial polyposis s/p colonic resection, and prior GIB. The pt was admitted to  with headache, LUE/LLE numbness, LUE swelling and bilateral lower extremity swelling. CTH negative, This profile is superimposed upon a history of HTN, HLD, DM type 2, CAD s/p stent placement, GERD, lichen planus, familial polyposis s/p colonic resection, and prior GIB.

## 2024-04-01 NOTE — H&P ADULT - NEUROLOGICAL
decreased sensation in LUE  / LLE/cranial nerves II-XII intact/responds to pain/responds to verbal commands

## 2024-04-01 NOTE — SWALLOW BEDSIDE ASSESSMENT ADULT - SWALLOW EVAL: PROGNOSIS
2) Pt demonstrates functional Oropharyngeal Swallowing integrity for age. NO behavioral aspiration signs exhibited. No change in O2 sats noted. Odynophagia was denied. Pt's oropharyngeal swallowing integrity is stable for age.

## 2024-04-01 NOTE — PHYSICAL THERAPY INITIAL EVALUATION ADULT - PERTINENT HX OF CURRENT PROBLEM, REHAB EVAL
Pt admitted to  secondary to HA, and left UE/LE numbness. CT head: neg. CTA head/neck: neg. MRI of head is ordered, and dopplers bilateral LE's ordered.

## 2024-04-01 NOTE — SWALLOW BEDSIDE ASSESSMENT ADULT - SWALLOW EVAL: DIAGNOSIS
1) The pt was alert and interactive. She c/o feeling as though her LUE "is heavy". She was able to verbalize during communicative probes without evidence of a primary motor speech or primary linguistic pathology. Pt is communicatively competent and at speech-language baseline.

## 2024-04-01 NOTE — ED ADULT NURSE NOTE - OBJECTIVE STATEMENT
Pt presents to the ED AO X 4, c/o left sided weakness/numbess and a headache since yesterday. Last known well 1300 on 3/30. Pt reports slight dizziness. Pt denies n/v/d, chest pain, Pt reports SOB. O2 100% on room air. Respirations even and unlabored. No facial droop. Clear speech. No drift in extremities. Decreased hand  strength on left hand. Slight sensory loss on left side.

## 2024-04-01 NOTE — CONSULT NOTE ADULT - ASSESSMENT
62 y/o F w/ PMH of HTN, dyslipidemia, DM2, tachycardia, GERD, CAD s/p PCI, h/o GIB, familial adenomatous polyopsis s/p colon resection, lichen planus, p/w L arm numbness, and weakness, and LLE numbness which lasted all afternoon and resolved and then returned again yesterday.  Sx's started  during  a cruise with family 2 days ago.  Pt. arrived in [place] yesterday and family took patient directcly to the ED.  Pt. is also c/o HA 8/10 occipital since yesterday and has been seeing L eye squggly lines and having intermittent blurry vision x 2 months.  There is no h/o  migraines/HA's.  Denies dysarthria, facial droop, unsteady gait.  She did have L facial heaviness/numbness yesterday which has resolved.  Code stroke was called upon arrival.  NIHSS 1 for decreased sensation.  CT head/CTA/CTP was neg for acute bleed, ischemia, LVO, significant stenosis.   She was not a candidate for IV thrombolytics because she was OOTW.  On PE she has decreased sensation to LUE/LLE, LUE drift, weakness L side LUE>LLE.  She does also have a HA/visual disturbances which sounds migraine like.        #possible R sided stroke-she does have multiple risk factors, could be migraine variant as well however no hs of headaches/migraines      Recommendations  -F/U MRI brain  -F/U official TTE with bubble study  -monitor on tele  -check LDL, A1c  -Continue ASA, Plavix, HD statin  -Neurochecks/VS q 4, FS q 6 x 24 hours  -DVT prophylaxis  -dysphagia screen  -PT/OT eval  -will follow    D/W Dr. Flower       62 y/o F w/ PMH of HTN, dyslipidemia, DM2, tachycardia, GERD, CAD s/p PCI, h/o GIB, familial adenomatous polyopsis s/p colon resection, lichen planus, p/w L arm numbness, and weakness, and LLE numbness which lasted all afternoon and resolved and then returned again yesterday.  Sx's started  during  a cruise with family 2 days ago.  Pt. arrived in [place] yesterday and family took patient directly to the ED.  Pt. is also c/o HA 8/10 occipital since yesterday and has been seeing L eye squggly lines and having intermittent blurry vision x 2 months.  There is no h/o  migraines/HA's.  Denies dysarthria, facial droop, unsteady gait.  She did have L facial heaviness/numbness yesterday which has resolved.  Code stroke was called upon arrival.  NIHSS 1 for decreased sensation.  CT head/CTA/CTP was neg for acute bleed, ischemia, LVO, significant stenosis.   She was not a candidate for IV thrombolytics because she was OOTW.  On PE she has decreased sensation to LUE/LLE, LUE drift, weakness L side LUE>LLE.  She does also have a HA/visual disturbances which sounds migraine like.        #possible R sided stroke-she does have multiple risk factors, could be migraine variant as well however no hs of headaches/migraines      Recommendations  -F/U MRI brain  -F/U official TTE with bubble study  -monitor on tele  -check LDL, A1c  -Continue ASA, Plavix, HD statin  -Neurochecks/VS q 4, FS q 6 x 24 hours  -DVT prophylaxis  -dysphagia screen  -PT/OT eval  -will follow    D/W Dr. Flower

## 2024-04-01 NOTE — CONSULT NOTE ADULT - SUBJECTIVE AND OBJECTIVE BOX
CC: paresthesias      HPI:  62 y/o F w/ PMH of HTN, dyslipidemia, DM2, GERD, CAD s/p PCI, h/o GIB, familial adenomatous polyopsis s/p colon resection, lichen planus, p/w paresthesias. Patient states that yesterday in afternoon he had numbness of LUE and LLE. The numbness lasted all afternoon, and resolved spontaneously. Today patient had numbness of LUE and LLE again. States that she also had some L facial heaviness. Patient also c/o HA. Denies nausea, vomiting, abdominal pain, fever, chills. Patient also c/o LUE swelling and B/L LE swelling that started yesterday.       PSH: colon resection, PCI     Social Hx: Denies tobacco / etoh / drugs     Family Hx: Mother - heart disease    (01 Apr 2024 00:58)      PAST MEDICAL & SURGICAL HISTORY:  Benign essential HTN      HLD (hyperlipidemia)      Type 2 diabetes mellitus      CAD (coronary artery disease)      Stented coronary artery      FAP (familial adenomatous polyposis)      Insomnia      GERD (gastroesophageal reflux disease)      H/O lichen planus      History of rectal bleeding      History of colon resection      Stented coronary artery          FAMILY HISTORY:  FH: heart disease (Mother, Sibling)        Social Hx:  Nonsmoker, no drug or alcohol use    MEDICATIONS  (STANDING):  aspirin  chewable 81 milliGRAM(s) Oral daily  atorvastatin 80 milliGRAM(s) Oral at bedtime  clopidogrel Tablet 75 milliGRAM(s) Oral daily  dextrose 5%. 1000 milliLiter(s) (50 mL/Hr) IV Continuous <Continuous>  dextrose 5%. 1000 milliLiter(s) (100 mL/Hr) IV Continuous <Continuous>  dextrose 50% Injectable 12.5 Gram(s) IV Push once  dextrose 50% Injectable 25 Gram(s) IV Push once  dextrose 50% Injectable 25 Gram(s) IV Push once  glucagon  Injectable 1 milliGRAM(s) IntraMuscular once  influenza   Vaccine 0.5 milliLiter(s) IntraMuscular once  insulin lispro (ADMELOG) corrective regimen sliding scale   SubCutaneous at bedtime  insulin lispro (ADMELOG) corrective regimen sliding scale   SubCutaneous three times a day before meals  mirtazapine 15 milliGRAM(s) Oral at bedtime  pantoprazole    Tablet 40 milliGRAM(s) Oral before breakfast       Allergies    No Known Allergies    Intolerances        ROS: Pertinent positives in HPI, all other ROS were reviewed and are negative.      Vital Signs Last 24 Hrs  T(C): 36.5 (01 Apr 2024 09:03), Max: 36.9 (31 Mar 2024 21:20)  T(F): 97.7 (01 Apr 2024 09:03), Max: 98.5 (31 Mar 2024 21:20)  HR: 100 (01 Apr 2024 09:03) (89 - 106)  BP: 123/80 (01 Apr 2024 09:03) (114/77 - 154/99)  BP(mean): 89 (01 Apr 2024 01:01) (89 - 126)  RR: 18 (01 Apr 2024 09:03) (18 - 22)  SpO2: 93% (01 Apr 2024 09:03) (93% - 100%)        Constitutional: awake, NAD  HEAD: Normocephalic   Neck: Supple.  Extremities:  no edema  Musculoskeletal: , no abnormal movements  Skin: No rashes    Neurological exam:  HF: A x O x 3. Appropriately interactive, normal affect. Speech fluent, No Aphasia or paraphasic errors. Naming /repetition intact   CN: YVETTE, EOMI, VFF, facial sensation normal, no NLFD, tongue midline, Palate moves equally, SCM equal bilaterally  Motor: No pronator drift, Strength 5/5 in all 4 ext, normal bulk and tone, no tremor, rigidity or bradykinesia.    Sens: Intact to light touch / PP/ VS/ JS    Reflexes: Symmetric and normal . BJ 2+, BR 2+, KJ 2+, AJ 2+, downgoing toes b/l  Coord:  No FNFA, dysmetria, SHANICE intact   Gait/Balance: Normal/Cannot test    NIHSS:          Labs:   04-01    142  |  112<H>  |  21  ----------------------------<  159<H>  3.8   |  25  |  0.77    Ca    9.1      01 Apr 2024 06:46    TPro  7.8  /  Alb  3.6  /  TBili  0.3  /  DBili  x   /  AST  31  /  ALT  35  /  AlkPhos  118  03-31 04-01 Chol 118 LDL -- HDL 42<L> Trig 170<H>                          13.1   6.79  )-----------( 185      ( 31 Mar 2024 21:24 )             40.8       Radiology:    < from: CT Brain Perfusion Maps Stroke (03.31.24 @ 21:35) >  IMPRESSION:    CTA NECK:  No significant stenosis of the cervical carotid arteries based   on NASCET criteria. Patent cervical vertebral arteries. No evidence of   cervical carotid or vertebral artery dissection.    CTA HEAD:  Nohigh-grade stenosis or occlusion of the major proximal   arterial branches.    CT PERFUSION:  Perfusion imaging shows no evidence of a core infarct or   tissue at risk.    < from: CT Brain Stroke Protocol (03.31.24 @ 21:25) >      IMPRESSION:    No acute intracranial hemorrhage, mass effect, or CT evidence of an acute   vascular territorial infarct.    Findings were discussed with Dr. Fink of the emergency Department at   9:30 PM on 3/31/2024.            A/P:    No IV tpa given because…    -ASA/PLAVIX  -Atorvastatin  -DVT prophylaxis  -Dysphagia screen  -Speech and swallow eval  -PT eval/ rehab eval    Mangement d/w Pt / family /     Total Critical Care Time spent:   CC: paresthesias      HPI:  64 y/o F w/ PMH of HTN, dyslipidemia, DM2, GERD, CAD s/p PCI, h/o GIB, familial adenomatous polyopsis s/p colon resection, lichen planus, p/w L arm numbness, and weakness, and LLE numbness which lasted all afternoon and resolved and then returned again yesterday.  Sx's started  during  a cruise with family 2 days ago.  Pt. arrived in [place] yesterday and family took patient directcly to the ED.  Pt. is also c/o HA 8/10 occipital since yesterday and has been seeing L eye squggly lines and having intermittent blurry vision x 2 months.  There is no h/o  migraines/HA's.  Denies dysarthria, facial droop, unsteady gait.  She did have L facial heaviness/numbness yesterday which has resolved.  Code stroke was called upon arrival.  NIHSS 1 for decreased sensation.  CT head/CTA/CTP was neg for acute bleed, ischemia, LVO, significant stenosis.   She was not a candidate for IV thrombolytics because she was OOTW.        PSH: colon resection, PCI     Social Hx: Denies tobacco / etoh / drugs     Family Hx: Mother - heart disease    (01 Apr 2024 00:58)      PAST MEDICAL & SURGICAL HISTORY:  Benign essential HTN      HLD (hyperlipidemia)      Type 2 diabetes mellitus      CAD (coronary artery disease)      Stented coronary artery      FAP (familial adenomatous polyposis)      Insomnia      GERD (gastroesophageal reflux disease)      H/O lichen planus      History of rectal bleeding      History of colon resection      Stented coronary artery          FAMILY HISTORY:  FH: heart disease (Mother, Sibling)  Sister CVA at age 60        Social Hx:  Nonsmoker, no drug or alcohol use    MEDICATIONS  (STANDING):  aspirin  chewable 81 milliGRAM(s) Oral daily  atorvastatin 80 milliGRAM(s) Oral at bedtime  clopidogrel Tablet 75 milliGRAM(s) Oral daily  dextrose 5%. 1000 milliLiter(s) (50 mL/Hr) IV Continuous <Continuous>  dextrose 5%. 1000 milliLiter(s) (100 mL/Hr) IV Continuous <Continuous>  dextrose 50% Injectable 12.5 Gram(s) IV Push once  dextrose 50% Injectable 25 Gram(s) IV Push once  dextrose 50% Injectable 25 Gram(s) IV Push once  glucagon  Injectable 1 milliGRAM(s) IntraMuscular once  influenza   Vaccine 0.5 milliLiter(s) IntraMuscular once  insulin lispro (ADMELOG) corrective regimen sliding scale   SubCutaneous at bedtime  insulin lispro (ADMELOG) corrective regimen sliding scale   SubCutaneous three times a day before meals  mirtazapine 15 milliGRAM(s) Oral at bedtime  pantoprazole    Tablet 40 milliGRAM(s) Oral before breakfast       Allergies  No Known Allergies        ROS: Pertinent positives in HPI, all other ROS were reviewed and are negative.      Vital Signs Last 24 Hrs  T(C): 36.5 (01 Apr 2024 09:03), Max: 36.9 (31 Mar 2024 21:20)  T(F): 97.7 (01 Apr 2024 09:03), Max: 98.5 (31 Mar 2024 21:20)  HR: 100 (01 Apr 2024 09:03) (89 - 106)  BP: 123/80 (01 Apr 2024 09:03) (114/77 - 154/99)  BP(mean): 89 (01 Apr 2024 01:01) (89 - 126)  RR: 18 (01 Apr 2024 09:03) (18 - 22)  SpO2: 93% (01 Apr 2024 09:03) (93% - 100%)        Constitutional: awake, NAD  HEAD: Normocephalic   Neck: Supple.  Extremities:  no edema  Musculoskeletal: , no abnormal movements  Skin: No rashes    Neurological exam:  HF: A x O x 3. Appropriately interactive, normal affect. Speech fluent, No Aphasia or paraphasic errors. Naming /repetition intact   CN: YVETTE, EOMI, VFF, facial sensation normal, no NLFD, tongue midline, Palate moves equally, SCM equal bilaterally  Motor: LUE drift, Strength 5/5 in R side, 4/5 LUE, 5-/5 LLE  Sens: Decreased to light touch LUE, LLE  Reflexes: Symmetric and normal, downgoing toes b/l  Coord:  NO FTNA R side, HTS intact R side, neg HTS LLE, unable to test LUE for FTNA 2/2 LUE pain  Gait/Balance: Cannot test    NIHSS: 1 for decreased sensation          Labs:   04-01    142  |  112<H>  |  21  ----------------------------<  159<H>  3.8   |  25  |  0.77    Ca    9.1      01 Apr 2024 06:46    TPro  7.8  /  Alb  3.6  /  TBili  0.3  /  DBili  x   /  AST  31  /  ALT  35  /  AlkPhos  118  03-31    04-01 Chol 118 LDL -- HDL 42<L> Trig 170<H>                          13.1   6.79  )-----------( 185      ( 31 Mar 2024 21:24 )             40.8       Radiology:    < from: CT Brain Perfusion Maps Stroke (03.31.24 @ 21:35) >  IMPRESSION:    CTA NECK:  No significant stenosis of the cervical carotid arteries based   on NASCET criteria. Patent cervical vertebral arteries. No evidence of   cervical carotid or vertebral artery dissection.    CTA HEAD:  Nohigh-grade stenosis or occlusion of the major proximal   arterial branches.    CT PERFUSION:  Perfusion imaging shows no evidence of a core infarct or   tissue at risk.    < from: CT Brain Stroke Protocol (03.31.24 @ 21:25) >      IMPRESSION:    No acute intracranial hemorrhage, mass effect, or CT evidence of an acute   vascular territorial infarct.    Findings were discussed with Dr. Fink of the emergency Department at   9:30 PM on 3/31/2024.            A/P:    No IV tpa given because…    -ASA/PLAVIX  -Atorvastatin  -DVT prophylaxis  -Dysphagia screen  -Speech and swallow eval  -PT eval/ rehab eval    Mangement d/w Pt / family /     Total Critical Care Time spent:   CC: paresthesias      HPI:  64 y/o F w/ PMH of HTN, dyslipidemia, DM2, GERD, CAD s/p PCI, h/o GIB, familial adenomatous polyopsis s/p colon resection, lichen planus, p/w L arm numbness, and weakness, and LLE numbness which lasted all afternoon and resolved and then returned again yesterday.  Sx's started  during  a cruise with family 2 days ago.  Pt. arrived in [place] yesterday and family took patient directcly to the ED.  Pt. is also c/o HA 8/10 occipital since yesterday and has been seeing L eye squggly lines and having intermittent blurry vision x 2 months.  There is no h/o  migraines/HA's.  Denies dysarthria, facial droop, unsteady gait.  She did have L facial heaviness/numbness yesterday which has resolved.  Code stroke was called upon arrival.  NIHSS 1 for decreased sensation.  CT head/CTA/CTP was neg for acute bleed, ischemia, LVO, significant stenosis.   She was not a candidate for IV thrombolytics because she was OOTW.        PSH: colon resection, PCI     Social Hx: Denies tobacco / etoh / drugs     Family Hx: Mother - heart disease    (01 Apr 2024 00:58)      PAST MEDICAL & SURGICAL HISTORY:  Benign essential HTN      HLD (hyperlipidemia)      Type 2 diabetes mellitus      CAD (coronary artery disease)      Stented coronary artery      FAP (familial adenomatous polyposis)      Insomnia      GERD (gastroesophageal reflux disease)      H/O lichen planus      History of rectal bleeding      History of colon resection      Stented coronary artery          FAMILY HISTORY:  FH: heart disease (Mother, Sibling)  Sister CVA at age 60        Social Hx:  Nonsmoker, no drug or alcohol use    MEDICATIONS  (STANDING):  aspirin  chewable 81 milliGRAM(s) Oral daily  atorvastatin 80 milliGRAM(s) Oral at bedtime  clopidogrel Tablet 75 milliGRAM(s) Oral daily  glucagon  Injectable 1 milliGRAM(s) IntraMuscular once  influenza   Vaccine 0.5 milliLiter(s) IntraMuscular once  insulin lispro (ADMELOG) corrective regimen sliding scale   SubCutaneous at bedtime  insulin lispro (ADMELOG) corrective regimen sliding scale   SubCutaneous three times a day before meals  mirtazapine 15 milliGRAM(s) Oral at bedtime  pantoprazole    Tablet 40 milliGRAM(s) Oral before breakfast       Allergies  No Known Allergies        ROS: Pertinent positives in HPI, all other ROS were reviewed and are negative.      Vital Signs Last 24 Hrs  T(C): 36.5 (01 Apr 2024 09:03), Max: 36.9 (31 Mar 2024 21:20)  T(F): 97.7 (01 Apr 2024 09:03), Max: 98.5 (31 Mar 2024 21:20)  HR: 100 (01 Apr 2024 09:03) (89 - 106)  BP: 123/80 (01 Apr 2024 09:03) (114/77 - 154/99)  BP(mean): 89 (01 Apr 2024 01:01) (89 - 126)  RR: 18 (01 Apr 2024 09:03) (18 - 22)  SpO2: 93% (01 Apr 2024 09:03) (93% - 100%)        Constitutional: awake, NAD  HEAD: Normocephalic   Neck: Supple.  Extremities:  no edema  Musculoskeletal: , no abnormal movements  Skin: No rashes    Neurological exam:  HF: A x O x 3. Appropriately interactive, normal affect. Speech fluent, No Aphasia or paraphasic errors. Naming /repetition intact   CN: YVETTE, EOMI, VFF, facial sensation normal, no NLFD, tongue midline, Palate moves equally, SCM equal bilaterally  Motor: LUE drift, Strength 5/5 in R side, 4/5 LUE, 5-/5 LLE  Sens: Decreased to light touch LUE, LLE  Reflexes: Symmetric and normal, downgoing toes b/l  Coord:  NO FTNA R side, HTS intact R side, neg HTS LLE, unable to test LUE for FTNA 2/2 LUE pain  Gait/Balance: Cannot test    NIHSS: 1 for decreased sensation          Labs:   04-01    142  |  112<H>  |  21  ----------------------------<  159<H>  3.8   |  25  |  0.77    Ca    9.1      01 Apr 2024 06:46    TPro  7.8  /  Alb  3.6  /  TBili  0.3  /  DBili  x   /  AST  31  /  ALT  35  /  AlkPhos  118  03-31 04-01 Chol 118 LDL -- HDL 42<L> Trig 170<H>                          13.1   6.79  )-----------( 185      ( 31 Mar 2024 21:24 )             40.8       Radiology:    < from: CT Brain Perfusion Maps Stroke (03.31.24 @ 21:35) >  IMPRESSION:    CTA NECK:  No significant stenosis of the cervical carotid arteries based   on NASCET criteria. Patent cervical vertebral arteries. No evidence of   cervical carotid or vertebral artery dissection.    CTA HEAD:  Nohigh-grade stenosis or occlusion of the major proximal   arterial branches.    CT PERFUSION:  Perfusion imaging shows no evidence of a core infarct or   tissue at risk.    < from: CT Brain Stroke Protocol (03.31.24 @ 21:25) >      IMPRESSION:    No acute intracranial hemorrhage, mass effect, or CT evidence of an acute   vascular territorial infarct.    Findings were discussed with Dr. Fink of the emergency Department at   9:30 PM on 3/31/2024.

## 2024-04-01 NOTE — H&P ADULT - CARDIOVASCULAR
normal/regular rate and rhythm/S1 S2 present/no gallops/no rub/no murmur
Need for prophylactic measure
MAGNUS (acute kidney injury)

## 2024-04-01 NOTE — ED ADULT NURSE REASSESSMENT NOTE - NS ED NURSE REASSESS COMMENT FT1
Received report from previous RN Tangela. Patient alert and oriented, no complaints or signs of distress noted. VSS. Plan of care explained. Safety measures in place.

## 2024-04-01 NOTE — PHYSICAL THERAPY INITIAL EVALUATION ADULT - MANUAL MUSCLE TESTING RESULTS, REHAB EVAL
Right UE and LE strength WNL. Left UE 3-3+/5 throughout grossly, LLE strength 3+/5 throughout grossly, DF 3/5.

## 2024-04-01 NOTE — SWALLOW BEDSIDE ASSESSMENT ADULT - SWALLOW EVAL: CRITERIA FOR SKILLED INTERVENTION MET
DO NOT FEEL THAT ACUTE SPEECH PATHOLOGY FOLLOW UP WOULD CHANGE CLINICAL MANAGEMENT/OUTCOME IN HOSPITAL SETTING. PT'S SPEECH-LANGUAGE AND OROPHARYNGEAL SWALLOWING INTEGRITY ARE FELT TO BE FUNCTIONAL/AT BASELINE/ARE MAXIMIZED. GIVEN ABOVE, THIS SERVICE WILL NOT ACTIVELY FOLLOW. RECONSULT PRN SHOULD STATUS CHANGE AND CONDITION WARRANT.

## 2024-04-01 NOTE — ED ADULT NURSE NOTE - NSFALLHARMRISKINTERV_ED_ALL_ED

## 2024-04-01 NOTE — CHART NOTE - NSCHARTNOTEFT_GEN_A_CORE
Admission HPI: 64 y/o F w/ PMH of HTN, dyslipidemia, DM2, GERD, CAD s/p PCI, h/o GIB, familial adenomatous polyopsis s/p colon resection, lichen planus, p/w L arm numbness, and weakness, and LLE numbness which lasted all afternoon and resolved and then returned again yesterday.  Sx's started  during  a cruise with family 2 days ago.  Pt. arrived in [place] yesterday and family took patient directcly to the ED.  Pt. is also c/o HA 8/10 occipital since yesterday and has been seeing L eye squggly lines and having intermittent blurry vision x 2 months.  There is no h/o  migraines/HA's.  Denies dysarthria, facial droop, unsteady gait.  She did have L facial heaviness/numbness yesterday which has resolved.  Code stroke was called upon arrival.  NIHSS 1 for decreased sensation.  CT head/CTA/CTP was neg for acute bleed, ischemia, LVO, significant stenosis.   She was not a candidate for IV thrombolytics because she was OOTW.      Patient seen and examined at bedside.  MRI, TTE pending. Refer to H&P from this morning for further plan. Admission HPI: 62 y/o F w/ PMH of HTN, dyslipidemia, DM2, GERD, CAD s/p PCI, h/o GIB, familial adenomatous polyopsis s/p colon resection, lichen planus, p/w L arm numbness, and weakness, and LLE numbness which lasted all afternoon and resolved and then returned again yesterday.  Sx's started  during  a cruise with family 2 days ago.  Pt. arrived in [place] yesterday and family took patient directcly to the ED.  Pt. is also c/o HA 8/10 occipital since yesterday and has been seeing L eye squggly lines and having intermittent blurry vision x 2 months.  There is no h/o  migraines/HA's.  Denies dysarthria, facial droop, unsteady gait.  She did have L facial heaviness/numbness yesterday which has resolved.  Code stroke was called upon arrival.  NIHSS 1 for decreased sensation.  CT head/CTA/CTP was neg for acute bleed, ischemia, LVO, significant stenosis.   She was not a candidate for IV thrombolytics because she was OOTW.    Patient seen and examined at bedside. Left arm sx still present. MRI, TTE pending. Refer to H&P from this morning for further plan.

## 2024-04-01 NOTE — SWALLOW BEDSIDE ASSESSMENT ADULT - SLP GENERAL OBSERVATIONS
The pt was alert and interactive. She c/o feeling as though her LUE "is heavy". She was able to verbalize during communicative probes without evidence of a primary motor speech or primary linguistic pathology. Pt is communicatively competent and at speech-language baseline.

## 2024-04-01 NOTE — ED ADULT NURSE NOTE - TEMPLATE LIST FOR HEAD TO TOE ASSESSMENT
Patient Instructions by Ethel Lui CMA at 11/29/18 04:21 PM     Author:  Ethel Lui CMA Service:  (none) Author Type:  Certified Medical Assistant     Filed:  11/29/18 04:27 PM Encounter Date:  11/29/2018 Status:  Signed     :  Ethel Lui CMA (Certified Medical Assistant)            PATIENT INFORMATION   --Wax was removed from both ears  --Ears look good, no fluid or infection    Follow-Up  -- Make an appointment with SHELTON Canales the second week of January 2019  -- Please review the handout you received at this visit. If you have any questions, please feel free to contact us.    Additional Educational Resources:  For additional resources regarding your symptoms, diagnosis, or further health information, please visit the Health Resources section on [email or web address] or the Online Health Resources section in [email or web address].        Revision History        User Key Date/Time User Provider Type Action    > [N/A] 11/29/18 04:27 PM Ethel Lui CMA Certified Medical Assistant Sign            
Neuro

## 2024-04-01 NOTE — CONSULT NOTE ADULT - NS ATTEND AMEND GEN_ALL_CORE FT
64 y/o woman PMH of HTN, dyslipidemia, DM2, tachycardia, GERD, CAD s/p PCI, h/o GIB, familial adenomatous polyposis s/p colon resection, lichen planus, ADM c/o left sided weakness, numbness. CT head/CTA/CTP was neg for acute bleed, ischemia, LVO, significant stenosis.  R/o CVA, MR head pending. ? Migraine w aura. Agree with above.

## 2024-04-01 NOTE — PHYSICAL THERAPY INITIAL EVALUATION ADULT - PLANNED THERAPY INTERVENTIONS, PT EVAL
Stair training. Eval, amb, transfers, bed mobility x 15'. Pt left in bed with all observation section equipment/material  intact and bed alarm activated. Pt c/o pain LUE and LLE 4/10. Will cont to follow./bed mobility training/gait training/ROM/strengthening/transfer training

## 2024-04-01 NOTE — H&P ADULT - HISTORY OF PRESENT ILLNESS
64 y/o F w/ PMH of HTN, dyslipidemia, DM2, GERD, CAD s/p PCI, h/o GIB, familial adenomatous polyopsis s/p colon resection, lichen planus, p/w paresthesias. Patient states that yesterday in afternoon he had numbness of LUE and LLE. The numbness lasted all afternoon, and resolved spontaneously. Today patient had numbness of LUE and LLE again. States that she also had some L facial heaviness. Patient also c/o HA. Denies nausea, vomiting, abdominal pain, fever, chills. Patient also c/o LUE swelling and B/L LE swelling that started yesterday.       PSH: colon resection, PCI     Social Hx: Denies tobacco / etoh / drugs     Family Hx: Mother - heart disease

## 2024-04-01 NOTE — PHYSICAL THERAPY INITIAL EVALUATION ADULT - GENERAL OBSERVATIONS, REHAB EVAL
Pt found supine in bed with tele monitor and bed alarm activated. Pt with no c/o pain. Pt c/o pain LUE and LE 4/10.

## 2024-04-01 NOTE — SWALLOW BEDSIDE ASSESSMENT ADULT - NS SPL SWALLOW CLINIC TRIAL FT
The patient demonstrated functional Oropharyngeal Swallowing integrity for age. NO behavioral aspiration signs exhibited. No change in O2 sats noted. Odynophagia was denied. Pt's oropharyngeal swallowing integrity is stable for age.

## 2024-04-01 NOTE — PHYSICAL THERAPY INITIAL EVALUATION ADULT - ACTIVE RANGE OF MOTION EXAMINATION, REHAB EVAL
Left shoulder flex ~ 100 degrees./Left UE Active ROM was WFL (within functional limits)/Right UE Active ROM was WFL (within functional limits)/Left LE Active ROM was WFL (within functional limits)/Right LE Active ROM was WFL (within functional limits)

## 2024-04-01 NOTE — PHYSICAL THERAPY INITIAL EVALUATION ADULT - GAIT TRAINING, PT EVAL
By D/C: Pt will amb without device 200' with independence. By D/C: Pt will ascend/descend 5 steps with HR, step to gait pattern, and independence.

## 2024-04-01 NOTE — H&P ADULT - ASSESSMENT
64 y/o F w/ PMH of HTN, dyslipidemia, DM2, GERD, CAD s/p PCI, h/o GIB, familial adenomatous polyopsis s/p colon resection, lichen planus, p/w paresthesias    *Paresthesias -  R/o TIA  -On ASA / Plavix   -Statin  -Neuro checks  -Neuro consult  -CT:  -MRI   -Tele monitoring  -Echo  -EKG  -PT consult  -Speech and swallow consult  -Fall precautions   -Will hold anti-hypertensives overnight     *LUE swelling / B/L LE swelling  -U/S to r/o DVT  -Echo     *DM2  -Humalog ISS     *H/o GERD / CAD /lichen planus  -C/w home meds and f/u outpatient for further management     *DVT ppx   -SCDs if U/S of LEs is negative     >75 mins required for admission    62 y/o F w/ PMH of HTN, dyslipidemia, DM2, GERD, CAD s/p PCI, h/o GIB, familial adenomatous polyopsis s/p colon resection, lichen planus, p/w paresthesias    *Paresthesias -  R/o TIA  -On ASA / Plavix   -Statin  -Neuro checks  -Neuro consult  -CT: No acute findings   -MRI   -Tele monitoring  -Echo  -EKG  -PT consult  -Speech and swallow consult  -Fall precautions   -Will hold anti-hypertensives overnight     *LUE swelling / B/L LE swelling  -U/S to r/o DVT  -Echo     *DM2  -Humalog ISS     *H/o GERD / CAD /lichen planus  -C/w home meds and f/u outpatient for further management     *DVT ppx   -SCDs if U/S of LEs is negative     >75 mins required for admission

## 2024-04-02 ENCOUNTER — TRANSCRIPTION ENCOUNTER (OUTPATIENT)
Age: 64
End: 2024-04-02

## 2024-04-02 VITALS
OXYGEN SATURATION: 92 % | DIASTOLIC BLOOD PRESSURE: 79 MMHG | HEART RATE: 102 BPM | RESPIRATION RATE: 18 BRPM | TEMPERATURE: 98 F | SYSTOLIC BLOOD PRESSURE: 122 MMHG

## 2024-04-02 LAB
GLUCOSE BLDC GLUCOMTR-MCNC: 172 MG/DL — HIGH (ref 70–99)
GLUCOSE BLDC GLUCOMTR-MCNC: 175 MG/DL — HIGH (ref 70–99)

## 2024-04-02 PROCEDURE — 99239 HOSP IP/OBS DSCHRG MGMT >30: CPT

## 2024-04-02 RX ORDER — KETOROLAC TROMETHAMINE 30 MG/ML
15 SYRINGE (ML) INJECTION ONCE
Refills: 0 | Status: DISCONTINUED | OUTPATIENT
Start: 2024-04-02 | End: 2024-04-02

## 2024-04-02 RX ORDER — METOCLOPRAMIDE HCL 10 MG
10 TABLET ORAL ONCE
Refills: 0 | Status: COMPLETED | OUTPATIENT
Start: 2024-04-02 | End: 2024-04-02

## 2024-04-02 RX ORDER — MAGNESIUM SULFATE 500 MG/ML
1 VIAL (ML) INJECTION ONCE
Refills: 0 | Status: COMPLETED | OUTPATIENT
Start: 2024-04-02 | End: 2024-04-02

## 2024-04-02 RX ORDER — DIPHENHYDRAMINE HCL 50 MG
25 CAPSULE ORAL ONCE
Refills: 0 | Status: COMPLETED | OUTPATIENT
Start: 2024-04-02 | End: 2024-04-02

## 2024-04-02 RX ADMIN — Medication 81 MILLIGRAM(S): at 09:08

## 2024-04-02 RX ADMIN — CLOPIDOGREL BISULFATE 75 MILLIGRAM(S): 75 TABLET, FILM COATED ORAL at 09:09

## 2024-04-02 RX ADMIN — Medication 1: at 08:18

## 2024-04-02 RX ADMIN — Medication 15 MILLIGRAM(S): at 12:25

## 2024-04-02 RX ADMIN — PANTOPRAZOLE SODIUM 40 MILLIGRAM(S): 20 TABLET, DELAYED RELEASE ORAL at 06:24

## 2024-04-02 RX ADMIN — Medication 100 GRAM(S): at 12:26

## 2024-04-02 RX ADMIN — Medication 650 MILLIGRAM(S): at 08:18

## 2024-04-02 RX ADMIN — Medication 10 MILLIGRAM(S): at 12:26

## 2024-04-02 RX ADMIN — Medication 1: at 12:53

## 2024-04-02 NOTE — DISCHARGE NOTE PROVIDER - NSDCCPCAREPLAN_GEN_ALL_CORE_FT
PRINCIPAL DISCHARGE DIAGNOSIS  Diagnosis: Paresthesias  Assessment and Plan of Treatment: Your workup was negative for an acute stroke. Follow up with Dr. Flower within 2-3 weeks of discharge if you continue to experience migraines.      SECONDARY DISCHARGE DIAGNOSES  Diagnosis: PFO (patent foramen ovale)  Assessment and Plan of Treatment: Your Echocardiogram showed findings a patent foramen ovale. Patent foramen ovale (PFO) is a small communication between the upper right and left chambers of your heart. Please follow up with Dr. Lee on your scheduled appointment on 04/05 at 8:45 AM for further management.

## 2024-04-02 NOTE — DISCHARGE NOTE NURSING/CASE MANAGEMENT/SOCIAL WORK - PATIENT PORTAL LINK FT
You can access the FollowMyHealth Patient Portal offered by [facility] by registering at the following website: [email or web address]. By joining [facility]’s FollowMyHealth portal, you will also be able to view your health information using other applications (apps) compatible with our system.

## 2024-04-02 NOTE — DISCHARGE NOTE PROVIDER - CARE PROVIDERS DIRECT ADDRESSES
[email or web address]

## 2024-04-02 NOTE — DISCHARGE NOTE PROVIDER - PROVIDER TOKENS
PROVIDER:[TOKEN:[[id number]],SCHEDULEDAPPT:[04/05/2024],SCHEDULEDAPPTTIME:[08:45 AM],ESTABLISHEDPATIENT:[T]],PROVIDER:[TOKEN:[[id number]],FOLLOWUP:[1 month]]

## 2024-04-02 NOTE — DISCHARGE NOTE PROVIDER - NSDCCPTREATMENT_GEN_ALL_CORE_FT
PRINCIPAL PROCEDURE  Procedure: Transthoracic echocardiography (TTE)  Findings and Treatment: Miscellaneous   A bubble study was performed with and without valsalva. Suboptimal image   quality; however, a small number of bubbles are seen in the left heart   following valsalva suggesting the presence of a PFO; no clear shunt seen   with color Doppler.   Impression   Summary   The left ventricle is normal in size, wall thickness, wall motion and   contractility. Estimated left ventricular ejection fraction is 55 %.   Mild diastolic dysfunction (stage I).   A bubble study was performed with and without valsalva. Suboptimal image   quality; however, a small number of bubbles are seen in the left heart   following valsalva suggesting the presence of a PFO; no clear shunt seen   with color Doppler.

## 2024-04-02 NOTE — DISCHARGE NOTE PROVIDER - NSDCCAREPROVSEEN_GEN_ALL_CORE_FT
Jake Tomlinson, Anshu Flower, Solitario Zamora, Karen Meza, Benjamin De Los Santos, Jeet Lisa, Morgan RIOS

## 2024-04-02 NOTE — DISCHARGE NOTE PROVIDER - NSDCMRMEDTOKEN_GEN_ALL_CORE_FT
ALBUTEROL HFA 90 MCG INHALER: 1 pump(s) inhaled every 6 hours INHALE 1 TO 2 PUFFS BY MOUTH EVERY 4 TO 6 HOURS AS NEEDED  alogliptin 25 mg oral tablet: 1 tab(s) orally once a day  aspirin 81 mg oral tablet: 1 tab(s) orally once a day  CLOPIDOGREL 75 MG TABLET: 1 tab(s) orally once a day TAKE 1 TABLET BY MOUTH EVERY DAY  ESZOPICLONE 2 MG TABLET: TAKE 1 TABLET AT BEDTIME AS NEEDED FOR SLEEP.  FARXIGA 10 MG TABLET: TAKE 1 TABLET BY MOUTH EVERY DAY IN THE MORNING  lisinopril 10 mg oral tablet: 1 tab(s) orally once a day  METOPROLOL SUCC  MG TAB: TAKE 1 TABLET BY MOUTH EVERY DAY  mirtazapine 15 mg oral tablet: 1 tab(s) orally once a day (at bedtime)  pantoprazole 20 mg oral delayed release tablet: 1 tab(s) orally once a day  PRALUENT 75 MG/ML PEN: INJECT CONTENTS OF PREFILLED AUTO INJECTOR EVERY TWO WEEKS  rosuvastatin 10 mg oral capsule: 1 cap(s) orally once a day

## 2024-04-02 NOTE — DISCHARGE NOTE PROVIDER - CARE PROVIDER_API CALL
Shelton Lee  Cardiovascular Disease  [address], [address]  [place] [address]  Phone: [phone]  Fax: [phone]  Established Patient  Scheduled Appointment: 04/05/2024 08:45 AM    Solitario Flower  Neurology  [address], [address]  [place] [address]  Phone: [phone]  Fax: [phone]  Follow Up Time: 1 month

## 2024-04-02 NOTE — DISCHARGE NOTE PROVIDER - HOSPITAL COURSE
Admission HPI: 64 y/o F w/ PMH of HTN, dyslipidemia, DM2, GERD, CAD s/p PCI, h/o GIB, familial adenomatous polyopsis s/p colon resection, lichen planus, p/w paresthesias. Patient states that yesterday in afternoon he had numbness of LUE and LLE. The numbness lasted all afternoon, and resolved spontaneously. Today patient had numbness of LUE and LLE again. States that she also had some L facial heaviness. Patient also c/o HA. Denies nausea, vomiting, abdominal pain, fever, chills. Patient also c/o LUE swelling and B/L LE swelling that started yesterday. Admitted for CVA workup.     4/2: Patient seen and examined at bedside. Reports generalized headache, otherwise L sided arm pain improved. No further complaints. Stable for discharge home. Discussed with Neurology - suspecting that symptoms were from an ocular migraine.     Hospital course (by problem):   *Paresthesias -  R/o TIA  -On ASA / Plavix / Statin for CAD, continue  -Neuro consult appreciated; suspicion for ocular migraine.   -CT: No acute findings   -MRI negative for CVA  -Tele monitoring  -Echo noted, normal EF but PFO noted. Follows with Dr. Lee. Continue ASA and follow up outpatient  -PT consult; recommending outpatient PT  -Speech and swallow consult appreciated  -Stable for discharge    #PFO  -Continue ASA and follow up with Dr. Lee on scheduled appointment     *LUE swelling / B/L LE swelling  -LUE and bilateral LE ultrasound negative for DVT  -Echo noted, normal EF    *DM2  -Humalog ISS inpatient    *H/o GERD / CAD /lichen planus  -C/w home meds and f/u outpatient for further management     Patient was discharged to: Home    Physical exam at the time of discharge:  LOS: 2d    VITALS:   T(C): 36.9 (04-02-24 @ 12:20), Max: 36.9 (04-02-24 @ 12:20)  HR: 114 (04-02-24 @ 12:20) (97 - 114)  BP: 119/94 (04-02-24 @ 12:20) (112/73 - 119/94)  RR: 18 (04-02-24 @ 12:20) (18 - 18)  SpO2: 97% (04-02-24 @ 12:20) (93% - 97%)    PHYSICAL EXAM:  GENERAL: No acute distress  HEAD:  Atraumatic, Normocephalic  EYES: EOMI, PERRLA, conjunctiva and sclera clear  ENMT: No tonsillar erythema, exudates, or enlargement; MMM  NECK: Supple, No JVD, Normal thyroid  HEART: Regular rate and rhythm; No murmurs, rubs, or gallops  RESPIRATORY: Unlabored respirations. CTA B/L, No W/R/R  ABDOMEN: Soft, Nontender, Nondistended; Bowel sounds present  NEUROLOGY: A&Ox3, nonfocal, moving all extremities  EXTREMITIES:  2+ Peripheral Pulses, No clubbing, cyanosis, or edema  SKIN: warm, dry, normal color, no rash or abnormal lesions

## 2024-04-02 NOTE — DISCHARGE NOTE PROVIDER - NSDCFUSCHEDAPPT_GEN_ALL_CORE_FT
Shelton Lee  [facility] Physician Formerly [facility]  CARDIOLOGY [address]  Scheduled Appointment: 04/05/2024    Rosa Liao  [facility] Physician [facility]  ENDOCRIN [address]  Scheduled Appointment: 05/02/2024    Gerda Mcneal  [facility] Physician [facility]  RHEUM [address]  Scheduled Appointment: 05/03/2024    Shannan Prasad  [facility] Physician [facility]  INTMED [address]  Scheduled Appointment: 06/27/2024

## 2024-04-04 ENCOUNTER — NON-APPOINTMENT (OUTPATIENT)
Age: 64
End: 2024-04-04

## 2024-04-05 ENCOUNTER — NON-APPOINTMENT (OUTPATIENT)
Age: 64
End: 2024-04-05

## 2024-04-05 ENCOUNTER — APPOINTMENT (OUTPATIENT)
Dept: CARDIOLOGY | Facility: CLINIC | Age: 64
End: 2024-04-05
Payer: MEDICAID

## 2024-04-05 VITALS
SYSTOLIC BLOOD PRESSURE: 140 MMHG | HEIGHT: 59 IN | HEART RATE: 85 BPM | BODY MASS INDEX: 26 KG/M2 | DIASTOLIC BLOOD PRESSURE: 84 MMHG | OXYGEN SATURATION: 99 % | WEIGHT: 129 LBS

## 2024-04-05 DIAGNOSIS — E78.5 HYPERLIPIDEMIA, UNSPECIFIED: ICD-10-CM

## 2024-04-05 PROCEDURE — G2211 COMPLEX E/M VISIT ADD ON: CPT | Mod: NC,1L

## 2024-04-05 PROCEDURE — 99214 OFFICE O/P EST MOD 30 MIN: CPT | Mod: 25

## 2024-04-05 PROCEDURE — 93000 ELECTROCARDIOGRAM COMPLETE: CPT

## 2024-04-05 NOTE — DISCUSSION/SUMMARY
[EKG obtained to assist in diagnosis and management of assessed problem(s)] : EKG obtained to assist in diagnosis and management of assessed problem(s) [FreeTextEntry1] : 63-year-old female with a past medical history of CAD multivessel CAD s/p PCI KATLIN Cx 6/2023 with 60% LAD disease, Pt is hemodynamically stable. Pt will increase lisinopril to 10 mg in am and 10 mg in pm. She will continue ASA and Plavix for now. She will probably require anticoagulation. LINQ will be scheduled. . She will follow up with Neuro.

## 2024-04-05 NOTE — HISTORY OF PRESENT ILLNESS
[FreeTextEntry1] : This is a 64 y/o female PMH: hypertension hyperlipidemia, DM, GERD, status post colon resection history of GI bleed, CAD status post PCI-Cx 6/2023 here today after recent discharge from  after a TIA. PFO noted on echo. Pt denies any neurological deficits today. No chest pain or shortness of breath. Pt reports on this visit that she has had episodes of irregular heart beats which were fast. Pt reports that she has seen atrial fibrillation on her Apple watch. She stated that she received 9 notifications on her Apple watch on the day of admission.

## 2024-04-08 DIAGNOSIS — Q21.12 PATENT FORAMEN OVALE: ICD-10-CM

## 2024-04-08 RX ORDER — LISINOPRIL 10 MG/1
10 TABLET ORAL
Qty: 180 | Refills: 1 | Status: ACTIVE | COMMUNITY

## 2024-04-08 RX ORDER — ASPIRIN ENTERIC COATED TABLETS 81 MG 81 MG/1
81 TABLET, DELAYED RELEASE ORAL
Refills: 0 | Status: DISCONTINUED | COMMUNITY
Start: 2021-10-08 | End: 2024-04-08

## 2024-04-10 ENCOUNTER — RX RENEWAL (OUTPATIENT)
Age: 64
End: 2024-04-10

## 2024-04-10 RX ORDER — ALBUTEROL SULFATE 90 UG/1
108 (90 BASE) INHALANT RESPIRATORY (INHALATION)
Qty: 1 | Refills: 2 | Status: ACTIVE | COMMUNITY
Start: 2022-01-04 | End: 1900-01-01

## 2024-04-17 DIAGNOSIS — R20.0 ANESTHESIA OF SKIN: ICD-10-CM

## 2024-04-17 DIAGNOSIS — K21.9 GASTRO-ESOPHAGEAL REFLUX DISEASE WITHOUT ESOPHAGITIS: ICD-10-CM

## 2024-04-17 DIAGNOSIS — Z95.5 PRESENCE OF CORONARY ANGIOPLASTY IMPLANT AND GRAFT: ICD-10-CM

## 2024-04-17 DIAGNOSIS — E78.5 HYPERLIPIDEMIA, UNSPECIFIED: ICD-10-CM

## 2024-04-17 DIAGNOSIS — Q21.12 PATENT FORAMEN OVALE: ICD-10-CM

## 2024-04-17 DIAGNOSIS — I10 ESSENTIAL (PRIMARY) HYPERTENSION: ICD-10-CM

## 2024-04-17 DIAGNOSIS — G43.B0 OPHTHALMOPLEGIC MIGRAINE, NOT INTRACTABLE: ICD-10-CM

## 2024-04-17 DIAGNOSIS — G47.00 INSOMNIA, UNSPECIFIED: ICD-10-CM

## 2024-04-17 DIAGNOSIS — E11.9 TYPE 2 DIABETES MELLITUS WITHOUT COMPLICATIONS: ICD-10-CM

## 2024-04-17 DIAGNOSIS — I25.10 ATHEROSCLEROTIC HEART DISEASE OF NATIVE CORONARY ARTERY WITHOUT ANGINA PECTORIS: ICD-10-CM

## 2024-04-17 DIAGNOSIS — D13.91 FAMILIAL ADENOMATOUS POLYPOSIS: ICD-10-CM

## 2024-05-01 LAB
ALBUMIN SERPL ELPH-MCNC: 4.4 G/DL
ALP BLD-CCNC: 116 U/L
ALT SERPL-CCNC: 23 U/L
ANION GAP SERPL CALC-SCNC: 12 MMOL/L
AST SERPL-CCNC: 18 U/L
BASOPHILS # BLD AUTO: 0.03 K/UL
BASOPHILS NFR BLD AUTO: 0.3 %
BILIRUB SERPL-MCNC: 0.3 MG/DL
BUN SERPL-MCNC: 32 MG/DL
CALCIUM SERPL-MCNC: 9.8 MG/DL
CHLORIDE SERPL-SCNC: 108 MMOL/L
CO2 SERPL-SCNC: 24 MMOL/L
CREAT SERPL-MCNC: 0.97 MG/DL
CRP SERPL-MCNC: <3 MG/L
EGFR: 66 ML/MIN/1.73M2
EOSINOPHIL # BLD AUTO: 0.11 K/UL
EOSINOPHIL NFR BLD AUTO: 1 %
ERYTHROCYTE [SEDIMENTATION RATE] IN BLOOD BY WESTERGREN METHOD: 23 MM/HR
ESTIMATED AVERAGE GLUCOSE: 157 MG/DL
GLUCOSE SERPL-MCNC: 135 MG/DL
HBA1C MFR BLD HPLC: 7.1 %
HCT VFR BLD CALC: 38.9 %
HGB BLD-MCNC: 12.4 G/DL
IMM GRANULOCYTES NFR BLD AUTO: 0.5 %
LYMPHOCYTES # BLD AUTO: 2.67 K/UL
LYMPHOCYTES NFR BLD AUTO: 25 %
MAN DIFF?: NORMAL
MCHC RBC-ENTMCNC: 30.6 PG
MCHC RBC-ENTMCNC: 31.9 GM/DL
MCV RBC AUTO: 96 FL
MONOCYTES # BLD AUTO: 0.82 K/UL
MONOCYTES NFR BLD AUTO: 7.7 %
NEUTROPHILS # BLD AUTO: 6.99 K/UL
NEUTROPHILS NFR BLD AUTO: 65.5 %
PLATELET # BLD AUTO: 206 K/UL
POTASSIUM SERPL-SCNC: 4.5 MMOL/L
PROT SERPL-MCNC: 7.2 G/DL
RBC # BLD: 4.05 M/UL
RBC # FLD: 13.4 %
SODIUM SERPL-SCNC: 144 MMOL/L
WBC # FLD AUTO: 10.67 K/UL

## 2024-05-01 RX ORDER — LEFLUNOMIDE 20 MG/1
20 TABLET, FILM COATED ORAL
Qty: 90 | Refills: 0 | Status: ACTIVE | COMMUNITY
Start: 2024-01-26 | End: 1900-01-01

## 2024-05-02 ENCOUNTER — APPOINTMENT (OUTPATIENT)
Dept: ENDOCRINOLOGY | Facility: CLINIC | Age: 64
End: 2024-05-02
Payer: MEDICAID

## 2024-05-02 ENCOUNTER — NON-APPOINTMENT (OUTPATIENT)
Age: 64
End: 2024-05-02

## 2024-05-02 ENCOUNTER — APPOINTMENT (OUTPATIENT)
Dept: CARDIOLOGY | Facility: CLINIC | Age: 64
End: 2024-05-02
Payer: MEDICAID

## 2024-05-02 VITALS
WEIGHT: 126 LBS | SYSTOLIC BLOOD PRESSURE: 110 MMHG | BODY MASS INDEX: 25.4 KG/M2 | OXYGEN SATURATION: 97 % | DIASTOLIC BLOOD PRESSURE: 80 MMHG | HEIGHT: 59 IN | HEART RATE: 118 BPM

## 2024-05-02 VITALS
DIASTOLIC BLOOD PRESSURE: 62 MMHG | BODY MASS INDEX: 25.2 KG/M2 | HEART RATE: 101 BPM | WEIGHT: 125 LBS | HEIGHT: 59 IN | SYSTOLIC BLOOD PRESSURE: 132 MMHG | TEMPERATURE: 97.5 F | RESPIRATION RATE: 18 BRPM | OXYGEN SATURATION: 94 %

## 2024-05-02 PROCEDURE — G2211 COMPLEX E/M VISIT ADD ON: CPT | Mod: NC,1L

## 2024-05-02 PROCEDURE — 93000 ELECTROCARDIOGRAM COMPLETE: CPT

## 2024-05-02 PROCEDURE — 99214 OFFICE O/P EST MOD 30 MIN: CPT

## 2024-05-02 PROCEDURE — 99214 OFFICE O/P EST MOD 30 MIN: CPT | Mod: 25

## 2024-05-02 RX ORDER — GLIPIZIDE 2.5 MG/1
2.5 TABLET, FILM COATED, EXTENDED RELEASE ORAL DAILY
Qty: 90 | Refills: 3 | Status: ACTIVE | COMMUNITY
Start: 2024-05-02 | End: 1900-01-01

## 2024-05-02 NOTE — PHYSICAL EXAM
[de-identified] : General: No distress, well nourished Eyes: Normal external appearance ENT: Normal appearance of the nose Neck/Thyroid: No visible neck swelling Respiratory: No use of accessory muscles of respiration Psychiatry: Patient converses normally, good judgement and insight Skin: No rashes seen on face  [de-identified] : DM foot exam done on 02/02/2024: No ulcers seen in feet Dorsalis pedis pulses normal bilaterally Sensation to 10 g monofilament normal in feet bilaterally.

## 2024-05-02 NOTE — DISCUSSION/SUMMARY
[FreeTextEntry1] : 63-year-old female with a past medical history of CAD multivessel CAD s/p PCI KATLIN Cx 6/2023 with 60% LAD disease, Pt is hemodynamically stable.  Pt will continue Eliquis bid. Pt will follow up with Dr. Mendoza for either pharmacological therapy or ablation.  [EKG obtained to assist in diagnosis and management of assessed problem(s)] : EKG obtained to assist in diagnosis and management of assessed problem(s)

## 2024-05-02 NOTE — HISTORY OF PRESENT ILLNESS
[FreeTextEntry1] : Problems: 1. DM type 2 2. Hypertension 3. Hyperlipidemia - I defer management of this to Cardiology  DM type 2 1. Diagnosed in 2018 2. Meds: Alogliptin 25 mg po daily (no pancreatitis) Farxiga 10 mg po daily - No UTIs or genital candidiasis Patient had nausea and vomiting with metformin.  3. Fingersticks done twice per day - 160s , no hypoglycemic unawareness 4. No on Aspirin - patient is on Plavix and Eliquis as patient has atrial fibrillation, not on statin, on Praluent, not ACEi/ARB 5. Complications: No DM nephropathy (normal creatinine, neg urine microalbumin panel in June 2023) No DM retinopathy (last eye exam was in October 2023, patient advised on the need for annual DM eye exam) Has CAD s/p stents, had stroke in the past, No other ASCVD No foot ulcers/amputation 6. Patient never smoked cigarettes

## 2024-05-02 NOTE — HISTORY OF PRESENT ILLNESS
[FreeTextEntry1] : This is a 62 y/o female PMH: hypertension hyperlipidemia, DM, GERD, status post colon resection history of GI bleed, CAD status post PCI-Cx 6/2023 here today after recent discharge from  after a TIA. Small PFO noted on echo. Pt denies any neurological deficits today. No chest pain or shortness of breath. Pt reports on this visit that she has had episodes of irregular heart beats which were fast. Pt reports that she has seen atrial fibrillation on her Apple watch. She stated that she received 9 notifications on her Apple watch on the day of admission.   5/2/24: Pt returns with her daughter. She reports frequent episodes of atrial fibrillation since last visit. She is disturbed by symptoms. She denies chest pain or shortness of breath.   [Paroxysmal Atrial Fibrillation] : paroxysmal atrial fibrillation

## 2024-05-03 ENCOUNTER — APPOINTMENT (OUTPATIENT)
Dept: RHEUMATOLOGY | Facility: CLINIC | Age: 64
End: 2024-05-03

## 2024-05-16 ENCOUNTER — APPOINTMENT (OUTPATIENT)
Dept: ELECTROPHYSIOLOGY | Facility: CLINIC | Age: 64
End: 2024-05-16
Payer: MEDICAID

## 2024-05-16 VITALS
RESPIRATION RATE: 16 BRPM | HEART RATE: 88 BPM | WEIGHT: 131 LBS | BODY MASS INDEX: 26.41 KG/M2 | HEIGHT: 59 IN | DIASTOLIC BLOOD PRESSURE: 80 MMHG | OXYGEN SATURATION: 98 % | SYSTOLIC BLOOD PRESSURE: 118 MMHG

## 2024-05-16 PROCEDURE — 99204 OFFICE O/P NEW MOD 45 MIN: CPT

## 2024-05-16 NOTE — HISTORY OF PRESENT ILLNESS
[FreeTextEntry1] : 64-year-old female with history of hypertension dyslipidemia diabetes GERD GI bleed, asthma, CAD prior PCI-Cx 6.2023.  Patient had TIA with a symptom resolution on echo noted to have small PFO patient uses smart watch which has detected A-fib.  Patient is taking Eliquis 5 mg twice daily. Pt has sinus tachycardia, prior ecgs do not show AF. She is on toprolol 100mg daily. She has frequent palpitations, difficulty sleeping, and fatigue. Denies cp, syncope.  prior ecgs reviewed  bpm   Cardiac workup: Stress echo from 6/20/2023 shows LV size and function no regional wall abnormality with exercise.

## 2024-05-16 NOTE — DISCUSSION/SUMMARY
[FreeTextEntry1] : 64-year-old female with history of hypertension diabetes CAD who has frequent bothersome palpitations patient also had a TIA questionable A-fib has been on Eliquis prior echo shows normal LV function.  ECGs reviewed patient has sinus tachycardia likely inappropriate IST.  Continue metoprolol encouraged aerobic exercise controlling risk factors adequate hydration -Obtain ZIO monitor 2 weeks to assess for arrhythmia -TFT panel -HORTENCIA workup as outpatient Will follow soon.  I spent a total of 45 minutes on the encounter evaluating and discussing treatment options with the patient, as well as counseling and coordination of care as stated above.

## 2024-05-28 ENCOUNTER — LABORATORY RESULT (OUTPATIENT)
Age: 64
End: 2024-05-28

## 2024-05-31 LAB
ABO + RH PNL BLD: NORMAL
ALBUMIN SERPL ELPH-MCNC: 4.3 G/DL
ALP BLD-CCNC: 140 U/L
ALT SERPL-CCNC: 32 U/L
ANION GAP SERPL CALC-SCNC: 10 MMOL/L
AST SERPL-CCNC: 34 U/L
BILIRUB SERPL-MCNC: 0.2 MG/DL
BUN SERPL-MCNC: 20 MG/DL
CALCIUM SERPL-MCNC: 9.1 MG/DL
CHLORIDE SERPL-SCNC: 105 MMOL/L
CO2 SERPL-SCNC: 25 MMOL/L
CREAT SERPL-MCNC: 0.99 MG/DL
EGFR: 64 ML/MIN/1.73M2
ESTIMATED AVERAGE GLUCOSE: 171 MG/DL
FT4I SERPL CALC-MCNC: 7 INDEX
GLUCOSE SERPL-MCNC: 182 MG/DL
HBA1C MFR BLD HPLC: 7.6 %
HCT VFR BLD CALC: 41.9 %
HGB BLD-MCNC: 13 G/DL
MCHC RBC-ENTMCNC: 30.2 PG
MCHC RBC-ENTMCNC: 31 GM/DL
MCV RBC AUTO: 97.2 FL
PLATELET # BLD AUTO: 217 K/UL
POTASSIUM SERPL-SCNC: 4.7 MMOL/L
PROT SERPL-MCNC: 7.3 G/DL
RBC # BLD: 4.31 M/UL
RBC # FLD: 13.4 %
SODIUM SERPL-SCNC: 140 MMOL/L
TSH SERPL-ACNC: 2.96 UIU/ML
WBC # FLD AUTO: 5.92 K/UL

## 2024-06-13 PROBLEM — I10 ESSENTIAL (PRIMARY) HYPERTENSION: Chronic | Status: ACTIVE | Noted: 2023-05-02

## 2024-06-13 PROBLEM — I25.10 ATHEROSCLEROTIC HEART DISEASE OF NATIVE CORONARY ARTERY WITHOUT ANGINA PECTORIS: Chronic | Status: ACTIVE | Noted: 2023-05-02

## 2024-06-14 ENCOUNTER — NON-APPOINTMENT (OUTPATIENT)
Age: 64
End: 2024-06-14

## 2024-06-14 ENCOUNTER — APPOINTMENT (OUTPATIENT)
Dept: INTERNAL MEDICINE | Facility: CLINIC | Age: 64
End: 2024-06-14
Payer: MEDICAID

## 2024-06-14 VITALS
HEIGHT: 59 IN | SYSTOLIC BLOOD PRESSURE: 102 MMHG | OXYGEN SATURATION: 99 % | WEIGHT: 132 LBS | DIASTOLIC BLOOD PRESSURE: 60 MMHG | TEMPERATURE: 98 F | BODY MASS INDEX: 26.61 KG/M2 | HEART RATE: 79 BPM

## 2024-06-14 DIAGNOSIS — M06.00 RHEUMATOID ARTHRITIS W/OUT RHEUMATOID FACTOR, UNSPECIFIED SITE: ICD-10-CM

## 2024-06-14 DIAGNOSIS — E11.9 TYPE 2 DIABETES MELLITUS W/OUT COMPLICATIONS: ICD-10-CM

## 2024-06-14 PROCEDURE — G2211 COMPLEX E/M VISIT ADD ON: CPT | Mod: NC,1L

## 2024-06-14 PROCEDURE — 99214 OFFICE O/P EST MOD 30 MIN: CPT

## 2024-06-14 NOTE — HISTORY OF PRESENT ILLNESS
[FreeTextEntry8] : Ms. NUBIA DIAZ is a 64 year F who comes in for an acute visit. Pt with hx of seronegative RA, DM-2, GERD, CAD s/p PCI CX, PFO, GERD, s/p colon resection.  Pt notes swelling in the body, specifically in the legs and face for about 8 days. She also notes chest pain, tightness in the chest, substernal, constant, not new pain. She also notes increased fatigue, even walking in house makes her tired.  Patient denies any cp, sob, abdominal pain, nausea, vomiting, palpitations, fever, chills, constipation, diarrhea.

## 2024-06-14 NOTE — ASSESSMENT
[FreeTextEntry1] : 1.Body pain/swelling: advised with hx of seronegative rheumatoid arthritis, f/u with rheumatology for medication initiation as has refused meds in the past. All questions answered.  2.CAD: s/p stent, EKG stable, advise f/u wt cardiology for Echo as has been 1 yr and f/u with EP on next steps for PFO/possible ablation.   Plan d/w pt and dtr, Mir[place]e.

## 2024-06-14 NOTE — PHYSICAL EXAM
[TextEntry] : General: NAD HEENT: NC/AT, EOMI, PERRLA. Neck: supple, no JVD. no LAD. CVS: S1, S2 normal, RRR, no m/g/r no chest wall TTP.  Resp: CTA b/l, no wheeze/rale/rhonchi Abdomen: soft, NT/ND, positive bowel sounds.  Extremities: no edema Neuro: aaox3

## 2024-06-21 ENCOUNTER — RX RENEWAL (OUTPATIENT)
Age: 64
End: 2024-06-21

## 2024-06-21 RX ORDER — APIXABAN 5 MG/1
5 TABLET, FILM COATED ORAL
Qty: 180 | Refills: 3 | Status: ACTIVE | COMMUNITY
Start: 2024-04-08 | End: 1900-01-01

## 2024-06-24 ENCOUNTER — INPATIENT (INPATIENT)
Facility: HOSPITAL | Age: 64
LOS: 1 days | Discharge: HOME | DRG: 198 | End: 2024-06-26
Attending: STUDENT IN AN ORGANIZED HEALTH CARE EDUCATION/TRAINING PROGRAM | Admitting: INTERNAL MEDICINE
Payer: MEDICAID

## 2024-06-24 ENCOUNTER — APPOINTMENT (OUTPATIENT)
Dept: ELECTROPHYSIOLOGY | Facility: CLINIC | Age: 64
End: 2024-06-24

## 2024-06-24 VITALS — WEIGHT: 132.94 LBS

## 2024-06-24 VITALS
HEIGHT: 59 IN | OXYGEN SATURATION: 99 % | WEIGHT: 133 LBS | BODY MASS INDEX: 26.81 KG/M2 | DIASTOLIC BLOOD PRESSURE: 83 MMHG | SYSTOLIC BLOOD PRESSURE: 131 MMHG | HEART RATE: 99 BPM

## 2024-06-24 DIAGNOSIS — M06.00 RHEUMATOID ARTHRITIS WITHOUT RHEUMATOID FACTOR, UNSPECIFIED SITE: ICD-10-CM

## 2024-06-24 DIAGNOSIS — I25.10 ATHEROSCLEROTIC HEART DISEASE OF NATIVE CORONARY ARTERY WITHOUT ANGINA PECTORIS: ICD-10-CM

## 2024-06-24 DIAGNOSIS — Z95.5 PRESENCE OF CORONARY ANGIOPLASTY IMPLANT AND GRAFT: Chronic | ICD-10-CM

## 2024-06-24 DIAGNOSIS — Z90.49 ACQUIRED ABSENCE OF OTHER SPECIFIED PARTS OF DIGESTIVE TRACT: Chronic | ICD-10-CM

## 2024-06-24 DIAGNOSIS — Z79.02 LONG TERM (CURRENT) USE OF ANTITHROMBOTICS/ANTIPLATELETS: ICD-10-CM

## 2024-06-24 DIAGNOSIS — Q21.12 PATENT FORAMEN OVALE: ICD-10-CM

## 2024-06-24 DIAGNOSIS — R07.9 CHEST PAIN, UNSPECIFIED: ICD-10-CM

## 2024-06-24 DIAGNOSIS — Z79.82 LONG TERM (CURRENT) USE OF ASPIRIN: ICD-10-CM

## 2024-06-24 DIAGNOSIS — Z79.84 LONG TERM (CURRENT) USE OF ORAL HYPOGLYCEMIC DRUGS: ICD-10-CM

## 2024-06-24 DIAGNOSIS — Z95.5 PRESENCE OF CORONARY ANGIOPLASTY IMPLANT AND GRAFT: ICD-10-CM

## 2024-06-24 DIAGNOSIS — I48.0 PAROXYSMAL ATRIAL FIBRILLATION: ICD-10-CM

## 2024-06-24 DIAGNOSIS — I10 ESSENTIAL (PRIMARY) HYPERTENSION: ICD-10-CM

## 2024-06-24 DIAGNOSIS — E78.5 HYPERLIPIDEMIA, UNSPECIFIED: ICD-10-CM

## 2024-06-24 DIAGNOSIS — J45.909 UNSPECIFIED ASTHMA, UNCOMPLICATED: ICD-10-CM

## 2024-06-24 DIAGNOSIS — K21.9 GASTRO-ESOPHAGEAL REFLUX DISEASE WITHOUT ESOPHAGITIS: ICD-10-CM

## 2024-06-24 DIAGNOSIS — E11.9 TYPE 2 DIABETES MELLITUS WITHOUT COMPLICATIONS: ICD-10-CM

## 2024-06-24 DIAGNOSIS — R07.89 OTHER CHEST PAIN: ICD-10-CM

## 2024-06-24 LAB
ALBUMIN SERPL ELPH-MCNC: 4 G/DL — SIGNIFICANT CHANGE UP (ref 3.3–5)
ALP SERPL-CCNC: 128 U/L — HIGH (ref 40–120)
ALT FLD-CCNC: 48 U/L — SIGNIFICANT CHANGE UP (ref 12–78)
ANION GAP SERPL CALC-SCNC: 2 MMOL/L — LOW (ref 5–17)
APTT BLD: 31.3 SEC — SIGNIFICANT CHANGE UP (ref 24.5–35.6)
AST SERPL-CCNC: 36 U/L — SIGNIFICANT CHANGE UP (ref 15–37)
BASOPHILS # BLD AUTO: 0.05 K/UL — SIGNIFICANT CHANGE UP (ref 0–0.2)
BASOPHILS NFR BLD AUTO: 0.8 % — SIGNIFICANT CHANGE UP (ref 0–2)
BILIRUB SERPL-MCNC: 0.2 MG/DL — SIGNIFICANT CHANGE UP (ref 0.2–1.2)
BUN SERPL-MCNC: 19 MG/DL — SIGNIFICANT CHANGE UP (ref 7–23)
CALCIUM SERPL-MCNC: 9.7 MG/DL — SIGNIFICANT CHANGE UP (ref 8.5–10.1)
CHLORIDE SERPL-SCNC: 110 MMOL/L — HIGH (ref 96–108)
CO2 SERPL-SCNC: 27 MMOL/L — SIGNIFICANT CHANGE UP (ref 22–31)
CREAT SERPL-MCNC: 0.75 MG/DL — SIGNIFICANT CHANGE UP (ref 0.5–1.3)
EGFR: 90 ML/MIN/1.73M2 — SIGNIFICANT CHANGE UP
EOSINOPHIL # BLD AUTO: 0.76 K/UL — HIGH (ref 0–0.5)
EOSINOPHIL NFR BLD AUTO: 11.4 % — HIGH (ref 0–6)
GLUCOSE SERPL-MCNC: 174 MG/DL — HIGH (ref 70–99)
HCT VFR BLD CALC: 39.2 % — SIGNIFICANT CHANGE UP (ref 34.5–45)
HGB BLD-MCNC: 12.7 G/DL — SIGNIFICANT CHANGE UP (ref 11.5–15.5)
IMM GRANULOCYTES NFR BLD AUTO: 0.3 % — SIGNIFICANT CHANGE UP (ref 0–0.9)
INR BLD: 0.94 RATIO — SIGNIFICANT CHANGE UP (ref 0.85–1.18)
LYMPHOCYTES # BLD AUTO: 2.38 K/UL — SIGNIFICANT CHANGE UP (ref 1–3.3)
LYMPHOCYTES # BLD AUTO: 35.8 % — SIGNIFICANT CHANGE UP (ref 13–44)
MCHC RBC-ENTMCNC: 29.6 PG — SIGNIFICANT CHANGE UP (ref 27–34)
MCHC RBC-ENTMCNC: 32.4 GM/DL — SIGNIFICANT CHANGE UP (ref 32–36)
MCV RBC AUTO: 91.4 FL — SIGNIFICANT CHANGE UP (ref 80–100)
MONOCYTES # BLD AUTO: 0.75 K/UL — SIGNIFICANT CHANGE UP (ref 0–0.9)
MONOCYTES NFR BLD AUTO: 11.3 % — SIGNIFICANT CHANGE UP (ref 2–14)
NEUTROPHILS # BLD AUTO: 2.69 K/UL — SIGNIFICANT CHANGE UP (ref 1.8–7.4)
NEUTROPHILS NFR BLD AUTO: 40.4 % — LOW (ref 43–77)
PLATELET # BLD AUTO: 217 K/UL — SIGNIFICANT CHANGE UP (ref 150–400)
POTASSIUM SERPL-MCNC: 4.5 MMOL/L — SIGNIFICANT CHANGE UP (ref 3.5–5.3)
POTASSIUM SERPL-SCNC: 4.5 MMOL/L — SIGNIFICANT CHANGE UP (ref 3.5–5.3)
PROT SERPL-MCNC: 7.9 GM/DL — SIGNIFICANT CHANGE UP (ref 6–8.3)
PROTHROM AB SERPL-ACNC: 10.6 SEC — SIGNIFICANT CHANGE UP (ref 9.5–13)
RBC # BLD: 4.29 M/UL — SIGNIFICANT CHANGE UP (ref 3.8–5.2)
RBC # FLD: 13.5 % — SIGNIFICANT CHANGE UP (ref 10.3–14.5)
SODIUM SERPL-SCNC: 139 MMOL/L — SIGNIFICANT CHANGE UP (ref 135–145)
TROPONIN I, HIGH SENSITIVITY RESULT: 3.89 NG/L — SIGNIFICANT CHANGE UP
TROPONIN I, HIGH SENSITIVITY RESULT: <3 NG/L — SIGNIFICANT CHANGE UP
WBC # BLD: 6.65 K/UL — SIGNIFICANT CHANGE UP (ref 3.8–10.5)
WBC # FLD AUTO: 6.65 K/UL — SIGNIFICANT CHANGE UP (ref 3.8–10.5)

## 2024-06-24 PROCEDURE — 85027 COMPLETE CBC AUTOMATED: CPT

## 2024-06-24 PROCEDURE — 83036 HEMOGLOBIN GLYCOSYLATED A1C: CPT

## 2024-06-24 PROCEDURE — 80048 BASIC METABOLIC PNL TOTAL CA: CPT

## 2024-06-24 PROCEDURE — 99215 OFFICE O/P EST HI 40 MIN: CPT | Mod: 25

## 2024-06-24 PROCEDURE — 93017 CV STRESS TEST TRACING ONLY: CPT

## 2024-06-24 PROCEDURE — 93010 ELECTROCARDIOGRAM REPORT: CPT

## 2024-06-24 PROCEDURE — 78452 HT MUSCLE IMAGE SPECT MULT: CPT

## 2024-06-24 PROCEDURE — 93306 TTE W/DOPPLER COMPLETE: CPT

## 2024-06-24 PROCEDURE — 36415 COLL VENOUS BLD VENIPUNCTURE: CPT

## 2024-06-24 PROCEDURE — A9500: CPT

## 2024-06-24 PROCEDURE — G0378: CPT

## 2024-06-24 PROCEDURE — 84484 ASSAY OF TROPONIN QUANT: CPT

## 2024-06-24 PROCEDURE — 71046 X-RAY EXAM CHEST 2 VIEWS: CPT | Mod: 26

## 2024-06-24 PROCEDURE — 99285 EMERGENCY DEPT VISIT HI MDM: CPT

## 2024-06-24 PROCEDURE — 82962 GLUCOSE BLOOD TEST: CPT

## 2024-06-24 PROCEDURE — 76376 3D RENDER W/INTRP POSTPROCES: CPT

## 2024-06-24 PROCEDURE — 93000 ELECTROCARDIOGRAM COMPLETE: CPT | Mod: 59

## 2024-06-24 PROCEDURE — 93005 ELECTROCARDIOGRAM TRACING: CPT

## 2024-06-24 RX ORDER — ROSUVASTATIN CALCIUM 5 MG/1
1 TABLET ORAL
Refills: 0 | DISCHARGE

## 2024-06-24 RX ORDER — ASPIRIN 325 MG/1
324 TABLET, FILM COATED ORAL ONCE
Refills: 0 | Status: COMPLETED | OUTPATIENT
Start: 2024-06-24 | End: 2024-06-24

## 2024-06-24 RX ORDER — DAPAGLIFLOZIN 10 MG/1
0 TABLET, FILM COATED ORAL
Qty: 0 | Refills: 3 | DISCHARGE

## 2024-06-24 RX ORDER — CYCLOBENZAPRINE HYDROCHLORIDE 10 MG/1
1 TABLET, FILM COATED ORAL
Refills: 0 | DISCHARGE

## 2024-06-24 RX ORDER — HYOSCYAMINE SULFATE 0.13 MG
0 TABLET ORAL
Refills: 0 | DISCHARGE

## 2024-06-24 RX ORDER — MIRTAZAPINE 15 MG/1
1 TABLET, FILM COATED ORAL
Refills: 0 | DISCHARGE

## 2024-06-24 RX ORDER — ALOGLIPTIN 12.5 MG/1
1 TABLET, FILM COATED ORAL
Refills: 0 | DISCHARGE

## 2024-06-24 RX ORDER — ALIROCUMAB 75 MG/ML
150 INJECTION, SOLUTION SUBCUTANEOUS
Refills: 0 | DISCHARGE

## 2024-06-24 RX ORDER — MIRTAZAPINE 45 MG/1
1 TABLET, ORALLY DISINTEGRATING ORAL
Refills: 0 | DISCHARGE

## 2024-06-24 RX ORDER — ESZOPICLONE 2 MG/1
1 TABLET, COATED ORAL
Refills: 0 | DISCHARGE

## 2024-06-24 RX ORDER — APIXABAN 5 MG/1
1 TABLET, FILM COATED ORAL
Refills: 0 | DISCHARGE

## 2024-06-24 RX ORDER — ALIROCUMAB 75 MG/ML
0 INJECTION, SOLUTION SUBCUTANEOUS
Qty: 0 | Refills: 0 | DISCHARGE

## 2024-06-24 RX ORDER — PANTOPRAZOLE SODIUM 20 MG/1
1 TABLET, DELAYED RELEASE ORAL
Refills: 0 | DISCHARGE

## 2024-06-24 RX ORDER — METOPROLOL TARTRATE 50 MG
1 TABLET ORAL
Refills: 0 | DISCHARGE

## 2024-06-24 RX ORDER — ESZOPICLONE 2 MG/1
0 TABLET, COATED ORAL
Qty: 0 | Refills: 0 | DISCHARGE

## 2024-06-24 RX ORDER — ALPRAZOLAM 2 MG/1
1 TABLET ORAL
Refills: 0 | DISCHARGE

## 2024-06-24 RX ORDER — LISINOPRIL 2.5 MG/1
1 TABLET ORAL
Refills: 0 | DISCHARGE

## 2024-06-24 RX ORDER — CLOPIDOGREL BISULFATE 75 MG/1
1 TABLET, FILM COATED ORAL
Refills: 4 | DISCHARGE

## 2024-06-24 RX ORDER — LEFLUNOMIDE 20 MG/1
1 TABLET, FILM COATED ORAL
Refills: 0 | DISCHARGE

## 2024-06-24 RX ORDER — HYDROXYZINE PAMOATE 50 MG/1
2 CAPSULE ORAL
Refills: 0 | DISCHARGE

## 2024-06-24 RX ORDER — ALBUTEROL 90 UG/1
2 AEROSOL, METERED ORAL
Refills: 0 | DISCHARGE

## 2024-06-24 RX ORDER — GLIPIZIDE 5 MG
1 TABLET ORAL
Refills: 0 | DISCHARGE

## 2024-06-24 RX ORDER — ALBUTEROL 90 UG/1
1 AEROSOL, METERED ORAL
Qty: 0 | Refills: 0 | DISCHARGE

## 2024-06-24 RX ORDER — BUTALB/ACETAMINOPHEN/CAFFEINE 50-325-40
1 TABLET ORAL
Refills: 0 | DISCHARGE

## 2024-06-24 RX ORDER — ROSUVASTATIN CALCIUM 20 MG/1
1 TABLET ORAL
Refills: 0 | DISCHARGE

## 2024-06-24 RX ADMIN — ASPIRIN 324 MILLIGRAM(S): 325 TABLET, FILM COATED ORAL at 18:40

## 2024-06-25 ENCOUNTER — RESULT REVIEW (OUTPATIENT)
Age: 64
End: 2024-06-25

## 2024-06-25 DIAGNOSIS — E78.5 HYPERLIPIDEMIA, UNSPECIFIED: ICD-10-CM

## 2024-06-25 DIAGNOSIS — R07.89 OTHER CHEST PAIN: ICD-10-CM

## 2024-06-25 DIAGNOSIS — I48.0 PAROXYSMAL ATRIAL FIBRILLATION: ICD-10-CM

## 2024-06-25 DIAGNOSIS — I10 ESSENTIAL (PRIMARY) HYPERTENSION: ICD-10-CM

## 2024-06-25 DIAGNOSIS — I25.10 ATHEROSCLEROTIC HEART DISEASE OF NATIVE CORONARY ARTERY WITHOUT ANGINA PECTORIS: ICD-10-CM

## 2024-06-25 PROBLEM — J45.909 UNSPECIFIED ASTHMA, UNCOMPLICATED: Chronic | Status: ACTIVE | Noted: 2023-05-02

## 2024-06-25 PROBLEM — E11.9 TYPE 2 DIABETES MELLITUS WITHOUT COMPLICATIONS: Chronic | Status: ACTIVE | Noted: 2023-05-02

## 2024-06-25 LAB
GLUCOSE BLDC GLUCOMTR-MCNC: 180 MG/DL — HIGH (ref 70–99)
GLUCOSE BLDC GLUCOMTR-MCNC: 195 MG/DL — HIGH (ref 70–99)
TROPONIN I, HIGH SENSITIVITY RESULT: 3.6 NG/L — SIGNIFICANT CHANGE UP

## 2024-06-25 PROCEDURE — 93016 CV STRESS TEST SUPVJ ONLY: CPT

## 2024-06-25 PROCEDURE — 78452 HT MUSCLE IMAGE SPECT MULT: CPT | Mod: 26

## 2024-06-25 PROCEDURE — 93010 ELECTROCARDIOGRAM REPORT: CPT

## 2024-06-25 PROCEDURE — 76376 3D RENDER W/INTRP POSTPROCES: CPT | Mod: 26,59

## 2024-06-25 PROCEDURE — 99223 1ST HOSP IP/OBS HIGH 75: CPT

## 2024-06-25 PROCEDURE — 93306 TTE W/DOPPLER COMPLETE: CPT | Mod: 26

## 2024-06-25 PROCEDURE — 93018 CV STRESS TEST I&R ONLY: CPT

## 2024-06-25 PROCEDURE — 99223 1ST HOSP IP/OBS HIGH 75: CPT | Mod: 25

## 2024-06-25 RX ORDER — ONDANSETRON HYDROCHLORIDE 2 MG/ML
4 INJECTION INTRAMUSCULAR; INTRAVENOUS EVERY 8 HOURS
Refills: 0 | Status: DISCONTINUED | OUTPATIENT
Start: 2024-06-25 | End: 2024-06-26

## 2024-06-25 RX ORDER — ALBUTEROL 90 MCG
2 AEROSOL REFILL (GRAM) INHALATION EVERY 6 HOURS
Refills: 0 | Status: DISCONTINUED | OUTPATIENT
Start: 2024-06-25 | End: 2024-06-26

## 2024-06-25 RX ORDER — MORPHINE SULFATE 100 MG/1
2 TABLET, EXTENDED RELEASE ORAL ONCE
Refills: 0 | Status: DISCONTINUED | OUTPATIENT
Start: 2024-06-25 | End: 2024-06-25

## 2024-06-25 RX ORDER — NITROGLYCERIN 2.5 MG
0.4 CAPSULE, EXTENDED RELEASE ORAL ONCE
Refills: 0 | Status: COMPLETED | OUTPATIENT
Start: 2024-06-25 | End: 2024-06-25

## 2024-06-25 RX ORDER — DEXTROSE MONOHYDRATE 100 MG/ML
125 INJECTION, SOLUTION INTRAVENOUS ONCE
Refills: 0 | Status: DISCONTINUED | OUTPATIENT
Start: 2024-06-25 | End: 2024-06-26

## 2024-06-25 RX ORDER — DEXTROSE MONOHYDRATE AND SODIUM CHLORIDE 5; .3 G/100ML; G/100ML
1000 INJECTION, SOLUTION INTRAVENOUS
Refills: 0 | Status: DISCONTINUED | OUTPATIENT
Start: 2024-06-25 | End: 2024-06-26

## 2024-06-25 RX ORDER — MAGNESIUM, ALUMINUM HYDROXIDE 400-400
30 TABLET,CHEWABLE ORAL EVERY 4 HOURS
Refills: 0 | Status: DISCONTINUED | OUTPATIENT
Start: 2024-06-25 | End: 2024-06-26

## 2024-06-25 RX ORDER — APIXABAN 5 MG/1
5 TABLET, FILM COATED ORAL
Refills: 0 | Status: DISCONTINUED | OUTPATIENT
Start: 2024-06-25 | End: 2024-06-26

## 2024-06-25 RX ORDER — CLOPIDOGREL BISULFATE 75 MG/1
75 TABLET, FILM COATED ORAL DAILY
Refills: 0 | Status: DISCONTINUED | OUTPATIENT
Start: 2024-06-25 | End: 2024-06-26

## 2024-06-25 RX ORDER — ASPIRIN 325 MG/1
81 TABLET, FILM COATED ORAL DAILY
Refills: 0 | Status: DISCONTINUED | OUTPATIENT
Start: 2024-06-25 | End: 2024-06-26

## 2024-06-25 RX ORDER — GLUCAGON HYDROCHLORIDE 1 MG/ML
1 INJECTION, POWDER, FOR SOLUTION INTRAMUSCULAR; INTRAVENOUS; SUBCUTANEOUS ONCE
Refills: 0 | Status: DISCONTINUED | OUTPATIENT
Start: 2024-06-25 | End: 2024-06-26

## 2024-06-25 RX ORDER — MIRTAZAPINE 15 MG/1
30 TABLET, FILM COATED ORAL AT BEDTIME
Refills: 0 | Status: DISCONTINUED | OUTPATIENT
Start: 2024-06-25 | End: 2024-06-26

## 2024-06-25 RX ORDER — ALPRAZOLAM 2 MG/1
0.5 TABLET ORAL AT BEDTIME
Refills: 0 | Status: DISCONTINUED | OUTPATIENT
Start: 2024-06-25 | End: 2024-06-26

## 2024-06-25 RX ORDER — PANTOPRAZOLE SODIUM 40 MG/10ML
40 INJECTION, POWDER, FOR SOLUTION INTRAVENOUS
Refills: 0 | Status: DISCONTINUED | OUTPATIENT
Start: 2024-06-25 | End: 2024-06-26

## 2024-06-25 RX ORDER — ATORVASTATIN CALCIUM 20 MG/1
40 TABLET, FILM COATED ORAL AT BEDTIME
Refills: 0 | Status: DISCONTINUED | OUTPATIENT
Start: 2024-06-25 | End: 2024-06-26

## 2024-06-25 RX ORDER — METOPROLOL TARTRATE 50 MG
100 TABLET ORAL DAILY
Refills: 0 | Status: DISCONTINUED | OUTPATIENT
Start: 2024-06-25 | End: 2024-06-26

## 2024-06-25 RX ORDER — HYDROXYZINE PAMOATE 50 MG/1
50 CAPSULE ORAL AT BEDTIME
Refills: 0 | Status: DISCONTINUED | OUTPATIENT
Start: 2024-06-25 | End: 2024-06-26

## 2024-06-25 RX ORDER — DEXTROSE 30 % IN WATER 30 %
15 VIAL (ML) INTRAVENOUS ONCE
Refills: 0 | Status: DISCONTINUED | OUTPATIENT
Start: 2024-06-25 | End: 2024-06-26

## 2024-06-25 RX ORDER — ACETAMINOPHEN 325 MG
1000 TABLET ORAL ONCE
Refills: 0 | Status: COMPLETED | OUTPATIENT
Start: 2024-06-25 | End: 2024-06-26

## 2024-06-25 RX ORDER — INSULIN LISPRO 100 [IU]/ML
INJECTION, SOLUTION SUBCUTANEOUS
Refills: 0 | Status: DISCONTINUED | OUTPATIENT
Start: 2024-06-25 | End: 2024-06-26

## 2024-06-25 RX ORDER — HYDROMORPHONE HCL 0.2 MG/ML
0.5 INJECTION, SOLUTION INTRAVENOUS ONCE
Refills: 0 | Status: DISCONTINUED | OUTPATIENT
Start: 2024-06-25 | End: 2024-06-25

## 2024-06-25 RX ORDER — LISINOPRIL 5 MG/1
10 TABLET ORAL
Refills: 0 | Status: DISCONTINUED | OUTPATIENT
Start: 2024-06-25 | End: 2024-06-26

## 2024-06-25 RX ORDER — NITROGLYCERIN 2.5 MG
0.4 CAPSULE, EXTENDED RELEASE ORAL
Refills: 0 | Status: DISCONTINUED | OUTPATIENT
Start: 2024-06-25 | End: 2024-06-25

## 2024-06-25 RX ORDER — ACETAMINOPHEN 325 MG
650 TABLET ORAL EVERY 6 HOURS
Refills: 0 | Status: DISCONTINUED | OUTPATIENT
Start: 2024-06-25 | End: 2024-06-26

## 2024-06-25 RX ORDER — DEXTROSE 30 % IN WATER 30 %
25 VIAL (ML) INTRAVENOUS ONCE
Refills: 0 | Status: DISCONTINUED | OUTPATIENT
Start: 2024-06-25 | End: 2024-06-26

## 2024-06-25 RX ORDER — DEXTROSE 30 % IN WATER 30 %
12.5 VIAL (ML) INTRAVENOUS ONCE
Refills: 0 | Status: DISCONTINUED | OUTPATIENT
Start: 2024-06-25 | End: 2024-06-26

## 2024-06-25 RX ADMIN — HYDROMORPHONE HCL 0.5 MILLIGRAM(S): 0.2 INJECTION, SOLUTION INTRAVENOUS at 18:57

## 2024-06-25 RX ADMIN — LISINOPRIL 10 MILLIGRAM(S): 5 TABLET ORAL at 09:24

## 2024-06-25 RX ADMIN — Medication 100 MILLIGRAM(S): at 09:24

## 2024-06-25 RX ADMIN — ASPIRIN 81 MILLIGRAM(S): 325 TABLET, FILM COATED ORAL at 09:25

## 2024-06-25 RX ADMIN — APIXABAN 5 MILLIGRAM(S): 5 TABLET, FILM COATED ORAL at 08:57

## 2024-06-25 RX ADMIN — Medication 0.4 MILLIGRAM(S): at 03:26

## 2024-06-25 RX ADMIN — CLOPIDOGREL BISULFATE 75 MILLIGRAM(S): 75 TABLET, FILM COATED ORAL at 09:25

## 2024-06-25 RX ADMIN — Medication 0.4 MILLIGRAM(S): at 02:33

## 2024-06-25 RX ADMIN — MORPHINE SULFATE 2 MILLIGRAM(S): 100 TABLET, EXTENDED RELEASE ORAL at 16:47

## 2024-06-25 RX ADMIN — ATORVASTATIN CALCIUM 40 MILLIGRAM(S): 20 TABLET, FILM COATED ORAL at 21:27

## 2024-06-25 RX ADMIN — Medication 3 MILLIGRAM(S): at 21:27

## 2024-06-25 RX ADMIN — MIRTAZAPINE 30 MILLIGRAM(S): 15 TABLET, FILM COATED ORAL at 21:27

## 2024-06-25 RX ADMIN — Medication 0.4 MILLIGRAM(S): at 13:57

## 2024-06-25 RX ADMIN — ONDANSETRON HYDROCHLORIDE 4 MILLIGRAM(S): 2 INJECTION INTRAMUSCULAR; INTRAVENOUS at 20:21

## 2024-06-25 RX ADMIN — LISINOPRIL 10 MILLIGRAM(S): 5 TABLET ORAL at 21:27

## 2024-06-25 RX ADMIN — INSULIN LISPRO 2: 100 INJECTION, SOLUTION SUBCUTANEOUS at 14:16

## 2024-06-25 RX ADMIN — HYDROXYZINE PAMOATE 50 MILLIGRAM(S): 50 CAPSULE ORAL at 21:28

## 2024-06-25 RX ADMIN — Medication 650 MILLIGRAM(S): at 04:15

## 2024-06-25 RX ADMIN — Medication 650 MILLIGRAM(S): at 02:32

## 2024-06-25 RX ADMIN — PANTOPRAZOLE SODIUM 40 MILLIGRAM(S): 40 INJECTION, POWDER, FOR SOLUTION INTRAVENOUS at 09:25

## 2024-06-25 RX ADMIN — APIXABAN 5 MILLIGRAM(S): 5 TABLET, FILM COATED ORAL at 21:28

## 2024-06-25 RX ADMIN — INSULIN LISPRO 2: 100 INJECTION, SOLUTION SUBCUTANEOUS at 17:05

## 2024-06-25 RX ADMIN — HYDROMORPHONE HCL 0.5 MILLIGRAM(S): 0.2 INJECTION, SOLUTION INTRAVENOUS at 18:25

## 2024-06-25 RX ADMIN — Medication 0.4 MILLIGRAM(S): at 14:50

## 2024-06-25 RX ADMIN — MORPHINE SULFATE 2 MILLIGRAM(S): 100 TABLET, EXTENDED RELEASE ORAL at 15:47

## 2024-06-26 ENCOUNTER — TRANSCRIPTION ENCOUNTER (OUTPATIENT)
Age: 64
End: 2024-06-26

## 2024-06-26 VITALS — SYSTOLIC BLOOD PRESSURE: 110 MMHG | DIASTOLIC BLOOD PRESSURE: 66 MMHG | HEART RATE: 85 BPM

## 2024-06-26 LAB
A1C WITH ESTIMATED AVERAGE GLUCOSE RESULT: 8 % — HIGH (ref 4–5.6)
ANION GAP SERPL CALC-SCNC: 6 MMOL/L — SIGNIFICANT CHANGE UP (ref 5–17)
BUN SERPL-MCNC: 28 MG/DL — HIGH (ref 7–23)
CALCIUM SERPL-MCNC: 9.1 MG/DL — SIGNIFICANT CHANGE UP (ref 8.5–10.1)
CHLORIDE SERPL-SCNC: 110 MMOL/L — HIGH (ref 96–108)
CO2 SERPL-SCNC: 23 MMOL/L — SIGNIFICANT CHANGE UP (ref 22–31)
CREAT SERPL-MCNC: 0.89 MG/DL — SIGNIFICANT CHANGE UP (ref 0.5–1.3)
EGFR: 73 ML/MIN/1.73M2 — SIGNIFICANT CHANGE UP
ESTIMATED AVERAGE GLUCOSE: 183 MG/DL — HIGH (ref 68–114)
GLUCOSE BLDC GLUCOMTR-MCNC: 139 MG/DL — HIGH (ref 70–99)
GLUCOSE BLDC GLUCOMTR-MCNC: 146 MG/DL — HIGH (ref 70–99)
GLUCOSE SERPL-MCNC: 157 MG/DL — HIGH (ref 70–99)
HCT VFR BLD CALC: 36.4 % — SIGNIFICANT CHANGE UP (ref 34.5–45)
HGB BLD-MCNC: 11.7 G/DL — SIGNIFICANT CHANGE UP (ref 11.5–15.5)
MCHC RBC-ENTMCNC: 29.3 PG — SIGNIFICANT CHANGE UP (ref 27–34)
MCHC RBC-ENTMCNC: 32.1 GM/DL — SIGNIFICANT CHANGE UP (ref 32–36)
MCV RBC AUTO: 91.2 FL — SIGNIFICANT CHANGE UP (ref 80–100)
PLATELET # BLD AUTO: 204 K/UL — SIGNIFICANT CHANGE UP (ref 150–400)
POTASSIUM SERPL-MCNC: 3.7 MMOL/L — SIGNIFICANT CHANGE UP (ref 3.5–5.3)
POTASSIUM SERPL-SCNC: 3.7 MMOL/L — SIGNIFICANT CHANGE UP (ref 3.5–5.3)
RBC # BLD: 3.99 M/UL — SIGNIFICANT CHANGE UP (ref 3.8–5.2)
RBC # FLD: 13.5 % — SIGNIFICANT CHANGE UP (ref 10.3–14.5)
SODIUM SERPL-SCNC: 139 MMOL/L — SIGNIFICANT CHANGE UP (ref 135–145)
WBC # BLD: 6.51 K/UL — SIGNIFICANT CHANGE UP (ref 3.8–10.5)
WBC # FLD AUTO: 6.51 K/UL — SIGNIFICANT CHANGE UP (ref 3.8–10.5)

## 2024-06-26 PROCEDURE — 99239 HOSP IP/OBS DSCHRG MGMT >30: CPT

## 2024-06-26 PROCEDURE — 99233 SBSQ HOSP IP/OBS HIGH 50: CPT

## 2024-06-26 RX ORDER — ASPIRIN/CALCIUM CARB/MAGNESIUM 324 MG
1 TABLET ORAL
Refills: 0 | DISCHARGE

## 2024-06-26 RX ORDER — ISOSORBIDE MONONITRATE 30 MG/1
30 TABLET, EXTENDED RELEASE ORAL DAILY
Refills: 0 | Status: DISCONTINUED | OUTPATIENT
Start: 2024-06-26 | End: 2024-06-26

## 2024-06-26 RX ORDER — ISOSORBIDE MONONITRATE 30 MG/1
1 TABLET, EXTENDED RELEASE ORAL
Qty: 30 | Refills: 0
Start: 2024-06-26 | End: 2024-07-25

## 2024-06-26 RX ORDER — METOPROLOL TARTRATE 50 MG
1 TABLET ORAL
Qty: 0 | Refills: 0 | DISCHARGE

## 2024-06-26 RX ADMIN — Medication 400 MILLIGRAM(S): at 10:00

## 2024-06-26 RX ADMIN — LISINOPRIL 10 MILLIGRAM(S): 5 TABLET ORAL at 10:01

## 2024-06-26 RX ADMIN — CLOPIDOGREL BISULFATE 75 MILLIGRAM(S): 75 TABLET, FILM COATED ORAL at 10:01

## 2024-06-26 RX ADMIN — Medication 100 MILLIGRAM(S): at 10:01

## 2024-06-26 RX ADMIN — ASPIRIN 81 MILLIGRAM(S): 325 TABLET, FILM COATED ORAL at 10:01

## 2024-06-26 RX ADMIN — Medication 1000 MILLIGRAM(S): at 11:00

## 2024-06-26 RX ADMIN — APIXABAN 5 MILLIGRAM(S): 5 TABLET, FILM COATED ORAL at 10:01

## 2024-06-26 RX ADMIN — PANTOPRAZOLE SODIUM 40 MILLIGRAM(S): 40 INJECTION, POWDER, FOR SOLUTION INTRAVENOUS at 06:04

## 2024-06-27 ENCOUNTER — NON-APPOINTMENT (OUTPATIENT)
Age: 64
End: 2024-06-27

## 2024-06-27 ENCOUNTER — APPOINTMENT (OUTPATIENT)
Dept: CARDIOLOGY | Facility: CLINIC | Age: 64
End: 2024-06-27
Payer: MEDICAID

## 2024-06-27 ENCOUNTER — APPOINTMENT (OUTPATIENT)
Dept: INTERNAL MEDICINE | Facility: CLINIC | Age: 64
End: 2024-06-27

## 2024-06-27 VITALS
BODY MASS INDEX: 26 KG/M2 | HEIGHT: 59 IN | DIASTOLIC BLOOD PRESSURE: 76 MMHG | SYSTOLIC BLOOD PRESSURE: 126 MMHG | OXYGEN SATURATION: 96 % | WEIGHT: 129 LBS | HEART RATE: 110 BPM

## 2024-06-27 DIAGNOSIS — I10 ESSENTIAL (PRIMARY) HYPERTENSION: ICD-10-CM

## 2024-06-27 DIAGNOSIS — I25.10 ATHEROSCLEROTIC HEART DISEASE OF NATIVE CORONARY ARTERY W/OUT ANGINA PECTORIS: ICD-10-CM

## 2024-06-27 DIAGNOSIS — R07.89 OTHER CHEST PAIN: ICD-10-CM

## 2024-06-27 PROCEDURE — 99214 OFFICE O/P EST MOD 30 MIN: CPT | Mod: 25

## 2024-06-27 PROCEDURE — 93000 ELECTROCARDIOGRAM COMPLETE: CPT

## 2024-06-28 ENCOUNTER — APPOINTMENT (OUTPATIENT)
Dept: INTERNAL MEDICINE | Facility: CLINIC | Age: 64
End: 2024-06-28

## 2024-07-18 ENCOUNTER — APPOINTMENT (OUTPATIENT)
Dept: CARDIOLOGY | Facility: CLINIC | Age: 64
End: 2024-07-18

## 2024-07-19 ENCOUNTER — APPOINTMENT (OUTPATIENT)
Dept: INTERNAL MEDICINE | Facility: CLINIC | Age: 64
End: 2024-07-19

## 2024-07-19 ENCOUNTER — APPOINTMENT (OUTPATIENT)
Dept: PULMONOLOGY | Facility: CLINIC | Age: 64
End: 2024-07-19

## 2024-07-24 ENCOUNTER — NON-APPOINTMENT (OUTPATIENT)
Age: 64
End: 2024-07-24

## 2024-07-25 ENCOUNTER — APPOINTMENT (OUTPATIENT)
Dept: ULTRASOUND IMAGING | Facility: CLINIC | Age: 64
End: 2024-07-25
Payer: MEDICAID

## 2024-07-25 PROCEDURE — 93970 EXTREMITY STUDY: CPT | Mod: 26

## 2024-07-30 ENCOUNTER — NON-APPOINTMENT (OUTPATIENT)
Age: 64
End: 2024-07-30

## 2024-08-07 ENCOUNTER — APPOINTMENT (OUTPATIENT)
Dept: ENDOCRINOLOGY | Facility: CLINIC | Age: 64
End: 2024-08-07

## 2024-09-13 ENCOUNTER — APPOINTMENT (OUTPATIENT)
Dept: ELECTROPHYSIOLOGY | Facility: CLINIC | Age: 64
End: 2024-09-13

## 2024-09-17 ENCOUNTER — APPOINTMENT (OUTPATIENT)
Dept: OBGYN | Facility: CLINIC | Age: 64
End: 2024-09-17
Payer: MEDICAID

## 2024-09-17 ENCOUNTER — APPOINTMENT (OUTPATIENT)
Dept: ENDOCRINOLOGY | Facility: CLINIC | Age: 64
End: 2024-09-17
Payer: MEDICAID

## 2024-09-17 VITALS
DIASTOLIC BLOOD PRESSURE: 86 MMHG | OXYGEN SATURATION: 97 % | HEIGHT: 59 IN | HEART RATE: 73 BPM | TEMPERATURE: 97.6 F | BODY MASS INDEX: 26.81 KG/M2 | SYSTOLIC BLOOD PRESSURE: 150 MMHG | WEIGHT: 133 LBS

## 2024-09-17 VITALS
OXYGEN SATURATION: 97 % | HEIGHT: 59 IN | TEMPERATURE: 97.6 F | BODY MASS INDEX: 27.21 KG/M2 | WEIGHT: 135 LBS | HEART RATE: 75 BPM | DIASTOLIC BLOOD PRESSURE: 80 MMHG | SYSTOLIC BLOOD PRESSURE: 132 MMHG | RESPIRATION RATE: 16 BRPM

## 2024-09-17 DIAGNOSIS — I10 ESSENTIAL (PRIMARY) HYPERTENSION: ICD-10-CM

## 2024-09-17 DIAGNOSIS — Z12.31 ENCOUNTER FOR SCREENING MAMMOGRAM FOR MALIGNANT NEOPLASM OF BREAST: ICD-10-CM

## 2024-09-17 DIAGNOSIS — E11.9 TYPE 2 DIABETES MELLITUS W/OUT COMPLICATIONS: ICD-10-CM

## 2024-09-17 DIAGNOSIS — Z01.419 ENCOUNTER FOR GYNECOLOGICAL EXAMINATION (GENERAL) (ROUTINE) W/OUT ABNORMAL FINDINGS: ICD-10-CM

## 2024-09-17 DIAGNOSIS — Z11.3 ENCOUNTER FOR SCREENING FOR INFECTIONS WITH A PREDOMINANTLY SEXUAL MODE OF TRANSMISSION: ICD-10-CM

## 2024-09-17 DIAGNOSIS — R92.30 DENSE BREASTS, UNSPECIFIED: ICD-10-CM

## 2024-09-17 DIAGNOSIS — Z78.0 ASYMPTOMATIC MENOPAUSAL STATE: ICD-10-CM

## 2024-09-17 LAB — HBA1C MFR BLD HPLC: 8.2

## 2024-09-17 PROCEDURE — 83036 HEMOGLOBIN GLYCOSYLATED A1C: CPT | Mod: QW

## 2024-09-17 PROCEDURE — 95251 CONT GLUC MNTR ANALYSIS I&R: CPT

## 2024-09-17 PROCEDURE — 99214 OFFICE O/P EST MOD 30 MIN: CPT

## 2024-09-17 PROCEDURE — G2211 COMPLEX E/M VISIT ADD ON: CPT | Mod: NC

## 2024-09-17 PROCEDURE — 99386 PREV VISIT NEW AGE 40-64: CPT

## 2024-09-17 RX ORDER — BLOOD-GLUCOSE SENSOR
EACH MISCELLANEOUS
Qty: 9 | Refills: 3 | Status: ACTIVE | COMMUNITY
Start: 2024-09-17 | End: 1900-01-01

## 2024-09-17 RX ORDER — SEMAGLUTIDE 0.68 MG/ML
2 INJECTION, SOLUTION SUBCUTANEOUS
Qty: 1 | Refills: 3 | Status: ACTIVE | COMMUNITY
Start: 2024-09-17 | End: 1900-01-01

## 2024-09-17 NOTE — PROCEDURE
[FreeTextEntry1] : CGM interpretation: Device: DEXCOM  Period: Wed Sep 4, 2024 - Tue Sep 17, 2024 Findings:  Percent in range: 51 %, Percent low/very low BGs: 0 %, Percent high/very high BGs: 49 % Interpretation - Patient has hyperglycemia

## 2024-09-17 NOTE — PHYSICAL EXAM
[de-identified] : DM foot exam done on 02/02/2024: No ulcers seen in feet Dorsalis pedis pulses normal bilaterally Sensation to 10 g monofilament normal in feet bilaterally. [de-identified] : General: No distress, well nourished Eyes: Normal Sclera, EOMI, PERRL ENT: Normal appearance of the nose, normal oropharynx Neck/Thyroid: No cervical lymphadenopathy, thyroid gland 20 g in size, no thyroid nodules, non-tender Respiratory: No use of accessory muscles of respiration, vesicular breath sounds heard bilaterally, no crepitations or ronchi Cardiovascular: S1 and S2 heard and normal, no S3 or S4, no murmurs, radial pulse normal bilaterally Abdomen: soft, non-tender, no masses, normal bowel sounds Musculoskeletal: No swelling or deformities of joints of hands, no pedal edema Neurological: Normal range of motion in the hands, Normal brachioradialis reflexes bilaterally Psychiatry: Patient converses normally, good judgement and insight Skin: No rashes in hands, no nodules palpated in hands  Skin: No rashes seen on face

## 2024-09-17 NOTE — HISTORY OF PRESENT ILLNESS
[FreeTextEntry1] : Problems: 1. DM type 2 2. Hypertension 3. Hyperlipidemia - I defer management of this to Cardiology  DM type 2 1. Diagnosed in 2018 2. Meds: Alogliptin 25 mg po daily (no pancreatitis, no personal or family history of medullary thyroid cancer) Farxiga 10 mg po daily - No UTIs or genital candidiasis Glipizide ER 2.5 mg po daily  Patient had nausea and vomiting with metformin.  3.  CGM - DEXCOM 7 - patient uses [facility] and states her insurance pays for this - patient is connected to the clinic  - 80 to 200, no hypoglycemic unawareness 4. No on Aspirin - patient is on Plavix and Eliquis as patient has atrial fibrillation, on rosuvastatin 10 mg po daily, on Praluent, on lisinopril 10 mg po bid 5. Complications: No DM nephropathy (normal creatinine, neg urine microalbumin panel in June 2023) No DM retinopathy (last eye exam was in October 2023, patient advised on the need for annual DM eye exam) Has CAD s/p stents, had stroke in the past, No other ASCVD No foot ulcers/amputation 6. Patient never smoked cigarettes

## 2024-09-17 NOTE — ASSESSMENT
[FreeTextEntry1] : Target: HbA1c < 7%, BP < 130/80  HbA1c is above goal so I increased the dose of Glipizide ER. The patient wishes to use Ozempic so I discontinued alogliptin and I prescribed Ozempic.  BP is above goal but I will monitor this for now as BP fluctuates between at goal and elevated.   Patient is on Aspirin, no indication for ACEi/ARB - I defer management of statin to Cardiology who prescribes Praluent  Last lipid panel - Jan 2024 - , Trig 211 Last HbA1c - 09/17/2024 - 8.2% Last Vitamin B12 - N/A Last urine albumin panel - June 2023 - neg Last BMP/CMP -  May 2024 - Cr, K, AST, ALT N  CGM INTERPRETATION DONE ON 09/17/2024  Plan: 1. Increase Glipizide ER to 5 mg po daily 2. Discontinue aloglipitin 3. Start Ozempic 0.25 mg sc once weekly for 4 weeks and then increase to 0.5 mg sc once weekly 4. CGM - DEXCOM 7 - patient says her insurance pays for this - patient is connected to the clinic 5. No labs ordered 6. Follow up in 2 months to review meter.
Normal rate, regular rhythm.  Heart sounds S1, S2.

## 2024-09-19 LAB — HPV HIGH+LOW RISK DNA PNL CVX: NOT DETECTED

## 2024-09-22 LAB — CYTOLOGY CVX/VAG DOC THIN PREP: NORMAL

## 2024-09-30 ENCOUNTER — TRANSCRIPTION ENCOUNTER (OUTPATIENT)
Age: 64
End: 2024-09-30

## 2024-09-30 DIAGNOSIS — N95.2 POSTMENOPAUSAL ATROPHIC VAGINITIS: ICD-10-CM

## 2024-09-30 RX ORDER — ESTRADIOL 0.1 MG/G
0.1 CREAM VAGINAL
Qty: 1 | Refills: 1 | Status: ACTIVE | COMMUNITY
Start: 2024-09-30 | End: 1900-01-01

## 2024-10-09 ENCOUNTER — APPOINTMENT (OUTPATIENT)
Dept: MAMMOGRAPHY | Facility: CLINIC | Age: 64
End: 2024-10-09
Payer: MEDICAID

## 2024-10-09 ENCOUNTER — OUTPATIENT (OUTPATIENT)
Dept: OUTPATIENT SERVICES | Facility: HOSPITAL | Age: 64
LOS: 1 days | End: 2024-10-09
Payer: MEDICAID

## 2024-10-09 ENCOUNTER — APPOINTMENT (OUTPATIENT)
Dept: ULTRASOUND IMAGING | Facility: CLINIC | Age: 64
End: 2024-10-09
Payer: MEDICAID

## 2024-10-09 ENCOUNTER — RESULT REVIEW (OUTPATIENT)
Age: 64
End: 2024-10-09

## 2024-10-09 ENCOUNTER — APPOINTMENT (OUTPATIENT)
Dept: RADIOLOGY | Facility: CLINIC | Age: 64
End: 2024-10-09
Payer: MEDICAID

## 2024-10-09 DIAGNOSIS — Z78.0 ASYMPTOMATIC MENOPAUSAL STATE: ICD-10-CM

## 2024-10-09 DIAGNOSIS — Z90.49 ACQUIRED ABSENCE OF OTHER SPECIFIED PARTS OF DIGESTIVE TRACT: Chronic | ICD-10-CM

## 2024-10-09 DIAGNOSIS — Z95.5 PRESENCE OF CORONARY ANGIOPLASTY IMPLANT AND GRAFT: Chronic | ICD-10-CM

## 2024-10-09 PROCEDURE — 77063 BREAST TOMOSYNTHESIS BI: CPT | Mod: 26

## 2024-10-09 PROCEDURE — 76641 ULTRASOUND BREAST COMPLETE: CPT

## 2024-10-09 PROCEDURE — 76641 ULTRASOUND BREAST COMPLETE: CPT | Mod: 26,50

## 2024-10-09 PROCEDURE — 77067 SCR MAMMO BI INCL CAD: CPT

## 2024-10-09 PROCEDURE — 77080 DXA BONE DENSITY AXIAL: CPT | Mod: 26

## 2024-10-09 PROCEDURE — 77067 SCR MAMMO BI INCL CAD: CPT | Mod: 26

## 2024-10-09 PROCEDURE — 77063 BREAST TOMOSYNTHESIS BI: CPT

## 2024-10-09 PROCEDURE — 77080 DXA BONE DENSITY AXIAL: CPT

## 2024-10-11 ENCOUNTER — APPOINTMENT (OUTPATIENT)
Dept: CARDIOLOGY | Facility: CLINIC | Age: 64
End: 2024-10-11
Payer: MEDICAID

## 2024-10-11 ENCOUNTER — NON-APPOINTMENT (OUTPATIENT)
Age: 64
End: 2024-10-11

## 2024-10-11 VITALS
HEART RATE: 98 BPM | DIASTOLIC BLOOD PRESSURE: 70 MMHG | SYSTOLIC BLOOD PRESSURE: 110 MMHG | BODY MASS INDEX: 24.84 KG/M2 | WEIGHT: 123 LBS | OXYGEN SATURATION: 99 %

## 2024-10-11 DIAGNOSIS — R07.89 OTHER CHEST PAIN: ICD-10-CM

## 2024-10-11 DIAGNOSIS — I10 ESSENTIAL (PRIMARY) HYPERTENSION: ICD-10-CM

## 2024-10-11 PROCEDURE — 93000 ELECTROCARDIOGRAM COMPLETE: CPT

## 2024-10-11 PROCEDURE — 99214 OFFICE O/P EST MOD 30 MIN: CPT

## 2024-10-11 PROCEDURE — G2211 COMPLEX E/M VISIT ADD ON: CPT | Mod: NC

## 2024-10-25 ENCOUNTER — INPATIENT (INPATIENT)
Facility: HOSPITAL | Age: 64
LOS: 45 days | Discharge: HOME HEALTH | DRG: 950 | End: 2024-12-10
Attending: INTERNAL MEDICINE | Admitting: STUDENT IN AN ORGANIZED HEALTH CARE EDUCATION/TRAINING PROGRAM
Payer: MEDICAID

## 2024-10-25 VITALS
OXYGEN SATURATION: 98 % | RESPIRATION RATE: 18 BRPM | DIASTOLIC BLOOD PRESSURE: 84 MMHG | SYSTOLIC BLOOD PRESSURE: 126 MMHG | WEIGHT: 121.47 LBS | TEMPERATURE: 98 F | HEART RATE: 110 BPM

## 2024-10-25 DIAGNOSIS — R11.2 NAUSEA WITH VOMITING, UNSPECIFIED: ICD-10-CM

## 2024-10-25 DIAGNOSIS — Z95.5 PRESENCE OF CORONARY ANGIOPLASTY IMPLANT AND GRAFT: Chronic | ICD-10-CM

## 2024-10-25 DIAGNOSIS — Z90.49 ACQUIRED ABSENCE OF OTHER SPECIFIED PARTS OF DIGESTIVE TRACT: Chronic | ICD-10-CM

## 2024-10-25 LAB
ALBUMIN SERPL ELPH-MCNC: 3.9 G/DL — SIGNIFICANT CHANGE UP (ref 3.3–5)
ALP SERPL-CCNC: 152 U/L — HIGH (ref 40–120)
ALT FLD-CCNC: 54 U/L — SIGNIFICANT CHANGE UP (ref 12–78)
ANION GAP SERPL CALC-SCNC: 7 MMOL/L — SIGNIFICANT CHANGE UP (ref 5–17)
APPEARANCE UR: ABNORMAL
AST SERPL-CCNC: 32 U/L — SIGNIFICANT CHANGE UP (ref 15–37)
BACTERIA # UR AUTO: ABNORMAL /HPF
BASOPHILS # BLD AUTO: 0.02 K/UL — SIGNIFICANT CHANGE UP (ref 0–0.2)
BASOPHILS NFR BLD AUTO: 0.4 % — SIGNIFICANT CHANGE UP (ref 0–2)
BILIRUB SERPL-MCNC: 0.4 MG/DL — SIGNIFICANT CHANGE UP (ref 0.2–1.2)
BILIRUB UR-MCNC: ABNORMAL
BLD GP AB SCN SERPL QL: SIGNIFICANT CHANGE UP
BUN SERPL-MCNC: 16 MG/DL — SIGNIFICANT CHANGE UP (ref 7–23)
CALCIUM SERPL-MCNC: 9.7 MG/DL — SIGNIFICANT CHANGE UP (ref 8.5–10.1)
CAST: 4 /LPF — SIGNIFICANT CHANGE UP (ref 0–4)
CHLORIDE SERPL-SCNC: 107 MMOL/L — SIGNIFICANT CHANGE UP (ref 96–108)
CO2 SERPL-SCNC: 26 MMOL/L — SIGNIFICANT CHANGE UP (ref 22–31)
COLOR SPEC: SIGNIFICANT CHANGE UP
CREAT SERPL-MCNC: 0.86 MG/DL — SIGNIFICANT CHANGE UP (ref 0.5–1.3)
DIFF PNL FLD: NEGATIVE — SIGNIFICANT CHANGE UP
EGFR: 76 ML/MIN/1.73M2 — SIGNIFICANT CHANGE UP
EOSINOPHIL # BLD AUTO: 0.36 K/UL — SIGNIFICANT CHANGE UP (ref 0–0.5)
EOSINOPHIL NFR BLD AUTO: 6.8 % — HIGH (ref 0–6)
GLUCOSE BLDC GLUCOMTR-MCNC: 205 MG/DL — HIGH (ref 70–99)
GLUCOSE BLDC GLUCOMTR-MCNC: 68 MG/DL — LOW (ref 70–99)
GLUCOSE BLDC GLUCOMTR-MCNC: 73 MG/DL — SIGNIFICANT CHANGE UP (ref 70–99)
GLUCOSE SERPL-MCNC: 101 MG/DL — HIGH (ref 70–99)
GLUCOSE UR QL: >=1000 MG/DL
HCT VFR BLD CALC: 42.5 % — SIGNIFICANT CHANGE UP (ref 34.5–45)
HGB BLD-MCNC: 13.3 G/DL — SIGNIFICANT CHANGE UP (ref 11.5–15.5)
IMM GRANULOCYTES NFR BLD AUTO: 0.2 % — SIGNIFICANT CHANGE UP (ref 0–0.9)
KETONES UR-MCNC: NEGATIVE MG/DL — SIGNIFICANT CHANGE UP
LACTATE SERPL-SCNC: 1 MMOL/L — SIGNIFICANT CHANGE UP (ref 0.7–2)
LEUKOCYTE ESTERASE UR-ACNC: NEGATIVE — SIGNIFICANT CHANGE UP
LIDOCAIN IGE QN: 71 U/L — SIGNIFICANT CHANGE UP (ref 13–75)
LYMPHOCYTES # BLD AUTO: 2.07 K/UL — SIGNIFICANT CHANGE UP (ref 1–3.3)
LYMPHOCYTES # BLD AUTO: 39.2 % — SIGNIFICANT CHANGE UP (ref 13–44)
MCHC RBC-ENTMCNC: 28.2 PG — SIGNIFICANT CHANGE UP (ref 27–34)
MCHC RBC-ENTMCNC: 31.3 GM/DL — LOW (ref 32–36)
MCV RBC AUTO: 90 FL — SIGNIFICANT CHANGE UP (ref 80–100)
MONOCYTES # BLD AUTO: 0.58 K/UL — SIGNIFICANT CHANGE UP (ref 0–0.9)
MONOCYTES NFR BLD AUTO: 11 % — SIGNIFICANT CHANGE UP (ref 2–14)
NEUTROPHILS # BLD AUTO: 2.24 K/UL — SIGNIFICANT CHANGE UP (ref 1.8–7.4)
NEUTROPHILS NFR BLD AUTO: 42.4 % — LOW (ref 43–77)
NITRITE UR-MCNC: NEGATIVE — SIGNIFICANT CHANGE UP
PH UR: 5 — SIGNIFICANT CHANGE UP (ref 5–8)
PLATELET # BLD AUTO: 212 K/UL — SIGNIFICANT CHANGE UP (ref 150–400)
POTASSIUM SERPL-MCNC: 4 MMOL/L — SIGNIFICANT CHANGE UP (ref 3.5–5.3)
POTASSIUM SERPL-SCNC: 4 MMOL/L — SIGNIFICANT CHANGE UP (ref 3.5–5.3)
PROT SERPL-MCNC: 8.4 GM/DL — HIGH (ref 6–8.3)
PROT UR-MCNC: 30 MG/DL
RBC # BLD: 4.72 M/UL — SIGNIFICANT CHANGE UP (ref 3.8–5.2)
RBC # FLD: 14 % — SIGNIFICANT CHANGE UP (ref 10.3–14.5)
RBC CASTS # UR COMP ASSIST: 1 /HPF — SIGNIFICANT CHANGE UP (ref 0–4)
SODIUM SERPL-SCNC: 140 MMOL/L — SIGNIFICANT CHANGE UP (ref 135–145)
SP GR SPEC: >1.03 — HIGH (ref 1–1.03)
SQUAMOUS # UR AUTO: 9 /HPF — HIGH (ref 0–5)
TROPONIN I, HIGH SENSITIVITY RESULT: 4.65 NG/L — SIGNIFICANT CHANGE UP
UROBILINOGEN FLD QL: 1 MG/DL — SIGNIFICANT CHANGE UP (ref 0.2–1)
WBC # BLD: 5.28 K/UL — SIGNIFICANT CHANGE UP (ref 3.8–10.5)
WBC # FLD AUTO: 5.28 K/UL — SIGNIFICANT CHANGE UP (ref 3.8–10.5)
WBC UR QL: 4 /HPF — SIGNIFICANT CHANGE UP (ref 0–5)

## 2024-10-25 PROCEDURE — 86850 RBC ANTIBODY SCREEN: CPT

## 2024-10-25 PROCEDURE — 93005 ELECTROCARDIOGRAM TRACING: CPT

## 2024-10-25 PROCEDURE — 76000 FLUOROSCOPY <1 HR PHYS/QHP: CPT

## 2024-10-25 PROCEDURE — 84100 ASSAY OF PHOSPHORUS: CPT

## 2024-10-25 PROCEDURE — C1874: CPT

## 2024-10-25 PROCEDURE — 97116 GAIT TRAINING THERAPY: CPT | Mod: GP

## 2024-10-25 PROCEDURE — 85730 THROMBOPLASTIN TIME PARTIAL: CPT

## 2024-10-25 PROCEDURE — 97530 THERAPEUTIC ACTIVITIES: CPT | Mod: GP

## 2024-10-25 PROCEDURE — 80053 COMPREHEN METABOLIC PANEL: CPT

## 2024-10-25 PROCEDURE — 36569 INSJ PICC 5 YR+ W/O IMAGING: CPT

## 2024-10-25 PROCEDURE — 87040 BLOOD CULTURE FOR BACTERIA: CPT

## 2024-10-25 PROCEDURE — L8699: CPT

## 2024-10-25 PROCEDURE — 74018 RADEX ABDOMEN 1 VIEW: CPT

## 2024-10-25 PROCEDURE — C1751: CPT

## 2024-10-25 PROCEDURE — 82150 ASSAY OF AMYLASE: CPT

## 2024-10-25 PROCEDURE — C1889: CPT

## 2024-10-25 PROCEDURE — 84478 ASSAY OF TRIGLYCERIDES: CPT

## 2024-10-25 PROCEDURE — 86900 BLOOD TYPING SEROLOGIC ABO: CPT

## 2024-10-25 PROCEDURE — 85610 PROTHROMBIN TIME: CPT

## 2024-10-25 PROCEDURE — 80076 HEPATIC FUNCTION PANEL: CPT

## 2024-10-25 PROCEDURE — 99223 1ST HOSP IP/OBS HIGH 75: CPT

## 2024-10-25 PROCEDURE — 88305 TISSUE EXAM BY PATHOLOGIST: CPT

## 2024-10-25 PROCEDURE — 97163 PT EVAL HIGH COMPLEX 45 MIN: CPT | Mod: GP

## 2024-10-25 PROCEDURE — 97162 PT EVAL MOD COMPLEX 30 MIN: CPT | Mod: GP

## 2024-10-25 PROCEDURE — 83735 ASSAY OF MAGNESIUM: CPT

## 2024-10-25 PROCEDURE — 84443 ASSAY THYROID STIM HORMONE: CPT

## 2024-10-25 PROCEDURE — 93010 ELECTROCARDIOGRAM REPORT: CPT

## 2024-10-25 PROCEDURE — 84484 ASSAY OF TROPONIN QUANT: CPT

## 2024-10-25 PROCEDURE — 76376 3D RENDER W/INTRP POSTPROCES: CPT

## 2024-10-25 PROCEDURE — C2617: CPT

## 2024-10-25 PROCEDURE — 88312 SPECIAL STAINS GROUP 1: CPT

## 2024-10-25 PROCEDURE — 86901 BLOOD TYPING SEROLOGIC RH(D): CPT

## 2024-10-25 PROCEDURE — 82306 VITAMIN D 25 HYDROXY: CPT

## 2024-10-25 PROCEDURE — C1769: CPT

## 2024-10-25 PROCEDURE — 71045 X-RAY EXAM CHEST 1 VIEW: CPT

## 2024-10-25 PROCEDURE — 85014 HEMATOCRIT: CPT

## 2024-10-25 PROCEDURE — 93356 MYOCRD STRAIN IMG SPCKL TRCK: CPT

## 2024-10-25 PROCEDURE — 74177 CT ABD & PELVIS W/CONTRAST: CPT | Mod: 26,MC

## 2024-10-25 PROCEDURE — 93306 TTE W/DOPPLER COMPLETE: CPT

## 2024-10-25 PROCEDURE — A9541: CPT

## 2024-10-25 PROCEDURE — 83605 ASSAY OF LACTIC ACID: CPT

## 2024-10-25 PROCEDURE — 81001 URINALYSIS AUTO W/SCOPE: CPT

## 2024-10-25 PROCEDURE — 80048 BASIC METABOLIC PNL TOTAL CA: CPT

## 2024-10-25 PROCEDURE — C1894: CPT

## 2024-10-25 PROCEDURE — 88313 SPECIAL STAINS GROUP 2: CPT

## 2024-10-25 PROCEDURE — 94640 AIRWAY INHALATION TREATMENT: CPT

## 2024-10-25 PROCEDURE — C1887: CPT

## 2024-10-25 PROCEDURE — 85018 HEMOGLOBIN: CPT

## 2024-10-25 PROCEDURE — 83036 HEMOGLOBIN GLYCOSYLATED A1C: CPT

## 2024-10-25 PROCEDURE — 78264 GASTRIC EMPTYING IMG STUDY: CPT | Mod: MC

## 2024-10-25 PROCEDURE — 74181 MRI ABDOMEN W/O CONTRAST: CPT | Mod: MC

## 2024-10-25 PROCEDURE — 74177 CT ABD & PELVIS W/CONTRAST: CPT | Mod: MC

## 2024-10-25 PROCEDURE — 76705 ECHO EXAM OF ABDOMEN: CPT

## 2024-10-25 PROCEDURE — 49441 PLACE DUOD/JEJ TUBE PERC: CPT

## 2024-10-25 PROCEDURE — 99285 EMERGENCY DEPT VISIT HI MDM: CPT

## 2024-10-25 PROCEDURE — 36415 COLL VENOUS BLD VENIPUNCTURE: CPT

## 2024-10-25 PROCEDURE — 85027 COMPLETE CBC AUTOMATED: CPT

## 2024-10-25 PROCEDURE — 71045 X-RAY EXAM CHEST 1 VIEW: CPT | Mod: 26

## 2024-10-25 PROCEDURE — 83690 ASSAY OF LIPASE: CPT

## 2024-10-25 PROCEDURE — 82962 GLUCOSE BLOOD TEST: CPT

## 2024-10-25 PROCEDURE — C2625: CPT

## 2024-10-25 RX ORDER — ACETAMINOPHEN 500MG 500 MG/1
650 TABLET, COATED ORAL EVERY 6 HOURS
Refills: 0 | Status: DISCONTINUED | OUTPATIENT
Start: 2024-10-25 | End: 2024-12-10

## 2024-10-25 RX ORDER — ACETAMINOPHEN, DIPHENHYDRAMINE HCL, PHENYLEPHRINE HCL 325; 25; 5 MG/1; MG/1; MG/1
3 TABLET ORAL AT BEDTIME
Refills: 0 | Status: DISCONTINUED | OUTPATIENT
Start: 2024-10-25 | End: 2024-10-25

## 2024-10-25 RX ORDER — ACETAMINOPHEN 500MG 500 MG/1
650 TABLET, COATED ORAL EVERY 6 HOURS
Refills: 0 | Status: DISCONTINUED | OUTPATIENT
Start: 2024-10-25 | End: 2024-10-25

## 2024-10-25 RX ORDER — ONDANSETRON HYDROCHLORIDE 4 MG/1
4 TABLET, FILM COATED ORAL EVERY 6 HOURS
Refills: 0 | Status: DISCONTINUED | OUTPATIENT
Start: 2024-10-25 | End: 2024-12-10

## 2024-10-25 RX ORDER — ONDANSETRON HYDROCHLORIDE 4 MG/1
4 TABLET, FILM COATED ORAL EVERY 8 HOURS
Refills: 0 | Status: DISCONTINUED | OUTPATIENT
Start: 2024-10-25 | End: 2024-10-25

## 2024-10-25 RX ORDER — METOCLOPRAMIDE HYDROCHLORIDE 10 MG/1
5 TABLET ORAL EVERY 6 HOURS
Refills: 0 | Status: DISCONTINUED | OUTPATIENT
Start: 2024-10-25 | End: 2024-10-31

## 2024-10-25 RX ORDER — HYDROMORPHONE HYDROCHLORIDE 2 MG/1
0.5 TABLET ORAL EVERY 6 HOURS
Refills: 0 | Status: DISCONTINUED | OUTPATIENT
Start: 2024-10-25 | End: 2024-10-28

## 2024-10-25 RX ORDER — ALBUTEROL 90 MCG
1 AEROSOL (GRAM) INHALATION EVERY 6 HOURS
Refills: 0 | Status: DISCONTINUED | OUTPATIENT
Start: 2024-10-25 | End: 2024-11-13

## 2024-10-25 RX ORDER — APIXABAN 2.5 MG/1
5 TABLET, FILM COATED ORAL EVERY 12 HOURS
Refills: 0 | Status: DISCONTINUED | OUTPATIENT
Start: 2024-10-25 | End: 2024-10-27

## 2024-10-25 RX ORDER — 0.9 % SODIUM CHLORIDE 0.9 %
1000 INTRAVENOUS SOLUTION INTRAVENOUS
Refills: 0 | Status: DISCONTINUED | OUTPATIENT
Start: 2024-10-25 | End: 2024-10-26

## 2024-10-25 RX ORDER — 0.9 % SODIUM CHLORIDE 0.9 %
1000 INTRAVENOUS SOLUTION INTRAVENOUS
Refills: 0 | Status: DISCONTINUED | OUTPATIENT
Start: 2024-10-25 | End: 2024-12-10

## 2024-10-25 RX ORDER — MIRTAZAPINE 15 MG/1
30 TABLET, FILM COATED ORAL AT BEDTIME
Refills: 0 | Status: DISCONTINUED | OUTPATIENT
Start: 2024-10-25 | End: 2024-10-28

## 2024-10-25 RX ORDER — INFLUENZA VIRUS VACCINE 15; 15; 15; 15 UG/.5ML; UG/.5ML; UG/.5ML; UG/.5ML
0.5 SUSPENSION INTRAMUSCULAR ONCE
Refills: 0 | Status: DISCONTINUED | OUTPATIENT
Start: 2024-10-25 | End: 2024-12-10

## 2024-10-25 RX ORDER — METOPROLOL TARTRATE 100 MG/1
100 TABLET, FILM COATED ORAL DAILY
Refills: 0 | Status: DISCONTINUED | OUTPATIENT
Start: 2024-10-25 | End: 2024-11-29

## 2024-10-25 RX ORDER — PANTOPRAZOLE SODIUM 40 MG/1
40 TABLET, DELAYED RELEASE ORAL EVERY 12 HOURS
Refills: 0 | Status: DISCONTINUED | OUTPATIENT
Start: 2024-10-25 | End: 2024-11-02

## 2024-10-25 RX ORDER — SODIUM CHLORIDE 9 MG/ML
1000 INJECTION, SOLUTION INTRAMUSCULAR; INTRAVENOUS; SUBCUTANEOUS ONCE
Refills: 0 | Status: COMPLETED | OUTPATIENT
Start: 2024-10-25 | End: 2024-10-25

## 2024-10-25 RX ORDER — MAGNESIUM, ALUMINUM HYDROXIDE 200-225/5
30 SUSPENSION, ORAL (FINAL DOSE FORM) ORAL EVERY 4 HOURS
Refills: 0 | Status: DISCONTINUED | OUTPATIENT
Start: 2024-10-25 | End: 2024-10-25

## 2024-10-25 RX ORDER — GLUCAGON INJECTION, SOLUTION 0.5 MG/.1ML
1 INJECTION, SOLUTION SUBCUTANEOUS ONCE
Refills: 0 | Status: DISCONTINUED | OUTPATIENT
Start: 2024-10-25 | End: 2024-12-10

## 2024-10-25 RX ORDER — ROSUVASTATIN CALCIUM 5 MG/1
10 TABLET, FILM COATED ORAL AT BEDTIME
Refills: 0 | Status: DISCONTINUED | OUTPATIENT
Start: 2024-10-25 | End: 2024-12-10

## 2024-10-25 RX ORDER — HYDROXYZINE HYDROCHLORIDE 25 MG/1
25 TABLET, FILM COATED ORAL EVERY 24 HOURS
Refills: 0 | Status: DISCONTINUED | OUTPATIENT
Start: 2024-10-25 | End: 2024-10-28

## 2024-10-25 RX ORDER — ACETAMINOPHEN 500MG 500 MG/1
1000 TABLET, COATED ORAL ONCE
Refills: 0 | Status: COMPLETED | OUTPATIENT
Start: 2024-10-25 | End: 2024-10-25

## 2024-10-25 RX ORDER — FAMOTIDINE 20 MG/1
20 TABLET, FILM COATED ORAL ONCE
Refills: 0 | Status: COMPLETED | OUTPATIENT
Start: 2024-10-25 | End: 2024-10-25

## 2024-10-25 RX ORDER — ISOSORBIDE MONONITRATE 10 MG
30 TABLET ORAL EVERY 24 HOURS
Refills: 0 | Status: DISCONTINUED | OUTPATIENT
Start: 2024-10-25 | End: 2024-10-26

## 2024-10-25 RX ORDER — LISINOPRIL 20 MG/1
10 TABLET ORAL DAILY
Refills: 0 | Status: DISCONTINUED | OUTPATIENT
Start: 2024-10-25 | End: 2024-10-26

## 2024-10-25 RX ORDER — MAGNESIUM, ALUMINUM HYDROXIDE 200-225/5
30 SUSPENSION, ORAL (FINAL DOSE FORM) ORAL EVERY 4 HOURS
Refills: 0 | Status: DISCONTINUED | OUTPATIENT
Start: 2024-10-25 | End: 2024-12-10

## 2024-10-25 RX ORDER — ONDANSETRON HYDROCHLORIDE 4 MG/1
4 TABLET, FILM COATED ORAL ONCE
Refills: 0 | Status: COMPLETED | OUTPATIENT
Start: 2024-10-25 | End: 2024-10-25

## 2024-10-25 RX ORDER — CLOPIDOGREL 75 MG/1
75 TABLET, FILM COATED ORAL DAILY
Refills: 0 | Status: DISCONTINUED | OUTPATIENT
Start: 2024-10-25 | End: 2024-10-28

## 2024-10-25 RX ADMIN — PANTOPRAZOLE SODIUM 40 MILLIGRAM(S): 40 TABLET, DELAYED RELEASE ORAL at 22:21

## 2024-10-25 RX ADMIN — HYDROXYZINE HYDROCHLORIDE 25 MILLIGRAM(S): 25 TABLET, FILM COATED ORAL at 16:26

## 2024-10-25 RX ADMIN — HYDROMORPHONE HYDROCHLORIDE 0.5 MILLIGRAM(S): 2 TABLET ORAL at 22:21

## 2024-10-25 RX ADMIN — APIXABAN 5 MILLIGRAM(S): 2.5 TABLET, FILM COATED ORAL at 22:22

## 2024-10-25 RX ADMIN — Medication 30 MILLIGRAM(S): at 20:24

## 2024-10-25 RX ADMIN — Medication 1 PUFF(S): at 14:12

## 2024-10-25 RX ADMIN — SODIUM CHLORIDE 1000 MILLILITER(S): 9 INJECTION, SOLUTION INTRAMUSCULAR; INTRAVENOUS; SUBCUTANEOUS at 08:23

## 2024-10-25 RX ADMIN — Medication 2 MILLIGRAM(S): at 09:40

## 2024-10-25 RX ADMIN — ROSUVASTATIN CALCIUM 10 MILLIGRAM(S): 5 TABLET, FILM COATED ORAL at 22:20

## 2024-10-25 RX ADMIN — CLOPIDOGREL 75 MILLIGRAM(S): 75 TABLET, FILM COATED ORAL at 18:46

## 2024-10-25 RX ADMIN — ONDANSETRON HYDROCHLORIDE 4 MILLIGRAM(S): 4 TABLET, FILM COATED ORAL at 08:24

## 2024-10-25 RX ADMIN — ACETAMINOPHEN 500MG 1000 MILLIGRAM(S): 500 TABLET, COATED ORAL at 09:00

## 2024-10-25 RX ADMIN — HYDROMORPHONE HYDROCHLORIDE 0.5 MILLIGRAM(S): 2 TABLET ORAL at 23:20

## 2024-10-25 RX ADMIN — ACETAMINOPHEN 500MG 400 MILLIGRAM(S): 500 TABLET, COATED ORAL at 08:23

## 2024-10-25 RX ADMIN — Medication 30 MILLILITER(S): at 16:31

## 2024-10-25 RX ADMIN — Medication 4 MILLIGRAM(S): at 09:00

## 2024-10-25 RX ADMIN — Medication 1 PUFF(S): at 20:24

## 2024-10-25 RX ADMIN — ACETAMINOPHEN 500MG 650 MILLIGRAM(S): 500 TABLET, COATED ORAL at 16:32

## 2024-10-25 RX ADMIN — MIRTAZAPINE 30 MILLIGRAM(S): 15 TABLET, FILM COATED ORAL at 22:19

## 2024-10-25 RX ADMIN — FAMOTIDINE 20 MILLIGRAM(S): 20 TABLET, FILM COATED ORAL at 08:24

## 2024-10-25 RX ADMIN — Medication 2 MILLIGRAM(S): at 10:00

## 2024-10-25 RX ADMIN — METOPROLOL TARTRATE 100 MILLIGRAM(S): 100 TABLET, FILM COATED ORAL at 18:45

## 2024-10-25 RX ADMIN — Medication 25 GRAM(S): at 16:47

## 2024-10-25 RX ADMIN — Medication 4 MILLIGRAM(S): at 08:24

## 2024-10-25 RX ADMIN — Medication 60 MILLILITER(S): at 14:22

## 2024-10-25 RX ADMIN — LISINOPRIL 10 MILLIGRAM(S): 20 TABLET ORAL at 18:45

## 2024-10-25 NOTE — H&P ADULT - NSHPLABSRESULTS_GEN_ALL_CORE
.  LABS:                         13.3   5.28  )-----------( 212      ( 25 Oct 2024 08:04 )             42.5     10-25    140  |  107  |  16  ----------------------------<  101[H]  4.0   |  26  |  0.86    Ca    9.7      25 Oct 2024 08:04    TPro  8.4[H]  /  Alb  3.9  /  TBili  0.4  /  DBili  x   /  AST  32  /  ALT  54  /  AlkPhos  152[H]  10-25      Urinalysis Basic - ( 25 Oct 2024 08:04 )    Color: Dark Yellow / Appearance: Cloudy / SG: >1.030 / pH: x  Gluc: 101 mg/dL / Ketone: Negative mg/dL  / Bili: Small / Urobili: 1.0 mg/dL   Blood: x / Protein: 30 mg/dL / Nitrite: Negative   Leuk Esterase: Negative / RBC: 1 /HPF / WBC 4 /HPF   Sq Epi: x / Non Sq Epi: 9 /HPF / Bacteria: Few /HPF        Lactate, Blood: 1.0 mmol/L (10-25 @ 08:04)      RADIOLOGY, EKG & ADDITIONAL TESTS: Reviewed.

## 2024-10-25 NOTE — ED PROVIDER NOTE - NSICDXPASTMEDICALHX_GEN_ALL_CORE_FT
PAST MEDICAL HISTORY:  Asthma     Benign essential HTN     CAD (coronary artery disease)     CAD (coronary artery disease)     DM (diabetes mellitus)     FAP (familial adenomatous polyposis)     GERD (gastroesophageal reflux disease)     H/O lichen planus     History of rectal bleeding     HLD (hyperlipidemia)     HTN (hypertension)     Insomnia     Stented coronary artery     Type 2 diabetes mellitus

## 2024-10-25 NOTE — ED ADULT NURSE REASSESSMENT NOTE - NS ED NURSE REASSESS COMMENT FT1
Rounding completed. PT c/o pain after pain medications. Md aware. No other  complaints or concerns at this time. Call bell in reach. Update provided.

## 2024-10-25 NOTE — ED PROVIDER NOTE - CLINICAL SUMMARY MEDICAL DECISION MAKING FREE TEXT BOX
pt with abdominal pain with n/v for 5 days since taking second dose of ozempic. will get labs and ct r/o pathology, hydrate, morphine for pain and zofran for nausea

## 2024-10-25 NOTE — H&P ADULT - HISTORY OF PRESENT ILLNESS
64-year-old female with history of GERD  HTN, DM, polyps, and afib with 5 cardiac stents. presents with epigastric pain for the last couple of weeks.  Her daughter brought her in, after speaking with her GI doctor who stated patient should not have been on ozempic before and probably have taken insulin instead. She states that she had just uptitrated her dose of ozempic to 0.5 from 0.25 earlier this week. She had been on the 0.25 for 5 weeks. She states the pain bothers her more than the nausea and vomiting. Patient states she feels very minimal relief since she came in.  She denies NSAID use, bleeding or black stools. She reports 10lb weight loss and has been on ozempic for her diabetes. Denies diarrhea or urinary symptoms.     In the ED  Vitals Temp 97.8  /84 O2 sat 98% on RA  Labs WBC 5.28 Hg 13.3 Plt 212 Na 140 K 4 Cl 107 Bicarb 26 BUN 16 Cr 0.86 Glucose 101 Protein 8.4 ALbumin 4 AST 32 ALT 54 Lipase 71 Trop I 4.65  Imaging CTAP   Etiology of abdominal pain is not elucidated  Intervention zofran, ofirmev, pepcid, morphine 2,  4, 1 L NS

## 2024-10-25 NOTE — PROVIDER CONTACT NOTE (HYPOGLYCEMIA EVENT) - NS PROVIDER CONTACT BACKGROUND-HYPO
Age: 64y    Gender: Female    POCT Blood Glucose:  68 mg/dL (10-25-24 @ 16:33)  73 mg/dL (10-25-24 @ 16:10)      eMAR:dextrose 50% Injectable   25 Gram(s) IV Push (10-25-24 @ 16:47)

## 2024-10-25 NOTE — PATIENT PROFILE ADULT - VISION (WITH CORRECTIVE LENSES IF THE PATIENT USUALLY WEARS THEM):
reading glasses, not with pt/Partially impaired: cannot see medication labels or newsprint, but can see obstacles in path, and the surrounding layout; can count fingers at arm's length

## 2024-10-25 NOTE — ED PROVIDER NOTE - PROGRESS NOTE DETAILS
pt unable to eat or drink, ct negative of abdomen, , dw pt and family. need to admit for hydration,   will admit,  spoke with Dr. Kevan Lew DO

## 2024-10-25 NOTE — ED ADULT NURSE NOTE - OBJECTIVE STATEMENT
Pt A&Ox4, presents to the ED c/o generalized abdominal pain x2 months and n/v x2 weeks. Pt reports 10lb weight loss. Denies diarrhea or urinary symptoms. PMH of HTN, DM, polyps, and afib with 5 cardiac stents. Pt takes Plavix and Eliquis. Pt started taking Ozempic 1.5 months ago. Pt saw new GI doctor yesterday and was told to come to the ED for further evaluation. Family at the bedside.

## 2024-10-25 NOTE — H&P ADULT - NSVTERISKREFERASSESS_GEN_ALL_CORE
Refer to the Assessment tab to view/cancel completed assessment.
Is This A New Presentation, Or A Follow-Up?: Acne

## 2024-10-25 NOTE — ED ADULT TRIAGE NOTE - CHIEF COMPLAINT QUOTE
Pt ambulatory to the ED with c/o abdominal pain and vomiting for a few weeks with poor PO intake. Pt saw gastro and advised to come to ED. Pt has hx ulcers and polyps. No acute distress noted upon assessment.

## 2024-10-25 NOTE — ED ADULT NURSE NOTE - NSFALLUNIVINTERV_ED_ALL_ED
Bed/Stretcher in lowest position, wheels locked, appropriate side rails in place/Call bell, personal items and telephone in reach/Instruct patient to call for assistance before getting out of bed/chair/stretcher/Non-slip footwear applied when patient is off stretcher/[place] to call system/Physically safe environment - no spills, clutter or unnecessary equipment/Purposeful proactive rounding/Room/bathroom lighting operational, light cord in reach

## 2024-10-25 NOTE — H&P ADULT - ASSESSMENT
64-year-old female with history of GERD  HTN, DM, polyps, and afib cad 5 cardiac stents. presents with epigastric pain for the last couple of weeks likely due to ozempic use being admitted for nausea/pain contrl  .   #inability to tolerate PO  patient with recent inability to tolerate PO - recent ozempic start in the past two months  per daughter has a history of ulcers and polyps  hemoglobin stable - no concern for bleeding  could be gastritis in the setting of vomiting from ozempic   patient also on glimepiride at home  responded well to zofran here  -c/w zofran q4 pRN with reglan as second line   -c/w ivf @ 60 cc/hr while not toelrating PO   -c/w carafate and PPI  -c/w maalox    #HTN  patient with a hx of htn - on metoprolol lisinopril and imdur  -c/w home meds with hold parameters    #DM  patient with a hx of diabetes on ozempic and glimepiride  -holding agents in house, f/u a1c  -c/w mISS    #afib  patient with hx of afib now in sinus on home meds of eliquis and   -c/w home meds    #cad  patient with a  hx cad s/p 5 stents last were 2 years ago  states her cardiologist wants her to continue on her plavix  -c/w plavix     #prophylactic measure  F: none  E: replete as needed  N: clear liquid  DVT ppx: eliquis

## 2024-10-25 NOTE — ED PROVIDER NOTE - OBJECTIVE STATEMENT
64-year-old female with history of GERD presents with epigastric pain for the last couple of weeks.  Patient's daughter brought her in, after speaking with her GI doctor that a lot of.  Patient is NAD daughter is asking for CAT scan and endoscopy because patient has polyposis.

## 2024-10-26 LAB
A1C WITH ESTIMATED AVERAGE GLUCOSE RESULT: 6.6 % — HIGH (ref 4–5.6)
ALBUMIN SERPL ELPH-MCNC: 3 G/DL — LOW (ref 3.3–5)
ALP SERPL-CCNC: 186 U/L — HIGH (ref 40–120)
ALT FLD-CCNC: 234 U/L — HIGH (ref 12–78)
ANION GAP SERPL CALC-SCNC: 4 MMOL/L — LOW (ref 5–17)
AST SERPL-CCNC: 159 U/L — HIGH (ref 15–37)
BILIRUB SERPL-MCNC: 0.6 MG/DL — SIGNIFICANT CHANGE UP (ref 0.2–1.2)
BUN SERPL-MCNC: 9 MG/DL — SIGNIFICANT CHANGE UP (ref 7–23)
CALCIUM SERPL-MCNC: 8.9 MG/DL — SIGNIFICANT CHANGE UP (ref 8.5–10.1)
CHLORIDE SERPL-SCNC: 109 MMOL/L — HIGH (ref 96–108)
CO2 SERPL-SCNC: 27 MMOL/L — SIGNIFICANT CHANGE UP (ref 22–31)
CREAT SERPL-MCNC: 0.81 MG/DL — SIGNIFICANT CHANGE UP (ref 0.5–1.3)
EGFR: 82 ML/MIN/1.73M2 — SIGNIFICANT CHANGE UP
ESTIMATED AVERAGE GLUCOSE: 143 MG/DL — HIGH (ref 68–114)
GLUCOSE BLDC GLUCOMTR-MCNC: 100 MG/DL — HIGH (ref 70–99)
GLUCOSE BLDC GLUCOMTR-MCNC: 82 MG/DL — SIGNIFICANT CHANGE UP (ref 70–99)
GLUCOSE BLDC GLUCOMTR-MCNC: 97 MG/DL — SIGNIFICANT CHANGE UP (ref 70–99)
GLUCOSE BLDC GLUCOMTR-MCNC: 99 MG/DL — SIGNIFICANT CHANGE UP (ref 70–99)
GLUCOSE SERPL-MCNC: 115 MG/DL — HIGH (ref 70–99)
HCT VFR BLD CALC: 35.2 % — SIGNIFICANT CHANGE UP (ref 34.5–45)
HGB BLD-MCNC: 11.2 G/DL — LOW (ref 11.5–15.5)
MAGNESIUM SERPL-MCNC: 1.9 MG/DL — SIGNIFICANT CHANGE UP (ref 1.6–2.6)
MCHC RBC-ENTMCNC: 28.6 PG — SIGNIFICANT CHANGE UP (ref 27–34)
MCHC RBC-ENTMCNC: 31.8 GM/DL — LOW (ref 32–36)
MCV RBC AUTO: 90 FL — SIGNIFICANT CHANGE UP (ref 80–100)
PLATELET # BLD AUTO: 165 K/UL — SIGNIFICANT CHANGE UP (ref 150–400)
POTASSIUM SERPL-MCNC: 4 MMOL/L — SIGNIFICANT CHANGE UP (ref 3.5–5.3)
POTASSIUM SERPL-SCNC: 4 MMOL/L — SIGNIFICANT CHANGE UP (ref 3.5–5.3)
PROT SERPL-MCNC: 6.6 GM/DL — SIGNIFICANT CHANGE UP (ref 6–8.3)
RBC # BLD: 3.91 M/UL — SIGNIFICANT CHANGE UP (ref 3.8–5.2)
RBC # FLD: 14.6 % — HIGH (ref 10.3–14.5)
SODIUM SERPL-SCNC: 140 MMOL/L — SIGNIFICANT CHANGE UP (ref 135–145)
TROPONIN I, HIGH SENSITIVITY RESULT: 3.13 NG/L — SIGNIFICANT CHANGE UP
WBC # BLD: 4.59 K/UL — SIGNIFICANT CHANGE UP (ref 3.8–10.5)
WBC # FLD AUTO: 4.59 K/UL — SIGNIFICANT CHANGE UP (ref 3.8–10.5)

## 2024-10-26 PROCEDURE — 99233 SBSQ HOSP IP/OBS HIGH 50: CPT

## 2024-10-26 PROCEDURE — 93010 ELECTROCARDIOGRAM REPORT: CPT

## 2024-10-26 PROCEDURE — 76705 ECHO EXAM OF ABDOMEN: CPT | Mod: 26

## 2024-10-26 RX ORDER — BUTALB/ACETAMINOPHEN/CAFFEINE 50-325-40
1 TABLET ORAL
Refills: 0 | Status: DISCONTINUED | OUTPATIENT
Start: 2024-10-26 | End: 2024-10-31

## 2024-10-26 RX ORDER — 0.9 % SODIUM CHLORIDE 0.9 %
1000 INTRAVENOUS SOLUTION INTRAVENOUS ONCE
Refills: 0 | Status: COMPLETED | OUTPATIENT
Start: 2024-10-26 | End: 2024-10-26

## 2024-10-26 RX ORDER — ISOSORBIDE MONONITRATE 10 MG
30 TABLET ORAL DAILY
Refills: 0 | Status: DISCONTINUED | OUTPATIENT
Start: 2024-10-27 | End: 2024-10-28

## 2024-10-26 RX ORDER — 0.9 % SODIUM CHLORIDE 0.9 %
1000 INTRAVENOUS SOLUTION INTRAVENOUS
Refills: 0 | Status: DISCONTINUED | OUTPATIENT
Start: 2024-10-26 | End: 2024-11-01

## 2024-10-26 RX ADMIN — Medication 100 MILLILITER(S): at 17:52

## 2024-10-26 RX ADMIN — PANTOPRAZOLE SODIUM 40 MILLIGRAM(S): 40 TABLET, DELAYED RELEASE ORAL at 21:47

## 2024-10-26 RX ADMIN — ROSUVASTATIN CALCIUM 10 MILLIGRAM(S): 5 TABLET, FILM COATED ORAL at 21:45

## 2024-10-26 RX ADMIN — MIRTAZAPINE 30 MILLIGRAM(S): 15 TABLET, FILM COATED ORAL at 21:45

## 2024-10-26 RX ADMIN — APIXABAN 5 MILLIGRAM(S): 2.5 TABLET, FILM COATED ORAL at 21:45

## 2024-10-26 RX ADMIN — Medication 1 PUFF(S): at 21:12

## 2024-10-26 RX ADMIN — HYDROMORPHONE HYDROCHLORIDE 0.5 MILLIGRAM(S): 2 TABLET ORAL at 21:54

## 2024-10-26 RX ADMIN — PANTOPRAZOLE SODIUM 40 MILLIGRAM(S): 40 TABLET, DELAYED RELEASE ORAL at 09:30

## 2024-10-26 RX ADMIN — Medication 1 PUFF(S): at 08:53

## 2024-10-26 RX ADMIN — CLOPIDOGREL 75 MILLIGRAM(S): 75 TABLET, FILM COATED ORAL at 09:31

## 2024-10-26 RX ADMIN — Medication 1 PUFF(S): at 13:50

## 2024-10-26 RX ADMIN — Medication 1000 MILLILITER(S): at 09:26

## 2024-10-26 RX ADMIN — HYDROXYZINE HYDROCHLORIDE 25 MILLIGRAM(S): 25 TABLET, FILM COATED ORAL at 17:00

## 2024-10-26 RX ADMIN — APIXABAN 5 MILLIGRAM(S): 2.5 TABLET, FILM COATED ORAL at 09:31

## 2024-10-26 RX ADMIN — HYDROMORPHONE HYDROCHLORIDE 0.5 MILLIGRAM(S): 2 TABLET ORAL at 13:06

## 2024-10-26 NOTE — PROGRESS NOTE ADULT - SUBJECTIVE AND OBJECTIVE BOX
HOSPITALIST ATTENDING PROGRESS NOTE    Chart and meds reviewed.  Patient seen and examined.    CC: inability to tolerate po     Subjective: Patient complaining of migraine for which she uses as needed Fioricet for.  Overnight  Patient had hypoglycemic episode due to poor p.o. intake.  Patient states that she has no appetite. has not been eating CLD. says she does not feel like eating     All other systems reviewed and found to be negative with the exception of what has been described above.    MEDICATIONS  (STANDING):  albuterol    90 MICROgram(s) HFA Inhaler 1 Puff(s) Inhalation every 6 hours  apixaban 5 milliGRAM(s) Oral every 12 hours  clopidogrel Tablet 75 milliGRAM(s) Oral daily  dextrose 5%. 1000 milliLiter(s) (100 mL/Hr) IV Continuous <Continuous>  dextrose 5%. 1000 milliLiter(s) (50 mL/Hr) IV Continuous <Continuous>  dextrose 50% Injectable 25 Gram(s) IV Push once  dextrose 50% Injectable 25 Gram(s) IV Push once  dextrose 50% Injectable 12.5 Gram(s) IV Push once  glucagon  Injectable 1 milliGRAM(s) IntraMuscular once  hydrOXYzine hydrochloride 25 milliGRAM(s) Oral every 24 hours  influenza   Vaccine 0.5 milliLiter(s) IntraMuscular once  insulin lispro (ADMELOG) corrective regimen sliding scale   SubCutaneous three times a day before meals  isosorbide   mononitrate ER Tablet (IMDUR) 30 milliGRAM(s) Oral every 24 hours  lactated ringers. 1000 milliLiter(s) (60 mL/Hr) IV Continuous <Continuous>  lisinopril 10 milliGRAM(s) Oral daily  metoprolol succinate  milliGRAM(s) Oral daily  mirtazapine 30 milliGRAM(s) Oral at bedtime  pantoprazole  Injectable 40 milliGRAM(s) IV Push every 12 hours  rosuvastatin 10 milliGRAM(s) Oral at bedtime    MEDICATIONS  (PRN):  acetaminophen     Tablet .. 650 milliGRAM(s) Oral every 6 hours PRN Moderate Pain (4 - 6)  acetaminophen 325 mG/butalbital 50 mG/caffeine 40 mG 1 Tablet(s) Oral <User Schedule> PRN headache  aluminum hydroxide/magnesium hydroxide/simethicone Suspension 30 milliLiter(s) Oral every 4 hours PRN Dyspepsia  dextrose Oral Gel 15 Gram(s) Oral once PRN Blood Glucose LESS THAN 70 milliGRAM(s)/deciliter  HYDROmorphone  Injectable 0.5 milliGRAM(s) IV Push every 6 hours PRN Severe Pain (7 - 10)  metoclopramide Injectable 5 milliGRAM(s) IV Push every 6 hours PRN nausea refractory to zofran  ondansetron Injectable 4 milliGRAM(s) IV Push every 6 hours PRN Nausea and/or Vomiting      VITALS:  T(F): 97.8 (10-26-24 @ 06:00), Max: 99.4 (10-25-24 @ 18:10)  HR: 111 (10-26-24 @ 13:57) (88 - 120)  BP: 103/51 (10-26-24 @ 13:57) (92/51 - 131/92)  RR: 17 (10-26-24 @ 13:57) (16 - 19)  SpO2: 94% (10-26-24 @ 13:57) (94% - 99%)  Wt(kg): --    I&O's Summary      CAPILLARY BLOOD GLUCOSE      POCT Blood Glucose.: 100 mg/dL (26 Oct 2024 17:05)  POCT Blood Glucose.: 82 mg/dL (26 Oct 2024 11:46)  POCT Blood Glucose.: 97 mg/dL (26 Oct 2024 08:56)      PHYSICAL EXAM:  Gen: NAD  HEENT:  pupils equal and reactive, EOMI, no oropharyngeal lesions, erythema, exudates, oral thrush  NECK:   supple, no carotid bruits, no palpable lymph nodes, no thyromegaly  CV:  +S1, +S2, regular, no murmurs or rubs  RESP:   lungs clear to auscultation bilaterally, no wheezing, rales, rhonchi, good air entry bilaterally  BREAST:  not examined  GI:  abdomen soft, non-tender, non-distended, normal BS, no bruits, no abdominal masses, no palpable masses  RECTAL:  not examined  :  not examined  MSK:   normal muscle tone, no atrophy, no rigidity, no contractions  EXT:  no clubbing, no cyanosis, no edema, no calf pain, swelling or erythema  VASCULAR:  pulses equal and symmetric in the upper and lower extremities  NEURO:  AAOX3, no focal neurological deficits, follows all commands, able to move extremities spontaneously; no focal deficits      LABS:                            11.2   4.59  )-----------( 165      ( 26 Oct 2024 09:23 )             35.2     10-26    140  |  109[H]  |  9   ----------------------------<  115[H]  4.0   |  27  |  0.81    Ca    8.9      26 Oct 2024 09:23  Mg     1.9     10-26    TPro  6.6  /  Alb  3.0[L]  /  TBili  0.6  /  DBili  x   /  AST  159[H]  /  ALT  234[H]  /  AlkPhos  186[H]  10-26        LIVER FUNCTIONS - ( 26 Oct 2024 09:23 )  Alb: 3.0 g/dL / Pro: 6.6 gm/dL / ALK PHOS: 186 U/L / ALT: 234 U/L / AST: 159 U/L / GGT: x             Urinalysis Basic - ( 26 Oct 2024 09:23 )    Color: x / Appearance: x / SG: x / pH: x  Gluc: 115 mg/dL / Ketone: x  / Bili: x / Urobili: x   Blood: x / Protein: x / Nitrite: x   Leuk Esterase: x / RBC: x / WBC x   Sq Epi: x / Non Sq Epi: x / Bacteria: x              CULTURES:      Additional results/Imaging, I have personally reviewed:    Telemetry, personally reviewed:

## 2024-10-26 NOTE — PROGRESS NOTE ADULT - ASSESSMENT
#po intolerance   #hypoglycemia   Overnight patient had hypoglycemic episode and hypotensive episode required LR bolus and hypoglycemia protocol  Will change IV fluids from LR to D5 half NS as patient continues to refuse clear liquid or full diet  Of note patient was recently started on Ozempic which is known to cause gastroparesis  GI consult  Gastric emptying study?  Advance diet as tolerated  Continue with Carafate and PPI and Maalox  Zofran and reglan as needed    #Elevated liver enzymes  On admission LFTs were normal  Now elevated in the 100s and 200s  Likely secondary to vomiting  Check right upper quadrant ultrasound  Trend CMP    #htn  #p afib  c/w metoprolol  hold lisinopril given hypotensive episode   change HP on imdur to hold for <110  c/w eliquis      #afib  patient with hx of afib now in sinus on home meds of eliquis and   -c/w home meds    #cad  patient with a  hx cad s/p 5 stents last were 2 years ago  states her cardiologist wants her to continue on her plavix  -c/w plavix

## 2024-10-27 LAB
ALBUMIN SERPL ELPH-MCNC: 2.7 G/DL — LOW (ref 3.3–5)
ALP SERPL-CCNC: 157 U/L — HIGH (ref 40–120)
ALT FLD-CCNC: 150 U/L — HIGH (ref 12–78)
ANION GAP SERPL CALC-SCNC: 4 MMOL/L — LOW (ref 5–17)
AST SERPL-CCNC: 72 U/L — HIGH (ref 15–37)
BILIRUB SERPL-MCNC: 0.2 MG/DL — SIGNIFICANT CHANGE UP (ref 0.2–1.2)
BUN SERPL-MCNC: 5 MG/DL — LOW (ref 7–23)
CALCIUM SERPL-MCNC: 8.7 MG/DL — SIGNIFICANT CHANGE UP (ref 8.5–10.1)
CHLORIDE SERPL-SCNC: 112 MMOL/L — HIGH (ref 96–108)
CO2 SERPL-SCNC: 25 MMOL/L — SIGNIFICANT CHANGE UP (ref 22–31)
CREAT SERPL-MCNC: 0.7 MG/DL — SIGNIFICANT CHANGE UP (ref 0.5–1.3)
EGFR: 98 ML/MIN/1.73M2 — SIGNIFICANT CHANGE UP
GLUCOSE BLDC GLUCOMTR-MCNC: 110 MG/DL — HIGH (ref 70–99)
GLUCOSE BLDC GLUCOMTR-MCNC: 115 MG/DL — HIGH (ref 70–99)
GLUCOSE BLDC GLUCOMTR-MCNC: 117 MG/DL — HIGH (ref 70–99)
GLUCOSE BLDC GLUCOMTR-MCNC: 143 MG/DL — HIGH (ref 70–99)
GLUCOSE SERPL-MCNC: 116 MG/DL — HIGH (ref 70–99)
HCT VFR BLD CALC: 34.2 % — LOW (ref 34.5–45)
HGB BLD-MCNC: 10.8 G/DL — LOW (ref 11.5–15.5)
INR BLD: 1.32 RATIO — HIGH (ref 0.85–1.16)
MCHC RBC-ENTMCNC: 28.8 PG — SIGNIFICANT CHANGE UP (ref 27–34)
MCHC RBC-ENTMCNC: 31.6 GM/DL — LOW (ref 32–36)
MCV RBC AUTO: 91.2 FL — SIGNIFICANT CHANGE UP (ref 80–100)
PLATELET # BLD AUTO: 156 K/UL — SIGNIFICANT CHANGE UP (ref 150–400)
POTASSIUM SERPL-MCNC: 3.4 MMOL/L — LOW (ref 3.5–5.3)
POTASSIUM SERPL-SCNC: 3.4 MMOL/L — LOW (ref 3.5–5.3)
PROT SERPL-MCNC: 6.4 GM/DL — SIGNIFICANT CHANGE UP (ref 6–8.3)
PROTHROM AB SERPL-ACNC: 15.5 SEC — HIGH (ref 9.9–13.4)
RBC # BLD: 3.75 M/UL — LOW (ref 3.8–5.2)
RBC # FLD: 14.6 % — HIGH (ref 10.3–14.5)
SODIUM SERPL-SCNC: 141 MMOL/L — SIGNIFICANT CHANGE UP (ref 135–145)
WBC # BLD: 4.05 K/UL — SIGNIFICANT CHANGE UP (ref 3.8–10.5)
WBC # FLD AUTO: 4.05 K/UL — SIGNIFICANT CHANGE UP (ref 3.8–10.5)

## 2024-10-27 PROCEDURE — 93010 ELECTROCARDIOGRAM REPORT: CPT

## 2024-10-27 PROCEDURE — 99232 SBSQ HOSP IP/OBS MODERATE 35: CPT

## 2024-10-27 RX ORDER — ALPRAZOLAM 0.5 MG
0.5 TABLET ORAL ONCE
Refills: 0 | Status: DISCONTINUED | OUTPATIENT
Start: 2024-10-27 | End: 2024-10-27

## 2024-10-27 RX ORDER — POTASSIUM CHLORIDE 600 MG/1
40 TABLET, EXTENDED RELEASE ORAL ONCE
Refills: 0 | Status: COMPLETED | OUTPATIENT
Start: 2024-10-27 | End: 2024-10-27

## 2024-10-27 RX ADMIN — HYDROMORPHONE HYDROCHLORIDE 0.5 MILLIGRAM(S): 2 TABLET ORAL at 17:46

## 2024-10-27 RX ADMIN — APIXABAN 5 MILLIGRAM(S): 2.5 TABLET, FILM COATED ORAL at 11:13

## 2024-10-27 RX ADMIN — Medication 1 PUFF(S): at 09:02

## 2024-10-27 RX ADMIN — Medication 1 PUFF(S): at 13:43

## 2024-10-27 RX ADMIN — Medication 100 MILLILITER(S): at 15:36

## 2024-10-27 RX ADMIN — PANTOPRAZOLE SODIUM 40 MILLIGRAM(S): 40 TABLET, DELAYED RELEASE ORAL at 11:14

## 2024-10-27 RX ADMIN — HYDROXYZINE HYDROCHLORIDE 25 MILLIGRAM(S): 25 TABLET, FILM COATED ORAL at 16:47

## 2024-10-27 RX ADMIN — Medication 1 PUFF(S): at 20:59

## 2024-10-27 RX ADMIN — MIRTAZAPINE 30 MILLIGRAM(S): 15 TABLET, FILM COATED ORAL at 20:44

## 2024-10-27 RX ADMIN — Medication 0.5 MILLIGRAM(S): at 00:51

## 2024-10-27 RX ADMIN — METOPROLOL TARTRATE 100 MILLIGRAM(S): 100 TABLET, FILM COATED ORAL at 11:13

## 2024-10-27 RX ADMIN — POTASSIUM CHLORIDE 40 MILLIEQUIVALENT(S): 600 TABLET, EXTENDED RELEASE ORAL at 11:16

## 2024-10-27 RX ADMIN — CLOPIDOGREL 75 MILLIGRAM(S): 75 TABLET, FILM COATED ORAL at 11:13

## 2024-10-27 RX ADMIN — ROSUVASTATIN CALCIUM 10 MILLIGRAM(S): 5 TABLET, FILM COATED ORAL at 20:45

## 2024-10-27 RX ADMIN — Medication 100 MILLILITER(S): at 04:53

## 2024-10-27 RX ADMIN — Medication 30 MILLIGRAM(S): at 11:10

## 2024-10-27 RX ADMIN — PANTOPRAZOLE SODIUM 40 MILLIGRAM(S): 40 TABLET, DELAYED RELEASE ORAL at 20:45

## 2024-10-27 NOTE — CONSULT NOTE ADULT - ASSESSMENT
Abdominal pain  No Nausea or vomiting  No relief with PPIs  Abd USG inconclusive S/P Cholecystectomy  Heart disease  REC  EGD/Colonoscopy early next week  Hold Eliquis  Cardiac clearance

## 2024-10-27 NOTE — DIETITIAN INITIAL EVALUATION ADULT - ORAL NUTRITION SUPPLEMENTS
[facility] BID to optimize nutritional needs (provides 325 kcal, 16 g protein/ shake)

## 2024-10-27 NOTE — PROGRESS NOTE ADULT - SUBJECTIVE AND OBJECTIVE BOX
HOSPITALIST ATTENDING PROGRESS NOTE    Chart and meds reviewed.  Patient seen and examined.    CC: po intolerance    Subjective: no nausea of vomitting in last 24 hours but barely ate or drank anything. persistently saying she has no appeitite; has not eaten a solid meal in about 1 week    All other systems reviewed and found to be negative with the exception of what has been described above.    MEDICATIONS  (STANDING):  albuterol    90 MICROgram(s) HFA Inhaler 1 Puff(s) Inhalation every 6 hours  clopidogrel Tablet 75 milliGRAM(s) Oral daily  dextrose 5% + sodium chloride 0.45%. 1000 milliLiter(s) (100 mL/Hr) IV Continuous <Continuous>  dextrose 5%. 1000 milliLiter(s) (100 mL/Hr) IV Continuous <Continuous>  dextrose 5%. 1000 milliLiter(s) (50 mL/Hr) IV Continuous <Continuous>  dextrose 50% Injectable 25 Gram(s) IV Push once  dextrose 50% Injectable 25 Gram(s) IV Push once  dextrose 50% Injectable 12.5 Gram(s) IV Push once  glucagon  Injectable 1 milliGRAM(s) IntraMuscular once  hydrOXYzine hydrochloride 25 milliGRAM(s) Oral every 24 hours  influenza   Vaccine 0.5 milliLiter(s) IntraMuscular once  insulin lispro (ADMELOG) corrective regimen sliding scale   SubCutaneous three times a day before meals  isosorbide   mononitrate ER Tablet (IMDUR) 30 milliGRAM(s) Oral daily  metoprolol succinate  milliGRAM(s) Oral daily  mirtazapine 30 milliGRAM(s) Oral at bedtime  pantoprazole  Injectable 40 milliGRAM(s) IV Push every 12 hours  rosuvastatin 10 milliGRAM(s) Oral at bedtime    MEDICATIONS  (PRN):  acetaminophen     Tablet .. 650 milliGRAM(s) Oral every 6 hours PRN Moderate Pain (4 - 6)  acetaminophen 325 mG/butalbital 50 mG/caffeine 40 mG 1 Tablet(s) Oral <User Schedule> PRN headache  aluminum hydroxide/magnesium hydroxide/simethicone Suspension 30 milliLiter(s) Oral every 4 hours PRN Dyspepsia  dextrose Oral Gel 15 Gram(s) Oral once PRN Blood Glucose LESS THAN 70 milliGRAM(s)/deciliter  HYDROmorphone  Injectable 0.5 milliGRAM(s) IV Push every 6 hours PRN Severe Pain (7 - 10)  metoclopramide Injectable 5 milliGRAM(s) IV Push every 6 hours PRN nausea refractory to zofran  ondansetron Injectable 4 milliGRAM(s) IV Push every 6 hours PRN Nausea and/or Vomiting      VITALS:  T(F): 98.4 (10-27-24 @ 16:13), Max: 98.8 (10-26-24 @ 21:48)  HR: 99 (10-27-24 @ 16:13) (90 - 114)  BP: 406/57 (10-27-24 @ 16:13) (113/63 - 406/57)  RR: 18 (10-27-24 @ 16:13) (18 - 19)  SpO2: 96% (10-27-24 @ 16:13) (91% - 96%)  Wt(kg): --    I&O's Summary      CAPILLARY BLOOD GLUCOSE      POCT Blood Glucose.: 117 mg/dL (27 Oct 2024 16:46)  POCT Blood Glucose.: 115 mg/dL (27 Oct 2024 11:39)  POCT Blood Glucose.: 110 mg/dL (27 Oct 2024 09:27)  POCT Blood Glucose.: 99 mg/dL (26 Oct 2024 21:42)      PHYSICAL EXAM:  Gen: NAD  HEENT:  pupils equal and reactive, EOMI, no oropharyngeal lesions, erythema, exudates, oral thrush  NECK:   supple, no carotid bruits, no palpable lymph nodes, no thyromegaly  CV:  +S1, +S2, regular, no murmurs or rubs  RESP:   lungs clear to auscultation bilaterally, no wheezing, rales, rhonchi, good air entry bilaterally  BREAST:  not examined  GI:  abdomen soft, non-tender, non-distended, normal BS, no bruits, no abdominal masses, no palpable masses  RECTAL:  not examined  :  not examined  MSK:   normal muscle tone, no atrophy, no rigidity, no contractions  EXT:  no clubbing, no cyanosis, no edema, no calf pain, swelling or erythema  VASCULAR:  pulses equal and symmetric in the upper and lower extremities  NEURO:  AAOX3, no focal neurological deficits, follows all commands, able to move extremities spontaneously  SKIN:  no ulcers, lesions or rashes    LABS:                            10.8   4.05  )-----------( 156      ( 27 Oct 2024 08:03 )             34.2     10-27    141  |  112[H]  |  5[L]  ----------------------------<  116[H]  3.4[L]   |  25  |  0.70    Ca    8.7      27 Oct 2024 08:03  Mg     1.9     10-26    TPro  6.4  /  Alb  2.7[L]  /  TBili  0.2  /  DBili  x   /  AST  72[H]  /  ALT  150[H]  /  AlkPhos  157[H]  10-27        LIVER FUNCTIONS - ( 27 Oct 2024 08:03 )  Alb: 2.7 g/dL / Pro: 6.4 gm/dL / ALK PHOS: 157 U/L / ALT: 150 U/L / AST: 72 U/L / GGT: x           PT/INR - ( 27 Oct 2024 08:03 )   PT: 15.5 sec;   INR: 1.32 ratio           Urinalysis Basic - ( 27 Oct 2024 08:03 )    Color: x / Appearance: x / SG: x / pH: x  Gluc: 116 mg/dL / Ketone: x  / Bili: x / Urobili: x   Blood: x / Protein: x / Nitrite: x   Leuk Esterase: x / RBC: x / WBC x   Sq Epi: x / Non Sq Epi: x / Bacteria: x

## 2024-10-27 NOTE — PROGRESS NOTE ADULT - ASSESSMENT
#po intolerance   #hypoglycemia   per GI; recommend EGD/Colonoscopy early next week  will obtain cardiac clearance  c/w D51/2NS as pt continues to avoid food  Advance diet as tolerated  Continue with Carafate and PPI and Maalox  Zofran and reglan as needed    #Elevated liver enzymes  On admission LFTs were normal  Now elevated in the 100s and 200s  Likely secondary to vomiting  improved today  Check right upper quadrant ultrasound: s/p cholecystectomy - unchanged markedly dilated CBD  Trend CMP    #htn  #p afib  c/w metoprolol  hold lisinopril given hypotensive episode   change HP on imdur to hold for <110  hold eliquis for scopes    #p afib  patient with hx of afib now in sinus  -c/w home meds    #cad  patient with a  hx cad s/p 5 stents last were 2 years ago  states her cardiologist wants her to continue on her plavix  -c/w plavix     hold eliquis for procedure - last dose 10/27 AM

## 2024-10-27 NOTE — DIETITIAN INITIAL EVALUATION ADULT - ADD RECOMMEND
1) ADAT to regular  2) Encourage protein-rich foods, maximize food preferences   3) [facility] BID to optimize nutritional needs (provides 325 kcal, 16 g protein/ shake)   4) Monitor bowel movements, if no BM for >3 days, consider implementing bowel regimen.   5) Obtain weekly wt to track/trend changes   6) MVI w/ minerals daily to ensure 100% RDA met, Consider adding thiamine 100 mg daily 2/2 poor PO intake/ malnutrition   7) Confirm goals of care regarding nutrition support   RD will continue to monitor PO intake, labs, hydration, and wt prn.

## 2024-10-27 NOTE — DIETITIAN NUTRITION RISK NOTIFICATION - FINDINGS BASED ON COMPREHENSIVE NUTRITION ASSESSMENT, CONSULTATION PERFORMED ON
Sheeba Shelton MA       Chart routed to no one  .          
27-Oct-2024

## 2024-10-27 NOTE — DIETITIAN INITIAL EVALUATION ADULT - ORAL INTAKE PTA/DIET HISTORY
Lives with daughter and granddaughters who assist with food shopping and cooking.  Endorses N/V x 2 weeks.  Usually consumes 2 meals per day and follows a lactose and gluten free diet.  No details provided for diet recall but knows she "eats bad foods".  Likely meeting <50% ENN x 2 weeks.

## 2024-10-27 NOTE — DIETITIAN INITIAL EVALUATION ADULT - OTHER INFO
64-year-old female with history of GERD  HTN, DM, polyps, and afib with 5 cardiac stents. presents with epigastric pain for the last couple of weeks.  Her daughter brought her in, after speaking with her GI doctor who stated patient should not have been on ozempic before and probably have taken insulin instead. She states that she had just uptitrated her dose of ozempic to 0.5 from 0.25 earlier this week. She had been on the 0.25 for 5 weeks. She states the pain bothers her more than the nausea and vomiting. Patient states she feels very minimal relief since she came in.  She denies NSAID use, bleeding or black stools. She reports 10lb weight loss and has been on ozempic for her diabetes. Denies diarrhea or urinary symptoms.   Admitted for Nausea and vomiting    Patient presented with continued nausea upon RD visit.  Prescribed a full liquids diet at .  Recommend to ADAT to regular.  Based on bed scale wt obtained by RD on 10/26 Pt wt is 121.6#.  Reports a UBW of 133# x 2 mo ago and attributes weight loss to Ozempic use.  Loss of 11.4#/9.3%, severe and clin sig.  No weight history available for review.  Patient appears appropriate weight for height.  NFPE revealed mild muscle/fat wasting.  RD provided verbal education for adequate energy intake and consistent meals/carbohydrate intake with recommendation to avoid skipping a meal.  Agreed to trial Mally Muniz BID to optimize nutritional needs (provides 325 kcal, 16 g protein/ shake).  See additional recommendations below

## 2024-10-27 NOTE — DIETITIAN INITIAL EVALUATION ADULT - PERTINENT MEDS FT
MEDICATIONS  (STANDING):  albuterol    90 MICROgram(s) HFA Inhaler 1 Puff(s) Inhalation every 6 hours  apixaban 5 milliGRAM(s) Oral every 12 hours  clopidogrel Tablet 75 milliGRAM(s) Oral daily  dextrose 5% + sodium chloride 0.45%. 1000 milliLiter(s) (100 mL/Hr) IV Continuous <Continuous>  dextrose 5%. 1000 milliLiter(s) (50 mL/Hr) IV Continuous <Continuous>  dextrose 5%. 1000 milliLiter(s) (100 mL/Hr) IV Continuous <Continuous>  dextrose 50% Injectable 25 Gram(s) IV Push once  dextrose 50% Injectable 25 Gram(s) IV Push once  dextrose 50% Injectable 12.5 Gram(s) IV Push once  glucagon  Injectable 1 milliGRAM(s) IntraMuscular once  hydrOXYzine hydrochloride 25 milliGRAM(s) Oral every 24 hours  influenza   Vaccine 0.5 milliLiter(s) IntraMuscular once  insulin lispro (ADMELOG) corrective regimen sliding scale   SubCutaneous three times a day before meals  isosorbide   mononitrate ER Tablet (IMDUR) 30 milliGRAM(s) Oral daily  metoprolol succinate  milliGRAM(s) Oral daily  mirtazapine 30 milliGRAM(s) Oral at bedtime  pantoprazole  Injectable 40 milliGRAM(s) IV Push every 12 hours  rosuvastatin 10 milliGRAM(s) Oral at bedtime    MEDICATIONS  (PRN):  acetaminophen     Tablet .. 650 milliGRAM(s) Oral every 6 hours PRN Moderate Pain (4 - 6)  acetaminophen 325 mG/butalbital 50 mG/caffeine 40 mG 1 Tablet(s) Oral <User Schedule> PRN headache  aluminum hydroxide/magnesium hydroxide/simethicone Suspension 30 milliLiter(s) Oral every 4 hours PRN Dyspepsia  dextrose Oral Gel 15 Gram(s) Oral once PRN Blood Glucose LESS THAN 70 milliGRAM(s)/deciliter  HYDROmorphone  Injectable 0.5 milliGRAM(s) IV Push every 6 hours PRN Severe Pain (7 - 10)  metoclopramide Injectable 5 milliGRAM(s) IV Push every 6 hours PRN nausea refractory to zofran  ondansetron Injectable 4 milliGRAM(s) IV Push every 6 hours PRN Nausea and/or Vomiting

## 2024-10-27 NOTE — DIETITIAN NUTRITION RISK NOTIFICATION - ADDITIONAL COMMENTS/DIETITIAN RECOMMENDATIONS
1) ADAT to regular  2) Encourage protein-rich foods, maximize food preferences   3) Knodium BID to optimize nutritional needs (provides 325 kcal, 16 g protein/ shake)   4) Monitor bowel movements, if no BM for >3 days, consider implementing bowel regimen.   5) Obtain weekly wt to track/trend changes   6) MVI w/ minerals daily to ensure 100% RDA met, Consider adding thiamine 100 mg daily 2/2 poor PO intake/ malnutrition   7) Confirm goals of care regarding nutrition support   RD will continue to monitor PO intake, labs, hydration, and wt prn.

## 2024-10-27 NOTE — CONSULT NOTE ADULT - SUBJECTIVE AND OBJECTIVE BOX
HPI:Epigastric and Lower abdominal painx 4 weeks  64-year-old female with history of GERD  HTN, DM, polyps, and afib with 5 cardiac stents. presents with epigastric pain for the last couple of weeks.  Her daughter brought her in, after speaking with her GI doctor who stated patient should not have been on ozempic before and probably have taken insulin instead. She states that she had just uptitrated her dose of ozempic to 0.5 from 0.25 earlier this week. She had been on the 0.25 for 5 weeks. She states the pain bothers her more than the nausea and vomiting. Patient states she feels very minimal relief since she came in.  She denies NSAID use, bleeding or black stools. She reports 10lb weight loss and has been on ozempic for her diabetes. Denies diarrhea or urinary symptoms.     In the ED  Vitals Temp 97.8  /84 O2 sat 98% on RA  Labs WBC 5.28 Hg 13.3 Plt 212 Na 140 K 4 Cl 107 Bicarb 26 BUN 16 Cr 0.86 Glucose 101 Protein 8.4 ALbumin 4 AST 32 ALT 54 Lipase 71 Trop I 4.65  Imaging CTAP   Etiology of abdominal pain is not elucidated  Intervention zofran, ofirmev, pepcid, morphine 2,  4, 1 L NS   (25 Oct 2024 13:17)      PAST MEDICAL & SURGICAL HISTORY:  CAD (coronary artery disease)      DM (diabetes mellitus)      HTN (hypertension)      Asthma      Benign essential HTN      HLD (hyperlipidemia)      Type 2 diabetes mellitus      CAD (coronary artery disease)      Stented coronary artery      FAP (familial adenomatous polyposis)      Insomnia      GERD (gastroesophageal reflux disease)      H/O lichen planus      History of rectal bleeding      History of colon resection      Stented coronary artery          MEDICATIONS  (STANDING):  albuterol    90 MICROgram(s) HFA Inhaler 1 Puff(s) Inhalation every 6 hours  clopidogrel Tablet 75 milliGRAM(s) Oral daily  dextrose 5% + sodium chloride 0.45%. 1000 milliLiter(s) (100 mL/Hr) IV Continuous <Continuous>  dextrose 5%. 1000 milliLiter(s) (100 mL/Hr) IV Continuous <Continuous>  dextrose 5%. 1000 milliLiter(s) (50 mL/Hr) IV Continuous <Continuous>  dextrose 50% Injectable 25 Gram(s) IV Push once  dextrose 50% Injectable 25 Gram(s) IV Push once  dextrose 50% Injectable 12.5 Gram(s) IV Push once  glucagon  Injectable 1 milliGRAM(s) IntraMuscular once  hydrOXYzine hydrochloride 25 milliGRAM(s) Oral every 24 hours  influenza   Vaccine 0.5 milliLiter(s) IntraMuscular once  insulin lispro (ADMELOG) corrective regimen sliding scale   SubCutaneous three times a day before meals  isosorbide   mononitrate ER Tablet (IMDUR) 30 milliGRAM(s) Oral daily  metoprolol succinate  milliGRAM(s) Oral daily  mirtazapine 30 milliGRAM(s) Oral at bedtime  pantoprazole  Injectable 40 milliGRAM(s) IV Push every 12 hours  rosuvastatin 10 milliGRAM(s) Oral at bedtime    MEDICATIONS  (PRN):  acetaminophen     Tablet .. 650 milliGRAM(s) Oral every 6 hours PRN Moderate Pain (4 - 6)  acetaminophen 325 mG/butalbital 50 mG/caffeine 40 mG 1 Tablet(s) Oral <User Schedule> PRN headache  aluminum hydroxide/magnesium hydroxide/simethicone Suspension 30 milliLiter(s) Oral every 4 hours PRN Dyspepsia  dextrose Oral Gel 15 Gram(s) Oral once PRN Blood Glucose LESS THAN 70 milliGRAM(s)/deciliter  HYDROmorphone  Injectable 0.5 milliGRAM(s) IV Push every 6 hours PRN Severe Pain (7 - 10)  metoclopramide Injectable 5 milliGRAM(s) IV Push every 6 hours PRN nausea refractory to zofran  ondansetron Injectable 4 milliGRAM(s) IV Push every 6 hours PRN Nausea and/or Vomiting      Allergies    No Known Allergies    Intolerances        SOCIAL HISTORY:    FAMILY HISTORY:  FH: heart disease (Mother, Sibling)     Non-contributory    REVIEW OF SYSTEMS      General:	    Respiratory and Thorax:  	  Cardiovascular:	    Gastrointestinal:	    Musculoskeletal:	   Vital Signs Last 24 Hrs  T(C): 36.7 (27 Oct 2024 08:34), Max: 37.6 (26 Oct 2024 16:55)  T(F): 98.1 (27 Oct 2024 08:34), Max: 99.6 (26 Oct 2024 16:55)  HR: 90 (27 Oct 2024 13:45) (90 - 114)  BP: 113/63 (27 Oct 2024 08:34) (100/55 - 132/73)  BP(mean): 87 (26 Oct 2024 21:48) (65 - 87)  RR: 18 (27 Oct 2024 08:34) (17 - 19)  SpO2: 94% (27 Oct 2024 13:45) (91% - 96%)    Parameters below as of 27 Oct 2024 13:45  Patient On (Oxygen Delivery Method): room air        HEENT :No Pallor.No icterus. EOMI,PERLAA  Chest : Clear to Auscultation  CVS : S1S2 Normal.No murmurs.  Abdomen: Soft.Epigastric tender .Normal bowel sounds.No Organomegaly.  CNS: Alert.Oriented to Time,Place and Person.No focal deficit.  EXT: Normal Range of motion.No pitting edema.    LABS:                        10.8   4.05  )-----------( 156      ( 27 Oct 2024 08:03 )             34.2     10-27    141  |  112[H]  |  5[L]  ----------------------------<  116[H]  3.4[L]   |  25  |  0.70    Ca    8.7      27 Oct 2024 08:03  Mg     1.9     10-26    TPro  6.4  /  Alb  2.7[L]  /  TBili  0.2  /  DBili  x   /  AST  72[H]  /  ALT  150[H]  /  AlkPhos  157[H]  10-27    PT/INR - ( 27 Oct 2024 08:03 )   PT: 15.5 sec;   INR: 1.32 ratio           LIVER FUNCTIONS - ( 27 Oct 2024 08:03 )  Alb: 2.7 g/dL / Pro: 6.4 gm/dL / ALK PHOS: 157 U/L / ALT: 150 U/L / AST: 72 U/L / GGT: x             RADIOLOGY & ADDITIONAL STUDIES:

## 2024-10-27 NOTE — DIETITIAN INITIAL EVALUATION ADULT - PERTINENT LABORATORY DATA
10-26    140  |  109[H]  |  9   ----------------------------<  115[H]  4.0   |  27  |  0.81    Ca    8.9      26 Oct 2024 09:23  Mg     1.9     10-26    TPro  6.6  /  Alb  3.0[L]  /  TBili  0.6  /  DBili  x   /  AST  159[H]  /  ALT  234[H]  /  AlkPhos  186[H]  10-26  POCT Blood Glucose.: 99 mg/dL (10-26-24 @ 21:42)  A1C with Estimated Average Glucose Result: 6.6 % (10-26-24 @ 06:47)  A1C with Estimated Average Glucose Result: 8.0 % (06-26-24 @ 07:33)  A1C with Estimated Average Glucose Result: 6.9 % (04-01-24 @ 06:46)

## 2024-10-28 DIAGNOSIS — I25.10 ATHEROSCLEROTIC HEART DISEASE OF NATIVE CORONARY ARTERY WITHOUT ANGINA PECTORIS: ICD-10-CM

## 2024-10-28 DIAGNOSIS — I48.20 CHRONIC ATRIAL FIBRILLATION, UNSPECIFIED: ICD-10-CM

## 2024-10-28 DIAGNOSIS — Z01.818 ENCOUNTER FOR OTHER PREPROCEDURAL EXAMINATION: ICD-10-CM

## 2024-10-28 LAB
ANION GAP SERPL CALC-SCNC: 3 MMOL/L — LOW (ref 5–17)
BUN SERPL-MCNC: 4 MG/DL — LOW (ref 7–23)
CALCIUM SERPL-MCNC: 9.6 MG/DL — SIGNIFICANT CHANGE UP (ref 8.5–10.1)
CHLORIDE SERPL-SCNC: 115 MMOL/L — HIGH (ref 96–108)
CO2 SERPL-SCNC: 27 MMOL/L — SIGNIFICANT CHANGE UP (ref 22–31)
CREAT SERPL-MCNC: 0.79 MG/DL — SIGNIFICANT CHANGE UP (ref 0.5–1.3)
EGFR: 85 ML/MIN/1.73M2 — SIGNIFICANT CHANGE UP
GLUCOSE BLDC GLUCOMTR-MCNC: 105 MG/DL — HIGH (ref 70–99)
GLUCOSE BLDC GLUCOMTR-MCNC: 115 MG/DL — HIGH (ref 70–99)
GLUCOSE BLDC GLUCOMTR-MCNC: 128 MG/DL — HIGH (ref 70–99)
GLUCOSE SERPL-MCNC: 129 MG/DL — HIGH (ref 70–99)
HCT VFR BLD CALC: 34.3 % — LOW (ref 34.5–45)
HGB BLD-MCNC: 10.6 G/DL — LOW (ref 11.5–15.5)
MCHC RBC-ENTMCNC: 28.6 PG — SIGNIFICANT CHANGE UP (ref 27–34)
MCHC RBC-ENTMCNC: 30.9 GM/DL — LOW (ref 32–36)
MCV RBC AUTO: 92.5 FL — SIGNIFICANT CHANGE UP (ref 80–100)
PLATELET # BLD AUTO: 153 K/UL — SIGNIFICANT CHANGE UP (ref 150–400)
POTASSIUM SERPL-MCNC: 4.4 MMOL/L — SIGNIFICANT CHANGE UP (ref 3.5–5.3)
POTASSIUM SERPL-SCNC: 4.4 MMOL/L — SIGNIFICANT CHANGE UP (ref 3.5–5.3)
RBC # BLD: 3.71 M/UL — LOW (ref 3.8–5.2)
RBC # FLD: 14.4 % — SIGNIFICANT CHANGE UP (ref 10.3–14.5)
SODIUM SERPL-SCNC: 145 MMOL/L — SIGNIFICANT CHANGE UP (ref 135–145)
WBC # BLD: 4.91 K/UL — SIGNIFICANT CHANGE UP (ref 3.8–10.5)
WBC # FLD AUTO: 4.91 K/UL — SIGNIFICANT CHANGE UP (ref 3.8–10.5)

## 2024-10-28 PROCEDURE — 99232 SBSQ HOSP IP/OBS MODERATE 35: CPT

## 2024-10-28 PROCEDURE — 93010 ELECTROCARDIOGRAM REPORT: CPT

## 2024-10-28 PROCEDURE — 99223 1ST HOSP IP/OBS HIGH 75: CPT

## 2024-10-28 RX ORDER — ESCITALOPRAM OXALATE 10 MG/1
10 TABLET, FILM COATED ORAL DAILY
Refills: 0 | Status: DISCONTINUED | OUTPATIENT
Start: 2024-10-28 | End: 2024-12-10

## 2024-10-28 RX ORDER — ALPRAZOLAM 0.5 MG
0.5 TABLET ORAL AT BEDTIME
Refills: 0 | Status: DISCONTINUED | OUTPATIENT
Start: 2024-10-28 | End: 2024-11-04

## 2024-10-28 RX ORDER — ARIPIPRAZOLE 15 MG/1
5 TABLET ORAL AT BEDTIME
Refills: 0 | Status: DISCONTINUED | OUTPATIENT
Start: 2024-10-28 | End: 2024-11-10

## 2024-10-28 RX ADMIN — Medication 1 PUFF(S): at 09:28

## 2024-10-28 RX ADMIN — Medication 100 MILLILITER(S): at 02:04

## 2024-10-28 RX ADMIN — Medication 0.5 MILLIGRAM(S): at 22:34

## 2024-10-28 RX ADMIN — HYDROMORPHONE HYDROCHLORIDE 0.5 MILLIGRAM(S): 2 TABLET ORAL at 02:23

## 2024-10-28 RX ADMIN — Medication 1 TABLET(S): at 09:14

## 2024-10-28 RX ADMIN — CLOPIDOGREL 75 MILLIGRAM(S): 75 TABLET, FILM COATED ORAL at 09:14

## 2024-10-28 RX ADMIN — ROSUVASTATIN CALCIUM 10 MILLIGRAM(S): 5 TABLET, FILM COATED ORAL at 22:34

## 2024-10-28 RX ADMIN — HYDROMORPHONE HYDROCHLORIDE 0.5 MILLIGRAM(S): 2 TABLET ORAL at 03:00

## 2024-10-28 RX ADMIN — ARIPIPRAZOLE 5 MILLIGRAM(S): 15 TABLET ORAL at 22:34

## 2024-10-28 RX ADMIN — Medication 1 TABLET(S): at 23:01

## 2024-10-28 RX ADMIN — PANTOPRAZOLE SODIUM 40 MILLIGRAM(S): 40 TABLET, DELAYED RELEASE ORAL at 22:35

## 2024-10-28 RX ADMIN — PANTOPRAZOLE SODIUM 40 MILLIGRAM(S): 40 TABLET, DELAYED RELEASE ORAL at 09:14

## 2024-10-28 RX ADMIN — Medication 1 TABLET(S): at 22:39

## 2024-10-28 RX ADMIN — METOPROLOL TARTRATE 100 MILLIGRAM(S): 100 TABLET, FILM COATED ORAL at 09:14

## 2024-10-28 RX ADMIN — Medication 30 MILLIGRAM(S): at 09:14

## 2024-10-28 RX ADMIN — Medication 1 PUFF(S): at 15:06

## 2024-10-28 NOTE — PROGRESS NOTE ADULT - SUBJECTIVE AND OBJECTIVE BOX
HOSPITALIST ATTENDING PROGRESS NOTE    Chart and meds reviewed.  Patient seen and examined.    CC: inability to tolerate po    Subjective: still not feeling urge to eat - feels nauseas when she tries even liquids     All other systems reviewed and found to be negative with the exception of what has been described above.    MEDICATIONS  (STANDING):  ARIPiprazole 5 milliGRAM(s) Oral at bedtime  dextrose 5% + sodium chloride 0.45%. 1000 milliLiter(s) (100 mL/Hr) IV Continuous <Continuous>  dextrose 5%. 1000 milliLiter(s) (100 mL/Hr) IV Continuous <Continuous>  dextrose 5%. 1000 milliLiter(s) (50 mL/Hr) IV Continuous <Continuous>  dextrose 50% Injectable 25 Gram(s) IV Push once  dextrose 50% Injectable 25 Gram(s) IV Push once  dextrose 50% Injectable 12.5 Gram(s) IV Push once  escitalopram 10 milliGRAM(s) Oral daily  glucagon  Injectable 1 milliGRAM(s) IntraMuscular once  influenza   Vaccine 0.5 milliLiter(s) IntraMuscular once  insulin lispro (ADMELOG) corrective regimen sliding scale   SubCutaneous three times a day before meals  metoprolol succinate  milliGRAM(s) Oral daily  pantoprazole  Injectable 40 milliGRAM(s) IV Push every 12 hours  rosuvastatin 10 milliGRAM(s) Oral at bedtime    MEDICATIONS  (PRN):  acetaminophen     Tablet .. 650 milliGRAM(s) Oral every 6 hours PRN Moderate Pain (4 - 6)  acetaminophen 325 mG/butalbital 50 mG/caffeine 40 mG 1 Tablet(s) Oral <User Schedule> PRN headache  albuterol    90 MICROgram(s) HFA Inhaler 1 Puff(s) Inhalation every 6 hours PRN Shortness of Breath and/or Wheezing  ALPRAZolam 0.5 milliGRAM(s) Oral at bedtime PRN insomnia/anxiety  aluminum hydroxide/magnesium hydroxide/simethicone Suspension 30 milliLiter(s) Oral every 4 hours PRN Dyspepsia  dextrose Oral Gel 15 Gram(s) Oral once PRN Blood Glucose LESS THAN 70 milliGRAM(s)/deciliter  metoclopramide Injectable 5 milliGRAM(s) IV Push every 6 hours PRN nausea refractory to zofran  ondansetron Injectable 4 milliGRAM(s) IV Push every 6 hours PRN Nausea and/or Vomiting      VITALS:  T(F): 98.1 (10-28-24 @ 15:03), Max: 98.1 (10-28-24 @ 15:03)  HR: 86 (10-28-24 @ 15:03) (73 - 97)  BP: 99/66 (10-28-24 @ 15:03) (99/66 - 121/77)  RR: 18 (10-28-24 @ 15:03) (18 - 18)  SpO2: 98% (10-28-24 @ 15:03) (93% - 100%)  Wt(kg): --    I&O's Summary      CAPILLARY BLOOD GLUCOSE      POCT Blood Glucose.: 128 mg/dL (28 Oct 2024 16:47)  POCT Blood Glucose.: 115 mg/dL (28 Oct 2024 11:19)  POCT Blood Glucose.: 105 mg/dL (28 Oct 2024 07:50)  POCT Blood Glucose.: 143 mg/dL (27 Oct 2024 20:43)      PHYSICAL EXAM:  Gen: NAD  HEENT:  pupils equal and reactive, EOMI, no oropharyngeal lesions, erythema, exudates, oral thrush  NECK:   supple, no carotid bruits, no palpable lymph nodes, no thyromegaly  CV:  +S1, +S2, regular, no murmurs or rubs  RESP:   lungs clear to auscultation bilaterally, no wheezing, rales, rhonchi, good air entry bilaterally  BREAST:  not examined  GI:  abdomen soft, non-tender, non-distended, normal BS, no bruits, no abdominal masses, no palpable masses  RECTAL:  not examined  :  not examined  MSK:   normal muscle tone, no atrophy, no rigidity, no contractions  EXT:  no clubbing, no cyanosis, no edema, no calf pain, swelling or erythema  VASCULAR:  pulses equal and symmetric in the upper and lower extremities  NEURO:  AAOX3, no focal neurological deficits, follows all commands, able to move extremities spontaneously  SKIN:  no ulcers, lesions or rashes    LABS:                            10.6   4.91  )-----------( 153      ( 28 Oct 2024 08:55 )             34.3     10-28    145  |  115[H]  |  4[L]  ----------------------------<  129[H]  4.4   |  27  |  0.79    Ca    9.6      28 Oct 2024 08:55    TPro  6.4  /  Alb  2.7[L]  /  TBili  0.2  /  DBili  x   /  AST  72[H]  /  ALT  150[H]  /  AlkPhos  157[H]  10-27        LIVER FUNCTIONS - ( 27 Oct 2024 08:03 )  Alb: 2.7 g/dL / Pro: 6.4 gm/dL / ALK PHOS: 157 U/L / ALT: 150 U/L / AST: 72 U/L / GGT: x           PT/INR - ( 27 Oct 2024 08:03 )   PT: 15.5 sec;   INR: 1.32 ratio           Urinalysis Basic - ( 28 Oct 2024 08:55 )    Color: x / Appearance: x / SG: x / pH: x  Gluc: 129 mg/dL / Ketone: x  / Bili: x / Urobili: x   Blood: x / Protein: x / Nitrite: x   Leuk Esterase: x / RBC: x / WBC x   Sq Epi: x / Non Sq Epi: x / Bacteria: x              CULTURES:      Additional results/Imaging, I have personally reviewed:    Telemetry, personally reviewed:

## 2024-10-28 NOTE — CONSULT NOTE ADULT - SUBJECTIVE AND OBJECTIVE BOX
CHIEF COMPLAINT:    HPI:  64-year-old female with history of GERD  HTN, DM, polyps, and afib with 5 cardiac stents. presents with epigastric pain for the last couple of weeks.  Her daughter brought her in, after speaking with her GI doctor who stated patient should not have been on ozempic before and probably have taken insulin instead. She states that she had just uptitrated her dose of ozempic to 0.5 from 0.25 earlier this week. She had been on the 0.25 for 5 weeks. She states the pain bothers her more than the nausea and vomiting. Patient states she feels very minimal relief since she came in.  She denies NSAID use, bleeding or black stools. She reports 10lb weight loss and has been on ozempic for her diabetes. Denies diarrhea or urinary symptoms.     In the ED  Vitals Temp 97.8  /84 O2 sat 98% on RA  Labs WBC 5.28 Hg 13.3 Plt 212 Na 140 K 4 Cl 107 Bicarb 26 BUN 16 Cr 0.86 Glucose 101 Protein 8.4 ALbumin 4 AST 32 ALT 54 Lipase 71 Trop I 4.65  Imaging CTAP   Etiology of abdominal pain is not elucidated  Intervention zofran, ofirmev, pepcid, morphine 2,  4, 1 L NS   (25 Oct 2024 13:17)    Consulted for preop clearance.  CAD s/p PCI OM1  '23.  6 months DAPT reccomended  also afib on eliquis. Currently on plavix & eliquis (no ASA given eliquis & >6 months postPCI)  reports atypical chest pressure nonexertional every 4-5 days relieved partially w/ inhaler.  No dyspnea, palps. >2 flights & 30 minutes walking w/o problems.      PAST MEDICAL AND SURGICAL HISTORY:  PAST MEDICAL & SURGICAL HISTORY:  CAD (coronary artery disease)      DM (diabetes mellitus)      HTN (hypertension)      Asthma      Benign essential HTN      HLD (hyperlipidemia)      Type 2 diabetes mellitus      CAD (coronary artery disease)      Stented coronary artery      FAP (familial adenomatous polyposis)      Insomnia      GERD (gastroesophageal reflux disease)      H/O lichen planus      History of rectal bleeding      History of colon resection      Stented coronary artery          ALLERGIES:  Allergies    No Known Allergies    Intolerances        SOCIAL HISTORY:  Social History:      FAMILY  HISTORY:  FAMILY HISTORY:  FH: heart disease (Mother, Sibling)        MEDICATIONS:  OUTPATIENT:  Home Medications:  ALBUTEROL HFA 90 MCG INHALER: 1 pump(s) inhaled every 6 hours INHALE 1 TO 2 PUFFS BY MOUTH EVERY 4 TO 6 HOURS AS NEEDED (25 Oct 2024 13:40)  ALPRAZolam 0.5 mg oral tablet: 1 tab(s) orally once a day (at bedtime) as needed for sleep (25 Oct 2024 13:40)  ARIPiprazole 5 mg oral tablet: 1 tab(s) orally once a day (at bedtime) (25 Oct 2024 13:38)  butalbital/acetaminophen/caffeine 50 mg-300 mg-40 mg oral capsule: 1 cap(s) orally every 12 hours as needed for  headache (25 Oct 2024 13:40)  CLOPIDOGREL 75 MG TABLET: 1 tab(s) orally once a day (25 Oct 2024 13:40)  Eliquis 5 mg oral tablet: 1 tab(s) orally 2 times a day (25 Oct 2024 13:40)  escitalopram 10 mg oral tablet: 1 tab(s) orally once a day (25 Oct 2024 13:39)  FARXIGA 10 MG TABLET: TAKE 1 TABLET BY MOUTH EVERY DAY IN THE MORNING (25 Oct 2024 13:40)  glipiZIDE 5 mg oral tablet, extended release: 1 tab(s) orally once a day (25 Oct 2024 13:39)  lisinopril 10 mg oral tablet: 1 tab(s) orally 2 times a day (25 Oct 2024 13:40)  METOPROLOL SUCC  MG TAB: 1 tab(s) orally once a day TAKE 1 TABLET BY MOUTH EVERY DAY (25 Oct 2024 13:40)  Praluent Pen 150 mg/mL subcutaneous solution: 150 milligram(s) subcutaneously every 2 weeks (25 Oct 2024 13:40)  rosuvastatin 10 mg oral tablet: 1 tab(s) orally once a day (in the evening) (25 Oct 2024 13:40)  semaglutide 0.5 mg/0.5 mL (0.5 mg dose) subcutaneous solution: 0.5 milligram(s) subcutaneously once a week on Wed (25 Oct 2024 13:39)      INPATIENT:  MEDICATIONS  (STANDING):  albuterol    90 MICROgram(s) HFA Inhaler 1 Puff(s) Inhalation every 6 hours  clopidogrel Tablet 75 milliGRAM(s) Oral daily  dextrose 5% + sodium chloride 0.45%. 1000 milliLiter(s) (100 mL/Hr) IV Continuous <Continuous>  dextrose 5%. 1000 milliLiter(s) (100 mL/Hr) IV Continuous <Continuous>  dextrose 5%. 1000 milliLiter(s) (50 mL/Hr) IV Continuous <Continuous>  dextrose 50% Injectable 25 Gram(s) IV Push once  dextrose 50% Injectable 12.5 Gram(s) IV Push once  dextrose 50% Injectable 25 Gram(s) IV Push once  glucagon  Injectable 1 milliGRAM(s) IntraMuscular once  hydrOXYzine hydrochloride 25 milliGRAM(s) Oral every 24 hours  influenza   Vaccine 0.5 milliLiter(s) IntraMuscular once  insulin lispro (ADMELOG) corrective regimen sliding scale   SubCutaneous three times a day before meals  isosorbide   mononitrate ER Tablet (IMDUR) 30 milliGRAM(s) Oral daily  metoprolol succinate  milliGRAM(s) Oral daily  mirtazapine 30 milliGRAM(s) Oral at bedtime  pantoprazole  Injectable 40 milliGRAM(s) IV Push every 12 hours  rosuvastatin 10 milliGRAM(s) Oral at bedtime    MEDICATIONS  (PRN):  acetaminophen     Tablet .. 650 milliGRAM(s) Oral every 6 hours PRN Moderate Pain (4 - 6)  acetaminophen 325 mG/butalbital 50 mG/caffeine 40 mG 1 Tablet(s) Oral <User Schedule> PRN headache  aluminum hydroxide/magnesium hydroxide/simethicone Suspension 30 milliLiter(s) Oral every 4 hours PRN Dyspepsia  dextrose Oral Gel 15 Gram(s) Oral once PRN Blood Glucose LESS THAN 70 milliGRAM(s)/deciliter  HYDROmorphone  Injectable 0.5 milliGRAM(s) IV Push every 6 hours PRN Severe Pain (7 - 10)  metoclopramide Injectable 5 milliGRAM(s) IV Push every 6 hours PRN nausea refractory to zofran  ondansetron Injectable 4 milliGRAM(s) IV Push every 6 hours PRN Nausea and/or Vomiting    MEDICATIONS  (PRN):  acetaminophen     Tablet .. 650 milliGRAM(s) Oral every 6 hours PRN Moderate Pain (4 - 6)  acetaminophen 325 mG/butalbital 50 mG/caffeine 40 mG 1 Tablet(s) Oral <User Schedule> PRN headache  aluminum hydroxide/magnesium hydroxide/simethicone Suspension 30 milliLiter(s) Oral every 4 hours PRN Dyspepsia  dextrose Oral Gel 15 Gram(s) Oral once PRN Blood Glucose LESS THAN 70 milliGRAM(s)/deciliter  HYDROmorphone  Injectable 0.5 milliGRAM(s) IV Push every 6 hours PRN Severe Pain (7 - 10)  metoclopramide Injectable 5 milliGRAM(s) IV Push every 6 hours PRN nausea refractory to zofran  ondansetron Injectable 4 milliGRAM(s) IV Push every 6 hours PRN Nausea and/or Vomiting      REVIEW OF SYSTEMS:  ===============================  ===============================  CONSTITUTIONAL: No weakness, fevers or chills  EYES/ENT: No visual changes;  No vertigo or throat pain   NECK: No pain or stiffness  RESPIRATORY: No cough, wheezing, hemoptysis; No shortness of breath  CARDIOVASCULAR: No chest pain or palpitations  GASTROINTESTINAL: No abdominal or epigastric pain. No nausea, vomiting, or hematemesis;   No diarrhea or constipation. No melena or hematochezia.  GENITOURINARY: No dysuria, frequency or hematuria  NEUROLOGICAL: No numbness or weakness  SKIN: No itching, burning, rashes, or lesions   All other review of systems is negative unless indicated above    Vital Signs Last 24 Hrs  T(C): 36.6 (28 Oct 2024 08:16), Max: 36.9 (27 Oct 2024 16:13)  T(F): 97.9 (28 Oct 2024 08:16), Max: 98.4 (27 Oct 2024 16:13)  HR: 85 (28 Oct 2024 08:16) (73 - 99)  BP: 101/57 (28 Oct 2024 08:16) (101/57 - 121/77)  BP(mean): 87 (27 Oct 2024 21:28) (69 - 87)  RR: 18 (28 Oct 2024 08:16) (18 - 18)  SpO2: 93% (28 Oct 2024 08:16) (93% - 100%)    Parameters below as of 28 Oct 2024 08:16  Patient On (Oxygen Delivery Method): room air        I&O's Summary      I&O's Detail      PHYSICAL EXAM:    Constitutional: NAD, awake and alert, well-developed  HEENT: PERR, EOMI,  No oral cyananosis.  Neck:  supple,  No JVD  Respiratory: Breath sounds are clear bilaterally, No wheezing, rales or rhonchi  Cardiovascular: S1 and S2, regular rate and rhythm, no Murmurs, gallops or rubs  Gastrointestinal: Bowel Sounds present, soft, nontender.   Extremities: No peripheral edema. No clubbing or cyanosis.  Vascular: 2+ peripheral pulses  Neurological: A/O x 3, no focal deficits  Musculoskeletal: no calf tenderness.  Skin: No rashes.    ===============================  ===============================  LABS:                         10.6   4.91  )-----------( 153      ( 28 Oct 2024 08:55 )             34.3                         10.8   4.05  )-----------( 156      ( 27 Oct 2024 08:03 )             34.2                         11.2   4.59  )-----------( 165      ( 26 Oct 2024 09:23 )             35.2     27 Oct 2024 08:03    141    |  112    |  5      ----------------------------<  116    3.4     |  25     |  0.70   26 Oct 2024 09:23    140    |  109    |  9      ----------------------------<  115    4.0     |  27     |  0.81     Ca    8.7        27 Oct 2024 08:03  Ca    8.9        26 Oct 2024 09:23  Mg     1.9       26 Oct 2024 09:23    TPro  6.4    /  Alb  2.7    /  TBili  0.2    /  DBili  x      /  AST  72     /  ALT  150    /  AlkPhos  157    27 Oct 2024 08:03  TPro  6.6    /  Alb  3.0    /  TBili  0.6    /  DBili  x      /  AST  159    /  ALT  234    /  AlkPhos  186    26 Oct 2024 09:23    PT/INR - ( 27 Oct 2024 08:03 )   PT: 15.5 sec;   INR: 1.32 ratio             THYROID STUDIES:    ===============================  ===============================  CARDIAC BIOMARKERS:  -------  -BNP VALUES:  - BNP:   -------  -TROPONIN VALUES:   Troponin I, High Sensitivity Result: 3.13 ng/L (10-26-24 @ 09:23)  Troponin I, High Sensitivity Result: 4.65 ng/L (10-25-24 @ 08:04)  Troponin I, High Sensitivity Result: 3.60 ng/L (24 @ 07:07)  Troponin I, High Sensitivity Result: 3.89 ng/L (24 @ 22:16)  Troponin I, High Sensitivity Result: <3.00 ng/L (24 @ 18:20)  Troponin I, High Sensitivity Result: 3.99 ng/L (24 @ 21:24)  Troponin I, High Sensitivity Result: 4.94 ng/L (10-14-23 @ 00:44)  Troponin I, High Sensitivity Result: 5.47 ng/L (10-13-23 @ 20:12)  Troponin T, High Sensitivity Result: 7 ng/L [0 - 51] (23 @ 06:08)  Troponin T, High Sensitivity Result: 7 ng/L [0 - 51] (23 @ 23:51)      ===============================  ===============================  EKG:   sinus tachy no ischemic changes   ===============================  ===============================  < from: Nuclear Stress Test-Exercise.. (24 @ 10:55) >   1. Normal myocardial perfusion scan, with no evidence of infarction or inducible ischemia.   2. Baseline electrocardiogram: normal sinus rhythm at a rate of 95 bpm.   3. Stress electrocardiogram: No significant ischemic ST segment changes.   4. Patient achieved 11.10 METS, which is consistent with good exercise capacity.   5. The left ventricle is normal in function. The post stress left ventricular EF is 79 %. The stress end diastolic volume is 43 ml and systolic volume is 9 ml.  ===============================  ===============================    < from: TTE W or WO Ultrasound Enhancing Agent (24 @ 10:07) >     CONCLUSIONS:      1. Left ventricular systolic function is normal.   2. There is mild (grade 1) left ventricular diastolic dysfunction.   3. Normal right ventricular cavity size and normal systolic function.   4. The left atrium is normal in size.   5. The right atriumis normal in size.   6. Trace mitral regurgitation.   7. Trace tricuspid regurgitation.   8. Mild pulmonic regurgitation.  ===============================  ===============================                < from: Cardiac Catheterization (23 @ 12:47) >  Study Date:     2023   Name:           NUBIA DIAZ   :            1960   (63 years)   Gender:         female   MR#:            [id number]   [facility]#:           [id number]   Patient Class:  Outpatient     Cath Lab Report    Interventional Cardiologist:   Ryan Blackmon MD   Fellow:                        Love Santillan MD   Referring Physician:           Shelton Lee MD     Procedures Performed   Procedures:               1.    Arterial Access - Right Femoral     2. Ultrasound Guided Access   3.    PCI: KATLIN     Indications:               Atypical chest pain   Staged Procedures     PCI Status:               elective     Conclusions:   Successul KATLIN placement to Cx resulting in XIOMARA 3 flow and no chest  pain at the end of the case.    Recommendations:     Maintain on DAPT for 6 months. Recommed aggressive medical management  for CAD as per ACC/AHA guidelines. Findings  relayed to patient and patient's cardiologist.      < end of copied text >

## 2024-10-28 NOTE — CONSULT NOTE ADULT - PROBLEM SELECTOR RECOMMENDATION 9
-low risk procedure.  ECG sinus. >4 METs activity. h/o CAD s/p PCI Jun '23.    -may proceed w/ EGD colonoscopy. low risk for cardiac events.  -OK to hold plavix & eliquis prior to procedure resume when safe from procedural standpoint  no later than 1 week-

## 2024-10-28 NOTE — PROGRESS NOTE ADULT - ASSESSMENT
#po intolerance   #hypoglycemia   EG colonoscopy per GI  cleared by cardio - ok to hold DOAC and plavix - dced  c/w D51/2NS as pt continues to avoid food  Advance diet as tolerated  Continue with Carafate and PPI and Maalox  Zofran and reglan as needed  reached out to GI Dr Suh regarding timing of procedures     #Elevated liver enzymes  On admission LFTs were normal  Check right upper quadrant ultrasound: s/p cholecystectomy - unchanged markedly dilated CBD  Trend CMP    #htn  #p afib  c/w metoprolol  hold lisinopril given hypotensive episode   hold eliquis for scopes    #p afib  patient with hx of afib now in sinus  -c/w home meds    #cad  patient with a  hx cad s/p 5 stents last were 2 years ago  states her cardiologist wants her to continue on her plavix  -c/w plavix     #unspecified mental health disorder   per pharmacy pt is on abilify and lexapro and xanax -  will order here     hold eliquis for procedure - last dose 10/27 AM   #po intolerance   #hypoglycemia   EG colonoscopy per GI  cleared by cardio - ok to hold DOAC and plavix - dced  c/w D51/2NS as pt continues to avoid food  Advance diet as tolerated  Continue with Carafate and PPI and Maalox  Zofran and reglan as needed  reached out to GI Dr Suh regarding timing of procedures - plan for wednesday    #Elevated liver enzymes  On admission LFTs were normal  Check right upper quadrant ultrasound: s/p cholecystectomy - unchanged markedly dilated CBD  Trend CMP    #htn  #p afib  c/w metoprolol  hold lisinopril given hypotensive episode   hold eliquis for scopes    #p afib  patient with hx of afib now in sinus  -c/w home meds    #cad  patient with a  hx cad s/p 5 stents last were 2 years ago  states her cardiologist wants her to continue on her plavix  -c/w plavix     #unspecified mental health disorder   per pharmacy pt is on abilify and lexapro and xanax -  will order here     hold eliquis for procedure - last dose 10/27 AM

## 2024-10-29 LAB
ALBUMIN SERPL ELPH-MCNC: 2.9 G/DL — LOW (ref 3.3–5)
ALP SERPL-CCNC: 145 U/L — HIGH (ref 40–120)
ALT FLD-CCNC: 93 U/L — HIGH (ref 12–78)
ANION GAP SERPL CALC-SCNC: 3 MMOL/L — LOW (ref 5–17)
AST SERPL-CCNC: 31 U/L — SIGNIFICANT CHANGE UP (ref 15–37)
BILIRUB SERPL-MCNC: 0.3 MG/DL — SIGNIFICANT CHANGE UP (ref 0.2–1.2)
BUN SERPL-MCNC: 5 MG/DL — LOW (ref 7–23)
CALCIUM SERPL-MCNC: 9.1 MG/DL — SIGNIFICANT CHANGE UP (ref 8.5–10.1)
CHLORIDE SERPL-SCNC: 110 MMOL/L — HIGH (ref 96–108)
CO2 SERPL-SCNC: 27 MMOL/L — SIGNIFICANT CHANGE UP (ref 22–31)
CREAT SERPL-MCNC: 0.83 MG/DL — SIGNIFICANT CHANGE UP (ref 0.5–1.3)
EGFR: 80 ML/MIN/1.73M2 — SIGNIFICANT CHANGE UP
GLUCOSE BLDC GLUCOMTR-MCNC: 101 MG/DL — HIGH (ref 70–99)
GLUCOSE BLDC GLUCOMTR-MCNC: 102 MG/DL — HIGH (ref 70–99)
GLUCOSE BLDC GLUCOMTR-MCNC: 105 MG/DL — HIGH (ref 70–99)
GLUCOSE BLDC GLUCOMTR-MCNC: 97 MG/DL — SIGNIFICANT CHANGE UP (ref 70–99)
GLUCOSE SERPL-MCNC: 91 MG/DL — SIGNIFICANT CHANGE UP (ref 70–99)
HCT VFR BLD CALC: 35.4 % — SIGNIFICANT CHANGE UP (ref 34.5–45)
HGB BLD-MCNC: 11.1 G/DL — LOW (ref 11.5–15.5)
INR BLD: 0.99 RATIO — SIGNIFICANT CHANGE UP (ref 0.85–1.16)
MCHC RBC-ENTMCNC: 28.5 PG — SIGNIFICANT CHANGE UP (ref 27–34)
MCHC RBC-ENTMCNC: 31.4 GM/DL — LOW (ref 32–36)
MCV RBC AUTO: 91 FL — SIGNIFICANT CHANGE UP (ref 80–100)
PLATELET # BLD AUTO: 180 K/UL — SIGNIFICANT CHANGE UP (ref 150–400)
POTASSIUM SERPL-MCNC: 3.8 MMOL/L — SIGNIFICANT CHANGE UP (ref 3.5–5.3)
POTASSIUM SERPL-SCNC: 3.8 MMOL/L — SIGNIFICANT CHANGE UP (ref 3.5–5.3)
PROT SERPL-MCNC: 6.9 GM/DL — SIGNIFICANT CHANGE UP (ref 6–8.3)
PROTHROM AB SERPL-ACNC: 11.7 SEC — SIGNIFICANT CHANGE UP (ref 9.9–13.4)
RBC # BLD: 3.89 M/UL — SIGNIFICANT CHANGE UP (ref 3.8–5.2)
RBC # FLD: 14.3 % — SIGNIFICANT CHANGE UP (ref 10.3–14.5)
SODIUM SERPL-SCNC: 140 MMOL/L — SIGNIFICANT CHANGE UP (ref 135–145)
WBC # BLD: 4.76 K/UL — SIGNIFICANT CHANGE UP (ref 3.8–10.5)
WBC # FLD AUTO: 4.76 K/UL — SIGNIFICANT CHANGE UP (ref 3.8–10.5)

## 2024-10-29 PROCEDURE — 99232 SBSQ HOSP IP/OBS MODERATE 35: CPT

## 2024-10-29 RX ORDER — SOD SULF/SODIUM/NAHCO3/KCL/PEG
4000 SOLUTION, RECONSTITUTED, ORAL ORAL ONCE
Refills: 0 | Status: COMPLETED | OUTPATIENT
Start: 2024-10-29 | End: 2024-10-29

## 2024-10-29 RX ADMIN — Medication 4000 MILLILITER(S): at 18:13

## 2024-10-29 RX ADMIN — ACETAMINOPHEN 500MG 650 MILLIGRAM(S): 500 TABLET, COATED ORAL at 14:37

## 2024-10-29 RX ADMIN — METOPROLOL TARTRATE 100 MILLIGRAM(S): 100 TABLET, FILM COATED ORAL at 09:35

## 2024-10-29 RX ADMIN — ROSUVASTATIN CALCIUM 10 MILLIGRAM(S): 5 TABLET, FILM COATED ORAL at 21:08

## 2024-10-29 RX ADMIN — PANTOPRAZOLE SODIUM 40 MILLIGRAM(S): 40 TABLET, DELAYED RELEASE ORAL at 21:07

## 2024-10-29 RX ADMIN — ARIPIPRAZOLE 5 MILLIGRAM(S): 15 TABLET ORAL at 21:08

## 2024-10-29 RX ADMIN — PANTOPRAZOLE SODIUM 40 MILLIGRAM(S): 40 TABLET, DELAYED RELEASE ORAL at 09:35

## 2024-10-29 RX ADMIN — Medication 0.5 MILLIGRAM(S): at 21:08

## 2024-10-29 RX ADMIN — ESCITALOPRAM OXALATE 10 MILLIGRAM(S): 10 TABLET, FILM COATED ORAL at 09:35

## 2024-10-29 NOTE — PROGRESS NOTE ADULT - SUBJECTIVE AND OBJECTIVE BOX
HOSPITALIST ATTENDING PROGRESS NOTE    Chart and meds reviewed.  Patient seen and examined.    CC: po intolerance    Subjective: minimal po intake - ate a small amount of jello - had normal BM this morning and yesterday    All other systems reviewed and found to be negative with the exception of what has been described above.    MEDICATIONS  (STANDING):  ARIPiprazole 5 milliGRAM(s) Oral at bedtime  dextrose 5% + sodium chloride 0.45%. 1000 milliLiter(s) (100 mL/Hr) IV Continuous <Continuous>  dextrose 5%. 1000 milliLiter(s) (50 mL/Hr) IV Continuous <Continuous>  dextrose 5%. 1000 milliLiter(s) (100 mL/Hr) IV Continuous <Continuous>  dextrose 50% Injectable 25 Gram(s) IV Push once  dextrose 50% Injectable 25 Gram(s) IV Push once  dextrose 50% Injectable 12.5 Gram(s) IV Push once  escitalopram 10 milliGRAM(s) Oral daily  glucagon  Injectable 1 milliGRAM(s) IntraMuscular once  influenza   Vaccine 0.5 milliLiter(s) IntraMuscular once  insulin lispro (ADMELOG) corrective regimen sliding scale   SubCutaneous three times a day before meals  metoprolol succinate  milliGRAM(s) Oral daily  pantoprazole  Injectable 40 milliGRAM(s) IV Push every 12 hours  rosuvastatin 10 milliGRAM(s) Oral at bedtime    MEDICATIONS  (PRN):  acetaminophen     Tablet .. 650 milliGRAM(s) Oral every 6 hours PRN Moderate Pain (4 - 6)  acetaminophen 325 mG/butalbital 50 mG/caffeine 40 mG 1 Tablet(s) Oral <User Schedule> PRN headache  albuterol    90 MICROgram(s) HFA Inhaler 1 Puff(s) Inhalation every 6 hours PRN Shortness of Breath and/or Wheezing  ALPRAZolam 0.5 milliGRAM(s) Oral at bedtime PRN insomnia/anxiety  aluminum hydroxide/magnesium hydroxide/simethicone Suspension 30 milliLiter(s) Oral every 4 hours PRN Dyspepsia  dextrose Oral Gel 15 Gram(s) Oral once PRN Blood Glucose LESS THAN 70 milliGRAM(s)/deciliter  metoclopramide Injectable 5 milliGRAM(s) IV Push every 6 hours PRN nausea refractory to zofran  ondansetron Injectable 4 milliGRAM(s) IV Push every 6 hours PRN Nausea and/or Vomiting      VITALS:  T(F): 97.7 (10-29-24 @ 15:51), Max: 97.7 (10-29-24 @ 15:51)  HR: 84 (10-29-24 @ 15:51) (84 - 94)  BP: 117/73 (10-29-24 @ 15:51) (114/66 - 122/68)  RR: 18 (10-29-24 @ 15:51) (16 - 18)  SpO2: 96% (10-29-24 @ 15:51) (94% - 96%)  Wt(kg): --    I&O's Summary      CAPILLARY BLOOD GLUCOSE      POCT Blood Glucose.: 97 mg/dL (29 Oct 2024 16:54)  POCT Blood Glucose.: 105 mg/dL (29 Oct 2024 11:37)  POCT Blood Glucose.: 102 mg/dL (29 Oct 2024 07:51)      PHYSICAL EXAM:  Gen: NAD  HEENT:  pupils equal and reactive, EOMI, no oropharyngeal lesions, erythema, exudates, oral thrush  NECK:   supple, no carotid bruits, no palpable lymph nodes, no thyromegaly  CV:  +S1, +S2, regular, no murmurs or rubs  RESP:   lungs clear to auscultation bilaterally, no wheezing, rales, rhonchi, good air entry bilaterally  BREAST:  not examined  GI:  abdomen soft, non-tender, non-distended, normal BS, no bruits, no abdominal masses, no palpable masses  RECTAL:  not examined  :  not examined  MSK:   normal muscle tone, no atrophy, no rigidity, no contractions  EXT:  no clubbing, no cyanosis, no edema, no calf pain, swelling or erythema  VASCULAR:  pulses equal and symmetric in the upper and lower extremities  NEURO:  AAOX3, no focal neurological deficits, follows all commands, able to move extremities spontaneously  SKIN:  no ulcers, lesions or rashes    LABS:                            11.1   4.76  )-----------( 180      ( 29 Oct 2024 09:09 )             35.4     10-29    140  |  110[H]  |  5[L]  ----------------------------<  91  3.8   |  27  |  0.83    Ca    9.1      29 Oct 2024 09:09    TPro  6.9  /  Alb  2.9[L]  /  TBili  0.3  /  DBili  x   /  AST  31  /  ALT  93[H]  /  AlkPhos  145[H]  10-29        LIVER FUNCTIONS - ( 29 Oct 2024 09:09 )  Alb: 2.9 g/dL / Pro: 6.9 gm/dL / ALK PHOS: 145 U/L / ALT: 93 U/L / AST: 31 U/L / GGT: x           PT/INR - ( 29 Oct 2024 09:09 )   PT: 11.7 sec;   INR: 0.99 ratio           Urinalysis Basic - ( 29 Oct 2024 09:09 )    Color: x / Appearance: x / SG: x / pH: x  Gluc: 91 mg/dL / Ketone: x  / Bili: x / Urobili: x   Blood: x / Protein: x / Nitrite: x   Leuk Esterase: x / RBC: x / WBC x   Sq Epi: x / Non Sq Epi: x / Bacteria: x              CULTURES:      Additional results/Imaging, I have personally reviewed:    Telemetry, personally reviewed:

## 2024-10-29 NOTE — PROGRESS NOTE ADULT - SUBJECTIVE AND OBJECTIVE BOX
CHIEF COMPLAINT:    HPI:  64-year-old female with history of GERD  HTN, DM, polyps, and afib with 5 cardiac stents. presents with epigastric pain for the last couple of weeks.  Her daughter brought her in, after speaking with her GI doctor who stated patient should not have been on ozempic before and probably have taken insulin instead. She states that she had just uptitrated her dose of ozempic to 0.5 from 0.25 earlier this week. She had been on the 0.25 for 5 weeks. She states the pain bothers her more than the nausea and vomiting. Patient states she feels very minimal relief since she came in.  She denies NSAID use, bleeding or black stools. She reports 10lb weight loss and has been on ozempic for her diabetes. Denies diarrhea or urinary symptoms.     In the ED  Vitals Temp 97.8  /84 O2 sat 98% on RA  Labs WBC 5.28 Hg 13.3 Plt 212 Na 140 K 4 Cl 107 Bicarb 26 BUN 16 Cr 0.86 Glucose 101 Protein 8.4 ALbumin 4 AST 32 ALT 54 Lipase 71 Trop I 4.65  Imaging CTAP   Etiology of abdominal pain is not elucidated  Intervention zofran, ofirmev, pepcid, morphine 2,  4, 1 L NS   (25 Oct 2024 13:17)    Consulted for preop clearance.  CAD s/p PCI OM1  '23.  6 months DAPT reccomended  also afib on eliquis. Currently on plavix & eliquis (no ASA given eliquis & >6 months postPCI)  reports atypical chest pressure nonexertional every 4-5 days relieved partially w/ inhaler.  No dyspnea, palps. >2 flights & 30 minutes walking w/o problems.    10/29/24 - seen in bed, no cardiac complaints, for EGD/colonoscop tomorrow, off tele     MEDICATIONS:  OUTPATIENT:  Home Medications:  ALBUTEROL HFA 90 MCG INHALER: 1 pump(s) inhaled every 6 hours INHALE 1 TO 2 PUFFS BY MOUTH EVERY 4 TO 6 HOURS AS NEEDED (25 Oct 2024 13:40)  ALPRAZolam 0.5 mg oral tablet: 1 tab(s) orally once a day (at bedtime) as needed for sleep (25 Oct 2024 13:40)  ARIPiprazole 5 mg oral tablet: 1 tab(s) orally once a day (at bedtime) (25 Oct 2024 13:38)  butalbital/acetaminophen/caffeine 50 mg-300 mg-40 mg oral capsule: 1 cap(s) orally every 12 hours as needed for  headache (25 Oct 2024 13:40)  CLOPIDOGREL 75 MG TABLET: 1 tab(s) orally once a day (25 Oct 2024 13:40)  Eliquis 5 mg oral tablet: 1 tab(s) orally 2 times a day (25 Oct 2024 13:40)  escitalopram 10 mg oral tablet: 1 tab(s) orally once a day (25 Oct 2024 13:39)  FARXIGA 10 MG TABLET: TAKE 1 TABLET BY MOUTH EVERY DAY IN THE MORNING (25 Oct 2024 13:40)  glipiZIDE 5 mg oral tablet, extended release: 1 tab(s) orally once a day (25 Oct 2024 13:39)  lisinopril 10 mg oral tablet: 1 tab(s) orally 2 times a day (25 Oct 2024 13:40)  METOPROLOL SUCC  MG TAB: 1 tab(s) orally once a day TAKE 1 TABLET BY MOUTH EVERY DAY (25 Oct 2024 13:40)  Praluent Pen 150 mg/mL subcutaneous solution: 150 milligram(s) subcutaneously every 2 weeks (25 Oct 2024 13:40)  rosuvastatin 10 mg oral tablet: 1 tab(s) orally once a day (in the evening) (25 Oct 2024 13:40)  semaglutide 0.5 mg/0.5 mL (0.5 mg dose) subcutaneous solution: 0.5 milligram(s) subcutaneously once a week on Wed (25 Oct 2024 13:39)      INPATIENT:  MEDICATIONS  (STANDING):  ARIPiprazole 5 milliGRAM(s) Oral at bedtime  dextrose 5% + sodium chloride 0.45%. 1000 milliLiter(s) (100 mL/Hr) IV Continuous <Continuous>  dextrose 5%. 1000 milliLiter(s) (100 mL/Hr) IV Continuous <Continuous>  dextrose 5%. 1000 milliLiter(s) (50 mL/Hr) IV Continuous <Continuous>  dextrose 50% Injectable 25 Gram(s) IV Push once  dextrose 50% Injectable 12.5 Gram(s) IV Push once  dextrose 50% Injectable 25 Gram(s) IV Push once  escitalopram 10 milliGRAM(s) Oral daily  glucagon  Injectable 1 milliGRAM(s) IntraMuscular once  influenza   Vaccine 0.5 milliLiter(s) IntraMuscular once  insulin lispro (ADMELOG) corrective regimen sliding scale   SubCutaneous three times a day before meals  metoprolol succinate  milliGRAM(s) Oral daily  pantoprazole  Injectable 40 milliGRAM(s) IV Push every 12 hours  rosuvastatin 10 milliGRAM(s) Oral at bedtime    MEDICATIONS  (PRN):  acetaminophen     Tablet .. 650 milliGRAM(s) Oral every 6 hours PRN Moderate Pain (4 - 6)  acetaminophen 325 mG/butalbital 50 mG/caffeine 40 mG 1 Tablet(s) Oral <User Schedule> PRN headache  aluminum hydroxide/magnesium hydroxide/simethicone Suspension 30 milliLiter(s) Oral every 4 hours PRN Dyspepsia  dextrose Oral Gel 15 Gram(s) Oral once PRN Blood Glucose LESS THAN 70 milliGRAM(s)/deciliter  HYDROmorphone  Injectable 0.5 milliGRAM(s) IV Push every 6 hours PRN Severe Pain (7 - 10)  metoclopramide Injectable 5 milliGRAM(s) IV Push every 6 hours PRN nausea refractory to zofran  ondansetron Injectable 4 milliGRAM(s) IV Push every 6 hours PRN Nausea and/or Vomiting    Vital Signs Last 24 Hrs  T(C): 36.4 (29 Oct 2024 08:21), Max: 36.7 (28 Oct 2024 15:03)  T(F): 97.5 (29 Oct 2024 08:21), Max: 98.1 (28 Oct 2024 15:03)  HR: 94 (29 Oct 2024 08:21) (86 - 94)  BP: 114/66 (29 Oct 2024 08:21) (99/66 - 122/68)  BP(mean): 80 (29 Oct 2024 00:58) (80 - 80)  RR: 18 (29 Oct 2024 08:21) (16 - 18)  SpO2: 94% (29 Oct 2024 08:21) (94% - 98%)    Parameters below as of 29 Oct 2024 08:21  Patient On (Oxygen Delivery Method): room air    PHYSICAL EXAM:    Constitutional: NAD, awake and alert, well-developed  HEENT: PERR, EOMI,  No oral cyananosis.  Neck:  supple,  No JVD  Respiratory: Breath sounds are clear bilaterally, No wheezing, rales or rhonchi  Cardiovascular: S1 and S2, regular rate and rhythm, no Murmurs, gallops or rubs  Gastrointestinal: Bowel Sounds present, soft, nontender.   Extremities: No peripheral edema. No clubbing or cyanosis.  Vascular: 2+ peripheral pulses  Neurological: A/O x 3, no focal deficits  Musculoskeletal: no calf tenderness.  Skin: No rashes.    ===============================  ===============================  LABS: reviewed                                 11.1   4.76  )-----------( 180      ( 29 Oct 2024 09:09 )             35.4     10-29    140  |  110[H]  |  5[L]  ----------------------------<  91  3.8   |  27  |  0.83    Ca    9.1      29 Oct 2024 09:09    TPro  6.9  /  Alb  2.9[L]  /  TBili  0.3  /  DBili  x   /  AST  31  /  ALT  93[H]  /  AlkPhos  145[H]  10-29    - TroponinI hsT: <-3.13, <-4.65    Radiology;EKG/TTE: reviewed     EKG:   sinus tachy no ischemic changes   ===============================  ===============================  < from: Nuclear Stress Test-Exercise.. (24 @ 10:55) >   1. Normal myocardial perfusion scan, with no evidence of infarction or inducible ischemia.   2. Baseline electrocardiogram: normal sinus rhythm at a rate of 95 bpm.   3. Stress electrocardiogram: No significant ischemic ST segment changes.   4. Patient achieved 11.10 METS, which is consistent with good exercise capacity.   5. The left ventricle is normal in function. The post stress left ventricular EF is 79 %. The stress end diastolic volume is 43 ml and systolic volume is 9 ml.  ===============================  ===============================    < from: TTE W or WO Ultrasound Enhancing Agent (24 @ 10:07) >     CONCLUSIONS:      1. Left ventricular systolic function is normal.   2. There is mild (grade 1) left ventricular diastolic dysfunction.   3. Normal right ventricular cavity size and normal systolic function.   4. The left atrium is normal in size.   5. The right atriumis normal in size.   6. Trace mitral regurgitation.   7. Trace tricuspid regurgitation.   8. Mild pulmonic regurgitation.  ===============================  ===============================                < from: Cardiac Catheterization (23 @ 12:47) >  Study Date:     2023   Name:           NUBIA DIAZ   :            1960   (63 years)   Gender:         female   MR#:            [id number]   [facility]#:           [id number]   Patient Class:  Outpatient     Cath Lab Report    Interventional Cardiologist:   Ryan Blackmon MD   Fellow:                        Love Santillan MD   Referring Physician:           Shelton Lee MD     Procedures Performed   Procedures:               1.    Arterial Access - Right Femoral     2. Ultrasound Guided Access   3.    PCI: KATLIN     Indications:               Atypical chest pain   Staged Procedures     PCI Status:               elective     Conclusions:   Successul KATLIN placement to Cx resulting in XIOMARA 3 flow and no chest  pain at the end of the case.    Recommendations:     Maintain on DAPT for 6 months. Recommed aggressive medical management  for CAD as per ACC/AHA guidelines. Findings  relayed to patient and patient's cardiologist.      < end of copied text >

## 2024-10-29 NOTE — PROGRESS NOTE ADULT - ASSESSMENT
#po intolerance   #hypoglycemia   EGD colonoscopy per GI tomorrow  cleared by cardio - ok to hold DOAC and plavix - dced  c/w D51/2NS as pt continues to avoid food  Advance diet as tolerated  Continue with Carafate and PPI and Maalox  Zofran and reglan as needed  reached out to GI Dr Suh regarding timing of procedures - plan for wednesday 230  pt states she wont be able to tolerate prep - per Dr. Suh, offer half now and half tomorrow morning - if she does not tolerate prep will proceed with only EGD     #Elevated liver enzymes  On admission LFTs were normal  Check right upper quadrant ultrasound: s/p cholecystectomy - unchanged markedly dilated CBD  Trend CMP - continues to improve    #htn  #p afib  c/w metoprolol  hold lisinopril given hypotensive episode   hold eliquis for scopes    #p afib  patient with hx of afib now in sinus  -c/w home meds    #cad  patient with a  hx cad s/p 5 stents last were 2 years ago  states her cardiologist wants her to continue on her plavix  -c/w plavix     #unspecified mental health disorder   per pharmacy pt is on abilify and lexapro and xanax -  will order here   pt endorses she has been diagnosed with schizophrenia and/or bipolar  she denies hx of inpatient psych hospitalizations  confirmed home meds - continue    hold eliquis for procedure - last dose 10/27 AM

## 2024-10-30 LAB
ALBUMIN SERPL ELPH-MCNC: 2.8 G/DL — LOW (ref 3.3–5)
ALP SERPL-CCNC: 138 U/L — HIGH (ref 40–120)
ALT FLD-CCNC: 73 U/L — SIGNIFICANT CHANGE UP (ref 12–78)
ANION GAP SERPL CALC-SCNC: 5 MMOL/L — SIGNIFICANT CHANGE UP (ref 5–17)
AST SERPL-CCNC: 25 U/L — SIGNIFICANT CHANGE UP (ref 15–37)
BILIRUB SERPL-MCNC: 0.2 MG/DL — SIGNIFICANT CHANGE UP (ref 0.2–1.2)
BLD GP AB SCN SERPL QL: SIGNIFICANT CHANGE UP
BUN SERPL-MCNC: 5 MG/DL — LOW (ref 7–23)
CALCIUM SERPL-MCNC: 9 MG/DL — SIGNIFICANT CHANGE UP (ref 8.5–10.1)
CHLORIDE SERPL-SCNC: 109 MMOL/L — HIGH (ref 96–108)
CO2 SERPL-SCNC: 27 MMOL/L — SIGNIFICANT CHANGE UP (ref 22–31)
CREAT SERPL-MCNC: 0.81 MG/DL — SIGNIFICANT CHANGE UP (ref 0.5–1.3)
EGFR: 82 ML/MIN/1.73M2 — SIGNIFICANT CHANGE UP
GLUCOSE BLDC GLUCOMTR-MCNC: 111 MG/DL — HIGH (ref 70–99)
GLUCOSE BLDC GLUCOMTR-MCNC: 77 MG/DL — SIGNIFICANT CHANGE UP (ref 70–99)
GLUCOSE BLDC GLUCOMTR-MCNC: 86 MG/DL — SIGNIFICANT CHANGE UP (ref 70–99)
GLUCOSE BLDC GLUCOMTR-MCNC: 87 MG/DL — SIGNIFICANT CHANGE UP (ref 70–99)
GLUCOSE SERPL-MCNC: 90 MG/DL — SIGNIFICANT CHANGE UP (ref 70–99)
HCT VFR BLD CALC: 35.1 % — SIGNIFICANT CHANGE UP (ref 34.5–45)
HGB BLD-MCNC: 11.2 G/DL — LOW (ref 11.5–15.5)
INR BLD: 1.04 RATIO — SIGNIFICANT CHANGE UP (ref 0.85–1.16)
MAGNESIUM SERPL-MCNC: 1.7 MG/DL — SIGNIFICANT CHANGE UP (ref 1.6–2.6)
MCHC RBC-ENTMCNC: 28.7 PG — SIGNIFICANT CHANGE UP (ref 27–34)
MCHC RBC-ENTMCNC: 31.9 G/DL — LOW (ref 32–36)
MCV RBC AUTO: 90 FL — SIGNIFICANT CHANGE UP (ref 80–100)
PLATELET # BLD AUTO: 170 K/UL — SIGNIFICANT CHANGE UP (ref 150–400)
POTASSIUM SERPL-MCNC: 3.6 MMOL/L — SIGNIFICANT CHANGE UP (ref 3.5–5.3)
POTASSIUM SERPL-SCNC: 3.6 MMOL/L — SIGNIFICANT CHANGE UP (ref 3.5–5.3)
PROT SERPL-MCNC: 6.7 GM/DL — SIGNIFICANT CHANGE UP (ref 6–8.3)
PROTHROM AB SERPL-ACNC: 12 SEC — SIGNIFICANT CHANGE UP (ref 9.9–13.4)
RBC # BLD: 3.9 M/UL — SIGNIFICANT CHANGE UP (ref 3.8–5.2)
RBC # FLD: 14.1 % — SIGNIFICANT CHANGE UP (ref 10.3–14.5)
SODIUM SERPL-SCNC: 141 MMOL/L — SIGNIFICANT CHANGE UP (ref 135–145)
WBC # BLD: 4.2 K/UL — SIGNIFICANT CHANGE UP (ref 3.8–10.5)
WBC # FLD AUTO: 4.2 K/UL — SIGNIFICANT CHANGE UP (ref 3.8–10.5)

## 2024-10-30 PROCEDURE — 88313 SPECIAL STAINS GROUP 2: CPT | Mod: 26

## 2024-10-30 PROCEDURE — 99232 SBSQ HOSP IP/OBS MODERATE 35: CPT

## 2024-10-30 PROCEDURE — 88312 SPECIAL STAINS GROUP 1: CPT | Mod: 26

## 2024-10-30 PROCEDURE — 88305 TISSUE EXAM BY PATHOLOGIST: CPT | Mod: 26

## 2024-10-30 RX ORDER — APIXABAN 2.5 MG/1
5 TABLET, FILM COATED ORAL
Refills: 0 | Status: DISCONTINUED | OUTPATIENT
Start: 2024-10-30 | End: 2024-11-07

## 2024-10-30 RX ADMIN — ONDANSETRON HYDROCHLORIDE 4 MILLIGRAM(S): 4 TABLET, FILM COATED ORAL at 11:57

## 2024-10-30 RX ADMIN — METOPROLOL TARTRATE 100 MILLIGRAM(S): 100 TABLET, FILM COATED ORAL at 10:19

## 2024-10-30 RX ADMIN — Medication 0.5 MILLIGRAM(S): at 22:11

## 2024-10-30 RX ADMIN — Medication 100 MILLILITER(S): at 16:59

## 2024-10-30 RX ADMIN — PANTOPRAZOLE SODIUM 40 MILLIGRAM(S): 40 TABLET, DELAYED RELEASE ORAL at 22:07

## 2024-10-30 RX ADMIN — ESCITALOPRAM OXALATE 10 MILLIGRAM(S): 10 TABLET, FILM COATED ORAL at 10:19

## 2024-10-30 RX ADMIN — ACETAMINOPHEN 500MG 650 MILLIGRAM(S): 500 TABLET, COATED ORAL at 22:09

## 2024-10-30 RX ADMIN — ARIPIPRAZOLE 5 MILLIGRAM(S): 15 TABLET ORAL at 22:06

## 2024-10-30 RX ADMIN — ROSUVASTATIN CALCIUM 10 MILLIGRAM(S): 5 TABLET, FILM COATED ORAL at 22:06

## 2024-10-30 RX ADMIN — APIXABAN 5 MILLIGRAM(S): 2.5 TABLET, FILM COATED ORAL at 22:07

## 2024-10-30 RX ADMIN — ACETAMINOPHEN 500MG 650 MILLIGRAM(S): 500 TABLET, COATED ORAL at 22:54

## 2024-10-30 RX ADMIN — PANTOPRAZOLE SODIUM 40 MILLIGRAM(S): 40 TABLET, DELAYED RELEASE ORAL at 10:19

## 2024-10-30 NOTE — PROGRESS NOTE ADULT - SUBJECTIVE AND OBJECTIVE BOX
CHIEF COMPLAINT:    HPI:  64-year-old female with history of GERD  HTN, DM, polyps, and afib with 5 cardiac stents. presents with epigastric pain for the last couple of weeks.  Her daughter brought her in, after speaking with her GI doctor who stated patient should not have been on ozempic before and probably have taken insulin instead. She states that she had just uptitrated her dose of ozempic to 0.5 from 0.25 earlier this week. She had been on the 0.25 for 5 weeks. She states the pain bothers her more than the nausea and vomiting. Patient states she feels very minimal relief since she came in.  She denies NSAID use, bleeding or black stools. She reports 10lb weight loss and has been on ozempic for her diabetes. Denies diarrhea or urinary symptoms.     In the ED  Vitals Temp 97.8  /84 O2 sat 98% on RA  Labs WBC 5.28 Hg 13.3 Plt 212 Na 140 K 4 Cl 107 Bicarb 26 BUN 16 Cr 0.86 Glucose 101 Protein 8.4 ALbumin 4 AST 32 ALT 54 Lipase 71 Trop I 4.65  Imaging CTAP   Etiology of abdominal pain is not elucidated  Intervention zofran, ofirmev, pepcid, morphine 2,  4, 1 L NS   (25 Oct 2024 13:17)    Consulted for preop clearance.  CAD s/p PCI OM1  '23.  6 months DAPT reccomended  also afib on eliquis. Currently on plavix & eliquis (no ASA given eliquis & >6 months postPCI)  reports atypical chest pressure nonexertional every 4-5 days relieved partially w/ inhaler.  No dyspnea, palps. >2 flights & 30 minutes walking w/o problems.    10/29/24 - seen in bed, no cardiac complaints, for EGD/colonoscop tomorrow  10/30/24 - no cardiac complaints, no events on tele, for EGD/colonscopy today at 3:30 pm     MEDICATIONS:  OUTPATIENT:  Home Medications:  ALBUTEROL HFA 90 MCG INHALER: 1 pump(s) inhaled every 6 hours INHALE 1 TO 2 PUFFS BY MOUTH EVERY 4 TO 6 HOURS AS NEEDED (25 Oct 2024 13:40)  ALPRAZolam 0.5 mg oral tablet: 1 tab(s) orally once a day (at bedtime) as needed for sleep (25 Oct 2024 13:40)  ARIPiprazole 5 mg oral tablet: 1 tab(s) orally once a day (at bedtime) (25 Oct 2024 13:38)  butalbital/acetaminophen/caffeine 50 mg-300 mg-40 mg oral capsule: 1 cap(s) orally every 12 hours as needed for  headache (25 Oct 2024 13:40)  CLOPIDOGREL 75 MG TABLET: 1 tab(s) orally once a day (25 Oct 2024 13:40)  Eliquis 5 mg oral tablet: 1 tab(s) orally 2 times a day (25 Oct 2024 13:40)  escitalopram 10 mg oral tablet: 1 tab(s) orally once a day (25 Oct 2024 13:39)  FARXIGA 10 MG TABLET: TAKE 1 TABLET BY MOUTH EVERY DAY IN THE MORNING (25 Oct 2024 13:40)  glipiZIDE 5 mg oral tablet, extended release: 1 tab(s) orally once a day (25 Oct 2024 13:39)  lisinopril 10 mg oral tablet: 1 tab(s) orally 2 times a day (25 Oct 2024 13:40)  METOPROLOL SUCC  MG TAB: 1 tab(s) orally once a day TAKE 1 TABLET BY MOUTH EVERY DAY (25 Oct 2024 13:40)  Praluent Pen 150 mg/mL subcutaneous solution: 150 milligram(s) subcutaneously every 2 weeks (25 Oct 2024 13:40)  rosuvastatin 10 mg oral tablet: 1 tab(s) orally once a day (in the evening) (25 Oct 2024 13:40)  semaglutide 0.5 mg/0.5 mL (0.5 mg dose) subcutaneous solution: 0.5 milligram(s) subcutaneously once a week on Wed (25 Oct 2024 13:39)      INPATIENT:  MEDICATIONS  (STANDING):  ARIPiprazole 5 milliGRAM(s) Oral at bedtime  dextrose 5% + sodium chloride 0.45%. 1000 milliLiter(s) (100 mL/Hr) IV Continuous <Continuous>  dextrose 5%. 1000 milliLiter(s) (100 mL/Hr) IV Continuous <Continuous>  dextrose 5%. 1000 milliLiter(s) (50 mL/Hr) IV Continuous <Continuous>  dextrose 50% Injectable 25 Gram(s) IV Push once  dextrose 50% Injectable 12.5 Gram(s) IV Push once  dextrose 50% Injectable 25 Gram(s) IV Push once  escitalopram 10 milliGRAM(s) Oral daily  glucagon  Injectable 1 milliGRAM(s) IntraMuscular once  influenza   Vaccine 0.5 milliLiter(s) IntraMuscular once  insulin lispro (ADMELOG) corrective regimen sliding scale   SubCutaneous three times a day before meals  metoprolol succinate  milliGRAM(s) Oral daily  pantoprazole  Injectable 40 milliGRAM(s) IV Push every 12 hours  rosuvastatin 10 milliGRAM(s) Oral at bedtime    MEDICATIONS  (PRN):  acetaminophen     Tablet .. 650 milliGRAM(s) Oral every 6 hours PRN Moderate Pain (4 - 6)  acetaminophen 325 mG/butalbital 50 mG/caffeine 40 mG 1 Tablet(s) Oral <User Schedule> PRN headache  aluminum hydroxide/magnesium hydroxide/simethicone Suspension 30 milliLiter(s) Oral every 4 hours PRN Dyspepsia  dextrose Oral Gel 15 Gram(s) Oral once PRN Blood Glucose LESS THAN 70 milliGRAM(s)/deciliter  HYDROmorphone  Injectable 0.5 milliGRAM(s) IV Push every 6 hours PRN Severe Pain (7 - 10)  metoclopramide Injectable 5 milliGRAM(s) IV Push every 6 hours PRN nausea refractory to zofran  ondansetron Injectable 4 milliGRAM(s) IV Push every 6 hours PRN Nausea and/or Vomiting    Vital Signs Last 24 Hrs  T(C): 36.5 (30 Oct 2024 07:30), Max: 36.5 (29 Oct 2024 15:51)  T(F): 97.7 (30 Oct 2024 07:30), Max: 97.7 (29 Oct 2024 15:51)  HR: 78 (30 Oct 2024 07:30) (78 - 84)  BP: 118/72 (30 Oct 2024 07:30) (107/66 - 118/72)  BP(mean): --  RR: 18 (30 Oct 2024 07:30) (18 - 19)  SpO2: 93% (30 Oct 2024 07:30) (93% - 96%)    Parameters below as of 30 Oct 2024 07:30  Patient On (Oxygen Delivery Method): room air    PHYSICAL EXAM:    Constitutional: NAD, awake and alert, well-developed  HEENT: PERR, EOMI,  No oral cyananosis.  Neck:  supple,  No JVD  Respiratory: Breath sounds are clear bilaterally, No wheezing, rales or rhonchi  Cardiovascular: S1 and S2, regular rate and rhythm, no Murmurs, gallops or rubs  Gastrointestinal: Bowel Sounds present, soft, nontender.   Extremities: No peripheral edema. No clubbing or cyanosis.  Vascular: 2+ peripheral pulses  Neurological: A/O x 3, no focal deficits  Musculoskeletal: no calf tenderness.  Skin: No rashes.    ===============================  ===============================  LABS: reviewed                                 11.1   4.76  )-----------( 180      ( 29 Oct 2024 09:09 )             35.4     10-29    140  |  110[H]  |  5[L]  ----------------------------<  91  3.8   |  27  |  0.83    Ca    9.1      29 Oct 2024 09:09    TPro  6.9  /  Alb  2.9[L]  /  TBili  0.3  /  DBili  x   /  AST  31  /  ALT  93[H]  /  AlkPhos  145[H]  10-29    - TroponinI hsT: <-3.13, <-4.65    Radiology;EKG/TTE: reviewed     EKG:   sinus tachy no ischemic changes   ===============================  ===============================  < from: Nuclear Stress Test-Exercise.. (24 @ 10:55) >   1. Normal myocardial perfusion scan, with no evidence of infarction or inducible ischemia.   2. Baseline electrocardiogram: normal sinus rhythm at a rate of 95 bpm.   3. Stress electrocardiogram: No significant ischemic ST segment changes.   4. Patient achieved 11.10 METS, which is consistent with good exercise capacity.   5. The left ventricle is normal in function. The post stress left ventricular EF is 79 %. The stress end diastolic volume is 43 ml and systolic volume is 9 ml.  ===============================  ===============================    < from: TTE W or WO Ultrasound Enhancing Agent (24 @ 10:07) >     CONCLUSIONS:      1. Left ventricular systolic function is normal.   2. There is mild (grade 1) left ventricular diastolic dysfunction.   3. Normal right ventricular cavity size and normal systolic function.   4. The left atrium is normal in size.   5. The right atriumis normal in size.   6. Trace mitral regurgitation.   7. Trace tricuspid regurgitation.   8. Mild pulmonic regurgitation.  ===============================  ===============================                < from: Cardiac Catheterization (23 @ 12:47) >  Study Date:     2023   Name:           NUBIA DIAZ   :            1960   (63 years)   Gender:         female   MR#:            [id number]   [facility]#:           [id number]   Patient Class:  Outpatient     Cath Lab Report    Interventional Cardiologist:   Ryan Blackmon MD   Fellow:                        Love Santillan MD   Referring Physician:           Shelton Lee MD     Procedures Performed   Procedures:               1.    Arterial Access - Right Femoral     2. Ultrasound Guided Access   3.    PCI: KATLIN     Indications:               Atypical chest pain   Staged Procedures     PCI Status:               elective     Conclusions:   Successul KATLIN placement to Cx resulting in XIOMARA 3 flow and no chest  pain at the end of the case.    Recommendations:     Maintain on DAPT for 6 months. Recommed aggressive medical management  for CAD as per ACC/AHA guidelines. Findings  relayed to patient and patient's cardiologist.      < end of copied text >

## 2024-10-30 NOTE — PROGRESS NOTE ADULT - SUBJECTIVE AND OBJECTIVE BOX
HOSPITALIST ATTENDING PROGRESS NOTE    Chart and meds reviewed.  Patient seen and examined.    CC: po intolerance    Subjective: toelrating prep - complains of epigastric pain    All other systems reviewed and found to be negative with the exception of what has been described above.    MEDICATIONS  (STANDING):  apixaban 5 milliGRAM(s) Oral two times a day  ARIPiprazole 5 milliGRAM(s) Oral at bedtime  dextrose 5% + sodium chloride 0.45%. 1000 milliLiter(s) (100 mL/Hr) IV Continuous <Continuous>  dextrose 5%. 1000 milliLiter(s) (100 mL/Hr) IV Continuous <Continuous>  dextrose 5%. 1000 milliLiter(s) (50 mL/Hr) IV Continuous <Continuous>  dextrose 50% Injectable 25 Gram(s) IV Push once  dextrose 50% Injectable 12.5 Gram(s) IV Push once  dextrose 50% Injectable 25 Gram(s) IV Push once  escitalopram 10 milliGRAM(s) Oral daily  glucagon  Injectable 1 milliGRAM(s) IntraMuscular once  influenza   Vaccine 0.5 milliLiter(s) IntraMuscular once  insulin lispro (ADMELOG) corrective regimen sliding scale   SubCutaneous three times a day before meals  metoprolol succinate  milliGRAM(s) Oral daily  pantoprazole  Injectable 40 milliGRAM(s) IV Push every 12 hours  rosuvastatin 10 milliGRAM(s) Oral at bedtime    MEDICATIONS  (PRN):  acetaminophen     Tablet .. 650 milliGRAM(s) Oral every 6 hours PRN Moderate Pain (4 - 6)  acetaminophen 325 mG/butalbital 50 mG/caffeine 40 mG 1 Tablet(s) Oral <User Schedule> PRN headache  albuterol    90 MICROgram(s) HFA Inhaler 1 Puff(s) Inhalation every 6 hours PRN Shortness of Breath and/or Wheezing  ALPRAZolam 0.5 milliGRAM(s) Oral at bedtime PRN insomnia/anxiety  aluminum hydroxide/magnesium hydroxide/simethicone Suspension 30 milliLiter(s) Oral every 4 hours PRN Dyspepsia  dextrose Oral Gel 15 Gram(s) Oral once PRN Blood Glucose LESS THAN 70 milliGRAM(s)/deciliter  metoclopramide Injectable 5 milliGRAM(s) IV Push every 6 hours PRN nausea refractory to zofran  ondansetron Injectable 4 milliGRAM(s) IV Push every 6 hours PRN Nausea and/or Vomiting      VITALS:  T(F): 97.7 (10-30-24 @ 07:30), Max: 97.7 (10-30-24 @ 07:30)  HR: 78 (10-30-24 @ 07:30) (78 - 80)  BP: 118/72 (10-30-24 @ 07:30) (107/66 - 118/72)  RR: 18 (10-30-24 @ 07:30) (18 - 19)  SpO2: 93% (10-30-24 @ 07:30) (93% - 94%)  Wt(kg): --    I&O's Summary    29 Oct 2024 07:01  -  30 Oct 2024 07:00  --------------------------------------------------------  IN: 1200 mL / OUT: 0 mL / NET: 1200 mL        CAPILLARY BLOOD GLUCOSE      POCT Blood Glucose.: 87 mg/dL (30 Oct 2024 16:41)  POCT Blood Glucose.: 77 mg/dL (30 Oct 2024 12:03)  POCT Blood Glucose.: 86 mg/dL (30 Oct 2024 08:18)  POCT Blood Glucose.: 101 mg/dL (29 Oct 2024 21:59)      PHYSICAL EXAM:  Gen: NAD  HEENT:  pupils equal and reactive, EOMI, no oropharyngeal lesions, erythema, exudates, oral thrush  NECK:   supple, no carotid bruits, no palpable lymph nodes, no thyromegaly  CV:  +S1, +S2, regular, no murmurs or rubs  RESP:   lungs clear to auscultation bilaterally, no wheezing, rales, rhonchi, good air entry bilaterally  GI:  abdomen soft, mild tenderness to epigastric area, non-distended, normal BS, no bruits, no abdominal masses, no palpable masses  RECTAL:  not examined  :  not examined  MSK:   normal muscle tone, no atrophy, no rigidity, no contractions  EXT:  no clubbing, no cyanosis, no edema, no calf pain, swelling or erythema  VASCULAR:  pulses equal and symmetric in the upper and lower extremities  NEURO:  AAOX3, no focal neurological deficits, follows all commands, able to move extremities spontaneously    LABS:                            11.2   4.20  )-----------( 170      ( 30 Oct 2024 07:29 )             35.1     10-30    141  |  109[H]  |  5[L]  ----------------------------<  90  3.6   |  27  |  0.81    Ca    9.0      30 Oct 2024 07:29  Mg     1.7     10-30    TPro  6.7  /  Alb  2.8[L]  /  TBili  0.2  /  DBili  x   /  AST  25  /  ALT  73  /  AlkPhos  138[H]  10-30        LIVER FUNCTIONS - ( 30 Oct 2024 07:29 )  Alb: 2.8 g/dL / Pro: 6.7 gm/dL / ALK PHOS: 138 U/L / ALT: 73 U/L / AST: 25 U/L / GGT: x           PT/INR - ( 30 Oct 2024 07:29 )   PT: 12.0 sec;   INR: 1.04 ratio           Urinalysis Basic - ( 30 Oct 2024 07:29 )    Color: x / Appearance: x / SG: x / pH: x  Gluc: 90 mg/dL / Ketone: x  / Bili: x / Urobili: x   Blood: x / Protein: x / Nitrite: x   Leuk Esterase: x / RBC: x / WBC x   Sq Epi: x / Non Sq Epi: x / Bacteria: x              CULTURES:

## 2024-10-30 NOTE — PROGRESS NOTE ADULT - ASSESSMENT
#po intolerance   #hypoglycemia   #gastritis   #esophagitis  EGD colonoscopy per GI tomorrow  cleared by cardio - ok to hold DOAC and plavix - dced  c/w D51/2NS   now s/p EGD and colonoscopy - egd with findings of gastritis and esophagitis without ulcers  colonoscopy with patent anasatomosis - no acute findings   per GI recommendations, restart eliquis tonight; hold plavix for 3 days   pt currently on IV PPI BID as well as carafate  Zofran and reglan as needed  regular low fiber diet ordered  f/u path    #Elevated liver enzymes  On admission LFTs were normal  Check right upper quadrant ultrasound: s/p cholecystectomy - unchanged markedly dilated CBD  LFTs now normal   mildly elevated alk phos    #htn  #p afib  c/w metoprolol  hold lisinopril given hypotensive episode   eliquis restarted    #p afib  patient with hx of afib now in sinus  -c/w home meds    #cad  patient with a  hx cad s/p 5 stents last were 2 years ago  states her cardiologist wants her to continue on her plavix  hold plavix x 3 days after biopsy    #unspecified mental health disorder   per pharmacy pt is on abilify and lexapro and xanax -  will order here   pt endorses she has been diagnosed with schizophrenia and/or bipolar  she denies hx of inpatient psych hospitalizations  confirmed home meds - continue    restarted eliquis 10/30 pm

## 2024-10-31 LAB
ALBUMIN SERPL ELPH-MCNC: 2.8 G/DL — LOW (ref 3.3–5)
ALP SERPL-CCNC: 127 U/L — HIGH (ref 40–120)
ALT FLD-CCNC: 61 U/L — SIGNIFICANT CHANGE UP (ref 12–78)
ANION GAP SERPL CALC-SCNC: 4 MMOL/L — LOW (ref 5–17)
AST SERPL-CCNC: 24 U/L — SIGNIFICANT CHANGE UP (ref 15–37)
BILIRUB SERPL-MCNC: 0.2 MG/DL — SIGNIFICANT CHANGE UP (ref 0.2–1.2)
BUN SERPL-MCNC: 6 MG/DL — LOW (ref 7–23)
CALCIUM SERPL-MCNC: 8.7 MG/DL — SIGNIFICANT CHANGE UP (ref 8.5–10.1)
CHLORIDE SERPL-SCNC: 112 MMOL/L — HIGH (ref 96–108)
CO2 SERPL-SCNC: 27 MMOL/L — SIGNIFICANT CHANGE UP (ref 22–31)
CREAT SERPL-MCNC: 0.84 MG/DL — SIGNIFICANT CHANGE UP (ref 0.5–1.3)
EGFR: 79 ML/MIN/1.73M2 — SIGNIFICANT CHANGE UP
GLUCOSE BLDC GLUCOMTR-MCNC: 103 MG/DL — HIGH (ref 70–99)
GLUCOSE BLDC GLUCOMTR-MCNC: 108 MG/DL — HIGH (ref 70–99)
GLUCOSE BLDC GLUCOMTR-MCNC: 115 MG/DL — HIGH (ref 70–99)
GLUCOSE BLDC GLUCOMTR-MCNC: 121 MG/DL — HIGH (ref 70–99)
GLUCOSE SERPL-MCNC: 98 MG/DL — SIGNIFICANT CHANGE UP (ref 70–99)
HCT VFR BLD CALC: 34.2 % — LOW (ref 34.5–45)
HGB BLD-MCNC: 10.9 G/DL — LOW (ref 11.5–15.5)
MCHC RBC-ENTMCNC: 28.8 PG — SIGNIFICANT CHANGE UP (ref 27–34)
MCHC RBC-ENTMCNC: 31.9 G/DL — LOW (ref 32–36)
MCV RBC AUTO: 90.2 FL — SIGNIFICANT CHANGE UP (ref 80–100)
PLATELET # BLD AUTO: 183 K/UL — SIGNIFICANT CHANGE UP (ref 150–400)
POTASSIUM SERPL-MCNC: 3.7 MMOL/L — SIGNIFICANT CHANGE UP (ref 3.5–5.3)
POTASSIUM SERPL-SCNC: 3.7 MMOL/L — SIGNIFICANT CHANGE UP (ref 3.5–5.3)
PROT SERPL-MCNC: 6.4 GM/DL — SIGNIFICANT CHANGE UP (ref 6–8.3)
RBC # BLD: 3.79 M/UL — LOW (ref 3.8–5.2)
RBC # FLD: 14 % — SIGNIFICANT CHANGE UP (ref 10.3–14.5)
SODIUM SERPL-SCNC: 143 MMOL/L — SIGNIFICANT CHANGE UP (ref 135–145)
WBC # BLD: 6.18 K/UL — SIGNIFICANT CHANGE UP (ref 3.8–10.5)
WBC # FLD AUTO: 6.18 K/UL — SIGNIFICANT CHANGE UP (ref 3.8–10.5)

## 2024-10-31 PROCEDURE — 99232 SBSQ HOSP IP/OBS MODERATE 35: CPT

## 2024-10-31 PROCEDURE — 99233 SBSQ HOSP IP/OBS HIGH 50: CPT

## 2024-10-31 RX ORDER — METOCLOPRAMIDE HYDROCHLORIDE 10 MG/1
5 TABLET ORAL
Refills: 0 | Status: DISCONTINUED | OUTPATIENT
Start: 2024-10-31 | End: 2024-11-04

## 2024-10-31 RX ORDER — DIPHENHYDRAMINE HYDROCHLORIDE AND LIDOCAINE HYDROCHLORIDE AND ALUMINUM HYDROXIDE AND MAGNESIUM HYDRO
5 KIT THREE TIMES A DAY
Refills: 0 | Status: DISCONTINUED | OUTPATIENT
Start: 2024-10-31 | End: 2024-11-29

## 2024-10-31 RX ORDER — BENZOCAINE, MENTHOL 15; 3.6 MG/1; MG/1
1 LOZENGE ORAL EVERY 4 HOURS
Refills: 0 | Status: DISCONTINUED | OUTPATIENT
Start: 2024-10-31 | End: 2024-12-10

## 2024-10-31 RX ORDER — SUCRALFATE 1 G/1
1 TABLET ORAL
Refills: 0 | Status: DISCONTINUED | OUTPATIENT
Start: 2024-10-31 | End: 2024-11-02

## 2024-10-31 RX ADMIN — APIXABAN 5 MILLIGRAM(S): 2.5 TABLET, FILM COATED ORAL at 09:54

## 2024-10-31 RX ADMIN — ROSUVASTATIN CALCIUM 10 MILLIGRAM(S): 5 TABLET, FILM COATED ORAL at 21:23

## 2024-10-31 RX ADMIN — BENZOCAINE, MENTHOL 1 LOZENGE: 15; 3.6 LOZENGE ORAL at 23:25

## 2024-10-31 RX ADMIN — ESCITALOPRAM OXALATE 10 MILLIGRAM(S): 10 TABLET, FILM COATED ORAL at 09:54

## 2024-10-31 RX ADMIN — ONDANSETRON HYDROCHLORIDE 4 MILLIGRAM(S): 4 TABLET, FILM COATED ORAL at 13:59

## 2024-10-31 RX ADMIN — METOPROLOL TARTRATE 100 MILLIGRAM(S): 100 TABLET, FILM COATED ORAL at 09:54

## 2024-10-31 RX ADMIN — DIPHENHYDRAMINE HYDROCHLORIDE AND LIDOCAINE HYDROCHLORIDE AND ALUMINUM HYDROXIDE AND MAGNESIUM HYDRO 5 MILLILITER(S): KIT at 12:54

## 2024-10-31 RX ADMIN — APIXABAN 5 MILLIGRAM(S): 2.5 TABLET, FILM COATED ORAL at 21:23

## 2024-10-31 RX ADMIN — PANTOPRAZOLE SODIUM 40 MILLIGRAM(S): 40 TABLET, DELAYED RELEASE ORAL at 23:25

## 2024-10-31 RX ADMIN — Medication 100 MILLILITER(S): at 13:56

## 2024-10-31 RX ADMIN — ARIPIPRAZOLE 5 MILLIGRAM(S): 15 TABLET ORAL at 21:24

## 2024-10-31 RX ADMIN — Medication 0.5 MILLIGRAM(S): at 21:23

## 2024-10-31 RX ADMIN — PANTOPRAZOLE SODIUM 40 MILLIGRAM(S): 40 TABLET, DELAYED RELEASE ORAL at 09:51

## 2024-10-31 RX ADMIN — Medication 100 MILLILITER(S): at 23:30

## 2024-10-31 NOTE — PROGRESS NOTE ADULT - ASSESSMENT
#po intolerance   #hypoglycemia   #gastritis   #esophagitis  now s/p EGD and colonoscopy - egd with findings of gastritis and esophagitis without ulcers  colonoscopy with patent anasatomosis - no acute findings   per GI recommendations, restarted eliquis 10/31; hold plavix for 3 days; hold fioricet which has aspirin for 3 days   pt currently on IV PPI BID as well as carafate  Zofran and reglan as needed  regular low fiber diet ordered - pt did not tolerate   f/u path  Nutrition consult - may need TPN  at this point, will need to rule out gastroparesis with gastric emptying study - per nuclear medicine the earliest it could be done would be tuesday 11/5- need to order isotpope  Trial of 5mg liquid reglan TID 15 minutes before meal? - defer to GI    #Elevated liver enzymes  On admission LFTs were normal  right upper quadrant ultrasound: s/p cholecystectomy - unchanged markedly dilated CBD  ASt/ALT now normal   mildly elevated alk phos  epigastric pain improving    #htn  #p afib  c/w metoprolol  hold lisinopril given hypotensive episode   eliquis restarted    #p afib  patient with hx of afib now in sinus  -c/w home meds    #cad  patient with a  hx cad s/p 5 stents last were 2 years ago  states her cardiologist wants her to continue on her plavix  hold plavix x 3 days after egd with biopsy     #unspecified mental health disorder   per pharmacy pt is on abilify and lexapro and xanax -  will order here   pt endorses she has been diagnosed with schizophrenia and/or bipolar - shes not entirely sure of her diagnosis  she denies hx of inpatient psych hospitalizations  confirmed home meds - continue    restarted eliquis 10/30 pm   #po intolerance   #hypoglycemia   #gastritis   #esophagitis  now s/p EGD and colonoscopy - egd with findings of gastritis and esophagitis without ulcers  colonoscopy with patent anasatomosis - no acute findings   per GI recommendations, restarted eliquis 10/31; hold plavix for 3 days; hold fioricet which has aspirin for 3 days   pt currently on IV PPI BID as well as carafate  regular low fiber diet ordered - pt did not tolerate   f/u path  Nutrition consult - may need TPN  at this point, will need to rule out gastroparesis with gastric emptying study - per nuclear medicine the earliest it could be done would be tuesday 11/5- need to order isotpope  Trial of 5mg liquid reglan QID 15 min before meals and at bedtime     #Elevated liver enzymes  On admission LFTs were normal  right upper quadrant ultrasound: s/p cholecystectomy - unchanged markedly dilated CBD  ASt/ALT now normal   mildly elevated alk phos  epigastric pain improving    #htn  #p afib  c/w metoprolol  hold lisinopril given hypotensive episode   eliquis restarted    #p afib  patient with hx of afib now in sinus  -c/w home meds    #cad  patient with a  hx cad s/p 5 stents last were 2 years ago  states her cardiologist wants her to continue on her plavix  hold plavix x 3 days after egd with biopsy     #unspecified mental health disorder   per pharmacy pt is on abilify and lexapro and xanax -  will order here   pt endorses she has been diagnosed with schizophrenia and/or bipolar - shes not entirely sure of her diagnosis  she denies hx of inpatient psych hospitalizations  confirmed home meds - continue    restarted eliquis 10/30 pm

## 2024-10-31 NOTE — PROGRESS NOTE ADULT - SUBJECTIVE AND OBJECTIVE BOX
HOSPITALIST ATTENDING PROGRESS NOTE    Chart and meds reviewed.  Patient seen and examined.    CC: po intolerance    Subjective: placed on regular diet post egd/colonoscopy - vomitted lunch this afternoon; complains of throat pain after egd; epigastric pain minimal    All other systems reviewed and found to be negative with the exception of what has been described above.    MEDICATIONS  (STANDING):  apixaban 5 milliGRAM(s) Oral two times a day  ARIPiprazole 5 milliGRAM(s) Oral at bedtime  dextrose 5% + sodium chloride 0.45%. 1000 milliLiter(s) (100 mL/Hr) IV Continuous <Continuous>  dextrose 5%. 1000 milliLiter(s) (100 mL/Hr) IV Continuous <Continuous>  dextrose 5%. 1000 milliLiter(s) (50 mL/Hr) IV Continuous <Continuous>  dextrose 50% Injectable 25 Gram(s) IV Push once  dextrose 50% Injectable 25 Gram(s) IV Push once  dextrose 50% Injectable 12.5 Gram(s) IV Push once  escitalopram 10 milliGRAM(s) Oral daily  FIRST- Mouthwash  BLM 5 milliLiter(s) Swish and Swallow three times a day  glucagon  Injectable 1 milliGRAM(s) IntraMuscular once  influenza   Vaccine 0.5 milliLiter(s) IntraMuscular once  insulin lispro (ADMELOG) corrective regimen sliding scale   SubCutaneous three times a day before meals  metoprolol succinate  milliGRAM(s) Oral daily  pantoprazole  Injectable 40 milliGRAM(s) IV Push every 12 hours  rosuvastatin 10 milliGRAM(s) Oral at bedtime  sucralfate 1 Gram(s) Oral four times a day    MEDICATIONS  (PRN):  acetaminophen     Tablet .. 650 milliGRAM(s) Oral every 6 hours PRN Moderate Pain (4 - 6)  albuterol    90 MICROgram(s) HFA Inhaler 1 Puff(s) Inhalation every 6 hours PRN Shortness of Breath and/or Wheezing  ALPRAZolam 0.5 milliGRAM(s) Oral at bedtime PRN insomnia/anxiety  aluminum hydroxide/magnesium hydroxide/simethicone Suspension 30 milliLiter(s) Oral every 4 hours PRN Dyspepsia  dextrose Oral Gel 15 Gram(s) Oral once PRN Blood Glucose LESS THAN 70 milliGRAM(s)/deciliter  metoclopramide Injectable 5 milliGRAM(s) IV Push every 6 hours PRN nausea refractory to zofran  ondansetron Injectable 4 milliGRAM(s) IV Push every 6 hours PRN Nausea and/or Vomiting      VITALS:  T(F): 97.6 (10-31-24 @ 08:51), Max: 97.6 (10-31-24 @ 08:51)  HR: 80 (10-31-24 @ 08:51) (80 - 87)  BP: 111/59 (10-31-24 @ 08:51) (106/79 - 111/59)  RR: 20 (10-31-24 @ 08:51) (20 - 20)  SpO2: 94% (10-31-24 @ 08:51) (94% - 94%)  Wt(kg): --    I&O's Summary      CAPILLARY BLOOD GLUCOSE      POCT Blood Glucose.: 121 mg/dL (31 Oct 2024 11:13)  POCT Blood Glucose.: 115 mg/dL (31 Oct 2024 08:00)  POCT Blood Glucose.: 111 mg/dL (30 Oct 2024 21:17)  POCT Blood Glucose.: 87 mg/dL (30 Oct 2024 16:41)      PHYSICAL EXAM:  Gen: NAD  HEENT:  pupils equal and reactive, EOMI, no oropharyngeal lesions, erythema, exudates, oral thrush  NECK:   supple, no carotid bruits, no palpable lymph nodes, no thyromegaly  CV:  +S1, +S2, regular, no murmurs or rubs  RESP:   lungs clear to auscultation bilaterally, no wheezing, rales, rhonchi, good air entry bilaterally  GI:  abdomen soft, mild tenderness to epigastric area, non-distended, normal BS, no bruits, no abdominal masses, no palpable masses  RECTAL:  not examined  :  not examined  MSK:   normal muscle tone, no atrophy, no rigidity, no contractions  EXT:  no clubbing, no cyanosis, no edema, no calf pain, swelling or erythema  VASCULAR:  pulses equal and symmetric in the upper and lower extremities  NEURO:  AAOX3, no focal neurological deficits, follows all commands, able to move extremities spontaneously    LABS:                            10.9   6.18  )-----------( 183      ( 31 Oct 2024 09:11 )             34.2     10-31    143  |  112[H]  |  6[L]  ----------------------------<  98  3.7   |  27  |  0.84    Ca    8.7      31 Oct 2024 09:11  Mg     1.7     10-30    TPro  6.4  /  Alb  2.8[L]  /  TBili  0.2  /  DBili  x   /  AST  24  /  ALT  61  /  AlkPhos  127[H]  10-31        LIVER FUNCTIONS - ( 31 Oct 2024 09:11 )  Alb: 2.8 g/dL / Pro: 6.4 gm/dL / ALK PHOS: 127 U/L / ALT: 61 U/L / AST: 24 U/L / GGT: x           PT/INR - ( 30 Oct 2024 07:29 )   PT: 12.0 sec;   INR: 1.04 ratio           Urinalysis Basic - ( 31 Oct 2024 09:11 )    Color: x / Appearance: x / SG: x / pH: x  Gluc: 98 mg/dL / Ketone: x  / Bili: x / Urobili: x   Blood: x / Protein: x / Nitrite: x   Leuk Esterase: x / RBC: x / WBC x   Sq Epi: x / Non Sq Epi: x / Bacteria: x

## 2024-10-31 NOTE — PROGRESS NOTE ADULT - SUBJECTIVE AND OBJECTIVE BOX
Interval History:Epigastric pain improving  Npw with Decreased PO intake ?Nausea    MEDICATIONS  (STANDING):  apixaban 5 milliGRAM(s) Oral two times a day  ARIPiprazole 5 milliGRAM(s) Oral at bedtime  dextrose 5% + sodium chloride 0.45%. 1000 milliLiter(s) (100 mL/Hr) IV Continuous <Continuous>  dextrose 5%. 1000 milliLiter(s) (50 mL/Hr) IV Continuous <Continuous>  dextrose 5%. 1000 milliLiter(s) (100 mL/Hr) IV Continuous <Continuous>  dextrose 50% Injectable 25 Gram(s) IV Push once  dextrose 50% Injectable 12.5 Gram(s) IV Push once  dextrose 50% Injectable 25 Gram(s) IV Push once  escitalopram 10 milliGRAM(s) Oral daily  FIRST- Mouthwash  BLM 5 milliLiter(s) Swish and Swallow three times a day  glucagon  Injectable 1 milliGRAM(s) IntraMuscular once  influenza   Vaccine 0.5 milliLiter(s) IntraMuscular once  insulin lispro (ADMELOG) corrective regimen sliding scale   SubCutaneous three times a day before meals  metoprolol succinate  milliGRAM(s) Oral daily  pantoprazole  Injectable 40 milliGRAM(s) IV Push every 12 hours  rosuvastatin 10 milliGRAM(s) Oral at bedtime  sucralfate 1 Gram(s) Oral four times a day    MEDICATIONS  (PRN):  acetaminophen     Tablet .. 650 milliGRAM(s) Oral every 6 hours PRN Moderate Pain (4 - 6)  albuterol    90 MICROgram(s) HFA Inhaler 1 Puff(s) Inhalation every 6 hours PRN Shortness of Breath and/or Wheezing  ALPRAZolam 0.5 milliGRAM(s) Oral at bedtime PRN insomnia/anxiety  aluminum hydroxide/magnesium hydroxide/simethicone Suspension 30 milliLiter(s) Oral every 4 hours PRN Dyspepsia  dextrose Oral Gel 15 Gram(s) Oral once PRN Blood Glucose LESS THAN 70 milliGRAM(s)/deciliter  ondansetron Injectable 4 milliGRAM(s) IV Push every 6 hours PRN Nausea and/or Vomiting      Daily     Daily   BMI: 23.5 (10-25 @ 18:30)  Change in Weight:  Vital Signs Last 24 Hrs  T(C): 36.4 (31 Oct 2024 08:51), Max: 36.4 (31 Oct 2024 08:51)  T(F): 97.6 (31 Oct 2024 08:51), Max: 97.6 (31 Oct 2024 08:51)  HR: 80 (31 Oct 2024 08:51) (80 - 87)  BP: 111/59 (31 Oct 2024 08:51) (106/79 - 111/59)  BP(mean): --  RR: 20 (31 Oct 2024 08:51) (20 - 20)  SpO2: 94% (31 Oct 2024 08:51) (94% - 94%)    Parameters below as of 31 Oct 2024 08:51  Patient On (Oxygen Delivery Method): room air      I&O's Detail      PHYSICAL EXAM    Cardiovascular:  RRR normal S1/S2, no murmur.  Respiratory:  CTA B/L, normal respiratory effort.   Abdominal:   soft, no masses or tenderness, normoactive BS, NT/ND, no HSM.  Lab Results:                        10.9   6.18  )-----------( 183      ( 31 Oct 2024 09:11 )             34.2     10-31    143  |  112[H]  |  6[L]  ----------------------------<  98  3.7   |  27  |  0.84    Ca    8.7      31 Oct 2024 09:11  Mg     1.7     10-30    TPro  6.4  /  Alb  2.8[L]  /  TBili  0.2  /  DBili  x   /  AST  24  /  ALT  61  /  AlkPhos  127[H]  10-31    LIVER FUNCTIONS - ( 31 Oct 2024 09:11 )  Alb: 2.8 g/dL / Pro: 6.4 gm/dL / ALK PHOS: 127 U/L / ALT: 61 U/L / AST: 24 U/L / GGT: x           PT/INR - ( 30 Oct 2024 07:29 )   PT: 12.0 sec;   INR: 1.04 ratio               Stool Results:          RADIOLOGY RESULTS:    SURGICAL PATHOLOGY:

## 2024-10-31 NOTE — PROGRESS NOTE ADULT - SUBJECTIVE AND OBJECTIVE BOX
CHIEF COMPLAINT:    HPI:  64-year-old female with history of GERD  HTN, DM, polyps, and afib with 5 cardiac stents. presents with epigastric pain for the last couple of weeks.  Her daughter brought her in, after speaking with her GI doctor who stated patient should not have been on ozempic before and probably have taken insulin instead. She states that she had just uptitrated her dose of ozempic to 0.5 from 0.25 earlier this week. She had been on the 0.25 for 5 weeks. She states the pain bothers her more than the nausea and vomiting. Patient states she feels very minimal relief since she came in.  She denies NSAID use, bleeding or black stools. She reports 10lb weight loss and has been on ozempic for her diabetes. Denies diarrhea or urinary symptoms.     In the ED  Vitals Temp 97.8  /84 O2 sat 98% on RA  Labs WBC 5.28 Hg 13.3 Plt 212 Na 140 K 4 Cl 107 Bicarb 26 BUN 16 Cr 0.86 Glucose 101 Protein 8.4 ALbumin 4 AST 32 ALT 54 Lipase 71 Trop I 4.65  Imaging CTAP   Etiology of abdominal pain is not elucidated  Intervention zofran, ofirmev, pepcid, morphine 2,  4, 1 L NS   (25 Oct 2024 13:17)    Consulted for preop clearance.  CAD s/p PCI OM1  '23.  6 months DAPT reccomended  also afib on eliquis. Currently on plavix & eliquis (no ASA given eliquis & >6 months postPCI)  reports atypical chest pressure nonexertional every 4-5 days relieved partially w/ inhaler.  No dyspnea, palps. >2 flights & 30 minutes walking w/o problems.    10/29/24 - seen in bed, no cardiac complaints, for EGD/colonoscop tomorrow  10/30/24 - no cardiac complaints, no events on tele, for EGD/colonscopy today at 3:30 pm   10/31/24 - seen post EGD/colonoscopy, no cardiac complaints     MEDICATIONS:  OUTPATIENT:  Home Medications:  ALBUTEROL HFA 90 MCG INHALER: 1 pump(s) inhaled every 6 hours INHALE 1 TO 2 PUFFS BY MOUTH EVERY 4 TO 6 HOURS AS NEEDED (25 Oct 2024 13:40)  ALPRAZolam 0.5 mg oral tablet: 1 tab(s) orally once a day (at bedtime) as needed for sleep (25 Oct 2024 13:40)  ARIPiprazole 5 mg oral tablet: 1 tab(s) orally once a day (at bedtime) (25 Oct 2024 13:38)  butalbital/acetaminophen/caffeine 50 mg-300 mg-40 mg oral capsule: 1 cap(s) orally every 12 hours as needed for  headache (25 Oct 2024 13:40)  CLOPIDOGREL 75 MG TABLET: 1 tab(s) orally once a day (25 Oct 2024 13:40)  Eliquis 5 mg oral tablet: 1 tab(s) orally 2 times a day (25 Oct 2024 13:40)  escitalopram 10 mg oral tablet: 1 tab(s) orally once a day (25 Oct 2024 13:39)  FARXIGA 10 MG TABLET: TAKE 1 TABLET BY MOUTH EVERY DAY IN THE MORNING (25 Oct 2024 13:40)  glipiZIDE 5 mg oral tablet, extended release: 1 tab(s) orally once a day (25 Oct 2024 13:39)  lisinopril 10 mg oral tablet: 1 tab(s) orally 2 times a day (25 Oct 2024 13:40)  METOPROLOL SUCC  MG TAB: 1 tab(s) orally once a day TAKE 1 TABLET BY MOUTH EVERY DAY (25 Oct 2024 13:40)  Praluent Pen 150 mg/mL subcutaneous solution: 150 milligram(s) subcutaneously every 2 weeks (25 Oct 2024 13:40)  rosuvastatin 10 mg oral tablet: 1 tab(s) orally once a day (in the evening) (25 Oct 2024 13:40)  semaglutide 0.5 mg/0.5 mL (0.5 mg dose) subcutaneous solution: 0.5 milligram(s) subcutaneously once a week on Wed (25 Oct 2024 13:39)      INPATIENT:  MEDICATIONS  (STANDING):  apixaban 5 milliGRAM(s) Oral two times a day  ARIPiprazole 5 milliGRAM(s) Oral at bedtime  dextrose 5% + sodium chloride 0.45%. 1000 milliLiter(s) (100 mL/Hr) IV Continuous <Continuous>  dextrose 5%. 1000 milliLiter(s) (100 mL/Hr) IV Continuous <Continuous>  dextrose 5%. 1000 milliLiter(s) (50 mL/Hr) IV Continuous <Continuous>  dextrose 50% Injectable 25 Gram(s) IV Push once  dextrose 50% Injectable 25 Gram(s) IV Push once  dextrose 50% Injectable 12.5 Gram(s) IV Push once  escitalopram 10 milliGRAM(s) Oral daily  FIRST- Mouthwash  BLM 5 milliLiter(s) Swish and Swallow three times a day  glucagon  Injectable 1 milliGRAM(s) IntraMuscular once  influenza   Vaccine 0.5 milliLiter(s) IntraMuscular once  insulin lispro (ADMELOG) corrective regimen sliding scale   SubCutaneous three times a day before meals  metoprolol succinate  milliGRAM(s) Oral daily  pantoprazole  Injectable 40 milliGRAM(s) IV Push every 12 hours  rosuvastatin 10 milliGRAM(s) Oral at bedtime  sucralfate 1 Gram(s) Oral four times a day      MEDICATIONS  (PRN):  acetaminophen     Tablet .. 650 milliGRAM(s) Oral every 6 hours PRN Moderate Pain (4 - 6)  acetaminophen 325 mG/butalbital 50 mG/caffeine 40 mG 1 Tablet(s) Oral <User Schedule> PRN headache  aluminum hydroxide/magnesium hydroxide/simethicone Suspension 30 milliLiter(s) Oral every 4 hours PRN Dyspepsia  dextrose Oral Gel 15 Gram(s) Oral once PRN Blood Glucose LESS THAN 70 milliGRAM(s)/deciliter  HYDROmorphone  Injectable 0.5 milliGRAM(s) IV Push every 6 hours PRN Severe Pain (7 - 10)  metoclopramide Injectable 5 milliGRAM(s) IV Push every 6 hours PRN nausea refractory to zofran  ondansetron Injectable 4 milliGRAM(s) IV Push every 6 hours PRN Nausea and/or Vomiting    Vital Signs Last 24 Hrs  T(C): 36.5 (30 Oct 2024 07:30), Max: 36.5 (29 Oct 2024 15:51)  T(F): 97.7 (30 Oct 2024 07:30), Max: 97.7 (29 Oct 2024 15:51)  HR: 78 (30 Oct 2024 07:30) (78 - 84)  BP: 118/72 (30 Oct 2024 07:30) (107/66 - 118/72)  BP(mean): --  RR: 18 (30 Oct 2024 07:30) (18 - 19)  SpO2: 93% (30 Oct 2024 07:30) (93% - 96%)    Parameters below as of 30 Oct 2024 07:30  Patient On (Oxygen Delivery Method): room air    PHYSICAL EXAM:    Constitutional: NAD, awake and alert, well-developed  HEENT: PERR, EOMI,  No oral cyananosis.  Neck:  supple,  No JVD  Respiratory: Breath sounds are clear bilaterally, No wheezing, rales or rhonchi  Cardiovascular: S1 and S2, regular rate and rhythm, no Murmurs, gallops or rubs  Gastrointestinal: Bowel Sounds present, soft, nontender.   Extremities: No peripheral edema. No clubbing or cyanosis.  Vascular: 2+ peripheral pulses  Neurological: A/O x 3, no focal deficits  Musculoskeletal: no calf tenderness.  Skin: No rashes.    ===============================  ===============================  LABS: reviewed                                 11.1   4.76  )-----------( 180      ( 29 Oct 2024 09:09 )             35.4     10-29    140  |  110[H]  |  5[L]  ----------------------------<  91  3.8   |  27  |  0.83    Ca    9.1      29 Oct 2024 09:09    TPro  6.9  /  Alb  2.9[L]  /  TBili  0.3  /  DBili  x   /  AST  31  /  ALT  93[H]  /  AlkPhos  145[H]  10-    - TroponinI hsT: <-3.13, <-4.65    Radiology;EKG/TTE: reviewed     EKG:   sinus tachy no ischemic changes   ===============================  ===============================  < from: Nuclear Stress Test-Exercise.. (24 @ 10:55) >   1. Normal myocardial perfusion scan, with no evidence of infarction or inducible ischemia.   2. Baseline electrocardiogram: normal sinus rhythm at a rate of 95 bpm.   3. Stress electrocardiogram: No significant ischemic ST segment changes.   4. Patient achieved 11.10 METS, which is consistent with good exercise capacity.   5. The left ventricle is normal in function. The post stress left ventricular EF is 79 %. The stress end diastolic volume is 43 ml and systolic volume is 9 ml.  ===============================  ===============================    < from: TTE W or WO Ultrasound Enhancing Agent (24 @ 10:07) >     CONCLUSIONS:      1. Left ventricular systolic function is normal.   2. There is mild (grade 1) left ventricular diastolic dysfunction.   3. Normal right ventricular cavity size and normal systolic function.   4. The left atrium is normal in size.   5. The right atriumis normal in size.   6. Trace mitral regurgitation.   7. Trace tricuspid regurgitation.   8. Mild pulmonic regurgitation.  ===============================  ===============================                < from: Cardiac Catheterization (23 @ 12:47) >  Study Date:     2023   Name:           NUBIA DIAZ   :            1960   (63 years)   Gender:         female   MR#:            [id number]   MPI#:           [id number]   Patient Class:  Outpatient     Cath Lab Report    Interventional Cardiologist:   Ryan Blackmon MD   Fellow:                        Love Santillan MD   Referring Physician:           Shelton Lee MD     Procedures Performed   Procedures:               1.    Arterial Access - Right Femoral     2. Ultrasound Guided Access   3.    PCI: KATLIN     Indications:               Atypical chest pain   Staged Procedures     PCI Status:               elective     Conclusions:   Successul KATLIN placement to Cx resulting in XIOMARA 3 flow and no chest  pain at the end of the case.    Recommendations:     Maintain on DAPT for 6 months. Recommed aggressive medical management  for CAD as per ACC/AHA guidelines. Findings  relayed to patient and patient's cardiologist.      < end of copied text >       REASON FOR VISIT: AF, CAD, post-procedure cardiac exam.    HPI:  64-year-old female with history of GERD  HTN, DM, polyps, and afib with 5 cardiac stents. presents with epigastric pain for the last couple of weeks.  Her daughter brought her in, after speaking with her GI doctor who stated patient should not have been on ozempic before and probably have taken insulin instead. She states that she had just uptitrated her dose of ozempic to 0.5 from 0.25 earlier this week. She had been on the 0.25 for 5 weeks. She states the pain bothers her more than the nausea and vomiting. Patient states she feels very minimal relief since she came in.  She denies NSAID use, bleeding or black stools. She reports 10lb weight loss and has been on ozempic for her diabetes. Denies diarrhea or urinary symptoms.     In the ED  Vitals Temp 97.8  /84 O2 sat 98% on RA  Labs WBC 5.28 Hg 13.3 Plt 212 Na 140 K 4 Cl 107 Bicarb 26 BUN 16 Cr 0.86 Glucose 101 Protein 8.4 ALbumin 4 AST 32 ALT 54 Lipase 71 Trop I 4.65  Imaging CTAP   Etiology of abdominal pain is not elucidated  Intervention zofran, ofirmev, pepcid, morphine 2,  4, 1 L NS   (25 Oct 2024 13:17)    Consulted for preop clearance.  CAD s/p PCI OM1  '23.  6 months DAPT reccomended  also afib on eliquis. Currently on plavix & eliquis (no ASA given eliquis & >6 months postPCI)  reports atypical chest pressure nonexertional every 4-5 days relieved partially w/ inhaler.  No dyspnea, palps. >2 flights & 30 minutes walking w/o problems.    10/29/24 - seen in bed, no cardiac complaints, for EGD/colonoscop tomorrow  10/30/24 - no cardiac complaints, no events on tele, for EGD/colonscopy today at 3:30 pm   10/31/24 - seen post EGD/colonoscopy, no cardiac complaints     MEDICATIONS:  OUTPATIENT:  Home Medications:  ALBUTEROL HFA 90 MCG INHALER: 1 pump(s) inhaled every 6 hours INHALE 1 TO 2 PUFFS BY MOUTH EVERY 4 TO 6 HOURS AS NEEDED (25 Oct 2024 13:40)  ALPRAZolam 0.5 mg oral tablet: 1 tab(s) orally once a day (at bedtime) as needed for sleep (25 Oct 2024 13:40)  ARIPiprazole 5 mg oral tablet: 1 tab(s) orally once a day (at bedtime) (25 Oct 2024 13:38)  butalbital/acetaminophen/caffeine 50 mg-300 mg-40 mg oral capsule: 1 cap(s) orally every 12 hours as needed for  headache (25 Oct 2024 13:40)  CLOPIDOGREL 75 MG TABLET: 1 tab(s) orally once a day (25 Oct 2024 13:40)  Eliquis 5 mg oral tablet: 1 tab(s) orally 2 times a day (25 Oct 2024 13:40)  escitalopram 10 mg oral tablet: 1 tab(s) orally once a day (25 Oct 2024 13:39)  FARXIGA 10 MG TABLET: TAKE 1 TABLET BY MOUTH EVERY DAY IN THE MORNING (25 Oct 2024 13:40)  glipiZIDE 5 mg oral tablet, extended release: 1 tab(s) orally once a day (25 Oct 2024 13:39)  lisinopril 10 mg oral tablet: 1 tab(s) orally 2 times a day (25 Oct 2024 13:40)  METOPROLOL SUCC  MG TAB: 1 tab(s) orally once a day TAKE 1 TABLET BY MOUTH EVERY DAY (25 Oct 2024 13:40)  Praluent Pen 150 mg/mL subcutaneous solution: 150 milligram(s) subcutaneously every 2 weeks (25 Oct 2024 13:40)  rosuvastatin 10 mg oral tablet: 1 tab(s) orally once a day (in the evening) (25 Oct 2024 13:40)  semaglutide 0.5 mg/0.5 mL (0.5 mg dose) subcutaneous solution: 0.5 milligram(s) subcutaneously once a week on Wed (25 Oct 2024 13:39)      INPATIENT:  MEDICATIONS  (STANDING):  apixaban 5 milliGRAM(s) Oral two times a day  ARIPiprazole 5 milliGRAM(s) Oral at bedtime  dextrose 5% + sodium chloride 0.45%. 1000 milliLiter(s) (100 mL/Hr) IV Continuous <Continuous>  dextrose 5%. 1000 milliLiter(s) (100 mL/Hr) IV Continuous <Continuous>  dextrose 5%. 1000 milliLiter(s) (50 mL/Hr) IV Continuous <Continuous>  dextrose 50% Injectable 25 Gram(s) IV Push once  dextrose 50% Injectable 25 Gram(s) IV Push once  dextrose 50% Injectable 12.5 Gram(s) IV Push once  escitalopram 10 milliGRAM(s) Oral daily  FIRST- Mouthwash  BLM 5 milliLiter(s) Swish and Swallow three times a day  glucagon  Injectable 1 milliGRAM(s) IntraMuscular once  influenza   Vaccine 0.5 milliLiter(s) IntraMuscular once  insulin lispro (ADMELOG) corrective regimen sliding scale   SubCutaneous three times a day before meals  metoprolol succinate  milliGRAM(s) Oral daily  pantoprazole  Injectable 40 milliGRAM(s) IV Push every 12 hours  rosuvastatin 10 milliGRAM(s) Oral at bedtime  sucralfate 1 Gram(s) Oral four times a day      MEDICATIONS  (PRN):  acetaminophen     Tablet .. 650 milliGRAM(s) Oral every 6 hours PRN Moderate Pain (4 - 6)  acetaminophen 325 mG/butalbital 50 mG/caffeine 40 mG 1 Tablet(s) Oral <User Schedule> PRN headache  aluminum hydroxide/magnesium hydroxide/simethicone Suspension 30 milliLiter(s) Oral every 4 hours PRN Dyspepsia  dextrose Oral Gel 15 Gram(s) Oral once PRN Blood Glucose LESS THAN 70 milliGRAM(s)/deciliter  HYDROmorphone  Injectable 0.5 milliGRAM(s) IV Push every 6 hours PRN Severe Pain (7 - 10)  metoclopramide Injectable 5 milliGRAM(s) IV Push every 6 hours PRN nausea refractory to zofran  ondansetron Injectable 4 milliGRAM(s) IV Push every 6 hours PRN Nausea and/or Vomiting    Vital Signs Last 24 Hrs  T(C): 36.4 (31 Oct 2024 08:51), Max: 36.4 (31 Oct 2024 08:51)  T(F): 97.6 (31 Oct 2024 08:51), Max: 97.6 (31 Oct 2024 08:51)  HR: 80 (31 Oct 2024 08:51) (80 - 87)  BP: 111/59 (31 Oct 2024 08:51) (106/79 - 111/59)  RR: 20 (31 Oct 2024 08:51) (20 - 20)  SpO2: 94% (31 Oct 2024 08:51) (94% - 94%)  Patient On (Oxygen Delivery Method): room air    PHYSICAL EXAM:  Constitutional: NAD, awake and alert, well-developed  HEENT: PERR, EOMI,  No oral cyanosis.  Neck:  supple,  No JVD  Respiratory: Breath sounds are clear bilaterally, No wheezing, rales or rhonchi  Cardiovascular: S1 and S2, regular rate and rhythm, no Murmurs, gallops or rubs  Gastrointestinal: Bowel Sounds present, soft, nontender.   Extremities: No peripheral edema. No clubbing or cyanosis.  Vascular: 2+ peripheral pulses  Neurological: A/O x 3, no focal deficits  Musculoskeletal: no calf tenderness.  Skin: No rashes.    LABS:                    10.9   6.18  )-----------( 183      ( 31 Oct 2024 09:11 )             34.2     143  |  112[H]  |  6[L]  ----------------------------<  98  3.7   |  27  |  0.84    Ca    8.7      31 Oct 2024 09:11  Mg     1.7     10-30    TPro  6.4  /  Alb  2.8[L]  /  TBili  0.2  /  DBili  x   /  AST  24  /  ALT  61  /  AlkPhos  127[H]  10-31    - TroponinI hsT: <-3.13, <-4.65       EKG:   sinus tachy no ischemic changes   ===============================  ===============================  < from: Nuclear Stress Test-Exercise.. (24 @ 10:55) >   1. Normal myocardial perfusion scan, with no evidence of infarction or inducible ischemia.   2. Baseline electrocardiogram: normal sinus rhythm at a rate of 95 bpm.   3. Stress electrocardiogram: No significant ischemic ST segment changes.   4. Patient achieved 11.10 METS, which is consistent with good exercise capacity.   5. The left ventricle is normal in function. The post stress left ventricular EF is 79 %. The stress end diastolic volume is 43 ml and systolic volume is 9 ml.  ===============================  ===============================    < from: TTE W or WO Ultrasound Enhancing Agent (24 @ 10:07) >     CONCLUSIONS:      1. Left ventricular systolic function is normal.   2. There is mild (grade 1) left ventricular diastolic dysfunction.   3. Normal right ventricular cavity size and normal systolic function.   4. The left atrium is normal in size.   5. The right atriumis normal in size.   6. Trace mitral regurgitation.   7. Trace tricuspid regurgitation.   8. Mild pulmonic regurgitation.  ===============================  ===============================                < from: Cardiac Catheterization (23 @ 12:47) >  Study Date:     2023   Name:           NUBIA DIAZ   :            1960   (63 years)   Gender:         female   MR#:            [id number]   [facility]#:           [id number]   Patient Class:  Outpatient     Cath Lab Report    Interventional Cardiologist:   Ryan Blackmon MD   Fellow:                        Love Santillan MD   Referring Physician:           Shelton Lee MD     Procedures Performed   Procedures:               1.    Arterial Access - Right Femoral     2. Ultrasound Guided Access   3.    PCI: KATLIN     Indications:               Atypical chest pain   Staged Procedures     PCI Status:               elective     Conclusions:   Successul KATLIN placement to Cx resulting in XIOMARA 3 flow and no chest  pain at the end of the case.    Recommendations:     Maintain on DAPT for 6 months. Recommed aggressive medical management  for CAD as per ACC/AHA guidelines. Findings  relayed to patient and patient's cardiologist.      < end of copied text >

## 2024-10-31 NOTE — PROGRESS NOTE ADULT - PROBLEM SELECTOR PLAN 3
·  Problem: CAD (coronary artery disease). had normal nuclear stress test on 6/25/24  ·  Recommendation: -cont statin.    -plavix on hold resume as noted above postprocedure.
·  Problem: CAD (coronary artery disease). had normal nuclear stress test on 6/25/24  ·  Recommendation: -cont statin.    -plavix on hold resume as noted above postprocedure.
·  Problem: CAD (coronary artery disease). had normal nuclear stress test on 6/25/24  ·  Recommendation: -cont statin.    -plavix will be resumed 3 days post biopsy

## 2024-10-31 NOTE — PROGRESS NOTE ADULT - PROBLEM SELECTOR PLAN 1
Problem: Preoperative clearance.   ·  Recommendation: -low risk procedure.  ECG sinus. >4 METs activity. h/o CAD s/p PCI Jun '23.    -may proceed w/ EGD colonoscopy. low risk for cardiac events.  -OK to hold plavix & eliquis prior to procedure resume when safe from procedural standpoint  no later than 1 week    10/31/24 - seen pt. post EGD/colonoscopy, no cardiac complaints, eliquis was resumed, plavix will be resumed 3 days post biopsy     pt. is stable from cardiac standpoint, will followup op with Dr. Lee (his office pt), will sign off
Problem: Preoperative clearance.   ·  Recommendation: -low risk procedure.  ECG sinus. >4 METs activity. h/o CAD s/p PCI Jun '23.    -may proceed w/ EGD colonoscopy. low risk for cardiac events.  -OK to hold plavix & eliquis prior to procedure resume when safe from procedural standpoint  no later than 1 week-.
Problem: Preoperative clearance.   ·  Recommendation: -low risk procedure.  ECG sinus. >4 METs activity. h/o CAD s/p PCI Jun '23.    -may proceed w/ EGD colonoscopy. low risk for cardiac events.  -OK to hold plavix & eliquis prior to procedure resume when safe from procedural standpoint  no later than 1 week-.

## 2024-10-31 NOTE — PROGRESS NOTE ADULT - PROBLEM SELECTOR PLAN 2
·  Problem: Chronic atrial fibrillation.   ·  Recommendation: -currently NSR. CHADS2-VASc>2    -eliquis on hold resume as noted above postprocedure
·  Problem: Chronic atrial fibrillation.   ·  Recommendation: -currently NSR. CHADS2-VASc>2    -eliquis on hold resume as noted above postprocedure
·  Problem: Chronic atrial fibrillation.   ·  Recommendation: -currently NSR. CHADS2-VASc>2    -eliquis resumed post procedure

## 2024-10-31 NOTE — PROGRESS NOTE ADULT - ASSESSMENT
S/P Subtotal Colectomy  Decrease PO intake  ?Gastroparesis  Depression  REC  D/C Planning  Soft bland diet  Agree with trial of Reglan-D/C in One week if no change  Ambulate  Psychiatry evaluation  Office visit after D/C home

## 2024-11-01 LAB
ALBUMIN SERPL ELPH-MCNC: 3.2 G/DL — LOW (ref 3.3–5)
ALP SERPL-CCNC: 140 U/L — HIGH (ref 40–120)
ALT FLD-CCNC: 61 U/L — SIGNIFICANT CHANGE UP (ref 12–78)
ANION GAP SERPL CALC-SCNC: 3 MMOL/L — LOW (ref 5–17)
AST SERPL-CCNC: 26 U/L — SIGNIFICANT CHANGE UP (ref 15–37)
BILIRUB SERPL-MCNC: 0.2 MG/DL — SIGNIFICANT CHANGE UP (ref 0.2–1.2)
BUN SERPL-MCNC: 5 MG/DL — LOW (ref 7–23)
CALCIUM SERPL-MCNC: 9 MG/DL — SIGNIFICANT CHANGE UP (ref 8.5–10.1)
CHLORIDE SERPL-SCNC: 112 MMOL/L — HIGH (ref 96–108)
CO2 SERPL-SCNC: 25 MMOL/L — SIGNIFICANT CHANGE UP (ref 22–31)
CREAT SERPL-MCNC: 0.91 MG/DL — SIGNIFICANT CHANGE UP (ref 0.5–1.3)
EGFR: 71 ML/MIN/1.73M2 — SIGNIFICANT CHANGE UP
GLUCOSE BLDC GLUCOMTR-MCNC: 89 MG/DL — SIGNIFICANT CHANGE UP (ref 70–99)
GLUCOSE BLDC GLUCOMTR-MCNC: 93 MG/DL — SIGNIFICANT CHANGE UP (ref 70–99)
GLUCOSE BLDC GLUCOMTR-MCNC: 95 MG/DL — SIGNIFICANT CHANGE UP (ref 70–99)
GLUCOSE BLDC GLUCOMTR-MCNC: 95 MG/DL — SIGNIFICANT CHANGE UP (ref 70–99)
GLUCOSE SERPL-MCNC: 100 MG/DL — HIGH (ref 70–99)
HCT VFR BLD CALC: 37.3 % — SIGNIFICANT CHANGE UP (ref 34.5–45)
HGB BLD-MCNC: 12 G/DL — SIGNIFICANT CHANGE UP (ref 11.5–15.5)
MCHC RBC-ENTMCNC: 28.8 PG — SIGNIFICANT CHANGE UP (ref 27–34)
MCHC RBC-ENTMCNC: 32.2 G/DL — SIGNIFICANT CHANGE UP (ref 32–36)
MCV RBC AUTO: 89.4 FL — SIGNIFICANT CHANGE UP (ref 80–100)
PLATELET # BLD AUTO: 204 K/UL — SIGNIFICANT CHANGE UP (ref 150–400)
POTASSIUM SERPL-MCNC: 4 MMOL/L — SIGNIFICANT CHANGE UP (ref 3.5–5.3)
POTASSIUM SERPL-SCNC: 4 MMOL/L — SIGNIFICANT CHANGE UP (ref 3.5–5.3)
PROT SERPL-MCNC: 7.5 GM/DL — SIGNIFICANT CHANGE UP (ref 6–8.3)
RBC # BLD: 4.17 M/UL — SIGNIFICANT CHANGE UP (ref 3.8–5.2)
RBC # FLD: 13.6 % — SIGNIFICANT CHANGE UP (ref 10.3–14.5)
SODIUM SERPL-SCNC: 140 MMOL/L — SIGNIFICANT CHANGE UP (ref 135–145)
SURGICAL PATHOLOGY STUDY: SIGNIFICANT CHANGE UP
WBC # BLD: 5.84 K/UL — SIGNIFICANT CHANGE UP (ref 3.8–10.5)
WBC # FLD AUTO: 5.84 K/UL — SIGNIFICANT CHANGE UP (ref 3.8–10.5)

## 2024-11-01 PROCEDURE — 99233 SBSQ HOSP IP/OBS HIGH 50: CPT

## 2024-11-01 RX ORDER — SODIUM/POT/MAG/CALC/CHLOR/ACET 35-20-5MEQ
1 VIAL (ML) INTRAVENOUS
Refills: 0 | Status: DISCONTINUED | OUTPATIENT
Start: 2024-11-01 | End: 2024-11-02

## 2024-11-01 RX ADMIN — ROSUVASTATIN CALCIUM 10 MILLIGRAM(S): 5 TABLET, FILM COATED ORAL at 23:01

## 2024-11-01 RX ADMIN — SUCRALFATE 1 GRAM(S): 1 TABLET ORAL at 06:45

## 2024-11-01 RX ADMIN — Medication 0.5 MILLIGRAM(S): at 23:01

## 2024-11-01 RX ADMIN — APIXABAN 5 MILLIGRAM(S): 2.5 TABLET, FILM COATED ORAL at 23:01

## 2024-11-01 RX ADMIN — ONDANSETRON HYDROCHLORIDE 4 MILLIGRAM(S): 4 TABLET, FILM COATED ORAL at 19:32

## 2024-11-01 RX ADMIN — SUCRALFATE 1 GRAM(S): 1 TABLET ORAL at 23:01

## 2024-11-01 RX ADMIN — Medication 100 MILLILITER(S): at 08:46

## 2024-11-01 RX ADMIN — SUCRALFATE 1 GRAM(S): 1 TABLET ORAL at 11:20

## 2024-11-01 RX ADMIN — APIXABAN 5 MILLIGRAM(S): 2.5 TABLET, FILM COATED ORAL at 08:46

## 2024-11-01 RX ADMIN — PANTOPRAZOLE SODIUM 40 MILLIGRAM(S): 40 TABLET, DELAYED RELEASE ORAL at 23:01

## 2024-11-01 RX ADMIN — PANTOPRAZOLE SODIUM 40 MILLIGRAM(S): 40 TABLET, DELAYED RELEASE ORAL at 08:46

## 2024-11-01 RX ADMIN — ARIPIPRAZOLE 5 MILLIGRAM(S): 15 TABLET ORAL at 23:01

## 2024-11-01 RX ADMIN — METOCLOPRAMIDE HYDROCHLORIDE 5 MILLIGRAM(S): 10 TABLET ORAL at 23:01

## 2024-11-01 RX ADMIN — METOCLOPRAMIDE HYDROCHLORIDE 5 MILLIGRAM(S): 10 TABLET ORAL at 11:20

## 2024-11-01 RX ADMIN — METOCLOPRAMIDE HYDROCHLORIDE 5 MILLIGRAM(S): 10 TABLET ORAL at 17:21

## 2024-11-01 RX ADMIN — METOCLOPRAMIDE HYDROCHLORIDE 5 MILLIGRAM(S): 10 TABLET ORAL at 06:45

## 2024-11-01 RX ADMIN — ESCITALOPRAM OXALATE 10 MILLIGRAM(S): 10 TABLET, FILM COATED ORAL at 08:46

## 2024-11-01 RX ADMIN — METOPROLOL TARTRATE 100 MILLIGRAM(S): 100 TABLET, FILM COATED ORAL at 08:46

## 2024-11-01 RX ADMIN — SUCRALFATE 1 GRAM(S): 1 TABLET ORAL at 17:20

## 2024-11-01 NOTE — CHART NOTE - NSCHARTNOTEFT_GEN_A_CORE
Clinical Nutrition PN Follow Up Note    *64-year-old female with history of GERD  HTN, DM, polyps, and afib with 5 cardiac stents. presents with epigastric pain for the last couple of weeks.  Her daughter brought her in, after speaking with her GI doctor who stated patient should not have been on ozempic before and probably have taken insulin instead. She states that she had just uptitrated her dose of ozempic to 0.5 from 0.25 earlier this week. She had been on the 0.25 for 5 weeks. She states the pain bothers her more than the nausea and vomiting. Patient states she feels very minimal relief since she came in.  She denies NSAID use, bleeding or black stools. She reports 10lb weight loss and has been on ozempic for her diabetes. Denies diarrhea or urinary symptoms. Admitted for Nausea and vomiting    *current status: PPN Day #1: Per hospitalist note    *new pertinent meds:    *labs reviewed;  11-01    140  |  112[H]  |  5[L]  ----------------------------<  100[H]  4.0   |  25  |  0.91    Ca    9.0      01 Nov 2024 08:01    TPro  7.5  /  Alb  3.2[L]  /  TBili  0.2  /  DBili  x   /  AST  26  /  ALT  61  /  AlkPhos  140[H]  11-01      BMI: BMI (kg/m2): 23.5 (10-25-24 @ 18:30)  HbA1c: A1C with Estimated Average Glucose Result: 6.6 % (10-26-24 @ 06:47)    Glucose: POCT Blood Glucose.: 89 mg/dL (11-01-24 @ 07:46)    BP: 114/56 (11-01-24 @ 08:32) (106/79 - 139/79)Vital Signs Last 24 Hrs  T(C): 36.7 (11-01-24 @ 08:32), Max: 36.8 (10-31-24 @ 21:30)  T(F): 98 (11-01-24 @ 08:32), Max: 98.2 (10-31-24 @ 21:30)  HR: 79 (11-01-24 @ 08:32) (78 - 79)  BP: 114/56 (11-01-24 @ 08:32) (114/56 - 139/79)  BP(mean): 70 (11-01-24 @ 08:32) (70 - 70)  RR: 17 (11-01-24 @ 08:32) (17 - 18)  SpO2: 96% (11-01-24 @ 08:32) (94% - 96%)      Lipid Panel: Date/Time: 04-01-24 @ 06:46  Cholesterol, Serum: 118  LDL Cholesterol Calculated: 48  HDL Cholesterol, Serum: 42  Total Cholesterol/HDL Ration Measurement: --  Triglycerides, Serum: 170    POCT Blood Glucose.: 89 mg/dL (11-01-24 @ 07:46)  POCT Blood Glucose.: 103 mg/dL (10-31-24 @ 22:00)  POCT Blood Glucose.: 108 mg/dL (10-31-24 @ 16:45)  POCT Blood Glucose.: 121 mg/dL (10-31-24 @ 11:13)      *I&O's Detail    * fluid status:   *BM (+) on .  pt on bowel regimen.  *edema: none doc'd    *malnutrition: Pt continues to meet criteria for moderate malnutrition in context of acute on chronic illness r/t decreased ability to meet increased nutrient needs 2/2 N/V, DM AEB >7.5% wt loss x 2mo, <50% ENN x 2 weeks, mild muscle/fat wasting    Estimated Needs: based on 55 Kg (wt from 10/27 )  Calories: 6708-6830 Kcal (35-40 Kcal/Kg)  Protein: 66-77 g (1.2-1.4 g/Kg)  Fluids:   mL (mL/Kg)    Diet, Low Fiber (10-30-24 @ 15:33) [Active]  Diet, Regular (10-26-24 @ 17:50) [Available for Activation]      Weight History:  Weight (kg): 52.8 (10-25-24 @ 18:30)    TPN/PPN Recommendations: via  Total Volume:  x 24 hours   g  Amino Acids   g Dextrose   g Lipids 20%  mEq Sodium Chloride  mEq Sodium Acetate  mmol Sodium Phosphate  mEq Potassium Chloride  mEq Potassium Acetate  mmol Potassium Phosphate  mEq Calcium Gluconate  mEq Magnesium Sulfate  200 mg Thiamine  ml Trace Elements  ml MVI    Total Calories            (Meets    %  of  Estimated Energy needs  and    %  Protein needs)   (osmolarity ~)    Additional Recommendations:    1) Daily weights  2) Strict I & O's  3) Daily lyte checks including magnesium and phos  4) Weekly triglycerides/LFT checks  5) POCT q6hrs; maintain 140-180mg/dL  6) if on PN > 6 days, consider placing PICC line to meet 100% of nutr needs    *will continue to monitor and adjust PN prn* Clinical Nutrition PN Follow Up Note    *64-year-old female with history of GERD  HTN, DM, polyps, and afib with 5 cardiac stents. presents with epigastric pain for the last couple of weeks.  Her daughter brought her in, after speaking with her GI doctor who stated patient should not have been on ozempic before and probably have taken insulin instead. She states that she had just uptitrated her dose of ozempic to 0.5 from 0.25 earlier this week. She had been on the 0.25 for 5 weeks. She states the pain bothers her more than the nausea and vomiting. Patient states she feels very minimal relief since she came in.  She denies NSAID use, bleeding or black stools. She reports 10lb weight loss and has been on ozempic for her diabetes. Denies diarrhea or urinary symptoms. Admitted for Nausea and vomiting    PPN initiated 2/2 prolonged PO intolerance, N/V, awaiting gastric emptying study    *current status: PPN Day #1: Per GI note 10/27: abd USG inconclusive s/p cholecystectomy. Rec for EGD/Colonoscopy adela next week. Per hospitalist note 10/27: ADAT; c/w carafae and PPI and Maalox; zofran and reglan prn. 10/28, per hospitalist note: pt ate small amount of jello and had BM. reached out to GI Dr Suh regarding timing of procedures - plan for wednesday 230 pt states she wont be able to tolerate prep - per Dr. Suh, offer half now and half tomorrow morning - if she does not tolerate prep will proceed with only EGD. c/w D51/2NS as pt continues to avoid food. 10/30: now s/p EGD and colonoscopy - egd with findings of gastritis and esophagitis without ulcers colonoscopy with patent anastomosis - no acute findings. pt on regular low fiber diet. 10/31: Per hospitalist note: pt placed on regular low fiber diet post egd/colonoscopy - vomited lunch this afternoon; complains of throat pain after egd; epigastric pain minimal. Nutrition consult - may need TPN. at this point, will need to rule out gastroparesis with gastric emptying study - per nuclear medicine the earliest it could be done would be tuesday 11/5- need to order isotpope. - Trial of 5mg liquid reglan QID 15 min before meals and at bedtime. D/w Dr Hernandez this morning; will start PPN as a bridge 2/2 prolonged poor PO intake/ PO intolerance while awaitig gastric emptying study. PPN would not be a long-term nutrition support, pending gastric emptying study if needing long-term nutrition support would recommend J-tube. Confirm goals of care regarding nutrition support.    *new pertinent meds: Lexapro, abilify, ADMELOG (0 units x 24 hours), reglan, protonix, carafate, acetaminophen, xanax, dextrose 5% + NaCL 0.45%; PRN - Maalox, Zofran, albuterol    *labs reviewed; Na WNL; will start total PN volume in middle of fluid needs and adjust as needed tomorrow. K WNL. Mg on low end of normal, no Phos lab; will start all lytes on low end in PN bag and adjust tomorrow. Replete lytes outside of PN prn. corrected Ca WNL, rec'd add 10mEq of Calcium Gluconate outside of PN bag as CAPS is out. T bili WNL will add trace elements to PN bag today. Will add thiamine and MVI with minerals in the PN bag today. Triglycerides WNL (170) will add 50g of lipids today. POCTs x 24 hours WNL. Continue to maintain POCTs between 140-180 mg/dL. Pt at HIGH RISK for RFS. - please obtain BMP, Mg, and Phos levels. Monitor and replete K, Phos, Mg prn if begins to downtrend.     11-01    140  |  112[H]  |  5[L]  ----------------------------<  100[H]  4.0   |  25  |  0.91    Ca    9.0      01 Nov 2024 08:01    TPro  7.5  /  Alb  3.2[L]  /  TBili  0.2  /  DBili  x   /  AST  26  /  ALT  61  /  AlkPhos  140[H]  11-01    BMI: BMI (kg/m2): 23.5 (10-25-24 @ 18:30)  HbA1c: A1C with Estimated Average Glucose Result: 6.6 % (10-26-24 @ 06:47)    Glucose: POCT Blood Glucose.: 89 mg/dL (11-01-24 @ 07:46)    BP: 114/56 (11-01-24 @ 08:32) (106/79 - 139/79)Vital Signs Last 24 Hrs  T(C): 36.7 (11-01-24 @ 08:32), Max: 36.8 (10-31-24 @ 21:30)  T(F): 98 (11-01-24 @ 08:32), Max: 98.2 (10-31-24 @ 21:30)  HR: 79 (11-01-24 @ 08:32) (78 - 79)  BP: 114/56 (11-01-24 @ 08:32) (114/56 - 139/79)  BP(mean): 70 (11-01-24 @ 08:32) (70 - 70)  RR: 17 (11-01-24 @ 08:32) (17 - 18)  SpO2: 96% (11-01-24 @ 08:32) (94% - 96%)    Lipid Panel: Date/Time: 04-01-24 @ 06:46  Cholesterol, Serum: 118  LDL Cholesterol Calculated: 48  HDL Cholesterol, Serum: 42  Triglycerides, Serum: 170    POCT Blood Glucose.: 89 mg/dL (11-01-24 @ 07:46)  POCT Blood Glucose.: 103 mg/dL (10-31-24 @ 22:00)  POCT Blood Glucose.: 108 mg/dL (10-31-24 @ 16:45)  POCT Blood Glucose.: 121 mg/dL (10-31-24 @ 11:13)      *I&O's Detail  none doc'd. Per PN protocol and  policy; strict I&O's when on PN.     *BM doc'd on 10/30.  pt on bowel regimen (senna)  *edema: none doc'd    *malnutrition: Pt continues to meet criteria for moderate malnutrition in context of acute on chronic illness r/t decreased ability to meet increased nutrient needs 2/2 N/V, DM AEB >7.5% wt loss x 2mo, <50% ENN x 2 weeks, mild muscle/fat wasting    Estimated Needs: based on 55 Kg (wt from 10/27 )  Calories: 8986-8841 Kcal (35-40 Kcal/Kg)  Protein: 66-77 g (1.2-1.4 g/Kg)  Fluids: 9754-7583 mL (35-40 mL/Kg)    Diet, Low Fiber (10-30-24 @ 15:33) [Active]  Diet, Regular (10-26-24 @ 17:50) [Available for Activation]    Weight History:  Weight (kg): 52.8 (10-25-24 @ 18:30)    PPN Recommendations: via peripheral line  Total Volume: 2000mL x 24 hours  80 g  Amino Acids  100 g Dextrose  50 g Lipids 20%  80 mEq Sodium Chloride  47 mEq Sodium Acetate  20 mmol Sodium Phosphate  40 mEq Potassium Chloride  0 mEq Potassium Acetate  0 mmol Potassium Phosphate  0 mEq Calcium Gluconate (CAPS out of PN solution)   8 mEq Magnesium Sulfate  200 mg Thiamine  1 ml Trace Elements  10 ml MVI    Total Calories    1160     (Meets 56   %  of  Estimated Energy needs  and 100  %  Protein needs)   (osmolarity ~865)    Additional Recommendations:    1) Daily weights  2) Strict I & O's  3) Daily lyte checks including magnesium and phos  4) Weekly triglycerides/LFT checks  5) POCT q6hrs; maintain 140-180mg/dL  6) if on PN > 6 days, consider placing PICC line to meet 100% of nutr needs; Confirm goals of care regarding LONG-TERM nutrition support     *will continue to monitor and adjust PN prn*  Cecilia Gusman, PhD, MS, RDN Clinical Nutrition PN Follow Up Note    *64-year-old female with history of GERD  HTN, DM, polyps, and afib with 5 cardiac stents. presents with epigastric pain for the last couple of weeks.  Her daughter brought her in, after speaking with her GI doctor who stated patient should not have been on ozempic before and probably have taken insulin instead. She states that she had just uptitrated her dose of ozempic to 0.5 from 0.25 earlier this week. She had been on the 0.25 for 5 weeks. She states the pain bothers her more than the nausea and vomiting. Patient states she feels very minimal relief since she came in.  She denies NSAID use, bleeding or black stools. She reports 10lb weight loss and has been on ozempic for her diabetes. Denies diarrhea or urinary symptoms. Admitted for Nausea and vomiting    PPN initiated 2/2 prolonged PO intolerance, N/V, awaiting gastric emptying study    *current status: PPN Day #1: Per GI note 10/27: abd USG inconclusive s/p cholecystectomy. Rec for EGD/Colonoscopy adela next week. Per hospitalist note 10/27: ADAT; c/w carafae and PPI and Maalox; zofran and reglan prn. 10/28, per hospitalist note: pt ate small amount of jello and had BM. reached out to GI Dr Suh regarding timing of procedures - plan for wednesday 230 pt states she wont be able to tolerate prep - per Dr. Suh, offer half now and half tomorrow morning - if she does not tolerate prep will proceed with only EGD. c/w D51/2NS as pt continues to avoid food. 10/30: now s/p EGD and colonoscopy - egd with findings of gastritis and esophagitis without ulcers colonoscopy with patent anastomosis - no acute findings. pt on regular low fiber diet. 10/31: Per hospitalist note: pt placed on regular low fiber diet post egd/colonoscopy - vomited lunch this afternoon; complains of throat pain after egd; epigastric pain minimal. Nutrition consult - may need TPN. at this point, will need to rule out gastroparesis with gastric emptying study - per nuclear medicine the earliest it could be done would be tuesday 11/5- need to order isotpope. - Trial of 5mg liquid reglan QID 15 min before meals and at bedtime. D/w Dr Hernandez this morning; will start PPN as a bridge 2/2 prolonged poor PO intake/ PO intolerance while awaitig gastric emptying study. PPN would not be a long-term nutrition support, pending gastric emptying study if needing long-term nutrition support would recommend J-tube. Confirm goals of care regarding nutrition support.    *new pertinent meds: Lexapro, abilify, ADMELOG (0 units x 24 hours), reglan, protonix, carafate, acetaminophen, xanax, dextrose 5% + NaCL 0.45%; PRN - Maalox, Zofran, albuterol    *labs reviewed; Na WNL; will start total PN volume in middle of fluid needs and adjust as needed tomorrow. K WNL. Mg on low end of normal, no Phos lab; will start all lytes on low end in PN bag and adjust tomorrow. Replete lytes outside of PN prn. corrected Ca WNL, rec'd add 10mEq of Calcium Gluconate outside of PN bag as CAPS is out. T bili WNL will add trace elements to PN bag today. Will add thiamine and MVI with minerals in the PN bag today. Triglycerides WNL (170) will add 50g of lipids today. POCTs x 24 hours WNL. Continue to maintain POCTs between 140-180 mg/dL. Pt at HIGH RISK for RFS. - please obtain BMP, Mg, and Phos levels. Monitor and replete K, Phos, Mg prn if begins to downtrend.     11-01    140  |  112[H]  |  5[L]  ----------------------------<  100[H]  4.0   |  25  |  0.91    Ca    9.0      01 Nov 2024 08:01    TPro  7.5  /  Alb  3.2[L]  /  TBili  0.2  /  DBili  x   /  AST  26  /  ALT  61  /  AlkPhos  140[H]  11-01    BMI: BMI (kg/m2): 23.5 (10-25-24 @ 18:30)  HbA1c: A1C with Estimated Average Glucose Result: 6.6 % (10-26-24 @ 06:47)    Glucose: POCT Blood Glucose.: 89 mg/dL (11-01-24 @ 07:46)    BP: 114/56 (11-01-24 @ 08:32) (106/79 - 139/79)Vital Signs Last 24 Hrs  T(C): 36.7 (11-01-24 @ 08:32), Max: 36.8 (10-31-24 @ 21:30)  T(F): 98 (11-01-24 @ 08:32), Max: 98.2 (10-31-24 @ 21:30)  HR: 79 (11-01-24 @ 08:32) (78 - 79)  BP: 114/56 (11-01-24 @ 08:32) (114/56 - 139/79)  BP(mean): 70 (11-01-24 @ 08:32) (70 - 70)  RR: 17 (11-01-24 @ 08:32) (17 - 18)  SpO2: 96% (11-01-24 @ 08:32) (94% - 96%)    Lipid Panel: Date/Time: 04-01-24 @ 06:46  Cholesterol, Serum: 118  LDL Cholesterol Calculated: 48  HDL Cholesterol, Serum: 42  Triglycerides, Serum: 170    POCT Blood Glucose.: 89 mg/dL (11-01-24 @ 07:46)  POCT Blood Glucose.: 103 mg/dL (10-31-24 @ 22:00)  POCT Blood Glucose.: 108 mg/dL (10-31-24 @ 16:45)  POCT Blood Glucose.: 121 mg/dL (10-31-24 @ 11:13)      *I&O's Detail  none doc'd. Per PN protocol and  policy; strict I&O's when on PN.     *BM doc'd on 10/30.  pt on bowel regimen (senna)  *edema: none doc'd    *malnutrition: Pt continues to meet criteria for moderate malnutrition in context of acute on chronic illness r/t decreased ability to meet increased nutrient needs 2/2 N/V, DM AEB >7.5% wt loss x 2mo, <50% ENN x 2 weeks, mild muscle/fat wasting    Estimated Needs: based on 55 Kg (wt from 10/27 )  Calories: 8540-2384 Kcal (35-40 Kcal/Kg)  Protein: 66-77 g (1.2-1.4 g/Kg)  Fluids: 8952-5955 mL (35-40 mL/Kg)    Diet, Low Fiber (10-30-24 @ 15:33) [Active]  Diet, Regular (10-26-24 @ 17:50) [Available for Activation]    Weight History:  Weight (kg): 52.8 (10-25-24 @ 18:30)    PPN Recommendations: via peripheral line  Total Volume: 2000mL x 24 hours  80 g  Amino Acids  100 g Dextrose  50 g Lipids 20%  80 mEq Sodium Chloride  47 mEq Sodium Acetate  20 mmol Sodium Phosphate  40 mEq Potassium Chloride  0 mEq Potassium Acetate  0 mmol Potassium Phosphate  0 mEq Calcium Gluconate (CAPS out of PN solution)   8 mEq Magnesium Sulfate  200 mg Thiamine  1 ml Trace Elements  10 ml MVI    Total Calories    1160     (Meets 56   %  of  Estimated Energy needs  and 100  %  Protein needs)   (osmolarity ~865)    Additional Recommendations:    1) Daily weights  2) Strict I & O's  3) Daily lyte checks including magnesium and phos  4) Weekly triglycerides/LFT checks  5) POCT q6hrs; maintain 140-180mg/dL  6) if on PN > 6 days, consider placing PICC line to meet 100% of nutr needs; Confirm goals of care regarding LONG-TERM nutrition support     *will continue to monitor and adjust PN prn*  Cecilia Gusman, PhD, MS, RDN  Isabel Mir, MS, RDN, CNSC, [phone]  Certified Nutrition  Clinical Nutrition PN Follow Up Note    *64-year-old female with history of GERD  HTN, DM, polyps, and afib with 5 cardiac stents. presents with epigastric pain for the last couple of weeks.  Her daughter brought her in, after speaking with her GI doctor who stated patient should not have been on ozempic before and probably have taken insulin instead. She states that she had just uptitrated her dose of ozempic to 0.5 from 0.25 earlier this week. She had been on the 0.25 for 5 weeks. She states the pain bothers her more than the nausea and vomiting. Patient states she feels very minimal relief since she came in.  She denies NSAID use, bleeding or black stools. She reports 10lb weight loss and has been on ozempic for her diabetes. Denies diarrhea or urinary symptoms. Admitted for Nausea and vomiting    PPN initiated 2/2 prolonged PO intolerance, N/V, awaiting gastric emptying study    *current status: PPN Day #1; PPN initiated as a bridge 2/2 prolonged PO intolerance/poor PO intake; awaiting gastric emptying study. Per GI note 10/27: abd USG inconclusive s/p cholecystectomy. Rec for EGD/Colonoscopy early next week. 10/30: now s/p EGD and colonoscopy - egd with findings of gastritis and esophagitis without ulcers colonoscopy with patent anastomosis - no acute findings. Pt placed on regular low fiber diet - vomited lunch on 10/31 - complains of throat pain after egd; epigastric pain minimal.  Per hospitalist at this point, will need to rule out gastroparesis with gastric emptying study - per nuclear medicine the earliest it could be done would be Tuesday 11/5- need to order isotpope - Trial of 5mg liquid Reglan QID 15 min before meals and at bedtime. D/w Dr Hernandez this morning; will start PPN as a bridge 2/2 prolonged poor PO intake/ PO intolerance while awaiting gastric emptying study. PPN would not be a long-term nutrition support, pending gastric emptying study if needing long-term nutrition support would recommend J-tube. Confirm goals of care regarding nutrition support.    *new pertinent meds: Lexapro, Abilify, ADMELOG (0 units x 24 hours), Reglan, Protonix, Carafate, acetaminophen, xanax, dextrose 5% + NaCL 0.45%; PRN - Maalox, Zofran, albuterol    *labs reviewed; Na WNL; will start total PN volume in middle of fluid needs and adjust as needed tomorrow. K WNL. Mg on low end of normal, no Phos lab; will start all lytes on low end in PN bag and adjust tomorrow. Replete lytes outside of PN prn. corrected Ca WNL, rec'd add 10mEq of Calcium Gluconate outside of PN bag as CAPS is out. T bili WNL will add trace elements to PN bag today. Will add thiamine and MVI with minerals in the PN bag today. Pending triglyceride level if <400; will add 50g of lipids tomorrow. POCTs x 24 hours WNL. Continue to maintain POCTs between 140-180 mg/dL. Pt at HIGH RISK for RFS. - please obtain BMP, Mg, and Phos levels. Monitor and replete K, Phos, Mg prn if begins to downtrend.     11-01    140  |  112[H]  |  5[L]  ----------------------------<  100[H]  4.0   |  25  |  0.91    Ca    9.0      01 Nov 2024 08:01    TPro  7.5  /  Alb  3.2[L]  /  TBili  0.2  /  DBili  x   /  AST  26  /  ALT  61  /  AlkPhos  140[H]  11-01    BMI: BMI (kg/m2): 23.5 (10-25-24 @ 18:30)  HbA1c: A1C with Estimated Average Glucose Result: 6.6 % (10-26-24 @ 06:47)    Glucose: POCT Blood Glucose.: 89 mg/dL (11-01-24 @ 07:46)    BP: 114/56 (11-01-24 @ 08:32) (106/79 - 139/79)Vital Signs Last 24 Hrs  T(C): 36.7 (11-01-24 @ 08:32), Max: 36.8 (10-31-24 @ 21:30)  T(F): 98 (11-01-24 @ 08:32), Max: 98.2 (10-31-24 @ 21:30)  HR: 79 (11-01-24 @ 08:32) (78 - 79)  BP: 114/56 (11-01-24 @ 08:32) (114/56 - 139/79)  BP(mean): 70 (11-01-24 @ 08:32) (70 - 70)  RR: 17 (11-01-24 @ 08:32) (17 - 18)  SpO2: 96% (11-01-24 @ 08:32) (94% - 96%)    Lipid Panel: Date/Time: 04-01-24 @ 06:46  Cholesterol, Serum: 118  LDL Cholesterol Calculated: 48  HDL Cholesterol, Serum: 42  Triglycerides, Serum: 170; pending new lab value    POCT Blood Glucose.: 89 mg/dL (11-01-24 @ 07:46)  POCT Blood Glucose.: 103 mg/dL (10-31-24 @ 22:00)  POCT Blood Glucose.: 108 mg/dL (10-31-24 @ 16:45)  POCT Blood Glucose.: 121 mg/dL (10-31-24 @ 11:13)    *I&O's Detail  none doc'd. Per PN protocol and  policy; strict I&O's when on PN.     *BM doc'd on 10/30.  pt on bowel regimen (senna)  *edema: none doc'd    *malnutrition: Pt continues to meet criteria for moderate malnutrition in context of acute on chronic illness r/t decreased ability to meet increased nutrient needs 2/2 N/V, DM AEB >7.5% wt loss x 2mo, <50% ENN x 2 weeks, mild muscle/fat wasting    Estimated Needs: based on 55 Kg (wt from 10/27 )  Calories: 1469-6277 Kcal (35-40 Kcal/Kg)  Protein: 66-77 g (1.2-1.4 g/Kg)  Fluids: 5087-7045 mL (35-40 mL/Kg)    Diet, Low Fiber (10-30-24 @ 15:33) [Active]  Diet, Regular (10-26-24 @ 17:50) [Available for Activation]    Weight History:  Weight (kg): 52.8 (10-25-24 @ 18:30)    PPN Recommendations: via peripheral line  Total Volume: 2000mL x 24 hours  80 g  Amino Acids  100 g Dextrose  0 g Lipids 20%  80 mEq Sodium Chloride  47 mEq Sodium Acetate  20 mmol Sodium Phosphate  40 mEq Potassium Chloride  0 mEq Potassium Acetate  0 mmol Potassium Phosphate  0 mEq Calcium Gluconate (CAPS out of PN solution)   8 mEq Magnesium Sulfate  200 mg Thiamine  1 ml Trace Elements  10 ml MVI    Total Calories    660  (Meets 33 %  of  Estimated Energy needs  and 100  %  Protein needs)   (osmolarity ~865)    Additional Recommendations:    1) Daily weights  2) Strict I & O's  3) Daily lyte checks including magnesium and phos  4) Weekly triglycerides/LFT checks  5) POCT q6hrs; maintain 140-180mg/dL  6) if on PN > 6 days, consider placing PICC line to meet 100% of nutr needs; Confirm goals of care regarding LONG-TERM nutrition support     *will continue to monitor and adjust PN prn*  Cecilia Gusman, PhD, MS, RDN  Isabel Mir, MS, RDN, [facility], [phone]  Certified Nutrition  Clinical Nutrition PN Follow Up Note    *64-year-old female with history of GERD  HTN, DM, polyps, and afib with 5 cardiac stents. presents with epigastric pain for the last couple of weeks.  Her daughter brought her in, after speaking with her GI doctor who stated patient should not have been on ozempic before and probably have taken insulin instead. She states that she had just uptitrated her dose of ozempic to 0.5 from 0.25 earlier this week. She had been on the 0.25 for 5 weeks. She states the pain bothers her more than the nausea and vomiting. Patient states she feels very minimal relief since she came in.  She denies NSAID use, bleeding or black stools. She reports 10lb weight loss and has been on ozempic for her diabetes. Denies diarrhea or urinary symptoms. Admitted for Nausea and vomiting    PPN initiated 2/2 prolonged PO intolerance, N/V, awaiting gastric emptying study    *current status: D/w Dr. Hernandez - PPN Day #1; PPN to be initiated as a bridge 2/2 prolonged PO intolerance/poor PO intake; awaiting gastric emptying study. Per GI note 10/27: abd USG inconclusive s/p cholecystectomy. Rec for EGD/Colonoscopy early next week. 10/30: now s/p EGD and colonoscopy - egd with findings of gastritis and esophagitis without ulcers colonoscopy with patent anastomosis - no acute findings. Pt placed on regular low fiber diet - vomited lunch on 10/31 - complains of throat pain after egd; epigastric pain minimal.  Per hospitalist: "at this point, will need to rule out gastroparesis with gastric emptying study - per nuclear medicine the earliest it could be done would be Tuesday 11/5- need to order isotpope - Trial of 5mg liquid Reglan QID 15 min before meals and at bedtime."   PPN is not to be a long-term nutrition support, pending gastric emptying study if needing long-term nutrition support would recommend GJ-tube if gastroparesis is present. Strongly suggest to confirm goals of care regarding LONG-TERM nutrition support.    *new pertinent meds: Lexapro, Abilify, ADMELOG (0 units x 24 hours), Reglan, Protonix, Carafate, acetaminophen, xanax, dextrose 5% + NaCL 0.45%; PRN - Maalox, Zofran, albuterol    *labs reviewed; Na WNL; will start total PN volume in middle of fluid needs and adjust as needed tomorrow. K WNL. Mg on low end of normal, no Phos lab; will start all lytes at standard doses in PN bag and adjust tomorrow based on labs. Replete lytes outside of PN prn. corrected Ca WNL, rec'd add 10mEq of Calcium Gluconate outside of PN bag as CAPS is out. T bili WNL will add trace elements to PN bag today. Will add thiamine and MVI with minerals in the PN bag today. Pending triglyceride level if <400; will add 50g of lipids tomorrow. POCTs x 24 hours WNL. Continue to maintain POCTs between 140-180 mg/dL. Pt at HIGH RISK for RFS. - please obtain BMP, Mg, and Phos levels. Monitor and replete K, Phos, Mg prn if begins to downtrend.     11-01    140  |  112[H]  |  5[L]  ----------------------------<  100[H]  4.0   |  25  |  0.91    Ca    9.0      01 Nov 2024 08:01    TPro  7.5  /  Alb  3.2[L]  /  TBili  0.2  /  DBili  x   /  AST  26  /  ALT  61  /  AlkPhos  140[H]  11-01    BMI: BMI (kg/m2): 23.5 (10-25-24 @ 18:30)  HbA1c: A1C with Estimated Average Glucose Result: 6.6 % (10-26-24 @ 06:47)    Glucose: POCT Blood Glucose.: 89 mg/dL (11-01-24 @ 07:46)    BP: 114/56 (11-01-24 @ 08:32) (106/79 - 139/79)Vital Signs Last 24 Hrs  T(C): 36.7 (11-01-24 @ 08:32), Max: 36.8 (10-31-24 @ 21:30)  T(F): 98 (11-01-24 @ 08:32), Max: 98.2 (10-31-24 @ 21:30)  HR: 79 (11-01-24 @ 08:32) (78 - 79)  BP: 114/56 (11-01-24 @ 08:32) (114/56 - 139/79)  BP(mean): 70 (11-01-24 @ 08:32) (70 - 70)  RR: 17 (11-01-24 @ 08:32) (17 - 18)  SpO2: 96% (11-01-24 @ 08:32) (94% - 96%)    Lipid Panel: Date/Time: 04-01-24 @ 06:46  Cholesterol, Serum: 118  LDL Cholesterol Calculated: 48  HDL Cholesterol, Serum: 42  Triglycerides, Serum: 170; pending new lab value    POCT Blood Glucose.: 89 mg/dL (11-01-24 @ 07:46)  POCT Blood Glucose.: 103 mg/dL (10-31-24 @ 22:00)  POCT Blood Glucose.: 108 mg/dL (10-31-24 @ 16:45)  POCT Blood Glucose.: 121 mg/dL (10-31-24 @ 11:13)    *I&O's Detail  none doc'd. Per PN protocol and  policy; strict I&O's when on PN.     *BM doc'd on 10/30.  pt on bowel regimen (senna)  *edema: none doc'd    *malnutrition: Pt continues to meet criteria for moderate malnutrition in context of acute on chronic illness r/t decreased ability to meet increased nutrient needs 2/2 N/V, DM AEB >7.5% wt loss x 2mo, <50% ENN x 2 weeks, mild muscle/fat wasting    Estimated Needs: based on 55 Kg (wt from 10/27 )  Calories: 0725-5998 Kcal (35-40 Kcal/Kg)  Protein: 66-77 g (1.2-1.4 g/Kg)  Fluids: 7923-6588 mL (35-40 mL/Kg)    Diet, Low Fiber (10-30-24 @ 15:33) [Active]  Diet, Regular (10-26-24 @ 17:50) [Available for Activation]    Weight History:  Weight (kg): 52.8 (10-25-24 @ 18:30)    PPN Recommendations: via peripheral line  Total Volume: 2000mL x 24 hours  80 g  Amino Acids  100 g Dextrose  0 g Lipids 20%  80 mEq Sodium Chloride  47 mEq Sodium Acetate  20 mmol Sodium Phosphate  40 mEq Potassium Chloride  0 mEq Potassium Acetate  0 mmol Potassium Phosphate  0 mEq Calcium Gluconate (CAPS out of PN solution)   8 mEq Magnesium Sulfate  200 mg Thiamine  1 ml Trace Elements  10 ml MVI    Total Calories    660  (Meets 33 %  of  Estimated Energy needs  and 100  %  Protein needs)   (osmolarity ~865)    Additional Recommendations:    1) Daily weights  2) Strict I & O's  3) Daily lyte checks including magnesium and phos **at risk for RFS replete K, Phos, Mg outside PN bag PRN  4) Weekly triglycerides/LFT checks  5) POCT q6hrs; maintain 140-180mg/dL  6) if on PN > 6 days, consider placing PICC line to meet 100% of nutr needs; Confirm goals of care regarding LONG-TERM nutrition support - if gastroparesis present and still unable to tolerate PO, would need GJ tube    *will continue to monitor and adjust PN prn*  Cecilia Gusman, PhD, MS, RDN  Isabel Mir, MS, RDN, CNSC, [phone]  Certified Nutrition

## 2024-11-01 NOTE — PROGRESS NOTE ADULT - SUBJECTIVE AND OBJECTIVE BOX
CC: inability to tolerate po    S:  11/1: Still says she has nausea, with poor PO intake.  DIscussed PPN today and also continued to monitor symptoms.    REVIEW OF SYSTEMS: All other review of systems is negative unless indicated above.      Vital Signs Last 24 Hrs  T(C): 36.7 (01 Nov 2024 08:32), Max: 36.8 (31 Oct 2024 21:30)  T(F): 98 (01 Nov 2024 08:32), Max: 98.2 (31 Oct 2024 21:30)  HR: 79 (01 Nov 2024 08:32) (78 - 79)  BP: 114/56 (01 Nov 2024 08:32) (114/56 - 139/79)  BP(mean): 70 (01 Nov 2024 08:32) (70 - 70)  RR: 17 (01 Nov 2024 08:32) (17 - 18)  SpO2: 96% (01 Nov 2024 08:32) (94% - 96%)    Parameters below as of 01 Nov 2024 08:32  Patient On (Oxygen Delivery Method): room air      PHYSICAL EXAM:    Constitutional: NAD, awake and alert, frail appearing  HEENT: PERR, EOMI, Normal Hearing, MMM  Neck: Soft and supple  Respiratory: Breath sounds are clear bilaterally, No wheezing, rales or rhonchi  Cardiovascular: S1 and S2, regular rate and rhythm, no Murmurs, gallops or rubs  Gastrointestinal: Bowel Sounds present, soft, nontender, nondistended, no guarding, no rebound  Extremities: No peripheral edema  Neurological: A/O x 3, no focal deficits in my limited exam      MEDICATIONS  (STANDING):  apixaban 5 milliGRAM(s) Oral two times a day  ARIPiprazole 5 milliGRAM(s) Oral at bedtime  dextrose 5% + sodium chloride 0.45%. 1000 milliLiter(s) (100 mL/Hr) IV Continuous <Continuous>  dextrose 5%. 1000 milliLiter(s) (100 mL/Hr) IV Continuous <Continuous>  dextrose 5%. 1000 milliLiter(s) (50 mL/Hr) IV Continuous <Continuous>  dextrose 50% Injectable 25 Gram(s) IV Push once  dextrose 50% Injectable 12.5 Gram(s) IV Push once  dextrose 50% Injectable 25 Gram(s) IV Push once  escitalopram 10 milliGRAM(s) Oral daily  FIRST- Mouthwash  BLM 5 milliLiter(s) Swish and Swallow three times a day  glucagon  Injectable 1 milliGRAM(s) IntraMuscular once  influenza   Vaccine 0.5 milliLiter(s) IntraMuscular once  insulin lispro (ADMELOG) corrective regimen sliding scale   SubCutaneous three times a day before meals  metoclopramide   Syrup 5 milliGRAM(s) Oral four times a day  metoprolol succinate  milliGRAM(s) Oral daily  pantoprazole  Injectable 40 milliGRAM(s) IV Push every 12 hours  Parenteral Nutrition - Adult 1 Each (83 mL/Hr) TPN Continuous <Continuous>  rosuvastatin 10 milliGRAM(s) Oral at bedtime  sucralfate 1 Gram(s) Oral four times a day    MEDICATIONS  (PRN):  acetaminophen     Tablet .. 650 milliGRAM(s) Oral every 6 hours PRN Moderate Pain (4 - 6)  albuterol    90 MICROgram(s) HFA Inhaler 1 Puff(s) Inhalation every 6 hours PRN Shortness of Breath and/or Wheezing  ALPRAZolam 0.5 milliGRAM(s) Oral at bedtime PRN insomnia/anxiety  aluminum hydroxide/magnesium hydroxide/simethicone Suspension 30 milliLiter(s) Oral every 4 hours PRN Dyspepsia  benzocaine/menthol Lozenge 1 Lozenge Oral every 4 hours PRN Sore Throat  dextrose Oral Gel 15 Gram(s) Oral once PRN Blood Glucose LESS THAN 70 milliGRAM(s)/deciliter  ondansetron Injectable 4 milliGRAM(s) IV Push every 6 hours PRN Nausea and/or Vomiting                                12.0   5.84  )-----------( 204      ( 01 Nov 2024 08:01 )             37.3     11-01    140  |  112[H]  |  5[L]  ----------------------------<  100[H]  4.0   |  25  |  0.91    Ca    9.0      01 Nov 2024 08:01    TPro  7.5  /  Alb  3.2[L]  /  TBili  0.2  /  DBili  x   /  AST  26  /  ALT  61  /  AlkPhos  140[H]  11-01    CAPILLARY BLOOD GLUCOSE      POCT Blood Glucose.: 95 mg/dL (01 Nov 2024 11:16)  POCT Blood Glucose.: 89 mg/dL (01 Nov 2024 07:46)  POCT Blood Glucose.: 103 mg/dL (31 Oct 2024 22:00)  POCT Blood Glucose.: 108 mg/dL (31 Oct 2024 16:45)    LIVER FUNCTIONS - ( 01 Nov 2024 08:01 )  Alb: 3.2 g/dL / Pro: 7.5 gm/dL / ALK PHOS: 140 U/L / ALT: 61 U/L / AST: 26 U/L / GGT: x             Urinalysis Basic - ( 01 Nov 2024 08:01 )    Color: x / Appearance: x / SG: x / pH: x  Gluc: 100 mg/dL / Ketone: x  / Bili: x / Urobili: x   Blood: x / Protein: x / Nitrite: x   Leuk Esterase: x / RBC: x / WBC x   Sq Epi: x / Non Sq Epi: x / Bacteria: x          Assessment and Plan: 64 year old woman with poor oral intake.       po intolerance suspect could be med adverse effect from Ozempic also with gastritis and esophagitis   - off ozempic  - CT abd:  Status post colectomy with ileorectal anastomosis. No bowel obstruction.  - EGD/colonoscopy --> gastritis, esophagitits  - c/w carafate  - c/w PPI IV BID  - GI following   - low fiber diet  - start PPN  - transaminitis resolved      DM2 with possible gastroparesis:  - trial of metoclopramide     - RAISS  - gastric empyting study problematic, will hold off for now      Hypoglycemia / Moderate protein-calorie malnutrition:  - start PPN, pt still unable to tolerate PO intake  - daily labs      Paroxymal afib / HTN / CAD:  - off lisinopril due to borderline BP  - c/w eliquis, and metoprolol   - s/p 5 stents last were 2 years ago  - plavix  - restart plavix on 11/3      #unspecified mental health disorder   per pharmacy pt is on abilify and lexapro and xanax -  will order here   pt endorses she has been diagnosed with schizophrenia and/or bipolar - shes not entirely sure of her diagnosis  she denies hx of inpatient psych hospitalizations  confirmed home meds - continue       CC: inability to tolerate po    S:  11/1: Still says she has nausea, with poor PO intake.  DIscussed PPN today and also continued to monitor symptoms.    REVIEW OF SYSTEMS: All other review of systems is negative unless indicated above.      Vital Signs Last 24 Hrs  T(C): 36.7 (01 Nov 2024 08:32), Max: 36.8 (31 Oct 2024 21:30)  T(F): 98 (01 Nov 2024 08:32), Max: 98.2 (31 Oct 2024 21:30)  HR: 79 (01 Nov 2024 08:32) (78 - 79)  BP: 114/56 (01 Nov 2024 08:32) (114/56 - 139/79)  BP(mean): 70 (01 Nov 2024 08:32) (70 - 70)  RR: 17 (01 Nov 2024 08:32) (17 - 18)  SpO2: 96% (01 Nov 2024 08:32) (94% - 96%)    Parameters below as of 01 Nov 2024 08:32  Patient On (Oxygen Delivery Method): room air      PHYSICAL EXAM:    Constitutional: NAD, awake and alert, frail appearing  HEENT: PERR, EOMI, Normal Hearing, MMM  Neck: Soft and supple  Respiratory: Breath sounds are clear bilaterally, No wheezing, rales or rhonchi  Cardiovascular: S1 and S2, regular rate and rhythm, no Murmurs, gallops or rubs  Gastrointestinal: Bowel Sounds present, soft, nontender, nondistended, no guarding, no rebound  Extremities: No peripheral edema  Neurological: A/O x 3, no focal deficits in my limited exam      MEDICATIONS  (STANDING):  apixaban 5 milliGRAM(s) Oral two times a day  ARIPiprazole 5 milliGRAM(s) Oral at bedtime  dextrose 5% + sodium chloride 0.45%. 1000 milliLiter(s) (100 mL/Hr) IV Continuous <Continuous>  dextrose 5%. 1000 milliLiter(s) (100 mL/Hr) IV Continuous <Continuous>  dextrose 5%. 1000 milliLiter(s) (50 mL/Hr) IV Continuous <Continuous>  dextrose 50% Injectable 25 Gram(s) IV Push once  dextrose 50% Injectable 12.5 Gram(s) IV Push once  dextrose 50% Injectable 25 Gram(s) IV Push once  escitalopram 10 milliGRAM(s) Oral daily  FIRST- Mouthwash  BLM 5 milliLiter(s) Swish and Swallow three times a day  glucagon  Injectable 1 milliGRAM(s) IntraMuscular once  influenza   Vaccine 0.5 milliLiter(s) IntraMuscular once  insulin lispro (ADMELOG) corrective regimen sliding scale   SubCutaneous three times a day before meals  metoclopramide   Syrup 5 milliGRAM(s) Oral four times a day  metoprolol succinate  milliGRAM(s) Oral daily  pantoprazole  Injectable 40 milliGRAM(s) IV Push every 12 hours  Parenteral Nutrition - Adult 1 Each (83 mL/Hr) TPN Continuous <Continuous>  rosuvastatin 10 milliGRAM(s) Oral at bedtime  sucralfate 1 Gram(s) Oral four times a day    MEDICATIONS  (PRN):  acetaminophen     Tablet .. 650 milliGRAM(s) Oral every 6 hours PRN Moderate Pain (4 - 6)  albuterol    90 MICROgram(s) HFA Inhaler 1 Puff(s) Inhalation every 6 hours PRN Shortness of Breath and/or Wheezing  ALPRAZolam 0.5 milliGRAM(s) Oral at bedtime PRN insomnia/anxiety  aluminum hydroxide/magnesium hydroxide/simethicone Suspension 30 milliLiter(s) Oral every 4 hours PRN Dyspepsia  benzocaine/menthol Lozenge 1 Lozenge Oral every 4 hours PRN Sore Throat  dextrose Oral Gel 15 Gram(s) Oral once PRN Blood Glucose LESS THAN 70 milliGRAM(s)/deciliter  ondansetron Injectable 4 milliGRAM(s) IV Push every 6 hours PRN Nausea and/or Vomiting                                12.0   5.84  )-----------( 204      ( 01 Nov 2024 08:01 )             37.3     11-01    140  |  112[H]  |  5[L]  ----------------------------<  100[H]  4.0   |  25  |  0.91    Ca    9.0      01 Nov 2024 08:01    TPro  7.5  /  Alb  3.2[L]  /  TBili  0.2  /  DBili  x   /  AST  26  /  ALT  61  /  AlkPhos  140[H]  11-01    CAPILLARY BLOOD GLUCOSE      POCT Blood Glucose.: 95 mg/dL (01 Nov 2024 11:16)  POCT Blood Glucose.: 89 mg/dL (01 Nov 2024 07:46)  POCT Blood Glucose.: 103 mg/dL (31 Oct 2024 22:00)  POCT Blood Glucose.: 108 mg/dL (31 Oct 2024 16:45)    LIVER FUNCTIONS - ( 01 Nov 2024 08:01 )  Alb: 3.2 g/dL / Pro: 7.5 gm/dL / ALK PHOS: 140 U/L / ALT: 61 U/L / AST: 26 U/L / GGT: x             Urinalysis Basic - ( 01 Nov 2024 08:01 )    Color: x / Appearance: x / SG: x / pH: x  Gluc: 100 mg/dL / Ketone: x  / Bili: x / Urobili: x   Blood: x / Protein: x / Nitrite: x   Leuk Esterase: x / RBC: x / WBC x   Sq Epi: x / Non Sq Epi: x / Bacteria: x          Assessment and Plan: 64 year old woman with poor oral intake.       Intractable nausea with poor oral intake: multifactorial - due to adverse effects off Ozempic / gastritis / esophagitis / possible gastroparesis:   - off ozempic  - CT abd:  Status post colectomy with ileorectal anastomosis. No bowel obstruction.  - EGD/colonoscopy --> gastritis, esophagitits  - c/w carafate  - c/w PPI IV BID  - GI following   - low fiber diet  - start PPN  - transaminitis resolved      DM2 with possible gastroparesis:  - trial of metoclopramide     - RAISS  - gastric empyting study problematic, will hold off for now      Hypoglycemia / Moderate protein-calorie malnutrition:  - start PPN, pt still unable to tolerate PO intake  - daily labs      Paroxymal afib / HTN / CAD:  - off lisinopril due to borderline BP  - c/w eliquis, and metoprolol   - s/p 5 stents last were 2 years ago  - plavix  - restart plavix on 11/3      #unspecified mental health disorder   per pharmacy pt is on abilify and lexapro and xanax -  will order here   pt endorses she has been diagnosed with schizophrenia and/or bipolar - shes not entirely sure of her diagnosis  she denies hx of inpatient psych hospitalizations  confirmed home meds - continue

## 2024-11-02 LAB
ALBUMIN SERPL ELPH-MCNC: 3 G/DL — LOW (ref 3.3–5)
ALP SERPL-CCNC: 126 U/L — HIGH (ref 40–120)
ALT FLD-CCNC: 48 U/L — SIGNIFICANT CHANGE UP (ref 12–78)
ANION GAP SERPL CALC-SCNC: 3 MMOL/L — LOW (ref 5–17)
AST SERPL-CCNC: 23 U/L — SIGNIFICANT CHANGE UP (ref 15–37)
BILIRUB SERPL-MCNC: 0.2 MG/DL — SIGNIFICANT CHANGE UP (ref 0.2–1.2)
BUN SERPL-MCNC: 7 MG/DL — SIGNIFICANT CHANGE UP (ref 7–23)
CALCIUM SERPL-MCNC: 8.8 MG/DL — SIGNIFICANT CHANGE UP (ref 8.5–10.1)
CHLORIDE SERPL-SCNC: 113 MMOL/L — HIGH (ref 96–108)
CO2 SERPL-SCNC: 28 MMOL/L — SIGNIFICANT CHANGE UP (ref 22–31)
CREAT SERPL-MCNC: 0.8 MG/DL — SIGNIFICANT CHANGE UP (ref 0.5–1.3)
EGFR: 83 ML/MIN/1.73M2 — SIGNIFICANT CHANGE UP
GLUCOSE BLDC GLUCOMTR-MCNC: 106 MG/DL — HIGH (ref 70–99)
GLUCOSE BLDC GLUCOMTR-MCNC: 114 MG/DL — HIGH (ref 70–99)
GLUCOSE BLDC GLUCOMTR-MCNC: 135 MG/DL — HIGH (ref 70–99)
GLUCOSE BLDC GLUCOMTR-MCNC: 136 MG/DL — HIGH (ref 70–99)
GLUCOSE SERPL-MCNC: 113 MG/DL — HIGH (ref 70–99)
MAGNESIUM SERPL-MCNC: 2.1 MG/DL — SIGNIFICANT CHANGE UP (ref 1.6–2.6)
PHOSPHATE SERPL-MCNC: 3.7 MG/DL — SIGNIFICANT CHANGE UP (ref 2.5–4.5)
POTASSIUM SERPL-MCNC: 3.6 MMOL/L — SIGNIFICANT CHANGE UP (ref 3.5–5.3)
POTASSIUM SERPL-SCNC: 3.6 MMOL/L — SIGNIFICANT CHANGE UP (ref 3.5–5.3)
PROT SERPL-MCNC: 6.9 GM/DL — SIGNIFICANT CHANGE UP (ref 6–8.3)
SODIUM SERPL-SCNC: 144 MMOL/L — SIGNIFICANT CHANGE UP (ref 135–145)
TRIGL SERPL-MCNC: 204 MG/DL — HIGH

## 2024-11-02 PROCEDURE — 99233 SBSQ HOSP IP/OBS HIGH 50: CPT

## 2024-11-02 RX ORDER — IBUPROFEN 200 MG
600 TABLET ORAL ONCE
Refills: 0 | Status: COMPLETED | OUTPATIENT
Start: 2024-11-02 | End: 2024-11-02

## 2024-11-02 RX ORDER — FAT EMULSIONS 20 %
0.95 EMULSION INTRAVENOUS
Qty: 50 | Refills: 0 | Status: DISCONTINUED | OUTPATIENT
Start: 2024-11-02 | End: 2024-11-02

## 2024-11-02 RX ORDER — SODIUM/POT/MAG/CALC/CHLOR/ACET 35-20-5MEQ
1 VIAL (ML) INTRAVENOUS
Refills: 0 | Status: DISCONTINUED | OUTPATIENT
Start: 2024-11-02 | End: 2024-11-02

## 2024-11-02 RX ORDER — PANTOPRAZOLE SODIUM 40 MG/1
40 TABLET, DELAYED RELEASE ORAL
Refills: 0 | Status: DISCONTINUED | OUTPATIENT
Start: 2024-11-02 | End: 2024-11-10

## 2024-11-02 RX ADMIN — Medication 20.83 GM/KG/DAY: at 22:56

## 2024-11-02 RX ADMIN — METOCLOPRAMIDE HYDROCHLORIDE 5 MILLIGRAM(S): 10 TABLET ORAL at 06:25

## 2024-11-02 RX ADMIN — ESCITALOPRAM OXALATE 10 MILLIGRAM(S): 10 TABLET, FILM COATED ORAL at 08:58

## 2024-11-02 RX ADMIN — SUCRALFATE 1 GRAM(S): 1 TABLET ORAL at 06:26

## 2024-11-02 RX ADMIN — ROSUVASTATIN CALCIUM 10 MILLIGRAM(S): 5 TABLET, FILM COATED ORAL at 22:35

## 2024-11-02 RX ADMIN — APIXABAN 5 MILLIGRAM(S): 2.5 TABLET, FILM COATED ORAL at 22:35

## 2024-11-02 RX ADMIN — METOCLOPRAMIDE HYDROCHLORIDE 5 MILLIGRAM(S): 10 TABLET ORAL at 23:32

## 2024-11-02 RX ADMIN — Medication 1 EACH: at 00:44

## 2024-11-02 RX ADMIN — Medication 600 MILLIGRAM(S): at 23:34

## 2024-11-02 RX ADMIN — PANTOPRAZOLE SODIUM 40 MILLIGRAM(S): 40 TABLET, DELAYED RELEASE ORAL at 08:57

## 2024-11-02 RX ADMIN — Medication 0.5 MILLIGRAM(S): at 22:36

## 2024-11-02 RX ADMIN — APIXABAN 5 MILLIGRAM(S): 2.5 TABLET, FILM COATED ORAL at 08:57

## 2024-11-02 RX ADMIN — ARIPIPRAZOLE 5 MILLIGRAM(S): 15 TABLET ORAL at 22:35

## 2024-11-02 RX ADMIN — Medication 1 EACH: at 22:55

## 2024-11-02 RX ADMIN — METOPROLOL TARTRATE 100 MILLIGRAM(S): 100 TABLET, FILM COATED ORAL at 08:58

## 2024-11-02 NOTE — CHART NOTE - NSCHARTNOTEFT_GEN_A_CORE
Clinical Nutrition PN Follow Up Note    *64-year-old female with history of GERD  HTN, DM, polyps, and afib with 5 cardiac stents. presents with epigastric pain for the last couple of weeks.  Her daughter brought her in, after speaking with her GI doctor who stated patient should not have been on ozempic before and probably have taken insulin instead. She states that she had just uptitrated her dose of ozempic to 0.5 from 0.25 earlier this week. She had been on the 0.25 for 5 weeks. She states the pain bothers her more than the nausea and vomiting. Patient states she feels very minimal relief since she came in.  She denies NSAID use, bleeding or black stools. She reports 10lb weight loss and has been on ozempic for her diabetes. Denies diarrhea or urinary symptoms. Admitted for Nausea and vomiting    PPN initiated 2/2 prolonged PO intolerance, N/V, awaiting gastric emptying study    *11/1: D/w Dr. Hernandez - PPN Day #1; PPN to be initiated as a bridge 2/2 prolonged PO intolerance/poor PO intake; awaiting gastric emptying study. Per GI note 10/27: abd USG inconclusive s/p cholecystectomy. Rec for EGD/Colonoscopy early next week. 10/30: now s/p EGD and colonoscopy - egd with findings of gastritis and esophagitis without ulcers colonoscopy with patent anastomosis - no acute findings. Pt placed on regular low fiber diet - vomited lunch on 10/31 - complains of throat pain after egd; epigastric pain minimal.  Per hospitalist: "at this point, will need to rule out gastroparesis with gastric emptying study - per nuclear medicine the earliest it could be done would be Tuesday 11/5- need to order isotpope - Trial of 5mg liquid Reglan QID 15 min before meals and at bedtime."   PPN is not to be a long-term nutrition support, pending gastric emptying study if needing long-term nutrition support would recommend GJ-tube if gastroparesis is present. Strongly suggest to confirm goals of care regarding LONG-TERM nutrition support.    *current status: PPN day #2. No pertinent events overnight. Remains w/ N/V and abdominal distension per flowsheet. Will c/w PPN.     *pertinent meds: Lexapro, Abilify, ADMELOG (0 units x 24 hours), Reglan, Protonix, Carafate, acetaminophen; PRN - Xanax, Maalox, Zofran, albuterol    *labs reviewed; Na, Mg and Phos WNL. K WNL however on low end of normal, will increase in PN bag. Replete lytes outside of PN prn. corrected Ca WNL, rec'd add 10mEq of Calcium Gluconate outside of PN bag as CAPS is out. T bili WNL will add trace elements to PN bag today. Will add thiamine and MVI with minerals in the PN bag today. Triglyceride level WNL, will add 50g of lipids tomorrow. POCTs x 24 hours WNL. Continue to maintain POCTs between 140-180 mg/dL. Pt at HIGH RISK for RFS. - please obtain BMP, Mg, and Phos levels. Monitor and replete K, Phos, Mg prn if begins to downtrend.     11-02    144  |  113[H]  |  7   ----------------------------<  113[H]  3.6   |  28  |  0.80    Ca    8.8      02 Nov 2024 04:16  Phos  3.7     11-02  Mg     2.1     11-02    TPro  6.9  /  Alb  3.0[L]  /  TBili  0.2  /  DBili  x   /  AST  23  /  ALT  48  /  AlkPhos  126[H]  11-02      BMI: BMI (kg/m2): 23.5 (10-25-24 @ 18:30)  HbA1c: A1C with Estimated Average Glucose Result: 6.6 % (10-26-24 @ 06:47)    Glucose: POCT Blood Glucose.: 114 mg/dL (02 Nov 2024 06:24)    BP: 114/56 (11-01-24 @ 08:32) (106/79 - 139/79)Vital Signs Last 24 Hrs  T(C): 36.7 (11-01-24 @ 08:32), Max: 36.8 (10-31-24 @ 21:30)  T(F): 98 (11-01-24 @ 08:32), Max: 98.2 (10-31-24 @ 21:30)  HR: 79 (11-01-24 @ 08:32) (78 - 79)  BP: 114/56 (11-01-24 @ 08:32) (114/56 - 139/79)  BP(mean): 70 (11-01-24 @ 08:32) (70 - 70)  RR: 17 (11-01-24 @ 08:32) (17 - 18)  SpO2: 96% (11-01-24 @ 08:32) (94% - 96%)    Lipid Panel: Date/Time: 04-01-24 @ 06:46  Cholesterol, Serum: 118  LDL Cholesterol Calculated: 48  HDL Cholesterol, Serum: 42  Triglycerides, Serum: 204 - obtained on 11/2/24 @ 04:16    POCT Blood Glucose.: 114 mg/dL (02 Nov 2024 06:24)  POCT Blood Glucose.: 93 mg/dL (01 Nov 2024 23:11)  POCT Blood Glucose.: 95 mg/dL (01 Nov 2024 16:58)  POCT Blood Glucose.: 95 mg/dL (01 Nov 2024 11:16)    *I&O's Detail  none doc'd. Per PN protocol and  policy; strict I&O's when on PN.   *BM doc'd on 10/30.  +N/V/ abdominal distension.  pt on bowel regimen (senna)  *edema: none doc'd    *malnutrition: Pt continues to meet criteria for moderate malnutrition in context of acute on chronic illness r/t decreased ability to meet increased nutrient needs 2/2 N/V, DM AEB >7.5% wt loss x 2mo, <50% ENN x 2 weeks, mild muscle/fat wasting    Estimated Needs: based on 55 Kg (wt from 10/27 )  Calories: 8672-5143 Kcal (35-40 Kcal/Kg)  Protein: 66-77 g (1.2-1.4 g/Kg)  Fluids: 8139-7355 mL (35-40 mL/Kg)    Diet, Low Fiber (10-30-24 @ 15:33) [Active]  Diet, Regular (10-26-24 @ 17:50) [Available for Activation]  Parenteral Nutrition - Adult 1 Each (83 mL/Hr) TPN Continuous <Continuous>, 11-01-24 @ 22:00, 22:00, Stop order after: 1 Days    Weight History:  Weight (kg): 52.8 (10-25-24 @ 18:30)    PPN Recommendations: via peripheral line  Total Volume: 2000mL x 24 hours  80 g  Amino Acids  100 g Dextrose  50 g Lipids 20%  80 mEq Sodium Chloride  47 mEq Sodium Acetate  20 mmol Sodium Phosphate  50 mEq Potassium Chloride  0 mEq Potassium Acetate  0 mmol Potassium Phosphate  0 mEq Calcium Gluconate (CAPS out of PN solution)   8 mEq Magnesium Sulfate  200 mg Thiamine  1 ml Trace Elements  10 ml MVI    Total Calories    1160  (Meets  56%  of  Estimated Energy needs  and 100%  Protein needs)   (osmolarity ~875)    Additional Recommendations:    1) Daily weights  2) Strict I & O's  3) Daily lyte checks including magnesium and phos **at risk for RFS replete K, Phos, Mg outside PN bag PRN  4) Weekly triglycerides/LFT checks  5) POCT q6hrs; maintain 140-180mg/dL  6) if on PN > 6 days, consider placing PICC line to meet 100% of nutr needs; Confirm goals of care regarding LONG-TERM nutrition support - if gastroparesis present and still unable to tolerate PO, would need GJ tube; pending gastric emptying study, possibly ?tuesday    *will continue to monitor and adjust PN prn*  Carmenza Hamilton, MIGUEL ÁNGELN, CDN [phone]

## 2024-11-02 NOTE — PROGRESS NOTE ADULT - SUBJECTIVE AND OBJECTIVE BOX
Progress Note:   · Provider Specialty  Hospitalist    Reason for Admission:   Reason for Admission:  · Reason for Admission  inability to tolerate po      · Subjective and Objective:   CC: inability to tolerate po    S:  11/1: Still says she has nausea, with poor PO intake.  DIscussed PPN today and also continued to monitor symptoms.  11/2: Tolerating ppn.  Pt still unable to tolerate po, still with nausea and poor appetite.      REVIEW OF SYSTEMS: All other review of systems is negative unless indicated above.      Vital Signs Last 24 Hrs  T(C): 36.6 (02 Nov 2024 08:07), Max: 36.8 (01 Nov 2024 16:19)  T(F): 97.8 (02 Nov 2024 08:07), Max: 98.3 (01 Nov 2024 16:19)  HR: 87 (02 Nov 2024 08:07) (81 - 87)  BP: 113/68 (02 Nov 2024 08:07) (113/68 - 114/67)  BP(mean): --  RR: 18 (02 Nov 2024 08:07) (18 - 18)  SpO2: 96% (02 Nov 2024 08:07) (95% - 96%)    Parameters below as of 02 Nov 2024 08:07  Patient On (Oxygen Delivery Method): room air        PHYSICAL EXAM:    Constitutional: NAD, awake and alert, frail appearing  HEENT: PERR, EOMI, Normal Hearing, MMM  Neck: Soft and supple  Respiratory: Breath sounds are clear bilaterally, No wheezing, rales or rhonchi  Cardiovascular: S1 and S2, regular rate and rhythm, no Murmurs, gallops or rubs  Gastrointestinal: Bowel Sounds present, soft, nontender, nondistended, no guarding, no rebound  Extremities: No peripheral edema  Neurological: A/O x 3, no focal deficits in my limited exam      MEDICATIONS  (STANDING):  apixaban 5 milliGRAM(s) Oral two times a day  ARIPiprazole 5 milliGRAM(s) Oral at bedtime  dextrose 5%. 1000 milliLiter(s) (50 mL/Hr) IV Continuous <Continuous>  dextrose 5%. 1000 milliLiter(s) (100 mL/Hr) IV Continuous <Continuous>  dextrose 50% Injectable 25 Gram(s) IV Push once  dextrose 50% Injectable 12.5 Gram(s) IV Push once  dextrose 50% Injectable 25 Gram(s) IV Push once  escitalopram 10 milliGRAM(s) Oral daily  FIRST- Mouthwash  BLM 5 milliLiter(s) Swish and Swallow three times a day  glucagon  Injectable 1 milliGRAM(s) IntraMuscular once  influenza   Vaccine 0.5 milliLiter(s) IntraMuscular once  insulin lispro (ADMELOG) corrective regimen sliding scale   SubCutaneous every 6 hours  lipid, fat emulsion (Fish Oil and Plant Based) 20% Infusion 0.9469 Gm/kG/Day (20.83 mL/Hr) IV Continuous <Continuous>  metoclopramide   Syrup 5 milliGRAM(s) Oral four times a day  metoprolol succinate  milliGRAM(s) Oral daily  pantoprazole    Tablet 40 milliGRAM(s) Oral before breakfast  Parenteral Nutrition - Adult 1 Each (83 mL/Hr) TPN Continuous <Continuous>  Parenteral Nutrition - Adult 1 Each (83 mL/Hr) TPN Continuous <Continuous>  rosuvastatin 10 milliGRAM(s) Oral at bedtime    MEDICATIONS  (PRN):  acetaminophen     Tablet .. 650 milliGRAM(s) Oral every 6 hours PRN Moderate Pain (4 - 6)  albuterol    90 MICROgram(s) HFA Inhaler 1 Puff(s) Inhalation every 6 hours PRN Shortness of Breath and/or Wheezing  ALPRAZolam 0.5 milliGRAM(s) Oral at bedtime PRN insomnia/anxiety  aluminum hydroxide/magnesium hydroxide/simethicone Suspension 30 milliLiter(s) Oral every 4 hours PRN Dyspepsia  benzocaine/menthol Lozenge 1 Lozenge Oral every 4 hours PRN Sore Throat  dextrose Oral Gel 15 Gram(s) Oral once PRN Blood Glucose LESS THAN 70 milliGRAM(s)/deciliter  ondansetron Injectable 4 milliGRAM(s) IV Push every 6 hours PRN Nausea and/or Vomiting                              12.0   5.84  )-----------( 204      ( 01 Nov 2024 08:01 )             37.3     11-02    144  |  113[H]  |  7   ----------------------------<  113[H]  3.6   |  28  |  0.80    Ca    8.8      02 Nov 2024 04:16  Phos  3.7     11-02  Mg     2.1     11-02    TPro  6.9  /  Alb  3.0[L]  /  TBili  0.2  /  DBili  x   /  AST  23  /  ALT  48  /  AlkPhos  126[H]  11-02    CAPILLARY BLOOD GLUCOSE      POCT Blood Glucose.: 114 mg/dL (02 Nov 2024 06:24)  POCT Blood Glucose.: 93 mg/dL (01 Nov 2024 23:11)  POCT Blood Glucose.: 95 mg/dL (01 Nov 2024 16:58)    LIVER FUNCTIONS - ( 02 Nov 2024 04:16 )  Alb: 3.0 g/dL / Pro: 6.9 gm/dL / ALK PHOS: 126 U/L / ALT: 48 U/L / AST: 23 U/L / GGT: x             Urinalysis Basic - ( 02 Nov 2024 04:16 )    Color: x / Appearance: x / SG: x / pH: x  Gluc: 113 mg/dL / Ketone: x  / Bili: x / Urobili: x   Blood: x / Protein: x / Nitrite: x   Leuk Esterase: x / RBC: x / WBC x   Sq Epi: x / Non Sq Epi: x / Bacteria: x        Assessment and Plan: 64 year old woman with poor oral intake.       Intractable nausea with poor oral intake: multifactorial - due to adverse effects off Ozempic / gastritis / esophagitis / possible gastroparesis:   - off ozempic  - CT abd:  Status post colectomy with ileorectal anastomosis. No bowel obstruction.  - EGD/colonoscopy --> gastritis, esophagitits  - s/p carafate  - change IV PPI BID --> oral qd   GI following   - low fiber diet  - started PPN on 11/1, continue today  - transaminitis resolved      DM2 with possible gastroparesis:  - trial of metoclopramide     - RAISS  - gastric empyting study problematic, will hold off for now      Hypoglycemia / Moderate protein-calorie malnutrition:  - start PPN, pt still unable to tolerate PO intake  - daily labs      Paroxymal afib / HTN / CAD:  - off lisinopril due to borderline BP  - c/w eliquis, and metoprolol   - s/p 5 stents last were 2 years ago  - plavix  - restart plavix on 11/3      #unspecified mental health disorder   per pharmacy pt is on abilify and lexapro and xanax -  will order here   pt endorses she has been diagnosed with schizophrenia and/or bipolar - shes not entirely sure of her diagnosis  she denies hx of inpatient psych hospitalizations  confirmed home meds - continue      Dispo:  - d/c home once symptoms improved and tolerating po.

## 2024-11-03 LAB
ALBUMIN SERPL ELPH-MCNC: 3.1 G/DL — LOW (ref 3.3–5)
ALP SERPL-CCNC: 121 U/L — HIGH (ref 40–120)
ALT FLD-CCNC: 46 U/L — SIGNIFICANT CHANGE UP (ref 12–78)
ANION GAP SERPL CALC-SCNC: 4 MMOL/L — LOW (ref 5–17)
AST SERPL-CCNC: 28 U/L — SIGNIFICANT CHANGE UP (ref 15–37)
BILIRUB SERPL-MCNC: 0.2 MG/DL — SIGNIFICANT CHANGE UP (ref 0.2–1.2)
BUN SERPL-MCNC: 20 MG/DL — SIGNIFICANT CHANGE UP (ref 7–23)
CALCIUM SERPL-MCNC: 8.6 MG/DL — SIGNIFICANT CHANGE UP (ref 8.5–10.1)
CHLORIDE SERPL-SCNC: 110 MMOL/L — HIGH (ref 96–108)
CO2 SERPL-SCNC: 27 MMOL/L — SIGNIFICANT CHANGE UP (ref 22–31)
CREAT SERPL-MCNC: 0.78 MG/DL — SIGNIFICANT CHANGE UP (ref 0.5–1.3)
EGFR: 86 ML/MIN/1.73M2 — SIGNIFICANT CHANGE UP
GLUCOSE BLDC GLUCOMTR-MCNC: 113 MG/DL — HIGH (ref 70–99)
GLUCOSE BLDC GLUCOMTR-MCNC: 115 MG/DL — HIGH (ref 70–99)
GLUCOSE BLDC GLUCOMTR-MCNC: 120 MG/DL — HIGH (ref 70–99)
GLUCOSE BLDC GLUCOMTR-MCNC: 131 MG/DL — HIGH (ref 70–99)
GLUCOSE SERPL-MCNC: 127 MG/DL — HIGH (ref 70–99)
MAGNESIUM SERPL-MCNC: 2.1 MG/DL — SIGNIFICANT CHANGE UP (ref 1.6–2.6)
PHOSPHATE SERPL-MCNC: 3.3 MG/DL — SIGNIFICANT CHANGE UP (ref 2.5–4.5)
POTASSIUM SERPL-MCNC: 3.7 MMOL/L — SIGNIFICANT CHANGE UP (ref 3.5–5.3)
POTASSIUM SERPL-SCNC: 3.7 MMOL/L — SIGNIFICANT CHANGE UP (ref 3.5–5.3)
PROT SERPL-MCNC: 7.2 GM/DL — SIGNIFICANT CHANGE UP (ref 6–8.3)
SODIUM SERPL-SCNC: 141 MMOL/L — SIGNIFICANT CHANGE UP (ref 135–145)

## 2024-11-03 PROCEDURE — 99233 SBSQ HOSP IP/OBS HIGH 50: CPT

## 2024-11-03 RX ORDER — KETOROLAC TROMETHAMINE 30 MG/ML
15 INJECTION INTRAMUSCULAR; INTRAVENOUS ONCE
Refills: 0 | Status: DISCONTINUED | OUTPATIENT
Start: 2024-11-03 | End: 2024-11-03

## 2024-11-03 RX ORDER — SODIUM/POT/MAG/CALC/CHLOR/ACET 35-20-5MEQ
1 VIAL (ML) INTRAVENOUS
Refills: 0 | Status: DISCONTINUED | OUTPATIENT
Start: 2024-11-03 | End: 2024-11-03

## 2024-11-03 RX ORDER — FAT EMULSIONS 20 %
0.95 EMULSION INTRAVENOUS
Qty: 50 | Refills: 0 | Status: DISCONTINUED | OUTPATIENT
Start: 2024-11-03 | End: 2024-11-03

## 2024-11-03 RX ORDER — BUTALB/ACETAMINOPHEN/CAFFEINE 50-325-40
1 TABLET ORAL EVERY 8 HOURS
Refills: 0 | Status: DISCONTINUED | OUTPATIENT
Start: 2024-11-03 | End: 2024-12-10

## 2024-11-03 RX ADMIN — METOCLOPRAMIDE HYDROCHLORIDE 5 MILLIGRAM(S): 10 TABLET ORAL at 17:38

## 2024-11-03 RX ADMIN — METOPROLOL TARTRATE 100 MILLIGRAM(S): 100 TABLET, FILM COATED ORAL at 09:25

## 2024-11-03 RX ADMIN — PANTOPRAZOLE SODIUM 40 MILLIGRAM(S): 40 TABLET, DELAYED RELEASE ORAL at 05:37

## 2024-11-03 RX ADMIN — ESCITALOPRAM OXALATE 10 MILLIGRAM(S): 10 TABLET, FILM COATED ORAL at 09:25

## 2024-11-03 RX ADMIN — Medication 1 EACH: at 23:13

## 2024-11-03 RX ADMIN — METOCLOPRAMIDE HYDROCHLORIDE 5 MILLIGRAM(S): 10 TABLET ORAL at 23:21

## 2024-11-03 RX ADMIN — KETOROLAC TROMETHAMINE 15 MILLIGRAM(S): 30 INJECTION INTRAMUSCULAR; INTRAVENOUS at 06:40

## 2024-11-03 RX ADMIN — Medication 0.5 MILLIGRAM(S): at 23:22

## 2024-11-03 RX ADMIN — APIXABAN 5 MILLIGRAM(S): 2.5 TABLET, FILM COATED ORAL at 23:20

## 2024-11-03 RX ADMIN — METOCLOPRAMIDE HYDROCHLORIDE 5 MILLIGRAM(S): 10 TABLET ORAL at 05:35

## 2024-11-03 RX ADMIN — ACETAMINOPHEN 500MG 650 MILLIGRAM(S): 500 TABLET, COATED ORAL at 12:20

## 2024-11-03 RX ADMIN — Medication 600 MILLIGRAM(S): at 00:00

## 2024-11-03 RX ADMIN — ROSUVASTATIN CALCIUM 10 MILLIGRAM(S): 5 TABLET, FILM COATED ORAL at 23:20

## 2024-11-03 RX ADMIN — Medication 20.8 GM/KG/DAY: at 23:14

## 2024-11-03 RX ADMIN — ARIPIPRAZOLE 5 MILLIGRAM(S): 15 TABLET ORAL at 23:21

## 2024-11-03 RX ADMIN — Medication 1 TABLET(S): at 15:29

## 2024-11-03 RX ADMIN — APIXABAN 5 MILLIGRAM(S): 2.5 TABLET, FILM COATED ORAL at 09:25

## 2024-11-03 NOTE — PROGRESS NOTE ADULT - SUBJECTIVE AND OBJECTIVE BOX
CHIEF COMPLAINT/INTERVAL HISTORY:    Patient is a 64y old  Female who presents with a chief complaint of inability to tolerate po (02 Nov 2024 13:01)      HPI:  64-year-old female with history of GERD  HTN, DM, polyps, and afib with 5 cardiac stents. presents with epigastric pain for the last couple of weeks.  Her daughter brought her in, after speaking with her GI doctor who stated patient should not have been on ozempic before and probably have taken insulin instead. She states that she had just uptitrated her dose of ozempic to 0.5 from 0.25 earlier this week. She had been on the 0.25 for 5 weeks. She states the pain bothers her more than the nausea and vomiting. Patient states she feels very minimal relief since she came in.  She denies NSAID use, bleeding or black stools. She reports 10lb weight loss and has been on ozempic for her diabetes. Denies diarrhea or urinary symptoms.     In the ED  Vitals Temp 97.8  /84 O2 sat 98% on RA  Labs WBC 5.28 Hg 13.3 Plt 212 Na 140 K 4 Cl 107 Bicarb 26 BUN 16 Cr 0.86 Glucose 101 Protein 8.4 ALbumin 4 AST 32 ALT 54 Lipase 71 Trop I 4.65  Imaging CTAP   Etiology of abdominal pain is not elucidated  Intervention zofran, ofirmev, pepcid, morphine 2,  4, 1 L NS   (25 Oct 2024 13:17)      SUBJECTIVE & OBJECTIVE: Pt seen and examined at bedside.     ICU Vital Signs Last 24 Hrs  T(C): 36.7 (02 Nov 2024 22:25), Max: 36.8 (02 Nov 2024 16:51)  T(F): 98.1 (02 Nov 2024 22:25), Max: 98.2 (02 Nov 2024 16:51)  HR: 67 (02 Nov 2024 22:25) (67 - 87)  BP: 137/68 (02 Nov 2024 22:25) (113/68 - 137/68)  BP(mean): 86 (02 Nov 2024 22:25) (86 - 86)  ABP: --  ABP(mean): --  RR: 18 (02 Nov 2024 22:25) (18 - 18)  SpO2: 96% (02 Nov 2024 22:25) (96% - 96%)    O2 Parameters below as of 02 Nov 2024 22:25  Patient On (Oxygen Delivery Method): room air      PHYSICAL EXAM:      Constitutional: NAD  Eyes: perrl, no conjunctival changes  ENMT: no exudates, moist oral muc, uvula midline  Neck: no JVD, no LAD  Back: no cva tenderness  Respiratory: CTA, no exp wheezes  Cardiovascular: S1S2 reg, no murmur gallop or rub  Gastrointestinal: abd soft, NT/ND + BS  Genitourinary: voiding  Extremities: FROM, no joint effusions, no edema, no clubbing , no cyanosis  Vascular: pedal pulses + bilateral, warm extremities  Neurological: non focal, mot str 5/5/ all extr  Skin: no rashes  Lymph Nodes: no LAD              LABS:                        12.0   5.84  )-----------( 204      ( 01 Nov 2024 08:01 )             37.3     11-03    141  |  110[H]  |  20  ----------------------------<  127[H]  3.7   |  27  |  0.78    Ca    8.6      03 Nov 2024 05:20  Phos  3.3     11-03  Mg     2.1     11-03    TPro  7.2  /  Alb  3.1[L]  /  TBili  0.2  /  DBili  x   /  AST  28  /  ALT  46  /  AlkPhos  121[H]  11-03          CAPILLARY BLOOD GLUCOSE      POCT Blood Glucose.: 115 mg/dL (03 Nov 2024 05:31)  POCT Blood Glucose.: 106 mg/dL (02 Nov 2024 23:30)  POCT Blood Glucose.: 135 mg/dL (02 Nov 2024 18:15)  POCT Blood Glucose.: 136 mg/dL (02 Nov 2024 13:58)      RECENT CULTURES:      RADIOLOGY & ADDITIONAL TESTS: personally reviewed        Assessment and Plan: 64 year old woman with poor oral intake.       Intractable nausea with poor oral intake: multifactorial - due to adverse effects off Ozempic / gastritis / esophagitis / possible gastroparesis:   - off ozempic  - CT abd:  Status post colectomy with ileorectal anastomosis. No bowel obstruction.  - EGD/colonoscopy --> gastritis, esophagitits  - s/p carafate  - change IV PPI BID --> oral qd   GI following   - low fiber diet  - started PPN on 11/1, continue today  - transaminitis resolved      DM2 with possible gastroparesis:  - trial of metoclopramide     - RAISS  - gastric empyting study problematic, will hold off for now      Hypoglycemia / Moderate protein-calorie malnutrition:  - start PPN, pt still unable to tolerate PO intake  - daily labs      Paroxymal afib / HTN / CAD:  - off lisinopril due to borderline BP  - c/w eliquis, and metoprolol   - s/p 5 stents last were 2 years ago  - plavix  - restart plavix on 11/3      #unspecified mental health disorder   per pharmacy pt is on abilify and lexapro and xanax -  will order here   pt endorses she has been diagnosed with schizophrenia and/or bipolar - shes not entirely sure of her diagnosis  she denies hx of inpatient psych hospitalizations  confirmed home meds - continue    time spent: 55min     CHIEF COMPLAINT/INTERVAL HISTORY:    Patient is a 64y old  Female who presents with a chief complaint of inability to tolerate po (02 Nov 2024 13:01)      HPI:  64-year-old female with history of GERD  HTN, DM, polyps, and afib with 5 cardiac stents. presents with epigastric pain for the last couple of weeks.  Her daughter brought her in, after speaking with her GI doctor who stated patient should not have been on ozempic before and probably have taken insulin instead. She states that she had just uptitrated her dose of ozempic to 0.5 from 0.25 earlier this week. She had been on the 0.25 for 5 weeks. She states the pain bothers her more than the nausea and vomiting. Patient states she feels very minimal relief since she came in.  She denies NSAID use, bleeding or black stools. She reports 10lb weight loss and has been on ozempic for her diabetes. Denies diarrhea or urinary symptoms.     In the ED  Vitals Temp 97.8  /84 O2 sat 98% on RA  Labs WBC 5.28 Hg 13.3 Plt 212 Na 140 K 4 Cl 107 Bicarb 26 BUN 16 Cr 0.86 Glucose 101 Protein 8.4 ALbumin 4 AST 32 ALT 54 Lipase 71 Trop I 4.65  Imaging CTAP   Etiology of abdominal pain is not elucidated  Intervention zofran, ofirmev, pepcid, morphine 2,  4, 1 L NS   (25 Oct 2024 13:17)    Vital Signs Last 24 Hrs  T(C): 36.7 (03 Nov 2024 08:13), Max: 36.8 (02 Nov 2024 16:51)  T(F): 98.1 (03 Nov 2024 08:13), Max: 98.2 (02 Nov 2024 16:51)  HR: 59 (03 Nov 2024 08:13) (59 - 78)  BP: 141/59 (03 Nov 2024 08:13) (126/74 - 141/59)  BP(mean): 86 (02 Nov 2024 22:25) (86 - 86)  RR: 18 (03 Nov 2024 08:13) (18 - 18)  SpO2: 100% (03 Nov 2024 08:13) (96% - 100%)    Parameters below as of 03 Nov 2024 08:13  Patient On (Oxygen Delivery Method): room air        PHYSICAL EXAM:      Constitutional: NAD  Eyes: perrl, no conjunctival changes  ENMT: no exudates, moist oral muc, uvula midline  Neck: no JVD, no LAD  Back: no cva tenderness  Respiratory: CTA, no exp wheezes  Cardiovascular: S1S2 reg, no murmur gallop or rub  Gastrointestinal: abd soft, NT/ND + BS  Genitourinary: voiding  Extremities: FROM, no joint effusions, no edema, no clubbing , no cyanosis  Vascular: pedal pulses + bilateral, warm extremities  Neurological: non focal, mot str 5/5/ all extr  Skin: no rashes  Lymph Nodes: no LAD              LABS:                        12.0   5.84  )-----------( 204      ( 01 Nov 2024 08:01 )             37.3     11-03    141  |  110[H]  |  20  ----------------------------<  127[H]  3.7   |  27  |  0.78    Ca    8.6      03 Nov 2024 05:20  Phos  3.3     11-03  Mg     2.1     11-03    TPro  7.2  /  Alb  3.1[L]  /  TBili  0.2  /  DBili  x   /  AST  28  /  ALT  46  /  AlkPhos  121[H]  11-03          CAPILLARY BLOOD GLUCOSE      POCT Blood Glucose.: 115 mg/dL (03 Nov 2024 05:31)  POCT Blood Glucose.: 106 mg/dL (02 Nov 2024 23:30)  POCT Blood Glucose.: 135 mg/dL (02 Nov 2024 18:15)  POCT Blood Glucose.: 136 mg/dL (02 Nov 2024 13:58)      RECENT CULTURES:      RADIOLOGY & ADDITIONAL TESTS: personally reviewed        Assessment and Plan: 64 year old woman with poor oral intake.       Intractable nausea with poor oral intake: multifactorial - due to adverse effects off Ozempic / gastritis / esophagitis / possible gastroparesis:   - off ozempic  - CT abd:  Status post colectomy with ileorectal anastomosis. No bowel obstruction.  - EGD/colonoscopy --> gastritis, esophagitits  - low fiber diet  - started PPN on 11/1, continue today        DM2 with possible gastroparesis:     - RAISS        Hypoglycemia / Moderate protein-calorie malnutrition:  - start PPN, pt still unable to tolerate PO intake  - daily labs      Paroxymal afib / HTN / CAD:  - off lisinopril due to borderline BP  - c/w eliquis, and metoprolol   - s/p 5 stents last were 2 years ago  - plavix  - restart plavix on 11/3      #unspecified mental health disorder   per pharmacy pt is on abilify and lexapro and xanax -  will order here   pt endorses she has been diagnosed with schizophrenia and/or bipolar - shes not entirely sure of her diagnosis  she denies hx of inpatient psych hospitalizations  confirmed home meds - continue    time spent: 55min

## 2024-11-03 NOTE — CHART NOTE - NSCHARTNOTEFT_GEN_A_CORE
Clinical Nutrition PN Follow Up Note    *64-year-old female with history of GERD  HTN, DM, polyps, and afib with 5 cardiac stents. presents with epigastric pain for the last couple of weeks.  Her daughter brought her in, after speaking with her GI doctor who stated patient should not have been on ozempic before and probably have taken insulin instead. She states that she had just uptitrated her dose of ozempic to 0.5 from 0.25 earlier this week. She had been on the 0.25 for 5 weeks. She states the pain bothers her more than the nausea and vomiting. Patient states she feels very minimal relief since she came in.  She denies NSAID use, bleeding or black stools. She reports 10lb weight loss and has been on ozempic for her diabetes. Denies diarrhea or urinary symptoms. Admitted for Nausea and vomiting    PPN initiated 2/2 prolonged PO intolerance, N/V, awaiting gastric emptying study    *11/1: D/w Dr. Hernandez - PPN Day #1; PPN to be initiated as a bridge 2/2 prolonged PO intolerance/poor PO intake; awaiting gastric emptying study. Per GI note 10/27: abd USG inconclusive s/p cholecystectomy. Rec for EGD/Colonoscopy early next week. 10/30: now s/p EGD and colonoscopy - egd with findings of gastritis and esophagitis without ulcers colonoscopy with patent anastomosis - no acute findings. Pt placed on regular low fiber diet - vomited lunch on 10/31 - complains of throat pain after egd; epigastric pain minimal.  Per hospitalist: "at this point, will need to rule out gastroparesis with gastric emptying study - per nuclear medicine the earliest it could be done would be Tuesday 11/5- need to order isotpope - Trial of 5mg liquid Reglan QID 15 min before meals and at bedtime."   PPN is not to be a long-term nutrition support, pending gastric emptying study if needing long-term nutrition support would recommend GJ-tube if gastroparesis is present. Strongly suggest to confirm goals of care regarding LONG-TERM nutrition support.    *current status: PPN day #3. Per RN documentation, no events of N/V overnight. Was drinking ensure shakes and tolerating. C/o N/V during the day per hospitalist note. Will c/w PPN for now. Pending gastric emptying study, tentative Tuesday 11/5.    *pertinent meds: Toradol, Lexapro, Abilify, ADMELOG (0 units x 24 hours), Reglan, Protonix, Carafate, acetaminophen; PRN - Xanax, Maalox, Cepacol, Zofran; FIRST mouthwash ordered however has not received x 24 hrs    *labs reviewed; Na, Mg and Phos WNL. K WNL however remains on low end of normal, will increase in PN bag. Replete lytes outside of PN prn. corrected Ca WNL, rec'd add 10mEq of Calcium Gluconate outside of PN bag as CAPS is out. T bili WNL will add trace elements to PN bag today. Will add thiamine and MVI with minerals in the PN bag today. Triglyceride level WNL, will add 50g of lipids daily. POCTs x 24 hours WNL. Continue to maintain POCTs between 140-180 mg/dL. Pt at HIGH RISK for RFS. - please obtain BMP, Mg, and Phos levels. Monitor and replete K, Phos, Mg prn if begins to downtrend.   11-03    141  |  110[H]  |  20  ----------------------------<  127[H]  3.7   |  27  |  0.78    Ca    8.6      03 Nov 2024 05:20  Phos  3.3     11-03  Mg     2.1     11-03    TPro  7.2  /  Alb  3.1[L]  /  TBili  0.2  /  DBili  x   /  AST  28  /  ALT  46  /  AlkPhos  121[H]  11-03    BMI: BMI (kg/m2): 23.5 (10-25-24 @ 18:30)  HbA1c: A1C with Estimated Average Glucose Result: 6.6 % (10-26-24 @ 06:47)    Glucose: POCT Blood Glucose.: 115 mg/dL (03 Nov 2024 05:31)    BP: 114/56 (11-01-24 @ 08:32) (106/79 - 139/79)Vital Signs Last 24 Hrs  T(C): 36.7 (11-01-24 @ 08:32), Max: 36.8 (10-31-24 @ 21:30)  T(F): 98 (11-01-24 @ 08:32), Max: 98.2 (10-31-24 @ 21:30)  HR: 79 (11-01-24 @ 08:32) (78 - 79)  BP: 114/56 (11-01-24 @ 08:32) (114/56 - 139/79)  BP(mean): 70 (11-01-24 @ 08:32) (70 - 70)  RR: 17 (11-01-24 @ 08:32) (17 - 18)  SpO2: 96% (11-01-24 @ 08:32) (94% - 96%)    Lipid Panel: Date/Time: 04-01-24 @ 06:46  Cholesterol, Serum: 118  LDL Cholesterol Calculated: 48  HDL Cholesterol, Serum: 42  Triglycerides, Serum: 204 - obtained on 11/2/24 @ 04:16    POCT Blood Glucose.: 115 mg/dL (03 Nov 2024 05:31)  POCT Blood Glucose.: 106 mg/dL (02 Nov 2024 23:30)  POCT Blood Glucose.: 135 mg/dL (02 Nov 2024 18:15)  POCT Blood Glucose.: 136 mg/dL (02 Nov 2024 13:58)    *I&O's Detail  none doc'd. Per PN protocol and  policy; strict I&O's when on PN.   *BM doc'd on 10/30.  +N/V/ abdominal distension.  pt on bowel regimen (senna)  *edema: none doc'd    *malnutrition: Pt continues to meet criteria for moderate malnutrition in context of acute on chronic illness r/t decreased ability to meet increased nutrient needs 2/2 N/V, DM AEB >7.5% wt loss x 2mo, <50% ENN x 2 weeks, mild muscle/fat wasting    Estimated Needs: based on 55 Kg (wt from 10/27 )  Calories: 6920-4141 Kcal (35-40 Kcal/Kg)  Protein: 66-77 g (1.2-1.4 g/Kg)  Fluids: 1896-6492 mL (35-40 mL/Kg)    Diet, Low Fiber (10-30-24 @ 15:33) [Active]  Diet, Regular (10-26-24 @ 17:50) [Available for Activation]  Parenteral Nutrition - Adult 1 Each (83 mL/Hr) TPN Continuous <Continuous>, 11-02-24 @ 22:00, 22:00, Stop order after: 1 Days    Weight History:  Weight (kg): 52.8 (10-25-24 @ 18:30)    PPN Recommendations: via peripheral line  Total Volume: 2000mL x 24 hours  80 g  Amino Acids  100 g Dextrose  50 g Lipids 20%  80 mEq Sodium Chloride  47 mEq Sodium Acetate  20 mmol Sodium Phosphate  50 mEq Potassium Chloride  10 mEq Potassium Acetate  0 mmol Potassium Phosphate  0 mEq Calcium Gluconate (CAPS out of PN solution)   8 mEq Magnesium Sulfate  200 mg Thiamine  1 ml Trace Elements  10 ml MVI    Total Calories    1160  (Meets  56%  of  Estimated Energy needs  and 100%  Protein needs)   (osmolarity ~885)    Additional Recommendations:    1) Daily weights  2) Strict I & O's  3) Daily lyte checks including magnesium and phos **at risk for RFS replete K, Phos, Mg outside PN bag PRN  4) Weekly triglycerides/LFT checks  5) POCT q6hrs; maintain 140-180mg/dL  6) if on PN > 6 days, consider placing PICC line to meet 100% of nutr needs; Confirm goals of care regarding LONG-TERM nutrition support - if gastroparesis present and still unable to tolerate PO, would need GJ tube; pending gastric emptying study, possibly ?tuesday    *will continue to monitor and adjust PN prn*  Carmenza Hamilton, MIGUEL ÁNGELN, CDN [phone]

## 2024-11-04 LAB
ALBUMIN SERPL ELPH-MCNC: 3.2 G/DL — LOW (ref 3.3–5)
ALP SERPL-CCNC: 113 U/L — SIGNIFICANT CHANGE UP (ref 40–120)
ALT FLD-CCNC: 49 U/L — SIGNIFICANT CHANGE UP (ref 12–78)
ANION GAP SERPL CALC-SCNC: 2 MMOL/L — LOW (ref 5–17)
AST SERPL-CCNC: 25 U/L — SIGNIFICANT CHANGE UP (ref 15–37)
BILIRUB SERPL-MCNC: 0.2 MG/DL — SIGNIFICANT CHANGE UP (ref 0.2–1.2)
BUN SERPL-MCNC: 21 MG/DL — SIGNIFICANT CHANGE UP (ref 7–23)
CALCIUM SERPL-MCNC: 8.6 MG/DL — SIGNIFICANT CHANGE UP (ref 8.5–10.1)
CHLORIDE SERPL-SCNC: 112 MMOL/L — HIGH (ref 96–108)
CO2 SERPL-SCNC: 26 MMOL/L — SIGNIFICANT CHANGE UP (ref 22–31)
CREAT SERPL-MCNC: 0.72 MG/DL — SIGNIFICANT CHANGE UP (ref 0.5–1.3)
EGFR: 94 ML/MIN/1.73M2 — SIGNIFICANT CHANGE UP
GLUCOSE BLDC GLUCOMTR-MCNC: 111 MG/DL — HIGH (ref 70–99)
GLUCOSE BLDC GLUCOMTR-MCNC: 117 MG/DL — HIGH (ref 70–99)
GLUCOSE BLDC GLUCOMTR-MCNC: 128 MG/DL — HIGH (ref 70–99)
GLUCOSE BLDC GLUCOMTR-MCNC: 129 MG/DL — HIGH (ref 70–99)
GLUCOSE SERPL-MCNC: 120 MG/DL — HIGH (ref 70–99)
MAGNESIUM SERPL-MCNC: 2 MG/DL — SIGNIFICANT CHANGE UP (ref 1.6–2.6)
PHOSPHATE SERPL-MCNC: 3.3 MG/DL — SIGNIFICANT CHANGE UP (ref 2.5–4.5)
POTASSIUM SERPL-MCNC: 3.9 MMOL/L — SIGNIFICANT CHANGE UP (ref 3.5–5.3)
POTASSIUM SERPL-SCNC: 3.9 MMOL/L — SIGNIFICANT CHANGE UP (ref 3.5–5.3)
PROT SERPL-MCNC: 7.2 GM/DL — SIGNIFICANT CHANGE UP (ref 6–8.3)
SODIUM SERPL-SCNC: 140 MMOL/L — SIGNIFICANT CHANGE UP (ref 135–145)

## 2024-11-04 PROCEDURE — 99233 SBSQ HOSP IP/OBS HIGH 50: CPT

## 2024-11-04 RX ORDER — CLOPIDOGREL 75 MG/1
75 TABLET, FILM COATED ORAL DAILY
Refills: 0 | Status: DISCONTINUED | OUTPATIENT
Start: 2024-11-04 | End: 2024-11-11

## 2024-11-04 RX ORDER — FAT EMULSIONS 20 %
0.95 EMULSION INTRAVENOUS
Qty: 50 | Refills: 0 | Status: DISCONTINUED | OUTPATIENT
Start: 2024-11-04 | End: 2024-11-04

## 2024-11-04 RX ORDER — SODIUM/POT/MAG/CALC/CHLOR/ACET 35-20-5MEQ
1 VIAL (ML) INTRAVENOUS
Refills: 0 | Status: DISCONTINUED | OUTPATIENT
Start: 2024-11-04 | End: 2024-11-04

## 2024-11-04 RX ADMIN — APIXABAN 5 MILLIGRAM(S): 2.5 TABLET, FILM COATED ORAL at 23:11

## 2024-11-04 RX ADMIN — ESCITALOPRAM OXALATE 10 MILLIGRAM(S): 10 TABLET, FILM COATED ORAL at 10:04

## 2024-11-04 RX ADMIN — METOPROLOL TARTRATE 100 MILLIGRAM(S): 100 TABLET, FILM COATED ORAL at 10:03

## 2024-11-04 RX ADMIN — ARIPIPRAZOLE 5 MILLIGRAM(S): 15 TABLET ORAL at 23:11

## 2024-11-04 RX ADMIN — Medication 1 TABLET(S): at 01:15

## 2024-11-04 RX ADMIN — Medication 1 EACH: at 22:46

## 2024-11-04 RX ADMIN — ONDANSETRON HYDROCHLORIDE 4 MILLIGRAM(S): 4 TABLET, FILM COATED ORAL at 17:35

## 2024-11-04 RX ADMIN — APIXABAN 5 MILLIGRAM(S): 2.5 TABLET, FILM COATED ORAL at 10:03

## 2024-11-04 RX ADMIN — ONDANSETRON HYDROCHLORIDE 4 MILLIGRAM(S): 4 TABLET, FILM COATED ORAL at 23:13

## 2024-11-04 RX ADMIN — CLOPIDOGREL 75 MILLIGRAM(S): 75 TABLET, FILM COATED ORAL at 10:03

## 2024-11-04 RX ADMIN — METOCLOPRAMIDE HYDROCHLORIDE 5 MILLIGRAM(S): 10 TABLET ORAL at 06:30

## 2024-11-04 RX ADMIN — Medication 1 TABLET(S): at 16:31

## 2024-11-04 RX ADMIN — ONDANSETRON HYDROCHLORIDE 4 MILLIGRAM(S): 4 TABLET, FILM COATED ORAL at 00:43

## 2024-11-04 RX ADMIN — Medication 20.8 GM/KG/DAY: at 22:47

## 2024-11-04 RX ADMIN — PANTOPRAZOLE SODIUM 40 MILLIGRAM(S): 40 TABLET, DELAYED RELEASE ORAL at 06:23

## 2024-11-04 RX ADMIN — ACETAMINOPHEN 500MG 650 MILLIGRAM(S): 500 TABLET, COATED ORAL at 16:07

## 2024-11-04 RX ADMIN — ROSUVASTATIN CALCIUM 10 MILLIGRAM(S): 5 TABLET, FILM COATED ORAL at 23:12

## 2024-11-04 RX ADMIN — Medication 0.5 MILLIGRAM(S): at 23:12

## 2024-11-04 RX ADMIN — Medication 1 TABLET(S): at 00:43

## 2024-11-04 RX ADMIN — ONDANSETRON HYDROCHLORIDE 4 MILLIGRAM(S): 4 TABLET, FILM COATED ORAL at 10:55

## 2024-11-04 NOTE — CHART NOTE - NSCHARTNOTEFT_GEN_A_CORE
Clinical Nutrition PN Follow Up Note    *64-year-old female with history of GERD  HTN, DM, polyps, and afib with 5 cardiac stents. presents with epigastric pain for the last couple of weeks.  Her daughter brought her in, after speaking with her GI doctor who stated patient should not have been on ozempic before and probably have taken insulin instead. She states that she had just uptitrated her dose of ozempic to 0.5 from 0.25 earlier this week. She had been on the 0.25 for 5 weeks. She states the pain bothers her more than the nausea and vomiting. Patient states she feels very minimal relief since she came in.  She denies NSAID use, bleeding or black stools. She reports 10lb weight loss and has been on ozempic for her diabetes. Denies diarrhea or urinary symptoms. Admitted for Nausea and vomiting  *11/1: D/w Dr. Hernandez - PPN Day #1; PPN to be initiated as a bridge 2/2 prolonged PO intolerance/poor PO intake; awaiting gastric emptying study. Per GI note 10/27: abd USG inconclusive s/p cholecystectomy. Rec for EGD/Colonoscopy early next week. 10/30: now s/p EGD and colonoscopy - egd with findings of gastritis and esophagitis without ulcers colonoscopy with patent anastomosis - no acute findings. Pt placed on regular low fiber diet - vomited lunch on 10/31 - complains of throat pain after egd; epigastric pain minimal.  Per hospitalist: "at this point, will need to rule out gastroparesis with gastric emptying study - per nuclear medicine the earliest it could be done would be Tuesday 11/5- need to order isotpope - Trial of 5mg liquid Reglan QID 15 min before meals and at bedtime."   PPN is not to be a long-term nutrition support, pending gastric emptying study if needing long-term nutrition support would recommend GJ-tube if gastroparesis is present. Strongly suggest to confirm goals of care regarding LONG-TERM nutrition support.  *11/2: PPN day #2. No pertinent events overnight. Remains w/ N/V and abdominal distension per flowsheet. Will c/w PPN.   *11/3: PPN day #3. Per RN documentation, no events of N/V overnight. Was drinking ensure shakes and tolerating. C/o N/V during the day per hospitalist note. Will c/w PPN for now. Pending gastric emptying study, tentative Tuesday 11/5.    PPN initiated 2/2 prolonged PO intolerance, N/V, awaiting gastric emptying study    *current status PPN day #4: CT abd: Status post colectomy with ileorectal anastomosis. No bowel obstruction. Per RN documentation abdominal discomfort, + N/V ; audible and normoactive bowel sounds. D/w MD Ulrich this am, will c/w PPN - awaiting GI input pending gastric emptying study.     *new pertinent meds: Toradol, Lexapro, Abilify, ADMELOG (0 units x 24 hours), Protonix, Reglan - d/c'd 11/3, acetaminophen, ESGIC, Xanax, Zofran; PRN- Xanax, Maalox, Zofran    *labs reviewed; Na, K, Mg WNL. Will keep totals the same in the PN bag today. Phos WNL; however is downtrending, will increase in PN bag today. Continue to replete lytes outside of PN prn. corrected Ca WNL, rec'd add 10mEq of Calcium Gluconate outside of PN bag as CAPS is out. T bili WNL will c/w trace elements in the PN bag today. Will c/w thiamine and MVI and minerals in the PN bag today. Triglyceride WNL (204), will c/w 50 g of lipids. POCTs x 24 hours WNL. Continue to maintain POCTs between 140-180 mg/dL.    11-04    140  |  112[H]  |  21  ----------------------------<  120[H]  3.9   |  26  |  0.72    Ca    8.6      04 Nov 2024 04:32  Phos  3.3     11-04  Mg     2.0     11-04    TPro  7.2  /  Alb  3.2[L]  /  TBili  0.2  /  DBili  x   /  AST  25  /  ALT  49  /  AlkPhos  113  11-04    BMI: BMI (kg/m2): 23.5 (10-25-24 @ 18:30)  HbA1c: A1C with Estimated Average Glucose Result: 6.6 % (10-26-24 @ 06:47)    Glucose: POCT Blood Glucose.: 117 mg/dL (11-04-24 @ 06:20)    BP: 99/56 (11-04-24 @ 08:24) (99/56 - 141/59)Vital Signs Last 24 Hrs  T(C): 36.6 (11-04-24 @ 08:24), Max: 36.6 (11-03-24 @ 23:22)  T(F): 97.8 (11-04-24 @ 08:24), Max: 97.9 (11-03-24 @ 23:22)  HR: 88 (11-04-24 @ 08:24) (70 - 88)  BP: 99/56 (11-04-24 @ 08:24) (99/56 - 127/77)  RR: 17 (11-04-24 @ 08:24) (17 - 18)  SpO2: 95% (11-04-24 @ 08:24) (94% - 96%)    Lipid Panel: Date/Time: 11-02-24 @ 04:16  Triglycerides, Serum: 204    POCT Blood Glucose.: 117 mg/dL (11-04-24 @ 06:20)  POCT Blood Glucose.: 113 mg/dL (11-03-24 @ 23:28)  POCT Blood Glucose.: 120 mg/dL (11-03-24 @ 17:36)  POCT Blood Glucose.: 131 mg/dL (11-03-24 @ 12:17)    *I&O's Detail  none doc'd. Per PN protocol and  policy; strict I&O's when on PN.     *BM - last doc'd 10/30; abdominal discomfort +N/V on 11/4. pt not on bowel regimen.  *edema: hazel doc'd    *malnutrition: Pt continues to meet criteria for moderate malnutrition in context of acute on chronic illness r/t decreased ability to meet increased nutrient needs 2/2 N/V, DM AEB >7.5% wt loss x 2mo, <50% ENN x 2 weeks, mild muscle/fat wasting    Estimated Needs: based on 55 Kg (wt from 10/27)  Calories: 8752-0991 Kcal (35-40 Kcal/Kg)  Protein: 66-77 g (1.2-1.4 g/Kg)  Fluids: 2345-6994 mL (35-40 mL/Kg)    Diet, Consistent Carbohydrate w/Evening Snack (11-03-24 @ 11:40) [Active]  Diet, Regular (10-26-24 @ 17:50) [Available for Activation]  Parenteral Nutrition - Adult 1 Each (83 mL/Hr) TPN Continuous <Continuous>, 11-03-24 @ 22:00, 22:00, Stop order after: 1 Days    Weight History:  Weight (kg): 52.8 (10-25-24 @ 18:30)    PPN Recommendations: via peripheral line  Total Volume: 2000 mL  x 24 hours  80 g  Amino Acids  100 g Dextrose  50 g Lipids 20%  80 mEq Sodium Chloride  41 mEq Sodium Acetate  25 mmol Sodium Phosphate  50 mEq Potassium Chloride  10 mEq Potassium Acetate  0 mmol Potassium Phosphate  0 mEq Calcium Gluconate (CAPS out of PN solution)   8 mEq Magnesium Sulfate  200 mg Thiamine  1 ml Trace Elements  10 ml MVI    Total Calories   1160   (Meets 56 %  of  Estimated Energy needs  and  100 %  Protein needs)   (osmolarity ~885)    Additional Recommendations:    1) Daily weights  2) Strict I & O's  3) Daily lyte checks including magnesium and phos  4) Weekly triglycerides/LFT checks  5) POCT q6hrs; maintain 140-180mg/dL  6) if on PN > 6 days, consider placing PICC line to meet 100% of nutr needs; Confirm goals of care regarding LONG-TERM nutrition support - if gastroparesis present and still unable to tolerate PO, would need GJ tube; pending gastric emptying study, possibly ?tuesday    *will continue to monitor and adjust PN prn*  Cecilia Gusman, PhD, MS, RDN Clinical Nutrition PN Follow Up Note    *64-year-old female with history of GERD  HTN, DM, polyps, and afib with 5 cardiac stents. presents with epigastric pain for the last couple of weeks.  Her daughter brought her in, after speaking with her GI doctor who stated patient should not have been on ozempic before and probably have taken insulin instead. She states that she had just uptitrated her dose of ozempic to 0.5 from 0.25 earlier this week. She had been on the 0.25 for 5 weeks. She states the pain bothers her more than the nausea and vomiting. Patient states she feels very minimal relief since she came in.  She denies NSAID use, bleeding or black stools. She reports 10lb weight loss and has been on ozempic for her diabetes. Denies diarrhea or urinary symptoms. Admitted for Nausea and vomiting  *11/1: D/w Dr. Hernandez - PPN Day #1; PPN to be initiated as a bridge 2/2 prolonged PO intolerance/poor PO intake; awaiting gastric emptying study. Per GI note 10/27: abd USG inconclusive s/p cholecystectomy. Rec for EGD/Colonoscopy early next week. 10/30: now s/p EGD and colonoscopy - egd with findings of gastritis and esophagitis without ulcers colonoscopy with patent anastomosis - no acute findings. Pt placed on regular low fiber diet - vomited lunch on 10/31 - complains of throat pain after egd; epigastric pain minimal.  Per hospitalist: "at this point, will need to rule out gastroparesis with gastric emptying study - per nuclear medicine the earliest it could be done would be Tuesday 11/5- need to order isotpope - Trial of 5mg liquid Reglan QID 15 min before meals and at bedtime."   PPN is not to be a long-term nutrition support, pending gastric emptying study if needing long-term nutrition support would recommend GJ-tube if gastroparesis is present. Strongly suggest to confirm goals of care regarding LONG-TERM nutrition support.  *11/2: PPN day #2. No pertinent events overnight. Remains w/ N/V and abdominal distension per flowsheet. Will c/w PPN.   *11/3: PPN day #3. Per RN documentation, no events of N/V overnight. Was drinking ensure shakes and tolerating. C/o N/V during the day per hospitalist note. Will c/w PPN for now. Pending gastric emptying study, tentative Tuesday 11/5.    PPN initiated 2/2 prolonged PO intolerance, N/V, awaiting gastric emptying study    *current status PPN day #4: CT abd: Status post colectomy with ileorectal anastomosis. No bowel obstruction. Per RN documentation abdominal discomfort, + N/V ; audible and normoactive bowel sounds. D/w MD Ulrich this am, will c/w PPN - awaiting GI input pending gastric emptying study.     *new pertinent meds: Toradol, Lexapro, Abilify, ADMELOG (0 units x 24 hours), Protonix, Reglan - d/c'd 11/3, acetaminophen, ESGIC, Xanax, Zofran; PRN- Xanax, Maalox, Zofran    *labs reviewed; Na, K, Mg, P WNL. Will keep totals the same in the PN bag today. Continue to replete lytes outside of PN prn. corrected Ca WNL, rec'd add 10mEq of Calcium Gluconate outside of PN bag as CAPS is out. T bili WNL will c/w trace elements in the PN bag today. Will c/w thiamine and MVI and minerals in the PN bag today. Triglyceride WNL (204), will c/w 50 g of lipids. POCTs x 24 hours WNL. Continue to maintain POCTs between 140-180 mg/dL.    11-04    140  |  112[H]  |  21  ----------------------------<  120[H]  3.9   |  26  |  0.72    Ca    8.6      04 Nov 2024 04:32  Phos  3.3     11-04  Mg     2.0     11-04    TPro  7.2  /  Alb  3.2[L]  /  TBili  0.2  /  DBili  x   /  AST  25  /  ALT  49  /  AlkPhos  113  11-04    BMI: BMI (kg/m2): 23.5 (10-25-24 @ 18:30)  HbA1c: A1C with Estimated Average Glucose Result: 6.6 % (10-26-24 @ 06:47)    Glucose: POCT Blood Glucose.: 117 mg/dL (11-04-24 @ 06:20)    BP: 99/56 (11-04-24 @ 08:24) (99/56 - 141/59)Vital Signs Last 24 Hrs  T(C): 36.6 (11-04-24 @ 08:24), Max: 36.6 (11-03-24 @ 23:22)  T(F): 97.8 (11-04-24 @ 08:24), Max: 97.9 (11-03-24 @ 23:22)  HR: 88 (11-04-24 @ 08:24) (70 - 88)  BP: 99/56 (11-04-24 @ 08:24) (99/56 - 127/77)  RR: 17 (11-04-24 @ 08:24) (17 - 18)  SpO2: 95% (11-04-24 @ 08:24) (94% - 96%)    Lipid Panel: Date/Time: 11-02-24 @ 04:16  Triglycerides, Serum: 204    POCT Blood Glucose.: 117 mg/dL (11-04-24 @ 06:20)  POCT Blood Glucose.: 113 mg/dL (11-03-24 @ 23:28)  POCT Blood Glucose.: 120 mg/dL (11-03-24 @ 17:36)  POCT Blood Glucose.: 131 mg/dL (11-03-24 @ 12:17)    *I&O's Detail  none doc'd. Per PN protocol and  policy; strict I&O's when on PN.     *BM - last doc'd 10/30; abdominal discomfort +N/V on 11/4. pt not on bowel regimen.  *edema: hazel doc'd    *malnutrition: Pt continues to meet criteria for moderate malnutrition in context of acute on chronic illness r/t decreased ability to meet increased nutrient needs 2/2 N/V, DM AEB >7.5% wt loss x 2mo, <50% ENN x 2 weeks, mild muscle/fat wasting    Estimated Needs: based on 55 Kg (wt from 10/27)  Calories: 3455-6850 Kcal (35-40 Kcal/Kg)  Protein: 66-77 g (1.2-1.4 g/Kg)  Fluids: 1825-2965 mL (35-40 mL/Kg)    Diet, Consistent Carbohydrate w/Evening Snack (11-03-24 @ 11:40) [Active]  Diet, Regular (10-26-24 @ 17:50) [Available for Activation]  Parenteral Nutrition - Adult 1 Each (83 mL/Hr) TPN Continuous <Continuous>, 11-03-24 @ 22:00, 22:00, Stop order after: 1 Days    Weight History:  Weight (kg): 52.8 (10-25-24 @ 18:30)    PPN Recommendations: via peripheral line  Total Volume: 2000 mL  x 24 hours  80 g  Amino Acids  100 g Dextrose  50 g Lipids 20%  80 mEq Sodium Chloride  47 mEq Sodium Acetate  20 mmol Sodium Phosphate  50 mEq Potassium Chloride  10 mEq Potassium Acetate  0 mmol Potassium Phosphate  0 mEq Calcium Gluconate (CAPS out of PN solution)   8 mEq Magnesium Sulfate  200 mg Thiamine  1 ml Trace Elements  10 ml MVI    Total Calories   1160   (Meets 56 %  of  Estimated Energy needs  and  100 %  Protein needs)   (osmolarity ~885)    Additional Recommendations:    1) Daily weights  2) Strict I & O's  3) Daily lyte checks including magnesium and phos  4) Weekly triglycerides/LFT checks  5) POCT q6hrs; maintain 140-180mg/dL  6) if on PN > 6 days, consider placing PICC line to meet 100% of nutr needs; Confirm goals of care regarding LONG-TERM nutrition support - if gastroparesis present and still unable to tolerate PO, would need GJ tube; pending gastric emptying study, possibly ?tuesday    *will continue to monitor and adjust PN prn*  Cecilia Gusman, PhD, MS, RDN Clinical Nutrition PN Follow Up Note    *64-year-old female with history of GERD  HTN, DM, polyps, and afib with 5 cardiac stents. presents with epigastric pain for the last couple of weeks.  Her daughter brought her in, after speaking with her GI doctor who stated patient should not have been on ozempic before and probably have taken insulin instead. She states that she had just uptitrated her dose of ozempic to 0.5 from 0.25 earlier this week. She had been on the 0.25 for 5 weeks. She states the pain bothers her more than the nausea and vomiting. Patient states she feels very minimal relief since she came in.  She denies NSAID use, bleeding or black stools. She reports 10lb weight loss and has been on ozempic for her diabetes. Denies diarrhea or urinary symptoms. Admitted for Nausea and vomiting  *11/1: D/w Dr. Hernandez - PPN Day #1; PPN to be initiated as a bridge 2/2 prolonged PO intolerance/poor PO intake; awaiting gastric emptying study. Per GI note 10/27: abd USG inconclusive s/p cholecystectomy. Rec for EGD/Colonoscopy early next week. 10/30: now s/p EGD and colonoscopy - egd with findings of gastritis and esophagitis without ulcers colonoscopy with patent anastomosis - no acute findings. Pt placed on regular low fiber diet - vomited lunch on 10/31 - complains of throat pain after egd; epigastric pain minimal.  Per hospitalist: "at this point, will need to rule out gastroparesis with gastric emptying study - per nuclear medicine the earliest it could be done would be Tuesday 11/5- need to order isotpope - Trial of 5mg liquid Reglan QID 15 min before meals and at bedtime."   PPN is not to be a long-term nutrition support, pending gastric emptying study if needing long-term nutrition support would recommend GJ-tube if gastroparesis is present. Strongly suggest to confirm goals of care regarding LONG-TERM nutrition support.  *11/2: PPN day #2. No pertinent events overnight. Remains w/ N/V and abdominal distension per flowsheet. Will c/w PPN.   *11/3: PPN day #3. Per RN documentation, no events of N/V overnight. Was drinking ensure shakes and tolerating. C/o N/V during the day per hospitalist note. Will c/w PPN for now. Pending gastric emptying study, tentative Tuesday 11/5.    PPN initiated 2/2 prolonged PO intolerance, N/V, awaiting gastric emptying study    *current status PPN day #4: No acute changes overnight. D/w MD Ulrich this am, will c/w PPN - awaiting GI input pending gastric emptying study.     *new pertinent meds: Toradol, Lexapro, Abilify, ADMELOG (0 units x 24 hours), Protonix, Reglan - d/c'd 11/3, acetaminophen, ESGIC, Xanax, Zofran; PRN- Xanax, Maalox, Zofran    *labs reviewed; Na, K, Mg, P WNL. Will keep totals the same in the PN bag today. Continue to replete lytes outside of PN prn. corrected Ca WNL, rec'd add 10mEq of Calcium Gluconate outside of PN bag as CAPS is out. T bili WNL will c/w trace elements in the PN bag today. Will c/w thiamine and MVI and minerals in the PN bag today. Triglyceride WNL (204), will c/w 50 g of lipids. POCTs x 24 hours WNL. Continue to maintain POCTs between 140-180 mg/dL. *No changes to PN bag today 11/4*    11-04    140  |  112[H]  |  21  ----------------------------<  120[H]  3.9   |  26  |  0.72    Ca    8.6      04 Nov 2024 04:32  Phos  3.3     11-04  Mg     2.0     11-04    TPro  7.2  /  Alb  3.2[L]  /  TBili  0.2  /  DBili  x   /  AST  25  /  ALT  49  /  AlkPhos  113  11-04    BMI: BMI (kg/m2): 23.5 (10-25-24 @ 18:30)  HbA1c: A1C with Estimated Average Glucose Result: 6.6 % (10-26-24 @ 06:47)    Glucose: POCT Blood Glucose.: 117 mg/dL (11-04-24 @ 06:20)    BP: 99/56 (11-04-24 @ 08:24) (99/56 - 141/59)Vital Signs Last 24 Hrs  T(C): 36.6 (11-04-24 @ 08:24), Max: 36.6 (11-03-24 @ 23:22)  T(F): 97.8 (11-04-24 @ 08:24), Max: 97.9 (11-03-24 @ 23:22)  HR: 88 (11-04-24 @ 08:24) (70 - 88)  BP: 99/56 (11-04-24 @ 08:24) (99/56 - 127/77)  RR: 17 (11-04-24 @ 08:24) (17 - 18)  SpO2: 95% (11-04-24 @ 08:24) (94% - 96%)    Lipid Panel: Date/Time: 11-02-24 @ 04:16  Triglycerides, Serum: 204    POCT Blood Glucose.: 117 mg/dL (11-04-24 @ 06:20)  POCT Blood Glucose.: 113 mg/dL (11-03-24 @ 23:28)  POCT Blood Glucose.: 120 mg/dL (11-03-24 @ 17:36)  POCT Blood Glucose.: 131 mg/dL (11-03-24 @ 12:17)    *I&O's Detail  none doc'd. Per PN protocol and  policy; strict I&O's when on PN.     *BM - last doc'd 10/30; abdominal discomfort +N/V on 11/4. pt not on bowel regimen.  *edema: none doc'd    *malnutrition: Pt continues to meet criteria for moderate malnutrition in context of acute on chronic illness r/t decreased ability to meet increased nutrient needs 2/2 N/V, DM AEB >7.5% wt loss x 2mo, <50% ENN x 2 weeks, mild muscle/fat wasting    Estimated Needs: based on 55 Kg (wt from 10/27)  Calories: 0934-3559 Kcal (35-40 Kcal/Kg)  Protein: 66-77 g (1.2-1.4 g/Kg)  Fluids: 9896-9345 mL (35-40 mL/Kg)    Diet, Consistent Carbohydrate w/Evening Snack (11-03-24 @ 11:40) [Active]  Parenteral Nutrition - Adult 1 Each (83 mL/Hr) TPN Continuous <Continuous>, 11-03-24 @ 22:00, 22:00, Stop order after: 1 Days    Weight History:  Weight (kg): 52.8 (10-25-24 @ 18:30)    PPN Recommendations: via peripheral line *No changes to PN bag today 11/4*  Total Volume: 2000 mL  x 24 hours  80 g  Amino Acids  100 g Dextrose  50 g Lipids 20%  80 mEq Sodium Chloride  47 mEq Sodium Acetate  20 mmol Sodium Phosphate  50 mEq Potassium Chloride  10 mEq Potassium Acetate  0 mmol Potassium Phosphate  0 mEq Calcium Gluconate (CAPS out of PN solution)   8 mEq Magnesium Sulfate  200 mg Thiamine  1 ml Trace Elements  10 ml MVI    Total Calories   1160   (Meets 56 %  of  Estimated Energy needs  and  100 %  Protein needs)   (osmolarity ~885)    Additional Recommendations:    1) Daily weights  2) Strict I & O's  3) Daily lyte checks including magnesium and phos  4) Weekly triglycerides/LFT checks  5) POCT q6hrs; maintain 140-180mg/dL  6) if on PN > 6 days, consider placing PICC line to meet 100% of nutr needs; Confirm goals of care regarding LONG-TERM nutrition support - if gastroparesis present and still unable to tolerate PO, would need GJ tube; pending gastric emptying study, possibly ?Tuesday    *will continue to monitor and adjust PN prn*  Cecilia Gusman, PhD, MS, RDN  Beatriz Calvo, MS, RDN, CDN, [facility] [phone]  Certified Nutrition

## 2024-11-04 NOTE — PROGRESS NOTE ADULT - SUBJECTIVE AND OBJECTIVE BOX
CHIEF COMPLAINT/INTERVAL HISTORY:    Patient is a 64y old  Female who presents with a chief complaint of inability to tolerate po (02 Nov 2024 13:01)      HPI:  64-year-old female with history of GERD  HTN, DM, polyps, and afib with 5 cardiac stents. presents with epigastric pain for the last couple of weeks.  Her daughter brought her in, after speaking with her GI doctor who stated patient should not have been on ozempic before and probably have taken insulin instead. She states that she had just uptitrated her dose of ozempic to 0.5 from 0.25 earlier this week. She had been on the 0.25 for 5 weeks. She states the pain bothers her more than the nausea and vomiting. Patient states she feels very minimal relief since she came in.  She denies NSAID use, bleeding or black stools. She reports 10lb weight loss and has been on ozempic for her diabetes. Denies diarrhea or urinary symptoms.     In the ED  Vitals Temp 97.8  /84 O2 sat 98% on RA  Labs WBC 5.28 Hg 13.3 Plt 212 Na 140 K 4 Cl 107 Bicarb 26 BUN 16 Cr 0.86 Glucose 101 Protein 8.4 ALbumin 4 AST 32 ALT 54 Lipase 71 Trop I 4.65  Imaging CTAP   Etiology of abdominal pain is not elucidated  Intervention zofran, ofirmev, pepcid, morphine 2,  4, 1 L NS   (25 Oct 2024 13:17)    Adm for initiation of PPN    Vital Signs Last 24 Hrs  T(C): 36.6 (03 Nov 2024 23:22), Max: 36.7 (03 Nov 2024 08:13)  T(F): 97.9 (03 Nov 2024 23:22), Max: 98.1 (03 Nov 2024 08:13)  HR: 70 (03 Nov 2024 23:22) (59 - 81)  BP: 127/77 (03 Nov 2024 23:22) (124/64 - 141/59)  BP(mean): --  RR: 18 (03 Nov 2024 23:22) (18 - 18)  SpO2: 94% (03 Nov 2024 23:22) (94% - 100%)    Parameters below as of 03 Nov 2024 23:22  Patient On (Oxygen Delivery Method): room air          PHYSICAL EXAM:      Constitutional: NAD  Eyes: perrl, no conjunctival changes  ENMT: no exudates, moist oral muc, uvula midline  Neck: no JVD, no LAD  Back: no cva tenderness  Respiratory: CTA, no exp wheezes  Cardiovascular: S1S2 reg, no murmur gallop or rub  Gastrointestinal: abd soft, NT/ND + BS  Genitourinary: voiding  Extremities: FROM, no joint effusions, no edema, no clubbing , no cyanosis  Vascular: pedal pulses + bilateral, warm extremities  Neurological: non focal, mot str 5/5/ all extr  Skin: no rashes  Lymph Nodes: no LAD              LABS:                        12.0   5.84  )-----------( 204      ( 01 Nov 2024 08:01 )             37.3     11-03    141  |  110[H]  |  20  ----------------------------<  127[H]  3.7   |  27  |  0.78    Ca    8.6      03 Nov 2024 05:20  Phos  3.3     11-03  Mg     2.1     11-03    TPro  7.2  /  Alb  3.1[L]  /  TBili  0.2  /  DBili  x   /  AST  28  /  ALT  46  /  AlkPhos  121[H]  11-03      CAPILLARY BLOOD GLUCOSE      POCT Blood Glucose.: 117 mg/dL (04 Nov 2024 06:20)  POCT Blood Glucose.: 113 mg/dL (03 Nov 2024 23:28)  POCT Blood Glucose.: 120 mg/dL (03 Nov 2024 17:36)  POCT Blood Glucose.: 131 mg/dL (03 Nov 2024 12:17)      RADIOLOGY & ADDITIONAL TESTS: personally reviewed        Assessment and Plan: 64 year old woman with poor oral intake.       Intractable nausea with poor oral intake: multifactorial - due to adverse effects off Ozempic / gastritis / esophagitis / possible gastroparesis:   - off ozempic  - CT abd:  Status post colectomy with ileorectal anastomosis. No bowel obstruction.  - EGD/colonoscopy --> gastritis, esophagitits  - low fiber diet  - started PPN on 11/1, continue GI input pending check gastric emptying study         DM2 with possible gastroparesis:     - RAISS        Hypoglycemia / Moderate protein-calorie malnutrition:  - start PPN, pt still unable to tolerate PO intake  - daily labs      Paroxymal afib / HTN / CAD:  - off lisinopril due to borderline BP  - c/w eliquis, and metoprolol   - s/p 5 stents last were 2 years ago  - plavix  - restart plavix       #unspecified mental health disorder   per pharmacy pt is on abilify and lexapro and xanax -  will order here   pt endorses she has been diagnosed with schizophrenia and/or bipolar - shes not entirely sure of her diagnosis  she denies hx of inpatient psych hospitalizations  confirmed home meds - continue    time spent: 55min

## 2024-11-04 NOTE — PROGRESS NOTE ADULT - ASSESSMENT
Nausea-Poor PO intake  Likely multifactorial inc drug induced /Severe Depression  Rec  F/U Gastric emptying study  Dietary F/U

## 2024-11-05 LAB
ALBUMIN SERPL ELPH-MCNC: 3.3 G/DL — SIGNIFICANT CHANGE UP (ref 3.3–5)
ALP SERPL-CCNC: 111 U/L — SIGNIFICANT CHANGE UP (ref 40–120)
ALT FLD-CCNC: 44 U/L — SIGNIFICANT CHANGE UP (ref 12–78)
ANION GAP SERPL CALC-SCNC: 5 MMOL/L — SIGNIFICANT CHANGE UP (ref 5–17)
AST SERPL-CCNC: 25 U/L — SIGNIFICANT CHANGE UP (ref 15–37)
BILIRUB SERPL-MCNC: 0.2 MG/DL — SIGNIFICANT CHANGE UP (ref 0.2–1.2)
BUN SERPL-MCNC: 21 MG/DL — SIGNIFICANT CHANGE UP (ref 7–23)
CALCIUM SERPL-MCNC: 8.8 MG/DL — SIGNIFICANT CHANGE UP (ref 8.5–10.1)
CHLORIDE SERPL-SCNC: 110 MMOL/L — HIGH (ref 96–108)
CO2 SERPL-SCNC: 26 MMOL/L — SIGNIFICANT CHANGE UP (ref 22–31)
CREAT SERPL-MCNC: 0.79 MG/DL — SIGNIFICANT CHANGE UP (ref 0.5–1.3)
EGFR: 85 ML/MIN/1.73M2 — SIGNIFICANT CHANGE UP
GLUCOSE BLDC GLUCOMTR-MCNC: 130 MG/DL — HIGH (ref 70–99)
GLUCOSE BLDC GLUCOMTR-MCNC: 149 MG/DL — HIGH (ref 70–99)
GLUCOSE BLDC GLUCOMTR-MCNC: 167 MG/DL — HIGH (ref 70–99)
GLUCOSE BLDC GLUCOMTR-MCNC: 90 MG/DL — SIGNIFICANT CHANGE UP (ref 70–99)
GLUCOSE SERPL-MCNC: 121 MG/DL — HIGH (ref 70–99)
MAGNESIUM SERPL-MCNC: 2.2 MG/DL — SIGNIFICANT CHANGE UP (ref 1.6–2.6)
PHOSPHATE SERPL-MCNC: 3.4 MG/DL — SIGNIFICANT CHANGE UP (ref 2.5–4.5)
POTASSIUM SERPL-MCNC: 3.9 MMOL/L — SIGNIFICANT CHANGE UP (ref 3.5–5.3)
POTASSIUM SERPL-SCNC: 3.9 MMOL/L — SIGNIFICANT CHANGE UP (ref 3.5–5.3)
PROT SERPL-MCNC: 7.3 GM/DL — SIGNIFICANT CHANGE UP (ref 6–8.3)
SODIUM SERPL-SCNC: 141 MMOL/L — SIGNIFICANT CHANGE UP (ref 135–145)

## 2024-11-05 PROCEDURE — 99233 SBSQ HOSP IP/OBS HIGH 50: CPT

## 2024-11-05 RX ORDER — ALPRAZOLAM 0.5 MG
0.5 TABLET ORAL AT BEDTIME
Refills: 0 | Status: DISCONTINUED | OUTPATIENT
Start: 2024-11-05 | End: 2024-11-12

## 2024-11-05 RX ORDER — 0.9 % SODIUM CHLORIDE 0.9 %
1000 INTRAVENOUS SOLUTION INTRAVENOUS
Refills: 0 | Status: DISCONTINUED | OUTPATIENT
Start: 2024-11-06 | End: 2024-11-06

## 2024-11-05 RX ORDER — FAT EMULSIONS 20 %
0.95 EMULSION INTRAVENOUS
Qty: 50 | Refills: 0 | Status: DISCONTINUED | OUTPATIENT
Start: 2024-11-05 | End: 2024-11-05

## 2024-11-05 RX ORDER — SODIUM/POT/MAG/CALC/CHLOR/ACET 35-20-5MEQ
1 VIAL (ML) INTRAVENOUS
Refills: 0 | Status: DISCONTINUED | OUTPATIENT
Start: 2024-11-05 | End: 2024-11-05

## 2024-11-05 RX ADMIN — PANTOPRAZOLE SODIUM 40 MILLIGRAM(S): 40 TABLET, DELAYED RELEASE ORAL at 06:25

## 2024-11-05 RX ADMIN — ARIPIPRAZOLE 5 MILLIGRAM(S): 15 TABLET ORAL at 21:25

## 2024-11-05 RX ADMIN — ONDANSETRON HYDROCHLORIDE 4 MILLIGRAM(S): 4 TABLET, FILM COATED ORAL at 10:25

## 2024-11-05 RX ADMIN — DIPHENHYDRAMINE HYDROCHLORIDE AND LIDOCAINE HYDROCHLORIDE AND ALUMINUM HYDROXIDE AND MAGNESIUM HYDRO 5 MILLILITER(S): KIT at 14:18

## 2024-11-05 RX ADMIN — ESCITALOPRAM OXALATE 10 MILLIGRAM(S): 10 TABLET, FILM COATED ORAL at 10:24

## 2024-11-05 RX ADMIN — METOPROLOL TARTRATE 100 MILLIGRAM(S): 100 TABLET, FILM COATED ORAL at 10:23

## 2024-11-05 RX ADMIN — APIXABAN 5 MILLIGRAM(S): 2.5 TABLET, FILM COATED ORAL at 10:23

## 2024-11-05 RX ADMIN — ACETAMINOPHEN 500MG 650 MILLIGRAM(S): 500 TABLET, COATED ORAL at 19:17

## 2024-11-05 RX ADMIN — APIXABAN 5 MILLIGRAM(S): 2.5 TABLET, FILM COATED ORAL at 21:24

## 2024-11-05 RX ADMIN — Medication 1 TABLET(S): at 22:00

## 2024-11-05 RX ADMIN — ACETAMINOPHEN 500MG 650 MILLIGRAM(S): 500 TABLET, COATED ORAL at 19:45

## 2024-11-05 RX ADMIN — ROSUVASTATIN CALCIUM 10 MILLIGRAM(S): 5 TABLET, FILM COATED ORAL at 21:25

## 2024-11-05 RX ADMIN — Medication 1 TABLET(S): at 21:24

## 2024-11-05 RX ADMIN — CLOPIDOGREL 75 MILLIGRAM(S): 75 TABLET, FILM COATED ORAL at 10:23

## 2024-11-05 RX ADMIN — Medication 0.5 MILLIGRAM(S): at 21:25

## 2024-11-05 RX ADMIN — Medication 2: at 17:15

## 2024-11-05 RX ADMIN — Medication 30 MILLILITER(S): at 14:19

## 2024-11-05 NOTE — PROGRESS NOTE ADULT - SUBJECTIVE AND OBJECTIVE BOX
CHIEF COMPLAINT/INTERVAL HISTORY:    Patient is a 64y old  Female who presents with a chief complaint of inability to tolerate po (02 Nov 2024 13:01)      HPI:  64-year-old female with history of GERD  HTN, DM, polyps, and afib with 5 cardiac stents. presents with epigastric pain for the last couple of weeks.  Her daughter brought her in, after speaking with her GI doctor who stated patient should not have been on ozempic before and probably have taken insulin instead. She states that she had just uptitrated her dose of ozempic to 0.5 from 0.25 earlier this week. She had been on the 0.25 for 5 weeks. She states the pain bothers her more than the nausea and vomiting. Patient states she feels very minimal relief since she came in.  She denies NSAID use, bleeding or black stools. She reports 10lb weight loss and has been on ozempic for her diabetes. Denies diarrhea or urinary symptoms.     In the ED  Vitals Temp 97.8  /84 O2 sat 98% on RA  Labs WBC 5.28 Hg 13.3 Plt 212 Na 140 K 4 Cl 107 Bicarb 26 BUN 16 Cr 0.86 Glucose 101 Protein 8.4 ALbumin 4 AST 32 ALT 54 Lipase 71 Trop I 4.65  Imaging CTAP   Etiology of abdominal pain is not elucidated  Intervention zofran, ofirmev, pepcid, morphine 2,  4, 1 L NS   (25 Oct 2024 13:17)    Adm for initiation of PPN; gastric emptying study for 11/6.    Vital Signs Last 24 Hrs  T(C): 36.4 (04 Nov 2024 23:00), Max: 36.6 (04 Nov 2024 08:24)  T(F): 97.5 (04 Nov 2024 23:00), Max: 97.8 (04 Nov 2024 08:24)  HR: 74 (04 Nov 2024 23:00) (74 - 88)  BP: 125/68 (04 Nov 2024 23:00) (99/56 - 134/77)  BP(mean): 77 (04 Nov 2024 23:00) (77 - 77)  RR: 18 (04 Nov 2024 23:00) (17 - 18)  SpO2: 98% (04 Nov 2024 23:00) (95% - 98%)    Parameters below as of 04 Nov 2024 23:00  Patient On (Oxygen Delivery Method): room air              PHYSICAL EXAM:      Constitutional: NAD  Eyes: perrl, no conjunctival changes  ENMT: no exudates, moist oral muc, uvula midline  Neck: no JVD, no LAD  Back: no cva tenderness  Respiratory: CTA, no exp wheezes  Cardiovascular: S1S2 reg, no murmur gallop or rub  Gastrointestinal: abd soft, NT/ND + BS  Genitourinary: voiding  Extremities: FROM, no joint effusions, no edema, no clubbing , no cyanosis  Vascular: pedal pulses + bilateral, warm extremities  Neurological: non focal, mot str 5/5/ all extr  Skin: no rashes  Lymph Nodes: no LAD        CAPILLARY BLOOD GLUCOSE      POCT Blood Glucose.: 130 mg/dL (05 Nov 2024 06:24)  POCT Blood Glucose.: 111 mg/dL (04 Nov 2024 23:20)  POCT Blood Glucose.: 128 mg/dL (04 Nov 2024 17:49)  POCT Blood Glucose.: 129 mg/dL (04 Nov 2024 12:08)          RADIOLOGY & ADDITIONAL TESTS: personally reviewed        Assessment and Plan: 64 year old woman with poor oral intake.       Intractable nausea with poor oral intake: multifactorial - due to adverse effects off Ozempic / gastritis / esophagitis / possible gastroparesis:   - off ozempic  - CT abd:  Status post colectomy with ileorectal anastomosis. No bowel obstruction.  - EGD/colonoscopy --> gastritis, esophagitits  - low fiber diet  - started PPN on 11/1, continue GI input pending check gastric emptying study         DM2 with possible gastroparesis:     - RAISS        Hypoglycemia / Moderate protein-calorie malnutrition:  - start PPN, pt still unable to tolerate PO intake  - daily labs      Paroxymal afib / HTN / CAD:  - off lisinopril due to borderline BP  - c/w eliquis, and metoprolol   - s/p 5 stents last were 2 years ago  - plavix  - restart plavix       #unspecified mental health disorder   per pharmacy pt is on abilify and lexapro and xanax -  will order here   pt endorses she has been diagnosed with schizophrenia and/or bipolar - shes not entirely sure of her diagnosis  she denies hx of inpatient psych hospitalizations  confirmed home meds - continue    time spent: 55min

## 2024-11-05 NOTE — CHART NOTE - NSCHARTNOTEFT_GEN_A_CORE
Clinical Nutrition PN Follow Up Note    *64-year-old female with history of GERD  HTN, DM, polyps, and afib with 5 cardiac stents. presents with epigastric pain for the last couple of weeks.  Her daughter brought her in, after speaking with her GI doctor who stated patient should not have been on ozempic before and probably have taken insulin instead. She states that she had just uptitrated her dose of ozempic to 0.5 from 0.25 earlier this week. She had been on the 0.25 for 5 weeks. She states the pain bothers her more than the nausea and vomiting. Patient states she feels very minimal relief since she came in.  She denies NSAID use, bleeding or black stools. She reports 10lb weight loss and has been on ozempic for her diabetes. Denies diarrhea or urinary symptoms. Admitted for Nausea and vomiting  *11/1: D/w Dr. Hernandez - PPN Day #1; PPN to be initiated as a bridge 2/2 prolonged PO intolerance/poor PO intake; awaiting gastric emptying study. Per GI note 10/27: abd USG inconclusive s/p cholecystectomy. Rec for EGD/Colonoscopy early next week. 10/30: now s/p EGD and colonoscopy - egd with findings of gastritis and esophagitis without ulcers colonoscopy with patent anastomosis - no acute findings. Pt placed on regular low fiber diet - vomited lunch on 10/31 - complains of throat pain after egd; epigastric pain minimal.  Per hospitalist: "at this point, will need to rule out gastroparesis with gastric emptying study - per nuclear medicine the earliest it could be done would be Tuesday 11/5- need to order isotpope - Trial of 5mg liquid Reglan QID 15 min before meals and at bedtime."   PPN is not to be a long-term nutrition support, pending gastric emptying study if needing long-term nutrition support would recommend GJ-tube if gastroparesis is present. Strongly suggest to confirm goals of care regarding LONG-TERM nutrition support.  *11/2: PPN day #2. No pertinent events overnight. Remains w/ N/V and abdominal distension per flowsheet. Will c/w PPN.   *11/3: PPN day #3. Per RN documentation, no events of N/V overnight. Was drinking ensure shakes and tolerating. C/o N/V during the day per hospitalist note. Will c/w PPN for now. Pending gastric emptying study, tentative Tuesday 11/5.  *11/4: PPN day #4: No acute changes overnight. D/w MD Ulrich this am, will c/w PPN - awaiting GI input pending gastric emptying study.     PPN initiated 2/2 prolonged PO intolerance, N/V, awaiting gastric emptying study    *current status PPN day #5: No acute changes overnight. D/w MD Ulrich this am, will c/w PPN - awaiting GI input pending gastric emptying study scheduled to be done tomorrow.     *new pertinent meds: Lexapro, Abilify, ADMELOG (0 units x 24 hours), Protonix, Reglan - d/c'd 11/4, acetaminophen, ESGIC, Xanax, Zofran; PRN- acetaminophen, ESGIC, Xanax, Maalox, Zofran    *labs reviewed; Na, K, Mg, P WNL. Will keep totals the same in the PN bag today. Continue to replete lytes outside of PN prn. corrected Ca WNL, rec'd add 10mEq of Calcium Gluconate outside of PN bag as CAPS is out. T bili WNL will c/w trace elements in the PN bag today. Will c/w thiamine and MVI and minerals in the PN bag today. Triglyceride WNL (204), will c/w 50 g of lipids. POCTs x 24 hours WNL. Continue to maintain POCTs between 140-180 mg/dL. *No changes to PN bag today 11/5*    11-05    141  |  110[H]  |  21  ----------------------------<  121[H]  3.9   |  26  |  0.79    Ca    8.8      05 Nov 2024 04:55  Phos  3.4     11-05  Mg     2.2     11-05    TPro  7.3  /  Alb  3.3  /  TBili  0.2  /  DBili  x   /  AST  25  /  ALT  44  /  AlkPhos  111  11-05      BMI: BMI (kg/m2): 23.5 (10-25-24 @ 18:30)  HbA1c: A1C with Estimated Average Glucose Result: 6.6 % (10-26-24 @ 06:47)    Glucose: POCT Blood Glucose.: 130 mg/dL (11-05-24 @ 06:24)    BP: 125/68 (11-04-24 @ 23:00) (99/56 - 141/59)Vital Signs Last 24 Hrs  T(C): 36.4 (11-04-24 @ 23:00), Max: 36.4 (11-04-24 @ 16:23)  T(F): 97.5 (11-04-24 @ 23:00), Max: 97.6 (11-04-24 @ 16:23)  HR: 74 (11-04-24 @ 23:00) (74 - 78)  BP: 125/68 (11-04-24 @ 23:00) (125/68 - 134/77)  BP(mean): 77 (11-04-24 @ 23:00) (77 - 77)  RR: 18 (11-04-24 @ 23:00) (18 - 18)  SpO2: 98% (11-04-24 @ 23:00) (95% - 98%)    Lipid Panel: Date/Time: 11-02-24 @ 04:16  Triglycerides, Serum: 204    POCT Blood Glucose.: 130 mg/dL (11-05-24 @ 06:24)  POCT Blood Glucose.: 111 mg/dL (11-04-24 @ 23:20)  POCT Blood Glucose.: 128 mg/dL (11-04-24 @ 17:49)  POCT Blood Glucose.: 129 mg/dL (11-04-24 @ 12:08)    *I&O's Detail  none doc'd. Per PN protocol and  policy; strict I&O's when on PN.     *BM - last doc'd 10/30; abdominal discomfort +N/V on 11/5. pt not on bowel regimen.  *edema: none doc'd    *malnutrition: Pt continues to meet criteria for moderate malnutrition in context of acute on chronic illness r/t decreased ability to meet increased nutrient needs 2/2 N/V, DM AEB >7.5% wt loss x 2mo, <50% ENN x 2 weeks, mild muscle/fat wasting    Estimated Needs: based on 55 Kg (wt from 10/27)  Calories: 0929-0453 Kcal (35-40 Kcal/Kg)  Protein: 66-77 g (1.2-1.4 g/Kg)  Fluids: 5086-0775 mL (35-40 mL/Kg)    Diet, Consistent Carbohydrate Renal/No Snacks:   Supplement Feeding Modality:  Oral  Ensure Enlive Cans or Servings Per Day:  1       Frequency:  Three Times a day (11-04-24 @ 16:04) [Active]  Diet, Regular (10-26-24 @ 17:50) [Available for Activation]  Parenteral Nutrition - Adult 1 Each (83 mL/Hr) TPN Continuous <Continuous>, 11-04-24 @ 22:00, 22:00, Stop order after: 1 Days    Weight History:  Weight (kg): 52.8 (10-25-24 @ 18:30)    PPN Recommendations: via peripheral line *No changes to PN bag today 11/5*  Total Volume: 2000 mL x 24 hours  80 g  Amino Acids  100 g Dextrose  50 g Lipids 20%  80 mEq Sodium Chloride  47 mEq Sodium Acetate  20 mmol Sodium Phosphate  50 mEq Potassium Chloride  10 mEq Potassium Acetate  0 mmol Potassium Phosphate  0 mEq Calcium Gluconate (CAPS out of PN solution)   8 mEq Magnesium Sulfate  200 mg Thiamine  1 ml Trace Elements  10 ml MVI    Total Calories   1160   (Meets 56 %  of  Estimated Energy needs  and  100 %  Protein needs)   (osmolarity ~885)  Additional Recommendations:    1) Daily weights  2) Strict I & O's  3) Daily lyte checks including magnesium and phos  4) Weekly triglycerides/LFT checks  5) POCT q6hrs; maintain 140-180mg/dL  6) if on PN > 6 days, consider placing PICC line to meet 100% of nutr needs; Confirm goals of care regarding LONG-TERM nutrition support - if gastroparesis present and still unable to tolerate PO, would need GJ tube; pending gastric emptying study, scheduled for Wednesday    *will continue to monitor and adjust PN prn*  Cecilia Gusman, PhD, MS, RDN Clinical Nutrition PN Follow Up Note    *64-year-old female with history of GERD  HTN, DM, polyps, and afib with 5 cardiac stents. presents with epigastric pain for the last couple of weeks.  Her daughter brought her in, after speaking with her GI doctor who stated patient should not have been on ozempic before and probably have taken insulin instead. She states that she had just uptitrated her dose of ozempic to 0.5 from 0.25 earlier this week. She had been on the 0.25 for 5 weeks. She states the pain bothers her more than the nausea and vomiting. Patient states she feels very minimal relief since she came in.  She denies NSAID use, bleeding or black stools. She reports 10lb weight loss and has been on ozempic for her diabetes. Denies diarrhea or urinary symptoms. Admitted for Nausea and vomiting  *11/1: D/w Dr. Hernandez - PPN Day #1; PPN to be initiated as a bridge 2/2 prolonged PO intolerance/poor PO intake; awaiting gastric emptying study. Per GI note 10/27: abd USG inconclusive s/p cholecystectomy. Rec for EGD/Colonoscopy early next week. 10/30: now s/p EGD and colonoscopy - egd with findings of gastritis and esophagitis without ulcers colonoscopy with patent anastomosis - no acute findings. Pt placed on regular low fiber diet - vomited lunch on 10/31 - complains of throat pain after egd; epigastric pain minimal.  Per hospitalist: "at this point, will need to rule out gastroparesis with gastric emptying study - per nuclear medicine the earliest it could be done would be Tuesday 11/5- need to order isotpope - Trial of 5mg liquid Reglan QID 15 min before meals and at bedtime."   PPN is not to be a long-term nutrition support, pending gastric emptying study if needing long-term nutrition support would recommend GJ-tube if gastroparesis is present. Strongly suggest to confirm goals of care regarding LONG-TERM nutrition support.  *11/2: PPN day #2. No pertinent events overnight. Remains w/ N/V and abdominal distension per flowsheet. Will c/w PPN.   *11/3: PPN day #3. Per RN documentation, no events of N/V overnight. Was drinking ensure shakes and tolerating. C/o N/V during the day per hospitalist note. Will c/w PPN for now. Pending gastric emptying study, tentative Tuesday 11/5.  *11/4: PPN day #4: No acute changes overnight. D/w MD Ulrich this am, will c/w PPN - awaiting GI input pending gastric emptying study.     PPN initiated 2/2 prolonged PO intolerance, N/V, awaiting gastric emptying study    *current status PPN day #5: No acute changes overnight. D/w MD Ulrich this am, will c/w PPN - awaiting GI input pending gastric emptying study scheduled to be done tomorrow.     *new pertinent meds: Lexapro, Abilify, ADMELOG (0 units x 24 hours), Protonix, Reglan - d/c'd 11/4, acetaminophen, ESGIC, Xanax, Zofran    *labs reviewed; Na, K, Mg, P WNL. Will keep totals the same in the PN bag today. Continue to replete lytes outside of PN prn. corrected Ca WNL, rec'd add 10mEq of Calcium Gluconate outside of PN bag as CAPS is out. T bili WNL will c/w trace elements in the PN bag today. Will c/w thiamine and MVI and minerals in the PN bag today. Triglyceride WNL (204), will c/w 50 g of lipids. POCTs x 24 hours WNL. Continue to maintain POCTs between 140-180 mg/dL. *No changes to PN bag today 11/5*    11-05    141  |  110[H]  |  21  ----------------------------<  121[H]  3.9   |  26  |  0.79    Ca    8.8      05 Nov 2024 04:55  Phos  3.4     11-05  Mg     2.2     11-05    TPro  7.3  /  Alb  3.3  /  TBili  0.2  /  DBili  x   /  AST  25  /  ALT  44  /  AlkPhos  111  11-05      BMI: BMI (kg/m2): 23.5 (10-25-24 @ 18:30)  HbA1c: A1C with Estimated Average Glucose Result: 6.6 % (10-26-24 @ 06:47)    Glucose: POCT Blood Glucose.: 130 mg/dL (11-05-24 @ 06:24)    BP: 125/68 (11-04-24 @ 23:00) (99/56 - 141/59)Vital Signs Last 24 Hrs  T(C): 36.4 (11-04-24 @ 23:00), Max: 36.4 (11-04-24 @ 16:23)  T(F): 97.5 (11-04-24 @ 23:00), Max: 97.6 (11-04-24 @ 16:23)  HR: 74 (11-04-24 @ 23:00) (74 - 78)  BP: 125/68 (11-04-24 @ 23:00) (125/68 - 134/77)  BP(mean): 77 (11-04-24 @ 23:00) (77 - 77)  RR: 18 (11-04-24 @ 23:00) (18 - 18)  SpO2: 98% (11-04-24 @ 23:00) (95% - 98%)    Lipid Panel: Date/Time: 11-02-24 @ 04:16  Triglycerides, Serum: 204    POCT Blood Glucose.: 130 mg/dL (11-05-24 @ 06:24)  POCT Blood Glucose.: 111 mg/dL (11-04-24 @ 23:20)  POCT Blood Glucose.: 128 mg/dL (11-04-24 @ 17:49)  POCT Blood Glucose.: 129 mg/dL (11-04-24 @ 12:08)    *I&O's Detail  none doc'd. Per PN protocol and  policy; strict I&O's when on PN.     *BM - last doc'd 10/30; abdominal discomfort +N/V on 11/5. pt not on bowel regimen.  *edema: none doc'd    *malnutrition: Pt continues to meet criteria for moderate malnutrition in context of acute on chronic illness r/t decreased ability to meet increased nutrient needs 2/2 N/V, DM AEB >7.5% wt loss x 2mo, <50% ENN x 2 weeks, mild muscle/fat wasting    Estimated Needs: based on 55 Kg (wt from 10/27)  Calories: 9302-0008 Kcal (35-40 Kcal/Kg)  Protein: 66-77 g (1.2-1.4 g/Kg)  Fluids: 0529-9557 mL (35-40 mL/Kg)    Diet, Consistent Carbohydrate Renal/No Snacks:   Supplement Feeding Modality:  Oral  Ensure Enlive Cans or Servings Per Day:  1       Frequency:  Three Times a day (11-04-24 @ 16:04) [Active]  Diet, Regular (10-26-24 @ 17:50) [Available for Activation]  Parenteral Nutrition - Adult 1 Each (83 mL/Hr) TPN Continuous <Continuous>, 11-04-24 @ 22:00, 22:00, Stop order after: 1 Days    Weight History:  Weight (kg): 52.8 (10-25-24 @ 18:30)    PPN Recommendations: via peripheral line *No changes to PN bag today 11/5*  Total Volume: 2000 mL x 24 hours  80 g  Amino Acids  100 g Dextrose  50 g Lipids 20%  80 mEq Sodium Chloride  47 mEq Sodium Acetate  20 mmol Sodium Phosphate  50 mEq Potassium Chloride  10 mEq Potassium Acetate  0 mmol Potassium Phosphate  0 mEq Calcium Gluconate (CAPS out of PN solution)   8 mEq Magnesium Sulfate  200 mg Thiamine  1 ml Trace Elements  10 ml MVI    Total Calories   1160   (Meets 56 %  of  Estimated Energy needs  and  100 %  Protein needs)   (osmolarity ~885)  Additional Recommendations:    1) Daily weights  2) Strict I & O's  3) Daily lyte checks including magnesium and phos  4) Weekly triglycerides/LFT checks  5) POCT q6hrs; maintain 140-180mg/dL  6) if on PN > 6 days, consider placing PICC line to meet 100% of nutr needs; Confirm goals of care regarding LONG-TERM nutrition support - if gastroparesis present and still unable to tolerate PO, would need GJ tube; pending gastric emptying study, scheduled for Wednesday    *will continue to monitor and adjust PN prn*  Cecilia Gusman, PhD, MS, RDN  Beatriz Calvo, MS, RDN, CDN, [facility] [phone]  Certified Nutrition

## 2024-11-06 LAB
ALBUMIN SERPL ELPH-MCNC: 3.2 G/DL — LOW (ref 3.3–5)
ALP SERPL-CCNC: 111 U/L — SIGNIFICANT CHANGE UP (ref 40–120)
ALT FLD-CCNC: 45 U/L — SIGNIFICANT CHANGE UP (ref 12–78)
ANION GAP SERPL CALC-SCNC: 4 MMOL/L — LOW (ref 5–17)
AST SERPL-CCNC: 29 U/L — SIGNIFICANT CHANGE UP (ref 15–37)
BILIRUB SERPL-MCNC: 0.2 MG/DL — SIGNIFICANT CHANGE UP (ref 0.2–1.2)
BUN SERPL-MCNC: 18 MG/DL — SIGNIFICANT CHANGE UP (ref 7–23)
CALCIUM SERPL-MCNC: 8.8 MG/DL — SIGNIFICANT CHANGE UP (ref 8.5–10.1)
CHLORIDE SERPL-SCNC: 110 MMOL/L — HIGH (ref 96–108)
CO2 SERPL-SCNC: 27 MMOL/L — SIGNIFICANT CHANGE UP (ref 22–31)
CREAT SERPL-MCNC: 0.78 MG/DL — SIGNIFICANT CHANGE UP (ref 0.5–1.3)
EGFR: 86 ML/MIN/1.73M2 — SIGNIFICANT CHANGE UP
GLUCOSE BLDC GLUCOMTR-MCNC: 101 MG/DL — HIGH (ref 70–99)
GLUCOSE BLDC GLUCOMTR-MCNC: 108 MG/DL — HIGH (ref 70–99)
GLUCOSE BLDC GLUCOMTR-MCNC: 86 MG/DL — SIGNIFICANT CHANGE UP (ref 70–99)
GLUCOSE BLDC GLUCOMTR-MCNC: 91 MG/DL — SIGNIFICANT CHANGE UP (ref 70–99)
GLUCOSE SERPL-MCNC: 109 MG/DL — HIGH (ref 70–99)
MAGNESIUM SERPL-MCNC: 2.1 MG/DL — SIGNIFICANT CHANGE UP (ref 1.6–2.6)
PHOSPHATE SERPL-MCNC: 3.6 MG/DL — SIGNIFICANT CHANGE UP (ref 2.5–4.5)
POTASSIUM SERPL-MCNC: 3.6 MMOL/L — SIGNIFICANT CHANGE UP (ref 3.5–5.3)
POTASSIUM SERPL-SCNC: 3.6 MMOL/L — SIGNIFICANT CHANGE UP (ref 3.5–5.3)
PROT SERPL-MCNC: 7.4 GM/DL — SIGNIFICANT CHANGE UP (ref 6–8.3)
SODIUM SERPL-SCNC: 141 MMOL/L — SIGNIFICANT CHANGE UP (ref 135–145)
TSH SERPL-MCNC: 1.44 UU/ML — SIGNIFICANT CHANGE UP (ref 0.34–4.82)

## 2024-11-06 PROCEDURE — 78264 GASTRIC EMPTYING IMG STUDY: CPT | Mod: 26

## 2024-11-06 PROCEDURE — 99233 SBSQ HOSP IP/OBS HIGH 50: CPT

## 2024-11-06 PROCEDURE — 74181 MRI ABDOMEN W/O CONTRAST: CPT | Mod: 26

## 2024-11-06 RX ORDER — SODIUM/POT/MAG/CALC/CHLOR/ACET 35-20-5MEQ
1 VIAL (ML) INTRAVENOUS
Refills: 0 | Status: DISCONTINUED | OUTPATIENT
Start: 2024-11-06 | End: 2024-11-06

## 2024-11-06 RX ORDER — METOCLOPRAMIDE HYDROCHLORIDE 10 MG/1
10 TABLET ORAL EVERY 8 HOURS
Refills: 0 | Status: DISCONTINUED | OUTPATIENT
Start: 2024-11-06 | End: 2024-11-10

## 2024-11-06 RX ORDER — FAT EMULSIONS 20 %
0.95 EMULSION INTRAVENOUS
Qty: 50 | Refills: 0 | Status: DISCONTINUED | OUTPATIENT
Start: 2024-11-06 | End: 2024-11-06

## 2024-11-06 RX ORDER — ACETAMINOPHEN 500MG 500 MG/1
1000 TABLET, COATED ORAL ONCE
Refills: 0 | Status: COMPLETED | OUTPATIENT
Start: 2024-11-06 | End: 2024-11-07

## 2024-11-06 RX ORDER — KETOROLAC TROMETHAMINE 30 MG/ML
15 INJECTION INTRAMUSCULAR; INTRAVENOUS EVERY 8 HOURS
Refills: 0 | Status: DISCONTINUED | OUTPATIENT
Start: 2024-11-06 | End: 2024-11-10

## 2024-11-06 RX ADMIN — Medication 1 EACH: at 23:34

## 2024-11-06 RX ADMIN — APIXABAN 5 MILLIGRAM(S): 2.5 TABLET, FILM COATED ORAL at 09:28

## 2024-11-06 RX ADMIN — ONDANSETRON HYDROCHLORIDE 4 MILLIGRAM(S): 4 TABLET, FILM COATED ORAL at 08:11

## 2024-11-06 RX ADMIN — ROSUVASTATIN CALCIUM 10 MILLIGRAM(S): 5 TABLET, FILM COATED ORAL at 22:30

## 2024-11-06 RX ADMIN — ACETAMINOPHEN 500MG 650 MILLIGRAM(S): 500 TABLET, COATED ORAL at 09:27

## 2024-11-06 RX ADMIN — ONDANSETRON HYDROCHLORIDE 4 MILLIGRAM(S): 4 TABLET, FILM COATED ORAL at 18:42

## 2024-11-06 RX ADMIN — METOPROLOL TARTRATE 100 MILLIGRAM(S): 100 TABLET, FILM COATED ORAL at 09:27

## 2024-11-06 RX ADMIN — Medication 20.8 GM/KG/DAY: at 23:35

## 2024-11-06 RX ADMIN — Medication 100 MILLILITER(S): at 01:35

## 2024-11-06 RX ADMIN — Medication 0.5 MILLIGRAM(S): at 22:29

## 2024-11-06 RX ADMIN — ARIPIPRAZOLE 5 MILLIGRAM(S): 15 TABLET ORAL at 22:30

## 2024-11-06 RX ADMIN — APIXABAN 5 MILLIGRAM(S): 2.5 TABLET, FILM COATED ORAL at 22:29

## 2024-11-06 RX ADMIN — METOCLOPRAMIDE HYDROCHLORIDE 10 MILLIGRAM(S): 10 TABLET ORAL at 22:30

## 2024-11-06 RX ADMIN — KETOROLAC TROMETHAMINE 15 MILLIGRAM(S): 30 INJECTION INTRAMUSCULAR; INTRAVENOUS at 15:50

## 2024-11-06 RX ADMIN — CLOPIDOGREL 75 MILLIGRAM(S): 75 TABLET, FILM COATED ORAL at 09:27

## 2024-11-06 RX ADMIN — ESCITALOPRAM OXALATE 10 MILLIGRAM(S): 10 TABLET, FILM COATED ORAL at 09:28

## 2024-11-06 NOTE — PROGRESS NOTE ADULT - ASSESSMENT
Poor PO intake  Significant depression  Symptoms multifactorial  REC  Full liquid diet  Trial of Reglan 1omg AC 3xday

## 2024-11-06 NOTE — CHART NOTE - NSCHARTNOTEFT_GEN_A_CORE
Clinical Nutrition PN Follow Up Note    *64-year-old female with history of GERD  HTN, DM, polyps, and afib with 5 cardiac stents. presents with epigastric pain for the last couple of weeks.  Her daughter brought her in, after speaking with her GI doctor who stated patient should not have been on ozempic before and probably have taken insulin instead. She states that she had just uptitrated her dose of ozempic to 0.5 from 0.25 earlier this week. She had been on the 0.25 for 5 weeks. She states the pain bothers her more than the nausea and vomiting. Patient states she feels very minimal relief since she came in.  She denies NSAID use, bleeding or black stools. She reports 10lb weight loss and has been on ozempic for her diabetes. Denies diarrhea or urinary symptoms. Admitted for Nausea and vomiting  *11/1: D/w Dr. Hernandez - PPN Day #1; PPN to be initiated as a bridge 2/2 prolonged PO intolerance/poor PO intake; awaiting gastric emptying study. Per GI note 10/27: abd USG inconclusive s/p cholecystectomy. Rec for EGD/Colonoscopy early next week. 10/30: now s/p EGD and colonoscopy - egd with findings of gastritis and esophagitis without ulcers colonoscopy with patent anastomosis - no acute findings. Pt placed on regular low fiber diet - vomited lunch on 10/31 - complains of throat pain after egd; epigastric pain minimal.  Per hospitalist: "at this point, will need to rule out gastroparesis with gastric emptying study - per nuclear medicine the earliest it could be done would be Tuesday 11/5- need to order isotpope - Trial of 5mg liquid Reglan QID 15 min before meals and at bedtime."   PPN is not to be a long-term nutrition support, pending gastric emptying study if needing long-term nutrition support would recommend GJ-tube if gastroparesis is present. Strongly suggest to confirm goals of care regarding LONG-TERM nutrition support.  *11/2: PPN day #2. No pertinent events overnight. Remains w/ N/V and abdominal distension per flowsheet. Will c/w PPN.   *11/3: PPN day #3. Per RN documentation, no events of N/V overnight. Was drinking ensure shakes and tolerating. C/o N/V during the day per hospitalist note. Will c/w PPN for now. Pending gastric emptying study, tentative Tuesday 11/5.  *11/4: PPN day #4: No acute changes overnight. D/w MD Ulrich this am, will c/w PPN - awaiting GI input pending gastric emptying study.   *11/5:  PPN day #5: No acute changes overnight. D/w MD Ulrich this am, will c/w PPN - awaiting GI input pending gastric emptying study scheduled to be done tomorrow.    PPN initiated 2/2 prolonged PO intolerance, N/V, awaiting gastric emptying study    *current status PPN day #6: Did not receive PPN last night 2/2 per nuclear medicine not allowed to receive anything past midnight (?). EGD done yesterday which came back inconclusive. Per discussion w/ Dr. Ulrich, pt will need J tube based on gastric emptying study. Will c/w PPN for now until J tube to be placed.      *new pertinent meds: Lexapro, Abilify, ADMELOG (0 units x 24 hours), Protonix, Reglan - d/c'd 11/4, acetaminophen, ESGIC, Xanax, Zofran    *labs reviewed; Na, Mg, and Phos WNL. K on low end of normal - will increase in PN bag. Continue to replete lytes outside of PN prn. corrected Ca WNL, rec'd add 10mEq of Calcium Gluconate outside of PN bag as CAPS is out. T bili WNL will c/w trace elements in the PN bag today. Will c/w thiamine and MVI and minerals in the PN bag today. Triglyceride WNL (204), will c/w 50 g of lipids. POCTs x 24 hours WNL. Continue to maintain POCTs between 140-180 mg/dL.     11-06    141  |  110[H]  |  18  ----------------------------<  109[H]  3.6   |  27  |  0.78    Ca    8.8      06 Nov 2024 04:53  Phos  3.6     11-06  Mg     2.1     11-06    TPro  7.4  /  Alb  3.2[L]  /  TBili  0.2  /  DBili  x   /  AST  29  /  ALT  45  /  AlkPhos  111  11-06    BMI: BMI (kg/m2): 23.5 (10-25-24 @ 18:30)  HbA1c: A1C with Estimated Average Glucose Result: 6.6 % (10-26-24 @ 06:47)    Glucose: POCT Blood Glucose.: 101 mg/dL (06 Nov 2024 06:33)    BP: 125/68 (11-04-24 @ 23:00) (99/56 - 141/59)Vital Signs Last 24 Hrs  T(C): 36.4 (11-04-24 @ 23:00), Max: 36.4 (11-04-24 @ 16:23)  T(F): 97.5 (11-04-24 @ 23:00), Max: 97.6 (11-04-24 @ 16:23)  HR: 74 (11-04-24 @ 23:00) (74 - 78)  BP: 125/68 (11-04-24 @ 23:00) (125/68 - 134/77)  BP(mean): 77 (11-04-24 @ 23:00) (77 - 77)  RR: 18 (11-04-24 @ 23:00) (18 - 18)  SpO2: 98% (11-04-24 @ 23:00) (95% - 98%)    Lipid Panel: Date/Time: 11-02-24 @ 04:16  Triglycerides, Serum: 204    POCT Blood Glucose.: 101 mg/dL (06 Nov 2024 06:33)  POCT Blood Glucose.: 90 mg/dL (05 Nov 2024 23:51)  POCT Blood Glucose.: 167 mg/dL (05 Nov 2024 16:46)  POCT Blood Glucose.: 149 mg/dL (05 Nov 2024 11:53)    *I&O's Detail  none doc'd. Per PN protocol and  policy; strict I&O's when on PN.   *BM - last doc'd 10/30; abdominal discomfort +N/V on 11/5. pt not on bowel regimen.  *edema: none doc'd    *malnutrition: Pt continues to meet criteria for moderate malnutrition in context of acute on chronic illness r/t decreased ability to meet increased nutrient needs 2/2 N/V, DM AEB >7.5% wt loss x 2mo, <50% ENN x 2 weeks, mild muscle/fat wasting    Estimated Needs: based on 55 Kg (wt from 10/27)  Calories: 3499-1322 Kcal (35-40 Kcal/Kg)  Protein: 66-77 g (1.2-1.4 g/Kg)  Fluids: 2070-1323 mL (35-40 mL/Kg)    Diet, Consistent Carbohydrate/No Snacks (11-06-24 @ 09:29) [Active]  Diet, Regular (10-26-24 @ 17:50) [Available for Activation]    Weight History:  Weight (kg): 52.8 (10-25-24 @ 18:30)    PPN Recommendations: via peripheral line  Total Volume: 2000 mL x 24 hours  80 g  Amino Acids  100 g Dextrose  50 g Lipids 20%  80 mEq Sodium Chloride  47 mEq Sodium Acetate  20 mmol Sodium Phosphate  55 mEq Potassium Chloride  15 mEq Potassium Acetate  0 mmol Potassium Phosphate  0 mEq Calcium Gluconate (CAPS out of PN solution)   8 mEq Magnesium Sulfate  200 mg Thiamine  1 ml Trace Elements  10 ml MVI    Total Calories   1160   (Meets 56 %  of  Estimated Energy needs  and  100 %  Protein needs)   (osmolarity ~895)  Additional Recommendations:    1) Daily weights  2) Strict I & O's  3) Daily lyte checks including magnesium and phos  4) Weekly triglycerides/LFT checks  5) POCT q6hrs; maintain 140-180mg/dL  6) if on PN > 6 days, consider placing PICC line to meet 100% of nutr needs  7) Confirm goals of care regarding LONG-TERM nutrition support - if gastroparesis present and still unable to tolerate PO, would need GJ tube - pending placement as per discussion w/ hospitalist     *will continue to monitor and adjust PN prn*  Carmenza Hamilton, MIGUEL ÁNGELN, CDN [phone]

## 2024-11-06 NOTE — PROGRESS NOTE ADULT - SUBJECTIVE AND OBJECTIVE BOX
CHIEF COMPLAINT/INTERVAL HISTORY:    Patient is a 64y old  Female who presents with a chief complaint of inability to tolerate po (02 Nov 2024 13:01)      HPI:  64-year-old female with history of GERD  HTN, DM, polyps, and afib with 5 cardiac stents. presents with epigastric pain for the last couple of weeks.  Her daughter brought her in, after speaking with her GI doctor who stated patient should not have been on ozempic before and probably have taken insulin instead. She states that she had just uptitrated her dose of ozempic to 0.5 from 0.25 earlier this week. She had been on the 0.25 for 5 weeks. She states the pain bothers her more than the nausea and vomiting. Patient states she feels very minimal relief since she came in.  She denies NSAID use, bleeding or black stools. She reports 10lb weight loss and has been on ozempic for her diabetes. Denies diarrhea or urinary symptoms.     In the ED  Vitals Temp 97.8  /84 O2 sat 98% on RA  Labs WBC 5.28 Hg 13.3 Plt 212 Na 140 K 4 Cl 107 Bicarb 26 BUN 16 Cr 0.86 Glucose 101 Protein 8.4 ALbumin 4 AST 32 ALT 54 Lipase 71 Trop I 4.65  Imaging CTAP   Etiology of abdominal pain is not elucidated  Intervention zofran, ofirmev, pepcid, morphine 2,  4, 1 L NS   (25 Oct 2024 13:17)    Adm for initiation of PPN; gastric emptying study for 11/6- failed plan for Jtube          Vital Signs Last 24 Hrs  T(C): 36.4 (06 Nov 2024 07:30), Max: 36.7 (05 Nov 2024 15:54)  T(F): 97.6 (06 Nov 2024 07:30), Max: 98 (05 Nov 2024 15:54)  HR: 75 (06 Nov 2024 07:30) (73 - 77)  BP: 113/81 (06 Nov 2024 07:30) (104/55 - 135/72)  BP(mean): --  RR: 18 (06 Nov 2024 07:30) (18 - 18)  SpO2: 98% (06 Nov 2024 07:30) (94% - 98%)    Parameters below as of 06 Nov 2024 07:30  Patient On (Oxygen Delivery Method): room air            PHYSICAL EXAM:      Constitutional: NAD  Eyes: perrl, no conjunctival changes  ENMT: no exudates, moist oral muc, uvula midline  Neck: no JVD, no LAD  Back: no cva tenderness  Respiratory: CTA, no exp wheezes  Cardiovascular: S1S2 reg, no murmur gallop or rub  Gastrointestinal: abd soft, NT/ND + BS  Genitourinary: voiding  Extremities: FROM, no joint effusions, no edema, no clubbing , no cyanosis  Vascular: pedal pulses + bilateral, warm extremities  Neurological: non focal, mot str 5/5/ all extr  Skin: no rashes  Lymph Nodes: no LAD        CAPILLARY BLOOD GLUCOSE      POCT Blood Glucose.: 130 mg/dL (05 Nov 2024 06:24)  POCT Blood Glucose.: 111 mg/dL (04 Nov 2024 23:20)  POCT Blood Glucose.: 128 mg/dL (04 Nov 2024 17:49)  POCT Blood Glucose.: 129 mg/dL (04 Nov 2024 12:08)          RADIOLOGY & ADDITIONAL TESTS: personally reviewed        Assessment and Plan: 64 year old woman with poor oral intake.       Intractable nausea with poor oral intake: multifactorial - due to adverse effects off Ozempic / gastritis / esophagitis / possible gastroparesis:   - off ozempic  - CT abd:  Status post colectomy with ileorectal anastomosis. No bowel obstruction.  - EGD/colonoscopy --> gastritis, esophagitits  - low fiber diet  - started PPN on 11/1,   Jtube if possible she had some intestine removed in the past        DM2 with possible gastroparesis:     - RAISS        Hypoglycemia / Moderate protein-calorie malnutrition:  - start PPN, pt still unable to tolerate PO intake  - daily labs      Paroxymal afib / HTN / CAD:  - off lisinopril due to borderline BP  - c/w eliquis, and metoprolol   - s/p 5 stents last were 2 years ago  - plavix  - restart plavix       #unspecified mental health disorder   per pharmacy pt is on abilify and lexapro and xanax -  will order here   pt endorses she has been diagnosed with schizophrenia and/or bipolar - shes not entirely sure of her diagnosis  she denies hx of inpatient psych hospitalizations  confirmed home meds - continue    time spent: 55mincase d/w daughter     CHIEF COMPLAINT/INTERVAL HISTORY:    Patient is a 64y old  Female who presents with a chief complaint of inability to tolerate po (02 Nov 2024 13:01)      HPI:  64-year-old female with history of GERD  HTN, DM, polyps, and afib with 5 cardiac stents. presents with epigastric pain for the last couple of weeks.  Her daughter brought her in, after speaking with her GI doctor who stated patient should not have been on ozempic before and probably have taken insulin instead. She states that she had just uptitrated her dose of ozempic to 0.5 from 0.25 earlier this week. She had been on the 0.25 for 5 weeks. She states the pain bothers her more than the nausea and vomiting. Patient states she feels very minimal relief since she came in.  She denies NSAID use, bleeding or black stools. She reports 10lb weight loss and has been on ozempic for her diabetes. Denies diarrhea or urinary symptoms.     In the ED  Vitals Temp 97.8  /84 O2 sat 98% on RA  Labs WBC 5.28 Hg 13.3 Plt 212 Na 140 K 4 Cl 107 Bicarb 26 BUN 16 Cr 0.86 Glucose 101 Protein 8.4 ALbumin 4 AST 32 ALT 54 Lipase 71 Trop I 4.65  Imaging CTAP   Etiology of abdominal pain is not elucidated  Intervention zofran, ofirmev, pepcid, morphine 2,  4, 1 L NS   (25 Oct 2024 13:17)    Adm for initiation of PPN; gastric emptying study for 11/6- failed Jtube          Vital Signs Last 24 Hrs  T(C): 36.4 (06 Nov 2024 07:30), Max: 36.7 (05 Nov 2024 15:54)  T(F): 97.6 (06 Nov 2024 07:30), Max: 98 (05 Nov 2024 15:54)  HR: 75 (06 Nov 2024 07:30) (73 - 77)  BP: 113/81 (06 Nov 2024 07:30) (104/55 - 135/72)  BP(mean): --  RR: 18 (06 Nov 2024 07:30) (18 - 18)  SpO2: 98% (06 Nov 2024 07:30) (94% - 98%)    Parameters below as of 06 Nov 2024 07:30  Patient On (Oxygen Delivery Method): room air            PHYSICAL EXAM:      Constitutional: NAD  Eyes: perrl, no conjunctival changes  ENMT: no exudates, moist oral muc, uvula midline  Neck: no JVD, no LAD  Back: no cva tenderness  Respiratory: CTA, no exp wheezes  Cardiovascular: S1S2 reg, no murmur gallop or rub  Gastrointestinal: abd soft, NT/ND + BS  Genitourinary: voiding  Extremities: FROM, no joint effusions, no edema, no clubbing , no cyanosis  Vascular: pedal pulses + bilateral, warm extremities  Neurological: non focal, mot str 5/5/ all extr  Skin: no rashes  Lymph Nodes: no LAD        CAPILLARY BLOOD GLUCOSE      POCT Blood Glucose.: 130 mg/dL (05 Nov 2024 06:24)  POCT Blood Glucose.: 111 mg/dL (04 Nov 2024 23:20)  POCT Blood Glucose.: 128 mg/dL (04 Nov 2024 17:49)  POCT Blood Glucose.: 129 mg/dL (04 Nov 2024 12:08)          RADIOLOGY & ADDITIONAL TESTS: personally reviewed        Assessment and Plan: 64 year old woman with poor oral intake.       Intractable nausea with poor oral intake: multifactorial - due to adverse effects off Ozempic / gastritis / esophagitis / possible gastroparesis:   - off ozempic  - CT abd:  Status post colectomy with ileorectal anastomosis. No bowel obstruction.  - EGD/colonoscopy --> gastritis, esophagitits  - low fiber diet  - started PPN on 11/1,   Jtube if possible she had some intestine removed in the past  - diff dx: celiac  artery compression  syndrome vs CBD related  - consult vascular  check MRCP  pt had colectomy with iliorectal anastomosis         DM2 with possible gastroparesis:     - RAISS        Hypoglycemia / Moderate protein-calorie malnutrition:  - start PPN, pt still unable to tolerate PO intake  - daily labs      Paroxymal afib / HTN / CAD:  - off lisinopril due to borderline BP  - c/w eliquis, and metoprolol   - s/p 5 stents last were 2 years ago  - plavix  - restart plavix       #unspecified mental health disorder   per pharmacy pt is on abilify and lexapro and xanax -  will order here   pt endorses she has been diagnosed with schizophrenia and/or bipolar - shes not entirely sure of her diagnosis  she denies hx of inpatient psych hospitalizations  confirmed home meds - continue    time spent: 55mincase d/w daughter

## 2024-11-06 NOTE — PROGRESS NOTE ADULT - SUBJECTIVE AND OBJECTIVE BOX
Interval History:  Pt depressed with Poor PO intake  MEDICATIONS  (STANDING):  acetaminophen   IVPB .. 1000 milliGRAM(s) IV Intermittent once  apixaban 5 milliGRAM(s) Oral two times a day  ARIPiprazole 5 milliGRAM(s) Oral at bedtime  clopidogrel Tablet 75 milliGRAM(s) Oral daily  dextrose 5%. 1000 milliLiter(s) (50 mL/Hr) IV Continuous <Continuous>  dextrose 5%. 1000 milliLiter(s) (100 mL/Hr) IV Continuous <Continuous>  dextrose 50% Injectable 25 Gram(s) IV Push once  dextrose 50% Injectable 25 Gram(s) IV Push once  dextrose 50% Injectable 12.5 Gram(s) IV Push once  escitalopram 10 milliGRAM(s) Oral daily  FIRST- Mouthwash  BLM 5 milliLiter(s) Swish and Swallow three times a day  glucagon  Injectable 1 milliGRAM(s) IntraMuscular once  influenza   Vaccine 0.5 milliLiter(s) IntraMuscular once  insulin lispro (ADMELOG) corrective regimen sliding scale   SubCutaneous every 6 hours  lipid, fat emulsion (Fish Oil and Plant Based) 20% Infusion 0.9469 Gm/kG/Day (20.8 mL/Hr) IV Continuous <Continuous>  metoclopramide Injectable 10 milliGRAM(s) IV Push every 8 hours  metoprolol succinate  milliGRAM(s) Oral daily  pantoprazole    Tablet 40 milliGRAM(s) Oral before breakfast  Parenteral Nutrition - Adult 1 Each (83 mL/Hr) TPN Continuous <Continuous>  rosuvastatin 10 milliGRAM(s) Oral at bedtime    MEDICATIONS  (PRN):  acetaminophen     Tablet .. 650 milliGRAM(s) Oral every 6 hours PRN Moderate Pain (4 - 6)  acetaminophen 325 mG/butalbital 50 mG/caffeine 40 mG 1 Tablet(s) Oral every 8 hours PRN migraine  albuterol    90 MICROgram(s) HFA Inhaler 1 Puff(s) Inhalation every 6 hours PRN Shortness of Breath and/or Wheezing  ALPRAZolam 0.5 milliGRAM(s) Oral at bedtime PRN insomnia/anxiety  aluminum hydroxide/magnesium hydroxide/simethicone Suspension 30 milliLiter(s) Oral every 4 hours PRN Dyspepsia  benzocaine/menthol Lozenge 1 Lozenge Oral every 4 hours PRN Sore Throat  dextrose Oral Gel 15 Gram(s) Oral once PRN Blood Glucose LESS THAN 70 milliGRAM(s)/deciliter  ketorolac   Injectable 15 milliGRAM(s) IV Push every 8 hours PRN Severe Pain (7 - 10)  ondansetron Injectable 4 milliGRAM(s) IV Push every 6 hours PRN Nausea and/or Vomiting      Daily     Daily   BMI: 23.5 (10-25 @ 18:30)  Change in Weight:  Vital Signs Last 24 Hrs  T(C): 36.7 (06 Nov 2024 15:52), Max: 36.7 (06 Nov 2024 15:52)  T(F): 98.1 (06 Nov 2024 15:52), Max: 98.1 (06 Nov 2024 15:52)  HR: 72 (06 Nov 2024 15:52) (72 - 75)  BP: 129/70 (06 Nov 2024 15:52) (104/55 - 129/70)  BP(mean): --  RR: 18 (06 Nov 2024 15:52) (18 - 18)  SpO2: 95% (06 Nov 2024 15:52) (94% - 98%)    Parameters below as of 06 Nov 2024 15:52  Patient On (Oxygen Delivery Method): room air      I&O's Detail      PHYSICAL EXAM  Abd : Soft NT    Lab Results:    11-06    141  |  110[H]  |  18  ----------------------------<  109[H]  3.6   |  27  |  0.78    Ca    8.8      06 Nov 2024 04:53  Phos  3.6     11-06  Mg     2.1     11-06    TPro  7.4  /  Alb  3.2[L]  /  TBili  0.2  /  DBili  x   /  AST  29  /  ALT  45  /  AlkPhos  111  11-06    LIVER FUNCTIONS - ( 06 Nov 2024 04:53 )  Alb: 3.2 g/dL / Pro: 7.4 gm/dL / ALK PHOS: 111 U/L / ALT: 45 U/L / AST: 29 U/L / GGT: x                 Stool Results:          RADIOLOGY RESULTS:    SURGICAL PATHOLOGY:

## 2024-11-07 ENCOUNTER — RESULT REVIEW (OUTPATIENT)
Age: 64
End: 2024-11-07

## 2024-11-07 LAB
ALBUMIN SERPL ELPH-MCNC: 3.1 G/DL — LOW (ref 3.3–5)
ALP SERPL-CCNC: 118 U/L — SIGNIFICANT CHANGE UP (ref 40–120)
ALT FLD-CCNC: 70 U/L — SIGNIFICANT CHANGE UP (ref 12–78)
ANION GAP SERPL CALC-SCNC: 3 MMOL/L — LOW (ref 5–17)
AST SERPL-CCNC: 63 U/L — HIGH (ref 15–37)
BILIRUB SERPL-MCNC: 0.2 MG/DL — SIGNIFICANT CHANGE UP (ref 0.2–1.2)
BLD GP AB SCN SERPL QL: SIGNIFICANT CHANGE UP
BUN SERPL-MCNC: 16 MG/DL — SIGNIFICANT CHANGE UP (ref 7–23)
CALCIUM SERPL-MCNC: 8.7 MG/DL — SIGNIFICANT CHANGE UP (ref 8.5–10.1)
CHLORIDE SERPL-SCNC: 113 MMOL/L — HIGH (ref 96–108)
CO2 SERPL-SCNC: 27 MMOL/L — SIGNIFICANT CHANGE UP (ref 22–31)
CREAT SERPL-MCNC: 0.79 MG/DL — SIGNIFICANT CHANGE UP (ref 0.5–1.3)
EGFR: 85 ML/MIN/1.73M2 — SIGNIFICANT CHANGE UP
GLUCOSE BLDC GLUCOMTR-MCNC: 125 MG/DL — HIGH (ref 70–99)
GLUCOSE BLDC GLUCOMTR-MCNC: 131 MG/DL — HIGH (ref 70–99)
GLUCOSE BLDC GLUCOMTR-MCNC: 217 MG/DL — HIGH (ref 70–99)
GLUCOSE BLDC GLUCOMTR-MCNC: 217 MG/DL — HIGH (ref 70–99)
GLUCOSE SERPL-MCNC: 113 MG/DL — HIGH (ref 70–99)
HCT VFR BLD CALC: 35.6 % — SIGNIFICANT CHANGE UP (ref 34.5–45)
HCT VFR BLD CALC: 35.8 % — SIGNIFICANT CHANGE UP (ref 34.5–45)
HGB BLD-MCNC: 11.4 G/DL — LOW (ref 11.5–15.5)
HGB BLD-MCNC: 11.4 G/DL — LOW (ref 11.5–15.5)
MAGNESIUM SERPL-MCNC: 1.9 MG/DL — SIGNIFICANT CHANGE UP (ref 1.6–2.6)
MCHC RBC-ENTMCNC: 28.9 PG — SIGNIFICANT CHANGE UP (ref 27–34)
MCHC RBC-ENTMCNC: 29.4 PG — SIGNIFICANT CHANGE UP (ref 27–34)
MCHC RBC-ENTMCNC: 31.8 G/DL — LOW (ref 32–36)
MCHC RBC-ENTMCNC: 32 G/DL — SIGNIFICANT CHANGE UP (ref 32–36)
MCV RBC AUTO: 90.1 FL — SIGNIFICANT CHANGE UP (ref 80–100)
MCV RBC AUTO: 92.3 FL — SIGNIFICANT CHANGE UP (ref 80–100)
PHOSPHATE SERPL-MCNC: 4.2 MG/DL — SIGNIFICANT CHANGE UP (ref 2.5–4.5)
PLATELET # BLD AUTO: 191 K/UL — SIGNIFICANT CHANGE UP (ref 150–400)
PLATELET # BLD AUTO: 196 K/UL — SIGNIFICANT CHANGE UP (ref 150–400)
POTASSIUM SERPL-MCNC: 3.8 MMOL/L — SIGNIFICANT CHANGE UP (ref 3.5–5.3)
POTASSIUM SERPL-SCNC: 3.8 MMOL/L — SIGNIFICANT CHANGE UP (ref 3.5–5.3)
PROT SERPL-MCNC: 7.2 GM/DL — SIGNIFICANT CHANGE UP (ref 6–8.3)
RBC # BLD: 3.88 M/UL — SIGNIFICANT CHANGE UP (ref 3.8–5.2)
RBC # BLD: 3.95 M/UL — SIGNIFICANT CHANGE UP (ref 3.8–5.2)
RBC # FLD: 14.4 % — SIGNIFICANT CHANGE UP (ref 10.3–14.5)
RBC # FLD: 14.6 % — HIGH (ref 10.3–14.5)
SODIUM SERPL-SCNC: 143 MMOL/L — SIGNIFICANT CHANGE UP (ref 135–145)
WBC # BLD: 6.01 K/UL — SIGNIFICANT CHANGE UP (ref 3.8–10.5)
WBC # BLD: 6.65 K/UL — SIGNIFICANT CHANGE UP (ref 3.8–10.5)
WBC # FLD AUTO: 6.01 K/UL — SIGNIFICANT CHANGE UP (ref 3.8–10.5)
WBC # FLD AUTO: 6.65 K/UL — SIGNIFICANT CHANGE UP (ref 3.8–10.5)

## 2024-11-07 PROCEDURE — 43274 ERCP DUCT STENT PLACEMENT: CPT

## 2024-11-07 PROCEDURE — 43261 ENDO CHOLANGIOPANCREATOGRAPH: CPT | Mod: 59

## 2024-11-07 PROCEDURE — 88305 TISSUE EXAM BY PATHOLOGIST: CPT | Mod: 26

## 2024-11-07 PROCEDURE — 43259 EGD US EXAM DUODENUM/JEJUNUM: CPT

## 2024-11-07 PROCEDURE — 99233 SBSQ HOSP IP/OBS HIGH 50: CPT

## 2024-11-07 RX ORDER — OXYCODONE HYDROCHLORIDE 30 MG/1
5 TABLET ORAL ONCE
Refills: 0 | Status: DISCONTINUED | OUTPATIENT
Start: 2024-11-07 | End: 2024-11-07

## 2024-11-07 RX ORDER — FENTANYL 12 UG/H
50 PATCH, EXTENDED RELEASE TRANSDERMAL
Refills: 0 | Status: DISCONTINUED | OUTPATIENT
Start: 2024-11-07 | End: 2024-11-07

## 2024-11-07 RX ORDER — FAT EMULSIONS 20 %
0.95 EMULSION INTRAVENOUS
Qty: 50 | Refills: 0 | Status: DISCONTINUED | OUTPATIENT
Start: 2024-11-07 | End: 2024-11-07

## 2024-11-07 RX ORDER — SODIUM/POT/MAG/CALC/CHLOR/ACET 35-20-5MEQ
1 VIAL (ML) INTRAVENOUS
Refills: 0 | Status: DISCONTINUED | OUTPATIENT
Start: 2024-11-07 | End: 2024-11-07

## 2024-11-07 RX ORDER — ONDANSETRON HYDROCHLORIDE 4 MG/1
4 TABLET, FILM COATED ORAL EVERY 6 HOURS
Refills: 0 | Status: DISCONTINUED | OUTPATIENT
Start: 2024-11-07 | End: 2024-11-07

## 2024-11-07 RX ORDER — HYDROMORPHONE HYDROCHLORIDE 2 MG/1
1 TABLET ORAL
Refills: 0 | Status: DISCONTINUED | OUTPATIENT
Start: 2024-11-07 | End: 2024-11-14

## 2024-11-07 RX ORDER — 0.9 % SODIUM CHLORIDE 0.9 %
1000 INTRAVENOUS SOLUTION INTRAVENOUS ONCE
Refills: 0 | Status: COMPLETED | OUTPATIENT
Start: 2024-11-07 | End: 2024-11-07

## 2024-11-07 RX ORDER — 0.9 % SODIUM CHLORIDE 0.9 %
1000 INTRAVENOUS SOLUTION INTRAVENOUS
Refills: 0 | Status: DISCONTINUED | OUTPATIENT
Start: 2024-11-07 | End: 2024-11-07

## 2024-11-07 RX ADMIN — FENTANYL 50 MICROGRAM(S): 12 PATCH, EXTENDED RELEASE TRANSDERMAL at 14:44

## 2024-11-07 RX ADMIN — METOCLOPRAMIDE HYDROCHLORIDE 10 MILLIGRAM(S): 10 TABLET ORAL at 22:20

## 2024-11-07 RX ADMIN — Medication 2 MILLIGRAM(S): at 11:26

## 2024-11-07 RX ADMIN — METOCLOPRAMIDE HYDROCHLORIDE 10 MILLIGRAM(S): 10 TABLET ORAL at 05:32

## 2024-11-07 RX ADMIN — Medication 1 EACH: at 22:43

## 2024-11-07 RX ADMIN — HYDROMORPHONE HYDROCHLORIDE 1 MILLIGRAM(S): 2 TABLET ORAL at 12:19

## 2024-11-07 RX ADMIN — PANTOPRAZOLE SODIUM 40 MILLIGRAM(S): 40 TABLET, DELAYED RELEASE ORAL at 05:32

## 2024-11-07 RX ADMIN — Medication 2 MILLIGRAM(S): at 03:24

## 2024-11-07 RX ADMIN — METOPROLOL TARTRATE 100 MILLIGRAM(S): 100 TABLET, FILM COATED ORAL at 11:21

## 2024-11-07 RX ADMIN — ROSUVASTATIN CALCIUM 10 MILLIGRAM(S): 5 TABLET, FILM COATED ORAL at 22:20

## 2024-11-07 RX ADMIN — Medication 4: at 17:37

## 2024-11-07 RX ADMIN — ARIPIPRAZOLE 5 MILLIGRAM(S): 15 TABLET ORAL at 22:21

## 2024-11-07 RX ADMIN — ESCITALOPRAM OXALATE 10 MILLIGRAM(S): 10 TABLET, FILM COATED ORAL at 09:49

## 2024-11-07 RX ADMIN — Medication 0.5 MILLIGRAM(S): at 22:23

## 2024-11-07 RX ADMIN — CLOPIDOGREL 75 MILLIGRAM(S): 75 TABLET, FILM COATED ORAL at 09:49

## 2024-11-07 RX ADMIN — FENTANYL 50 MICROGRAM(S): 12 PATCH, EXTENDED RELEASE TRANSDERMAL at 15:11

## 2024-11-07 RX ADMIN — Medication 20.83 GM/KG/DAY: at 22:43

## 2024-11-07 RX ADMIN — HYDROMORPHONE HYDROCHLORIDE 1 MILLIGRAM(S): 2 TABLET ORAL at 15:57

## 2024-11-07 RX ADMIN — Medication 1000 MILLILITER(S): at 17:21

## 2024-11-07 RX ADMIN — ACETAMINOPHEN 500MG 400 MILLIGRAM(S): 500 TABLET, COATED ORAL at 06:06

## 2024-11-07 RX ADMIN — HYDROMORPHONE HYDROCHLORIDE 1 MILLIGRAM(S): 2 TABLET ORAL at 22:51

## 2024-11-07 RX ADMIN — HYDROMORPHONE HYDROCHLORIDE 1 MILLIGRAM(S): 2 TABLET ORAL at 22:21

## 2024-11-07 RX ADMIN — DIPHENHYDRAMINE HYDROCHLORIDE AND LIDOCAINE HYDROCHLORIDE AND ALUMINUM HYDROXIDE AND MAGNESIUM HYDRO 5 MILLILITER(S): KIT at 22:20

## 2024-11-07 NOTE — CONSULT NOTE ADULT - ASSESSMENT
HPI:  64-year-old female with history of GERD  HTN, DM, polyps, and afib with 5 cardiac stents, post colectomy with ileorectal anastomosis presents with epigastric pain for the last couple of weeks.  Her daughter brought her in, after speaking with her GI doctor who stated patient should not have been on ozempic before and   probably have taken insulin instead. She states that she had just uptitrated her dose of ozempic to 0.5 from 0.25 earlier this week. She had been on the 0.25 for 5 weeks. She states the pain   bothers her more than the nausea and vomiting. Patient states she feels very minimal relief since she came in.  She denies NSAID use, bleeding or black stools. She reports 10lb weight loss and has been   on ozempic for her diabetes. Denies diarrhea or urinary symptoms.     - CT abd:  Status post colectomy with ileorectal anastomosis. No bowel obstruction.  - EGD/colonoscopy --> gastritis, esophagitits  MRCP:  Marked intrahepatic biliary ductal dilatation with multiple small filling   defects within the distal common bile duct, likely representing   choledocholithiasis.  vascular surgery consulted to kwame for median arcuate ligament syndrome    Recs:   HPI:  64-year-old female with history of GERD  HTN, DM, polyps, and afib with 5 cardiac stents, post colectomy with ileorectal anastomosis presents with epigastric pain for the last couple of weeks.  Her daughter brought her in, after speaking with her GI doctor who stated patient should not have been on ozempic before and   probably have taken insulin instead. She states that she had just uptitrated her dose of ozempic to 0.5 from 0.25 earlier this week. She had been on the 0.25 for 5 weeks. She states the pain   bothers her more than the nausea and vomiting. Patient states she feels very minimal relief since she came in.  She denies NSAID use, bleeding or black stools. She reports 10lb weight loss and has been   on ozempic for her diabetes. Denies diarrhea or urinary symptoms.     - CT abd:  Status post colectomy with ileorectal anastomosis. No bowel obstruction.  - EGD/colonoscopy --> gastritis, esophagitits  MRCP:  Marked intrahepatic biliary ductal dilatation with multiple small filling   defects within the distal common bile duct, likely representing   choledocholithiasis.  vascular surgery consulted to eval for median arcuate ligament syndrome    Recs:  No imaging finding suggestive of median arcuate ligament syndrome   Patient's symptoms are most likely related to other underlying pathologies  GI rces  No vascular intervention indicated  reconsult as needed   care per primary team  HPI:  64-year-old female with history of GERD  HTN, DM, polyps, and afib with 5 cardiac stents, post colectomy with ileorectal anastomosis, hx of ? cholecystectomy,  presents with epigastric pain for the last couple of weeks.  Her daughter brought her in, after speaking with her GI doctor who stated patient should not have been on ozempic before and   probably have taken insulin instead. She states that she had just uptitrated her dose of ozempic to 0.5 from 0.25 earlier this week. She had been on the 0.25 for 5 weeks. She states the pain   bothers her more than the nausea and vomiting. Patient states she feels very minimal relief since she came in.  She denies NSAID use, bleeding or black stools. She reports 15lb weight loss and has been   on ozempic for her diabetes. Denies diarrhea or urinary symptoms.     - CT abd:  Status post colectomy with ileorectal anastomosis. No bowel obstruction.  - EGD/colonoscopy --> gastritis, esophagitits  MRCP:  Marked intrahepatic biliary ductal dilatation with multiple small filling   defects within the distal common bile duct, likely representing   choledocholithiasis.    vascular surgery consulted to kwame for median arcuate ligament syndrome      Recs:  No imaging finding suggestive of median arcuate ligament syndrome   Patient's symptoms are most likely related to other underlying pathologies  GI rces  No vascular intervention indicated  reconsult as needed   care per primary team

## 2024-11-07 NOTE — CHART NOTE - NSCHARTNOTEFT_GEN_A_CORE
Patient noted to have a loose bowel movement with blood associated with abdominal pain. Patient states that she had a colonoscopy/ endoscopy about 1 week ago. States that when she had this done previously she did have an episode of bleeding 8 days after the procedure.     T(C): 36.7 (11-06-24 @ 22:30), Max: 36.7 (11-06-24 @ 15:52)  HR: 74 (11-06-24 @ 22:30) (72 - 75)  BP: 121/72 (11-06-24 @ 22:30) (113/81 - 129/70)  RR: 18 (11-06-24 @ 22:30) (18 - 18)  SpO2: 94% (11-06-24 @ 22:30) (94% - 98%)    CONSTITUTIONAL: Well groomed, no apparent distress  RESP: No respiratory distress, no use of accessory muscles; CTA b/l, no WRR  CV: RRR, +S1S2, no MRG; no JVD; no peripheral edema  GI: Soft, generalized tenderness, ND    Plan  GI Bleed  - Advised nurse to hold Eliquis   - Stat H/H   - Morphine x1 for pain

## 2024-11-07 NOTE — PROGRESS NOTE ADULT - SUBJECTIVE AND OBJECTIVE BOX
CHIEF COMPLAINT/INTERVAL HISTORY:    Patient is a 64y old  Female who presents with a chief complaint of inability to tolerate po (02 Nov 2024 13:01)      HPI:  64-year-old female with history of GERD  HTN, DM, polyps, and afib with 5 cardiac stents. presents with epigastric pain for the last couple of weeks.  Her daughter brought her in, after speaking with her GI doctor who stated patient should not have been on ozempic before and probably have taken insulin instead. She states that she had just uptitrated her dose of ozempic to 0.5 from 0.25 earlier this week. She had been on the 0.25 for 5 weeks. She states the pain bothers her more than the nausea and vomiting. Patient states she feels very minimal relief since she came in.  She denies NSAID use, bleeding or black stools. She reports 10lb weight loss and has been on ozempic for her diabetes. Denies diarrhea or urinary symptoms.     In the ED  Vitals Temp 97.8  /84 O2 sat 98% on RA  Labs WBC 5.28 Hg 13.3 Plt 212 Na 140 K 4 Cl 107 Bicarb 26 BUN 16 Cr 0.86 Glucose 101 Protein 8.4 ALbumin 4 AST 32 ALT 54 Lipase 71 Trop I 4.65  Imaging CTAP   Etiology of abdominal pain is not elucidated  Intervention zofran, ofirmev, pepcid, morphine 2,  4, 1 L NS   (25 Oct 2024 13:17)    Adm for initiation of PPN; gastric emptying study for 11/6- failed  Abnormal MRCP; vascular also consulted. At this time diff diagnosis expanded because of the pain she is c/o of.      Vital Signs Last 24 Hrs  T(C): 36.7 (06 Nov 2024 22:30), Max: 36.7 (06 Nov 2024 15:52)  T(F): 98 (06 Nov 2024 22:30), Max: 98.1 (06 Nov 2024 15:52)  HR: 74 (06 Nov 2024 22:30) (72 - 74)  BP: 121/72 (06 Nov 2024 22:30) (121/72 - 129/70)  BP(mean): --  RR: 18 (06 Nov 2024 22:30) (18 - 18)  SpO2: 94% (06 Nov 2024 22:30) (94% - 95%)    Parameters below as of 06 Nov 2024 22:30  Patient On (Oxygen Delivery Method): room air        PHYSICAL EXAM:      Constitutional: NAD  Eyes: perrl, no conjunctival changes  ENMT: no exudates, moist oral muc, uvula midline  Neck: no JVD, no LAD  Back: no cva tenderness  Respiratory: CTA, no exp wheezes  Cardiovascular: S1S2 reg, no murmur gallop or rub  Gastrointestinal: abd soft, NT/ND + BS  Genitourinary: voiding  Extremities: FROM, no joint effusions, no edema, no clubbing , no cyanosis  Vascular: pedal pulses + bilateral, warm extremities  Neurological: non focal, mot str 5/5/ all extr  Skin: no rashes  Lymph Nodes: no LAD      CAPILLARY BLOOD GLUCOSE      POCT Blood Glucose.: 131 mg/dL (07 Nov 2024 06:11)  POCT Blood Glucose.: 86 mg/dL (06 Nov 2024 23:14)  POCT Blood Glucose.: 91 mg/dL (06 Nov 2024 17:17)  POCT Blood Glucose.: 108 mg/dL (06 Nov 2024 11:14)          RADIOLOGY & ADDITIONAL TESTS: personally reviewed        Assessment and Plan: 64 year old woman with poor oral intake.       Intractable nausea with poor oral intake: multifactorial - due to adverse effects off Ozempic / gastritis / esophagitis / possible gastroparesis:   - off ozempic  - CT abd:  Status post colectomy with ileorectal anastomosis. No bowel obstruction.  - EGD/colonoscopy --> gastritis, esophagitits  - low fiber diet  - started PPN on 11/1,   Jtube if possible she had some intestine removed in the past  - diff dx: celiac  artery compression  syndrome vs CBD related- abnormal MRCP consult GI  - consult vascular  pt had colectomy with iliorectal anastomosis         DM2 with possible gastroparesis:     - RAISS        Hypoglycemia / Moderate protein-calorie malnutrition:  - start PPN, pt still unable to tolerate PO intake  - daily labs      Paroxymal afib / HTN / CAD:  - off lisinopril due to borderline BP  - c/w eliquis, and metoprolol   - s/p 5 stents last were 2 years ago  - plavix  - restart plavix       #unspecified mental health disorder   per pharmacy pt is on abilify and lexapro and xanax -  will order here   pt endorses she has been diagnosed with schizophrenia and/or bipolar - shes not entirely sure of her diagnosis  she denies hx of inpatient psych hospitalizations  confirmed home meds - continue    time spent: 55mincase d/w daughter     CHIEF COMPLAINT/INTERVAL HISTORY:    Patient is a 64y old  Female who presents with a chief complaint of inability to tolerate po (02 Nov 2024 13:01)      HPI:  64-year-old female with history of GERD  HTN, DM, polyps, and afib with 5 cardiac stents. presents with epigastric pain for the last couple of weeks.  Her daughter brought her in, after speaking with her GI doctor who stated patient should not have been on ozempic before and probably have taken insulin instead. She states that she had just uptitrated her dose of ozempic to 0.5 from 0.25 earlier this week. She had been on the 0.25 for 5 weeks. She states the pain bothers her more than the nausea and vomiting. Patient states she feels very minimal relief since she came in.  She denies NSAID use, bleeding or black stools. She reports 10lb weight loss and has been on ozempic for her diabetes. Denies diarrhea or urinary symptoms.     In the ED  Vitals Temp 97.8  /84 O2 sat 98% on RA  Labs WBC 5.28 Hg 13.3 Plt 212 Na 140 K 4 Cl 107 Bicarb 26 BUN 16 Cr 0.86 Glucose 101 Protein 8.4 ALbumin 4 AST 32 ALT 54 Lipase 71 Trop I 4.65  Imaging CTAP   Etiology of abdominal pain is not elucidated  Intervention zofran, ofirmev, pepcid, morphine 2,  4, 1 L NS   (25 Oct 2024 13:17)    Adm for initiation of PPN; gastric emptying study for 11/6- failed  Abnormal MRCP; vascular also consulted. At this time diff diagnosis expanded because of the pain she is c/o of.  Liquid BM with pink blood    Vital Signs Last 24 Hrs  T(C): 36.7 (06 Nov 2024 22:30), Max: 36.7 (06 Nov 2024 15:52)  T(F): 98 (06 Nov 2024 22:30), Max: 98.1 (06 Nov 2024 15:52)  HR: 74 (06 Nov 2024 22:30) (72 - 74)  BP: 121/72 (06 Nov 2024 22:30) (121/72 - 129/70)  BP(mean): --  RR: 18 (06 Nov 2024 22:30) (18 - 18)  SpO2: 94% (06 Nov 2024 22:30) (94% - 95%)    Parameters below as of 06 Nov 2024 22:30  Patient On (Oxygen Delivery Method): room air        PHYSICAL EXAM:      Constitutional: NAD  Eyes: perrl, no conjunctival changes  ENMT: no exudates, moist oral muc, uvula midline  Neck: no JVD, no LAD  Back: no cva tenderness  Respiratory: CTA, no exp wheezes  Cardiovascular: S1S2 reg, no murmur gallop or rub  Gastrointestinal: abd soft, NT/ND + BS  Genitourinary: voiding  Extremities: FROM, no joint effusions, no edema, no clubbing , no cyanosis  Vascular: pedal pulses + bilateral, warm extremities  Neurological: non focal, mot str 5/5/ all extr  Skin: no rashes  Lymph Nodes: no LAD      CAPILLARY BLOOD GLUCOSE      POCT Blood Glucose.: 131 mg/dL (07 Nov 2024 06:11)  POCT Blood Glucose.: 86 mg/dL (06 Nov 2024 23:14)  POCT Blood Glucose.: 91 mg/dL (06 Nov 2024 17:17)  POCT Blood Glucose.: 108 mg/dL (06 Nov 2024 11:14)          RADIOLOGY & ADDITIONAL TESTS: personally reviewed        Assessment and Plan: 64 year old woman with poor oral intake.       Intractable nausea with poor oral intake: multifactorial - due to adverse effects off Ozempic / gastritis / esophagitis / possible gastroparesis:   - off ozempic  - CT abd:  Status post colectomy with ileorectal anastomosis. No bowel obstruction.  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

## 2024-11-07 NOTE — CONSULT NOTE ADULT - NS ATTEND AMEND GEN_ALL_CORE FT
64-year-old woman with history of GERD  HTN, DM, polyps, and afib with 5 cardiac stents. presents with epigastric pain for the last couple of week and inability to tolerating PO. Advanced endoscopy called to consult for choledocholithiasis.    Initially with cbd 1 and s/p choly with normal LFT"s, so very understandable that biliary etiology not worked up, but now with several filling defects in distal duct and as pain and n/v, this could definitely be the culprit. LFT's normal due to ductal accomidation.     Plan for EUS (to ensure no mass lesion as duct very dilated) and ERCP. Understand paitnet is on anticoagulants/antiplatelets, due to urgent nature of procedure will not wait to hold and will place stent for ductal decompression.

## 2024-11-07 NOTE — CONSULT NOTE ADULT - SUBJECTIVE AND OBJECTIVE BOX
HPI:  64-year-old female with history of GERD, colectomy HTN, DM, polyps, and afib with 5 cardiac stents admitted for epigastric pain for the last couple of weeks.  She states the pain bothers her more than the nausea and vomiting. She denies NSAID use, bleeding or black stools. She reports 15lb weight loss in 1.5 month and has been  on ozempic for her diabetes. Denies diarrhea or urinary symptoms.   poor oral intake.         - CT abd:  Status post colectomy with ileorectal anastomosis. No bowel obstruction.  - EGD/colonoscopy --> gastritis, esophagitits  - started PPN on 11/1,    HPI:  64-year-old female with history of GERD  HTN, DM, polyps, and afib with 5 cardiac stents, post colectomy with ileorectal anastomosis, hx of ? cholecystectomy,  presents with epigastric pain for the last couple of weeks.  Her daughter brought her in, after speaking with her GI doctor who stated patient should not have been on ozempic before and   probably have taken insulin instead. She states that she had just uptitrated her dose of ozempic to 0.5 from 0.25 earlier this week. She had been on the 0.25 for 5 weeks. She states the pain   bothers her more than the nausea and vomiting. Patient states she feels very minimal relief since she came in.  She denies NSAID use, bleeding or black stools. She reports 10lb weight loss and has been   on ozempic for her diabetes. Denies diarrhea or urinary symptoms.     - CT abd:  Status post colectomy with ileorectal anastomosis. No bowel obstruction.  - EGD/colonoscopy --> gastritis, esophagitits  MRCP:  Marked intrahepatic biliary ductal dilatation with multiple small filling   defects within the distal common bile duct, likely representing   choledocholithiasis.    vascular surgery consulted to kwame for median arcuate ligament syndrome          Vitals:  T(C): 36.7 ( @ 22:30), Max: 36.7 ( @ 15:52)  HR: 74 ( @ 22:30) (72 - 75)  BP: 121/72 ( @ 22:30) (113/81 - 129/70)  RR: 18 ( @ 22:30) (18 - 18)  SpO2: 94% ( @ 22:30) (94% - 98%)      Physical Exam:  General: NAD  Chest: Normal respiratory effort  Heart: RRR  Abdomen: Soft, ND, mildly TTP RUQ and Epigastric  Neuro/Psych: No localized deficits. Normal spech, normal tone  Skin: Normal, no rashes, no lesions noted.   Extremities: Warm, well perfused, no edema, Pulses intact     @ 05:13                    -  CBC: ->)-------(<-                     -                 143 | 113 | 16    CMP:  ----------------------< 113               3.8 | 27 | 0.79                      Ca:8.7  Phos:4.2  M.9               0.2|      |63        LFTs:  ------|118|-----             -|      |-   @ 03:06                    11.4  CBC: 6.65>)-------(<191                     35.6                 - | - | -    CMP:  ----------------------< -               - | - | -                      Ca:-  Phos:-  Mg:-               -|      |-        LFTs:  ------|-|-----             -|      |-   @ 04:53                    -  CBC: ->)-------(<-                     -                 141 | 110 | 18    CMP:  ----------------------< 109               3.6 | 27 | 0.78                      Ca:8.8  Phos:3.6  M.1               0.2|      |29        LFTs:  ------|111|-----             -|      |-      Current Inpatient Medications:  acetaminophen     Tablet .. 650 milliGRAM(s) Oral every 6 hours PRN  acetaminophen 325 mG/butalbital 50 mG/caffeine 40 mG 1 Tablet(s) Oral every 8 hours PRN  albuterol    90 MICROgram(s) HFA Inhaler 1 Puff(s) Inhalation every 6 hours PRN  ALPRAZolam 0.5 milliGRAM(s) Oral at bedtime PRN  aluminum hydroxide/magnesium hydroxide/simethicone Suspension 30 milliLiter(s) Oral every 4 hours PRN  apixaban 5 milliGRAM(s) Oral two times a day  ARIPiprazole 5 milliGRAM(s) Oral at bedtime  benzocaine/menthol Lozenge 1 Lozenge Oral every 4 hours PRN  clopidogrel Tablet 75 milliGRAM(s) Oral daily  dextrose 5%. 1000 milliLiter(s) (100 mL/Hr) IV Continuous <Continuous>  dextrose 5%. 1000 milliLiter(s) (50 mL/Hr) IV Continuous <Continuous>  dextrose 50% Injectable 25 Gram(s) IV Push once  dextrose 50% Injectable 25 Gram(s) IV Push once  dextrose 50% Injectable 12.5 Gram(s) IV Push once  dextrose Oral Gel 15 Gram(s) Oral once PRN  escitalopram 10 milliGRAM(s) Oral daily  FIRST- Mouthwash  BLM 5 milliLiter(s) Swish and Swallow three times a day  glucagon  Injectable 1 milliGRAM(s) IntraMuscular once  influenza   Vaccine 0.5 milliLiter(s) IntraMuscular once  insulin lispro (ADMELOG) corrective regimen sliding scale   SubCutaneous every 6 hours  ketorolac   Injectable 15 milliGRAM(s) IV Push every 8 hours PRN  lipid, fat emulsion (Fish Oil and Plant Based) 20% Infusion 0.9469 Gm/kG/Day (20.8 mL/Hr) IV Continuous <Continuous>  metoclopramide Injectable 10 milliGRAM(s) IV Push every 8 hours  metoprolol succinate  milliGRAM(s) Oral daily  ondansetron Injectable 4 milliGRAM(s) IV Push every 6 hours PRN  pantoprazole    Tablet 40 milliGRAM(s) Oral before breakfast  Parenteral Nutrition - Adult 1 Each (83 mL/Hr) TPN Continuous <Continuous>  rosuvastatin 10 milliGRAM(s) Oral at bedtime      < from: MR MRCP No Cont (24 @ 18:09) >  IMPRESSION:  Marked intrahepatic biliary ductal dilatation with multiple small filling   defects within the distal common bile duct, likely representing   choledocholithiasis.    < end of copied text >  < from: US Abdomen Upper Quadrant Right (10.26.24 @ 17:30) >    Status post cholecystectomy. Unchanged markedly dilated CBD. Pancreas and   distal CBD not visualized due to overlying bowel gas.    < end of copied text >  < from: CT Abdomen and Pelvis w/ IV Cont (10.25.24 @ 09:13) >    BOWEL: Status post colectomy with ileorectal anastomosis. No bowel   obstruction.  PERITONEUM/RETROPERITONEUM: Within normal limits.  VESSELS: Atherosclerotic changes.  LYMPH NODES: No lymphadenopathy.  ABDOMINAL WALL: Postsurgical changes.  BONES: Degenerative changes.    IMPRESSION:  Etiology of abdominal pain is not elucidated.    < end of copied text >     HPI:  64-year-old female with history of GERD  HTN, DM, polyps, and afib with 5 cardiac stents, post colectomy with ileorectal anastomosis, hx of ? cholecystectomy,  presents with epigastric pain for the last couple of weeks.  Her daughter brought her in, after speaking with her GI doctor who stated patient should not have been on ozempic before and   probably have taken insulin instead. She states that she had just uptitrated her dose of ozempic to 0.5 from 0.25 earlier this week. She had been on the 0.25 for 5 weeks. She states the pain   bothers her more than the nausea and vomiting. Patient states she feels very minimal relief since she came in.  She denies NSAID use, bleeding or black stools. She reports 15lb weight loss and has been   on ozempic for her diabetes. Denies diarrhea or urinary symptoms.     - CT abd:  Status post colectomy with ileorectal anastomosis. No bowel obstruction.  - EGD/colonoscopy --> gastritis, esophagitits  MRCP:  Marked intrahepatic biliary ductal dilatation with multiple small filling   defects within the distal common bile duct, likely representing   choledocholithiasis.    vascular surgery consulted to kwame for median arcuate ligament syndrome          Vitals:  T(C): 36.7 ( @ 22:30), Max: 36.7 ( @ 15:52)  HR: 74 ( @ 22:30) (72 - 75)  BP: 121/72 ( @ 22:30) (113/81 - 129/70)  RR: 18 ( @ 22:30) (18 - 18)  SpO2: 94% ( @ 22:30) (94% - 98%)      Physical Exam:  General: NAD  Chest: Normal respiratory effort  Heart: RRR  Abdomen: Soft, ND, mildly TTP RUQ and Epigastric  Neuro/Psych: No localized deficits. Normal spech, normal tone  Skin: Normal, no rashes, no lesions noted.   Extremities: Warm, well perfused, no edema, Pulses intact     @ 05:13                    -  CBC: ->)-------(<-                     -                 143 | 113 | 16    CMP:  ----------------------< 113               3.8 | 27 | 0.79                      Ca:8.7  Phos:4.2  M.9               0.2|      |63        LFTs:  ------|118|-----             -|      |-   @ 03:06                    11.4  CBC: 6.65>)-------(<191                     35.6                 - | - | -    CMP:  ----------------------< -               - | - | -                      Ca:-  Phos:-  Mg:-               -|      |-        LFTs:  ------|-|-----             -|      |-   @ 04:53                    -  CBC: ->)-------(<-                     -                 141 | 110 | 18    CMP:  ----------------------< 109               3.6 | 27 | 0.78                      Ca:8.8  Phos:3.6  M.1               0.2|      |29        LFTs:  ------|111|-----             -|      |-      Current Inpatient Medications:  acetaminophen     Tablet .. 650 milliGRAM(s) Oral every 6 hours PRN  acetaminophen 325 mG/butalbital 50 mG/caffeine 40 mG 1 Tablet(s) Oral every 8 hours PRN  albuterol    90 MICROgram(s) HFA Inhaler 1 Puff(s) Inhalation every 6 hours PRN  ALPRAZolam 0.5 milliGRAM(s) Oral at bedtime PRN  aluminum hydroxide/magnesium hydroxide/simethicone Suspension 30 milliLiter(s) Oral every 4 hours PRN  apixaban 5 milliGRAM(s) Oral two times a day  ARIPiprazole 5 milliGRAM(s) Oral at bedtime  benzocaine/menthol Lozenge 1 Lozenge Oral every 4 hours PRN  clopidogrel Tablet 75 milliGRAM(s) Oral daily  dextrose 5%. 1000 milliLiter(s) (100 mL/Hr) IV Continuous <Continuous>  dextrose 5%. 1000 milliLiter(s) (50 mL/Hr) IV Continuous <Continuous>  dextrose 50% Injectable 25 Gram(s) IV Push once  dextrose 50% Injectable 25 Gram(s) IV Push once  dextrose 50% Injectable 12.5 Gram(s) IV Push once  dextrose Oral Gel 15 Gram(s) Oral once PRN  escitalopram 10 milliGRAM(s) Oral daily  FIRST- Mouthwash  BLM 5 milliLiter(s) Swish and Swallow three times a day  glucagon  Injectable 1 milliGRAM(s) IntraMuscular once  influenza   Vaccine 0.5 milliLiter(s) IntraMuscular once  insulin lispro (ADMELOG) corrective regimen sliding scale   SubCutaneous every 6 hours  ketorolac   Injectable 15 milliGRAM(s) IV Push every 8 hours PRN  lipid, fat emulsion (Fish Oil and Plant Based) 20% Infusion 0.9469 Gm/kG/Day (20.8 mL/Hr) IV Continuous <Continuous>  metoclopramide Injectable 10 milliGRAM(s) IV Push every 8 hours  metoprolol succinate  milliGRAM(s) Oral daily  ondansetron Injectable 4 milliGRAM(s) IV Push every 6 hours PRN  pantoprazole    Tablet 40 milliGRAM(s) Oral before breakfast  Parenteral Nutrition - Adult 1 Each (83 mL/Hr) TPN Continuous <Continuous>  rosuvastatin 10 milliGRAM(s) Oral at bedtime      < from: MR MRCP No Cont (24 @ 18:09) >  IMPRESSION:  Marked intrahepatic biliary ductal dilatation with multiple small filling   defects within the distal common bile duct, likely representing   choledocholithiasis.    < end of copied text >  < from: US Abdomen Upper Quadrant Right (10.26.24 @ 17:30) >    Status post cholecystectomy. Unchanged markedly dilated CBD. Pancreas and   distal CBD not visualized due to overlying bowel gas.    < end of copied text >  < from: CT Abdomen and Pelvis w/ IV Cont (10.25.24 @ 09:13) >    BOWEL: Status post colectomy with ileorectal anastomosis. No bowel   obstruction.  PERITONEUM/RETROPERITONEUM: Within normal limits.  VESSELS: Atherosclerotic changes.  LYMPH NODES: No lymphadenopathy.  ABDOMINAL WALL: Postsurgical changes.  BONES: Degenerative changes.    IMPRESSION:  Etiology of abdominal pain is not elucidated.    < end of copied text >

## 2024-11-07 NOTE — CONSULT NOTE ADULT - SUBJECTIVE AND OBJECTIVE BOX
Patient is a 64y old  Female who presents with a chief complaint of inability to tolerate po (07 Nov 2024 07:49)      HPI:  64-year-old female with history of GERD  HTN, DM, polyps, and afib with 5 cardiac stents. presents with epigastric pain for the last couple of weeks.  Her daughter brought her in, after speaking with her GI doctor who stated patient should not have been on ozempic before and probably have taken insulin instead. She states that she had just uptitrated her dose of ozempic to 0.5 from 0.25 earlier this week. She had been on the 0.25 for 5 weeks. She states the pain bothers her more than the nausea and vomiting. Patient states she feels very minimal relief since she came in.  She denies NSAID use, bleeding or black stools. She reports 10lb weight loss and has been on ozempic for her diabetes. Denies diarrhea or urinary symptoms.     In the ED  Vitals Temp 97.8  /84 O2 sat 98% on RA  Labs WBC 5.28 Hg 13.3 Plt 212 Na 140 K 4 Cl 107 Bicarb 26 BUN 16 Cr 0.86 Glucose 101 Protein 8.4 ALbumin 4 AST 32 ALT 54 Lipase 71 Trop I 4.65  Imaging CTAP   Etiology of abdominal pain is not elucidated  Intervention zofran, ofirmev, pepcid, morphine 2,  4, 1 L NS   (25 Oct 2024 13:17)      PAST MEDICAL & SURGICAL HISTORY:  CAD (coronary artery disease)      DM (diabetes mellitus)      HTN (hypertension)      Asthma      Benign essential HTN      HLD (hyperlipidemia)      Type 2 diabetes mellitus      CAD (coronary artery disease)      Stented coronary artery      FAP (familial adenomatous polyposis)      Insomnia      GERD (gastroesophageal reflux disease)      H/O lichen planus      History of rectal bleeding      History of colon resection      Stented coronary artery          MEDICATIONS  (STANDING):  ARIPiprazole 5 milliGRAM(s) Oral at bedtime  clopidogrel Tablet 75 milliGRAM(s) Oral daily  dextrose 5%. 1000 milliLiter(s) (50 mL/Hr) IV Continuous <Continuous>  dextrose 5%. 1000 milliLiter(s) (100 mL/Hr) IV Continuous <Continuous>  dextrose 50% Injectable 25 Gram(s) IV Push once  dextrose 50% Injectable 12.5 Gram(s) IV Push once  dextrose 50% Injectable 25 Gram(s) IV Push once  escitalopram 10 milliGRAM(s) Oral daily  FIRST- Mouthwash  BLM 5 milliLiter(s) Swish and Swallow three times a day  glucagon  Injectable 1 milliGRAM(s) IntraMuscular once  influenza   Vaccine 0.5 milliLiter(s) IntraMuscular once  insulin lispro (ADMELOG) corrective regimen sliding scale   SubCutaneous every 6 hours  lipid, fat emulsion (Fish Oil and Plant Based) 20% Infusion 0.9469 Gm/kG/Day (20.8 mL/Hr) IV Continuous <Continuous>  metoclopramide Injectable 10 milliGRAM(s) IV Push every 8 hours  metoprolol succinate  milliGRAM(s) Oral daily  pantoprazole    Tablet 40 milliGRAM(s) Oral before breakfast  Parenteral Nutrition - Adult 1 Each (83 mL/Hr) TPN Continuous <Continuous>  rosuvastatin 10 milliGRAM(s) Oral at bedtime    MEDICATIONS  (PRN):  acetaminophen     Tablet .. 650 milliGRAM(s) Oral every 6 hours PRN Moderate Pain (4 - 6)  acetaminophen 325 mG/butalbital 50 mG/caffeine 40 mG 1 Tablet(s) Oral every 8 hours PRN migraine  albuterol    90 MICROgram(s) HFA Inhaler 1 Puff(s) Inhalation every 6 hours PRN Shortness of Breath and/or Wheezing  ALPRAZolam 0.5 milliGRAM(s) Oral at bedtime PRN insomnia/anxiety  aluminum hydroxide/magnesium hydroxide/simethicone Suspension 30 milliLiter(s) Oral every 4 hours PRN Dyspepsia  benzocaine/menthol Lozenge 1 Lozenge Oral every 4 hours PRN Sore Throat  dextrose Oral Gel 15 Gram(s) Oral once PRN Blood Glucose LESS THAN 70 milliGRAM(s)/deciliter  ketorolac   Injectable 15 milliGRAM(s) IV Push every 8 hours PRN Severe Pain (7 - 10)  morphine  - Injectable 2 milliGRAM(s) IV Push every 4 hours PRN Severe Pain (7 - 10)  ondansetron Injectable 4 milliGRAM(s) IV Push every 6 hours PRN Nausea and/or Vomiting      Allergies    No Known Allergies    Intolerances        SOCIAL HISTORY:    FAMILY HISTORY:  FH: heart disease (Mother, Sibling)        REVIEW OF SYSTEMS:    CONSTITUTIONAL: No weakness, fevers or chills  EYES/ENT: No visual changes;  No vertigo or throat pain   NECK: No pain or stiffness  RESPIRATORY: No cough, wheezing, hemoptysis; No shortness of breath  CARDIOVASCULAR: No chest pain or palpitations  GASTROINTESTINAL: No abdominal or epigastric pain. No nausea, vomiting, or hematemesis; No diarrhea or constipation. No melena or hematochezia.  GENITOURINARY: No dysuria, frequency or hematuria  NEUROLOGICAL: No numbness or weakness  SKIN: No itching, burning, rashes, or lesions   PSYCH: Normal mood and affect  All other review of systems is negative unless indicated above.    Vital Signs Last 24 Hrs  T(C): 36.7 (07 Nov 2024 07:30), Max: 36.7 (06 Nov 2024 15:52)  T(F): 98.1 (07 Nov 2024 07:30), Max: 98.1 (06 Nov 2024 15:52)  HR: 90 (07 Nov 2024 07:30) (72 - 90)  BP: 120/73 (07 Nov 2024 07:30) (120/73 - 129/70)  BP(mean): --  RR: 18 (07 Nov 2024 07:30) (18 - 18)  SpO2: 95% (07 Nov 2024 07:30) (94% - 95%)    Parameters below as of 07 Nov 2024 07:30  Patient On (Oxygen Delivery Method): room air        PHYSICAL EXAM:    Constitutional: No acute distress, well-developed, non-toxic appearing  HEENT: masked, good phonation, not icteric  Neck: supple, no lymphadenopathy  Respiratory: clear to ascultation bilaterally, no wheezing  Cardiovascular: S1 and S2, regular rate and rhythm, no murmurs rubs or gallops  Gastrointestinal: soft, non-tender, non-distended, +bowel sounds, no rebound or guarding, no surgical scars, no drains  Extremities: No peripheral edema, no cyanosis or clubbing  Vascular: 2+ peripheral pulses, no venous stasis  Neurological: A/O x 3, no focal deficits, no asterixis  Psychiatric: Normal mood, normal affect  Skin: No rashes, not jaundiced    LABS:                        11.4   6.01  )-----------( 196      ( 07 Nov 2024 05:13 )             35.8     11-07    143  |  113[H]  |  16  ----------------------------<  113[H]  3.8   |  27  |  0.79    Ca    8.7      07 Nov 2024 05:13  Phos  4.2     11-07  Mg     1.9     11-07    TPro  7.2  /  Alb  3.1[L]  /  TBili  0.2  /  DBili  x   /  AST  63[H]  /  ALT  70  /  AlkPhos  118  11-07      LIVER FUNCTIONS - ( 07 Nov 2024 05:13 )  Alb: 3.1 g/dL / Pro: 7.2 gm/dL / ALK PHOS: 118 U/L / ALT: 70 U/L / AST: 63 U/L / GGT: x             RADIOLOGY & ADDITIONAL STUDIES: Patient is a 64y old  Female who presents with a chief complaint of inability to tolerate po (07 Nov 2024 07:49)    64-year-old woman with history of GERD  HTN, DM, polyps, and afib with 5 cardiac stents. presents with epigastric pain for the last couple of week and inability to tolerating PO. Advanced endoscopy called to consult for choledocholithiasis.     Patient states that over the last two weeks has had constant abdominal pain without relief and inability to tolerate PO with nausea and vomiting. Pain was sudden onset, constant, diffuse, non radiation, sharp, 7/10, without known alleviating or exacerbating factors. Pain associated with inability to tolerate PO, with constant nausea and vomiting. Of note, this all coinciding with uptitrating her ozempic. No sick contacts or travel history. No diarrhea. No overt signs of GI bleeding like hematemesis, melena, hematochezia. Has been hospitalized for over 10 days at this point without elucidation of cause and is on PPN. Attempted GES but vomited. Initial imaging without dialtion of duct. She is s/p choly. MRCP performed yesterday demonstrated cbd dilated to 1.6 with multiple stones. Had prior EGD this admission with Dr. Suh.       PAST MEDICAL & SURGICAL HISTORY:  CAD (coronary artery disease)      DM (diabetes mellitus)      HTN (hypertension)      Asthma      Benign essential HTN      HLD (hyperlipidemia)      Type 2 diabetes mellitus      CAD (coronary artery disease)      Stented coronary artery      FAP (familial adenomatous polyposis)      Insomnia      GERD (gastroesophageal reflux disease)      H/O lichen planus      History of rectal bleeding      History of colon resection      Stented coronary artery          MEDICATIONS  (STANDING):  ARIPiprazole 5 milliGRAM(s) Oral at bedtime  clopidogrel Tablet 75 milliGRAM(s) Oral daily  dextrose 5%. 1000 milliLiter(s) (50 mL/Hr) IV Continuous <Continuous>  dextrose 5%. 1000 milliLiter(s) (100 mL/Hr) IV Continuous <Continuous>  dextrose 50% Injectable 25 Gram(s) IV Push once  dextrose 50% Injectable 12.5 Gram(s) IV Push once  dextrose 50% Injectable 25 Gram(s) IV Push once  escitalopram 10 milliGRAM(s) Oral daily  FIRST- Mouthwash  BLM 5 milliLiter(s) Swish and Swallow three times a day  glucagon  Injectable 1 milliGRAM(s) IntraMuscular once  influenza   Vaccine 0.5 milliLiter(s) IntraMuscular once  insulin lispro (ADMELOG) corrective regimen sliding scale   SubCutaneous every 6 hours  lipid, fat emulsion (Fish Oil and Plant Based) 20% Infusion 0.9469 Gm/kG/Day (20.8 mL/Hr) IV Continuous <Continuous>  metoclopramide Injectable 10 milliGRAM(s) IV Push every 8 hours  metoprolol succinate  milliGRAM(s) Oral daily  pantoprazole    Tablet 40 milliGRAM(s) Oral before breakfast  Parenteral Nutrition - Adult 1 Each (83 mL/Hr) TPN Continuous <Continuous>  rosuvastatin 10 milliGRAM(s) Oral at bedtime    MEDICATIONS  (PRN):  acetaminophen     Tablet .. 650 milliGRAM(s) Oral every 6 hours PRN Moderate Pain (4 - 6)  acetaminophen 325 mG/butalbital 50 mG/caffeine 40 mG 1 Tablet(s) Oral every 8 hours PRN migraine  albuterol    90 MICROgram(s) HFA Inhaler 1 Puff(s) Inhalation every 6 hours PRN Shortness of Breath and/or Wheezing  ALPRAZolam 0.5 milliGRAM(s) Oral at bedtime PRN insomnia/anxiety  aluminum hydroxide/magnesium hydroxide/simethicone Suspension 30 milliLiter(s) Oral every 4 hours PRN Dyspepsia  benzocaine/menthol Lozenge 1 Lozenge Oral every 4 hours PRN Sore Throat  dextrose Oral Gel 15 Gram(s) Oral once PRN Blood Glucose LESS THAN 70 milliGRAM(s)/deciliter  ketorolac   Injectable 15 milliGRAM(s) IV Push every 8 hours PRN Severe Pain (7 - 10)  morphine  - Injectable 2 milliGRAM(s) IV Push every 4 hours PRN Severe Pain (7 - 10)  ondansetron Injectable 4 milliGRAM(s) IV Push every 6 hours PRN Nausea and/or Vomiting      Allergies    No Known Allergies    Intolerances    SOCIAL HISTORY:  no smoking, drinking or drugs    FAMILY HISTORY:  heart disease (Mother, Sibling)  no colon or stomach cancer        REVIEW OF SYSTEMS:    CONSTITUTIONAL: No weakness, fevers or chills  EYES/ENT: No visual changes;  No vertigo or throat pain   NECK: No pain or stiffness  RESPIRATORY: No cough, wheezing, hemoptysis; No shortness of breath  CARDIOVASCULAR: No chest pain or palpitations  GASTROINTESTINAL: see HPI  GENITOURINARY: No dysuria, frequency or hematuria  NEUROLOGICAL: No numbness or weakness  SKIN: No itching, burning, rashes, or lesions   PSYCH: Normal mood and affect  All other review of systems is negative unless indicated above.    Vital Signs Last 24 Hrs  T(C): 36.7 (07 Nov 2024 07:30), Max: 36.7 (06 Nov 2024 15:52)  T(F): 98.1 (07 Nov 2024 07:30), Max: 98.1 (06 Nov 2024 15:52)  HR: 90 (07 Nov 2024 07:30) (72 - 90)  BP: 120/73 (07 Nov 2024 07:30) (120/73 - 129/70)  BP(mean): --  RR: 18 (07 Nov 2024 07:30) (18 - 18)  SpO2: 95% (07 Nov 2024 07:30) (94% - 95%)    Parameters below as of 07 Nov 2024 07:30  Patient On (Oxygen Delivery Method): room air        PHYSICAL EXAM:    Constitutional: in mild distress, weak but non-toxic appearing, on TPN  HEENT: unmasked, poor phonation, not icteric  Neck: supple, no lymphadenopathy  Respiratory: clear to ascultation bilaterally, no wheezing  Cardiovascular: S1 and S2, regular rate and rhythm, no murmurs rubs or gallops  Gastrointestinal: soft, tender to deep pal;ation, non-distended, +bowel sounds, no rebound or guarding,  Extremities: No peripheral edema, no cyanosis or clubbing  Vascular: 2+ peripheral pulses, no venous stasis  Neurological: A/O x 3, no focal deficits, no asterixis  Psychiatric: Normal mood, normal affect  Skin: bruising, facial discoloration, No rashes, not jaundiced    LABS:                        11.4   6.01  )-----------( 196      ( 07 Nov 2024 05:13 )             35.8     11-07    143  |  113[H]  |  16  ----------------------------<  113[H]  3.8   |  27  |  0.79    Ca    8.7      07 Nov 2024 05:13  Phos  4.2     11-07  Mg     1.9     11-07    TPro  7.2  /  Alb  3.1[L]  /  TBili  0.2  /  DBili  x   /  AST  63[H]  /  ALT  70  /  AlkPhos  118  11-07      LIVER FUNCTIONS - ( 07 Nov 2024 05:13 )  Alb: 3.1 g/dL / Pro: 7.2 gm/dL / ALK PHOS: 118 U/L / ALT: 70 U/L / AST: 63 U/L / GGT: x             RADIOLOGY & ADDITIONAL STUDIES: MRCP with dilated cbd and multiple distal CBD stones

## 2024-11-07 NOTE — CONSULT NOTE ADULT - ASSESSMENT
Imp: Abdominal pain 2/2 distal CBD stones/sludge with likely accomodation biliary flow due to dilated duct post choley    Rec:  ::EUS/ERCP today  ::NPO Imp: Abdominal pain could be secondary to distal CBD stones/sludge with likely accomodation biliary flow due to dilated duct post choly (normal lft's)    Rec:  ::EUS/ERCP today  ::NPO

## 2024-11-07 NOTE — CHART NOTE - NSCHARTNOTEFT_GEN_A_CORE
Clinical Nutrition PN Follow Up Note    *64-year-old female with history of GERD  HTN, DM, polyps, and afib with 5 cardiac stents. presents with epigastric pain for the last couple of weeks.  Her daughter brought her in, after speaking with her GI doctor who stated patient should not have been on ozempic before and probably have taken insulin instead. She states that she had just uptitrated her dose of ozempic to 0.5 from 0.25 earlier this week. She had been on the 0.25 for 5 weeks. She states the pain bothers her more than the nausea and vomiting. Patient states she feels very minimal relief since she came in.  She denies NSAID use, bleeding or black stools. She reports 10lb weight loss and has been on ozempic for her diabetes. Denies diarrhea or urinary symptoms. Admitted for Nausea and vomiting  *11/1: D/w Dr. Hernandez - PPN Day #1; PPN to be initiated as a bridge 2/2 prolonged PO intolerance/poor PO intake; awaiting gastric emptying study. Per GI note 10/27: abd USG inconclusive s/p cholecystectomy. Rec for EGD/Colonoscopy early next week. 10/30: now s/p EGD and colonoscopy - egd with findings of gastritis and esophagitis without ulcers colonoscopy with patent anastomosis - no acute findings. Pt placed on regular low fiber diet - vomited lunch on 10/31 - complains of throat pain after egd; epigastric pain minimal.  Per hospitalist: "at this point, will need to rule out gastroparesis with gastric emptying study - per nuclear medicine the earliest it could be done would be Tuesday 11/5- need to order isotpope - Trial of 5mg liquid Reglan QID 15 min before meals and at bedtime."   PPN is not to be a long-term nutrition support, pending gastric emptying study if needing long-term nutrition support would recommend GJ-tube if gastroparesis is present. Strongly suggest to confirm goals of care regarding LONG-TERM nutrition support.  *11/2: PPN day #2. No pertinent events overnight. Remains w/ N/V and abdominal distension per flowsheet. Will c/w PPN.   *11/3: PPN day #3. Per RN documentation, no events of N/V overnight. Was drinking ensure shakes and tolerating. C/o N/V during the day per hospitalist note. Will c/w PPN for now. Pending gastric emptying study, tentative Tuesday 11/5.  *11/6: PPN day #6: Did not receive PPN last night 2/2 per nuclear medicine not allowed to receive anything past midnight (?). EGD done yesterday which came back inconclusive. Per discussion w/ Dr. Ulrich, pt will need J tube based on gastric emptying study. Will c/w PPN for now until J tube to be placed.      PPN initiated 2/2 prolonged PO intolerance, N/V, awaiting gastric emptying study    *current status PPN day #7. Abnormal MRCP; vascular also consulted. At this time diff diagnosis expanded 2/2 pain. 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 GI recommended to c/w FLD for now and trial Reglan 3x daily to alleviate symptoms. Passed bloody BM this morning w/ abdominal pain, trend H/H. Vascular surgery consulted to evaluate for median arcuate ligament syndrome however no intervention to be done at this time as no imaging finding suggestive of median arcuate ligament syndrome. D/w hospitalist, will c/w PPN until GJ tube can be placed.     *pertinent meds: Morphine, Lexapro, Abilify, Plavix, ADMELOG (0 units x 24 hours), Protonix, Reglan, acetaminophen, ESGIC, Xanax, Zofran    *labs reviewed; Na, Mg, and Phos WNL. K WNL however remains on low end of normal, will increase in PN bag. Continue to replete lytes outside of PN prn. corrected Ca WNL, rec'd add 10mEq of Calcium Gluconate outside of PN bag as CAPS is out. T bili WNL will c/w trace elements in the PN bag today. Will c/w thiamine and MVI and minerals in the PN bag today. Triglyceride WNL (204), will c/w 50 g of lipids. POCTs x 24 hours WNL. Continue to maintain POCTs between 140-180 mg/dL.     11-07    143  |  113[H]  |  16  ----------------------------<  113[H]  3.8   |  27  |  0.79    Ca    8.7      07 Nov 2024 05:13  Phos  4.2     11-07  Mg     1.9     11-07    TPro  7.2  /  Alb  3.1[L]  /  TBili  0.2  /  DBili  x   /  AST  63[H]  /  ALT  70  /  AlkPhos  118  11-07    BMI: BMI (kg/m2): 23.5 (10-25-24 @ 18:30)  HbA1c: A1C with Estimated Average Glucose Result: 6.6 % (10-26-24 @ 06:47)    Glucose: POCT Blood Glucose.: 131 mg/dL (07 Nov 2024 06:11)    BP: 125/68 (11-04-24 @ 23:00) (99/56 - 141/59)Vital Signs Last 24 Hrs  T(C): 36.4 (11-04-24 @ 23:00), Max: 36.4 (11-04-24 @ 16:23)  T(F): 97.5 (11-04-24 @ 23:00), Max: 97.6 (11-04-24 @ 16:23)  HR: 74 (11-04-24 @ 23:00) (74 - 78)  BP: 125/68 (11-04-24 @ 23:00) (125/68 - 134/77)  BP(mean): 77 (11-04-24 @ 23:00) (77 - 77)  RR: 18 (11-04-24 @ 23:00) (18 - 18)  SpO2: 98% (11-04-24 @ 23:00) (95% - 98%)    Lipid Panel: Date/Time: 11-02-24 @ 04:16  Triglycerides, Serum: 204    POCT Blood Glucose.: 131 mg/dL (07 Nov 2024 06:11)  POCT Blood Glucose.: 86 mg/dL (06 Nov 2024 23:14)  POCT Blood Glucose.: 91 mg/dL (06 Nov 2024 17:17)  POCT Blood Glucose.: 108 mg/dL (06 Nov 2024 11:14)    *I&O's Detail  none doc'd. Per PN protocol and  policy; strict I&O's when on PN.   *BM - last doc'd 10/30; abdominal discomfort +N/V on 11/6. pt not on bowel regimen.  *edema: none doc'd    *malnutrition: Pt continues to meet criteria for moderate malnutrition in context of acute on chronic illness r/t decreased ability to meet increased nutrient needs 2/2 N/V, DM AEB >7.5% wt loss x 2mo, <50% ENN x 2 weeks, mild muscle/fat wasting    Estimated Needs: based on 55 Kg (wt from 10/27)  Calories: 8361-1683 Kcal (35-40 Kcal/Kg)  Protein: 66-83 g (1.2-1.5 g/Kg)  Fluids: 0435-6984 mL (35-40 mL/Kg)    Diet, NPO:   Except Medications (11-07-24 @ 10:18) [Active]  Diet, Regular (10-26-24 @ 17:50) [Available for Activation]  Parenteral Nutrition - Adult 1 Each (83 mL/Hr) TPN Continuous <Continuous>, 11-06-24 @ 22:00, 22:00, Stop order after: 1 Days    Weight History:  Weight (kg): 52.8 (10-25-24 @ 18:30)    PPN Recommendations: via peripheral line  Total Volume: 2000 mL x 24 hours  80 g  Amino Acids  100 g Dextrose  50 g Lipids 20%  80 mEq Sodium Chloride  47 mEq Sodium Acetate  20 mmol Sodium Phosphate  65 mEq Potassium Chloride  15 mEq Potassium Acetate  0 mmol Potassium Phosphate  0 mEq Calcium Gluconate (CAPS out of PN solution)   8 mEq Magnesium Sulfate  200 mg Thiamine  1 ml Trace Elements  10 ml MVI    Total Calories   1160   (Meets 56 %  of  Estimated Energy needs  and  100 %  Protein needs)   (osmolarity <900)    Additional Recommendations:  1) Daily weights  2) Strict I & O's  3) Daily lyte checks including magnesium and phos  4) Weekly triglycerides/LFT checks  5) POCT q6hrs; maintain 140-180mg/dL  6) if on PN > 6 days, consider placing PICC line to meet 100% of nutr needs  7) Confirm goals of care regarding LONG-TERM nutrition support - if gastroparesis present and still unable to tolerate PO, would need GJ tube - pending placement as per discussion w/ hospitalist     *will continue to monitor and adjust PN prn*  Carmenza Hamilton, IRMA, CDN [phone]

## 2024-11-08 LAB
ADD ON TEST-SPECIMEN IN LAB: SIGNIFICANT CHANGE UP
ALBUMIN SERPL ELPH-MCNC: 2.8 G/DL — LOW (ref 3.3–5)
ALBUMIN SERPL ELPH-MCNC: 2.9 G/DL — LOW (ref 3.3–5)
ALP SERPL-CCNC: 134 U/L — HIGH (ref 40–120)
ALP SERPL-CCNC: 134 U/L — HIGH (ref 40–120)
ALT FLD-CCNC: 155 U/L — HIGH (ref 12–78)
ALT FLD-CCNC: 179 U/L — HIGH (ref 12–78)
ANION GAP SERPL CALC-SCNC: 1 MMOL/L — LOW (ref 5–17)
AST SERPL-CCNC: 142 U/L — HIGH (ref 15–37)
AST SERPL-CCNC: 96 U/L — HIGH (ref 15–37)
BILIRUB DIRECT SERPL-MCNC: <0.1 MG/DL — SIGNIFICANT CHANGE UP (ref 0–0.3)
BILIRUB INDIRECT FLD-MCNC: >0.1 MG/DL — LOW (ref 0.2–1)
BILIRUB SERPL-MCNC: 0.2 MG/DL — SIGNIFICANT CHANGE UP (ref 0.2–1.2)
BILIRUB SERPL-MCNC: 0.2 MG/DL — SIGNIFICANT CHANGE UP (ref 0.2–1.2)
BUN SERPL-MCNC: 19 MG/DL — SIGNIFICANT CHANGE UP (ref 7–23)
CALCIUM SERPL-MCNC: 8.5 MG/DL — SIGNIFICANT CHANGE UP (ref 8.5–10.1)
CHLORIDE SERPL-SCNC: 112 MMOL/L — HIGH (ref 96–108)
CO2 SERPL-SCNC: 28 MMOL/L — SIGNIFICANT CHANGE UP (ref 22–31)
CREAT SERPL-MCNC: 0.66 MG/DL — SIGNIFICANT CHANGE UP (ref 0.5–1.3)
EGFR: 99 ML/MIN/1.73M2 — SIGNIFICANT CHANGE UP
GLUCOSE BLDC GLUCOMTR-MCNC: 124 MG/DL — HIGH (ref 70–99)
GLUCOSE BLDC GLUCOMTR-MCNC: 124 MG/DL — HIGH (ref 70–99)
GLUCOSE BLDC GLUCOMTR-MCNC: 136 MG/DL — HIGH (ref 70–99)
GLUCOSE BLDC GLUCOMTR-MCNC: 152 MG/DL — HIGH (ref 70–99)
GLUCOSE BLDC GLUCOMTR-MCNC: 159 MG/DL — HIGH (ref 70–99)
GLUCOSE SERPL-MCNC: 134 MG/DL — HIGH (ref 70–99)
HCT VFR BLD CALC: 31.6 % — LOW (ref 34.5–45)
HGB BLD-MCNC: 10.1 G/DL — LOW (ref 11.5–15.5)
LIDOCAIN IGE QN: 535 U/L — HIGH (ref 13–75)
MAGNESIUM SERPL-MCNC: 1.9 MG/DL — SIGNIFICANT CHANGE UP (ref 1.6–2.6)
MCHC RBC-ENTMCNC: 29 PG — SIGNIFICANT CHANGE UP (ref 27–34)
MCHC RBC-ENTMCNC: 32 G/DL — SIGNIFICANT CHANGE UP (ref 32–36)
MCV RBC AUTO: 90.8 FL — SIGNIFICANT CHANGE UP (ref 80–100)
PHOSPHATE SERPL-MCNC: 4 MG/DL — SIGNIFICANT CHANGE UP (ref 2.5–4.5)
PLATELET # BLD AUTO: 181 K/UL — SIGNIFICANT CHANGE UP (ref 150–400)
POTASSIUM SERPL-MCNC: 4.3 MMOL/L — SIGNIFICANT CHANGE UP (ref 3.5–5.3)
POTASSIUM SERPL-SCNC: 4.3 MMOL/L — SIGNIFICANT CHANGE UP (ref 3.5–5.3)
PROT SERPL-MCNC: 6.4 GM/DL — SIGNIFICANT CHANGE UP (ref 6–8.3)
PROT SERPL-MCNC: 6.8 GM/DL — SIGNIFICANT CHANGE UP (ref 6–8.3)
RBC # BLD: 3.48 M/UL — LOW (ref 3.8–5.2)
RBC # FLD: 14.1 % — SIGNIFICANT CHANGE UP (ref 10.3–14.5)
SODIUM SERPL-SCNC: 141 MMOL/L — SIGNIFICANT CHANGE UP (ref 135–145)
WBC # BLD: 9.24 K/UL — SIGNIFICANT CHANGE UP (ref 3.8–10.5)
WBC # FLD AUTO: 9.24 K/UL — SIGNIFICANT CHANGE UP (ref 3.8–10.5)

## 2024-11-08 PROCEDURE — 71045 X-RAY EXAM CHEST 1 VIEW: CPT | Mod: 26

## 2024-11-08 PROCEDURE — 99233 SBSQ HOSP IP/OBS HIGH 50: CPT

## 2024-11-08 RX ORDER — SODIUM/POT/MAG/CALC/CHLOR/ACET 35-20-5MEQ
1 VIAL (ML) INTRAVENOUS
Refills: 0 | Status: DISCONTINUED | OUTPATIENT
Start: 2024-11-08 | End: 2024-11-08

## 2024-11-08 RX ORDER — FAT EMULSIONS 20 %
0.95 EMULSION INTRAVENOUS
Qty: 50 | Refills: 0 | Status: DISCONTINUED | OUTPATIENT
Start: 2024-11-08 | End: 2024-11-08

## 2024-11-08 RX ADMIN — KETOROLAC TROMETHAMINE 15 MILLIGRAM(S): 30 INJECTION INTRAMUSCULAR; INTRAVENOUS at 18:20

## 2024-11-08 RX ADMIN — Medication 2: at 17:23

## 2024-11-08 RX ADMIN — ROSUVASTATIN CALCIUM 10 MILLIGRAM(S): 5 TABLET, FILM COATED ORAL at 21:23

## 2024-11-08 RX ADMIN — Medication 2: at 01:50

## 2024-11-08 RX ADMIN — HYDROMORPHONE HYDROCHLORIDE 1 MILLIGRAM(S): 2 TABLET ORAL at 14:46

## 2024-11-08 RX ADMIN — CLOPIDOGREL 75 MILLIGRAM(S): 75 TABLET, FILM COATED ORAL at 09:20

## 2024-11-08 RX ADMIN — METOCLOPRAMIDE HYDROCHLORIDE 10 MILLIGRAM(S): 10 TABLET ORAL at 14:13

## 2024-11-08 RX ADMIN — ARIPIPRAZOLE 5 MILLIGRAM(S): 15 TABLET ORAL at 21:23

## 2024-11-08 RX ADMIN — DIPHENHYDRAMINE HYDROCHLORIDE AND LIDOCAINE HYDROCHLORIDE AND ALUMINUM HYDROXIDE AND MAGNESIUM HYDRO 5 MILLILITER(S): KIT at 14:13

## 2024-11-08 RX ADMIN — HYDROMORPHONE HYDROCHLORIDE 1 MILLIGRAM(S): 2 TABLET ORAL at 06:11

## 2024-11-08 RX ADMIN — Medication 1 EACH: at 23:01

## 2024-11-08 RX ADMIN — DIPHENHYDRAMINE HYDROCHLORIDE AND LIDOCAINE HYDROCHLORIDE AND ALUMINUM HYDROXIDE AND MAGNESIUM HYDRO 5 MILLILITER(S): KIT at 06:11

## 2024-11-08 RX ADMIN — Medication 20.8 GM/KG/DAY: at 23:02

## 2024-11-08 RX ADMIN — HYDROMORPHONE HYDROCHLORIDE 1 MILLIGRAM(S): 2 TABLET ORAL at 01:54

## 2024-11-08 RX ADMIN — METOCLOPRAMIDE HYDROCHLORIDE 10 MILLIGRAM(S): 10 TABLET ORAL at 06:11

## 2024-11-08 RX ADMIN — ESCITALOPRAM OXALATE 10 MILLIGRAM(S): 10 TABLET, FILM COATED ORAL at 09:20

## 2024-11-08 RX ADMIN — KETOROLAC TROMETHAMINE 15 MILLIGRAM(S): 30 INJECTION INTRAMUSCULAR; INTRAVENOUS at 09:20

## 2024-11-08 RX ADMIN — KETOROLAC TROMETHAMINE 15 MILLIGRAM(S): 30 INJECTION INTRAMUSCULAR; INTRAVENOUS at 17:20

## 2024-11-08 RX ADMIN — KETOROLAC TROMETHAMINE 15 MILLIGRAM(S): 30 INJECTION INTRAMUSCULAR; INTRAVENOUS at 10:20

## 2024-11-08 RX ADMIN — DIPHENHYDRAMINE HYDROCHLORIDE AND LIDOCAINE HYDROCHLORIDE AND ALUMINUM HYDROXIDE AND MAGNESIUM HYDRO 5 MILLILITER(S): KIT at 21:24

## 2024-11-08 RX ADMIN — PANTOPRAZOLE SODIUM 40 MILLIGRAM(S): 40 TABLET, DELAYED RELEASE ORAL at 06:11

## 2024-11-08 RX ADMIN — HYDROMORPHONE HYDROCHLORIDE 1 MILLIGRAM(S): 2 TABLET ORAL at 02:24

## 2024-11-08 RX ADMIN — ACETAMINOPHEN 500MG 650 MILLIGRAM(S): 500 TABLET, COATED ORAL at 21:23

## 2024-11-08 RX ADMIN — Medication 0.5 MILLIGRAM(S): at 23:07

## 2024-11-08 RX ADMIN — METOCLOPRAMIDE HYDROCHLORIDE 10 MILLIGRAM(S): 10 TABLET ORAL at 21:24

## 2024-11-08 RX ADMIN — METOPROLOL TARTRATE 100 MILLIGRAM(S): 100 TABLET, FILM COATED ORAL at 09:20

## 2024-11-08 RX ADMIN — HYDROMORPHONE HYDROCHLORIDE 1 MILLIGRAM(S): 2 TABLET ORAL at 14:16

## 2024-11-08 RX ADMIN — HYDROMORPHONE HYDROCHLORIDE 1 MILLIGRAM(S): 2 TABLET ORAL at 21:23

## 2024-11-08 RX ADMIN — BENZOCAINE, MENTHOL 1 LOZENGE: 15; 3.6 LOZENGE ORAL at 06:15

## 2024-11-08 NOTE — ADVANCED PRACTICE NURSE CONSULT - ASSESSMENT
Procedure Note: Vascular Access  Procedure Name: LUE PICC Placement  Time out: Patient’s first and last name, , MRN, procedure and correct site confirmed prior to start of procedure.  Procedure Date and Time: 2024   1230  Informed Consent: Benefits, risks, and possible complications of procedure explained to patient and Daughter.  Patient verbalized understanding and gave written consent.  Local Anesthesia: 1% Lidocaine subdermal  Procedure findings and Details:  Successful placement of RUE 4 Fr single lumen PICC (Xcela PICC with PASV Valve Technology Lot # [id number]) via basilic vein inserted under ultrasound guidance.  PICC line trimmed to 37cm.   Follow up: CXR ordered to confirm tip placement.  Report given to District RN Piedad Petty.   Procedure Note: Vascular Access  Procedure Name: LUE PICC Placement  Time out: Patient’s first and last name, , MRN, procedure and correct site confirmed prior to start of procedure.  Procedure Date and Time: 2024   1230  Informed Consent: Benefits, risks, and possible complications of procedure explained to patient and Daughter.  Patient verbalized understanding and gave written consent.  Local Anesthesia: 1% Lidocaine subdermal  Procedure findings and Details:  Successful placement of RUE 5 Fr single lumen PICC (Xcela PICC with PASV Valve Technology Lot # [id number]) via basilic vein inserted under ultrasound guidance.  PICC line trimmed to 37cm.   Follow up: CXR ordered to confirm tip placement.  Report given to District RN Piedad Petty.

## 2024-11-08 NOTE — PROGRESS NOTE ADULT - SUBJECTIVE AND OBJECTIVE BOX
CHIEF COMPLAINT/INTERVAL HISTORY:    Patient is a 64y old  Female who presents with a chief complaint of inability to tolerate po (02 Nov 2024 13:01)      HPI:  64-year-old female with history of GERD  HTN, DM, polyps, and afib with 5 cardiac stents. presents with epigastric pain for the last couple of weeks.  Her daughter brought her in, after speaking with her GI doctor who stated patient should not have been on ozempic before and probably have taken insulin instead. She states that she had just uptitrated her dose of ozempic to 0.5 from 0.25 earlier this week. She had been on the 0.25 for 5 weeks. She states the pain bothers her more than the nausea and vomiting. Patient states she feels very minimal relief since she came in.  She denies NSAID use, bleeding or black stools. She reports 10lb weight loss and has been on ozempic for her diabetes. Denies diarrhea or urinary symptoms.     In the ED  Vitals Temp 97.8  /84 O2 sat 98% on RA  Labs WBC 5.28 Hg 13.3 Plt 212 Na 140 K 4 Cl 107 Bicarb 26 BUN 16 Cr 0.86 Glucose 101 Protein 8.4 ALbumin 4 AST 32 ALT 54 Lipase 71 Trop I 4.65  Imaging CTAP   Etiology of abdominal pain is not elucidated  Intervention zofran, ofirmev, pepcid, morphine 2,  4, 1 L NS   (25 Oct 2024 13:17)    Adm for initiation of PPN; gastric emptying study for 11/6- failed  Abnormal MRCP; vascular also consulted. At this time diff diagnosis expanded because of the pain she is c/o of.  Liquid BM with pink blood  s/p ERCP and stent placement for CBD stone    Vital Signs Last 24 Hrs  T(C): 36.8 (08 Nov 2024 00:30), Max: 36.8 (08 Nov 2024 00:30)  T(F): 98.2 (08 Nov 2024 00:30), Max: 98.2 (08 Nov 2024 00:30)  HR: 75 (08 Nov 2024 00:30) (64 - 89)  BP: 100/52 (08 Nov 2024 00:30) (100/52 - 154/95)  BP(mean): --  RR: 19 (08 Nov 2024 00:30) (11 - 19)  SpO2: 97% (08 Nov 2024 00:30) (93% - 97%)    Parameters below as of 08 Nov 2024 00:30  Patient On (Oxygen Delivery Method): nasal cannula          PHYSICAL EXAM:      Constitutional: NAD  Eyes: perrl, no conjunctival changes  ENMT: no exudates, moist oral muc, uvula midline  Neck: no JVD, no LAD  Back: no cva tenderness  Respiratory: CTA, no exp wheezes  Cardiovascular: S1S2 reg, no murmur gallop or rub  Gastrointestinal: abd soft, NT/ND + BS  Genitourinary: voiding  Extremities: FROM, no joint effusions, no edema, no clubbing , no cyanosis  Vascular: pedal pulses + bilateral, warm extremities  Neurological: non focal, mot str 5/5/ all extr  Skin: no rashes  Lymph Nodes: no LAD      CAPILLARY BLOOD GLUCOSE      POCT Blood Glucose.: 131 mg/dL (07 Nov 2024 06:11)  POCT Blood Glucose.: 86 mg/dL (06 Nov 2024 23:14)  POCT Blood Glucose.: 91 mg/dL (06 Nov 2024 17:17)  POCT Blood Glucose.: 108 mg/dL (06 Nov 2024 11:14)          RADIOLOGY & ADDITIONAL TESTS: personally reviewed        Assessment and Plan: 64 year old woman with poor oral intake.       Intractable nausea with poor oral intake: multifactorial - due to adverse effects off Ozempic / gastritis / esophagitis / possible gastroparesis:   - off ozempic  - CT abd:  Status post colectomy with ileorectal anastomosis. No bowel obstruction.  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

## 2024-11-08 NOTE — CHART NOTE - NSCHARTNOTEFT_GEN_A_CORE
Clinical Nutrition PN Follow Up Note    *64-year-old female with history of GERD  HTN, DM, polyps, and afib with 5 cardiac stents. presents with epigastric pain for the last couple of weeks.  Her daughter brought her in, after speaking with her GI doctor who stated patient should not have been on ozempic before and probably have taken insulin instead. She states that she had just uptitrated her dose of ozempic to 0.5 from 0.25 earlier this week. She had been on the 0.25 for 5 weeks. She states the pain bothers her more than the nausea and vomiting. Patient states she feels very minimal relief since she came in.  She denies NSAID use, bleeding or black stools. She reports 10lb weight loss and has been on ozempic for her diabetes. Denies diarrhea or urinary symptoms. Admitted for Nausea and vomiting  *11/1: D/w Dr. Hernandez - PPN Day #1; PPN to be initiated as a bridge 2/2 prolonged PO intolerance/poor PO intake; awaiting gastric emptying study. Per GI note 10/27: abd USG inconclusive s/p cholecystectomy. Rec for EGD/Colonoscopy early next week. 10/30: now s/p EGD and colonoscopy - egd with findings of gastritis and esophagitis without ulcers colonoscopy with patent anastomosis - no acute findings. Pt placed on regular low fiber diet - vomited lunch on 10/31 - complains of throat pain after egd; epigastric pain minimal.  Per hospitalist: "at this point, will need to rule out gastroparesis with gastric emptying study - per nuclear medicine the earliest it could be done would be Tuesday 11/5- need to order isotpope - Trial of 5mg liquid Reglan QID 15 min before meals and at bedtime."   PPN is not to be a long-term nutrition support, pending gastric emptying study if needing long-term nutrition support would recommend GJ-tube if gastroparesis is present. Strongly suggest to confirm goals of care regarding LONG-TERM nutrition support.  *11/2: PPN day #2. No pertinent events overnight. Remains w/ N/V and abdominal distension per flowsheet. Will c/w PPN.   *11/3: PPN day #3. Per RN documentation, no events of N/V overnight. Was drinking ensure shakes and tolerating. C/o N/V during the day per hospitalist note. Will c/w PPN for now. Pending gastric emptying study, tentative Tuesday 11/5.  *11/6: PPN day #6: Did not receive PPN last night 2/2 per nuclear medicine not allowed to receive anything past midnight (?). EGD done yesterday which came back inconclusive. Per discussion w/ Dr. Ulrich, pt will need J tube based on gastric emptying study. Will c/w PPN for now until J tube to be placed.    *11/7: PPN day #7. Abnormal MRCP; vascular also consulted. At this time diff diagnosis expanded 2/2 pain. 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 GI recommended to c/w FLD for now and trial Reglan 3x daily to alleviate symptoms. Passed bloody BM this morning w/ abdominal pain, trend H/H. Vascular surgery consulted to evaluate for median arcuate ligament syndrome however no intervention to be done at this time as no imaging finding suggestive of median arcuate ligament syndrome. D/w hospitalist, will c/w PPN until GJ tube can be placed.     PPN initiated 2/2 prolonged PO intolerance, N/V, awaiting gastric emptying study    *current status PPN day #8. GI consulted yesterday, per note: "Abdominal pain could be secondary to distal CBD stones/sludge with likely accomodation biliary flow due to dilated duct post choly (normal lft's)." Is s/p ERCP yesterday w/ Dr. Moncada - 2 stents were placed and obtained biopsy of nodular papilla. Currently c/o abdominal discomfort/ pain s/p ERCP yesterday, D/w hospitalist, will c/w PPN for now, ?if GJ tube will be placed. Consult placed for PICC team since pt on PPN >6 days and meeting <75% ENN x 8 days.    *pertinent meds: Morphine, Lexapro, Abilify, Plavix, ADMELOG (6 units x 24 hours), Protonix, Reglan, LR bolus; PRN: acetaminophen, ESGIC, Xanax, Zofran, Dilaudid    *labs reviewed; Na, K, Mg, and Phos WNL. Continue to replete lytes outside of PN prn. corrected Ca WNL, rec'd add 10mEq of Calcium Gluconate outside of PN bag as CAPS is out. T bili WNL will c/w trace elements in the PN bag today. Will c/w thiamine and MVI and minerals in the PN bag today. Triglyceride WNL (204), will c/w 50 g of lipids. POCTs x 24 hours WNL w/ 2 elevated levels. Continue to maintain POCTs between 140-180 mg/dL. *no changes to PN bag 11/8*  11-08    141  |  112[H]  |  19  ----------------------------<  134[H]  4.3   |  28  |  0.66    Ca    8.5      08 Nov 2024 04:43  Phos  4.0     11-08  Mg     1.9     11-08    TPro  6.4  /  Alb  2.8[L]  /  TBili  0.2  /  DBili  x   /  AST  142[H]  /  ALT  179[H]  /  AlkPhos  134[H]  11-08      BMI: BMI (kg/m2): 23.5 (10-25-24 @ 18:30)  HbA1c: A1C with Estimated Average Glucose Result: 6.6 % (10-26-24 @ 06:47)    Glucose: POCT Blood Glucose.: 136 mg/dL (08 Nov 2024 07:56)    BP: 125/68 (11-04-24 @ 23:00) (99/56 - 141/59)Vital Signs Last 24 Hrs  T(C): 36.4 (11-04-24 @ 23:00), Max: 36.4 (11-04-24 @ 16:23)  T(F): 97.5 (11-04-24 @ 23:00), Max: 97.6 (11-04-24 @ 16:23)  HR: 74 (11-04-24 @ 23:00) (74 - 78)  BP: 125/68 (11-04-24 @ 23:00) (125/68 - 134/77)  BP(mean): 77 (11-04-24 @ 23:00) (77 - 77)  RR: 18 (11-04-24 @ 23:00) (18 - 18)  SpO2: 98% (11-04-24 @ 23:00) (95% - 98%)    Lipid Panel: Date/Time: 11-02-24 @ 04:16  Triglycerides, Serum: 204    POCT Blood Glucose.: 136 mg/dL (08 Nov 2024 07:56)  POCT Blood Glucose.: 124 mg/dL (08 Nov 2024 06:19)  POCT Blood Glucose.: 152 mg/dL (08 Nov 2024 01:39)  POCT Blood Glucose.: 217 mg/dL (07 Nov 2024 17:34)  POCT Blood Glucose.: 217 mg/dL (07 Nov 2024 17:18)  POCT Blood Glucose.: 125 mg/dL (07 Nov 2024 11:48)    I&O's Detail    07 Nov 2024 07:01  -  08 Nov 2024 07:00  --------------------------------------------------------  IN:    Other (mL): 500 mL  Total IN: 500 mL    OUT:  Total OUT: 0 mL    Total NET: 500 mL  *fluid status: no PPN doc'd x 24 hrs. Per PN protocol and  policy; strict I&O's when on PN.   *BM - last doc'd 10/30; abdominal discomfort +N/V on 11/6. pt not on bowel regimen.  *edema: none doc'd    *malnutrition: Pt continues to meet criteria for moderate malnutrition in context of acute on chronic illness r/t decreased ability to meet increased nutrient needs 2/2 N/V, DM AEB >7.5% wt loss x 2mo, <50% ENN x 2 weeks, mild muscle/fat wasting    Estimated Needs: based on 55 Kg (wt from 10/27)  Calories: 4657-8073 Kcal (35-40 Kcal/Kg)  Protein: 66-83 g (1.2-1.5 g/Kg)  Fluids: 3206-7296 mL (35-40 mL/Kg)    Diet, NPO:   Except Medications (11-07-24 @ 10:18) [Active]  Diet, Regular (10-26-24 @ 17:50) [Available for Activation]  Parenteral Nutrition - Adult 1 Each (83 mL/Hr) TPN Continuous <Continuous>, 11-06-24 @ 22:00, 22:00, Stop order after: 1 Days    Weight History:  Weight (kg): 52.8 (10-25-24 @ 18:30)    *no changes to PN bag 11/8*  PPN Recommendations: via peripheral line  Total Volume: 2000 mL x 24 hours  80 g  Amino Acids  100 g Dextrose  50 g Lipids 20%  80 mEq Sodium Chloride  47 mEq Sodium Acetate  20 mmol Sodium Phosphate  65 mEq Potassium Chloride  15 mEq Potassium Acetate  0 mmol Potassium Phosphate  0 mEq Calcium Gluconate (CAPS out of PN solution)   8 mEq Magnesium Sulfate  200 mg Thiamine  1 ml Trace Elements  10 ml MVI    Total Calories   1160   (Meets  56%  of  Estimated Energy needs  and  100%  Protein needs)   (osmolarity <900)    Additional Recommendations:  1) Daily weights  2) Strict I & O's  3) Daily lyte checks including magnesium and phos  4) Weekly triglycerides/LFT checks  5) POCT q6hrs; maintain 140-180mg/dL  6) if on PN > 6 days, consider placing PICC line to meet 100% of nutr needs  7) Confirm goals of care regarding LONG-TERM nutrition support - if gastroparesis present and still unable to tolerate PO, would need GJ tube - ?if to be placed. PICC team consulted to place PICC line to better meet ENN.    *will continue to monitor and adjust PN prn*  Carmenza Hamilton RDN, CDN [phone]

## 2024-11-09 LAB
ALBUMIN SERPL ELPH-MCNC: 2.8 G/DL — LOW (ref 3.3–5)
ALP SERPL-CCNC: 119 U/L — SIGNIFICANT CHANGE UP (ref 40–120)
ALT FLD-CCNC: 100 U/L — HIGH (ref 12–78)
ANION GAP SERPL CALC-SCNC: 4 MMOL/L — LOW (ref 5–17)
AST SERPL-CCNC: 52 U/L — HIGH (ref 15–37)
BILIRUB SERPL-MCNC: 0.2 MG/DL — SIGNIFICANT CHANGE UP (ref 0.2–1.2)
BUN SERPL-MCNC: 17 MG/DL — SIGNIFICANT CHANGE UP (ref 7–23)
CALCIUM SERPL-MCNC: 8.4 MG/DL — LOW (ref 8.5–10.1)
CHLORIDE SERPL-SCNC: 107 MMOL/L — SIGNIFICANT CHANGE UP (ref 96–108)
CO2 SERPL-SCNC: 27 MMOL/L — SIGNIFICANT CHANGE UP (ref 22–31)
CREAT SERPL-MCNC: 0.68 MG/DL — SIGNIFICANT CHANGE UP (ref 0.5–1.3)
EGFR: 98 ML/MIN/1.73M2 — SIGNIFICANT CHANGE UP
GLUCOSE BLDC GLUCOMTR-MCNC: 111 MG/DL — HIGH (ref 70–99)
GLUCOSE BLDC GLUCOMTR-MCNC: 122 MG/DL — HIGH (ref 70–99)
GLUCOSE BLDC GLUCOMTR-MCNC: 144 MG/DL — HIGH (ref 70–99)
GLUCOSE BLDC GLUCOMTR-MCNC: 146 MG/DL — HIGH (ref 70–99)
GLUCOSE BLDC GLUCOMTR-MCNC: 159 MG/DL — HIGH (ref 70–99)
GLUCOSE SERPL-MCNC: 150 MG/DL — HIGH (ref 70–99)
MAGNESIUM SERPL-MCNC: 1.9 MG/DL — SIGNIFICANT CHANGE UP (ref 1.6–2.6)
PHOSPHATE SERPL-MCNC: 3 MG/DL — SIGNIFICANT CHANGE UP (ref 2.5–4.5)
POTASSIUM SERPL-MCNC: 4.1 MMOL/L — SIGNIFICANT CHANGE UP (ref 3.5–5.3)
POTASSIUM SERPL-SCNC: 4.1 MMOL/L — SIGNIFICANT CHANGE UP (ref 3.5–5.3)
PROT SERPL-MCNC: 6.8 GM/DL — SIGNIFICANT CHANGE UP (ref 6–8.3)
SODIUM SERPL-SCNC: 138 MMOL/L — SIGNIFICANT CHANGE UP (ref 135–145)
SURGICAL PATHOLOGY STUDY: SIGNIFICANT CHANGE UP

## 2024-11-09 PROCEDURE — 99233 SBSQ HOSP IP/OBS HIGH 50: CPT

## 2024-11-09 RX ORDER — PIPERACILLIN SODIUM AND TAZOBACTAM SODIUM 4; .5 G/20ML; G/20ML
3.38 INJECTION, POWDER, LYOPHILIZED, FOR SOLUTION INTRAVENOUS ONCE
Refills: 0 | Status: COMPLETED | OUTPATIENT
Start: 2024-11-09 | End: 2024-11-09

## 2024-11-09 RX ORDER — FAT EMULSIONS 20 %
0.95 EMULSION INTRAVENOUS
Qty: 50 | Refills: 0 | Status: DISCONTINUED | OUTPATIENT
Start: 2024-11-09 | End: 2024-11-09

## 2024-11-09 RX ORDER — SODIUM/POT/MAG/CALC/CHLOR/ACET 35-20-5MEQ
1 VIAL (ML) INTRAVENOUS
Refills: 0 | Status: DISCONTINUED | OUTPATIENT
Start: 2024-11-09 | End: 2024-11-09

## 2024-11-09 RX ORDER — PIPERACILLIN SODIUM AND TAZOBACTAM SODIUM 4; .5 G/20ML; G/20ML
3.38 INJECTION, POWDER, LYOPHILIZED, FOR SOLUTION INTRAVENOUS EVERY 8 HOURS
Refills: 0 | Status: DISCONTINUED | OUTPATIENT
Start: 2024-11-10 | End: 2024-11-12

## 2024-11-09 RX ADMIN — HYDROMORPHONE HYDROCHLORIDE 1 MILLIGRAM(S): 2 TABLET ORAL at 21:39

## 2024-11-09 RX ADMIN — HYDROMORPHONE HYDROCHLORIDE 1 MILLIGRAM(S): 2 TABLET ORAL at 01:28

## 2024-11-09 RX ADMIN — Medication 1 EACH: at 23:38

## 2024-11-09 RX ADMIN — ACETAMINOPHEN 500MG 650 MILLIGRAM(S): 500 TABLET, COATED ORAL at 04:40

## 2024-11-09 RX ADMIN — Medication 20.8 GM/KG/DAY: at 23:41

## 2024-11-09 RX ADMIN — CLOPIDOGREL 75 MILLIGRAM(S): 75 TABLET, FILM COATED ORAL at 09:17

## 2024-11-09 RX ADMIN — Medication 1 TABLET(S): at 01:28

## 2024-11-09 RX ADMIN — DIPHENHYDRAMINE HYDROCHLORIDE AND LIDOCAINE HYDROCHLORIDE AND ALUMINUM HYDROXIDE AND MAGNESIUM HYDRO 5 MILLILITER(S): KIT at 14:43

## 2024-11-09 RX ADMIN — Medication 2: at 17:18

## 2024-11-09 RX ADMIN — Medication 0.5 MILLIGRAM(S): at 21:09

## 2024-11-09 RX ADMIN — ARIPIPRAZOLE 5 MILLIGRAM(S): 15 TABLET ORAL at 21:09

## 2024-11-09 RX ADMIN — ROSUVASTATIN CALCIUM 10 MILLIGRAM(S): 5 TABLET, FILM COATED ORAL at 21:09

## 2024-11-09 RX ADMIN — PANTOPRAZOLE SODIUM 40 MILLIGRAM(S): 40 TABLET, DELAYED RELEASE ORAL at 06:14

## 2024-11-09 RX ADMIN — HYDROMORPHONE HYDROCHLORIDE 1 MILLIGRAM(S): 2 TABLET ORAL at 06:14

## 2024-11-09 RX ADMIN — DIPHENHYDRAMINE HYDROCHLORIDE AND LIDOCAINE HYDROCHLORIDE AND ALUMINUM HYDROXIDE AND MAGNESIUM HYDRO 5 MILLILITER(S): KIT at 06:13

## 2024-11-09 RX ADMIN — HYDROMORPHONE HYDROCHLORIDE 1 MILLIGRAM(S): 2 TABLET ORAL at 10:58

## 2024-11-09 RX ADMIN — METOCLOPRAMIDE HYDROCHLORIDE 10 MILLIGRAM(S): 10 TABLET ORAL at 06:13

## 2024-11-09 RX ADMIN — METOPROLOL TARTRATE 100 MILLIGRAM(S): 100 TABLET, FILM COATED ORAL at 09:17

## 2024-11-09 RX ADMIN — METOCLOPRAMIDE HYDROCHLORIDE 10 MILLIGRAM(S): 10 TABLET ORAL at 14:43

## 2024-11-09 RX ADMIN — HYDROMORPHONE HYDROCHLORIDE 1 MILLIGRAM(S): 2 TABLET ORAL at 21:19

## 2024-11-09 RX ADMIN — PIPERACILLIN SODIUM AND TAZOBACTAM SODIUM 25 GRAM(S): 4; .5 INJECTION, POWDER, LYOPHILIZED, FOR SOLUTION INTRAVENOUS at 22:26

## 2024-11-09 RX ADMIN — METOCLOPRAMIDE HYDROCHLORIDE 10 MILLIGRAM(S): 10 TABLET ORAL at 21:09

## 2024-11-09 RX ADMIN — PIPERACILLIN SODIUM AND TAZOBACTAM SODIUM 200 GRAM(S): 4; .5 INJECTION, POWDER, LYOPHILIZED, FOR SOLUTION INTRAVENOUS at 17:32

## 2024-11-09 RX ADMIN — ESCITALOPRAM OXALATE 10 MILLIGRAM(S): 10 TABLET, FILM COATED ORAL at 09:17

## 2024-11-09 NOTE — CHART NOTE - NSCHARTNOTEFT_GEN_A_CORE
Clinical Nutrition PN Follow Up Note    *64-year-old female with history of GERD  HTN, DM, polyps, and afib with 5 cardiac stents. presents with epigastric pain for the last couple of weeks.  Her daughter brought her in, after speaking with her GI doctor who stated patient should not have been on ozempic before and probably have taken insulin instead. She states that she had just uptitrated her dose of ozempic to 0.5 from 0.25 earlier this week. She had been on the 0.25 for 5 weeks. She states the pain bothers her more than the nausea and vomiting. Patient states she feels very minimal relief since she came in.  She denies NSAID use, bleeding or black stools. She reports 10lb weight loss and has been on ozempic for her diabetes. Denies diarrhea or urinary symptoms. Admitted for Nausea and vomiting  *11/1: D/w Dr. Hernandez - PPN Day #1; PPN to be initiated as a bridge 2/2 prolonged PO intolerance/poor PO intake; awaiting gastric emptying study. Per GI note 10/27: abd USG inconclusive s/p cholecystectomy. Rec for EGD/Colonoscopy early next week. 10/30: now s/p EGD and colonoscopy - egd with findings of gastritis and esophagitis without ulcers colonoscopy with patent anastomosis - no acute findings. Pt placed on regular low fiber diet - vomited lunch on 10/31 - complains of throat pain after egd; epigastric pain minimal.  Per hospitalist: "at this point, will need to rule out gastroparesis with gastric emptying study - per nuclear medicine the earliest it could be done would be Tuesday 11/5- need to order isotpope - Trial of 5mg liquid Reglan QID 15 min before meals and at bedtime."   PPN is not to be a long-term nutrition support, pending gastric emptying study if needing long-term nutrition support would recommend GJ-tube if gastroparesis is present. Strongly suggest to confirm goals of care regarding LONG-TERM nutrition support.  *11/2: PPN day #2. No pertinent events overnight. Remains w/ N/V and abdominal distension per flowsheet. Will c/w PPN.   *11/3: PPN day #3. Per RN documentation, no events of N/V overnight. Was drinking ensure shakes and tolerating. C/o N/V during the day per hospitalist note. Will c/w PPN for now. Pending gastric emptying study, tentative Tuesday 11/5.  *11/6: PPN day #6: Did not receive PPN last night 2/2 per nuclear medicine not allowed to receive anything past midnight (?). EGD done yesterday which came back inconclusive. Per discussion w/ Dr. Ulrich, pt will need J tube based on gastric emptying study. Will c/w PPN for now until J tube to be placed.    *11/7: PPN day #7. Abnormal MRCP; vascular also consulted. At this time diff diagnosis expanded 2/2 pain. 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 GI recommended to c/w FLD for now and trial Reglan 3x daily to alleviate symptoms. Passed bloody BM this morning w/ abdominal pain, trend H/H. Vascular surgery consulted to evaluate for median arcuate ligament syndrome however no intervention to be done at this time as no imaging finding suggestive of median arcuate ligament syndrome. D/w hospitalist, will c/w PPN until GJ tube can be placed.   *11/8: PPN day #8. GI consulted yesterday, per note: "Abdominal pain could be secondary to distal CBD stones/sludge with likely accomodation biliary flow due to dilated duct post choly (normal lft's)." Is s/p ERCP yesterday w/ Dr. Moncada - 2 stents were placed and obtained biopsy of nodular papilla. Currently c/o abdominal discomfort/ pain s/p ERCP yesterday, D/w hospitalist, will c/w PPN for now, ?if GJ tube will be placed. Consult placed for PICC team since pt on PPN >6 days and meeting <75% ENN x 8 days.    PPN initiated 2/2 prolonged PO intolerance, N/V, awaiting gastric emptying study    *current status:     *pertinent meds: Lexapro, Magic Mouthwash, Admelog (2 units x 24 hours), Reglan, Protonix, Acetaminophen, Xanax, Diluadid, Toradol, Morphine    *labs reviewed; Na, K, Mg, and Phos WNL. Continue to replete lytes outside of PN prn. corrected Ca WNL, rec'd add 10mEq of Calcium Gluconate outside of PN bag as CAPS is out. T bili WNL will c/w trace elements in the PN bag today. Will c/w thiamine and MVI and minerals in the PN bag today. Triglyceride WNL (204), will c/w 50 g of lipids. POCTs x 24 hours WNL w/ 2 elevated levels. Continue to maintain POCTs between 140-180 mg/dL. *no changes to PN bag 11/8*  11-08    141  |  112[H]  |  19  ----------------------------<  134[H]  4.3   |  28  |  0.66    Ca    8.5      08 Nov 2024 04:43  Phos  4.0     11-08  Mg     1.9     11-08    TPro  6.4  /  Alb  2.8[L]  /  TBili  0.2  /  DBili  x   /  AST  142[H]  /  ALT  179[H]  /  AlkPhos  134[H]  11-08      BMI: BMI (kg/m2): 23.5 (10-25-24 @ 18:30)  HbA1c: A1C with Estimated Average Glucose Result: 6.6 % (10-26-24 @ 06:47)    Glucose: POCT Blood Glucose.: 136 mg/dL (08 Nov 2024 07:56)    BP: 125/68 (11-04-24 @ 23:00) (99/56 - 141/59)Vital Signs Last 24 Hrs  T(C): 36.4 (11-04-24 @ 23:00), Max: 36.4 (11-04-24 @ 16:23)  T(F): 97.5 (11-04-24 @ 23:00), Max: 97.6 (11-04-24 @ 16:23)  HR: 74 (11-04-24 @ 23:00) (74 - 78)  BP: 125/68 (11-04-24 @ 23:00) (125/68 - 134/77)  BP(mean): 77 (11-04-24 @ 23:00) (77 - 77)  RR: 18 (11-04-24 @ 23:00) (18 - 18)  SpO2: 98% (11-04-24 @ 23:00) (95% - 98%)    Lipid Panel: Date/Time: 11-02-24 @ 04:16  Triglycerides, Serum: 204    POCT Blood Glucose.: 122 mg/dL (09 Nov 2024 06:20)  POCT Blood Glucose.: 111 mg/dL (09 Nov 2024 01:08)  POCT Blood Glucose.: 159 mg/dL (08 Nov 2024 17:19)  POCT Blood Glucose.: 124 mg/dL (08 Nov 2024 14:13)  POCT Blood Glucose.: 136 mg/dL (08 Nov 2024 07:56)    I&O's Detail - not doc'd; ***strict I&O's when on PN. ***  *BM - last doc'd 10/30; abdominal discomfort +N/V on 11/8. pt not on bowel regimen.  *edema: none doc'd    *malnutrition: Pt continues to meet criteria for moderate malnutrition in context of acute on chronic illness r/t decreased ability to meet increased nutrient needs 2/2 N/V, DM AEB >7.5% wt loss x 2mo, <50% ENN x 2 weeks, mild muscle/fat wasting    Estimated Needs: based on 55 Kg (wt from 10/27)  Calories: 5463-9061 Kcal (35-40 Kcal/Kg)  Protein: 66-83 g (1.2-1.5 g/Kg)  Fluids: 2799-6268 mL (35-40 mL/Kg)    Diet, Clear Liquid (11-07-24 @ 14:27) [Active]    Weight History:  Weight (kg): 52.8 (10-25-24 @ 18:30)    TPN Recommendations: via PICC Line  Total Volume: 2000 mL x 24 hours  80 g  Amino Acids  100 g Dextrose  50 g Lipids 20%  80 mEq Sodium Chloride  47 mEq Sodium Acetate  20 mmol Sodium Phosphate  65 mEq Potassium Chloride  15 mEq Potassium Acetate  0 mmol Potassium Phosphate  0 mEq Calcium Gluconate (CAPS out of PN solution)   8 mEq Magnesium Sulfate  200 mg Thiamine  1 ml Trace Elements  10 ml MVI    Total Calories   1160   (Meets  56%  of  Estimated Energy needs  and  100%  Protein needs)   (osmolarity <900)    Additional Recommendations:  1) Daily weights  2) Strict I & O's  3) Daily lyte checks including magnesium and phos  4) Weekly triglycerides/LFT checks  5) POCT q6hrs; maintain 140-180mg/dL  6) if on PN > 6 days, consider placing PICC line to meet 100% of nutr needs  7) Confirm goals of care regarding LONG-TERM nutrition support - if gastroparesis present and still unable to tolerate PO, would need GJ tube - ?if to be placed. PICC team consulted to place PICC line to better meet ENN.    *will continue to monitor and adjust PN prn*  Beatriz Calvo MS, RDN, CDN, [facility] [phone]  Certified Nutrition  Clinical Nutrition PN Follow Up Note    *64-year-old female with history of GERD  HTN, DM, polyps, and afib with 5 cardiac stents. presents with epigastric pain for the last couple of weeks.  Her daughter brought her in, after speaking with her GI doctor who stated patient should not have been on ozempic before and probably have taken insulin instead. She states that she had just uptitrated her dose of ozempic to 0.5 from 0.25 earlier this week. She had been on the 0.25 for 5 weeks. She states the pain bothers her more than the nausea and vomiting. Patient states she feels very minimal relief since she came in.  She denies NSAID use, bleeding or black stools. She reports 10lb weight loss and has been on ozempic for her diabetes. Denies diarrhea or urinary symptoms. Admitted for Nausea and vomiting  *11/1: D/w Dr. Hernandez - PPN Day #1; PPN to be initiated as a bridge 2/2 prolonged PO intolerance/poor PO intake; awaiting gastric emptying study. Per GI note 10/27: abd USG inconclusive s/p cholecystectomy. Rec for EGD/Colonoscopy early next week. 10/30: now s/p EGD and colonoscopy - egd with findings of gastritis and esophagitis without ulcers colonoscopy with patent anastomosis - no acute findings. Pt placed on regular low fiber diet - vomited lunch on 10/31 - complains of throat pain after egd; epigastric pain minimal.  Per hospitalist: "at this point, will need to rule out gastroparesis with gastric emptying study - per nuclear medicine the earliest it could be done would be Tuesday 11/5- need to order isotpope - Trial of 5mg liquid Reglan QID 15 min before meals and at bedtime."   PPN is not to be a long-term nutrition support, pending gastric emptying study if needing long-term nutrition support would recommend GJ-tube if gastroparesis is present. Strongly suggest to confirm goals of care regarding LONG-TERM nutrition support.  *11/2: PPN day #2. No pertinent events overnight. Remains w/ N/V and abdominal distension per flowsheet. Will c/w PPN.   *11/3: PPN day #3. Per RN documentation, no events of N/V overnight. Was drinking ensure shakes and tolerating. C/o N/V during the day per hospitalist note. Will c/w PPN for now. Pending gastric emptying study, tentative Tuesday 11/5.  *11/6: PPN day #6: Did not receive PPN last night 2/2 per nuclear medicine not allowed to receive anything past midnight (?). EGD done yesterday which came back inconclusive. Per discussion w/ Dr. Ulrich, pt will need J tube based on gastric emptying study. Will c/w PPN for now until J tube to be placed.    *11/7: PPN day #7. Abnormal MRCP; vascular also consulted. At this time diff diagnosis expanded 2/2 pain. 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 GI recommended to c/w FLD for now and trial Reglan 3x daily to alleviate symptoms. Passed bloody BM this morning w/ abdominal pain, trend H/H. Vascular surgery consulted to evaluate for median arcuate ligament syndrome however no intervention to be done at this time as no imaging finding suggestive of median arcuate ligament syndrome. D/w hospitalist, will c/w PPN until GJ tube can be placed.   *11/8: PPN day #8. GI consulted yesterday, per note: "Abdominal pain could be secondary to distal CBD stones/sludge with likely accomodation biliary flow due to dilated duct post choly (normal lft's)." Is s/p ERCP yesterday w/ Dr. Moncada - 2 stents were placed and obtained biopsy of nodular papilla. Currently c/o abdominal discomfort/ pain s/p ERCP yesterday, D/w hospitalist, will c/w PPN for now, ?if GJ tube will be placed. Consult placed for PICC team since pt on PPN >6 days and meeting <75% ENN x 8 days.    PPN initiated 2/2 prolonged PO intolerance, N/V, awaiting gastric emptying study    *current status: Continues to be on CLD. S/p PICC line placement (11/8), will transition to TPN today. ?if GJ tube will be placed as pt has had intestines removed in the past. Plan to initiate TPN today.     *pertinent meds: Lexapro, Magic Mouthwash, Admelog (2 units x 24 hours), Reglan, Protonix, Acetaminophen, Xanax, Dilaudid, Toradol, Morphine    *labs reviewed; Na, K+, Phos, and Mg WNL. T Bili WNL to c/w trace elements. corrected Ca WNL, rec'd add 10mEq of Calcium Gluconate outside of PN bag as CAPS is out. Will c/w thiamine and MVI and minerals in the PN bag today. Triglyceride WNL (204), will c/w 50 g of lipids. POCTs x 24 hours WNL. Plan to titrate dextrose up 50-75g/day until goal rate of 325g reached (GIR ~4.3).   11-08    141  |  112[H]  |  19  ----------------------------<  134[H]  4.3   |  28  |  0.66    Ca    8.5      08 Nov 2024 04:43  Phos  4.0     11-08  Mg     1.9     11-08    TPro  6.4  /  Alb  2.8[L]  /  TBili  0.2  /  DBili  x   /  AST  142[H]  /  ALT  179[H]  /  AlkPhos  134[H]  11-08      BMI: BMI (kg/m2): 23.5 (10-25-24 @ 18:30)  HbA1c: A1C with Estimated Average Glucose Result: 6.6 % (10-26-24 @ 06:47)    Glucose: POCT Blood Glucose.: 136 mg/dL (08 Nov 2024 07:56)    BP: 125/68 (11-04-24 @ 23:00) (99/56 - 141/59)Vital Signs Last 24 Hrs  T(C): 36.4 (11-04-24 @ 23:00), Max: 36.4 (11-04-24 @ 16:23)  T(F): 97.5 (11-04-24 @ 23:00), Max: 97.6 (11-04-24 @ 16:23)  HR: 74 (11-04-24 @ 23:00) (74 - 78)  BP: 125/68 (11-04-24 @ 23:00) (125/68 - 134/77)  BP(mean): 77 (11-04-24 @ 23:00) (77 - 77)  RR: 18 (11-04-24 @ 23:00) (18 - 18)  SpO2: 98% (11-04-24 @ 23:00) (95% - 98%)    Lipid Panel: Date/Time: 11-02-24 @ 04:16  Triglycerides, Serum: 204    POCT Blood Glucose.: 122 mg/dL (09 Nov 2024 06:20)  POCT Blood Glucose.: 111 mg/dL (09 Nov 2024 01:08)  POCT Blood Glucose.: 159 mg/dL (08 Nov 2024 17:19)  POCT Blood Glucose.: 124 mg/dL (08 Nov 2024 14:13)  POCT Blood Glucose.: 136 mg/dL (08 Nov 2024 07:56)    I&O's Detail - not doc'd; ***strict I&O's when on PN. ***  *BM - last doc'd 10/30; abdominal discomfort +N/V on 11/8. pt not on bowel regimen.  *edema: none doc'd    *malnutrition: Pt continues to meet criteria for moderate malnutrition in context of acute on chronic illness r/t decreased ability to meet increased nutrient needs 2/2 N/V, DM AEB >7.5% wt loss x 2mo, <50% ENN x 2 weeks, mild muscle/fat wasting    Estimated Needs: based on 55 Kg (wt from 10/27)  Calories: 0833-2002 Kcal (35-40 Kcal/Kg)  Protein: 66-83 g (1.2-1.5 g/Kg)  Fluids: 8481-9766 mL (35-40 mL/Kg)    Diet, Clear Liquid (11-07-24 @ 14:27) [Active]    Weight History:  Weight (kg): 52.8 (10-25-24 @ 18:30)    TPN Recommendations: via PICC Line  Total Volume: 2000 mL x 24 hours  80 g  Amino Acids  150 g Dextrose (titrate dextrose up 50-75g/day until goal rate of 325g reached.)  50 g Lipids 20%  80 mEq Sodium Chloride  47 mEq Sodium Acetate  20 mmol Sodium Phosphate  65 mEq Potassium Chloride  15 mEq Potassium Acetate  0 mmol Potassium Phosphate  0 mEq Calcium Gluconate (CAPS out of PN solution)   8 mEq Magnesium Sulfate  200 mg Thiamine  1 ml Trace Elements  10 ml MVI    Total Calories   1330   (Meets  64%  of  Estimated Energy needs  and  100%  Protein needs)       Additional Recommendations:  1) Daily weights  2) Strict I & O's  3) Daily lyte checks including magnesium and phos  4) Weekly triglycerides/LFT checks  5) POCT q6hrs; maintain 140-180mg/dL  6) Confirm goals of care regarding LONG-TERM nutrition support - if gastroparesis present and still unable to tolerate PO, would need GJ tube - ?if to be placed. PICC team consulted to place PICC line to better meet ENN.    *will continue to monitor and adjust PN prn*  Beatriz Calvo, MS, RDN, CDN, [facility] [phone]  Certified Nutrition

## 2024-11-09 NOTE — PROGRESS NOTE ADULT - SUBJECTIVE AND OBJECTIVE BOX
CHIEF COMPLAINT/INTERVAL HISTORY:    Patient is a 64y old  Female who presents with a chief complaint of inability to tolerate po (02 Nov 2024 13:01)      HPI:  64-year-old female with history of GERD  HTN, DM, polyps, and afib with 5 cardiac stents. presents with epigastric pain for the last couple of weeks.  Her daughter brought her in, after speaking with her GI doctor who stated patient should not have been on ozempic before and probably have taken insulin instead. She states that she had just uptitrated her dose of ozempic to 0.5 from 0.25 earlier this week. She had been on the 0.25 for 5 weeks. She states the pain bothers her more than the nausea and vomiting. Patient states she feels very minimal relief since she came in.  She denies NSAID use, bleeding or black stools. She reports 10lb weight loss and has been on ozempic for her diabetes. Denies diarrhea or urinary symptoms.       Adm for initiation of PPN; gastric emptying study for 11/6- failed  Abnormal MRCP; vascular also consulted. At this time diff diagnosis expanded because of the pain she is c/o of.  Liquid BM with pink blood  s/p ERCP and stent placement for CBD stone    Vital Signs Last 24 Hrs  T(C): 36.9 (09 Nov 2024 00:00), Max: 36.9 (08 Nov 2024 08:29)  T(F): 98.4 (09 Nov 2024 00:00), Max: 98.4 (08 Nov 2024 08:29)  HR: 93 (09 Nov 2024 00:00) (80 - 93)  BP: 102/53 (09 Nov 2024 00:00) (97/50 - 109/70)  BP(mean): --  RR: 16 (09 Nov 2024 00:00) (16 - 18)  SpO2: 95% (09 Nov 2024 00:00) (94% - 99%)    Parameters below as of 09 Nov 2024 00:00  Patient On (Oxygen Delivery Method): room air            PHYSICAL EXAM:      Constitutional: NAD  Eyes: perrl, no conjunctival changes  ENMT: no exudates, moist oral muc, uvula midline  Neck: no JVD, no LAD  Back: no cva tenderness  Respiratory: CTA, no exp wheezes  Cardiovascular: S1S2 reg, no murmur gallop or rub  Gastrointestinal: abd soft, NT/ND + BS  Genitourinary: voiding  Extremities: FROM, no joint effusions, no edema, no clubbing , no cyanosis  Vascular: pedal pulses + bilateral, warm extremities  Neurological: non focal, mot str 5/5/ all extr  Skin: no rashes  Lymph Nodes: no LAD      CAPILLARY BLOOD GLUCOSE      POCT Blood Glucose.: 131 mg/dL (07 Nov 2024 06:11)  POCT Blood Glucose.: 86 mg/dL (06 Nov 2024 23:14)  POCT Blood Glucose.: 91 mg/dL (06 Nov 2024 17:17)  POCT Blood Glucose.: 108 mg/dL (06 Nov 2024 11:14)          RADIOLOGY & ADDITIONAL TESTS: personally reviewed        Assessment and Plan: 64 year old woman with poor oral intake.       Intractable nausea with poor oral intake: multifactorial - due to adverse effects off Ozempic / gastritis / esophagitis / possible gastroparesis:   - off ozempic  - CT abd:  Status post colectomy with ileorectal anastomosis. No bowel obstruction.  - EGD/colonoscopy --> gastritis, esophagitits  - low fiber diet  - started PPN on 11/1,   Jtube if possible she had some intestine removed in the past  s/p ERCP and stent placement  - consult vascular  pt had colectomy with iliorectal anastomosis   - LGIB dc Eliquis for now      DM2 with possible gastroparesis:     - RAISS        Hypoglycemia / Moderate protein-calorie malnutrition:  - start PPN, pt still unable to tolerate PO intake  - daily labs      Paroxymal afib / HTN / CAD:  - off lisinopril due to borderline BP  - c/w eliquis, and metoprolol ( OAC on hold now) pt is in NSR  - s/p 5 stents last were 2 years ago  - plavix  - restart plavix         time spent: 55mincase d/w daughter     CHIEF COMPLAINT/INTERVAL HISTORY:    Patient is a 64y old  Female who presents with a chief complaint of inability to tolerate po (02 Nov 2024 13:01)      HPI:  64-year-old female with history of GERD  HTN, DM, polyps, and afib with 5 cardiac stents. presents with epigastric pain for the last couple of weeks.  Her daughter brought her in, after speaking with her GI doctor who stated patient should not have been on ozempic before and probably have taken insulin instead. She states that she had just uptitrated her dose of ozempic to 0.5 from 0.25 earlier this week. She had been on the 0.25 for 5 weeks. She states the pain bothers her more than the nausea and vomiting. Patient states she feels very minimal relief since she came in.  She denies NSAID use, bleeding or black stools. She reports 10lb weight loss and has been on ozempic for her diabetes. Denies diarrhea or urinary symptoms.       Adm for initiation of PPN; gastric emptying study for 11/6- failed  Abnormal MRCP; vascular also consulted. At this time diff diagnosis expanded because of the pain she is c/o of.  Liquid BM with pink blood  s/p ERCP and stent placement for CBD stone    Vital Signs Last 24 Hrs  T(C): 36.9 (09 Nov 2024 00:00), Max: 36.9 (08 Nov 2024 08:29)  T(F): 98.4 (09 Nov 2024 00:00), Max: 98.4 (08 Nov 2024 08:29)  HR: 93 (09 Nov 2024 00:00) (80 - 93)  BP: 102/53 (09 Nov 2024 00:00) (97/50 - 109/70)  BP(mean): --  RR: 16 (09 Nov 2024 00:00) (16 - 18)  SpO2: 95% (09 Nov 2024 00:00) (94% - 99%)    Parameters below as of 09 Nov 2024 00:00  Patient On (Oxygen Delivery Method): room air            PHYSICAL EXAM:      Constitutional: NAD  Eyes: perrl, no conjunctival changes  ENMT: no exudates, moist oral muc, uvula midline  Neck: no JVD, no LAD  Back: no cva tenderness  Respiratory: CTA, no exp wheezes  Cardiovascular: S1S2 reg, no murmur gallop or rub  Gastrointestinal: abd soft, NT/ND + BS  Genitourinary: voiding  Extremities: FROM, no joint effusions, no edema, no clubbing , no cyanosis  Vascular: pedal pulses + bilateral, warm extremities  Neurological: non focal, mot str 5/5/ all extr  Skin: no rashes  Lymph Nodes: no LAD      CAPILLARY BLOOD GLUCOSE      POCT Blood Glucose.: 131 mg/dL (07 Nov 2024 06:11)  POCT Blood Glucose.: 86 mg/dL (06 Nov 2024 23:14)  POCT Blood Glucose.: 91 mg/dL (06 Nov 2024 17:17)  POCT Blood Glucose.: 108 mg/dL (06 Nov 2024 11:14)          RADIOLOGY & ADDITIONAL TESTS: personally reviewed        Assessment and Plan: 64 year old woman with poor oral intake.       Intractable nausea with poor oral intake: multifactorial - due to adverse effects off Ozempic / gastritis / esophagitis / possible gastroparesis:   - off ozempic  - CT abd:  Status post colectomy with ileorectal anastomosis. No bowel obstruction.  - EGD/colonoscopy --> gastritis, esophagitits  s/p ERCP and stent placement  repeat MRCP; empiric Zosyn  pt had colectomy with iliorectal anastomosis   - LGIB resolved, hold  Eliquis in case procedure necessary      DM2 with possible gastroparesis:     - RAISS        Hypoglycemia / Moderate protein-calorie malnutrition:  - start PPN, pt still unable to tolerate PO intake  - daily labs      Paroxymal afib / HTN / CAD:  - off lisinopril due to borderline BP  - c/w eliquis, and metoprolol ( OAC on hold now) pt is in NSR  - s/p 5 stents last were 2 years ago  - plavix  - restart plavix         time spent: 55mincase d/w daughter

## 2024-11-10 LAB
ALBUMIN SERPL ELPH-MCNC: 2.8 G/DL — LOW (ref 3.3–5)
ALP SERPL-CCNC: 112 U/L — SIGNIFICANT CHANGE UP (ref 40–120)
ALT FLD-CCNC: 74 U/L — SIGNIFICANT CHANGE UP (ref 12–78)
ANION GAP SERPL CALC-SCNC: 2 MMOL/L — LOW (ref 5–17)
AST SERPL-CCNC: 31 U/L — SIGNIFICANT CHANGE UP (ref 15–37)
BILIRUB SERPL-MCNC: 0.3 MG/DL — SIGNIFICANT CHANGE UP (ref 0.2–1.2)
BUN SERPL-MCNC: 14 MG/DL — SIGNIFICANT CHANGE UP (ref 7–23)
CALCIUM SERPL-MCNC: 8.9 MG/DL — SIGNIFICANT CHANGE UP (ref 8.5–10.1)
CHLORIDE SERPL-SCNC: 108 MMOL/L — SIGNIFICANT CHANGE UP (ref 96–108)
CO2 SERPL-SCNC: 29 MMOL/L — SIGNIFICANT CHANGE UP (ref 22–31)
CREAT SERPL-MCNC: 0.72 MG/DL — SIGNIFICANT CHANGE UP (ref 0.5–1.3)
EGFR: 94 ML/MIN/1.73M2 — SIGNIFICANT CHANGE UP
GLUCOSE BLDC GLUCOMTR-MCNC: 146 MG/DL — HIGH (ref 70–99)
GLUCOSE BLDC GLUCOMTR-MCNC: 182 MG/DL — HIGH (ref 70–99)
GLUCOSE BLDC GLUCOMTR-MCNC: 268 MG/DL — HIGH (ref 70–99)
GLUCOSE BLDC GLUCOMTR-MCNC: 322 MG/DL — HIGH (ref 70–99)
GLUCOSE SERPL-MCNC: 169 MG/DL — HIGH (ref 70–99)
MAGNESIUM SERPL-MCNC: 2 MG/DL — SIGNIFICANT CHANGE UP (ref 1.6–2.6)
PHOSPHATE SERPL-MCNC: 2.9 MG/DL — SIGNIFICANT CHANGE UP (ref 2.5–4.5)
POTASSIUM SERPL-MCNC: 4.5 MMOL/L — SIGNIFICANT CHANGE UP (ref 3.5–5.3)
POTASSIUM SERPL-SCNC: 4.5 MMOL/L — SIGNIFICANT CHANGE UP (ref 3.5–5.3)
PROT SERPL-MCNC: 6.9 GM/DL — SIGNIFICANT CHANGE UP (ref 6–8.3)
SODIUM SERPL-SCNC: 139 MMOL/L — SIGNIFICANT CHANGE UP (ref 135–145)
TRIGL SERPL-MCNC: 160 MG/DL — HIGH

## 2024-11-10 PROCEDURE — 99233 SBSQ HOSP IP/OBS HIGH 50: CPT

## 2024-11-10 PROCEDURE — 74181 MRI ABDOMEN W/O CONTRAST: CPT | Mod: 26

## 2024-11-10 RX ORDER — PANTOPRAZOLE SODIUM 40 MG/1
40 TABLET, DELAYED RELEASE ORAL DAILY
Refills: 0 | Status: DISCONTINUED | OUTPATIENT
Start: 2024-11-10 | End: 2024-11-14

## 2024-11-10 RX ORDER — ARIPIPRAZOLE 15 MG/1
2 TABLET ORAL DAILY
Refills: 0 | Status: DISCONTINUED | OUTPATIENT
Start: 2024-11-11 | End: 2024-12-04

## 2024-11-10 RX ORDER — FAT EMULSIONS 20 %
0.95 EMULSION INTRAVENOUS
Qty: 50 | Refills: 0 | Status: DISCONTINUED | OUTPATIENT
Start: 2024-11-10 | End: 2024-11-10

## 2024-11-10 RX ORDER — SODIUM/POT/MAG/CALC/CHLOR/ACET 35-20-5MEQ
1 VIAL (ML) INTRAVENOUS
Refills: 0 | Status: DISCONTINUED | OUTPATIENT
Start: 2024-11-10 | End: 2024-11-10

## 2024-11-10 RX ADMIN — Medication 2: at 23:44

## 2024-11-10 RX ADMIN — PIPERACILLIN SODIUM AND TAZOBACTAM SODIUM 25 GRAM(S): 4; .5 INJECTION, POWDER, LYOPHILIZED, FOR SOLUTION INTRAVENOUS at 21:16

## 2024-11-10 RX ADMIN — Medication 6: at 11:52

## 2024-11-10 RX ADMIN — HYDROMORPHONE HYDROCHLORIDE 1 MILLIGRAM(S): 2 TABLET ORAL at 23:43

## 2024-11-10 RX ADMIN — Medication 1 EACH: at 23:50

## 2024-11-10 RX ADMIN — HYDROMORPHONE HYDROCHLORIDE 1 MILLIGRAM(S): 2 TABLET ORAL at 13:05

## 2024-11-10 RX ADMIN — HYDROMORPHONE HYDROCHLORIDE 1 MILLIGRAM(S): 2 TABLET ORAL at 02:58

## 2024-11-10 RX ADMIN — ROSUVASTATIN CALCIUM 10 MILLIGRAM(S): 5 TABLET, FILM COATED ORAL at 21:02

## 2024-11-10 RX ADMIN — KETOROLAC TROMETHAMINE 15 MILLIGRAM(S): 30 INJECTION INTRAMUSCULAR; INTRAVENOUS at 21:02

## 2024-11-10 RX ADMIN — CLOPIDOGREL 75 MILLIGRAM(S): 75 TABLET, FILM COATED ORAL at 09:37

## 2024-11-10 RX ADMIN — PIPERACILLIN SODIUM AND TAZOBACTAM SODIUM 25 GRAM(S): 4; .5 INJECTION, POWDER, LYOPHILIZED, FOR SOLUTION INTRAVENOUS at 05:54

## 2024-11-10 RX ADMIN — METOCLOPRAMIDE HYDROCHLORIDE 10 MILLIGRAM(S): 10 TABLET ORAL at 05:56

## 2024-11-10 RX ADMIN — PIPERACILLIN SODIUM AND TAZOBACTAM SODIUM 25 GRAM(S): 4; .5 INJECTION, POWDER, LYOPHILIZED, FOR SOLUTION INTRAVENOUS at 14:11

## 2024-11-10 RX ADMIN — Medication 1 PUFF(S): at 14:05

## 2024-11-10 RX ADMIN — HYDROMORPHONE HYDROCHLORIDE 1 MILLIGRAM(S): 2 TABLET ORAL at 03:30

## 2024-11-10 RX ADMIN — Medication 20.83 GM/KG/DAY: at 23:51

## 2024-11-10 RX ADMIN — ESCITALOPRAM OXALATE 10 MILLIGRAM(S): 10 TABLET, FILM COATED ORAL at 09:37

## 2024-11-10 RX ADMIN — KETOROLAC TROMETHAMINE 15 MILLIGRAM(S): 30 INJECTION INTRAMUSCULAR; INTRAVENOUS at 21:30

## 2024-11-10 RX ADMIN — PANTOPRAZOLE SODIUM 40 MILLIGRAM(S): 40 TABLET, DELAYED RELEASE ORAL at 05:54

## 2024-11-10 RX ADMIN — Medication 8: at 17:20

## 2024-11-10 NOTE — PROGRESS NOTE ADULT - SUBJECTIVE AND OBJECTIVE BOX
CHIEF COMPLAINT/INTERVAL HISTORY:    Patient is a 64y old  Female who presents with a chief complaint of inability to tolerate po (02 Nov 2024 13:01)      HPI:  64-year-old female with history of GERD  HTN, DM, polyps, and afib with 5 cardiac stents. presents with epigastric pain for the last couple of weeks.  Her daughter brought her in, after speaking with her GI doctor who stated patient should not have been on ozempic before and probably have taken insulin instead. She states that she had just uptitrated her dose of ozempic to 0.5 from 0.25 earlier this week. She had been on the 0.25 for 5 weeks. She states the pain bothers her more than the nausea and vomiting. Patient states she feels very minimal relief since she came in.  She denies NSAID use, bleeding or black stools. She reports 10lb weight loss and has been on ozempic for her diabetes. Denies diarrhea or urinary symptoms.       Adm for initiation of PPN for presumed gastroparesis; gastric emptying study for 11/6- failed. Had a 1.5cm CBD dilation ( s/p calvin).   Abnormal MRCP; s/p ERCP and stent placement for CBD stone  Pain has worsened the past few days, now requiring Dilaudid  PICCinserted 11/9 for TPN initiation      Vital Signs Last 24 Hrs  T(C): 37.1 (09 Nov 2024 21:09), Max: 37.1 (09 Nov 2024 21:09)  T(F): 98.7 (09 Nov 2024 21:09), Max: 98.7 (09 Nov 2024 21:09)  HR: 97 (09 Nov 2024 21:09) (89 - 97)  BP: 118/67 (09 Nov 2024 21:09) (118/67 - 129/69)  BP(mean): --  RR: 18 (09 Nov 2024 21:09) (18 - 18)  SpO2: 95% (09 Nov 2024 21:09) (95% - 96%)    Parameters below as of 09 Nov 2024 21:09  Patient On (Oxygen Delivery Method): room air        PHYSICAL EXAM:      Constitutional: NAD  Eyes: perrl, no conjunctival changes  ENMT: no exudates, moist oral muc, uvula midline  Neck: no JVD, no LAD  Back: no cva tenderness  Respiratory: CTA, no exp wheezes  Cardiovascular: S1S2 reg, no murmur gallop or rub  Gastrointestinal: + epig pain  Genitourinary: voiding  Extremities: FROM, no joint effusions, no edema, no clubbing , no cyanosis  Vascular: pedal pulses + bilateral, warm extremities  Neurological: non focal, mot str 5/5/ all extr  Skin: no rashes  Lymph Nodes: no LAD      CAPILLARY BLOOD GLUCOSE      POCT Blood Glucose.: 131 mg/dL (07 Nov 2024 06:11)  POCT Blood Glucose.: 86 mg/dL (06 Nov 2024 23:14)  POCT Blood Glucose.: 91 mg/dL (06 Nov 2024 17:17)  POCT Blood Glucose.: 108 mg/dL (06 Nov 2024 11:14)          RADIOLOGY & ADDITIONAL TESTS: personally reviewed    MRCP 11/6  IMPRESSION:  Marked intrahepatic biliary ductal dilatation with multiple small filling   defects within the distal common bile duct, likely representing   choledocholithiasis.    Assessment and Plan: 64 year old woman with poor oral intake.       * Intractable nausea with poor oral intake: multifactorial - due to adverse effects off Ozempic / gastritis / esophagitis / possible gastroparesis:   - off ozempic  - CT abd:  Status post colectomy with ileorectal anastomosis. No bowel obstruction.  - EGD/colonoscopy --> gastritis, esophagitits  s/p ERCP and stent placement  repeat MRCP; empiric Zosyn  pt had colectomy with iliorectal anastomosis   - LGIB resolved, hold  Eliquis in case procedure necessary      DM2 with possible gastroparesis:     - RAISS        Hypoglycemia / Moderate protein-calorie malnutrition:  - start PPN, pt still unable to tolerate PO intake  - daily labs      Paroxymal afib / HTN / CAD:  - off lisinopril due to borderline BP  Eliquis on hold  - s/p 5 stents last were 2 years ago  - plavix  - restart plavix         time spent: 55mincase d/w daughter     CHIEF COMPLAINT/INTERVAL HISTORY:    Patient is a 64y old  Female who presents with a chief complaint of inability to tolerate po (02 Nov 2024 13:01)      HPI:  64-year-old female with history of GERD  HTN, DM, polyps, and afib with 5 cardiac stents. presents with epigastric pain for the last couple of weeks.  Her daughter brought her in, after speaking with her GI doctor who stated patient should not have been on ozempic before and probably have taken insulin instead. She states that she had just uptitrated her dose of ozempic to 0.5 from 0.25 earlier this week. She had been on the 0.25 for 5 weeks. She states the pain bothers her more than the nausea and vomiting. Patient states she feels very minimal relief since she came in.  She denies NSAID use, bleeding or black stools. She reports 10lb weight loss and has been on ozempic for her diabetes. Denies diarrhea or urinary symptoms.       Adm for initiation of PPN for presumed gastroparesis; gastric emptying study for 11/6- failed. Had a 1.5cm CBD dilation ( s/p calvin).   Abnormal MRCP; s/p ERCP and stent placement for CBD stone  Pain has worsened the past few days, now requiring Dilaudid  PICC inserted 11/9 for TPN initiation  Empiric abx started 11/9 for low grade temp and worsening pain; today is the first day she reports decreased       Vital Signs Last 24 Hrs  T(C): 37.1 (09 Nov 2024 21:09), Max: 37.1 (09 Nov 2024 21:09)  T(F): 98.7 (09 Nov 2024 21:09), Max: 98.7 (09 Nov 2024 21:09)  HR: 97 (09 Nov 2024 21:09) (89 - 97)  BP: 118/67 (09 Nov 2024 21:09) (118/67 - 129/69)  BP(mean): --  RR: 18 (09 Nov 2024 21:09) (18 - 18)  SpO2: 95% (09 Nov 2024 21:09) (95% - 96%)    Parameters below as of 09 Nov 2024 21:09  Patient On (Oxygen Delivery Method): room air        PHYSICAL EXAM:      Constitutional: NAD  Eyes: perrl, no conjunctival changes  ENMT: no exudates, moist oral muc, uvula midline  Neck: no JVD, no LAD  Back: no cva tenderness  Respiratory: CTA, no exp wheezes  Cardiovascular: S1S2 reg, no murmur gallop or rub  Gastrointestinal: + epig pain  Genitourinary: voiding  Extremities: FROM, no joint effusions, no edema, no clubbing , no cyanosis  Vascular: pedal pulses + bilateral, warm extremities  Neurological: non focal, mot str 5/5/ all extr  Skin: no rashes  Lymph Nodes: no LAD      CAPILLARY BLOOD GLUCOSE      POCT Blood Glucose.: 131 mg/dL (07 Nov 2024 06:11)  POCT Blood Glucose.: 86 mg/dL (06 Nov 2024 23:14)  POCT Blood Glucose.: 91 mg/dL (06 Nov 2024 17:17)  POCT Blood Glucose.: 108 mg/dL (06 Nov 2024 11:14)          RADIOLOGY & ADDITIONAL TESTS: personally reviewed    MRCP 11/6  IMPRESSION:  Marked intrahepatic biliary ductal dilatation with multiple small filling   defects within the distal common bile duct, likely representing   choledocholithiasis.    Assessment and Plan: 64 year old woman with poor oral intake.       * Intractable nausea with poor oral intake: multifactorial - due to adverse effects off Ozempic / gastritis / esophagitis / possible gastroparesis:   - off ozempic  - CT abd:  Status post colectomy with ileorectal anastomosis. No bowel obstruction.  - EGD/colonoscopy --> gastritis, esophagitits  s/p ERCP and stent placement  repeat MRCP; empiric Zosyn  pt had colectomy with iliorectal anastomosis   - LGIB resolved, hold  Eliquis in case procedure necessary      DM2 with possible gastroparesis:     - RAISS        Hypoglycemia / Moderate protein-calorie malnutrition:  - start PPN, pt still unable to tolerate PO intake  - daily labs      Paroxymal afib / HTN / CAD:  - off lisinopril due to borderline BP  Eliquis on hold  - s/p 5 stents last were 2 years ago  - plavix  - restart plavix         time spent: 55mincase d/w daughter     CHIEF COMPLAINT/INTERVAL HISTORY:    Patient is a 64y old  Female who presents with a chief complaint of inability to tolerate po (02 Nov 2024 13:01)      HPI:  64-year-old female with history of GERD  HTN, DM, polyps, and afib with 5 cardiac stents. presents with epigastric pain for the last couple of weeks.  Her daughter brought her in, after speaking with her GI doctor who stated patient should not have been on ozempic before and probably have taken insulin instead. She states that she had just uptitrated her dose of ozempic to 0.5 from 0.25 earlier this week. She had been on the 0.25 for 5 weeks. She states the pain bothers her more than the nausea and vomiting. Patient states she feels very minimal relief since she came in.  She denies NSAID use, bleeding or black stools. She reports 10lb weight loss and has been on ozempic for her diabetes. Denies diarrhea or urinary symptoms.       Adm for initiation of PPN for presumed gastroparesis; gastric emptying study for 11/6- failed. Had a 1.5cm CBD dilation ( s/p calvin).   Abnormal MRCP; s/p ERCP and stent placement for CBD stone  Pain has worsened the past few days, now requiring Dilaudid  PICC inserted 11/9 for TPN initiation  Empiric abx started 11/9 for low grade temp and worsening pain; today is the first day she reports decreased       Vital Signs Last 24 Hrs  T(C): 36.7 (10 Nov 2024 08:50), Max: 37.1 (09 Nov 2024 21:09)  T(F): 98 (10 Nov 2024 08:50), Max: 98.7 (09 Nov 2024 21:09)  HR: 100 (10 Nov 2024 08:50) (89 - 100)  BP: 96/59 (10 Nov 2024 08:50) (96/59 - 129/69)  BP(mean): --  RR: 17 (10 Nov 2024 08:50) (17 - 18)  SpO2: 95% (10 Nov 2024 08:50) (95% - 96%)    Parameters below as of 10 Nov 2024 08:50  Patient On (Oxygen Delivery Method): room air        PHYSICAL EXAM:      Constitutional: NAD  Eyes: perrl, no conjunctival changes  ENMT: no exudates, moist oral muc, uvula midline  Neck: no JVD, no LAD  Back: no cva tenderness  Respiratory: CTA, no exp wheezes  Cardiovascular: S1S2 reg, no murmur gallop or rub  Gastrointestinal: + epig pain  Genitourinary: voiding  Extremities: FROM, no joint effusions, no edema, no clubbing , no cyanosis  Vascular: pedal pulses + bilateral, warm extremities  Neurological: non focal, mot str 5/5/ all extr  Skin: no rashes  Lymph Nodes: no LAD      CAPILLARY BLOOD GLUCOSE      POCT Blood Glucose.: 268 mg/dL (10 Nov 2024 11:49)  POCT Blood Glucose.: 146 mg/dL (10 Nov 2024 06:09)  POCT Blood Glucose.: 144 mg/dL (09 Nov 2024 23:19)  POCT Blood Glucose.: 159 mg/dL (09 Nov 2024 17:10)  POCT Blood Glucose.: 146 mg/dL (09 Nov 2024 12:33)      we can tell she is eating something, BGMs are going up      RADIOLOGY & ADDITIONAL TESTS: personally reviewed    MRCP 11/6  IMPRESSION:  Marked intrahepatic biliary ductal dilatation with multiple small filling   defects within the distal common bile duct, likely representing   choledocholithiasis.    Assessment and Plan: 64 year old woman with poor oral intake.       * Intractable nausea with poor oral intake: multifactorial - due to adverse effects off Ozempic / gastritis / esophagitis / possible gastroparesis:   - pt had colectomy with iliorectal anastomosis for IBD  - CT abd:  Status post colectomy with ileorectal anastomosis. No bowel obstruction. Dilated CBD @1.5 cm s/p calvin  - EGD/colonoscopy --> gastritis, esophagitits  s/p ERCP and stent placement after MRCP showed stones  repeat MRCP today ; empiric Zosyn for increased pain and low grade temp  - LGIB resolved, hold  Eliquis in case procedure necessary    If the stone is not the cause one could ask if this can be an IBD exacerbation on the small bowel mucosae since she did have some blood;. I will check ESR and CRP- if markedly elevated it could be indicative of poss IBD and steroids might be necessary    Pt has no h/o schizofrenia Abilify was Rx for insomnia and it can cause N/V; by talking to daughter it could be that the symptoms started at the time this was Rx; taper to dc Abilify, decrease to 2.5mg. None tonight; dc Reglan alos not safe in combination with Abilify    DM2 with possible gastroparesis:   doubt gastroparesis  - RAISS        Hypoglycemia / Moderate protein-calorie malnutrition:  - started on TPN 11/8 and PICC line inserted       Paroxymal afib / HTN / CAD:  - off lisinopril due to borderline BP  Eliquis on hold due to 1 ep bloody diarrhea and for poss further procedure  - s/p 5 stents last were 2 years ago  - plavix      Dispo: get MRCP result maybe stone passed; no Abilify tonight if symptoms better consult psych to see if we can just dc. If not results fam wants her transfer no PEG for now per HCP    time spent: 75mincase d/w daughter

## 2024-11-10 NOTE — CHART NOTE - NSCHARTNOTEFT_GEN_A_CORE
Clinical Nutrition PN Follow Up Note    *64-year-old female with history of GERD  HTN, DM, polyps, and afib with 5 cardiac stents. presents with epigastric pain for the last couple of weeks.  Her daughter brought her in, after speaking with her GI doctor who stated patient should not have been on ozempic before and probably have taken insulin instead. She states that she had just uptitrated her dose of ozempic to 0.5 from 0.25 earlier this week. She had been on the 0.25 for 5 weeks. She states the pain bothers her more than the nausea and vomiting. Patient states she feels very minimal relief since she came in.  She denies NSAID use, bleeding or black stools. She reports 10lb weight loss and has been on ozempic for her diabetes. Denies diarrhea or urinary symptoms. Admitted for Nausea and vomiting  *11/1: D/w Dr. Hernandez - PPN Day #1; PPN to be initiated as a bridge 2/2 prolonged PO intolerance/poor PO intake; awaiting gastric emptying study. Per GI note 10/27: abd USG inconclusive s/p cholecystectomy. Rec for EGD/Colonoscopy early next week. 10/30: now s/p EGD and colonoscopy - egd with findings of gastritis and esophagitis without ulcers colonoscopy with patent anastomosis - no acute findings. Pt placed on regular low fiber diet - vomited lunch on 10/31 - complains of throat pain after egd; epigastric pain minimal.  Per hospitalist: "at this point, will need to rule out gastroparesis with gastric emptying study - per nuclear medicine the earliest it could be done would be Tuesday 11/5- need to order isotpope - Trial of 5mg liquid Reglan QID 15 min before meals and at bedtime."   PPN is not to be a long-term nutrition support, pending gastric emptying study if needing long-term nutrition support would recommend GJ-tube if gastroparesis is present. Strongly suggest to confirm goals of care regarding LONG-TERM nutrition support.  *11/2: PPN day #2. No pertinent events overnight. Remains w/ N/V and abdominal distension per flowsheet. Will c/w PPN.   *11/3: PPN day #3. Per RN documentation, no events of N/V overnight. Was drinking ensure shakes and tolerating. C/o N/V during the day per hospitalist note. Will c/w PPN for now. Pending gastric emptying study, tentative Tuesday 11/5.  *11/6: PPN day #6: Did not receive PPN last night 2/2 per nuclear medicine not allowed to receive anything past midnight (?). EGD done yesterday which came back inconclusive. Per discussion w/ Dr. Ulrich, pt will need J tube based on gastric emptying study. Will c/w PPN for now until J tube to be placed.    *11/7: PPN day #7. Abnormal MRCP; vascular also consulted. At this time diff diagnosis expanded 2/2 pain. 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 GI recommended to c/w FLD for now and trial Reglan 3x daily to alleviate symptoms. Passed bloody BM this morning w/ abdominal pain, trend H/H. Vascular surgery consulted to evaluate for median arcuate ligament syndrome however no intervention to be done at this time as no imaging finding suggestive of median arcuate ligament syndrome. D/w hospitalist, will c/w PPN until GJ tube can be placed.   *11/8: PPN day #8. GI consulted yesterday, per note: "Abdominal pain could be secondary to distal CBD stones/sludge with likely accomodation biliary flow due to dilated duct post choly (normal lft's)." Is s/p ERCP yesterday w/ Dr. Moncada - 2 stents were placed and obtained biopsy of nodular papilla. Currently c/o abdominal discomfort/ pain s/p ERCP yesterday, D/w hospitalist, will c/w PPN for now, ?if GJ tube will be placed. Consult placed for PICC team since pt on PPN >6 days and meeting <75% ENN x 8 days.  11/9: S/p PICC placement (11/8) and transitioned to TPN. ?if GJ tube will be placed as pt has had intestines removed in the past.    PPN initiated 2/2 prolonged PO intolerance, N/V, awaiting gastric emptying study    *current status: Continues to be on CLD. No acute changes overnight. Plan to c/w TPN.    *pertinent meds: Abilify, Lexapro, Magic Mouthwash, Admelog (2 units x 24 hours), Reglan, Metoprolol, Protonix, Abx, Acetaminophen, Xanax, Dilaudid    *labs reviewed; Na, K+, And Mg WNL. Phos on lower end of normal limits, will increase in PN bag. T Bili WNL to c/w trace elements. corrected Ca on higher end of normal limits, DO NOT supplement calcium outside of PN bag at this time. Will c/w thiamine and MVI and minerals in the PN bag today. Triglyceride WNL (204), will c/w 50 g of lipids. POCTs x 24 hours WNL. Plan to titrate dextrose up 50-75g/day until goal rate of 325g reached (GIR ~4.3).   11-10    139  |  108  |  14  ----------------------------<  169[H]  4.5   |  29  |  0.72    Ca    8.9      10 Nov 2024 04:34  Phos  2.9     11-10  Mg     2.0     11-10    TPro  6.9  /  Alb  2.8[L]  /  TBili  0.3  /  DBili  x   /  AST  31  /  ALT  74  /  AlkPhos  112  11-10    BMI: BMI (kg/m2): 23.5 (10-25-24 @ 18:30)  HbA1c: A1C with Estimated Average Glucose Result: 6.6 % (10-26-24 @ 06:47)    Glucose: POCT Blood Glucose.: 136 mg/dL (08 Nov 2024 07:56)    BP: 125/68 (11-04-24 @ 23:00) (99/56 - 141/59)Vital Signs Last 24 Hrs  T(C): 36.4 (11-04-24 @ 23:00), Max: 36.4 (11-04-24 @ 16:23)  T(F): 97.5 (11-04-24 @ 23:00), Max: 97.6 (11-04-24 @ 16:23)  HR: 74 (11-04-24 @ 23:00) (74 - 78)  BP: 125/68 (11-04-24 @ 23:00) (125/68 - 134/77)  BP(mean): 77 (11-04-24 @ 23:00) (77 - 77)  RR: 18 (11-04-24 @ 23:00) (18 - 18)  SpO2: 98% (11-04-24 @ 23:00) (95% - 98%)    Lipid Panel: Date/Time: 11-02-24 @ 04:16  Triglycerides, Serum: 204    POCT Blood Glucose.: 146 mg/dL (10 Nov 2024 06:09)  POCT Blood Glucose.: 144 mg/dL (09 Nov 2024 23:19)  POCT Blood Glucose.: 159 mg/dL (09 Nov 2024 17:10)  POCT Blood Glucose.: 146 mg/dL (09 Nov 2024 12:33)    *I&O's Detaill - not doc'd; ***strict I&O's when on PN. ***  *BM - last doc'd 10/30; abdominal discomfort +N/V on 11/8. pt not on bowel regimen.  *edema: none doc'd    *malnutrition: Pt continues to meet criteria for moderate malnutrition in context of acute on chronic illness r/t decreased ability to meet increased nutrient needs 2/2 N/V, DM AEB >7.5% wt loss x 2mo, <50% ENN x 2 weeks, mild muscle/fat wasting    Estimated Needs: based on 55 Kg (wt from 10/27)  Calories: 9732-5119 Kcal (35-40 Kcal/Kg)  Protein: 66-83 g (1.2-1.5 g/Kg)  Fluids: 0686-6120 mL (35-40 mL/Kg)    Diet, Clear Liquid (11-07-24 @ 14:27) [Active]    Weight History:  Weight (kg): 52.8 (10-25-24 @ 18:30)    TPN Recommendations: via PICC Line  Total Volume: 2000 mL x 24 hours  80 g  Amino Acids  225 g Dextrose (titrate dextrose up 50-75g/day until goal rate of 325g reached.)  50 g Lipids 20%  80 mEq Sodium Chloride  47 mEq Sodium Acetate  20 mmol Sodium Phosphate  60 mEq Potassium Chloride  13 mEq Potassium Acetate  5 mmol Potassium Phosphate  0 mEq Calcium Gluconate (CAPS out of PN solution)   8 mEq Magnesium Sulfate  200 mg Thiamine  1 ml Trace Elements  10 ml MVI    Total Calories   1585   (Meets  76%  of  Estimated Energy needs  and  100%  Protein needs)       Additional Recommendations:  1) Daily weights  2) Strict I & O's  3) Daily lyte checks including magnesium and phos  4) Weekly triglycerides/LFT checks  5) POCT q6hrs; maintain 140-180mg/dL  6) Confirm goals of care regarding LONG-TERM nutrition support - if gastroparesis present and still unable to tolerate PO, would need GJ tube - ?if to be placed.    *will continue to monitor and adjust PN prn*  Beatriz Calvo MS, RDN, CDN, [facility] [phone]  Certified Nutrition

## 2024-11-11 LAB
ALBUMIN SERPL ELPH-MCNC: 2.6 G/DL — LOW (ref 3.3–5)
ALP SERPL-CCNC: 95 U/L — SIGNIFICANT CHANGE UP (ref 40–120)
ALT FLD-CCNC: 54 U/L — SIGNIFICANT CHANGE UP (ref 12–78)
ANION GAP SERPL CALC-SCNC: 2 MMOL/L — LOW (ref 5–17)
AST SERPL-CCNC: 19 U/L — SIGNIFICANT CHANGE UP (ref 15–37)
BILIRUB SERPL-MCNC: 0.2 MG/DL — SIGNIFICANT CHANGE UP (ref 0.2–1.2)
BUN SERPL-MCNC: 16 MG/DL — SIGNIFICANT CHANGE UP (ref 7–23)
CALCIUM SERPL-MCNC: 8.6 MG/DL — SIGNIFICANT CHANGE UP (ref 8.5–10.1)
CHLORIDE SERPL-SCNC: 111 MMOL/L — HIGH (ref 96–108)
CO2 SERPL-SCNC: 26 MMOL/L — SIGNIFICANT CHANGE UP (ref 22–31)
CREAT SERPL-MCNC: 0.72 MG/DL — SIGNIFICANT CHANGE UP (ref 0.5–1.3)
EGFR: 94 ML/MIN/1.73M2 — SIGNIFICANT CHANGE UP
GLUCOSE BLDC GLUCOMTR-MCNC: 231 MG/DL — HIGH (ref 70–99)
GLUCOSE BLDC GLUCOMTR-MCNC: 289 MG/DL — HIGH (ref 70–99)
GLUCOSE BLDC GLUCOMTR-MCNC: 294 MG/DL — HIGH (ref 70–99)
GLUCOSE BLDC GLUCOMTR-MCNC: 305 MG/DL — HIGH (ref 70–99)
GLUCOSE SERPL-MCNC: 251 MG/DL — HIGH (ref 70–99)
MAGNESIUM SERPL-MCNC: 1.9 MG/DL — SIGNIFICANT CHANGE UP (ref 1.6–2.6)
PHOSPHATE SERPL-MCNC: 3.3 MG/DL — SIGNIFICANT CHANGE UP (ref 2.5–4.5)
POTASSIUM SERPL-MCNC: 4.3 MMOL/L — SIGNIFICANT CHANGE UP (ref 3.5–5.3)
POTASSIUM SERPL-SCNC: 4.3 MMOL/L — SIGNIFICANT CHANGE UP (ref 3.5–5.3)
PROT SERPL-MCNC: 6.8 GM/DL — SIGNIFICANT CHANGE UP (ref 6–8.3)
SODIUM SERPL-SCNC: 139 MMOL/L — SIGNIFICANT CHANGE UP (ref 135–145)

## 2024-11-11 PROCEDURE — 99233 SBSQ HOSP IP/OBS HIGH 50: CPT

## 2024-11-11 RX ORDER — METOCLOPRAMIDE HYDROCHLORIDE 10 MG/1
10 TABLET ORAL EVERY 8 HOURS
Refills: 0 | Status: DISCONTINUED | OUTPATIENT
Start: 2024-11-11 | End: 2024-11-14

## 2024-11-11 RX ORDER — SODIUM/POT/MAG/CALC/CHLOR/ACET 35-20-5MEQ
1 VIAL (ML) INTRAVENOUS
Refills: 0 | Status: DISCONTINUED | OUTPATIENT
Start: 2024-11-11 | End: 2024-11-11

## 2024-11-11 RX ORDER — METOCLOPRAMIDE HYDROCHLORIDE 10 MG/1
10 TABLET ORAL EVERY 8 HOURS
Refills: 0 | Status: DISCONTINUED | OUTPATIENT
Start: 2024-11-11 | End: 2024-11-11

## 2024-11-11 RX ORDER — FAT EMULSIONS 20 %
0.95 EMULSION INTRAVENOUS
Qty: 50 | Refills: 0 | Status: DISCONTINUED | OUTPATIENT
Start: 2024-11-11 | End: 2024-11-11

## 2024-11-11 RX ADMIN — Medication 20.83 GM/KG/DAY: at 23:51

## 2024-11-11 RX ADMIN — ESCITALOPRAM OXALATE 10 MILLIGRAM(S): 10 TABLET, FILM COATED ORAL at 09:06

## 2024-11-11 RX ADMIN — CLOPIDOGREL 75 MILLIGRAM(S): 75 TABLET, FILM COATED ORAL at 09:06

## 2024-11-11 RX ADMIN — HYDROMORPHONE HYDROCHLORIDE 1 MILLIGRAM(S): 2 TABLET ORAL at 16:25

## 2024-11-11 RX ADMIN — ARIPIPRAZOLE 2 MILLIGRAM(S): 15 TABLET ORAL at 09:06

## 2024-11-11 RX ADMIN — Medication 1 EACH: at 23:49

## 2024-11-11 RX ADMIN — HYDROMORPHONE HYDROCHLORIDE 1 MILLIGRAM(S): 2 TABLET ORAL at 00:30

## 2024-11-11 RX ADMIN — HYDROMORPHONE HYDROCHLORIDE 1 MILLIGRAM(S): 2 TABLET ORAL at 10:14

## 2024-11-11 RX ADMIN — PIPERACILLIN SODIUM AND TAZOBACTAM SODIUM 25 GRAM(S): 4; .5 INJECTION, POWDER, LYOPHILIZED, FOR SOLUTION INTRAVENOUS at 14:24

## 2024-11-11 RX ADMIN — ROSUVASTATIN CALCIUM 10 MILLIGRAM(S): 5 TABLET, FILM COATED ORAL at 20:59

## 2024-11-11 RX ADMIN — METOPROLOL TARTRATE 100 MILLIGRAM(S): 100 TABLET, FILM COATED ORAL at 09:06

## 2024-11-11 RX ADMIN — ONDANSETRON HYDROCHLORIDE 4 MILLIGRAM(S): 4 TABLET, FILM COATED ORAL at 09:06

## 2024-11-11 RX ADMIN — PIPERACILLIN SODIUM AND TAZOBACTAM SODIUM 25 GRAM(S): 4; .5 INJECTION, POWDER, LYOPHILIZED, FOR SOLUTION INTRAVENOUS at 20:56

## 2024-11-11 RX ADMIN — Medication 0.5 MILLIGRAM(S): at 02:00

## 2024-11-11 RX ADMIN — PANTOPRAZOLE SODIUM 40 MILLIGRAM(S): 40 TABLET, DELAYED RELEASE ORAL at 09:06

## 2024-11-11 RX ADMIN — METOCLOPRAMIDE HYDROCHLORIDE 10 MILLIGRAM(S): 10 TABLET ORAL at 16:02

## 2024-11-11 RX ADMIN — HYDROMORPHONE HYDROCHLORIDE 1 MILLIGRAM(S): 2 TABLET ORAL at 20:56

## 2024-11-11 RX ADMIN — PIPERACILLIN SODIUM AND TAZOBACTAM SODIUM 25 GRAM(S): 4; .5 INJECTION, POWDER, LYOPHILIZED, FOR SOLUTION INTRAVENOUS at 06:18

## 2024-11-11 RX ADMIN — Medication 8: at 23:54

## 2024-11-11 RX ADMIN — Medication 4: at 06:22

## 2024-11-11 RX ADMIN — Medication 6: at 12:33

## 2024-11-11 RX ADMIN — Medication 6: at 17:11

## 2024-11-11 NOTE — CHART NOTE - NSCHARTNOTEFT_GEN_A_CORE
Clinical Nutrition PN Follow Up Note    *64-year-old female with history of GERD  HTN, DM, polyps, and afib with 5 cardiac stents. presents with epigastric pain for the last couple of weeks.  Her daughter brought her in, after speaking with her GI doctor who stated patient should not have been on ozempic before and probably have taken insulin instead. She states that she had just uptitrated her dose of ozempic to 0.5 from 0.25 earlier this week. She had been on the 0.25 for 5 weeks. She states the pain bothers her more than the nausea and vomiting. Patient states she feels very minimal relief since she came in.  She denies NSAID use, bleeding or black stools. She reports 10lb weight loss and has been on ozempic for her diabetes. Denies diarrhea or urinary symptoms. Admitted for Nausea and vomiting  *11/1: D/w Dr. Hernandez - PPN Day #1; PPN to be initiated as a bridge 2/2 prolonged PO intolerance/poor PO intake; awaiting gastric emptying study. Per GI note 10/27: abd USG inconclusive s/p cholecystectomy. Rec for EGD/Colonoscopy early next week. 10/30: now s/p EGD and colonoscopy - egd with findings of gastritis and esophagitis without ulcers colonoscopy with patent anastomosis - no acute findings. Pt placed on regular low fiber diet - vomited lunch on 10/31 - complains of throat pain after egd; epigastric pain minimal.  Per hospitalist: "at this point, will need to rule out gastroparesis with gastric emptying study - per nuclear medicine the earliest it could be done would be Tuesday 11/5- need to order isotpope - Trial of 5mg liquid Reglan QID 15 min before meals and at bedtime."   PPN is not to be a long-term nutrition support, pending gastric emptying study if needing long-term nutrition support would recommend GJ-tube if gastroparesis is present. Strongly suggest to confirm goals of care regarding LONG-TERM nutrition support.  *11/2: PPN day #2. No pertinent events overnight. Remains w/ N/V and abdominal distension per flowsheet. Will c/w PPN.   *11/3: PPN day #3. Per RN documentation, no events of N/V overnight. Was drinking ensure shakes and tolerating. C/o N/V during the day per hospitalist note. Will c/w PPN for now. Pending gastric emptying study, tentative Tuesday 11/5.  *11/6: PPN day #6: Did not receive PPN last night 2/2 per nuclear medicine not allowed to receive anything past midnight (?). EGD done yesterday which came back inconclusive. Per discussion w/ Dr. Ulrich, pt will need J tube based on gastric emptying study. Will c/w PPN for now until J tube to be placed.    *11/7: PPN day #7. Abnormal MRCP; vascular also consulted. At this time diff diagnosis expanded 2/2 pain. 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 GI recommended to c/w FLD for now and trial Reglan 3x daily to alleviate symptoms. Passed bloody BM this morning w/ abdominal pain, trend H/H. Vascular surgery consulted to evaluate for median arcuate ligament syndrome however no intervention to be done at this time as no imaging finding suggestive of median arcuate ligament syndrome. D/w hospitalist, will c/w PPN until GJ tube can be placed.   *11/8: PPN day #8. GI consulted yesterday, per note: "Abdominal pain could be secondary to distal CBD stones/sludge with likely accomodation biliary flow due to dilated duct post choly (normal lft's)." Is s/p ERCP yesterday w/ Dr. Moncada - 2 stents were placed and obtained biopsy of nodular papilla. Currently c/o abdominal discomfort/ pain s/p ERCP yesterday, D/w hospitalist, will c/w PPN for now, ?if GJ tube will be placed. Consult placed for PICC team since pt on PPN >6 days and meeting <75% ENN x 8 days.  11/9: S/p PICC placement (11/8) and transitioned to TPN. ?if GJ tube will be placed as pt has had intestines removed in the past.    PPN initiated 2/2 prolonged PO intolerance, N/V, awaiting gastric emptying study    *current status: Remains on CLD. Now w/ low grade temp and worsening pain, started on empiric abx. Per hospitalist note: "If the stone is not the cause one could ask if this can be an IBD exacerbation on the small bowel mucosae since she did have some blood; check ESR and CRP- if markedly elevated it could be indicative of poss IBD and steroids might be necessary." Plan to c/w TPN for now. Hyperglycemia noted x 24 hrs - will hold off on titrating dextrose today.    *pertinent meds: Abilify, Lexapro, Magic Mouthwash (did not receive x 24 hrs), Admelog (20 units x 24 hours), Reglan, Metoprolol, Protonix, Abx in D5, Acetaminophen, Xanax, Dilaudid    *labs reviewed; Na, K+, Phos and Mg WNL. Replete lytes outside of PN bag prn. T Bili WNL to c/w trace elements. corrected Ca on higher end of normal limits, DO NOT supplement calcium outside of PN bag at this time. Will c/w thiamine and MVI and minerals in the PN bag today. New Triglyceride WNL (160), will c/w 50 g of lipids. POCTs x 24 hours elevated. Plan to titrate dextrose up 50-75g/day until goal rate of 325g reached (GIR ~4.3) however d/t hyperglycemia will hold off on titrating dextrose today and will add 10 units of insulin in PN bag (~.05 units insulin/ grams of dextrose).   11-11    139  |  111[H]  |  16  ----------------------------<  251[H]  4.3   |  26  |  0.72    Ca    8.6      11 Nov 2024 04:52  Phos  3.3     11-11  Mg     1.9     11-11    TPro  6.8  /  Alb  2.6[L]  /  TBili  0.2  /  DBili  x   /  AST  19  /  ALT  54  /  AlkPhos  95  11-11    BMI: BMI (kg/m2): 23.5 (10-25-24 @ 18:30)  HbA1c: A1C with Estimated Average Glucose Result: 6.6 % (10-26-24 @ 06:47)    Glucose: POCT Blood Glucose.: 136 mg/dL (08 Nov 2024 07:56)    BP: 125/68 (11-04-24 @ 23:00) (99/56 - 141/59)Vital Signs Last 24 Hrs  T(C): 36.4 (11-04-24 @ 23:00), Max: 36.4 (11-04-24 @ 16:23)  T(F): 97.5 (11-04-24 @ 23:00), Max: 97.6 (11-04-24 @ 16:23)  HR: 74 (11-04-24 @ 23:00) (74 - 78)  BP: 125/68 (11-04-24 @ 23:00) (125/68 - 134/77)  BP(mean): 77 (11-04-24 @ 23:00) (77 - 77)  RR: 18 (11-04-24 @ 23:00) (18 - 18)  SpO2: 98% (11-04-24 @ 23:00) (95% - 98%)    Lipid Panel: Date/Time: 11-10-24 @ 04:34  Triglycerides, Serum: 160    POCT Blood Glucose.: 231 mg/dL (11 Nov 2024 06:21)  POCT Blood Glucose.: 182 mg/dL (10 Nov 2024 23:36)  POCT Blood Glucose.: 322 mg/dL (10 Nov 2024 17:18)  POCT Blood Glucose.: 268 mg/dL (10 Nov 2024 11:49)    *I&O's Detaill - not doc'd; ***strict I&O's when on PN. ***  *BM - last doc'd 10/30; abdominal discomfort +N/V on 11/8. pt not on bowel regimen.  *edema: none doc'd    *malnutrition: Pt continues to meet criteria for moderate malnutrition in context of acute on chronic illness r/t decreased ability to meet increased nutrient needs 2/2 N/V, DM AEB >7.5% wt loss x 2mo, <50% ENN x 2 weeks, mild muscle/fat wasting    Estimated Needs: based on 55 Kg (wt from 10/27)  Calories: 4622-2623 Kcal (35-40 Kcal/Kg)  Protein: 66-83 g (1.2-1.5 g/Kg)  Fluids: 2035-8281 mL (35-40 mL/Kg)    Diet, Clear Liquid (11-07-24 @ 14:27) [Active]  Diet, Regular (10-26-24 @ 17:50) [Available for Activation]  Parenteral Nutrition - Adult 1 Each (83 mL/Hr) TPN Continuous <Continuous>, 11-10-24 @ 22:00, 22:00, Stop order after: 1 Days    Weight History:  Weight (kg): 52.8 (10-25-24 @ 18:30)    TPN Recommendations: via PICC Line  Total Volume: 2000 mL x 24 hours  80 g  Amino Acids  225 g Dextrose (titrate dextrose up 50-75g/day until goal rate of 325g reached; will not titrate today 2/2 hyperglycemia)  50 g Lipids 20%  80 mEq Sodium Chloride  47 mEq Sodium Acetate  20 mmol Sodium Phosphate  60 mEq Potassium Chloride  13 mEq Potassium Acetate  5 mmol Potassium Phosphate  0 mEq Calcium Gluconate (CAPS out of PN solution)   8 mEq Magnesium Sulfate  200 mg Thiamine  1 ml Trace Elements  10 ml MVI  10 units insulin     Total Calories   1585   (Meets  76%  of  Estimated Energy needs  and  100%  Protein needs)       Additional Recommendations:  1) Daily weights  2) Strict I & O's  3) Daily lyte checks including magnesium and phos  4) Weekly triglycerides/LFT checks  5) POCT q6hrs; maintain 140-180mg/dL    6) Titrate dextrose 50-75g daily until goal 325g dextrose is met (GIR 4.3; WNL). Will add insulin to PN bag 2/2 hyperglycemia.   7) Confirm goals of care regarding LONG-TERM nutrition support - if gastroparesis present and still unable to tolerate PO, would need GJ tube - ?if to be placed. Currently w/ PICC line to better meet ENN    *will continue to monitor and adjust PN prn*  Carmenza Hamilton, IRMA, CDN [phone]

## 2024-11-11 NOTE — PROGRESS NOTE ADULT - SUBJECTIVE AND OBJECTIVE BOX
CHIEF COMPLAINT/INTERVAL HISTORY:    Patient is a 64y old  Female who presents with a chief complaint of inability to tolerate po (02 Nov 2024 13:01)      HPI:  64-year-old female with history of GERD  HTN, DM, polyps, and afib with 5 cardiac stents. presents with epigastric pain for the last couple of weeks.  Her daughter brought her in, after speaking with her GI doctor who stated patient should not have been on ozempic before and probably have taken insulin instead. She states that she had just uptitrated her dose of ozempic to 0.5 from 0.25 earlier this week. She had been on the 0.25 for 5 weeks. She states the pain bothers her more than the nausea and vomiting. Patient states she feels very minimal relief since she came in.  She denies NSAID use, bleeding or black stools. She reports 10lb weight loss and has been on ozempic for her diabetes. Denies diarrhea or urinary symptoms.     Adm for initiation of PPN for presumed gastroparesis; gastric emptying study for 11/6- failed. Had a 1.5cm CBD dilation ( s/p calvin).   Abnormal MRCP; s/p ERCP and stent placement for CBD stone  Pain has worsened the past few days, now requiring Dilaudid  PICC inserted 11/9 for TPN initiation  Empiric abx started 11/9 for low grade temp     11.11: c/o epigastric pain, c/o nausea, no diarrhea       Vital Signs Last 24 Hrs  T(C): 36.6 (11 Nov 2024 08:49), Max: 36.9 (10 Nov 2024 21:00)  T(F): 97.8 (11 Nov 2024 08:49), Max: 98.5 (10 Nov 2024 21:00)  HR: 90 (10 Nov 2024 23:43) (90 - 95)  BP: 134/75 (11 Nov 2024 08:49) (98/56 - 134/75)  BP(mean): 75 (10 Nov 2024 23:43) (75 - 75)  RR: 16 (11 Nov 2024 08:49) (16 - 18)  SpO2: 99% (11 Nov 2024 08:49) (95% - 99%)    Parameters below as of 11 Nov 2024 08:49  Patient On (Oxygen Delivery Method): room air        PHYSICAL EXAM:      Constitutional: NAD  Eyes: perrl, no conjunctival changes  ENMT: no exudates, moist oral muc, uvula midline  Neck: no JVD, no LAD  Back: no cva tenderness  Respiratory: CTA, no exp wheezes  Cardiovascular: S1S2 reg, no murmur gallop or rub  Gastrointestinal: + epig pain  Genitourinary: voiding  Extremities: FROM, no joint effusions, no edema, no clubbing , no cyanosis  Vascular: pedal pulses + bilateral, warm extremities  Neurological: non focal, mot str 5/5/ all extr  Skin: no rashes  Lymph Nodes: no LAD        RADIOLOGY & ADDITIONAL TESTS: personally reviewed    MRCP 11/6  IMPRESSION:  Marked intrahepatic biliary ductal dilatation with multiple small filling   defects within the distal common bile duct, likely representing   choledocholithiasis.    Assessment and Plan:     64 year old woman with poor oral intake.       1. Intractable nausea with poor oral intake: multifactorial - due to adverse effects off Ozempic / gastritis / esophagitis / possible gastroparesis:   - h/o colectomy with iliorectal anastomosis for IBD  - EGD/colonoscopy --> gastritis, esophagitis  s/p ERCP and stent placement after MRCP showed stones  repeat MRCP revealed persistent stones in CBC; GI eval noted, for repeat ERCP  c/w Zosyn for low grade fevers     2. Probable underlying Gastroparesis   start Reglan , IV PPI    3. DM type II:  ISS    4. Moderate protein-calorie malnutrition:  - started on TPN 11/8 and PICC line inserted       5. Paroxymal afib / HTN / CAD:  - off lisinopril due to borderline BP  Eliquis on hold due to 1 ep bloody diarrhea and for poss further procedure  - s/p 5 stents last were 2 years ago

## 2024-11-11 NOTE — PROGRESS NOTE ADULT - SUBJECTIVE AND OBJECTIVE BOX
Interval History:Persistent pain    MEDICATIONS  (STANDING):  ARIPiprazole 2 milliGRAM(s) Oral daily  clopidogrel Tablet 75 milliGRAM(s) Oral daily  dextrose 5%. 1000 milliLiter(s) (100 mL/Hr) IV Continuous <Continuous>  dextrose 5%. 1000 milliLiter(s) (50 mL/Hr) IV Continuous <Continuous>  dextrose 50% Injectable 25 Gram(s) IV Push once  dextrose 50% Injectable 25 Gram(s) IV Push once  dextrose 50% Injectable 12.5 Gram(s) IV Push once  escitalopram 10 milliGRAM(s) Oral daily  FIRST- Mouthwash  BLM 5 milliLiter(s) Swish and Swallow three times a day  glucagon  Injectable 1 milliGRAM(s) IntraMuscular once  influenza   Vaccine 0.5 milliLiter(s) IntraMuscular once  insulin lispro (ADMELOG) corrective regimen sliding scale   SubCutaneous every 6 hours  lipid, fat emulsion (Fish Oil and Plant Based) 20% Infusion 0.947 Gm/kG/Day (20.83 mL/Hr) IV Continuous <Continuous>  metoprolol succinate  milliGRAM(s) Oral daily  pantoprazole  Injectable 40 milliGRAM(s) IV Push daily  Parenteral Nutrition - Adult 1 Each (83 mL/Hr) TPN Continuous <Continuous>  piperacillin/tazobactam IVPB.. 3.375 Gram(s) IV Intermittent every 8 hours  rosuvastatin 10 milliGRAM(s) Oral at bedtime    MEDICATIONS  (PRN):  acetaminophen     Tablet .. 650 milliGRAM(s) Oral every 6 hours PRN Moderate Pain (4 - 6)  acetaminophen 325 mG/butalbital 50 mG/caffeine 40 mG 1 Tablet(s) Oral every 8 hours PRN migraine  albuterol    90 MICROgram(s) HFA Inhaler 1 Puff(s) Inhalation every 6 hours PRN Shortness of Breath and/or Wheezing  ALPRAZolam 0.5 milliGRAM(s) Oral at bedtime PRN insomnia/anxiety  aluminum hydroxide/magnesium hydroxide/simethicone Suspension 30 milliLiter(s) Oral every 4 hours PRN Dyspepsia  benzocaine/menthol Lozenge 1 Lozenge Oral every 4 hours PRN Sore Throat  dextrose Oral Gel 15 Gram(s) Oral once PRN Blood Glucose LESS THAN 70 milliGRAM(s)/deciliter  HYDROmorphone  Injectable 1 milliGRAM(s) IV Push every 3 hours PRN Severe Pain (7 - 10)  ketorolac   Injectable 15 milliGRAM(s) IV Push every 8 hours PRN Severe Pain (7 - 10)  ondansetron Injectable 4 milliGRAM(s) IV Push every 6 hours PRN Nausea and/or Vomiting      Daily     Daily   BMI: 23.5 (10-25 @ 18:30)  Change in Weight:  Vital Signs Last 24 Hrs  T(C): 36.6 (11 Nov 2024 08:49), Max: 36.9 (10 Nov 2024 21:00)  T(F): 97.8 (11 Nov 2024 08:49), Max: 98.5 (10 Nov 2024 21:00)  HR: 90 (10 Nov 2024 23:43) (90 - 95)  BP: 134/75 (11 Nov 2024 08:49) (98/56 - 134/75)  BP(mean): 75 (10 Nov 2024 23:43) (75 - 75)  RR: 16 (11 Nov 2024 08:49) (16 - 18)  SpO2: 99% (11 Nov 2024 08:49) (95% - 99%)    Parameters below as of 11 Nov 2024 08:49  Patient On (Oxygen Delivery Method): room air      I&O's Detail      PHYSICAL EXAM  Abd : Epigastric tenderness  Chest CTA  Lab Results:    11-11    139  |  111[H]  |  16  ----------------------------<  251[H]  4.3   |  26  |  0.72    Ca    8.6      11 Nov 2024 04:52  Phos  3.3     11-11  Mg     1.9     11-11    TPro  6.8  /  Alb  2.6[L]  /  TBili  0.2  /  DBili  x   /  AST  19  /  ALT  54  /  AlkPhos  95  11-11    LIVER FUNCTIONS - ( 11 Nov 2024 04:52 )  Alb: 2.6 g/dL / Pro: 6.8 gm/dL / ALK PHOS: 95 U/L / ALT: 54 U/L / AST: 19 U/L / GGT: x                 Stool Results:          RADIOLOGY RESULTS:    SURGICAL PATHOLOGY:

## 2024-11-11 NOTE — PROGRESS NOTE ADULT - ASSESSMENT
Persistent Multiple large CBD and GB stones  REC  Surgical eval for Exp. Laparotomy and Cholecytectomy    Persistent Multiple large CBD and GB stones  REC  Surgical eval for Exp. Laparotomy  VS repeat ERCP

## 2024-11-11 NOTE — CHART NOTE - NSCHARTNOTEFT_GEN_A_CORE
Discussed with primary team. With residual abdominal pain post placement CBD stent. Unable to perform sphincterotomy on primary ercp due to AC on board and risk for bleed--->could not clean out stones. only place stent.    Will plan on ERCP on Thursday with 3 day hold Plavix.     NPO p MN Wednesday.    FINDINGS:  LOWER CHEST: Trace effusions.    LIVER: Normal.  BILE DUCTS: 16 mm common bile duct dilatation with multiple stones,   sludge, and/or debris in the mid to lower CBD on the order of 4 mm. Mild   intrahepatic dilatation.  GALLBLADDER: Prior cholecystectomy.  SPLEEN: Normal.  PANCREAS: Normal. No pancreatitis.  ADRENALS: Normal.  KIDNEYS/URETERS: Symmetric size. No hydronephrosis.    VISUALIZED PORTIONS:  BOWEL: No dilated bowel.  PERITONEUM: No ascites.  VESSELS: Normal caliber aorta.  RETROPERITONEUM/LYMPH NODES: No adenopathy.  ABDOMINAL WALL: Normal.  BONES: No aggressive lesion.    IMPRESSION:  *  16 mm common bile duct dilatation with multiple stones, sludge, and/or   debris in the mid to lower CBD on the order of 4 mm. Mild intrahepatic   dilatation.  *  No pancreatitis.

## 2024-11-12 LAB
GLUCOSE BLDC GLUCOMTR-MCNC: 304 MG/DL — HIGH (ref 70–99)
GLUCOSE BLDC GLUCOMTR-MCNC: 311 MG/DL — HIGH (ref 70–99)
GLUCOSE BLDC GLUCOMTR-MCNC: 353 MG/DL — HIGH (ref 70–99)
GLUCOSE BLDC GLUCOMTR-MCNC: 361 MG/DL — HIGH (ref 70–99)

## 2024-11-12 PROCEDURE — 99232 SBSQ HOSP IP/OBS MODERATE 35: CPT

## 2024-11-12 RX ORDER — FAT EMULSIONS 20 %
0.95 EMULSION INTRAVENOUS
Qty: 50 | Refills: 0 | Status: DISCONTINUED | OUTPATIENT
Start: 2024-11-12 | End: 2024-11-13

## 2024-11-12 RX ORDER — ALPRAZOLAM 0.5 MG
0.5 TABLET ORAL AT BEDTIME
Refills: 0 | Status: DISCONTINUED | OUTPATIENT
Start: 2024-11-12 | End: 2024-11-13

## 2024-11-12 RX ORDER — SODIUM/POT/MAG/CALC/CHLOR/ACET 35-20-5MEQ
1 VIAL (ML) INTRAVENOUS
Refills: 0 | Status: DISCONTINUED | OUTPATIENT
Start: 2024-11-12 | End: 2024-11-13

## 2024-11-12 RX ADMIN — HYDROMORPHONE HYDROCHLORIDE 1 MILLIGRAM(S): 2 TABLET ORAL at 09:24

## 2024-11-12 RX ADMIN — Medication 10: at 05:30

## 2024-11-12 RX ADMIN — BENZOCAINE, MENTHOL 1 LOZENGE: 15; 3.6 LOZENGE ORAL at 06:18

## 2024-11-12 RX ADMIN — Medication 0.5 MILLIGRAM(S): at 14:20

## 2024-11-12 RX ADMIN — ESCITALOPRAM OXALATE 10 MILLIGRAM(S): 10 TABLET, FILM COATED ORAL at 09:04

## 2024-11-12 RX ADMIN — Medication 1 PUFF(S): at 20:50

## 2024-11-12 RX ADMIN — HYDROMORPHONE HYDROCHLORIDE 1 MILLIGRAM(S): 2 TABLET ORAL at 20:47

## 2024-11-12 RX ADMIN — Medication 8: at 17:02

## 2024-11-12 RX ADMIN — HYDROMORPHONE HYDROCHLORIDE 1 MILLIGRAM(S): 2 TABLET ORAL at 09:08

## 2024-11-12 RX ADMIN — PIPERACILLIN SODIUM AND TAZOBACTAM SODIUM 25 GRAM(S): 4; .5 INJECTION, POWDER, LYOPHILIZED, FOR SOLUTION INTRAVENOUS at 05:33

## 2024-11-12 RX ADMIN — ACETAMINOPHEN 500MG 650 MILLIGRAM(S): 500 TABLET, COATED ORAL at 16:13

## 2024-11-12 RX ADMIN — Medication 0.5 MILLIGRAM(S): at 00:00

## 2024-11-12 RX ADMIN — HYDROMORPHONE HYDROCHLORIDE 1 MILLIGRAM(S): 2 TABLET ORAL at 00:01

## 2024-11-12 RX ADMIN — ROSUVASTATIN CALCIUM 10 MILLIGRAM(S): 5 TABLET, FILM COATED ORAL at 20:41

## 2024-11-12 RX ADMIN — Medication 10: at 12:10

## 2024-11-12 RX ADMIN — PANTOPRAZOLE SODIUM 40 MILLIGRAM(S): 40 TABLET, DELAYED RELEASE ORAL at 09:04

## 2024-11-12 RX ADMIN — HYDROMORPHONE HYDROCHLORIDE 1 MILLIGRAM(S): 2 TABLET ORAL at 06:16

## 2024-11-12 RX ADMIN — ONDANSETRON HYDROCHLORIDE 4 MILLIGRAM(S): 4 TABLET, FILM COATED ORAL at 14:15

## 2024-11-12 RX ADMIN — HYDROMORPHONE HYDROCHLORIDE 1 MILLIGRAM(S): 2 TABLET ORAL at 21:10

## 2024-11-12 RX ADMIN — METOPROLOL TARTRATE 100 MILLIGRAM(S): 100 TABLET, FILM COATED ORAL at 09:04

## 2024-11-12 RX ADMIN — ARIPIPRAZOLE 2 MILLIGRAM(S): 15 TABLET ORAL at 09:04

## 2024-11-12 RX ADMIN — HYDROMORPHONE HYDROCHLORIDE 1 MILLIGRAM(S): 2 TABLET ORAL at 13:56

## 2024-11-12 NOTE — CHART NOTE - NSCHARTNOTEFT_GEN_A_CORE
Clinical Nutrition PN Follow Up Note    *64-year-old female with history of GERD  HTN, DM, polyps, and afib with 5 cardiac stents. presents with epigastric pain for the last couple of weeks.  Her daughter brought her in, after speaking with her GI doctor who stated patient should not have been on ozempic before and probably have taken insulin instead. She states that she had just uptitrated her dose of ozempic to 0.5 from 0.25 earlier this week. She had been on the 0.25 for 5 weeks. She states the pain bothers her more than the nausea and vomiting. Patient states she feels very minimal relief since she came in.  She denies NSAID use, bleeding or black stools. She reports 10lb weight loss and has been on ozempic for her diabetes. Denies diarrhea or urinary symptoms. Admitted for Nausea and vomiting  *11/1: D/w Dr. Hernandez - PPN Day #1; PPN to be initiated as a bridge 2/2 prolonged PO intolerance/poor PO intake; awaiting gastric emptying study. Per GI note 10/27: abd USG inconclusive s/p cholecystectomy. Rec for EGD/Colonoscopy early next week. 10/30: now s/p EGD and colonoscopy - egd with findings of gastritis and esophagitis without ulcers colonoscopy with patent anastomosis - no acute findings. Pt placed on regular low fiber diet - vomited lunch on 10/31 - complains of throat pain after egd; epigastric pain minimal.  Per hospitalist: "at this point, will need to rule out gastroparesis with gastric emptying study - per nuclear medicine the earliest it could be done would be Tuesday 11/5- need to order isotpope - Trial of 5mg liquid Reglan QID 15 min before meals and at bedtime."   PPN is not to be a long-term nutrition support, pending gastric emptying study if needing long-term nutrition support would recommend GJ-tube if gastroparesis is present. Strongly suggest to confirm goals of care regarding LONG-TERM nutrition support.  *11/3: PPN day #3. Per RN documentation, no events of N/V overnight. Was drinking ensure shakes and tolerating. C/o N/V during the day per hospitalist note. Will c/w PPN for now. Pending gastric emptying study, tentative Tuesday 11/5.  *11/6: PPN day #6: Did not receive PPN last night 2/2 per nuclear medicine not allowed to receive anything past midnight (?). EGD done yesterday which came back inconclusive. Per discussion w/ Dr. Ulrich, pt will need J tube based on gastric emptying study. Will c/w PPN for now until J tube to be placed.    *11/7: PPN day #7. Abnormal MRCP; vascular also consulted. At this time diff diagnosis expanded 2/2 pain. 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 GI recommended to c/w FLD for now and trial Reglan 3x daily to alleviate symptoms. Passed bloody BM this morning w/ abdominal pain, trend H/H. Vascular surgery consulted to evaluate for median arcuate ligament syndrome however no intervention to be done at this time as no imaging finding suggestive of median arcuate ligament syndrome. D/w hospitalist, will c/w PPN until GJ tube can be placed.   *11/8: PPN day #8. GI consulted yesterday, per note: "Abdominal pain could be secondary to distal CBD stones/sludge with likely accomodation biliary flow due to dilated duct post choly (normal lft's)." Is s/p ERCP yesterday w/ Dr. Moncada - 2 stents were placed and obtained biopsy of nodular papilla. Currently c/o abdominal discomfort/ pain s/p ERCP yesterday, D/w hospitalist, will c/w PPN for now, ?if GJ tube will be placed. Consult placed for PICC team since pt on PPN >6 days and meeting <75% ENN x 8 days.  11/9: S/p PICC placement (11/8) and transitioned to TPN. ?if GJ tube will be placed as pt has had intestines removed in the past.  *11/11: Remains on CLD. Now w/ low grade temp and worsening pain, started on empiric abx. Per hospitalist note: "If the stone is not the cause one could ask if this can be an IBD exacerbation on the small bowel mucosae since she did have some blood; check ESR and CRP- if markedly elevated it could be indicative of poss IBD and steroids might be necessary." Plan to c/w TPN for now. Hyperglycemia noted x 24 hrs - will hold off on titrating dextrose today.    PPN initiated 2/2 prolonged PO intolerance, N/V, awaiting gastric emptying study    *current status: Diet advanced to FLD yesterday. Per GI NP note - since pt w/ persistent abdominal pain s/p CBD stent placement, plan for repeat ERCP w/ stone removal on thursday; was unable to remove stones originally 2/2 AC on board and there was a risk for bleeding. Plan to c/w TPN for now. W/ worsening Hyperglycemia x 24 hrs even w/ insulin added to PN bag - will decrease dextrose in PN bag today and will titrate once POCTs regulate.    *pertinent meds: Abilify, Lexapro, Magic Mouthwash (did not receive x 24 hrs), Admelog (30 units x 24 hours), Reglan, Metoprolol, Protonix, Abx in D5, Acetaminophen, Xanax, Dilaudid    *labs reviewed; LABS WERE NOT OBTAINED TODAY 2/2 LABS WERE NOT VERIFIED BY HOSPITALIST. Per labs from yesterday (11/11): "Na, K+, Phos and Mg WNL. Replete lytes outside of PN bag prn. T Bili WNL to c/w trace elements. corrected Ca on higher end of normal limits, DO NOT supplement calcium outside of PN bag at this time. Will c/w thiamine and MVI and minerals in the PN bag today. New Triglyceride WNL (160), will c/w 50 g of lipids." POCTs x 24 hours remain elevated. Plan to titrate dextrose up 50-75g/day until goal rate of 325g reached (GIR ~4.3) however d/t hyperglycemia will decrease dextrose today by 50g and will c/w 10 units of insulin in PN bag (~.05 units insulin/ grams of dextrose).   11-11    139  |  111[H]  |  16  ----------------------------<  251[H]  4.3   |  26  |  0.72    Ca    8.6      11 Nov 2024 04:52  Phos  3.3     11-11  Mg     1.9     11-11    TPro  6.8  /  Alb  2.6[L]  /  TBili  0.2  /  DBili  x   /  AST  19  /  ALT  54  /  AlkPhos  95  11-11    BMI: BMI (kg/m2): 23.5 (10-25-24 @ 18:30)  HbA1c: A1C with Estimated Average Glucose Result: 6.6 % (10-26-24 @ 06:47)    Glucose: POCT Blood Glucose.: 136 mg/dL (08 Nov 2024 07:56)    BP: 125/68 (11-04-24 @ 23:00) (99/56 - 141/59)Vital Signs Last 24 Hrs  T(C): 36.4 (11-04-24 @ 23:00), Max: 36.4 (11-04-24 @ 16:23)  T(F): 97.5 (11-04-24 @ 23:00), Max: 97.6 (11-04-24 @ 16:23)  HR: 74 (11-04-24 @ 23:00) (74 - 78)  BP: 125/68 (11-04-24 @ 23:00) (125/68 - 134/77)  BP(mean): 77 (11-04-24 @ 23:00) (77 - 77)  RR: 18 (11-04-24 @ 23:00) (18 - 18)  SpO2: 98% (11-04-24 @ 23:00) (95% - 98%)    Lipid Panel: Date/Time: 11-10-24 @ 04:34  Triglycerides, Serum: 160    POCT Blood Glucose.: 231 mg/dL (11 Nov 2024 06:21)  POCT Blood Glucose.: 182 mg/dL (10 Nov 2024 23:36)  POCT Blood Glucose.: 322 mg/dL (10 Nov 2024 17:18)  POCT Blood Glucose.: 268 mg/dL (10 Nov 2024 11:49)    *I&O's Detaill - not doc'd; ***strict I&O's when on PN. ***  *BM - last doc'd 10/30; abdominal discomfort +N/V on 11/8. pt not on bowel regimen.  *edema: none doc'd    *malnutrition: Pt continues to meet criteria for moderate malnutrition in context of acute on chronic illness r/t decreased ability to meet increased nutrient needs 2/2 N/V, DM AEB >7.5% wt loss x 2mo, <50% ENN x 2 weeks, mild muscle/fat wasting    Estimated Needs: based on 55 Kg (wt from 10/27)  Calories: 5798-5856 Kcal (35-40 Kcal/Kg)  Protein: 66-83 g (1.2-1.5 g/Kg)  Fluids: 1369-0530 mL (35-40 mL/Kg)    Diet, Clear Liquid (11-07-24 @ 14:27) [Active]  Diet, Regular (10-26-24 @ 17:50) [Available for Activation]  Parenteral Nutrition - Adult 1 Each (83 mL/Hr) TPN Continuous <Continuous>, 11-10-24 @ 22:00, 22:00, Stop order after: 1 Days    Weight History:  Weight (kg): 52.8 (10-25-24 @ 18:30)    TPN Recommendations: via PICC Line  Total Volume: 2000 mL x 24 hours  80 g  Amino Acids  225 g Dextrose (titrate dextrose up 50-75g/day until goal rate of 325g reached; will not titrate today 2/2 hyperglycemia)  50 g Lipids 20%  80 mEq Sodium Chloride  47 mEq Sodium Acetate  20 mmol Sodium Phosphate  60 mEq Potassium Chloride  13 mEq Potassium Acetate  5 mmol Potassium Phosphate  0 mEq Calcium Gluconate (CAPS out of PN solution)   8 mEq Magnesium Sulfate  200 mg Thiamine  1 ml Trace Elements  10 ml MVI  10 units insulin     Total Calories   1585   (Meets  76%  of  Estimated Energy needs  and  100%  Protein needs)       Additional Recommendations:  1) Daily weights  2) Strict I & O's  3) Daily lyte checks including magnesium and phos  4) Weekly triglycerides/LFT checks  5) POCT q6hrs; maintain 140-180mg/dL    6) Titrate dextrose 50-75g daily until goal 325g dextrose is met (GIR 4.3; WNL). Will add insulin to PN bag 2/2 hyperglycemia.   7) Confirm goals of care regarding LONG-TERM nutrition support - if gastroparesis present and still unable to tolerate PO, would need GJ tube - ?if to be placed. Currently w/ PICC line to better meet ENN    *will continue to monitor and adjust PN prn*  Carmenza Hamilton RDN, CDN [phone] Clinical Nutrition PN Follow Up Note    *64-year-old female with history of GERD  HTN, DM, polyps, and afib with 5 cardiac stents. presents with epigastric pain for the last couple of weeks.  Her daughter brought her in, after speaking with her GI doctor who stated patient should not have been on ozempic before and probably have taken insulin instead. She states that she had just uptitrated her dose of ozempic to 0.5 from 0.25 earlier this week. She had been on the 0.25 for 5 weeks. She states the pain bothers her more than the nausea and vomiting. Patient states she feels very minimal relief since she came in.  She denies NSAID use, bleeding or black stools. She reports 10lb weight loss and has been on ozempic for her diabetes. Denies diarrhea or urinary symptoms. Admitted for Nausea and vomiting  *11/1: D/w Dr. Hernandez - PPN Day #1; PPN to be initiated as a bridge 2/2 prolonged PO intolerance/poor PO intake; awaiting gastric emptying study. Per GI note 10/27: abd USG inconclusive s/p cholecystectomy. Rec for EGD/Colonoscopy early next week. 10/30: now s/p EGD and colonoscopy - egd with findings of gastritis and esophagitis without ulcers colonoscopy with patent anastomosis - no acute findings. Pt placed on regular low fiber diet - vomited lunch on 10/31 - complains of throat pain after egd; epigastric pain minimal.  Per hospitalist: "at this point, will need to rule out gastroparesis with gastric emptying study - per nuclear medicine the earliest it could be done would be Tuesday 11/5- need to order isotpope - Trial of 5mg liquid Reglan QID 15 min before meals and at bedtime."   PPN is not to be a long-term nutrition support, pending gastric emptying study if needing long-term nutrition support would recommend GJ-tube if gastroparesis is present. Strongly suggest to confirm goals of care regarding LONG-TERM nutrition support.  *11/3: PPN day #3. Per RN documentation, no events of N/V overnight. Was drinking ensure shakes and tolerating. C/o N/V during the day per hospitalist note. Will c/w PPN for now. Pending gastric emptying study, tentative Tuesday 11/5.  *11/6: PPN day #6: Did not receive PPN last night 2/2 per nuclear medicine not allowed to receive anything past midnight (?). EGD done yesterday which came back inconclusive. Per discussion w/ Dr. Ulrich, pt will need J tube based on gastric emptying study. Will c/w PPN for now until J tube to be placed.    *11/7: PPN day #7. Abnormal MRCP; vascular also consulted. At this time diff diagnosis expanded 2/2 pain. 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 GI recommended to c/w FLD for now and trial Reglan 3x daily to alleviate symptoms. Passed bloody BM this morning w/ abdominal pain, trend H/H. Vascular surgery consulted to evaluate for median arcuate ligament syndrome however no intervention to be done at this time as no imaging finding suggestive of median arcuate ligament syndrome. D/w hospitalist, will c/w PPN until GJ tube can be placed.   *11/8: PPN day #8. GI consulted yesterday, per note: "Abdominal pain could be secondary to distal CBD stones/sludge with likely accomodation biliary flow due to dilated duct post choly (normal lft's)." Is s/p ERCP yesterday w/ Dr. Moncada - 2 stents were placed and obtained biopsy of nodular papilla. Currently c/o abdominal discomfort/ pain s/p ERCP yesterday, D/w hospitalist, will c/w PPN for now, ?if GJ tube will be placed. Consult placed for PICC team since pt on PPN >6 days and meeting <75% ENN x 8 days.  11/9: S/p PICC placement (11/8) and transitioned to TPN. ?if GJ tube will be placed as pt has had intestines removed in the past.  *11/11: Remains on CLD. Now w/ low grade temp and worsening pain, started on empiric abx. Per hospitalist note: "If the stone is not the cause one could ask if this can be an IBD exacerbation on the small bowel mucosae since she did have some blood; check ESR and CRP- if markedly elevated it could be indicative of poss IBD and steroids might be necessary." Plan to c/w TPN for now. Hyperglycemia noted x 24 hrs - will hold off on titrating dextrose today.    PPN initiated 2/2 prolonged PO intolerance, N/V, awaiting gastric emptying study    *current status: Diet advanced to FLD yesterday. Per GI NP note - since pt w/ persistent abdominal pain s/p CBD stent placement, plan for repeat ERCP w/ stone removal on Thursday; was unable to remove stones originally 2/2 AC on board and there was a risk for bleeding. Plan to c/w TPN for now. W/ worsening Hyperglycemia x 24 hrs even w/ insulin added to PN bag - will decrease dextrose in PN bag today and will titrate once POCTs regulate.    *pertinent meds: Abilify, Lexapro, Magic Mouthwash (did not receive x 24 hrs), Admelog (30 units x 24 hours), Reglan, Metoprolol, Protonix, Abx in D5, Acetaminophen, Xanax, Dilaudid    *labs reviewed; LABS WERE NOT OBTAINED TODAY 2/2 LABS WERE NOT VERIFIED BY HOSPITALIST. Per labs from yesterday (11/11): "Na, K+, Phos and Mg WNL. Replete lytes outside of PN bag prn. T Bili WNL to c/w trace elements. corrected Ca on higher end of normal limits, DO NOT supplement calcium outside of PN bag at this time. Will c/w thiamine and MVI and minerals in the PN bag today. New Triglyceride WNL (160), will c/w 50 g of lipids." POCTs x 24 hours remain elevated. Plan to titrate dextrose up 50-75g/day until goal rate of 325g reached (GIR ~4.3) however d/t hyperglycemia will decrease dextrose today to 175g and will c/w 10 units of insulin in PN bag (~.09 units insulin/ grams of dextrose).   11-11    139  |  111[H]  |  16  ----------------------------<  251[H]  4.3   |  26  |  0.72    Ca    8.6      11 Nov 2024 04:52  Phos  3.3     11-11  Mg     1.9     11-11    TPro  6.8  /  Alb  2.6[L]  /  TBili  0.2  /  DBili  x   /  AST  19  /  ALT  54  /  AlkPhos  95  11-11    BMI: BMI (kg/m2): 23.5 (10-25-24 @ 18:30)  HbA1c: A1C with Estimated Average Glucose Result: 6.6 % (10-26-24 @ 06:47)    Glucose: POCT Blood Glucose.: 353 mg/dL (12 Nov 2024 04:58)    BP: 125/68 (11-04-24 @ 23:00) (99/56 - 141/59)Vital Signs Last 24 Hrs  T(C): 36.4 (11-04-24 @ 23:00), Max: 36.4 (11-04-24 @ 16:23)  T(F): 97.5 (11-04-24 @ 23:00), Max: 97.6 (11-04-24 @ 16:23)  HR: 74 (11-04-24 @ 23:00) (74 - 78)  BP: 125/68 (11-04-24 @ 23:00) (125/68 - 134/77)  BP(mean): 77 (11-04-24 @ 23:00) (77 - 77)  RR: 18 (11-04-24 @ 23:00) (18 - 18)  SpO2: 98% (11-04-24 @ 23:00) (95% - 98%)    Lipid Panel: Date/Time: 11-10-24 @ 04:34  Triglycerides, Serum: 160    POCT Blood Glucose.: 353 mg/dL (12 Nov 2024 04:58)  POCT Blood Glucose.: 305 mg/dL (11 Nov 2024 23:12)  POCT Blood Glucose.: 294 mg/dL (11 Nov 2024 17:07)  POCT Blood Glucose.: 289 mg/dL (11 Nov 2024 12:32)    *I&O's Detaill - not doc'd; ***strict I&O's when on PN. ***  *BM - last doc'd 10/30; abdominal discomfort +N/V on 11/8. pt not on bowel regimen.  *edema: none doc'd    *malnutrition: Pt continues to meet criteria for moderate malnutrition in context of acute on chronic illness r/t decreased ability to meet increased nutrient needs 2/2 N/V, DM AEB >7.5% wt loss x 2mo, <50% ENN x 2 weeks, mild muscle/fat wasting    Estimated Needs: based on 55 Kg (wt from 10/27)  Calories: 0407-3701 Kcal (35-40 Kcal/Kg)  Protein: 66-83 g (1.2-1.5 g/Kg)  Fluids: 2463-6011 mL (35-40 mL/Kg)    Diet, Full Liquid; consistent carbohydrate (no snacks); glucerna TID (11-11-24 @ 15:52) [Active]  Parenteral Nutrition - Adult 1 Each (83 mL/Hr) TPN Continuous <Continuous>, 11-11-24 @ 22:00, 22:00, Stop order after: 1 Days    Weight History:  Weight (kg): 52.8 (10-25-24 @ 18:30)    TPN Recommendations: via PICC Line  Total Volume: 2000 mL x 24 hours  80 g  Amino Acids  175 g Dextrose (titrate dextrose up 50-75g/day until goal rate of 325g reached)  50 g Lipids 20%  80 mEq Sodium Chloride  47 mEq Sodium Acetate  20 mmol Sodium Phosphate  60 mEq Potassium Chloride  13 mEq Potassium Acetate  5 mmol Potassium Phosphate  0 mEq Calcium Gluconate (CAPS out of PN solution)   8 mEq Magnesium Sulfate  200 mg Thiamine  1 ml Trace Elements  10 ml MVI  10 units insulin     Total Calories   1415   (Meets  73%  of  Estimated Energy needs  and  100%  Protein needs)       Additional Recommendations:  1) Daily weights  2) Strict I & O's  3) Daily lyte checks including magnesium and phos  4) Weekly triglycerides/LFT checks  5) POCT q6hrs; maintain 140-180mg/dL    6) Titrate dextrose 50-75g daily until goal 325g dextrose is met (GIR 4.3; WNL). C/w insulin in PN bag 2/2 hyperglycemia.   7) Confirm goals of care regarding LONG-TERM nutrition support - if gastroparesis present and still unable to tolerate PO, would need GJ tube - ?if to be placed. Currently w/ PICC line to better meet ENN    *will continue to monitor and adjust PN prn*  Carmenza Hamilton, MIGUEL ÁNGELN, CDN [phone]

## 2024-11-12 NOTE — PROGRESS NOTE ADULT - SUBJECTIVE AND OBJECTIVE BOX
CHIEF COMPLAINT/INTERVAL HISTORY:    Patient is a 64y old  Female who presents with a chief complaint of inability to tolerate po (02 Nov 2024 13:01)      HPI:  64-year-old female with history of GERD  HTN, DM, polyps, and afib with 5 cardiac stents. presents with epigastric pain for the last couple of weeks.  Her daughter brought her in, after speaking with her GI doctor who stated patient should not have been on ozempic before and probably have taken insulin instead. She states that she had just uptitrated her dose of ozempic to 0.5 from 0.25 earlier this week. She had been on the 0.25 for 5 weeks. She states the pain bothers her more than the nausea and vomiting. Patient states she feels very minimal relief since she came in.  She denies NSAID use, bleeding or black stools. She reports 10lb weight loss and has been on ozempic for her diabetes. Denies diarrhea or urinary symptoms.     Adm for initiation of PPN for presumed gastroparesis; gastric emptying study for 11/6- failed. Had a 1.5cm CBD dilation ( s/p calvin).   Abnormal MRCP; s/p ERCP and stent placement for CBD stone  Pain has worsened the past few days, now requiring Dilaudid  PICC inserted 11/9 for TPN initiation  Empiric abx started 11/9 for low grade temp     11.11: c/o epigastric pain, c/o nausea, no diarrhea   11.12: c/o persistent epigastric pain, able to tolerated small amount of Glucerna   vomited yesterday, no fever/chills      Vital Signs Last 24 Hrs  T(C): 36.7 (12 Nov 2024 08:29), Max: 36.8 (12 Nov 2024 00:01)  T(F): 98 (12 Nov 2024 08:29), Max: 98.3 (12 Nov 2024 00:01)  HR: 107 (12 Nov 2024 08:29) (85 - 107)  BP: 111/64 (12 Nov 2024 08:29) (93/48 - 128/69)  BP(mean): --  RR: 19 (12 Nov 2024 08:29) (17 - 19)  SpO2: 95% (12 Nov 2024 08:29) (95% - 98%)    Parameters below as of 12 Nov 2024 08:29  Patient On (Oxygen Delivery Method): room air      PHYSICAL EXAM:    GENERAL: NAD,  HEENT:  NC/AT, EOMI, PERRLA, No scleral icterus, Moist mucous membranes  NECK: Supple, No JVD  CNS:  Alert & Oriented X3, Motor Strength 5/5 B/L upper and lower extremities; DTRs 2+ intact   LUNG: Normal Breath sounds, Clear to auscultation bilaterally, No rales, No rhonchi, No wheezing  HEART: RRR; No murmurs, No rubs  ABDOMEN: +BS, ST, +ttp to ruq and epigastric area   GENITOURINARY- Voiding, Bladder not distended  EXTREMITIES:  2+ Peripheral Pulses, No clubbing, No cyanosis, No tibial edema  MUSCULOSKELTAL: Joints normal ROM, No joint tenderness, No muscle tenderness  VAGINAL: deferred  RECTAL: deferred, not indicated  BREAST: deferred        Assessment and Plan:     64 year old woman with poor oral intake.       1. Intractable nausea with poor oral intake likely due to Gastroparesis exacerbated by GLP-1  - h/o colectomy with iliorectal anastomosis for IBD  - EGD/colonoscopy --> gastritis, esophagitis, c/w PPI, REglan     s/p ERCP and stent placement last week   repeat MRCP revealed persistent 14mm stone in CBD; GI eval noted, for repeat ERCP in 48hrs, hold Plavix     2. Probable underlying Chronic  Gastroparesis   start Reglan , IV PPI  -was unable to complete Gastric emptying study due to vomiting     3. DM type II: uncontrolled  start Lantus 10u qHS  c/w ISS  ADA    4. Moderate protein-calorie malnutrition:  - started on TPN 11/8 and PICC line inserted     5. Anxiety:   c/w Abilify low dose for now      5. Paroxymal afib / HTN / CAD:  - off lisinopril due to borderline BP  Eliquis on hold due to 1 ep bloody diarrhea and for poss further procedure  - s/p 5 stents last were 2 years ago       CHIEF COMPLAINT/INTERVAL HISTORY:    Patient is a 64y old  Female who presents with a chief complaint of inability to tolerate po (02 Nov 2024 13:01)      HPI:  64-year-old female with history of GERD  HTN, DM, polyps, and afib with 5 cardiac stents. presents with epigastric pain for the last couple of weeks.  Her daughter brought her in, after speaking with her GI doctor who stated patient should not have been on ozempic before and probably have taken insulin instead. She states that she had just uptitrated her dose of ozempic to 0.5 from 0.25 earlier this week. She had been on the 0.25 for 5 weeks. She states the pain bothers her more than the nausea and vomiting. Patient states she feels very minimal relief since she came in.  She denies NSAID use, bleeding or black stools. She reports 10lb weight loss and has been on ozempic for her diabetes. Denies diarrhea or urinary symptoms.     Adm for initiation of PPN for presumed gastroparesis; gastric emptying study for 11/6- failed. Had a 1.5cm CBD dilation ( s/p calvin).   Abnormal MRCP; s/p ERCP and stent placement for CBD stone  Pain has worsened the past few days, now requiring Dilaudid  PICC inserted 11/9 for TPN initiation  Empiric abx started 11/9 for low grade temp     11.11: c/o epigastric pain, c/o nausea, no diarrhea   11.12: c/o persistent epigastric pain, able to tolerated small amount of Glucerna   vomited yesterday, no fever/chills      Vital Signs Last 24 Hrs  T(C): 36.7 (12 Nov 2024 08:29), Max: 36.8 (12 Nov 2024 00:01)  T(F): 98 (12 Nov 2024 08:29), Max: 98.3 (12 Nov 2024 00:01)  HR: 107 (12 Nov 2024 08:29) (85 - 107)  BP: 111/64 (12 Nov 2024 08:29) (93/48 - 128/69)  BP(mean): --  RR: 19 (12 Nov 2024 08:29) (17 - 19)  SpO2: 95% (12 Nov 2024 08:29) (95% - 98%)    Parameters below as of 12 Nov 2024 08:29  Patient On (Oxygen Delivery Method): room air      PHYSICAL EXAM:    GENERAL: NAD,  HEENT:  NC/AT, EOMI, PERRLA, No scleral icterus, Moist mucous membranes  NECK: Supple, No JVD  CNS:  Alert & Oriented X3, Motor Strength 5/5 B/L upper and lower extremities; DTRs 2+ intact   LUNG: Normal Breath sounds, Clear to auscultation bilaterally, No rales, No rhonchi, No wheezing  HEART: RRR; No murmurs, No rubs  ABDOMEN: +BS, ST, +ttp to ruq and epigastric area   GENITOURINARY- Voiding, Bladder not distended  EXTREMITIES:  2+ Peripheral Pulses, No clubbing, No cyanosis, No tibial edema  MUSCULOSKELTAL: Joints normal ROM, No joint tenderness, No muscle tenderness  VAGINAL: deferred  RECTAL: deferred, not indicated  BREAST: deferred        Assessment and Plan:     64 year old woman with poor oral intake.       1. Intractable nausea with poor oral intake likely due to Gastroparesis exacerbated by GLP-1  - h/o colectomy with iliorectal anastomosis for IBD  - EGD/colonoscopy --> gastritis, esophagitis, c/w PPI, REglan     2. Choledocholithiasis  s/p ERCP and stent placement last week   repeat MRCP revealed persistent 14mm stone in CBD;   GI eval noted, for repeat ERCP in 48hrs, hold Plavix     3. Probable underlying Chronic  Gastroparesis   start Reglan , IV PPI  -was unable to complete Gastric emptying study due to vomiting     4. DM type II: uncontrolled  start Lantus 10u qHS  c/w ISS  ADA    5. Moderate protein-calorie malnutrition:  - started on TPN 11/8 and PICC line inserted     6. Anxiety:   c/w Lexapro and Xanax prn    On Abilify 5mg [place]rence for the past month, now dose lowered to 2mg /day.  Psych evaluation: does she need the Abilify considering the severe nausea/vomiting/DM ?      5. Paroxymal afib / HTN / CAD:  - off lisinopril due to borderline BP  Eliquis on hold due to 1 ep bloody diarrhea and for poss further procedure  - s/p 5 stents last were 2 years ago

## 2024-11-13 LAB
ALBUMIN SERPL ELPH-MCNC: 2.7 G/DL — LOW (ref 3.3–5)
ALP SERPL-CCNC: 96 U/L — SIGNIFICANT CHANGE UP (ref 40–120)
ALT FLD-CCNC: 39 U/L — SIGNIFICANT CHANGE UP (ref 12–78)
ANION GAP SERPL CALC-SCNC: 1 MMOL/L — LOW (ref 5–17)
ANION GAP SERPL CALC-SCNC: 2 MMOL/L — LOW (ref 5–17)
AST SERPL-CCNC: 15 U/L — SIGNIFICANT CHANGE UP (ref 15–37)
BILIRUB SERPL-MCNC: 0.2 MG/DL — SIGNIFICANT CHANGE UP (ref 0.2–1.2)
BUN SERPL-MCNC: 15 MG/DL — SIGNIFICANT CHANGE UP (ref 7–23)
BUN SERPL-MCNC: 15 MG/DL — SIGNIFICANT CHANGE UP (ref 7–23)
CALCIUM SERPL-MCNC: 8.6 MG/DL — SIGNIFICANT CHANGE UP (ref 8.5–10.1)
CALCIUM SERPL-MCNC: 8.7 MG/DL — SIGNIFICANT CHANGE UP (ref 8.5–10.1)
CHLORIDE SERPL-SCNC: 104 MMOL/L — SIGNIFICANT CHANGE UP (ref 96–108)
CHLORIDE SERPL-SCNC: 105 MMOL/L — SIGNIFICANT CHANGE UP (ref 96–108)
CO2 SERPL-SCNC: 30 MMOL/L — SIGNIFICANT CHANGE UP (ref 22–31)
CO2 SERPL-SCNC: 32 MMOL/L — HIGH (ref 22–31)
CREAT SERPL-MCNC: 0.72 MG/DL — SIGNIFICANT CHANGE UP (ref 0.5–1.3)
CREAT SERPL-MCNC: 0.76 MG/DL — SIGNIFICANT CHANGE UP (ref 0.5–1.3)
EGFR: 89 ML/MIN/1.73M2 — SIGNIFICANT CHANGE UP
EGFR: 94 ML/MIN/1.73M2 — SIGNIFICANT CHANGE UP
GLUCOSE BLDC GLUCOMTR-MCNC: 296 MG/DL — HIGH (ref 70–99)
GLUCOSE BLDC GLUCOMTR-MCNC: 330 MG/DL — HIGH (ref 70–99)
GLUCOSE BLDC GLUCOMTR-MCNC: 335 MG/DL — HIGH (ref 70–99)
GLUCOSE SERPL-MCNC: 309 MG/DL — HIGH (ref 70–99)
GLUCOSE SERPL-MCNC: 339 MG/DL — HIGH (ref 70–99)
MAGNESIUM SERPL-MCNC: 1.7 MG/DL — SIGNIFICANT CHANGE UP (ref 1.6–2.6)
PHOSPHATE SERPL-MCNC: 3.1 MG/DL — SIGNIFICANT CHANGE UP (ref 2.5–4.5)
POTASSIUM SERPL-MCNC: 4.4 MMOL/L — SIGNIFICANT CHANGE UP (ref 3.5–5.3)
POTASSIUM SERPL-MCNC: 4.4 MMOL/L — SIGNIFICANT CHANGE UP (ref 3.5–5.3)
POTASSIUM SERPL-SCNC: 4.4 MMOL/L — SIGNIFICANT CHANGE UP (ref 3.5–5.3)
POTASSIUM SERPL-SCNC: 4.4 MMOL/L — SIGNIFICANT CHANGE UP (ref 3.5–5.3)
PROT SERPL-MCNC: 6.5 GM/DL — SIGNIFICANT CHANGE UP (ref 6–8.3)
SODIUM SERPL-SCNC: 136 MMOL/L — SIGNIFICANT CHANGE UP (ref 135–145)
SODIUM SERPL-SCNC: 138 MMOL/L — SIGNIFICANT CHANGE UP (ref 135–145)
TROPONIN I, HIGH SENSITIVITY RESULT: 3.8 NG/L — SIGNIFICANT CHANGE UP

## 2024-11-13 PROCEDURE — 71045 X-RAY EXAM CHEST 1 VIEW: CPT | Mod: 26

## 2024-11-13 PROCEDURE — 99232 SBSQ HOSP IP/OBS MODERATE 35: CPT

## 2024-11-13 PROCEDURE — 90792 PSYCH DIAG EVAL W/MED SRVCS: CPT

## 2024-11-13 PROCEDURE — 93010 ELECTROCARDIOGRAM REPORT: CPT

## 2024-11-13 RX ORDER — ALPRAZOLAM 0.5 MG
0.5 TABLET ORAL THREE TIMES A DAY
Refills: 0 | Status: DISCONTINUED | OUTPATIENT
Start: 2024-11-13 | End: 2024-11-20

## 2024-11-13 RX ORDER — FAT EMULSIONS 20 %
0.95 EMULSION INTRAVENOUS
Qty: 50 | Refills: 0 | Status: DISCONTINUED | OUTPATIENT
Start: 2024-11-13 | End: 2024-11-14

## 2024-11-13 RX ORDER — ALPRAZOLAM 0.5 MG
0.5 TABLET ORAL AT BEDTIME
Refills: 0 | Status: DISCONTINUED | OUTPATIENT
Start: 2024-11-13 | End: 2024-11-20

## 2024-11-13 RX ORDER — IPRATROPIUM BROMIDE AND ALBUTEROL SULFATE 2.5; .5 MG/3ML; MG/3ML
3 SOLUTION RESPIRATORY (INHALATION) EVERY 4 HOURS
Refills: 0 | Status: DISCONTINUED | OUTPATIENT
Start: 2024-11-13 | End: 2024-12-10

## 2024-11-13 RX ORDER — INSULIN GLARGINE 100 [IU]/ML
10 INJECTION, SOLUTION SUBCUTANEOUS AT BEDTIME
Refills: 0 | Status: DISCONTINUED | OUTPATIENT
Start: 2024-11-13 | End: 2024-11-14

## 2024-11-13 RX ORDER — MONTELUKAST SODIUM 10 MG/1
10 TABLET ORAL AT BEDTIME
Refills: 0 | Status: DISCONTINUED | OUTPATIENT
Start: 2024-11-13 | End: 2024-12-10

## 2024-11-13 RX ORDER — IPRATROPIUM BROMIDE AND ALBUTEROL SULFATE 2.5; .5 MG/3ML; MG/3ML
3 SOLUTION RESPIRATORY (INHALATION) EVERY 6 HOURS
Refills: 0 | Status: DISCONTINUED | OUTPATIENT
Start: 2024-11-13 | End: 2024-11-13

## 2024-11-13 RX ORDER — GUAIFENESIN 400 MG
200 TABLET ORAL EVERY 6 HOURS
Refills: 0 | Status: DISCONTINUED | OUTPATIENT
Start: 2024-11-13 | End: 2024-12-10

## 2024-11-13 RX ORDER — METHYLPREDNISOLONE SOD SUCC 125 MG
40 VIAL (EA) INJECTION EVERY 8 HOURS
Refills: 0 | Status: DISCONTINUED | OUTPATIENT
Start: 2024-11-13 | End: 2024-11-13

## 2024-11-13 RX ORDER — SODIUM/POT/MAG/CALC/CHLOR/ACET 35-20-5MEQ
1 VIAL (ML) INTRAVENOUS
Refills: 0 | Status: DISCONTINUED | OUTPATIENT
Start: 2024-11-13 | End: 2024-11-14

## 2024-11-13 RX ORDER — FLUTICASONE PROPIONATE AND SALMETEROL XINAFOATE 45; 21 UG/1; UG/1
1 AEROSOL, METERED RESPIRATORY (INHALATION)
Refills: 0 | Status: DISCONTINUED | OUTPATIENT
Start: 2024-11-13 | End: 2024-12-10

## 2024-11-13 RX ADMIN — MONTELUKAST SODIUM 10 MILLIGRAM(S): 10 TABLET ORAL at 22:47

## 2024-11-13 RX ADMIN — IPRATROPIUM BROMIDE AND ALBUTEROL SULFATE 3 MILLILITER(S): 2.5; .5 SOLUTION RESPIRATORY (INHALATION) at 18:54

## 2024-11-13 RX ADMIN — Medication 20.8 GM/KG/DAY: at 00:09

## 2024-11-13 RX ADMIN — HYDROMORPHONE HYDROCHLORIDE 1 MILLIGRAM(S): 2 TABLET ORAL at 09:30

## 2024-11-13 RX ADMIN — Medication 8: at 06:22

## 2024-11-13 RX ADMIN — HYDROMORPHONE HYDROCHLORIDE 1 MILLIGRAM(S): 2 TABLET ORAL at 09:11

## 2024-11-13 RX ADMIN — IPRATROPIUM BROMIDE AND ALBUTEROL SULFATE 3 MILLILITER(S): 2.5; .5 SOLUTION RESPIRATORY (INHALATION) at 10:43

## 2024-11-13 RX ADMIN — ONDANSETRON HYDROCHLORIDE 4 MILLIGRAM(S): 4 TABLET, FILM COATED ORAL at 00:22

## 2024-11-13 RX ADMIN — HYDROMORPHONE HYDROCHLORIDE 1 MILLIGRAM(S): 2 TABLET ORAL at 13:02

## 2024-11-13 RX ADMIN — Medication 1 EACH: at 00:09

## 2024-11-13 RX ADMIN — ARIPIPRAZOLE 2 MILLIGRAM(S): 15 TABLET ORAL at 09:02

## 2024-11-13 RX ADMIN — Medication 8: at 11:28

## 2024-11-13 RX ADMIN — HYDROMORPHONE HYDROCHLORIDE 1 MILLIGRAM(S): 2 TABLET ORAL at 06:25

## 2024-11-13 RX ADMIN — ESCITALOPRAM OXALATE 10 MILLIGRAM(S): 10 TABLET, FILM COATED ORAL at 09:03

## 2024-11-13 RX ADMIN — HYDROMORPHONE HYDROCHLORIDE 1 MILLIGRAM(S): 2 TABLET ORAL at 06:45

## 2024-11-13 RX ADMIN — METOPROLOL TARTRATE 100 MILLIGRAM(S): 100 TABLET, FILM COATED ORAL at 09:02

## 2024-11-13 RX ADMIN — INSULIN GLARGINE 10 UNIT(S): 100 INJECTION, SOLUTION SUBCUTANEOUS at 22:44

## 2024-11-13 RX ADMIN — Medication 6: at 17:10

## 2024-11-13 RX ADMIN — Medication 0.5 MILLIGRAM(S): at 14:15

## 2024-11-13 RX ADMIN — Medication 0.5 MILLIGRAM(S): at 00:21

## 2024-11-13 RX ADMIN — HYDROMORPHONE HYDROCHLORIDE 1 MILLIGRAM(S): 2 TABLET ORAL at 00:45

## 2024-11-13 RX ADMIN — ROSUVASTATIN CALCIUM 10 MILLIGRAM(S): 5 TABLET, FILM COATED ORAL at 22:47

## 2024-11-13 RX ADMIN — PANTOPRAZOLE SODIUM 40 MILLIGRAM(S): 40 TABLET, DELAYED RELEASE ORAL at 09:02

## 2024-11-13 RX ADMIN — FLUTICASONE PROPIONATE AND SALMETEROL XINAFOATE 1 DOSE(S): 45; 21 AEROSOL, METERED RESPIRATORY (INHALATION) at 13:53

## 2024-11-13 RX ADMIN — FLUTICASONE PROPIONATE AND SALMETEROL XINAFOATE 1 DOSE(S): 45; 21 AEROSOL, METERED RESPIRATORY (INHALATION) at 18:55

## 2024-11-13 RX ADMIN — Medication 8: at 00:17

## 2024-11-13 RX ADMIN — HYDROMORPHONE HYDROCHLORIDE 1 MILLIGRAM(S): 2 TABLET ORAL at 23:10

## 2024-11-13 RX ADMIN — HYDROMORPHONE HYDROCHLORIDE 1 MILLIGRAM(S): 2 TABLET ORAL at 00:22

## 2024-11-13 RX ADMIN — HYDROMORPHONE HYDROCHLORIDE 1 MILLIGRAM(S): 2 TABLET ORAL at 22:47

## 2024-11-13 NOTE — PROVIDER CONTACT NOTE (CHANGE IN STATUS NOTIFICATION) - BACKGROUND
Patient is here for poor PO intake, nausea, vomiting and GI related issues. Patient receiving TPN via PICC line placement.

## 2024-11-13 NOTE — BH CONSULTATION LIAISON ASSESSMENT NOTE - CURRENT MEDICATION
MEDICATIONS  (STANDING):  ARIPiprazole 2 milliGRAM(s) Oral daily  dextrose 5%. 1000 milliLiter(s) (100 mL/Hr) IV Continuous <Continuous>  dextrose 5%. 1000 milliLiter(s) (50 mL/Hr) IV Continuous <Continuous>  dextrose 50% Injectable 25 Gram(s) IV Push once  dextrose 50% Injectable 25 Gram(s) IV Push once  dextrose 50% Injectable 12.5 Gram(s) IV Push once  escitalopram 10 milliGRAM(s) Oral daily  FIRST- Mouthwash  BLM 5 milliLiter(s) Swish and Swallow three times a day  fluticasone propionate/ salmeterol 250-50 MICROgram(s) Diskus 1 Dose(s) Inhalation two times a day  glucagon  Injectable 1 milliGRAM(s) IntraMuscular once  influenza   Vaccine 0.5 milliLiter(s) IntraMuscular once  insulin glargine Injectable (LANTUS) 10 Unit(s) SubCutaneous at bedtime  insulin lispro (ADMELOG) corrective regimen sliding scale   SubCutaneous every 6 hours  lipid, fat emulsion (Fish Oil and Plant Based) 20% Infusion 0.9469 Gm/kG/Day (20.8 mL/Hr) IV Continuous <Continuous>  metoprolol succinate  milliGRAM(s) Oral daily  montelukast 10 milliGRAM(s) Oral at bedtime  pantoprazole  Injectable 40 milliGRAM(s) IV Push daily  Parenteral Nutrition - Adult 1 Each (83 mL/Hr) TPN Continuous <Continuous>  rosuvastatin 10 milliGRAM(s) Oral at bedtime    MEDICATIONS  (PRN):  acetaminophen     Tablet .. 650 milliGRAM(s) Oral every 6 hours PRN Moderate Pain (4 - 6)  acetaminophen 325 mG/butalbital 50 mG/caffeine 40 mG 1 Tablet(s) Oral every 8 hours PRN migraine  albuterol/ipratropium for Nebulization 3 milliLiter(s) Nebulizer every 4 hours PRN Shortness of Breath and/or Wheezing  ALPRAZolam 0.5 milliGRAM(s) Oral at bedtime PRN insomnia/anxiety  aluminum hydroxide/magnesium hydroxide/simethicone Suspension 30 milliLiter(s) Oral every 4 hours PRN Dyspepsia  benzocaine/menthol Lozenge 1 Lozenge Oral every 4 hours PRN Sore Throat  dextrose Oral Gel 15 Gram(s) Oral once PRN Blood Glucose LESS THAN 70 milliGRAM(s)/deciliter  guaiFENesin Oral Liquid (Sugar-Free) 200 milliGRAM(s) Oral every 6 hours PRN Cough  HYDROmorphone  Injectable 1 milliGRAM(s) IV Push every 3 hours PRN Severe Pain (7 - 10)  metoclopramide Injectable 10 milliGRAM(s) IV Push every 8 hours PRN nausea/vomiting  ondansetron Injectable 4 milliGRAM(s) IV Push every 6 hours PRN Nausea and/or Vomiting

## 2024-11-13 NOTE — BH CONSULTATION LIAISON ASSESSMENT NOTE - HPI (INCLUDE ILLNESS QUALITY, SEVERITY, DURATION, TIMING, CONTEXT, MODIFYING FACTORS, ASSOCIATED SIGNS AND SYMPTOMS)
Patient is a 64 years old woman with past psychiatric history of depression on Abilify 2 mg and Xanax 0.5 mg at HS PRN that is prescribed by provider Ernestine Foley [phone] And currently complaining about being chronically depressed, insomnia responsive Xanax but she does get more than 5 to 6 hours a day.  Appetite is poor.  Energy level is satisfying and patient is taking care of her son's children and they live together.  She says sometimes she is able to enjoy her present but sometimes that she is overwhelmed.    She is currently depressed but denies ever having thoughts about harming self or dying.  No history of aggression or cleo.  No history of psychosis.  Patient denies any substance abuse or trauma.

## 2024-11-13 NOTE — CHART NOTE - NSCHARTNOTEFT_GEN_A_CORE
Clinical Nutrition PN Follow Up Note    *64-year-old female with history of GERD  HTN, DM, polyps, and afib with 5 cardiac stents. presents with epigastric pain for the last couple of weeks.  Her daughter brought her in, after speaking with her GI doctor who stated patient should not have been on ozempic before and probably have taken insulin instead. She states that she had just uptitrated her dose of ozempic to 0.5 from 0.25 earlier this week. She had been on the 0.25 for 5 weeks. She states the pain bothers her more than the nausea and vomiting. Patient states she feels very minimal relief since she came in.  She denies NSAID use, bleeding or black stools. She reports 10lb weight loss and has been on ozempic for her diabetes. Denies diarrhea or urinary symptoms. Admitted for Nausea and vomiting  *11/1: D/w Dr. Hernandez - PPN Day #1; PPN to be initiated as a bridge 2/2 prolonged PO intolerance/poor PO intake; awaiting gastric emptying study. Per GI note 10/27: abd USG inconclusive s/p cholecystectomy. Rec for EGD/Colonoscopy early next week. 10/30: now s/p EGD and colonoscopy - egd with findings of gastritis and esophagitis without ulcers colonoscopy with patent anastomosis - no acute findings. Pt placed on regular low fiber diet - vomited lunch on 10/31 - complains of throat pain after egd; epigastric pain minimal.  Per hospitalist: "at this point, will need to rule out gastroparesis with gastric emptying study - per nuclear medicine the earliest it could be done would be Tuesday 11/5- need to order isotpope - Trial of 5mg liquid Reglan QID 15 min before meals and at bedtime."   PPN is not to be a long-term nutrition support, pending gastric emptying study if needing long-term nutrition support would recommend GJ-tube if gastroparesis is present. Strongly suggest to confirm goals of care regarding LONG-TERM nutrition support.  *11/3: PPN day #3. Per RN documentation, no events of N/V overnight. Was drinking ensure shakes and tolerating. C/o N/V during the day per hospitalist note. Will c/w PPN for now. Pending gastric emptying study, tentative Tuesday 11/5.  *11/6: PPN day #6: Did not receive PPN last night 2/2 per nuclear medicine not allowed to receive anything past midnight (?). EGD done yesterday which came back inconclusive. Per discussion w/ Dr. Ulrich, pt will need J tube based on gastric emptying study. Will c/w PPN for now until J tube to be placed.    *11/7: PPN day #7. Abnormal MRCP; vascular also consulted. At this time diff diagnosis expanded 2/2 pain. 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 GI recommended to c/w FLD for now and trial Reglan 3x daily to alleviate symptoms. Passed bloody BM this morning w/ abdominal pain, trend H/H. Vascular surgery consulted to evaluate for median arcuate ligament syndrome however no intervention to be done at this time as no imaging finding suggestive of median arcuate ligament syndrome. D/w hospitalist, will c/w PPN until GJ tube can be placed.   *11/8: PPN day #8. GI consulted yesterday, per note: "Abdominal pain could be secondary to distal CBD stones/sludge with likely accomodation biliary flow due to dilated duct post choly (normal lft's)." Is s/p ERCP yesterday w/ Dr. Moncada - 2 stents were placed and obtained biopsy of nodular papilla. Currently c/o abdominal discomfort/ pain s/p ERCP yesterday, D/w hospitalist, will c/w PPN for now, ?if GJ tube will be placed. Consult placed for PICC team since pt on PPN >6 days and meeting <75% ENN x 8 days.  11/9: S/p PICC placement (11/8) and transitioned to TPN. ?if GJ tube will be placed as pt has had intestines removed in the past.  *11/11: Remains on CLD. Now w/ low grade temp and worsening pain, started on empiric abx. Per hospitalist note: "If the stone is not the cause one could ask if this can be an IBD exacerbation on the small bowel mucosae since she did have some blood; check ESR and CRP- if markedly elevated it could be indicative of poss IBD and steroids might be necessary." Plan to c/w TPN for now. Hyperglycemia noted x 24 hrs - will hold off on titrating dextrose today.  *11/12: Diet advanced to FLD yesterday. Per GI NP note - since pt w/ persistent abdominal pain s/p CBD stent placement, plan for repeat ERCP w/ stone removal on Thursday; was unable to remove stones originally 2/2 AC on board and there was a risk for bleeding. Plan to c/w TPN for now. W/ worsening Hyperglycemia x 24 hrs even w/ insulin added to PN bag - will decrease dextrose in PN bag today and will titrate once POCTs regulate.    PPN initiated 2/2 prolonged PO intolerance, N/V, awaiting gastric emptying study    *current status: Remains on FLD yesterday. C/o abdominal pain and per hospitalist note pt w/ 1 emesis episode yesterday. Is tolerating small sips of Glucerna shakes. C/o chest pain this morning, Chest x-ray and respiratory treatments ordered. Plan to c/w TPN for now. Hyperglycemia persists despite lowering dextrose in PN bag - will c/w current dextrose in PN bag today and will increase insulin in bag; will titrate dextrose once POCTs regulate. Lantus 10 units HS to be initiated tonight.     *pertinent meds: Abilify, Lexapro, Magic Mouthwash (did not receive x 24 hrs), Admelog (34 units x 24 hours), Reglan, Metoprolol, Protonix, Abx in D5, Acetaminophen, Xanax, Dilaudid, Zofran     *labs reviewed; Na, K+, and Phos WNL. Mg downtrended, now on lower end of limits, will increase. Please replete lytes outside of PN bag prn. T Bili WNL to c/w trace elements. corrected Ca on higher end of normal limits, DO NOT supplement calcium outside of PN bag at this time. Will c/w thiamine and MVI and minerals in the PN bag today. New Triglyceride WNL (160), will c/w 50 g of lipids." POCTs x 24 hours remain elevated. Plan to titrate dextrose up 50-75g/day until goal rate of 325g reached (GIR ~4.3) however d/t hyperglycemia will hold off on titrating dextrose today and will increase to 15 units of insulin in PN bag (~.09 units insulin/ grams of dextrose); Lantus 10 units HS to be initiated tonight as well to better control BG levels.   11-13    138  |  104  |  15  ----------------------------<  309[H]  4.4   |  32[H]  |  0.76    Ca    8.7      13 Nov 2024 07:01  Phos  3.1     11-13  Mg     1.7     11-13    TPro  6.5  /  Alb  2.7[L]  /  TBili  0.2  /  DBili  x   /  AST  15  /  ALT  39  /  AlkPhos  96  11-13    BMI: BMI (kg/m2): 23.5 (10-25-24 @ 18:30)  HbA1c: A1C with Estimated Average Glucose Result: 6.6 % (10-26-24 @ 06:47)    Glucose: POCT Blood Glucose.: 335 mg/dL (13 Nov 2024 06:18)    BP: 125/68 (11-04-24 @ 23:00) (99/56 - 141/59)Vital Signs Last 24 Hrs  T(C): 36.4 (11-04-24 @ 23:00), Max: 36.4 (11-04-24 @ 16:23)  T(F): 97.5 (11-04-24 @ 23:00), Max: 97.6 (11-04-24 @ 16:23)  HR: 74 (11-04-24 @ 23:00) (74 - 78)  BP: 125/68 (11-04-24 @ 23:00) (125/68 - 134/77)  BP(mean): 77 (11-04-24 @ 23:00) (77 - 77)  RR: 18 (11-04-24 @ 23:00) (18 - 18)  SpO2: 98% (11-04-24 @ 23:00) (95% - 98%)    Lipid Panel: Date/Time: 11-10-24 @ 04:34  Triglycerides, Serum: 160    POCT Blood Glucose.: 335 mg/dL (13 Nov 2024 06:18)  POCT Blood Glucose.: 304 mg/dL (12 Nov 2024 23:49)  POCT Blood Glucose.: 311 mg/dL (12 Nov 2024 16:58)  POCT Blood Glucose.: 361 mg/dL (12 Nov 2024 12:08)    *I&O's Detaill - not doc'd; ***strict I&O's when on PN. ***  *BM - last doc'd 10/30; abdominal discomfort +N/V on 11/8. pt not on bowel regimen.  *edema: none doc'd    *malnutrition: Pt continues to meet criteria for moderate malnutrition in context of acute on chronic illness r/t decreased ability to meet increased nutrient needs 2/2 N/V, DM AEB >7.5% wt loss x 2mo, <50% ENN x 2 weeks, mild muscle/fat wasting    Estimated Needs: based on 55 Kg (wt from 10/27)  Calories: 7931-7841 Kcal (35-40 Kcal/Kg)  Protein: 66-83 g (1.2-1.5 g/Kg)  Fluids: 8025-3359 mL (35-40 mL/Kg)    Diet, Full Liquid; consistent carbohydrate (no snacks); glucerna TID (11-11-24 @ 15:52) [Active]  Parenteral Nutrition - Adult 1 Each (83 mL/Hr) TPN Continuous <Continuous>, 11-12-24 @ 22:00, 22:00, Stop order after: 1 Days    Weight History:  Weight (kg): 52.8 (10-25-24 @ 18:30)    TPN Recommendations: via PICC Line  Total Volume: 2000 mL x 24 hours  80 g  Amino Acids  175 g Dextrose (titrate dextrose up 50-75g/day until goal rate of 325g reached)  50 g Lipids 20%  80 mEq Sodium Chloride  47 mEq Sodium Acetate  20 mmol Sodium Phosphate  60 mEq Potassium Chloride  13 mEq Potassium Acetate  5 mmol Potassium Phosphate  0 mEq Calcium Gluconate (CAPS out of PN solution)   10 mEq Magnesium Sulfate  200 mg Thiamine  1 ml Trace Elements  10 ml MVI  15 units insulin     Total Calories   1415   (Meets  73%  of  Estimated Energy needs  and  100%  Protein needs)       Additional Recommendations:  1) Daily weights  2) Strict I & O's  3) Daily lyte checks including magnesium and phos  4) Weekly triglycerides/LFT checks  5) POCT q6hrs; maintain 140-180mg/dL    6) Titrate dextrose 50-75g daily until goal 325g dextrose is met (GIR 4.3; WNL). C/w insulin in PN bag 2/2 hyperglycemia; will hold off titrating dextrose until POCTs normalize.   7) Confirm goals of care regarding LONG-TERM nutrition support - if gastroparesis present and still unable to tolerate PO, would need GJ tube - ?if to be placed. Currently w/ PICC line to better meet ENN    *will continue to monitor and adjust PN prn*  Carmenza Hamilton, RDN, CDN [phone] Clinical Nutrition PN Follow Up Note    *64-year-old female with history of GERD  HTN, DM, polyps, and afib with 5 cardiac stents. presents with epigastric pain for the last couple of weeks.  Her daughter brought her in, after speaking with her GI doctor who stated patient should not have been on ozempic before and probably have taken insulin instead. She states that she had just uptitrated her dose of ozempic to 0.5 from 0.25 earlier this week. She had been on the 0.25 for 5 weeks. She states the pain bothers her more than the nausea and vomiting. Patient states she feels very minimal relief since she came in.  She denies NSAID use, bleeding or black stools. She reports 10lb weight loss and has been on ozempic for her diabetes. Denies diarrhea or urinary symptoms. Admitted for Nausea and vomiting  *11/1: D/w Dr. Hernandez - PPN Day #1; PPN to be initiated as a bridge 2/2 prolonged PO intolerance/poor PO intake; awaiting gastric emptying study. Per GI note 10/27: abd USG inconclusive s/p cholecystectomy. Rec for EGD/Colonoscopy early next week. 10/30: now s/p EGD and colonoscopy - egd with findings of gastritis and esophagitis without ulcers colonoscopy with patent anastomosis - no acute findings. Pt placed on regular low fiber diet - vomited lunch on 10/31 - complains of throat pain after egd; epigastric pain minimal.  Per hospitalist: "at this point, will need to rule out gastroparesis with gastric emptying study - per nuclear medicine the earliest it could be done would be Tuesday 11/5- need to order isotpope - Trial of 5mg liquid Reglan QID 15 min before meals and at bedtime."   PPN is not to be a long-term nutrition support, pending gastric emptying study if needing long-term nutrition support would recommend GJ-tube if gastroparesis is present. Strongly suggest to confirm goals of care regarding LONG-TERM nutrition support.  *11/3: PPN day #3. Per RN documentation, no events of N/V overnight. Was drinking ensure shakes and tolerating. C/o N/V during the day per hospitalist note. Will c/w PPN for now. Pending gastric emptying study, tentative Tuesday 11/5.  *11/6: PPN day #6: Did not receive PPN last night 2/2 per nuclear medicine not allowed to receive anything past midnight (?). EGD done yesterday which came back inconclusive. Per discussion w/ Dr. Ulrich, pt will need J tube based on gastric emptying study. Will c/w PPN for now until J tube to be placed.    *11/7: PPN day #7. Abnormal MRCP; vascular also consulted. At this time diff diagnosis expanded 2/2 pain. 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 GI recommended to c/w FLD for now and trial Reglan 3x daily to alleviate symptoms. Passed bloody BM this morning w/ abdominal pain, trend H/H. Vascular surgery consulted to evaluate for median arcuate ligament syndrome however no intervention to be done at this time as no imaging finding suggestive of median arcuate ligament syndrome. D/w hospitalist, will c/w PPN until GJ tube can be placed.   *11/8: PPN day #8. GI consulted yesterday, per note: "Abdominal pain could be secondary to distal CBD stones/sludge with likely accomodation biliary flow due to dilated duct post choly (normal lft's)." Is s/p ERCP yesterday w/ Dr. Moncada - 2 stents were placed and obtained biopsy of nodular papilla. Currently c/o abdominal discomfort/ pain s/p ERCP yesterday, D/w hospitalist, will c/w PPN for now, ?if GJ tube will be placed. Consult placed for PICC team since pt on PPN >6 days and meeting <75% ENN x 8 days.  11/9: S/p PICC placement (11/8) and transitioned to TPN. ?if GJ tube will be placed as pt has had intestines removed in the past.  *11/11: Remains on CLD. Now w/ low grade temp and worsening pain, started on empiric abx. Per hospitalist note: "If the stone is not the cause one could ask if this can be an IBD exacerbation on the small bowel mucosae since she did have some blood; check ESR and CRP- if markedly elevated it could be indicative of poss IBD and steroids might be necessary." Plan to c/w TPN for now. Hyperglycemia noted x 24 hrs - will hold off on titrating dextrose today.  *11/12: Diet advanced to FLD yesterday. Per GI NP note - since pt w/ persistent abdominal pain s/p CBD stent placement, plan for repeat ERCP w/ stone removal on Thursday; was unable to remove stones originally 2/2 AC on board and there was a risk for bleeding. Plan to c/w TPN for now. W/ worsening Hyperglycemia x 24 hrs even w/ insulin added to PN bag - will decrease dextrose in PN bag today and will titrate once POCTs regulate.    PPN initiated 2/2 prolonged PO intolerance, N/V, awaiting gastric emptying study    *current status: Remains on FLD yesterday. C/o abdominal pain and per hospitalist note pt w/ 1 emesis episode yesterday. Is tolerating small sips of Glucerna shakes. C/o chest pain this morning, Chest x-ray and respiratory treatments ordered. Plan to c/w TPN for now. Hyperglycemia persists despite lowering dextrose in PN bag - will c/w current dextrose in PN bag today and will increase insulin in bag; will titrate dextrose once POCTs regulate. Lantus 10 units HS to be initiated tonight.     *pertinent meds: Abilify, Lexapro, Magic Mouthwash (did not receive x 24 hrs), Admelog (34 units x 24 hours), Reglan, Metoprolol, Protonix, Abx in D5, Acetaminophen, Xanax, Dilaudid, Zofran     *labs reviewed; Na, K+, and Phos WNL. Mg downtrended, now on lower end of limits, will increase. Please replete lytes outside of PN bag prn. T Bili WNL to c/w trace elements. corrected Ca on higher end of normal limits, DO NOT supplement calcium outside of PN bag at this time. Will c/w thiamine and MVI and minerals in the PN bag today. New Triglyceride WNL (160), will c/w 50 g of lipids." POCTs x 24 hours remain elevated. Plan to titrate dextrose up 50-75g/day until goal rate of 325g reached (GIR ~4.3) however d/t hyperglycemia will hold off on titrating dextrose today and will increase to 15 units of insulin in PN bag (~.09 units insulin/ grams of dextrose); Lantus 10 units HS to be initiated tonight as well to better control BG levels. Will also add 10 mcg Chromium in PN bag - some evidence-based studies suggest Chromium can aid in treating hyperglycemia.   11-13    138  |  104  |  15  ----------------------------<  309[H]  4.4   |  32[H]  |  0.76    Ca    8.7      13 Nov 2024 07:01  Phos  3.1     11-13  Mg     1.7     11-13    TPro  6.5  /  Alb  2.7[L]  /  TBili  0.2  /  DBili  x   /  AST  15  /  ALT  39  /  AlkPhos  96  11-13    BMI: BMI (kg/m2): 23.5 (10-25-24 @ 18:30)  HbA1c: A1C with Estimated Average Glucose Result: 6.6 % (10-26-24 @ 06:47)    Glucose: POCT Blood Glucose.: 335 mg/dL (13 Nov 2024 06:18)    BP: 125/68 (11-04-24 @ 23:00) (99/56 - 141/59)Vital Signs Last 24 Hrs  T(C): 36.4 (11-04-24 @ 23:00), Max: 36.4 (11-04-24 @ 16:23)  T(F): 97.5 (11-04-24 @ 23:00), Max: 97.6 (11-04-24 @ 16:23)  HR: 74 (11-04-24 @ 23:00) (74 - 78)  BP: 125/68 (11-04-24 @ 23:00) (125/68 - 134/77)  BP(mean): 77 (11-04-24 @ 23:00) (77 - 77)  RR: 18 (11-04-24 @ 23:00) (18 - 18)  SpO2: 98% (11-04-24 @ 23:00) (95% - 98%)    Lipid Panel: Date/Time: 11-10-24 @ 04:34  Triglycerides, Serum: 160    POCT Blood Glucose.: 335 mg/dL (13 Nov 2024 06:18)  POCT Blood Glucose.: 304 mg/dL (12 Nov 2024 23:49)  POCT Blood Glucose.: 311 mg/dL (12 Nov 2024 16:58)  POCT Blood Glucose.: 361 mg/dL (12 Nov 2024 12:08)    *I&O's Detaill - not doc'd; ***strict I&O's when on PN. ***  *BM - last doc'd 10/30; abdominal discomfort +N/V on 11/8. pt not on bowel regimen.  *edema: none doc'd    *malnutrition: Pt continues to meet criteria for moderate malnutrition in context of acute on chronic illness r/t decreased ability to meet increased nutrient needs 2/2 N/V, DM AEB >7.5% wt loss x 2mo, <50% ENN x 2 weeks, mild muscle/fat wasting    Estimated Needs: based on 55 Kg (wt from 10/27)  Calories: 2456-9769 Kcal (35-40 Kcal/Kg)  Protein: 66-83 g (1.2-1.5 g/Kg)  Fluids: 6186-9144 mL (35-40 mL/Kg)    Diet, Full Liquid; consistent carbohydrate (no snacks); glucerna TID (11-11-24 @ 15:52) [Active]  Parenteral Nutrition - Adult 1 Each (83 mL/Hr) TPN Continuous <Continuous>, 11-12-24 @ 22:00, 22:00, Stop order after: 1 Days    Weight History:  Weight (kg): 52.8 (10-25-24 @ 18:30)    TPN Recommendations: via PICC Line  Total Volume: 2000 mL x 24 hours  80 g  Amino Acids  175 g Dextrose (titrate dextrose up 50-75g/day until goal rate of 325g reached)  50 g Lipids 20%  80 mEq Sodium Chloride  47 mEq Sodium Acetate  20 mmol Sodium Phosphate  60 mEq Potassium Chloride  13 mEq Potassium Acetate  5 mmol Potassium Phosphate  0 mEq Calcium Gluconate (CAPS out of PN solution)   10 mEq Magnesium Sulfate  200 mg Thiamine  1 ml Trace Elements  10 ml MVI  10 mcg Chromium   15 units insulin     Total Calories   1415   (Meets  73%  of  Estimated Energy needs  and  100%  Protein needs)       Additional Recommendations:  1) Daily weights  2) Strict I & O's  3) Daily lyte checks including magnesium and phos  4) Weekly triglycerides/LFT checks  5) POCT q6hrs; maintain 140-180mg/dL    6) Titrate dextrose 50-75g daily until goal 325g dextrose is met (GIR 4.3; WNL). C/w insulin in PN bag 2/2 hyperglycemia; will hold off titrating dextrose until POCTs normalize.   7) Confirm goals of care regarding LONG-TERM nutrition support - if gastroparesis present and still unable to tolerate PO, would need GJ tube - ?if to be placed. Currently w/ PICC line to better meet ENN    *will continue to monitor and adjust PN prn*  Carmenza Hamilton RDN, CDN [phone]

## 2024-11-13 NOTE — PROVIDER CONTACT NOTE (CHANGE IN STATUS NOTIFICATION) - SITUATION
Upon morning med pass, patient complaining of chest pain that is described as tight, making it hard to breathe associated with a new onset of a tight cough.

## 2024-11-13 NOTE — BH CONSULTATION LIAISON ASSESSMENT NOTE - RISK ASSESSMENT
Low acute risk: Patient depression is in remission, she does not want to die and does not have any thoughts of harming self.    Long-term risks are increased on the basis of her history of depression.    Protective factors: No history of psychiatric utilization suicidality and family supportive.    Mitigation of risk: Patient can resume treatment with outpatient provider

## 2024-11-13 NOTE — PROVIDER CONTACT NOTE (CHANGE IN STATUS NOTIFICATION) - ASSESSMENT
Patient states not feeling well, when pressure is applied to chest she denies pain but says it is hard to breathe. Vitals are stable, slightly tachycardic but otherwise WDL. Patient medicated per PRN orders for abdominal pain.

## 2024-11-13 NOTE — PROGRESS NOTE ADULT - SUBJECTIVE AND OBJECTIVE BOX
CHIEF COMPLAINT/INTERVAL HISTORY:    Patient is a 64y old  Female who presents with a chief complaint of inability to tolerate po (02 Nov 2024 13:01)      HPI:  64-year-old female with history of GERD  HTN, DM, polyps, and afib with 5 cardiac stents. presents with epigastric pain for the last couple of weeks.  Her daughter brought her in, after speaking with her GI doctor who stated patient should not have been on ozempic before and probably have taken insulin instead. She states that she had just uptitrated her dose of ozempic to 0.5 from 0.25 earlier this week. She had been on the 0.25 for 5 weeks. She states the pain bothers her more than the nausea and vomiting. Patient states she feels very minimal relief since she came in.  She denies NSAID use, bleeding or black stools. She reports 10lb weight loss and has been on ozempic for her diabetes. Denies diarrhea or urinary symptoms.     Adm for initiation of PPN for presumed gastroparesis; gastric emptying study for 11/6- failed. Had a 1.5cm CBD dilation ( s/p calvin).   Abnormal MRCP; s/p ERCP and stent placement for CBD stone  Pain has worsened the past few days, now requiring Dilaudid  PICC inserted 11/9 for TPN initiation  Empiric abx started 11/9 for low grade temp     11.11: c/o epigastric pain, c/o nausea, no diarrhea   11.12: c/o persistent epigastric pain, able to tolerated small amount of Glucerna   vomited yesterday, no fever/chills  11.13: states pain is worse today, also c/o dyspnea and wheezing   had CP last night      Vital Signs Last 24 Hrs  T(C): 36.6 (13 Nov 2024 06:20), Max: 36.7 (12 Nov 2024 14:33)  T(F): 97.9 (13 Nov 2024 06:20), Max: 98.1 (12 Nov 2024 14:33)  HR: 102 (13 Nov 2024 06:20) (81 - 102)  BP: 134/94 (13 Nov 2024 06:20) (108/68 - 134/94)  BP(mean): 105 (13 Nov 2024 06:20) (80 - 105)  RR: 18 (13 Nov 2024 06:20) (18 - 19)  SpO2: 96% (13 Nov 2024 06:20) (94% - 99%)    Parameters below as of 13 Nov 2024 06:20  Patient On (Oxygen Delivery Method): room air          PHYSICAL EXAM:    GENERAL: NAD,  HEENT:  NC/AT, EOMI, PERRLA, No scleral icterus, Moist mucous membranes  NECK: Supple, No JVD  CNS:  Alert & Oriented X3, Motor Strength 5/5 B/L upper and lower extremities; DTRs 2+ intact   LUNG: Normal Breath sounds, Clear to auscultation bilaterally, No rales, No rhonchi, No wheezing  HEART: RRR; No murmurs, No rubs  ABDOMEN: +BS, ST, +ttp to ruq and epigastric area   GENITOURINARY- Voiding, Bladder not distended  EXTREMITIES:  2+ Peripheral Pulses, No clubbing, No cyanosis, No tibial edema  MUSCULOSKELTAL: Joints normal ROM, No joint tenderness, No muscle tenderness  VAGINAL: deferred  RECTAL: deferred, not indicated  BREAST: deferred        Assessment and Plan:     64 year old woman with poor oral intake and epig pain:      1. Intractable nausea with poor oral intake likely due to Gastroparesis exacerbated by GLP-1  - h/o colectomy with iliorectal anastomosis for IBD  - EGD/colonoscopy --> gastritis, esophagitis, c/w PPI,   Reglan  not improving symptoms much     -was unable to complete Gastric emptying study due to vomiting     2. Choledocholithiasis  s/p ERCP and stent placement last week   repeat MRCP revealed persistent 14mm stone in CBD;   GI eval noted, for repeat ERCP in 48hrs, hold Plavix     3. Asthma exacerbation:  s/p Solumedrol IV x 1  Albuterol/Atrovent nebs q 4hrs  start Advair, Singulair  Mucinex  CXray noted: no Pna, clear lungs     4. DM type II: uncontrolled  start Lantus 10u qHS  c/w ISS  ADA    5. Moderate protein-calorie malnutrition:  - started on TPN 11/8 and PICC line inserted     6. Anxiety:   c/w Lexapro and Xanax prn    On Abilify 5mg [place] for the past month, now dose lowered to 2mg /day.  Psych evaluation: does she need the Abilify considering the severe nausea/vomiting/DM ?      5. Paroxymal afib / HTN / CAD:  - off lisinopril due to borderline BP  Eliquis on hold due to 1 ep bloody diarrhea and for poss further procedure  - s/p 5 stents last were 2 years ago

## 2024-11-13 NOTE — BH CONSULTATION LIAISON ASSESSMENT NOTE - NSBHCHARTREVIEWLAB_PSY_A_CORE FT
11-13    138  |  104  |  15  ----------------------------<  309[H]  4.4   |  32[H]  |  0.76    Ca    8.7      13 Nov 2024 07:01  Phos  3.1     11-13  Mg     1.7     11-13    TPro  6.5  /  Alb  2.7[L]  /  TBili  0.2  /  DBili  x   /  AST  15  /  ALT  39  /  AlkPhos  96  11-13

## 2024-11-13 NOTE — BH CONSULTATION LIAISON ASSESSMENT NOTE - NSBHCHARTREVIEWVS_PSY_A_CORE FT
Vital Signs Last 24 Hrs  T(C): 36.6 (13 Nov 2024 06:20), Max: 36.7 (12 Nov 2024 14:33)  T(F): 97.9 (13 Nov 2024 06:20), Max: 98.1 (12 Nov 2024 14:33)  HR: 102 (13 Nov 2024 06:20) (81 - 102)  BP: 134/94 (13 Nov 2024 06:20) (108/68 - 134/94)  BP(mean): 105 (13 Nov 2024 06:20) (80 - 105)  RR: 18 (13 Nov 2024 06:20) (18 - 19)  SpO2: 96% (13 Nov 2024 06:20) (94% - 99%)    Parameters below as of 13 Nov 2024 06:20  Patient On (Oxygen Delivery Method): room air

## 2024-11-13 NOTE — CHART NOTE - NSCHARTNOTEFT_GEN_A_CORE
MEDICINE NP    Notified by RN patient with temperature . Seen and examined patient at bedside. Patient is alert, NAD. Denies HA, CP, SOB, cough, N/V, or abd pain.    VITAL SIGNS:  T(C): 36.6 (11-13-24 @ 06:20), Max: 36.7 (11-12-24 @ 08:29)  HR: 102 (11-13-24 @ 06:20) (81 - 107)  BP: 134/94 (11-13-24 @ 06:20) (108/68 - 134/94)  RR: 18 (11-13-24 @ 06:20) (18 - 19)  SpO2: 96% (11-13-24 @ 06:20) (94% - 99%)  Wt(kg): --      LABORATORY:        11-13    136  |  105  |  15  ----------------------------<  339[H]  4.4   |  30  |  0.72    Ca    8.6      13 Nov 2024 04:40  Phos  3.1     11-13  Mg     1.7     11-13    TPro  6.5  /  Alb  2.7[L]  /  TBili  0.2  /  DBili  x   /  AST  15  /  ALT  39  /  AlkPhos  96  11-13          MICROBIOLOGY:           RADIOLOGY:          PHYSICAL EXAM:    Constitutional: AOx3. NAD.    Respiratory: clear lungs bilaterally. No wheezing, rhonchi, or crackles.    Cardiovascular: S1 S2. No murmurs.    Gastrointestinal: BS X4 active. soft. nontender.    Extremities/Vascular: +2 pulses bilaterally. No BLE edema.      ASSESSMENT/PLAN:   HPI:  64-year-old female with history of GERD  HTN, DM, polyps, and afib with 5 cardiac stents. presents with epigastric pain for the last couple of weeks.  Her daughter brought her in, after speaking with her GI doctor who stated patient should not have been on ozempic before and probably have taken insulin instead. She states that she had just uptitrated her dose of ozempic to 0.5 from 0.25 earlier this week. She had been on the 0.25 for 5 weeks. She states the pain bothers her more than the nausea and vomiting. Patient states she feels very minimal relief since she came in.  She denies NSAID use, bleeding or black stools. She reports 10lb weight loss and has been on ozempic for her diabetes. Denies diarrhea or urinary symptoms. Now with chest pain          1) Chest pain/cough  -Lab: BMP Trop  -Robitussin q6  -EKG  -c/w remoter tele  -F/U primary team in AM MEDICINE NP    Notified by RN patient with Chest pain/cough/wheezing . Seen and examined patient at bedside. Patient is alert, NAD. Denies HA, CP, N/V, or abd pain. patient treatment outlined below. Patient placed on sign out for follow care    VITAL SIGNS:  T(C): 36.6 (11-13-24 @ 06:20), Max: 36.7 (11-12-24 @ 08:29)  HR: 102 (11-13-24 @ 06:20) (81 - 107)  BP: 134/94 (11-13-24 @ 06:20) (108/68 - 134/94)  RR: 18 (11-13-24 @ 06:20) (18 - 19)  SpO2: 96% (11-13-24 @ 06:20) (94% - 99%)  Wt(kg): --      LABORATORY:        11-13    136  |  105  |  15  ----------------------------<  339[H]  4.4   |  30  |  0.72    Ca    8.6      13 Nov 2024 04:40  Phos  3.1     11-13  Mg     1.7     11-13    TPro  6.5  /  Alb  2.7[L]  /  TBili  0.2  /  DBili  x   /  AST  15  /  ALT  39  /  AlkPhos  96  11-13          MICROBIOLOGY:           RADIOLOGY:          PHYSICAL EXAM:    Constitutional: AOx3. NAD.    Respiratory: clear lungs bilaterally. No wheezing, rhonchi, or crackles.    Cardiovascular: S1 S2. No murmurs.    Gastrointestinal: BS X4 active. soft. nontender.    Extremities/Vascular: +2 pulses bilaterally. No BLE edema.      ASSESSMENT/PLAN:   HPI:  64-year-old female with history of GERD  HTN, DM, polyps, and afib with 5 cardiac stents. presents with epigastric pain for the last couple of weeks.  Her daughter brought her in, after speaking with her GI doctor who stated patient should not have been on ozempic before and probably have taken insulin instead. She states that she had just uptitrated her dose of ozempic to 0.5 from 0.25 earlier this week. She had been on the 0.25 for 5 weeks. She states the pain bothers her more than the nausea and vomiting. Patient states she feels very minimal relief since she came in.  She denies NSAID use, bleeding or black stools. She reports 10lb weight loss and has been on ozempic for her diabetes. Denies diarrhea or urinary symptoms. Chest pain/cough/wheezing        1) Chest pain/cough/wheezing  -Lab: BMP Trop  -PRN Robitussin q6  -EKG  -PRN Duo neb q6   -solumedrol  -CXR  -Continuous pulse ox  -F/U primary team in AM

## 2024-11-13 NOTE — BH CONSULTATION LIAISON ASSESSMENT NOTE - SUMMARY
Patient is a 64 years old woman with past psychiatric history of depression on Abilify 2 mg and Xanax 0.5 mg at HS PRN that is prescribed by provider Ernestine oFley [phone] And currently complaining about being chronically depressed, insomnia responsive Xanax but she does get more than 5 to 6 hours a day.  Appetite is poor.  Energy level is satisfying and patient is taking care of her son's children and they live together.  She says sometimes she is able to enjoy her present but sometimes that she is overwhelmed.    She is currently depressed but denies ever having thoughts about harming self or dying.  No history of aggression or cleo.  No history of psychosis.  Patient denies any substance abuse or trauma.    Patient is not in imminent risk of harm to self and others, she is not severely depressed or psychotic that she does not need inpatient psychiatry admission.    She prefers her outpatient provider to manage her Abilify and does not want right now needed combination and replace with antidepressant nor increasing medication.  She prefers that being done by outside provider.  PLan:  Continue Abilify 2 mg every day and Xanax 0.5 mg nightly as needed for anxiety and insomnia.

## 2024-11-14 LAB
ALBUMIN SERPL ELPH-MCNC: 2.6 G/DL — LOW (ref 3.3–5)
ALP SERPL-CCNC: 97 U/L — SIGNIFICANT CHANGE UP (ref 40–120)
ALT FLD-CCNC: 38 U/L — SIGNIFICANT CHANGE UP (ref 12–78)
ANION GAP SERPL CALC-SCNC: 2 MMOL/L — LOW (ref 5–17)
AST SERPL-CCNC: 22 U/L — SIGNIFICANT CHANGE UP (ref 15–37)
BILIRUB SERPL-MCNC: 0.2 MG/DL — SIGNIFICANT CHANGE UP (ref 0.2–1.2)
BUN SERPL-MCNC: 14 MG/DL — SIGNIFICANT CHANGE UP (ref 7–23)
CALCIUM SERPL-MCNC: 8.7 MG/DL — SIGNIFICANT CHANGE UP (ref 8.5–10.1)
CHLORIDE SERPL-SCNC: 105 MMOL/L — SIGNIFICANT CHANGE UP (ref 96–108)
CO2 SERPL-SCNC: 30 MMOL/L — SIGNIFICANT CHANGE UP (ref 22–31)
CREAT SERPL-MCNC: 0.65 MG/DL — SIGNIFICANT CHANGE UP (ref 0.5–1.3)
EGFR: 99 ML/MIN/1.73M2 — SIGNIFICANT CHANGE UP
GLUCOSE BLDC GLUCOMTR-MCNC: 281 MG/DL — HIGH (ref 70–99)
GLUCOSE BLDC GLUCOMTR-MCNC: 292 MG/DL — HIGH (ref 70–99)
GLUCOSE BLDC GLUCOMTR-MCNC: 315 MG/DL — HIGH (ref 70–99)
GLUCOSE BLDC GLUCOMTR-MCNC: 335 MG/DL — HIGH (ref 70–99)
GLUCOSE BLDC GLUCOMTR-MCNC: 367 MG/DL — HIGH (ref 70–99)
GLUCOSE SERPL-MCNC: 304 MG/DL — HIGH (ref 70–99)
MAGNESIUM SERPL-MCNC: 1.9 MG/DL — SIGNIFICANT CHANGE UP (ref 1.6–2.6)
PHOSPHATE SERPL-MCNC: 3.1 MG/DL — SIGNIFICANT CHANGE UP (ref 2.5–4.5)
POTASSIUM SERPL-MCNC: 4.2 MMOL/L — SIGNIFICANT CHANGE UP (ref 3.5–5.3)
POTASSIUM SERPL-SCNC: 4.2 MMOL/L — SIGNIFICANT CHANGE UP (ref 3.5–5.3)
PROT SERPL-MCNC: 6.7 GM/DL — SIGNIFICANT CHANGE UP (ref 6–8.3)
SODIUM SERPL-SCNC: 137 MMOL/L — SIGNIFICANT CHANGE UP (ref 135–145)

## 2024-11-14 PROCEDURE — 99232 SBSQ HOSP IP/OBS MODERATE 35: CPT

## 2024-11-14 RX ORDER — METOCLOPRAMIDE HYDROCHLORIDE 10 MG/1
10 TABLET ORAL
Refills: 0 | Status: DISCONTINUED | OUTPATIENT
Start: 2024-11-14 | End: 2024-11-29

## 2024-11-14 RX ORDER — INSULIN GLARGINE 100 [IU]/ML
15 INJECTION, SOLUTION SUBCUTANEOUS AT BEDTIME
Refills: 0 | Status: DISCONTINUED | OUTPATIENT
Start: 2024-11-14 | End: 2024-11-15

## 2024-11-14 RX ORDER — SODIUM/POT/MAG/CALC/CHLOR/ACET 35-20-5MEQ
1 VIAL (ML) INTRAVENOUS
Refills: 0 | Status: DISCONTINUED | OUTPATIENT
Start: 2024-11-14 | End: 2024-11-14

## 2024-11-14 RX ORDER — FAT EMULSIONS 20 %
0.95 EMULSION INTRAVENOUS
Qty: 50 | Refills: 0 | Status: DISCONTINUED | OUTPATIENT
Start: 2024-11-14 | End: 2024-11-14

## 2024-11-14 RX ORDER — PANTOPRAZOLE SODIUM 40 MG/1
40 TABLET, DELAYED RELEASE ORAL
Refills: 0 | Status: DISCONTINUED | OUTPATIENT
Start: 2024-11-14 | End: 2024-11-28

## 2024-11-14 RX ADMIN — Medication 20.8 GM/KG/DAY: at 00:22

## 2024-11-14 RX ADMIN — METOPROLOL TARTRATE 100 MILLIGRAM(S): 100 TABLET, FILM COATED ORAL at 09:59

## 2024-11-14 RX ADMIN — MONTELUKAST SODIUM 10 MILLIGRAM(S): 10 TABLET ORAL at 21:52

## 2024-11-14 RX ADMIN — ESCITALOPRAM OXALATE 10 MILLIGRAM(S): 10 TABLET, FILM COATED ORAL at 09:59

## 2024-11-14 RX ADMIN — HYDROMORPHONE HYDROCHLORIDE 1 MILLIGRAM(S): 2 TABLET ORAL at 19:43

## 2024-11-14 RX ADMIN — HYDROMORPHONE HYDROCHLORIDE 1 MILLIGRAM(S): 2 TABLET ORAL at 05:35

## 2024-11-14 RX ADMIN — Medication 20.8 GM/KG/DAY: at 22:30

## 2024-11-14 RX ADMIN — IPRATROPIUM BROMIDE AND ALBUTEROL SULFATE 3 MILLILITER(S): 2.5; .5 SOLUTION RESPIRATORY (INHALATION) at 18:47

## 2024-11-14 RX ADMIN — Medication 200 MILLIGRAM(S): at 00:34

## 2024-11-14 RX ADMIN — Medication 6: at 05:33

## 2024-11-14 RX ADMIN — DIPHENHYDRAMINE HYDROCHLORIDE AND LIDOCAINE HYDROCHLORIDE AND ALUMINUM HYDROXIDE AND MAGNESIUM HYDRO 5 MILLILITER(S): KIT at 15:11

## 2024-11-14 RX ADMIN — Medication 0.5 MILLIGRAM(S): at 21:53

## 2024-11-14 RX ADMIN — Medication 0.5 MILLIGRAM(S): at 00:33

## 2024-11-14 RX ADMIN — HYDROMORPHONE HYDROCHLORIDE 1 MILLIGRAM(S): 2 TABLET ORAL at 15:34

## 2024-11-14 RX ADMIN — DIPHENHYDRAMINE HYDROCHLORIDE AND LIDOCAINE HYDROCHLORIDE AND ALUMINUM HYDROXIDE AND MAGNESIUM HYDRO 5 MILLILITER(S): KIT at 21:54

## 2024-11-14 RX ADMIN — Medication 1 EACH: at 22:29

## 2024-11-14 RX ADMIN — HYDROMORPHONE HYDROCHLORIDE 1 MILLIGRAM(S): 2 TABLET ORAL at 20:15

## 2024-11-14 RX ADMIN — FLUTICASONE PROPIONATE AND SALMETEROL XINAFOATE 1 DOSE(S): 45; 21 AEROSOL, METERED RESPIRATORY (INHALATION) at 07:50

## 2024-11-14 RX ADMIN — INSULIN GLARGINE 15 UNIT(S): 100 INJECTION, SOLUTION SUBCUTANEOUS at 22:07

## 2024-11-14 RX ADMIN — METOCLOPRAMIDE HYDROCHLORIDE 10 MILLIGRAM(S): 10 TABLET ORAL at 11:38

## 2024-11-14 RX ADMIN — ARIPIPRAZOLE 2 MILLIGRAM(S): 15 TABLET ORAL at 09:58

## 2024-11-14 RX ADMIN — Medication 6: at 11:45

## 2024-11-14 RX ADMIN — Medication 8: at 17:32

## 2024-11-14 RX ADMIN — HYDROMORPHONE HYDROCHLORIDE 1 MILLIGRAM(S): 2 TABLET ORAL at 10:46

## 2024-11-14 RX ADMIN — ROSUVASTATIN CALCIUM 10 MILLIGRAM(S): 5 TABLET, FILM COATED ORAL at 21:52

## 2024-11-14 RX ADMIN — Medication 0.5 MILLIGRAM(S): at 06:56

## 2024-11-14 RX ADMIN — IPRATROPIUM BROMIDE AND ALBUTEROL SULFATE 3 MILLILITER(S): 2.5; .5 SOLUTION RESPIRATORY (INHALATION) at 07:52

## 2024-11-14 RX ADMIN — Medication 250 MILLIGRAM(S): at 15:29

## 2024-11-14 RX ADMIN — METOCLOPRAMIDE HYDROCHLORIDE 10 MILLIGRAM(S): 10 TABLET ORAL at 15:34

## 2024-11-14 RX ADMIN — PANTOPRAZOLE SODIUM 40 MILLIGRAM(S): 40 TABLET, DELAYED RELEASE ORAL at 10:46

## 2024-11-14 RX ADMIN — Medication 8: at 00:36

## 2024-11-14 RX ADMIN — Medication 1 EACH: at 00:23

## 2024-11-14 RX ADMIN — FLUTICASONE PROPIONATE AND SALMETEROL XINAFOATE 1 DOSE(S): 45; 21 AEROSOL, METERED RESPIRATORY (INHALATION) at 20:14

## 2024-11-14 RX ADMIN — Medication 250 MILLIGRAM(S): at 22:08

## 2024-11-14 RX ADMIN — HYDROMORPHONE HYDROCHLORIDE 1 MILLIGRAM(S): 2 TABLET ORAL at 05:55

## 2024-11-14 RX ADMIN — ONDANSETRON HYDROCHLORIDE 4 MILLIGRAM(S): 4 TABLET, FILM COATED ORAL at 00:35

## 2024-11-14 NOTE — PHYSICAL THERAPY INITIAL EVALUATION ADULT - NSACTIVITYREC_GEN_A_PT
Dispensed patient education regarding importance of self mobilizing while at the hospital. Encouraged patient to ambulate hourly with nursing staff supervision. Patient has been admitted since 10/25 and is familiar with staff on the floor.

## 2024-11-14 NOTE — PROGRESS NOTE ADULT - SUBJECTIVE AND OBJECTIVE BOX
CHIEF COMPLAINT/INTERVAL HISTORY:    Patient is a 64y old  Female who presents with a chief complaint of inability to tolerate po (02 Nov 2024 13:01)      HPI:  64-year-old female with history of GERD  HTN, DM, polyps, and afib with 5 cardiac stents. presents with epigastric pain for the last couple of weeks.  Her daughter brought her in, after speaking with her GI doctor who stated patient should not have been on ozempic before and probably have taken insulin instead. She states that she had just uptitrated her dose of ozempic to 0.5 from 0.25 earlier this week. She had been on the 0.25 for 5 weeks. She states the pain bothers her more than the nausea and vomiting. Patient states she feels very minimal relief since she came in.  She denies NSAID use, bleeding or black stools. She reports 10lb weight loss and has been on ozempic for her diabetes. Denies diarrhea or urinary symptoms.     Adm for initiation of PPN for presumed gastroparesis; gastric emptying study for 11/6- failed. Had a 1.5cm CBD dilation ( s/p calvin).   Abnormal MRCP; s/p ERCP and stent placement for CBD stone  Pain has worsened the past few days, now requiring Dilaudid  PICC inserted 11/9 for TPN initiation  Empiric abx started 11/9 for low grade temp     11.11: c/o epigastric pain, c/o nausea, no diarrhea   11.12: c/o persistent epigastric pain, able to tolerated small amount of Glucerna   vomited yesterday, no fever/chills  11.13: states pain is worse today, also c/o dyspnea and wheezing   had CP last night  11.14: epig pain persists, less nausea, tolerated Glucerna and jello yesterday, ERCP postponed for tomorrow       Vital Signs Last 24 Hrs  T(C): 36.8 (14 Nov 2024 09:24), Max: 36.8 (14 Nov 2024 05:25)  T(F): 98.3 (14 Nov 2024 09:24), Max: 98.3 (14 Nov 2024 05:25)  HR: 100 (14 Nov 2024 09:24) (93 - 112)  BP: 127/96 (14 Nov 2024 09:24) (114/71 - 137/85)  BP(mean): 91 (14 Nov 2024 05:25) (91 - 98)  RR: 17 (14 Nov 2024 09:24) (17 - 18)  SpO2: 96% (14 Nov 2024 09:24) (95% - 99%)    Parameters below as of 14 Nov 2024 09:24  Patient On (Oxygen Delivery Method): room air          PHYSICAL EXAM:    GENERAL: NAD,  HEENT:  NC/AT, EOMI, PERRLA, No scleral icterus, Moist mucous membranes  NECK: Supple, No JVD  CNS:  Alert & Oriented X3, Motor Strength 5/5 B/L upper and lower extremities; DTRs 2+ intact   LUNG: Normal Breath sounds, Clear to auscultation bilaterally, No rales, No rhonchi, No wheezing  HEART: RRR; No murmurs, No rubs  ABDOMEN: +BS, ST, +ttp to ruq and epigastric area   GENITOURINARY- Voiding, Bladder not distended  EXTREMITIES:  2+ Peripheral Pulses, No clubbing, No cyanosis, No tibial edema  MUSCULOSKELTAL: Joints normal ROM, No joint tenderness, No muscle tenderness  VAGINAL: deferred  RECTAL: deferred, not indicated  BREAST: deferred        Assessment and Plan:     64 year old woman with poor oral intake and epig pain:      1. Intractable nausea with poor oral intake likely due to Gastroparesis exacerbated by GLP-1  - h/o colectomy with iliorectal anastomosis for IBD  - EGD/colonoscopy --> gastritis, esophagitis, c/w PPI,   Reglan  10mg TID started  GI follow up     -was unable to complete Gastric emptying study due to vomiting     2. Choledocholithiasis  s/p ERCP and stent placement last week   repeat MRCP revealed persistent 14mm stone in CBD;   GI eval noted, for repeat ERCP in 48hrs, hold Plavix     3. Asthma exacerbation: stable symptoms today  s/p Solumedrol IV x 1  Albuterol/Atrovent nebs q 4hrs  start Advair, Singulair  Mucinex  CXray noted: no Pna, clear lungs     4. DM type II: uncontrolled  increase Lantus 15u qHS  c/w ISS  ADA    5. Moderate protein-calorie malnutrition:  - started on TPN 11/8 and PICC line inserted     6. Anxiety:   c/w Lexapro and Xanax prn  On Abilify 5mg lawrence for the past month, now dose lowered to 2mg /day.  Psych evaluation noted: c/w Abilify low dose for now       5. Paroxymal afib / HTN / CAD:  - off lisinopril due to borderline BP  Eliquis on hold due to 1 ep bloody diarrhea and for poss further procedure  - s/p 5 stents last were 2 years ago       CHIEF COMPLAINT/INTERVAL HISTORY:    Patient is a 64y old  Female who presents with a chief complaint of inability to tolerate po (02 Nov 2024 13:01)      HPI:  64-year-old female with history of GERD  HTN, DM, polyps, and afib with 5 cardiac stents. presents with epigastric pain for the last couple of weeks.  Her daughter brought her in, after speaking with her GI doctor who stated patient should not have been on ozempic before and probably have taken insulin instead. She states that she had just uptitrated her dose of ozempic to 0.5 from 0.25 earlier this week. She had been on the 0.25 for 5 weeks. She states the pain bothers her more than the nausea and vomiting. Patient states she feels very minimal relief since she came in.  She denies NSAID use, bleeding or black stools. She reports 10lb weight loss and has been on ozempic for her diabetes. Denies diarrhea or urinary symptoms.     Adm for initiation of PPN for presumed gastroparesis; gastric emptying study for 11/6- failed. Had a 1.5cm CBD dilation ( s/p calvin).   Abnormal MRCP; s/p ERCP and stent placement for CBD stone  Pain has worsened the past few days, now requiring Dilaudid  PICC inserted 11/9 for TPN initiation  Empiric abx started 11/9 for low grade temp     11.11: c/o epigastric pain, c/o nausea, no diarrhea   11.12: c/o persistent epigastric pain, able to tolerated small amount of Glucerna   vomited yesterday, no fever/chills  11.13: states pain is worse today, also c/o dyspnea and wheezing   had CP last night  11.14: epig pain persists, less nausea, tolerated Glucerna and jello yesterday, ERCP postponed for tomorrow       Vital Signs Last 24 Hrs  T(C): 36.8 (14 Nov 2024 09:24), Max: 36.8 (14 Nov 2024 05:25)  T(F): 98.3 (14 Nov 2024 09:24), Max: 98.3 (14 Nov 2024 05:25)  HR: 100 (14 Nov 2024 09:24) (93 - 112)  BP: 127/96 (14 Nov 2024 09:24) (114/71 - 137/85)  BP(mean): 91 (14 Nov 2024 05:25) (91 - 98)  RR: 17 (14 Nov 2024 09:24) (17 - 18)  SpO2: 96% (14 Nov 2024 09:24) (95% - 99%)    Parameters below as of 14 Nov 2024 09:24  Patient On (Oxygen Delivery Method): room air          PHYSICAL EXAM:    GENERAL: NAD,  HEENT:  NC/AT, EOMI, PERRLA, No scleral icterus, Moist mucous membranes  NECK: Supple, No JVD  CNS:  Alert & Oriented X3, Motor Strength 5/5 B/L upper and lower extremities; DTRs 2+ intact   LUNG: Normal Breath sounds, Clear to auscultation bilaterally, No rales, No rhonchi, No wheezing  HEART: RRR; No murmurs, No rubs  ABDOMEN: +BS, ST, +ttp to ruq and epigastric area   GENITOURINARY- Voiding, Bladder not distended  EXTREMITIES:  2+ Peripheral Pulses, No clubbing, No cyanosis, No tibial edema  MUSCULOSKELTAL: Joints normal ROM, No joint tenderness, No muscle tenderness  VAGINAL: deferred  RECTAL: deferred, not indicated  BREAST: deferred        Assessment and Plan:     64 year old woman with poor oral intake and epig pain:      1. Intractable nausea with poor oral intake likely due to Gastroparesis exacerbated by GLP-1  - h/o colectomy with iliorectal anastomosis for IBD  - EGD/colonoscopy --> gastritis, esophagitis, c/w PPI,   Reglan  10mg TID started  Add Erythromycin 250mg po Tid  GI follow up     -was unable to complete Gastric emptying study due to vomiting     2. Choledocholithiasis  s/p ERCP and stent placement last week   repeat MRCP revealed persistent 14mm stone in CBD;   GI eval noted, for repeat ERCP in 48hrs, hold Plavix     3. Asthma exacerbation: stable symptoms today  s/p Solumedrol IV x 1  Albuterol/Atrovent nebs q 4hrs  start Advair, Singulair  Mucinex  CXray noted: no Pna, clear lungs     4. DM type II: uncontrolled  increase Lantus 15u qHS  c/w ISS  ADA    5. Moderate protein-calorie malnutrition:  - started on TPN 11/8 and PICC line inserted     6. Anxiety:   c/w Lexapro and Xanax prn  On Abilify 5mg lawrence for the past month, now dose lowered to 2mg /day.  Psych evaluation noted: c/w Abilify low dose for now       5. Paroxymal afib / HTN / CAD:  - off lisinopril due to borderline BP  Eliquis on hold due to 1 ep bloody diarrhea and for poss further procedure  - s/p 5 stents last were 2 years ago

## 2024-11-14 NOTE — PHYSICAL THERAPY INITIAL EVALUATION ADULT - PERTINENT HX OF CURRENT PROBLEM, REHAB EVAL
64-year-old female with history of GERD  HTN, DM, polyps, and afib with 5 cardiac stents. presents with epigastric pain for the last couple of weeks. PICC inserted on 11/9 for TPN. As of late, patient has been treated with antibiotics for low grade temp and has had c/o chest pain, dispnea, and wheezing.  PT consult in place to assessed need for subacute rehab.     Patient admitted on 10/25/2024.

## 2024-11-14 NOTE — CHART NOTE - NSCHARTNOTEFT_GEN_A_CORE
Clinical Nutrition PN Follow Up Note    *64-year-old female with history of GERD  HTN, DM, polyps, and afib with 5 cardiac stents. presents with epigastric pain for the last couple of weeks.  Her daughter brought her in, after speaking with her GI doctor who stated patient should not have been on ozempic before and probably have taken insulin instead. She states that she had just uptitrated her dose of ozempic to 0.5 from 0.25 earlier this week. She had been on the 0.25 for 5 weeks. She states the pain bothers her more than the nausea and vomiting. Patient states she feels very minimal relief since she came in.  She denies NSAID use, bleeding or black stools. She reports 10lb weight loss and has been on ozempic for her diabetes. Denies diarrhea or urinary symptoms. Admitted for Nausea and vomiting  *11/1: D/w Dr. Hernandez - PPN Day #1; PPN to be initiated as a bridge 2/2 prolonged PO intolerance/poor PO intake; awaiting gastric emptying study. Per GI note 10/27: abd USG inconclusive s/p cholecystectomy. Rec for EGD/Colonoscopy early next week. 10/30: now s/p EGD and colonoscopy - egd with findings of gastritis and esophagitis without ulcers colonoscopy with patent anastomosis - no acute findings. Pt placed on regular low fiber diet - vomited lunch on 10/31 - complains of throat pain after egd; epigastric pain minimal.  Per hospitalist: "at this point, will need to rule out gastroparesis with gastric emptying study - per nuclear medicine the earliest it could be done would be Tuesday 11/5- need to order isotpope - Trial of 5mg liquid Reglan QID 15 min before meals and at bedtime."   PPN is not to be a long-term nutrition support, pending gastric emptying study if needing long-term nutrition support would recommend GJ-tube if gastroparesis is present. Strongly suggest to confirm goals of care regarding LONG-TERM nutrition support.  *11/3: PPN day #3. Per RN documentation, no events of N/V overnight. Was drinking ensure shakes and tolerating. C/o N/V during the day per hospitalist note. Will c/w PPN for now. Pending gastric emptying study, tentative Tuesday 11/5.  *11/6: PPN day #6: Did not receive PPN last night 2/2 per nuclear medicine not allowed to receive anything past midnight (?). EGD done yesterday which came back inconclusive. Per discussion w/ Dr. Ulrich, pt will need J tube based on gastric emptying study. Will c/w PPN for now until J tube to be placed.    *11/7: PPN day #7. Abnormal MRCP; vascular also consulted. At this time diff diagnosis expanded 2/2 pain. 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 GI recommended to c/w FLD for now and trial Reglan 3x daily to alleviate symptoms. Passed bloody BM this morning w/ abdominal pain, trend H/H. Vascular surgery consulted to evaluate for median arcuate ligament syndrome however no intervention to be done at this time as no imaging finding suggestive of median arcuate ligament syndrome. D/w hospitalist, will c/w PPN until GJ tube can be placed.   *11/8: PPN day #8. GI consulted yesterday, per note: "Abdominal pain could be secondary to distal CBD stones/sludge with likely accomodation biliary flow due to dilated duct post choly (normal lft's)." Is s/p ERCP yesterday w/ Dr. Moncada - 2 stents were placed and obtained biopsy of nodular papilla. Currently c/o abdominal discomfort/ pain s/p ERCP yesterday, D/w hospitalist, will c/w PPN for now, ?if GJ tube will be placed. Consult placed for PICC team since pt on PPN >6 days and meeting <75% ENN x 8 days.  11/9: S/p PICC placement (11/8) and transitioned to TPN. ?if GJ tube will be placed as pt has had intestines removed in the past.  *11/11: Remains on CLD. Now w/ low grade temp and worsening pain, started on empiric abx. Per hospitalist note: "If the stone is not the cause one could ask if this can be an IBD exacerbation on the small bowel mucosae since she did have some blood; check ESR and CRP- if markedly elevated it could be indicative of poss IBD and steroids might be necessary." Plan to c/w TPN for now. Hyperglycemia noted x 24 hrs - will hold off on titrating dextrose today.  *11/12: Diet advanced to FLD yesterday. Per GI NP note - since pt w/ persistent abdominal pain s/p CBD stent placement, plan for repeat ERCP w/ stone removal on Thursday; was unable to remove stones originally 2/2 AC on board and there was a risk for bleeding. Plan to c/w TPN for now. W/ worsening Hyperglycemia x 24 hrs even w/ insulin added to PN bag - will decrease dextrose in PN bag today and will titrate once POCTs regulate.    PPN initiated 2/2 prolonged PO intolerance, N/V, awaiting gastric emptying study    *current status: Remains on FLD w/ TPN. No acute changes overnight. Pt w/ persistent hyperglycemia, will increase regular insulin and hold increasing dextorse in PN bag. C/w TPN today.    *pertinent meds: Xanax, Abilify, Lexapro, Lantus (10 units HS), Admelog (36 units x 24 hours), Metoprolol, Protonix, Dilaudid, Zofran    *labs reviewed; Na, K+, Phos, and Mg WNL. T Bili WNL to c/w trace elements. corrected Ca on higher end, DO NOT supplement calcium outside of PN bag at this time. Triglyceride WNL (160), will c/w 50 g of lipids. POCTs x 24 hours remain elevated. Plan to titrate dextrose up 50-75g/day until goal rate of 325g reached (GIR ~4.3) however d/t hyperglycemia will hold off on titrating dextrose today and will increase to 20 units of insulin in PN bag (~.11 units insulin/ grams of dextrose); Lantus 10 units HS to be initiated tonight as well to better control BG levels. Will c/w 10 mcg Chromium in PN bag - some evidence-based studies suggest Chromium can aid in treating hyperglycemia.   11-14    137  |  105  |  14  ----------------------------<  304[H]  4.2   |  30  |  0.65    Ca    8.7      14 Nov 2024 04:55  Phos  3.1     11-14  Mg     1.9     11-14    TPro  6.7  /  Alb  2.6[L]  /  TBili  0.2  /  DBili  x   /  AST  22  /  ALT  38  /  AlkPhos  97  11-14    BMI: BMI (kg/m2): 23.5 (10-25-24 @ 18:30)  HbA1c: A1C with Estimated Average Glucose Result: 6.6 % (10-26-24 @ 06:47)    Glucose: POCT Blood Glucose.: 335 mg/dL (13 Nov 2024 06:18)    BP: 125/68 (11-04-24 @ 23:00) (99/56 - 141/59)Vital Signs Last 24 Hrs  T(C): 36.4 (11-04-24 @ 23:00), Max: 36.4 (11-04-24 @ 16:23)  T(F): 97.5 (11-04-24 @ 23:00), Max: 97.6 (11-04-24 @ 16:23)  HR: 74 (11-04-24 @ 23:00) (74 - 78)  BP: 125/68 (11-04-24 @ 23:00) (125/68 - 134/77)  BP(mean): 77 (11-04-24 @ 23:00) (77 - 77)  RR: 18 (11-04-24 @ 23:00) (18 - 18)  SpO2: 98% (11-04-24 @ 23:00) (95% - 98%)    Lipid Panel: Date/Time: 11-10-24 @ 04:34  Triglycerides, Serum: 160    POCT Blood Glucose.: 292 mg/dL (14 Nov 2024 05:23)  POCT Blood Glucose.: 315 mg/dL (14 Nov 2024 00:29)  POCT Blood Glucose.: 296 mg/dL (13 Nov 2024 16:49)  POCT Blood Glucose.: 330 mg/dL (13 Nov 2024 11:23)    *I&O's Detail - not doc'd; ***strict I&O's when on PN. ***  *BM - last doc'd 10/30; abdominal discomfort +N/V on 11/8. pt not on bowel regimen.  *edema: none doc'd    *malnutrition: Pt continues to meet criteria for moderate malnutrition in context of acute on chronic illness r/t decreased ability to meet increased nutrient needs 2/2 N/V, DM AEB >7.5% wt loss x 2mo, <50% ENN x 2 weeks, mild muscle/fat wasting    Estimated Needs: based on 55 Kg (wt from 10/27)  Calories: 8946-5443 Kcal (35-40 Kcal/Kg)  Protein: 66-83 g (1.2-1.5 g/Kg)  Fluids: 2442-7859 mL (35-40 mL/Kg)    Diet, Full Liquid; consistent carbohydrate (no snacks); glucerna TID (11-11-24 @ 15:52) [Active]  Parenteral Nutrition - Adult 1 Each (83 mL/Hr) TPN Continuous <Continuous>, 11-12-24 @ 22:00, 22:00, Stop order after: 1 Days    Weight History:  Weight (kg): 52.8 (10-25-24 @ 18:30)    TPN Recommendations: via PICC Line  Total Volume: 2000 mL x 24 hours  80 g  Amino Acids  175 g Dextrose (titrate dextrose up 50-75g/day until goal rate of 325g reached)  50 g Lipids 20%  80 mEq Sodium Chloride  47 mEq Sodium Acetate  20 mmol Sodium Phosphate  60 mEq Potassium Chloride  13 mEq Potassium Acetate  5 mmol Potassium Phosphate  0 mEq Calcium Gluconate (CAPS out of PN solution)   10 mEq Magnesium Sulfate  200 mg Thiamine  1 ml Trace Elements  10 ml MVI  10 mcg Chromium   20 units insulin     Total Calories   1415   (Meets  73%  of  Estimated Energy needs  and  100%  Protein needs)       Additional Recommendations:  1) Daily weights  2) Strict I & O's  3) Daily lyte checks including magnesium and phos  4) Weekly triglycerides/LFT checks  5) POCT q6hrs; maintain 140-180mg/dL    6) Titrate dextrose 50-75g daily until goal 325g dextrose is met (GIR 4.3; WNL). C/w insulin in PN bag 2/2 hyperglycemia; will hold off titrating dextrose until POCTs normalize.   7) Confirm goals of care regarding LONG-TERM nutrition support - if gastroparesis present and still unable to tolerate PO, would need GJ tube - ?if to be placed. Currently w/ PICC line to better meet ENN    *will continue to monitor and adjust PN prn*  Beatriz Calvo, MS, RDN, CDN, CNSC [phone]  Certified Nutrition

## 2024-11-15 LAB
ALBUMIN SERPL ELPH-MCNC: 2.8 G/DL — LOW (ref 3.3–5)
ALP SERPL-CCNC: 97 U/L — SIGNIFICANT CHANGE UP (ref 40–120)
ALT FLD-CCNC: 34 U/L — SIGNIFICANT CHANGE UP (ref 12–78)
ANION GAP SERPL CALC-SCNC: 2 MMOL/L — LOW (ref 5–17)
AST SERPL-CCNC: 19 U/L — SIGNIFICANT CHANGE UP (ref 15–37)
BILIRUB SERPL-MCNC: 0.2 MG/DL — SIGNIFICANT CHANGE UP (ref 0.2–1.2)
BUN SERPL-MCNC: 16 MG/DL — SIGNIFICANT CHANGE UP (ref 7–23)
CALCIUM SERPL-MCNC: 8.6 MG/DL — SIGNIFICANT CHANGE UP (ref 8.5–10.1)
CHLORIDE SERPL-SCNC: 105 MMOL/L — SIGNIFICANT CHANGE UP (ref 96–108)
CO2 SERPL-SCNC: 30 MMOL/L — SIGNIFICANT CHANGE UP (ref 22–31)
CREAT SERPL-MCNC: 0.66 MG/DL — SIGNIFICANT CHANGE UP (ref 0.5–1.3)
CULTURE RESULTS: SIGNIFICANT CHANGE UP
EGFR: 99 ML/MIN/1.73M2 — SIGNIFICANT CHANGE UP
GLUCOSE BLDC GLUCOMTR-MCNC: 276 MG/DL — HIGH (ref 70–99)
GLUCOSE BLDC GLUCOMTR-MCNC: 305 MG/DL — HIGH (ref 70–99)
GLUCOSE BLDC GLUCOMTR-MCNC: 332 MG/DL — HIGH (ref 70–99)
GLUCOSE BLDC GLUCOMTR-MCNC: 355 MG/DL — HIGH (ref 70–99)
GLUCOSE BLDC GLUCOMTR-MCNC: 401 MG/DL — HIGH (ref 70–99)
GLUCOSE SERPL-MCNC: 341 MG/DL — HIGH (ref 70–99)
MAGNESIUM SERPL-MCNC: 1.9 MG/DL — SIGNIFICANT CHANGE UP (ref 1.6–2.6)
PHOSPHATE SERPL-MCNC: 3.1 MG/DL — SIGNIFICANT CHANGE UP (ref 2.5–4.5)
POTASSIUM SERPL-MCNC: 4.1 MMOL/L — SIGNIFICANT CHANGE UP (ref 3.5–5.3)
POTASSIUM SERPL-SCNC: 4.1 MMOL/L — SIGNIFICANT CHANGE UP (ref 3.5–5.3)
PROT SERPL-MCNC: 6.7 GM/DL — SIGNIFICANT CHANGE UP (ref 6–8.3)
SODIUM SERPL-SCNC: 137 MMOL/L — SIGNIFICANT CHANGE UP (ref 135–145)
SPECIMEN SOURCE: SIGNIFICANT CHANGE UP

## 2024-11-15 PROCEDURE — 99232 SBSQ HOSP IP/OBS MODERATE 35: CPT

## 2024-11-15 PROCEDURE — 43276 ERCP STENT EXCHANGE W/DILATE: CPT

## 2024-11-15 PROCEDURE — 43274 ERCP DUCT STENT PLACEMENT: CPT | Mod: 59

## 2024-11-15 PROCEDURE — 43264 ERCP REMOVE DUCT CALCULI: CPT

## 2024-11-15 PROCEDURE — 43251 EGD REMOVE LESION SNARE: CPT

## 2024-11-15 RX ORDER — HYDROMORPHONE HYDROCHLORIDE 2 MG/1
0.5 TABLET ORAL
Refills: 0 | Status: DISCONTINUED | OUTPATIENT
Start: 2024-11-15 | End: 2024-11-15

## 2024-11-15 RX ORDER — FAT EMULSIONS 20 %
0.95 EMULSION INTRAVENOUS
Qty: 50 | Refills: 0 | Status: DISCONTINUED | OUTPATIENT
Start: 2024-11-15 | End: 2024-11-16

## 2024-11-15 RX ORDER — ONDANSETRON HYDROCHLORIDE 4 MG/1
4 TABLET, FILM COATED ORAL ONCE
Refills: 0 | Status: DISCONTINUED | OUTPATIENT
Start: 2024-11-15 | End: 2024-11-15

## 2024-11-15 RX ORDER — INSULIN GLARGINE 100 [IU]/ML
20 INJECTION, SOLUTION SUBCUTANEOUS AT BEDTIME
Refills: 0 | Status: DISCONTINUED | OUTPATIENT
Start: 2024-11-15 | End: 2024-11-16

## 2024-11-15 RX ORDER — FENTANYL 12 UG/H
50 PATCH, EXTENDED RELEASE TRANSDERMAL
Refills: 0 | Status: DISCONTINUED | OUTPATIENT
Start: 2024-11-15 | End: 2024-11-15

## 2024-11-15 RX ORDER — 0.9 % SODIUM CHLORIDE 0.9 %
1000 INTRAVENOUS SOLUTION INTRAVENOUS ONCE
Refills: 0 | Status: COMPLETED | OUTPATIENT
Start: 2024-11-15 | End: 2024-11-15

## 2024-11-15 RX ORDER — SODIUM/POT/MAG/CALC/CHLOR/ACET 35-20-5MEQ
1 VIAL (ML) INTRAVENOUS
Refills: 0 | Status: DISCONTINUED | OUTPATIENT
Start: 2024-11-15 | End: 2024-11-16

## 2024-11-15 RX ORDER — HYDROMORPHONE HYDROCHLORIDE 2 MG/1
1 TABLET ORAL
Refills: 0 | Status: DISCONTINUED | OUTPATIENT
Start: 2024-11-15 | End: 2024-11-15

## 2024-11-15 RX ORDER — OXYCODONE HYDROCHLORIDE 30 MG/1
5 TABLET ORAL ONCE
Refills: 0 | Status: DISCONTINUED | OUTPATIENT
Start: 2024-11-15 | End: 2024-11-15

## 2024-11-15 RX ORDER — 0.9 % SODIUM CHLORIDE 0.9 %
1000 INTRAVENOUS SOLUTION INTRAVENOUS
Refills: 0 | Status: DISCONTINUED | OUTPATIENT
Start: 2024-11-15 | End: 2024-11-15

## 2024-11-15 RX ORDER — HYDROMORPHONE HYDROCHLORIDE 2 MG/1
0.5 TABLET ORAL
Refills: 0 | Status: DISCONTINUED | OUTPATIENT
Start: 2024-11-15 | End: 2024-11-17

## 2024-11-15 RX ORDER — ACETAMINOPHEN 500MG 500 MG/1
1000 TABLET, COATED ORAL ONCE
Refills: 0 | Status: DISCONTINUED | OUTPATIENT
Start: 2024-11-15 | End: 2024-11-15

## 2024-11-15 RX ADMIN — ROSUVASTATIN CALCIUM 10 MILLIGRAM(S): 5 TABLET, FILM COATED ORAL at 21:10

## 2024-11-15 RX ADMIN — INSULIN GLARGINE 20 UNIT(S): 100 INJECTION, SOLUTION SUBCUTANEOUS at 21:46

## 2024-11-15 RX ADMIN — Medication 250 MILLIGRAM(S): at 21:09

## 2024-11-15 RX ADMIN — Medication 1 EACH: at 22:42

## 2024-11-15 RX ADMIN — Medication 20.8 GM/KG/DAY: at 22:43

## 2024-11-15 RX ADMIN — MONTELUKAST SODIUM 10 MILLIGRAM(S): 10 TABLET ORAL at 21:09

## 2024-11-15 RX ADMIN — ARIPIPRAZOLE 2 MILLIGRAM(S): 15 TABLET ORAL at 10:13

## 2024-11-15 RX ADMIN — HYDROMORPHONE HYDROCHLORIDE 1 MILLIGRAM(S): 2 TABLET ORAL at 11:25

## 2024-11-15 RX ADMIN — Medication 0.5 MILLIGRAM(S): at 10:12

## 2024-11-15 RX ADMIN — Medication 0.5 MILLIGRAM(S): at 21:09

## 2024-11-15 RX ADMIN — HYDROMORPHONE HYDROCHLORIDE 1 MILLIGRAM(S): 2 TABLET ORAL at 00:30

## 2024-11-15 RX ADMIN — Medication 1000 MILLILITER(S): at 17:00

## 2024-11-15 RX ADMIN — FLUTICASONE PROPIONATE AND SALMETEROL XINAFOATE 1 DOSE(S): 45; 21 AEROSOL, METERED RESPIRATORY (INHALATION) at 08:37

## 2024-11-15 RX ADMIN — METOPROLOL TARTRATE 100 MILLIGRAM(S): 100 TABLET, FILM COATED ORAL at 10:13

## 2024-11-15 RX ADMIN — Medication 6: at 12:33

## 2024-11-15 RX ADMIN — Medication 250 MILLIGRAM(S): at 12:33

## 2024-11-15 RX ADMIN — IPRATROPIUM BROMIDE AND ALBUTEROL SULFATE 3 MILLILITER(S): 2.5; .5 SOLUTION RESPIRATORY (INHALATION) at 14:40

## 2024-11-15 RX ADMIN — HYDROMORPHONE HYDROCHLORIDE 1 MILLIGRAM(S): 2 TABLET ORAL at 00:54

## 2024-11-15 RX ADMIN — Medication 10: at 06:07

## 2024-11-15 RX ADMIN — DIPHENHYDRAMINE HYDROCHLORIDE AND LIDOCAINE HYDROCHLORIDE AND ALUMINUM HYDROXIDE AND MAGNESIUM HYDRO 5 MILLILITER(S): KIT at 12:33

## 2024-11-15 RX ADMIN — Medication 12: at 00:25

## 2024-11-15 RX ADMIN — ESCITALOPRAM OXALATE 10 MILLIGRAM(S): 10 TABLET, FILM COATED ORAL at 10:13

## 2024-11-15 RX ADMIN — IPRATROPIUM BROMIDE AND ALBUTEROL SULFATE 3 MILLILITER(S): 2.5; .5 SOLUTION RESPIRATORY (INHALATION) at 08:37

## 2024-11-15 RX ADMIN — HYDROMORPHONE HYDROCHLORIDE 1 MILLIGRAM(S): 2 TABLET ORAL at 06:06

## 2024-11-15 RX ADMIN — Medication 8: at 18:49

## 2024-11-15 RX ADMIN — Medication 1000 MILLILITER(S): at 18:11

## 2024-11-15 RX ADMIN — DIPHENHYDRAMINE HYDROCHLORIDE AND LIDOCAINE HYDROCHLORIDE AND ALUMINUM HYDROXIDE AND MAGNESIUM HYDRO 5 MILLILITER(S): KIT at 21:10

## 2024-11-15 RX ADMIN — HYDROMORPHONE HYDROCHLORIDE 1 MILLIGRAM(S): 2 TABLET ORAL at 07:00

## 2024-11-15 NOTE — CHART NOTE - NSCHARTNOTEFT_GEN_A_CORE
Clinical Nutrition PN Follow Up Note    *64-year-old female with history of GERD  HTN, DM, polyps, and afib with 5 cardiac stents. presents with epigastric pain for the last couple of weeks.  Her daughter brought her in, after speaking with her GI doctor who stated patient should not have been on ozempic before and probably have taken insulin instead. She states that she had just uptitrated her dose of ozempic to 0.5 from 0.25 earlier this week. She had been on the 0.25 for 5 weeks. She states the pain bothers her more than the nausea and vomiting. Patient states she feels very minimal relief since she came in.  She denies NSAID use, bleeding or black stools. She reports 10lb weight loss and has been on ozempic for her diabetes. Denies diarrhea or urinary symptoms. Admitted for Nausea and vomiting  *11/1: D/w Dr. Hernandez - PPN Day #1; PPN to be initiated as a bridge 2/2 prolonged PO intolerance/poor PO intake; awaiting gastric emptying study. Per GI note 10/27: abd USG inconclusive s/p cholecystectomy. Rec for EGD/Colonoscopy early next week. 10/30: now s/p EGD and colonoscopy - egd with findings of gastritis and esophagitis without ulcers colonoscopy with patent anastomosis - no acute findings. Pt placed on regular low fiber diet - vomited lunch on 10/31 - complains of throat pain after egd; epigastric pain minimal.  Per hospitalist: "at this point, will need to rule out gastroparesis with gastric emptying study - per nuclear medicine the earliest it could be done would be Tuesday 11/5- need to order isotpope - Trial of 5mg liquid Reglan QID 15 min before meals and at bedtime."   PPN is not to be a long-term nutrition support, pending gastric emptying study if needing long-term nutrition support would recommend GJ-tube if gastroparesis is present. Strongly suggest to confirm goals of care regarding LONG-TERM nutrition support.  *11/3: PPN day #3. Per RN documentation, no events of N/V overnight. Was drinking ensure shakes and tolerating. C/o N/V during the day per hospitalist note. Will c/w PPN for now. Pending gastric emptying study, tentative Tuesday 11/5.  *11/6: PPN day #6: Did not receive PPN last night 2/2 per nuclear medicine not allowed to receive anything past midnight (?). EGD done yesterday which came back inconclusive. Per discussion w/ Dr. Ulrich, pt will need J tube based on gastric emptying study. Will c/w PPN for now until J tube to be placed.    *11/7: PPN day #7. Abnormal MRCP; vascular also consulted. At this time diff diagnosis expanded 2/2 pain. 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 GI recommended to c/w FLD for now and trial Reglan 3x daily to alleviate symptoms. Passed bloody BM this morning w/ abdominal pain, trend H/H. Vascular surgery consulted to evaluate for median arcuate ligament syndrome however no intervention to be done at this time as no imaging finding suggestive of median arcuate ligament syndrome. D/w hospitalist, will c/w PPN until GJ tube can be placed.   *11/8: PPN day #8. GI consulted yesterday, per note: "Abdominal pain could be secondary to distal CBD stones/sludge with likely accomodation biliary flow due to dilated duct post choly (normal lft's)." Is s/p ERCP yesterday w/ Dr. Moncada - 2 stents were placed and obtained biopsy of nodular papilla. Currently c/o abdominal discomfort/ pain s/p ERCP yesterday, D/w hospitalist, will c/w PPN for now, ?if GJ tube will be placed. Consult placed for PICC team since pt on PPN >6 days and meeting <75% ENN x 8 days.  11/9: S/p PICC placement (11/8) and transitioned to TPN. ?if GJ tube will be placed as pt has had intestines removed in the past.  *11/11: Remains on CLD. Now w/ low grade temp and worsening pain, started on empiric abx. Per hospitalist note: "If the stone is not the cause one could ask if this can be an IBD exacerbation on the small bowel mucosae since she did have some blood; check ESR and CRP- if markedly elevated it could be indicative of poss IBD and steroids might be necessary." Plan to c/w TPN for now. Hyperglycemia noted x 24 hrs - will hold off on titrating dextrose today.  *11/12: Diet advanced to FLD yesterday. Per GI NP note - since pt w/ persistent abdominal pain s/p CBD stent placement, plan for repeat ERCP w/ stone removal on Thursday; was unable to remove stones originally 2/2 AC on board and there was a risk for bleeding. Plan to c/w TPN for now. W/ worsening Hyperglycemia x 24 hrs even w/ insulin added to PN bag - will decrease dextrose in PN bag today and will titrate once POCTs regulate.  *11/14: Remains on FLD w/ TPN. No acute changes overnight. Pt w/ persistent hyperglycemia, will increase regular insulin and hold increasing dextorse in PN bag. C/w TPN today.    PPN initiated 2/2 prolonged PO intolerance, N/V, awaiting gastric emptying study    *current status: Less nausea, tolerating Glucerna and jello yesterday. NPO today awaiting ERCP. Pt still with persistent hyperglycemia, will increase insulin    *new pertinent meds:    *labs reviewed;  11-15    137  |  105  |  16  ----------------------------<  341[H]  4.1   |  30  |  0.66    Ca    8.6      15 Nov 2024 04:50  Phos  3.1     11-15  Mg     1.9     11-15    TPro  6.7  /  Alb  2.8[L]  /  TBili  0.2  /  DBili  x   /  AST  19  /  ALT  34  /  AlkPhos  97  11-15      BMI: BMI (kg/m2): 20.6 (11-13-24 @ 08:07)  HbA1c: A1C with Estimated Average Glucose Result: 6.6 % (10-26-24 @ 06:47)    Glucose: POCT Blood Glucose.: 355 mg/dL (11-15-24 @ 06:01)    BP: 114/65 (11-15-24 @ 09:01) (102/51 - 141/85)Vital Signs Last 24 Hrs  T(C): 36.7 (11-15-24 @ 09:01), Max: 36.9 (11-14-24 @ 16:05)  T(F): 98 (11-15-24 @ 09:01), Max: 98.5 (11-14-24 @ 21:53)  HR: 120 (11-15-24 @ 09:01) (94 - 120)  BP: 114/65 (11-15-24 @ 09:01) (102/51 - 141/85)  BP(mean): --  RR: 18 (11-15-24 @ 09:01) (18 - 18)  SpO2: 95% (11-15-24 @ 09:01) (94% - 98%)      Lipid Panel: Date/Time: 11-10-24 @ 04:34  Cholesterol, Serum: --  LDL Cholesterol Calculated: --  HDL Cholesterol, Serum: --  Total Cholesterol/HDL Ration Measurement: --  Triglycerides, Serum: 160    POCT Blood Glucose.: 355 mg/dL (11-15-24 @ 06:01)  POCT Blood Glucose.: 401 mg/dL (11-15-24 @ 00:21)  POCT Blood Glucose.: 367 mg/dL (11-14-24 @ 21:50)  POCT Blood Glucose.: 335 mg/dL (11-14-24 @ 17:29)  POCT Blood Glucose.: 281 mg/dL (11-14-24 @ 11:41)      *I&O's Detail    * fluid status:   *BM (+) on .  pt on bowel regimen.  *edema:    *malnutrition:    Estimated Needs: based on Kg (wt from )  Calories: Kcal (Kcal/Kg)  Protein:  g (g/Kg)  Fluids:   mL (mL/Kg)    Diet, NPO after Midnight:      NPO Start Date: 14-Nov-2024,   NPO Start Time: 23:59 (11-14-24 @ 13:10) [Active]  Diet, Regular (10-26-24 @ 17:50) [Available for Activation]      Weight History:  Daily   Height (cm): 160 (11-13-24 @ 08:07)  Weight (kg): 52.8 (10-25-24 @ 18:30)  BMI (kg/m2): 20.6 (11-13-24 @ 08:07)  BSA (m2): 1.54 (11-13-24 @ 08:07)    TPN/PPN Recommendations: via  Total Volume:  x 24 hours   g  Amino Acids   g Dextrose   g Lipids 20%  mEq Sodium Chloride  mEq Sodium Acetate  mmol Sodium Phosphate  mEq Potassium Chloride  mEq Potassium Acetate  mmol Potassium Phosphate  mEq Calcium Gluconate  mEq Magnesium Sulfate  200 mg Thiamine  ml Trace Elements  ml MVI    Total Calories            (Meets    %  of  Estimated Energy needs  and    %  Protein needs)   (osmolarity ~)    Additional Recommendations:    1) Daily weights  2) Strict I & O's  3) Daily lyte checks including magnesium and phos  4) Weekly triglycerides/LFT checks  5) POCT q6hrs; maintain 140-180mg/dL  6) if on PN > 6 days, consider placing PICC line to meet 100% of nutr needs    *will continue to monitor and adjust PN prn* Clinical Nutrition PN Follow Up Note    *64-year-old female with history of GERD  HTN, DM, polyps, and afib with 5 cardiac stents. presents with epigastric pain for the last couple of weeks.  Her daughter brought her in, after speaking with her GI doctor who stated patient should not have been on ozempic before and probably have taken insulin instead. She states that she had just uptitrated her dose of ozempic to 0.5 from 0.25 earlier this week. She had been on the 0.25 for 5 weeks. She states the pain bothers her more than the nausea and vomiting. Patient states she feels very minimal relief since she came in.  She denies NSAID use, bleeding or black stools. She reports 10lb weight loss and has been on ozempic for her diabetes. Denies diarrhea or urinary symptoms. Admitted for Nausea and vomiting  *11/1: D/w Dr. Hernandez - PPN Day #1; PPN to be initiated as a bridge 2/2 prolonged PO intolerance/poor PO intake; awaiting gastric emptying study. Per GI note 10/27: abd USG inconclusive s/p cholecystectomy. Rec for EGD/Colonoscopy early next week. 10/30: now s/p EGD and colonoscopy - egd with findings of gastritis and esophagitis without ulcers colonoscopy with patent anastomosis - no acute findings. Pt placed on regular low fiber diet - vomited lunch on 10/31 - complains of throat pain after egd; epigastric pain minimal.  Per hospitalist: "at this point, will need to rule out gastroparesis with gastric emptying study - per nuclear medicine the earliest it could be done would be Tuesday 11/5- need to order isotpope - Trial of 5mg liquid Reglan QID 15 min before meals and at bedtime."   PPN is not to be a long-term nutrition support, pending gastric emptying study if needing long-term nutrition support would recommend GJ-tube if gastroparesis is present. Strongly suggest to confirm goals of care regarding LONG-TERM nutrition support.  *11/3: PPN day #3. Per RN documentation, no events of N/V overnight. Was drinking ensure shakes and tolerating. C/o N/V during the day per hospitalist note. Will c/w PPN for now. Pending gastric emptying study, tentative Tuesday 11/5.  *11/6: PPN day #6: Did not receive PPN last night 2/2 per nuclear medicine not allowed to receive anything past midnight (?). EGD done yesterday which came back inconclusive. Per discussion w/ Dr. Ulrich, pt will need J tube based on gastric emptying study. Will c/w PPN for now until J tube to be placed.    *11/7: PPN day #7. Abnormal MRCP; vascular also consulted. At this time diff diagnosis expanded 2/2 pain. 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 GI recommended to c/w FLD for now and trial Reglan 3x daily to alleviate symptoms. Passed bloody BM this morning w/ abdominal pain, trend H/H. Vascular surgery consulted to evaluate for median arcuate ligament syndrome however no intervention to be done at this time as no imaging finding suggestive of median arcuate ligament syndrome. D/w hospitalist, will c/w PPN until GJ tube can be placed.   *11/8: PPN day #8. GI consulted yesterday, per note: "Abdominal pain could be secondary to distal CBD stones/sludge with likely accomodation biliary flow due to dilated duct post choly (normal lft's)." Is s/p ERCP yesterday w/ Dr. Moncada - 2 stents were placed and obtained biopsy of nodular papilla. Currently c/o abdominal discomfort/ pain s/p ERCP yesterday, D/w hospitalist, will c/w PPN for now, ?if GJ tube will be placed. Consult placed for PICC team since pt on PPN >6 days and meeting <75% ENN x 8 days.  11/9: S/p PICC placement (11/8) and transitioned to TPN. ?if GJ tube will be placed as pt has had intestines removed in the past.  *11/11: Remains on CLD. Now w/ low grade temp and worsening pain, started on empiric abx. Per hospitalist note: "If the stone is not the cause one could ask if this can be an IBD exacerbation on the small bowel mucosae since she did have some blood; check ESR and CRP- if markedly elevated it could be indicative of poss IBD and steroids might be necessary." Plan to c/w TPN for now. Hyperglycemia noted x 24 hrs - will hold off on titrating dextrose today.  *11/12: Diet advanced to FLD yesterday. Per GI NP note - since pt w/ persistent abdominal pain s/p CBD stent placement, plan for repeat ERCP w/ stone removal on Thursday; was unable to remove stones originally 2/2 AC on board and there was a risk for bleeding. Plan to c/w TPN for now. W/ worsening Hyperglycemia x 24 hrs even w/ insulin added to PN bag - will decrease dextrose in PN bag today and will titrate once POCTs regulate.  *11/14: Remains on FLD w/ TPN. No acute changes overnight. Pt w/ persistent hyperglycemia, will increase regular insulin and hold increasing dextorse in PN bag. C/w TPN today.    PPN initiated 2/2 prolonged PO intolerance, N/V, awaiting gastric emptying study    *current status: Less nausea, tolerating Glucerna and jello yesterday. NPO today awaiting ERCP. Pt still with persistent hyperglycemia, will increase insulin and continue to hold increasing dextrose in the PN bag. Will c/w TPN today. Please see additional recommendations below.     *new pertinent meds: Xanax, Ability, Lexapro, Abx, Lantus (15 units HS), ADMELOG (42 units x 24 hours), metoprolol, protonix, dilaudid    *labs reviewed; Na, K, Phos, Mg WNL, will keep totals the same in the PN bag today. Continue to replete lytes outside of PN bag prn. T bili WNL will c/w trace elements. corrected Ca WNL, rec'd add 10mEq of Calcium Gluconate outside of PN bag as CAPS is out. TG level WNL (160), will c/w 50g of lipids. POCTs x 24 hrs remain very elevated. Plan to titrate dextrose up 50-75g/day until goal rate of 325g reached (GIR ~4/3); however d/t persistent hyperglycemia will hold off on titrating dextrose today; and will increase to 25 units of insulin in the PN bag ( ~.14 units insulin/ grams of dextrose).     11-15    137  |  105  |  16  ----------------------------<  341[H]  4.1   |  30  |  0.66    Ca    8.6      15 Nov 2024 04:50  Phos  3.1     11-15  Mg     1.9     11-15    TPro  6.7  /  Alb  2.8[L]  /  TBili  0.2  /  DBili  x   /  AST  19  /  ALT  34  /  AlkPhos  97  11-15      BMI: BMI (kg/m2): 20.6 (11-13-24 @ 08:07)  HbA1c: A1C with Estimated Average Glucose Result: 6.6 % (10-26-24 @ 06:47)    Glucose: POCT Blood Glucose.: 355 mg/dL (11-15-24 @ 06:01)    BP: 114/65 (11-15-24 @ 09:01) (102/51 - 141/85)Vital Signs Last 24 Hrs  T(C): 36.7 (11-15-24 @ 09:01), Max: 36.9 (11-14-24 @ 16:05)  T(F): 98 (11-15-24 @ 09:01), Max: 98.5 (11-14-24 @ 21:53)  HR: 120 (11-15-24 @ 09:01) (94 - 120)  BP: 114/65 (11-15-24 @ 09:01) (102/51 - 141/85)  BP(mean): --  RR: 18 (11-15-24 @ 09:01) (18 - 18)  SpO2: 95% (11-15-24 @ 09:01) (94% - 98%)      Lipid Panel: Date/Time: 11-10-24 @ 04:34  Cholesterol, Serum: --  LDL Cholesterol Calculated: --  HDL Cholesterol, Serum: --  Total Cholesterol/HDL Ration Measurement: --  Triglycerides, Serum: 160    POCT Blood Glucose.: 355 mg/dL (11-15-24 @ 06:01)  POCT Blood Glucose.: 401 mg/dL (11-15-24 @ 00:21)  POCT Blood Glucose.: 367 mg/dL (11-14-24 @ 21:50)  POCT Blood Glucose.: 335 mg/dL (11-14-24 @ 17:29)  POCT Blood Glucose.: 281 mg/dL (11-14-24 @ 11:41)      *I&O's Detail    * fluid status:   *BM (+) on .  pt on bowel regimen.  *edema:    *malnutrition:    Estimated Needs: based on Kg (wt from )  Calories: Kcal (Kcal/Kg)  Protein:  g (g/Kg)  Fluids:   mL (mL/Kg)    Diet, NPO after Midnight:      NPO Start Date: 14-Nov-2024,   NPO Start Time: 23:59 (11-14-24 @ 13:10) [Active]  Diet, Regular (10-26-24 @ 17:50) [Available for Activation]      Weight History:  Daily   Height (cm): 160 (11-13-24 @ 08:07)  Weight (kg): 52.8 (10-25-24 @ 18:30)  BMI (kg/m2): 20.6 (11-13-24 @ 08:07)  BSA (m2): 1.54 (11-13-24 @ 08:07)    TPN/PPN Recommendations: via  Total Volume:  x 24 hours   g  Amino Acids   g Dextrose   g Lipids 20%  mEq Sodium Chloride  mEq Sodium Acetate  mmol Sodium Phosphate  mEq Potassium Chloride  mEq Potassium Acetate  mmol Potassium Phosphate  mEq Calcium Gluconate  mEq Magnesium Sulfate  200 mg Thiamine  ml Trace Elements  ml MVI    Total Calories            (Meets    %  of  Estimated Energy needs  and    %  Protein needs)   (osmolarity ~)    Additional Recommendations:    1) Daily weights  2) Strict I & O's  3) Daily lyte checks including magnesium and phos  4) Weekly triglycerides/LFT checks  5) POCT q6hrs; maintain 140-180mg/dL  6) if on PN > 6 days, consider placing PICC line to meet 100% of nutr needs    *will continue to monitor and adjust PN prn* Clinical Nutrition PN Follow Up Note    *64-year-old female with history of GERD  HTN, DM, polyps, and afib with 5 cardiac stents. presents with epigastric pain for the last couple of weeks.  Her daughter brought her in, after speaking with her GI doctor who stated patient should not have been on ozempic before and probably have taken insulin instead. She states that she had just uptitrated her dose of ozempic to 0.5 from 0.25 earlier this week. She had been on the 0.25 for 5 weeks. She states the pain bothers her more than the nausea and vomiting. Patient states she feels very minimal relief since she came in.  She denies NSAID use, bleeding or black stools. She reports 10lb weight loss and has been on ozempic for her diabetes. Denies diarrhea or urinary symptoms. Admitted for Nausea and vomiting  *11/1: D/w Dr. Hernandez - PPN Day #1; PPN to be initiated as a bridge 2/2 prolonged PO intolerance/poor PO intake; awaiting gastric emptying study. Per GI note 10/27: abd USG inconclusive s/p cholecystectomy. Rec for EGD/Colonoscopy early next week. 10/30: now s/p EGD and colonoscopy - egd with findings of gastritis and esophagitis without ulcers colonoscopy with patent anastomosis - no acute findings. Pt placed on regular low fiber diet - vomited lunch on 10/31 - complains of throat pain after egd; epigastric pain minimal.  Per hospitalist: "at this point, will need to rule out gastroparesis with gastric emptying study - per nuclear medicine the earliest it could be done would be Tuesday 11/5- need to order isotpope - Trial of 5mg liquid Reglan QID 15 min before meals and at bedtime."   PPN is not to be a long-term nutrition support, pending gastric emptying study if needing long-term nutrition support would recommend GJ-tube if gastroparesis is present. Strongly suggest to confirm goals of care regarding LONG-TERM nutrition support.  *11/3: PPN day #3. Per RN documentation, no events of N/V overnight. Was drinking ensure shakes and tolerating. C/o N/V during the day per hospitalist note. Will c/w PPN for now. Pending gastric emptying study, tentative Tuesday 11/5.  *11/6: PPN day #6: Did not receive PPN last night 2/2 per nuclear medicine not allowed to receive anything past midnight (?). EGD done yesterday which came back inconclusive. Per discussion w/ Dr. Ulrich, pt will need J tube based on gastric emptying study. Will c/w PPN for now until J tube to be placed.    *11/7: PPN day #7. Abnormal MRCP; vascular also consulted. At this time diff diagnosis expanded 2/2 pain. 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 GI recommended to c/w FLD for now and trial Reglan 3x daily to alleviate symptoms. Passed bloody BM this morning w/ abdominal pain, trend H/H. Vascular surgery consulted to evaluate for median arcuate ligament syndrome however no intervention to be done at this time as no imaging finding suggestive of median arcuate ligament syndrome. D/w hospitalist, will c/w PPN until GJ tube can be placed.   *11/8: PPN day #8. GI consulted yesterday, per note: "Abdominal pain could be secondary to distal CBD stones/sludge with likely accomodation biliary flow due to dilated duct post choly (normal lft's)." Is s/p ERCP yesterday w/ Dr. Moncada - 2 stents were placed and obtained biopsy of nodular papilla. Currently c/o abdominal discomfort/ pain s/p ERCP yesterday, D/w hospitalist, will c/w PPN for now, ?if GJ tube will be placed. Consult placed for PICC team since pt on PPN >6 days and meeting <75% ENN x 8 days.  11/9: S/p PICC placement (11/8) and transitioned to TPN. ?if GJ tube will be placed as pt has had intestines removed in the past.  *11/11: Remains on CLD. Now w/ low grade temp and worsening pain, started on empiric abx. Per hospitalist note: "If the stone is not the cause one could ask if this can be an IBD exacerbation on the small bowel mucosae since she did have some blood; check ESR and CRP- if markedly elevated it could be indicative of poss IBD and steroids might be necessary." Plan to c/w TPN for now. Hyperglycemia noted x 24 hrs - will hold off on titrating dextrose today.  *11/12: Diet advanced to FLD yesterday. Per GI NP note - since pt w/ persistent abdominal pain s/p CBD stent placement, plan for repeat ERCP w/ stone removal on Thursday; was unable to remove stones originally 2/2 AC on board and there was a risk for bleeding. Plan to c/w TPN for now. W/ worsening Hyperglycemia x 24 hrs even w/ insulin added to PN bag - will decrease dextrose in PN bag today and will titrate once POCTs regulate.  *11/14: Remains on FLD w/ TPN. No acute changes overnight. Pt w/ persistent hyperglycemia, will increase regular insulin and hold increasing dextorse in PN bag. C/w TPN today.    PPN initiated 2/2 prolonged PO intolerance, N/V, awaiting gastric emptying study    *current status: Less nausea, tolerating Glucerna and jello yesterday. NPO today awaiting ERCP. Pt still with persistent hyperglycemia, will increase insulin and continue to hold increasing dextrose in the PN bag. Will c/w TPN today. Please see additional recommendations below.     *new pertinent meds: Xanax, Ability, Lexapro, Abx, Lantus (15 units HS), ADMELOG (42 units x 24 hours), metoprolol, protonix, dilaudid    *labs reviewed; Na, K, Phos, Mg WNL, will keep totals the same in the PN bag today. Continue to replete lytes outside of PN bag prn. T bili WNL will c/w trace elements. corrected Ca WNL, rec'd add 10mEq of Calcium Gluconate outside of PN bag as CAPS is out. TG level WNL (160), will c/w 50g of lipids. POCTs x 24 hrs remain very elevated. Plan to titrate dextrose up 50-75g/day until goal rate of 325g reached (GIR ~4.3); however d/t persistent hyperglycemia will hold off on titrating dextrose today; and will increase to 25 units of insulin in the PN bag ( ~.14 units insulin/ grams of dextrose). Will c/w 10 mcg Chromium in PN bag - some evidence-based studies suggest Chromium can aid in treating hyperglycemia.     11-15    137  |  105  |  16  ----------------------------<  341[H]  4.1   |  30  |  0.66    Ca    8.6      15 Nov 2024 04:50  Phos  3.1     11-15  Mg     1.9     11-15    TPro  6.7  /  Alb  2.8[L]  /  TBili  0.2  /  DBili  x   /  AST  19  /  ALT  34  /  AlkPhos  97  11-15    BMI: BMI (kg/m2): 20.6 (11-13-24 @ 08:07)  HbA1c: A1C with Estimated Average Glucose Result: 6.6 % (10-26-24 @ 06:47)    Glucose: POCT Blood Glucose.: 355 mg/dL (11-15-24 @ 06:01)    BP: 114/65 (11-15-24 @ 09:01) (102/51 - 141/85)Vital Signs Last 24 Hrs  T(C): 36.7 (11-15-24 @ 09:01), Max: 36.9 (11-14-24 @ 16:05)  T(F): 98 (11-15-24 @ 09:01), Max: 98.5 (11-14-24 @ 21:53)  HR: 120 (11-15-24 @ 09:01) (94 - 120)  BP: 114/65 (11-15-24 @ 09:01) (102/51 - 141/85)  RR: 18 (11-15-24 @ 09:01) (18 - 18)  SpO2: 95% (11-15-24 @ 09:01) (94% - 98%)    Lipid Panel: Date/Time: 11-10-24 @ 04:34  Triglycerides, Serum: 160    POCT Blood Glucose.: 355 mg/dL (11-15-24 @ 06:01)  POCT Blood Glucose.: 401 mg/dL (11-15-24 @ 00:21)  POCT Blood Glucose.: 367 mg/dL (11-14-24 @ 21:50)  POCT Blood Glucose.: 335 mg/dL (11-14-24 @ 17:29)  POCT Blood Glucose.: 281 mg/dL (11-14-24 @ 11:41)    *I&O's Detail  none doc'd***strict I&O's when on PN. ***    *BM - last doc'd on 10/30; abdominal discomfort on 11/14.  pt not on bowel regimen.  *edema: none doc'd    *malnutrition: Pt continues to meet criteria for moderate malnutrition in context of acute on chronic illness r/t decreased ability to meet increased nutrient needs 2/2 N/V, DM AEB >7.5% wt loss x 2mo, <50% ENN x 2 weeks, mild muscle/fat wasting    Estimated Needs: based on 55 Kg (wt from 10/27)  Calories: 7628-9884 Kcal (35-40 Kcal/Kg)  Protein: 66-83 g (1.2-1.5 g/Kg)  Fluids: 9639-7844 mL (35-40 mL/Kg)    Diet, NPO after Midnight:      NPO Start Date: 14-Nov-2024,   NPO Start Time: 23:59 (11-14-24 @ 13:10) [Active]  Diet, Regular (10-26-24 @ 17:50) [Available for Activation]    Weight History:  Height (cm): 160 (11-13-24 @ 08:07)  Weight (kg): 52.8 (10-25-24 @ 18:30)  BMI (kg/m2): 20.6 (11-13-24 @ 08:07)  BSA (m2): 1.54 (11-13-24 @ 08:07)    TPN Recommendations: via PICC line  Total Volume: 2000 mL x 24 hours  80 g  Amino Acids  175 g Dextrose (titrate dextrose up 50-75g/day until goal rate of 325g reached)  50 g Lipids 20%  80 mEq Sodium Chloride  47 mEq Sodium Acetate  20 mmol Sodium Phosphate  60 mEq Potassium Chloride  13 mEq Potassium Acetate  5 mmol Potassium Phosphate  0 mEq Calcium Gluconate (CAPS out of PN solution)   10 mEq Magnesium Sulfate  200 mg Thiamine  1 ml Trace Elements  10 ml MVI  10 mcg Chromium  25 units insulin    Total Calories    1415    (Meets 73 %  of  Estimated Energy needs  and  100 %  Protein needs)     Additional Recommendations:    1) Daily weights  2) Strict I & O's  3) Daily lyte checks including magnesium and phos  4) Weekly triglycerides/LFT checks  5) POCT q6hrs; maintain 140-180mg/dL  6) Titrate dextrose 50-75g daily until goal 325g dextrose is met (GIR ~4.3; WNL). C/w insulin in PN bag 2/2 persistent hyperglycemia; will hold off titrating dextrose until POCT's normalize  7) Confirm goals of care regarding LONG-TERM nutrition support - if gastroparesis present and still unable to tolerate PO, would need GJ tube - ?if to be placed. Currently w/ PICC line to better meet ENN    *will continue to monitor and adjust PN prn*  Cecilia Gusman, PhD, MS, RDN Clinical Nutrition PN Follow Up Note    *64-year-old female with history of GERD  HTN, DM, polyps, and afib with 5 cardiac stents. presents with epigastric pain for the last couple of weeks.  Her daughter brought her in, after speaking with her GI doctor who stated patient should not have been on ozempic before and probably have taken insulin instead. She states that she had just uptitrated her dose of ozempic to 0.5 from 0.25 earlier this week. She had been on the 0.25 for 5 weeks. She states the pain bothers her more than the nausea and vomiting. Patient states she feels very minimal relief since she came in.  She denies NSAID use, bleeding or black stools. She reports 10lb weight loss and has been on ozempic for her diabetes. Denies diarrhea or urinary symptoms. Admitted for Nausea and vomiting  *11/1: D/w Dr. Hernandez - PPN Day #1; PPN to be initiated as a bridge 2/2 prolonged PO intolerance/poor PO intake; awaiting gastric emptying study. Per GI note 10/27: abd USG inconclusive s/p cholecystectomy. Rec for EGD/Colonoscopy early next week. 10/30: now s/p EGD and colonoscopy - egd with findings of gastritis and esophagitis without ulcers colonoscopy with patent anastomosis - no acute findings. Pt placed on regular low fiber diet - vomited lunch on 10/31 - complains of throat pain after egd; epigastric pain minimal.  Per hospitalist: "at this point, will need to rule out gastroparesis with gastric emptying study - per nuclear medicine the earliest it could be done would be Tuesday 11/5- need to order isotpope - Trial of 5mg liquid Reglan QID 15 min before meals and at bedtime."   PPN is not to be a long-term nutrition support, pending gastric emptying study if needing long-term nutrition support would recommend GJ-tube if gastroparesis is present. Strongly suggest to confirm goals of care regarding LONG-TERM nutrition support.  *11/3: PPN day #3. Per RN documentation, no events of N/V overnight. Was drinking ensure shakes and tolerating. C/o N/V during the day per hospitalist note. Will c/w PPN for now. Pending gastric emptying study, tentative Tuesday 11/5.  *11/6: PPN day #6: Did not receive PPN last night 2/2 per nuclear medicine not allowed to receive anything past midnight (?). EGD done yesterday which came back inconclusive. Per discussion w/ Dr. Ulrich, pt will need J tube based on gastric emptying study. Will c/w PPN for now until J tube to be placed.    *11/7: PPN day #7. Abnormal MRCP; vascular also consulted. At this time diff diagnosis expanded 2/2 pain. 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 GI recommended to c/w FLD for now and trial Reglan 3x daily to alleviate symptoms. Passed bloody BM this morning w/ abdominal pain, trend H/H. Vascular surgery consulted to evaluate for median arcuate ligament syndrome however no intervention to be done at this time as no imaging finding suggestive of median arcuate ligament syndrome. D/w hospitalist, will c/w PPN until GJ tube can be placed.   *11/8: PPN day #8. GI consulted yesterday, per note: "Abdominal pain could be secondary to distal CBD stones/sludge with likely accomodation biliary flow due to dilated duct post choly (normal lft's)." Is s/p ERCP yesterday w/ Dr. Moncada - 2 stents were placed and obtained biopsy of nodular papilla. Currently c/o abdominal discomfort/ pain s/p ERCP yesterday, D/w hospitalist, will c/w PPN for now, ?if GJ tube will be placed. Consult placed for PICC team since pt on PPN >6 days and meeting <75% ENN x 8 days.  11/9: S/p PICC placement (11/8) and transitioned to TPN. ?if GJ tube will be placed as pt has had intestines removed in the past.  *11/11: Remains on CLD. Now w/ low grade temp and worsening pain, started on empiric abx. Per hospitalist note: "If the stone is not the cause one could ask if this can be an IBD exacerbation on the small bowel mucosae since she did have some blood; check ESR and CRP- if markedly elevated it could be indicative of poss IBD and steroids might be necessary." Plan to c/w TPN for now. Hyperglycemia noted x 24 hrs - will hold off on titrating dextrose today.  *11/12: Diet advanced to FLD yesterday. Per GI NP note - since pt w/ persistent abdominal pain s/p CBD stent placement, plan for repeat ERCP w/ stone removal on Thursday; was unable to remove stones originally 2/2 AC on board and there was a risk for bleeding. Plan to c/w TPN for now. W/ worsening Hyperglycemia x 24 hrs even w/ insulin added to PN bag - will decrease dextrose in PN bag today and will titrate once POCTs regulate.  *11/14: Remains on FLD w/ TPN. No acute changes overnight. Pt w/ persistent hyperglycemia, will increase regular insulin and hold increasing dextorse in PN bag. C/w TPN today.    PPN initiated 2/2 prolonged PO intolerance, N/V, awaiting gastric emptying study    *current status: Less nausea, tolerating Glucerna and jello yesterday per hospitalist note. NPO today, awaiting ERCP. Pt still with persistent hyperglycemia, will increase insulin and continue to hold increasing dextrose in the PN bag. Will c/w TPN today.     *new pertinent meds: Xanax, Ability, Lexapro, Abx, Lantus (15 units HS), ADMELOG (42 units x 24 hours), metoprolol, protonix, Dilaudid    *labs reviewed; Na, K, Phos, Mg WNL, will keep totals the same in the PN bag today. Continue to replete lytes outside of PN bag prn. T bili WNL will c/w trace elements. corrected Ca WNL, rec'd add 10mEq of Calcium Gluconate outside of PN bag as CAPS is out. TG level WNL (160), will c/w 50g of lipids. POCTs x 24 hrs remain very elevated. Plan to titrate dextrose up 50-75g/day until goal rate of 325g reached (GIR ~4.3); however d/t persistent hyperglycemia will hold off on titrating dextrose today; and will increase to 25 units of insulin in the PN bag ( ~.15 units insulin/ grams of dextrose). Will c/w 10 mcg Chromium in PN bag - some evidence-based studies suggest Chromium can aid in treating hyperglycemia.     11-15    137  |  105  |  16  ----------------------------<  341[H]  4.1   |  30  |  0.66    Ca    8.6      15 Nov 2024 04:50  Phos  3.1     11-15  Mg     1.9     11-15    TPro  6.7  /  Alb  2.8[L]  /  TBili  0.2  /  DBili  x   /  AST  19  /  ALT  34  /  AlkPhos  97  11-15    BMI: BMI (kg/m2): 20.6 (11-13-24 @ 08:07)  HbA1c: A1C with Estimated Average Glucose Result: 6.6 % (10-26-24 @ 06:47)    Glucose: POCT Blood Glucose.: 355 mg/dL (11-15-24 @ 06:01)    BP: 114/65 (11-15-24 @ 09:01) (102/51 - 141/85)Vital Signs Last 24 Hrs  T(C): 36.7 (11-15-24 @ 09:01), Max: 36.9 (11-14-24 @ 16:05)  T(F): 98 (11-15-24 @ 09:01), Max: 98.5 (11-14-24 @ 21:53)  HR: 120 (11-15-24 @ 09:01) (94 - 120)  BP: 114/65 (11-15-24 @ 09:01) (102/51 - 141/85)  RR: 18 (11-15-24 @ 09:01) (18 - 18)  SpO2: 95% (11-15-24 @ 09:01) (94% - 98%)    Lipid Panel: Date/Time: 11-10-24 @ 04:34  Triglycerides, Serum: 160    POCT Blood Glucose.: 355 mg/dL (11-15-24 @ 06:01)  POCT Blood Glucose.: 401 mg/dL (11-15-24 @ 00:21)  POCT Blood Glucose.: 367 mg/dL (11-14-24 @ 21:50)  POCT Blood Glucose.: 335 mg/dL (11-14-24 @ 17:29)  POCT Blood Glucose.: 281 mg/dL (11-14-24 @ 11:41)    *I&O's Detail  none doc'd***strict I&O's when on PN. ***    *BM - last doc'd on 10/30; abdominal discomfort on 11/14.  pt not on bowel regimen.  *edema: none doc'd    *malnutrition: Pt continues to meet criteria for moderate malnutrition in context of acute on chronic illness r/t decreased ability to meet increased nutrient needs 2/2 N/V, DM AEB >7.5% wt loss x 2mo, <50% ENN x 2 weeks, mild muscle/fat wasting    Estimated Needs: based on 55 Kg (wt from 10/27)  Calories: 5136-6909 Kcal (35-40 Kcal/Kg)  Protein: 66-83 g (1.2-1.5 g/Kg)  Fluids: 5863-8180 mL (35-40 mL/Kg)    Diet, NPO after Midnight:      NPO Start Date: 14-Nov-2024,   NPO Start Time: 23:59 (11-14-24 @ 13:10) [Active]  Diet, Regular (10-26-24 @ 17:50) [Available for Activation]    Weight History:  Height (cm): 160 (11-13-24 @ 08:07)  Weight (kg): 52.8 (10-25-24 @ 18:30)  BMI (kg/m2): 20.6 (11-13-24 @ 08:07)  BSA (m2): 1.54 (11-13-24 @ 08:07)    TPN Recommendations: via PICC line  Total Volume: 2000 mL x 24 hours  80 g  Amino Acids  175 g Dextrose (titrate dextrose up 50-75g/day until goal rate of 325g reached)  50 g Lipids 20%  80 mEq Sodium Chloride  47 mEq Sodium Acetate  20 mmol Sodium Phosphate  60 mEq Potassium Chloride  13 mEq Potassium Acetate  5 mmol Potassium Phosphate  0 mEq Calcium Gluconate (CAPS out of PN solution)   10 mEq Magnesium Sulfate  200 mg Thiamine  1 ml Trace Elements  10 ml MVI  10 mcg Chromium  25 units insulin    Total Calories    1415    (Meets 73 %  of  Estimated Energy needs  and  100 %  Protein needs)     Additional Recommendations:    1) Daily weights  2) Strict I & O's  3) Daily lyte checks including magnesium and phos  4) Weekly triglycerides/LFT checks  5) POCT q6hrs; maintain 140-180mg/dL  6) Titrate dextrose 50-75g daily until goal 325g dextrose is met (GIR ~4.3; WNL). C/w insulin in PN bag 2/2 persistent hyperglycemia; will hold off titrating dextrose until POCT's normalize  7) Confirm goals of care regarding LONG-TERM nutrition support - if gastroparesis present and still unable to tolerate PO, would need GJ tube - ?if to be placed. Currently w/ PICC line to better meet ENN    *will continue to monitor and adjust PN prn*  Cecilia Gusman, PhD, MS, RDN  Carmenza Hamilton RDN, CDN [phone]

## 2024-11-15 NOTE — PROGRESS NOTE ADULT - SUBJECTIVE AND OBJECTIVE BOX
CHIEF COMPLAINT/INTERVAL HISTORY:    Patient is a 64y old  Female who presents with a chief complaint of inability to tolerate po (02 Nov 2024 13:01)      HPI:  64-year-old female with history of GERD  HTN, DM, polyps, and afib with 5 cardiac stents. presents with epigastric pain for the last couple of weeks.  Her daughter brought her in, after speaking with her GI doctor who stated patient should not have been on ozempic before and probably have taken insulin instead. She states that she had just uptitrated her dose of ozempic to 0.5 from 0.25 earlier this week. She had been on the 0.25 for 5 weeks. She states the pain bothers her more than the nausea and vomiting. Patient states she feels very minimal relief since she came in.  She denies NSAID use, bleeding or black stools. She reports 10lb weight loss and has been on ozempic for her diabetes. Denies diarrhea or urinary symptoms.     Adm for initiation of PPN for presumed gastroparesis; gastric emptying study for 11/6- failed. Had a 1.5cm CBD dilation ( s/p calvin).   Abnormal MRCP; s/p ERCP and stent placement for CBD stone  Pain has worsened the past few days, now requiring Dilaudid  PICC inserted 11/9 for TPN initiation  Empiric abx started 11/9 for low grade temp     11.11: c/o epigastric pain, c/o nausea, no diarrhea   11.12: c/o persistent epigastric pain, able to tolerated small amount of Glucerna   vomited yesterday, no fever/chills  11.13: states pain is worse today, also c/o dyspnea and wheezing   had CP last night  11.14: epig pain persists, less nausea, tolerated Glucerna and jello yesterday, ERCP postponed for tomorrow   11.15: feels very depressed, still has abd pain, less nausea and vomiting, was NPO after MN for procedure today   didn't sleep well    Vital Signs Last 24 Hrs  T(C): 36.7 (15 Nov 2024 09:01), Max: 36.9 (14 Nov 2024 16:05)  T(F): 98 (15 Nov 2024 09:01), Max: 98.5 (14 Nov 2024 21:53)  HR: 120 (15 Nov 2024 09:01) (94 - 120)  BP: 114/65 (15 Nov 2024 09:01) (102/51 - 141/85)  RR: 18 (15 Nov 2024 09:01) (18 - 18)  SpO2: 95% (15 Nov 2024 09:01) (94% - 98%)    Parameters below as of 15 Nov 2024 09:01  Patient On (Oxygen Delivery Method): room air            PHYSICAL EXAM:    GENERAL: NAD,  HEENT:  NC/AT, EOMI, PERRLA, No scleral icterus, Moist mucous membranes  NECK: Supple, No JVD  CNS:  Alert & Oriented X3, Motor Strength 5/5 B/L upper and lower extremities; DTRs 2+ intact   LUNG: Normal Breath sounds, Clear to auscultation bilaterally, No rales, No rhonchi, No wheezing  HEART: RRR; No murmurs, No rubs  ABDOMEN: +BS, ST, +ttp to ruq and epigastric area   GENITOURINARY- Voiding, Bladder not distended  EXTREMITIES:  2+ Peripheral Pulses, No clubbing, No cyanosis, No tibial edema  MUSCULOSKELTAL: Joints normal ROM, No joint tenderness, No muscle tenderness  VAGINAL: deferred  RECTAL: deferred, not indicated  BREAST: deferred        Assessment and Plan:     64 year old woman with poor oral intake and epig pain:      1. Intractable nausea with poor oral intake likely due to Gastroparesis exacerbated by GLP-1  - h/o colectomy with iliorectal anastomosis for IBD  - EGD/colonoscopy --> gastritis, esophagitis, c/w PPI,   Reglan  10mg TID started  Add Erythromycin 250mg po Tid  GI follow up     -was unable to complete Gastric emptying study on admission due to vomiting     2. Choledocholithiasis:  unlikely cholangitis  s/p ERCP and stent placement last week   will need repeat ERCP for persistent  14mm CBD stone   restart Plavix and Apixaban after procedure    3. Asthma exacerbation: stable symptoms today  s/p Solumedrol IV x 1  Albuterol/Atrovent nebs q 4hrs  start Advair, Singulair  Mucinex  CXray noted: no Pna, clear lungs     4. DM type II: uncontrolled  increase Lantus to 20u qHS  c/w ISS  ADA    5. Moderate protein-calorie malnutrition:   poor oral intake   - started on TPN 11/8 and PICC line inserted     6. Anxiety:   c/w Lexapro, c/w  Xanax prn Tid  On Abilify 5mg lawrence for the past month, now dose lowered to 2mg /day.  Psych evaluation noted: c/w Abilify low dose for now       5. Paroxymal afib / HTN / CAD:  - off lisinopril due to borderline BP  Eliquis on hold for procedure  - s/p 5 stents last were 2 years ago  will restart Plavix after procedure

## 2024-11-16 LAB
ALBUMIN SERPL ELPH-MCNC: 2.9 G/DL — LOW (ref 3.3–5)
ALP SERPL-CCNC: 101 U/L — SIGNIFICANT CHANGE UP (ref 40–120)
ALT FLD-CCNC: 38 U/L — SIGNIFICANT CHANGE UP (ref 12–78)
ANION GAP SERPL CALC-SCNC: 6 MMOL/L — SIGNIFICANT CHANGE UP (ref 5–17)
AST SERPL-CCNC: 23 U/L — SIGNIFICANT CHANGE UP (ref 15–37)
BILIRUB SERPL-MCNC: 0.3 MG/DL — SIGNIFICANT CHANGE UP (ref 0.2–1.2)
BUN SERPL-MCNC: 20 MG/DL — SIGNIFICANT CHANGE UP (ref 7–23)
CALCIUM SERPL-MCNC: 8.8 MG/DL — SIGNIFICANT CHANGE UP (ref 8.5–10.1)
CHLORIDE SERPL-SCNC: 104 MMOL/L — SIGNIFICANT CHANGE UP (ref 96–108)
CO2 SERPL-SCNC: 25 MMOL/L — SIGNIFICANT CHANGE UP (ref 22–31)
CREAT SERPL-MCNC: 0.93 MG/DL — SIGNIFICANT CHANGE UP (ref 0.5–1.3)
EGFR: 69 ML/MIN/1.73M2 — SIGNIFICANT CHANGE UP
GLUCOSE BLDC GLUCOMTR-MCNC: 347 MG/DL — HIGH (ref 70–99)
GLUCOSE BLDC GLUCOMTR-MCNC: 359 MG/DL — HIGH (ref 70–99)
GLUCOSE BLDC GLUCOMTR-MCNC: 387 MG/DL — HIGH (ref 70–99)
GLUCOSE BLDC GLUCOMTR-MCNC: 395 MG/DL — HIGH (ref 70–99)
GLUCOSE BLDC GLUCOMTR-MCNC: 406 MG/DL — HIGH (ref 70–99)
GLUCOSE SERPL-MCNC: 398 MG/DL — HIGH (ref 70–99)
MAGNESIUM SERPL-MCNC: 1.8 MG/DL — SIGNIFICANT CHANGE UP (ref 1.6–2.6)
PHOSPHATE SERPL-MCNC: 2.9 MG/DL — SIGNIFICANT CHANGE UP (ref 2.5–4.5)
POTASSIUM SERPL-MCNC: 4.1 MMOL/L — SIGNIFICANT CHANGE UP (ref 3.5–5.3)
POTASSIUM SERPL-SCNC: 4.1 MMOL/L — SIGNIFICANT CHANGE UP (ref 3.5–5.3)
PROT SERPL-MCNC: 7.4 GM/DL — SIGNIFICANT CHANGE UP (ref 6–8.3)
SODIUM SERPL-SCNC: 135 MMOL/L — SIGNIFICANT CHANGE UP (ref 135–145)

## 2024-11-16 PROCEDURE — 99232 SBSQ HOSP IP/OBS MODERATE 35: CPT

## 2024-11-16 RX ADMIN — ACETAMINOPHEN 500MG 650 MILLIGRAM(S): 500 TABLET, COATED ORAL at 21:51

## 2024-11-16 RX ADMIN — ARIPIPRAZOLE 2 MILLIGRAM(S): 15 TABLET ORAL at 09:26

## 2024-11-16 RX ADMIN — Medication 10: at 11:54

## 2024-11-16 RX ADMIN — ACETAMINOPHEN 500MG 650 MILLIGRAM(S): 500 TABLET, COATED ORAL at 11:52

## 2024-11-16 RX ADMIN — Medication 250 MILLIGRAM(S): at 06:20

## 2024-11-16 RX ADMIN — HYDROMORPHONE HYDROCHLORIDE 0.5 MILLIGRAM(S): 2 TABLET ORAL at 12:00

## 2024-11-16 RX ADMIN — HYDROMORPHONE HYDROCHLORIDE 0.5 MILLIGRAM(S): 2 TABLET ORAL at 17:05

## 2024-11-16 RX ADMIN — ROSUVASTATIN CALCIUM 10 MILLIGRAM(S): 5 TABLET, FILM COATED ORAL at 21:00

## 2024-11-16 RX ADMIN — FLUTICASONE PROPIONATE AND SALMETEROL XINAFOATE 1 DOSE(S): 45; 21 AEROSOL, METERED RESPIRATORY (INHALATION) at 20:55

## 2024-11-16 RX ADMIN — DIPHENHYDRAMINE HYDROCHLORIDE AND LIDOCAINE HYDROCHLORIDE AND ALUMINUM HYDROXIDE AND MAGNESIUM HYDRO 5 MILLILITER(S): KIT at 06:29

## 2024-11-16 RX ADMIN — Medication 250 MILLIGRAM(S): at 15:04

## 2024-11-16 RX ADMIN — ACETAMINOPHEN 500MG 650 MILLIGRAM(S): 500 TABLET, COATED ORAL at 22:21

## 2024-11-16 RX ADMIN — Medication 250 MILLIGRAM(S): at 21:00

## 2024-11-16 RX ADMIN — MONTELUKAST SODIUM 10 MILLIGRAM(S): 10 TABLET ORAL at 21:00

## 2024-11-16 RX ADMIN — METOCLOPRAMIDE HYDROCHLORIDE 10 MILLIGRAM(S): 10 TABLET ORAL at 17:05

## 2024-11-16 RX ADMIN — Medication 12: at 00:45

## 2024-11-16 RX ADMIN — HYDROMORPHONE HYDROCHLORIDE 0.5 MILLIGRAM(S): 2 TABLET ORAL at 00:58

## 2024-11-16 RX ADMIN — PANTOPRAZOLE SODIUM 40 MILLIGRAM(S): 40 TABLET, DELAYED RELEASE ORAL at 06:20

## 2024-11-16 RX ADMIN — METOCLOPRAMIDE HYDROCHLORIDE 10 MILLIGRAM(S): 10 TABLET ORAL at 12:32

## 2024-11-16 RX ADMIN — HYDROMORPHONE HYDROCHLORIDE 0.5 MILLIGRAM(S): 2 TABLET ORAL at 11:45

## 2024-11-16 RX ADMIN — Medication 10: at 17:11

## 2024-11-16 RX ADMIN — METOPROLOL TARTRATE 100 MILLIGRAM(S): 100 TABLET, FILM COATED ORAL at 09:26

## 2024-11-16 RX ADMIN — DIPHENHYDRAMINE HYDROCHLORIDE AND LIDOCAINE HYDROCHLORIDE AND ALUMINUM HYDROXIDE AND MAGNESIUM HYDRO 5 MILLILITER(S): KIT at 21:00

## 2024-11-16 RX ADMIN — Medication 10: at 06:28

## 2024-11-16 RX ADMIN — ESCITALOPRAM OXALATE 10 MILLIGRAM(S): 10 TABLET, FILM COATED ORAL at 09:26

## 2024-11-16 RX ADMIN — Medication 0.5 MILLIGRAM(S): at 01:56

## 2024-11-16 RX ADMIN — Medication 0.5 MILLIGRAM(S): at 21:00

## 2024-11-16 RX ADMIN — FLUTICASONE PROPIONATE AND SALMETEROL XINAFOATE 1 DOSE(S): 45; 21 AEROSOL, METERED RESPIRATORY (INHALATION) at 08:41

## 2024-11-16 RX ADMIN — METOCLOPRAMIDE HYDROCHLORIDE 10 MILLIGRAM(S): 10 TABLET ORAL at 06:20

## 2024-11-16 RX ADMIN — HYDROMORPHONE HYDROCHLORIDE 0.5 MILLIGRAM(S): 2 TABLET ORAL at 17:30

## 2024-11-16 RX ADMIN — HYDROMORPHONE HYDROCHLORIDE 0.5 MILLIGRAM(S): 2 TABLET ORAL at 01:43

## 2024-11-16 RX ADMIN — HYDROMORPHONE HYDROCHLORIDE 0.5 MILLIGRAM(S): 2 TABLET ORAL at 06:18

## 2024-11-16 NOTE — PROGRESS NOTE ADULT - PROVIDER SPECIALTY LIST ADULT
[FreeTextEntry1] : Impression: Non-displaced fracture of the distal phalanx, left hallux (S92.425A).  Workers' Comp. injury (Z02.6).  Treatment: Patient was placed into a compression type wrap and surgical shoe.  Limit activities.   This is a work-related injury causally related and she is totally disabled from work at this time.   Will reevaluate in 3 weeks.  Any questions or problems, patient is to contact the office.  Hospitalist

## 2024-11-16 NOTE — CHART NOTE - NSCHARTNOTEFT_GEN_A_CORE
Clinical Nutrition PN Follow Up Note    *64-year-old female with history of GERD  HTN, DM, polyps, and afib with 5 cardiac stents. presents with epigastric pain for the last couple of weeks.  Her daughter brought her in, after speaking with her GI doctor who stated patient should not have been on ozempic before and probably have taken insulin instead. She states that she had just uptitrated her dose of ozempic to 0.5 from 0.25 earlier this week. She had been on the 0.25 for 5 weeks. She states the pain bothers her more than the nausea and vomiting. Patient states she feels very minimal relief since she came in.  She denies NSAID use, bleeding or black stools. She reports 10lb weight loss and has been on ozempic for her diabetes. Denies diarrhea or urinary symptoms. Admitted for Nausea and vomiting  *11/1: D/w Dr. Hernandez - PPN Day #1; PPN to be initiated as a bridge 2/2 prolonged PO intolerance/poor PO intake; awaiting gastric emptying study. Per GI note 10/27: abd USG inconclusive s/p cholecystectomy. Rec for EGD/Colonoscopy early next week. 10/30: now s/p EGD and colonoscopy - egd with findings of gastritis and esophagitis without ulcers colonoscopy with patent anastomosis - no acute findings. Pt placed on regular low fiber diet - vomited lunch on 10/31 - complains of throat pain after egd; epigastric pain minimal.  Per hospitalist: "at this point, will need to rule out gastroparesis with gastric emptying study - per nuclear medicine the earliest it could be done would be Tuesday 11/5- need to order isotpope - Trial of 5mg liquid Reglan QID 15 min before meals and at bedtime."   PPN is not to be a long-term nutrition support, pending gastric emptying study if needing long-term nutrition support would recommend GJ-tube if gastroparesis is present. Strongly suggest to confirm goals of care regarding LONG-TERM nutrition support.  *11/3: PPN day #3. Per RN documentation, no events of N/V overnight. Was drinking ensure shakes and tolerating. C/o N/V during the day per hospitalist note. Will c/w PPN for now. Pending gastric emptying study, tentative Tuesday 11/5.  *11/6: PPN day #6: Did not receive PPN last night 2/2 per nuclear medicine not allowed to receive anything past midnight (?). EGD done yesterday which came back inconclusive. Per discussion w/ Dr. Ulrich, pt will need J tube based on gastric emptying study. Will c/w PPN for now until J tube to be placed.    *11/7: PPN day #7. Abnormal MRCP; vascular also consulted. At this time diff diagnosis expanded 2/2 pain. 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 GI recommended to c/w FLD for now and trial Reglan 3x daily to alleviate symptoms. Passed bloody BM this morning w/ abdominal pain, trend H/H. Vascular surgery consulted to evaluate for median arcuate ligament syndrome however no intervention to be done at this time as no imaging finding suggestive of median arcuate ligament syndrome. D/w hospitalist, will c/w PPN until GJ tube can be placed.   *11/8: PPN day #8. GI consulted yesterday, per note: "Abdominal pain could be secondary to distal CBD stones/sludge with likely accomodation biliary flow due to dilated duct post choly (normal lft's)." Is s/p ERCP yesterday w/ Dr. Moncada - 2 stents were placed and obtained biopsy of nodular papilla. Currently c/o abdominal discomfort/ pain s/p ERCP yesterday, D/w hospitalist, will c/w PPN for now, ?if GJ tube will be placed. Consult placed for PICC team since pt on PPN >6 days and meeting <75% ENN x 8 days.  11/9: S/p PICC placement (11/8) and transitioned to TPN. ?if GJ tube will be placed as pt has had intestines removed in the past.  *11/11: Remains on CLD. Now w/ low grade temp and worsening pain, started on empiric abx. Per hospitalist note: "If the stone is not the cause one could ask if this can be an IBD exacerbation on the small bowel mucosae since she did have some blood; check ESR and CRP- if markedly elevated it could be indicative of poss IBD and steroids might be necessary." Plan to c/w TPN for now. Hyperglycemia noted x 24 hrs - will hold off on titrating dextrose today.  *11/12: Diet advanced to FLD yesterday. Per GI NP note - since pt w/ persistent abdominal pain s/p CBD stent placement, plan for repeat ERCP w/ stone removal on Thursday; was unable to remove stones originally 2/2 AC on board and there was a risk for bleeding. Plan to c/w TPN for now. W/ worsening Hyperglycemia x 24 hrs even w/ insulin added to PN bag - will decrease dextrose in PN bag today and will titrate once POCTs regulate.  *11/14: Remains on FLD w/ TPN. No acute changes overnight. Pt w/ persistent hyperglycemia, will increase regular insulin and hold increasing dextorse in PN bag. C/w TPN today.  *11/15: Less nausea, tolerating Glucerna and jello yesterday per hospitalist note. NPO today, awaiting ERCP. Pt still with persistent hyperglycemia, will increase insulin and continue to hold increasing dextrose in the PN bag. Will c/w TPN today.     PPN initiated 2/2 prolonged PO intolerance, N/V, awaiting gastric emptying study    *current status: hyperglycemia is still persistent despite large amounts of insulin received - 25 units in TPN (for 175g dextrose = 0.15units/g dextrose) + 20 units Lantus HS + 46 units ADMELOG. RD d/w MD Murphy, plan to stop TPN and hold x 24 hrs to allow BG levels to normalize. Will need to restart TPN d/t poor PO intake, on CLD only. Suggest cons CHO CLD and restart dextrose once BG levels normalize at 100g dextrose and 10 units insulin in TPN bag. Of note, s/p ERCP - CBD stone removal, may need enteral feeding tube per Dr. Moncada    *labs reviewed; 11-16    135  |  104  |  20  ----------------------------<  398[H]  4.1   |  25  |  0.93    Ca    8.8      16 Nov 2024 04:11  Phos  2.9     11-16  Mg     1.8     11-16    TPro  7.4  /  Alb  2.9[L]  /  TBili  0.3  /  DBili  x   /  AST  23  /  ALT  38  /  AlkPhos  101  11-16    BMI: BMI (kg/m2): 20.6 (11-13-24 @ 08:07)  HbA1c: A1C with Estimated Average Glucose Result: 6.6 % (10-26-24 @ 06:47)    BP: 114/65 (11-15-24 @ 09:01) (102/51 - 141/85)Vital Signs Last 24 Hrs  T(C): 36.7 (11-15-24 @ 09:01), Max: 36.9 (11-14-24 @ 16:05)  T(F): 98 (11-15-24 @ 09:01), Max: 98.5 (11-14-24 @ 21:53)  HR: 120 (11-15-24 @ 09:01) (94 - 120)  BP: 114/65 (11-15-24 @ 09:01) (102/51 - 141/85)  RR: 18 (11-15-24 @ 09:01) (18 - 18)  SpO2: 95% (11-15-24 @ 09:01) (94% - 98%)    Lipid Panel: Date/Time: 11-10-24 @ 04:34  Triglycerides, Serum: 160    POCT Blood Glucose.: 359 mg/dL (16 Nov 2024 06:14)  POCT Blood Glucose.: 406 mg/dL (16 Nov 2024 00:42)  POCT Blood Glucose.: 332 mg/dL (15 Nov 2024 21:03)  POCT Blood Glucose.: 305 mg/dL (15 Nov 2024 18:48)  POCT Blood Glucose.: 276 mg/dL (15 Nov 2024 11:51)    *malnutrition: Pt continues to meet criteria for moderate malnutrition in context of acute on chronic illness r/t decreased ability to meet increased nutrient needs 2/2 N/V, DM AEB >7.5% wt loss x 2mo, <50% ENN x 2 weeks, mild muscle/fat wasting    Estimated Needs: based on 55 Kg (wt from 10/27)  Calories: 4220-5308 Kcal (35-40 Kcal/Kg)  Protein: 66-83 g (1.2-1.5 g/Kg)  Fluids: 2032-3456 mL (35-40 mL/Kg)    Weight History:  Height (cm): 160 (11-13-24 @ 08:07)  Weight (kg): 52.8 (10-25-24 @ 18:30)  BMI (kg/m2): 20.6 (11-13-24 @ 08:07)  BSA (m2): 1.54 (11-13-24 @ 08:07)    Additional Recommendations:    1) Daily weights  2) Strict I & O's  3) Daily lyte checks including magnesium and phos  4) Weekly triglycerides/LFT checks  5) POCT q6hrs; maintain 140-180mg/dL - **hold TPN x 24 hrs until BG levels normalize. Has been persistently hyperglycemic despite large amounts of insulin given   6) Confirm goals of care regarding LONG-TERM nutrition support - if gastroparesis present and still unable to tolerate PO, would need GJ tube - ?if to be placed. Currently w/ PICC line for TPN providing sole nutrition     *will continue to monitor and adjust nutrition plan prn*  Isabel Mir, MS, RDN, CNSC, [phone]  Certified Nutrition

## 2024-11-16 NOTE — PROGRESS NOTE ADULT - SUBJECTIVE AND OBJECTIVE BOX
CHIEF COMPLAINT/INTERVAL HISTORY:    Patient is a 64y old  Female who presents with a chief complaint of inability to tolerate po (02 Nov 2024 13:01)      HPI:  64-year-old female with history of GERD  HTN, DM, polyps, and afib with 5 cardiac stents. presents with epigastric pain for the last couple of weeks.  Her daughter brought her in, after speaking with her GI doctor who stated patient should not have been on ozempic before and probably have taken insulin instead. She states that she had just uptitrated her dose of ozempic to 0.5 from 0.25 earlier this week. She had been on the 0.25 for 5 weeks. She states the pain bothers her more than the nausea and vomiting. Patient states she feels very minimal relief since she came in.  She denies NSAID use, bleeding or black stools. She reports 10lb weight loss and has been on ozempic for her diabetes. Denies diarrhea or urinary symptoms.     Adm for initiation of PPN for presumed gastroparesis; gastric emptying study for 11/6- failed. Had a 1.5cm CBD dilation ( s/p calvin).   Abnormal MRCP; s/p ERCP and stent placement for CBD stone  Pain has worsened the past few days, now requiring Dilaudid  PICC inserted 11/9 for TPN initiation  Empiric abx started 11/9 for low grade temp     11.11: c/o epigastric pain, c/o nausea, no diarrhea   11.12: c/o persistent epigastric pain, able to tolerated small amount of Glucerna   vomited yesterday, no fever/chills  11.13: states pain is worse today, also c/o dyspnea and wheezing   had CP last night  11.14: epig pain persists, less nausea, tolerated Glucerna and jello yesterday, ERCP postponed for tomorrow   11.15: feels very depressed, still has abd pain, less nausea and vomiting, was NPO after MN for procedure today   didn't sleep well  11.16: s/p ERCP; c/o abd pain, able to tolerate small amounts of liquid diet    Vital Signs Last 24 Hrs  T(C): 36.7 (16 Nov 2024 09:19), Max: 36.8 (16 Nov 2024 00:58)  T(F): 98 (16 Nov 2024 09:19), Max: 98.2 (16 Nov 2024 00:58)  HR: 113 (16 Nov 2024 09:19) (91 - 113)  BP: 101/58 (16 Nov 2024 09:19) (101/58 - 128/81)  BP(mean): --  RR: 16 (16 Nov 2024 09:19) (10 - 18)  SpO2: 93% (16 Nov 2024 09:19) (92% - 99%)    Parameters below as of 16 Nov 2024 09:19  Patient On (Oxygen Delivery Method): room air          PHYSICAL EXAM:    GENERAL: NAD,  HEENT:  NC/AT, EOMI, PERRLA, No scleral icterus, Moist mucous membranes  NECK: Supple, No JVD  CNS:  Alert & Oriented X3, Motor Strength 5/5 B/L upper and lower extremities; DTRs 2+ intact   LUNG: Normal Breath sounds, Clear to auscultation bilaterally, No rales, No rhonchi, No wheezing  HEART: RRR; No murmurs, No rubs  ABDOMEN: +BS, ST, +ttp to ruq and epigastric area   GENITOURINARY- Voiding, Bladder not distended  EXTREMITIES:  2+ Peripheral Pulses, No clubbing, No cyanosis, No tibial edema  MUSCULOSKELTAL: Joints normal ROM, No joint tenderness, No muscle tenderness  VAGINAL: deferred  RECTAL: deferred, not indicated  BREAST: deferred        Assessment and Plan:     64 year old woman with poor oral intake and epig pain:      1. Intractable nausea with poor oral intake likely due to Gastroparesis exacerbated by GLP-1  - h/o colectomy with iliorectal anastomosis for IBD  - EGD/colonoscopy --> gastritis, esophagitis, c/w PPI,   Reglan  10mg TID started  Add Erythromycin 250mg po Tid    was on TPN for the past week; will stop TPN today and c/w clear liquid diet  GI follow up     -was unable to complete Gastric emptying study on admission due to vomiting     2. Choledocholithiasis:  unlikely cholangitis  s/p ERCP and stent placement last week   s/p repeat ERCP on 11.15 for persistent  14mm CBD stone ; CBD and Pancreatic duct cleared, sphincterotomy and metal stent placed  restart Plavix and Apixaban in 48hrs post procedure  Advance diet as tolerated     3. Asthma exacerbation: resolved no wheezing today  s/p Solumedrol IV x 1  Albuterol/Atrovent nebs q 4hrs  c/w Advair, Singulair  Mucinex  CXray noted: no Pna, clear lungs     4. DM type II: uncontrolled due to TPN  TPN stopped  will d/c Lantus due to risk of hypoglycemia; will reassess BGM and Insulin requirements   ADA, ISS     5. Moderate protein-calorie malnutrition:   poor oral intake   - started on TPN 11/8 via PICC line   - TPN stopped due to elevated BGMs    6. Anxiety:   c/w Lexapro, c/w  Xanax prn Tid  On Abilify 5mg lawrence for the past month, now dose lowered to 2mg /day.  Psych evaluation noted: c/w Abilify low dose for now     5. Paroxymal afib / HTN / CAD:  - off lisinopril due to borderline BP  Eliquis on hold for procedure; will restart tomorrow   - s/p 5 stents last were 2 years ago  will restart Plavix tomorrow

## 2024-11-17 LAB
GLUCOSE BLDC GLUCOMTR-MCNC: 201 MG/DL — HIGH (ref 70–99)
GLUCOSE BLDC GLUCOMTR-MCNC: 226 MG/DL — HIGH (ref 70–99)
GLUCOSE BLDC GLUCOMTR-MCNC: 298 MG/DL — HIGH (ref 70–99)

## 2024-11-17 PROCEDURE — 99232 SBSQ HOSP IP/OBS MODERATE 35: CPT

## 2024-11-17 PROCEDURE — 74177 CT ABD & PELVIS W/CONTRAST: CPT | Mod: 26

## 2024-11-17 RX ORDER — HYDROMORPHONE HYDROCHLORIDE 2 MG/1
0.5 TABLET ORAL EVERY 4 HOURS
Refills: 0 | Status: DISCONTINUED | OUTPATIENT
Start: 2024-11-17 | End: 2024-11-18

## 2024-11-17 RX ORDER — PIPERACILLIN SODIUM AND TAZOBACTAM SODIUM 4; .5 G/20ML; G/20ML
3.38 INJECTION, POWDER, LYOPHILIZED, FOR SOLUTION INTRAVENOUS EVERY 8 HOURS
Refills: 0 | Status: DISCONTINUED | OUTPATIENT
Start: 2024-11-17 | End: 2024-11-18

## 2024-11-17 RX ADMIN — HYDROMORPHONE HYDROCHLORIDE 0.5 MILLIGRAM(S): 2 TABLET ORAL at 06:06

## 2024-11-17 RX ADMIN — METOCLOPRAMIDE HYDROCHLORIDE 10 MILLIGRAM(S): 10 TABLET ORAL at 16:49

## 2024-11-17 RX ADMIN — Medication 6: at 12:32

## 2024-11-17 RX ADMIN — HYDROMORPHONE HYDROCHLORIDE 0.5 MILLIGRAM(S): 2 TABLET ORAL at 17:09

## 2024-11-17 RX ADMIN — PIPERACILLIN SODIUM AND TAZOBACTAM SODIUM 25 GRAM(S): 4; .5 INJECTION, POWDER, LYOPHILIZED, FOR SOLUTION INTRAVENOUS at 23:14

## 2024-11-17 RX ADMIN — Medication 250 MILLIGRAM(S): at 20:58

## 2024-11-17 RX ADMIN — HYDROMORPHONE HYDROCHLORIDE 0.5 MILLIGRAM(S): 2 TABLET ORAL at 08:21

## 2024-11-17 RX ADMIN — ROSUVASTATIN CALCIUM 10 MILLIGRAM(S): 5 TABLET, FILM COATED ORAL at 20:58

## 2024-11-17 RX ADMIN — DIPHENHYDRAMINE HYDROCHLORIDE AND LIDOCAINE HYDROCHLORIDE AND ALUMINUM HYDROXIDE AND MAGNESIUM HYDRO 5 MILLILITER(S): KIT at 06:06

## 2024-11-17 RX ADMIN — Medication 8: at 00:02

## 2024-11-17 RX ADMIN — Medication 250 MILLIGRAM(S): at 06:06

## 2024-11-17 RX ADMIN — MONTELUKAST SODIUM 10 MILLIGRAM(S): 10 TABLET ORAL at 21:02

## 2024-11-17 RX ADMIN — Medication 0.5 MILLIGRAM(S): at 21:03

## 2024-11-17 RX ADMIN — ARIPIPRAZOLE 2 MILLIGRAM(S): 15 TABLET ORAL at 10:18

## 2024-11-17 RX ADMIN — Medication 4: at 17:10

## 2024-11-17 RX ADMIN — FLUTICASONE PROPIONATE AND SALMETEROL XINAFOATE 1 DOSE(S): 45; 21 AEROSOL, METERED RESPIRATORY (INHALATION) at 19:58

## 2024-11-17 RX ADMIN — DIPHENHYDRAMINE HYDROCHLORIDE AND LIDOCAINE HYDROCHLORIDE AND ALUMINUM HYDROXIDE AND MAGNESIUM HYDRO 5 MILLILITER(S): KIT at 20:58

## 2024-11-17 RX ADMIN — METOCLOPRAMIDE HYDROCHLORIDE 10 MILLIGRAM(S): 10 TABLET ORAL at 06:06

## 2024-11-17 RX ADMIN — HYDROMORPHONE HYDROCHLORIDE 0.5 MILLIGRAM(S): 2 TABLET ORAL at 22:10

## 2024-11-17 RX ADMIN — HYDROMORPHONE HYDROCHLORIDE 0.5 MILLIGRAM(S): 2 TABLET ORAL at 00:32

## 2024-11-17 RX ADMIN — HYDROMORPHONE HYDROCHLORIDE 0.5 MILLIGRAM(S): 2 TABLET ORAL at 16:47

## 2024-11-17 RX ADMIN — ESCITALOPRAM OXALATE 10 MILLIGRAM(S): 10 TABLET, FILM COATED ORAL at 10:18

## 2024-11-17 RX ADMIN — METOPROLOL TARTRATE 100 MILLIGRAM(S): 100 TABLET, FILM COATED ORAL at 08:21

## 2024-11-17 RX ADMIN — ACETAMINOPHEN 500MG 650 MILLIGRAM(S): 500 TABLET, COATED ORAL at 08:21

## 2024-11-17 RX ADMIN — Medication 4: at 06:06

## 2024-11-17 RX ADMIN — HYDROMORPHONE HYDROCHLORIDE 0.5 MILLIGRAM(S): 2 TABLET ORAL at 21:21

## 2024-11-17 RX ADMIN — Medication 250 MILLIGRAM(S): at 14:22

## 2024-11-17 RX ADMIN — FLUTICASONE PROPIONATE AND SALMETEROL XINAFOATE 1 DOSE(S): 45; 21 AEROSOL, METERED RESPIRATORY (INHALATION) at 07:56

## 2024-11-17 RX ADMIN — DIPHENHYDRAMINE HYDROCHLORIDE AND LIDOCAINE HYDROCHLORIDE AND ALUMINUM HYDROXIDE AND MAGNESIUM HYDRO 5 MILLILITER(S): KIT at 14:22

## 2024-11-17 RX ADMIN — HYDROMORPHONE HYDROCHLORIDE 0.5 MILLIGRAM(S): 2 TABLET ORAL at 00:02

## 2024-11-17 RX ADMIN — METOCLOPRAMIDE HYDROCHLORIDE 10 MILLIGRAM(S): 10 TABLET ORAL at 12:33

## 2024-11-17 RX ADMIN — PANTOPRAZOLE SODIUM 40 MILLIGRAM(S): 40 TABLET, DELAYED RELEASE ORAL at 06:06

## 2024-11-17 NOTE — CHART NOTE - NSCHARTNOTEFT_GEN_A_CORE
Dietitian BRIEF Note    TPN stopped 11/16 2/2 persistent hyperglycemia, BG still elevated despite TPN stopped. Diet changed to cons CHO clear liquids w/ glucerna shakes, pt tolerating. Intake is minimal. RD d/w Dr. Murphy, plan to continue to hold TPN for now. RD will F/U prn    RD will continue to monitor PO intake, labs, hydration, and wt prn.  Isabel Mir, MS, RDN, [facility], [phone]  Certified Nutrition

## 2024-11-17 NOTE — PROGRESS NOTE ADULT - SUBJECTIVE AND OBJECTIVE BOX
CHIEF COMPLAINT/INTERVAL HISTORY:    Patient is a 64y old  Female who presents with a chief complaint of inability to tolerate po (02 Nov 2024 13:01)      HPI:  64-year-old female with history of GERD  HTN, DM, polyps, and afib with 5 cardiac stents. presents with epigastric pain for the last couple of weeks.  Her daughter brought her in, after speaking with her GI doctor who stated patient should not have been on ozempic before and probably have taken insulin instead. She states that she had just uptitrated her dose of ozempic to 0.5 from 0.25 earlier this week. She had been on the 0.25 for 5 weeks. She states the pain bothers her more than the nausea and vomiting. Patient states she feels very minimal relief since she came in.  She denies NSAID use, bleeding or black stools. She reports 10lb weight loss and has been on ozempic for her diabetes. Denies diarrhea or urinary symptoms.     Adm for initiation of PPN for presumed gastroparesis; gastric emptying study for 11/6- failed.  Abnormal MRCP; s/p ERCP and stent placement for CBD stone followed by repeat ERCP on 11/15 with stone extraction, sphincterotomy, stent replacement  PICC inserted 11/9 for TPN initiation, now off TPN due to persistent hyperglycemia  Continues to have abd pain and poor oral intake   Developed Asthma exacerbation quickly resolved w inhalers and one dose steroids     11.11: c/o epigastric pain, c/o nausea, no diarrhea   11.12: c/o persistent epigastric pain, able to tolerated small amount of Glucerna   vomited yesterday, no fever/chills  11.13: states pain is worse today, also c/o dyspnea and wheezing   had CP last night  11.14: epig pain persists, less nausea, tolerated Glucerna and jello yesterday, ERCP postponed for tomorrow   11.15: feels very depressed, still has abd pain, less nausea and vomiting, was NPO after MN for procedure today   didn't sleep well  11.16: s/p ERCP; c/o abd pain, able to tolerate small amounts of liquid diet  11.17: s/p ERCP pod#2, tolerated small amount of liquid diet, ++abd pain, no wheezing, no cp, poor appetite     Vital Signs Last 24 Hrs  T(C): 37.2 (17 Nov 2024 09:00), Max: 37.6 (16 Nov 2024 23:30)  T(F): 98.9 (17 Nov 2024 09:00), Max: 99.6 (16 Nov 2024 23:30)  HR: 122 (17 Nov 2024 09:00) (102 - 125)  BP: 115/55 (17 Nov 2024 09:00) (92/47 - 115/55)  BP(mean): 65 (17 Nov 2024 08:14) (65 - 65)  RR: 19 (17 Nov 2024 09:00) (17 - 20)  SpO2: 95% (17 Nov 2024 09:00) (92% - 98%)    Parameters below as of 17 Nov 2024 09:00  Patient On (Oxygen Delivery Method): nasal cannula  O2 Flow (L/min): 2          PHYSICAL EXAM:    GENERAL: NAD,  HEENT:  NC/AT, EOMI, PERRLA, No scleral icterus, Moist mucous membranes  NECK: Supple, No JVD  CNS:  Alert & Oriented X3, Motor Strength 5/5 B/L upper and lower extremities; DTRs 2+ intact   LUNG: Normal Breath sounds, Clear to auscultation bilaterally, No rales, No rhonchi, No wheezing  HEART: RRR; No murmurs, No rubs  ABDOMEN: +BS, ST, +ttp to ruq and epigastric area   GENITOURINARY- Voiding, Bladder not distended  EXTREMITIES:  2+ Peripheral Pulses, No clubbing, No cyanosis, No tibial edema  MUSCULOSKELTAL: Joints normal ROM, No joint tenderness, No muscle tenderness  VAGINAL: deferred  RECTAL: deferred, not indicated  BREAST: deferred        Assessment and Plan:     64 year old woman with poor oral intake and epig pain:      1. Intractable nausea with poor oral intake likely due to Gastroparesis exacerbated by GLP-1  - h/o colectomy with iliorectal anastomosis for IBD  - EGD/colonoscopy --> gastritis, esophagitis, c/w PPI    -Gastroparesis clinically diagnosed as she was unable to complete Gastric emptying study on admission due to vomiting     c/w Reglan  10mg TID +Erythromycin 250mg po Tid  If no improvement consider G-J feeding tube     was on TPN for the past week; TPN stopped 11.16 due to persistent Hyperglycemia (despite Lantus 25u)   c/w clear liquid diet  GI follow up recalled     2. Choledocholithiasis:  unlikely cholangitis  s/p ERCP and stent placement last week   s/p repeat ERCP on 11.15 for persistent  14mm CBD stone ; CBD and Pancreatic duct cleared, sphincterotomy and metal stent placed  restart Plavix and Apixaban this kieran  Advance diet as tolerated     3. Asthma exacerbation: resolved no wheezing today  s/p Solumedrol IV x 1  Albuterol/Atrovent nebs q 4hrs  c/w Advair, Singulair  Mucinex  CXray noted: no Pna, clear lungs     4. DM type II: uncontrolled due to TPN  TPN stopped  will d/c Lantus due to risk of hypoglycemia; will reassess BGM and Insulin requirements   ADA, ISS     5. Moderate protein-calorie malnutrition:   poor oral intake   - started on TPN 11/8 via PICC line   - TPN stopped due to elevated BGMs    6. Anxiety:   c/w Lexapro, c/w  Xanax prn Tid  On Abilify 5mg lawrence for the past month, now dose lowered to 2mg /day.  Psych evaluation noted: c/w Abilify low dose for now     5. Paroxymal afib / HTN / CAD:  - off lisinopril due to borderline BP  Eliquis on hold for procedure; will restart today   - s/p 5 stents last were 2 years ago  will restart Plavix     Plan: GI follow up on severe persistent abd pain   CHIEF COMPLAINT/INTERVAL HISTORY:    Patient is a 64y old  Female who presents with a chief complaint of inability to tolerate po (02 Nov 2024 13:01)      HPI:  64-year-old female with history of GERD  HTN, DM, polyps, and afib with 5 cardiac stents. presents with epigastric pain for the last couple of weeks.  Her daughter brought her in, after speaking with her GI doctor who stated patient should not have been on ozempic before and probably have taken insulin instead. She states that she had just uptitrated her dose of ozempic to 0.5 from 0.25 earlier this week. She had been on the 0.25 for 5 weeks. She states the pain bothers her more than the nausea and vomiting. Patient states she feels very minimal relief since she came in.  She denies NSAID use, bleeding or black stools. She reports 10lb weight loss and has been on ozempic for her diabetes. Denies diarrhea or urinary symptoms.     Adm for initiation of PPN for presumed gastroparesis; gastric emptying study for 11/6- failed.  Abnormal MRCP; s/p ERCP and stent placement for CBD stone followed by repeat ERCP on 11/15 with stone extraction, sphincterotomy, stent replacement  PICC inserted 11/9 for TPN initiation, now off TPN due to persistent hyperglycemia  Continues to have abd pain and poor oral intake   Developed Asthma exacerbation quickly resolved w inhalers and one dose steroids     11.11: c/o epigastric pain, c/o nausea, no diarrhea   11.12: c/o persistent epigastric pain, able to tolerated small amount of Glucerna   vomited yesterday, no fever/chills  11.13: states pain is worse today, also c/o dyspnea and wheezing   had CP last night  11.14: epig pain persists, less nausea, tolerated Glucerna and jello yesterday, ERCP postponed for tomorrow   11.15: feels very depressed, still has abd pain, less nausea and vomiting, was NPO after MN for procedure today   didn't sleep well  11.16: s/p ERCP; c/o abd pain, able to tolerate small amounts of liquid diet  11.17: s/p ERCP pod#2, tolerated small amount of liquid diet, ++abd pain, no wheezing, no cp, poor appetite   +low grade fever 100F, +tachycardia Hr 120/min    Vital Signs Last 24 Hrs  T(C): 37.6 (17 Nov 2024 16:55), Max: 37.6 (16 Nov 2024 23:30)  T(F): 99.7 (17 Nov 2024 16:55), Max: 99.7 (17 Nov 2024 16:47)  HR: 121 (17 Nov 2024 16:55) (102 - 125)  BP: 125/103 (17 Nov 2024 16:55) (112/58 - 146/105)  BP(mean): 65 (17 Nov 2024 08:14) (65 - 65)  RR: 19 (17 Nov 2024 16:55) (17 - 20)  SpO2: 95% (17 Nov 2024 16:55) (94% - 98%)    Parameters below as of 17 Nov 2024 16:55  Patient On (Oxygen Delivery Method): nasal cannula  O2 Flow (L/min): 2          PHYSICAL EXAM:    GENERAL: NAD,  HEENT:  NC/AT, EOMI, PERRLA, No scleral icterus, Moist mucous membranes  NECK: Supple, No JVD  CNS:  Alert & Oriented X3, Motor Strength 5/5 B/L upper and lower extremities; DTRs 2+ intact   LUNG: Normal Breath sounds, Clear to auscultation bilaterally, No rales, No rhonchi, No wheezing  HEART: RRR; No murmurs, No rubs  ABDOMEN: +BS, ST, +ttp to ruq and epigastric area   GENITOURINARY- Voiding, Bladder not distended  EXTREMITIES:  2+ Peripheral Pulses, No clubbing, No cyanosis, No tibial edema  MUSCULOSKELTAL: Joints normal ROM, No joint tenderness, No muscle tenderness  VAGINAL: deferred  RECTAL: deferred, not indicated  BREAST: deferred        Assessment and Plan:     64 year old woman with poor oral intake and epig pain:      1. Intractable nausea with poor oral intake likely due to Gastroparesis exacerbated by GLP-1  - h/o colectomy with iliorectal anastomosis for IBD  - EGD/colonoscopy --> gastritis, esophagitis, c/w PPI    -Gastroparesis clinically diagnosed as she was unable to complete Gastric emptying study on admission due to vomiting     c/w Reglan  10mg TID +Erythromycin 250mg po Tid    -was on TPN for the past week; TPN stopped 11.16 due to persistent Hyperglycemia (despite Lantus 25u)   c/w clear liquid diet, able to eat small amounts    GI follow up noted: If no improvement consider G feeding tube     2. Choledocholithiasis:  s/p ERCP and stent placement last week   s/p repeat ERCP on 11.15 for persistent  14mm CBD stone ; CBD and Pancreatic duct cleared, sphincterotomy and metal stent placed    Patient with increasing abd pain, low grade fever of 100F and tachycardia  will check Blood Cx and CT abd with po and IV contrast to r/o free air   start Zosyn IV  d/w dr Moncada     3. Asthma exacerbation: resolved no wheezing today  s/p Solumedrol IV x 1  Albuterol/Atrovent nebs q 4hrs  c/w Advair, Singulair  Mucinex  CXray noted: no Pna, clear lungs     4. DM type II: uncontrolled due to TPN  TPN stopped  will d/c Lantus due to risk of hypoglycemia; will reassess BGM and Insulin requirements   ADA, ISS     5. Moderate protein-calorie malnutrition:   poor oral intake   - started on TPN 11/8 via PICC line   - TPN stopped due to elevated BGMs    6. Anxiety:   c/w Lexapro, c/w  Xanax prn Tid  On Abilify 5mg lawrence for the past month, now dose lowered to 2mg /day.  Psych evaluation noted: c/w Abilify low dose for now     5. Paroxymal afib / HTN / CAD:  - off lisinopril due to borderline BP  Eliquis on hold for procedure; will restart today   - s/p 5 stents last were 2 years ago  will restart Plavix     Plan: GI follow up on severe persistent abd pain   CHIEF COMPLAINT/INTERVAL HISTORY:    Patient is a 64y old  Female who presents with a chief complaint of inability to tolerate po (02 Nov 2024 13:01)      HPI:  64-year-old female with history of GERD  HTN, DM, polyps, and afib with 5 cardiac stents. presents with epigastric pain for the last couple of weeks.  Her daughter brought her in, after speaking with her GI doctor who stated patient should not have been on ozempic before and probably have taken insulin instead. She states that she had just uptitrated her dose of ozempic to 0.5 from 0.25 earlier this week. She had been on the 0.25 for 5 weeks. She states the pain bothers her more than the nausea and vomiting. Patient states she feels very minimal relief since she came in.  She denies NSAID use, bleeding or black stools. She reports 10lb weight loss and has been on ozempic for her diabetes. Denies diarrhea or urinary symptoms.     Adm for initiation of PPN for presumed gastroparesis; gastric emptying study for 11/6- failed.  Abnormal MRCP; s/p ERCP and stent placement for CBD stone followed by repeat ERCP on 11/15 with stone extraction, sphincterotomy, stent replacement  PICC inserted 11/9 for TPN initiation, now off TPN due to persistent hyperglycemia  Continues to have abd pain and poor oral intake   Developed Asthma exacerbation quickly resolved w inhalers and one dose steroids     11.11: c/o epigastric pain, c/o nausea, no diarrhea   11.12: c/o persistent epigastric pain, able to tolerated small amount of Glucerna   vomited yesterday, no fever/chills  11.13: states pain is worse today, also c/o dyspnea and wheezing   had CP last night  11.14: epig pain persists, less nausea, tolerated Glucerna and jello yesterday, ERCP postponed for tomorrow   11.15: feels very depressed, still has abd pain, less nausea and vomiting, was NPO after MN for procedure today   didn't sleep well  11.16: s/p ERCP; c/o abd pain, able to tolerate small amounts of liquid diet  11.17: s/p ERCP pod#2, tolerated small amount of liquid diet, ++abd pain, no wheezing, no cp, poor appetite   +low grade fever 100F, +tachycardia Hr 120/min    Vital Signs Last 24 Hrs  T(C): 37.6 (17 Nov 2024 16:55), Max: 37.6 (16 Nov 2024 23:30)  T(F): 99.7 (17 Nov 2024 16:55), Max: 99.7 (17 Nov 2024 16:47)  HR: 121 (17 Nov 2024 16:55) (102 - 125)  BP: 125/103 (17 Nov 2024 16:55) (112/58 - 146/105)  BP(mean): 65 (17 Nov 2024 08:14) (65 - 65)  RR: 19 (17 Nov 2024 16:55) (17 - 20)  SpO2: 95% (17 Nov 2024 16:55) (94% - 98%)    Parameters below as of 17 Nov 2024 16:55  Patient On (Oxygen Delivery Method): nasal cannula  O2 Flow (L/min): 2          PHYSICAL EXAM:    GENERAL: NAD,  HEENT:  NC/AT, EOMI, PERRLA, No scleral icterus, Moist mucous membranes  NECK: Supple, No JVD  CNS:  Alert & Oriented X3, Motor Strength 5/5 B/L upper and lower extremities; DTRs 2+ intact   LUNG: Normal Breath sounds, Clear to auscultation bilaterally, No rales, No rhonchi, No wheezing  HEART: RRR; No murmurs, No rubs  ABDOMEN: +BS, ST, +ttp to ruq and epigastric area   GENITOURINARY- Voiding, Bladder not distended  EXTREMITIES:  2+ Peripheral Pulses, No clubbing, No cyanosis, No tibial edema  MUSCULOSKELTAL: Joints normal ROM, No joint tenderness, No muscle tenderness  VAGINAL: deferred  RECTAL: deferred, not indicated  BREAST: deferred        Assessment and Plan:     64 year old woman with poor oral intake and epig pain:      1. Intractable nausea with poor oral intake likely due to Gastroparesis exacerbated by GLP-1  - h/o colectomy with iliorectal anastomosis for IBD  - EGD/colonoscopy --> gastritis, esophagitis, c/w PPI    -Gastroparesis clinically diagnosed as she was unable to complete Gastric emptying study on admission due to vomiting   -c/w Reglan  10mg TID +Erythromycin 250mg po Tid    -was on TPN for the past week; TPN stopped 11.16 due to persistent Hyperglycemia (despite Lantus 25u)   c/w clear liquid diet, able to eat small amounts    GI follow up noted: If no improvement consider G feeding tube     2. Choledocholithiasis:  s/p ERCP and stent placement last week   s/p repeat ERCP on 11.15 for persistent  14mm CBD stone ; CBD and Pancreatic duct cleared, sphincterotomy and metal stent placed    Patient with increasing abd pain, low grade fever of 100F and tachycardia  will check Blood Cx and CT abd with po and IV contrast to r/o free air   start Zosyn IV  d/w dr Moncada     3. Asthma exacerbation: resolved no wheezing today  s/p Solumedrol IV x 1  Albuterol/Atrovent nebs q 4hrs  c/w Advair, Singulair  Mucinex  CXray noted: no Pna, clear lungs     4. DM type II: uncontrolled due to TPN  TPN stopped  will d/c Lantus due to risk of hypoglycemia; will reassess BGM and Insulin requirements   ADA, ISS     5. Moderate protein-calorie malnutrition:   poor oral intake   - started on TPN 11/8 via PICC line   - TPN stopped due to elevated BGMs    6. Anxiety:   c/w Lexapro, c/w  Xanax prn Tid  On Abilify 5mg lawrence for the past month, now dose lowered to 2mg /day.  Psych evaluation noted: c/w Abilify low dose for now     5. Paroxymal afib / HTN / CAD:  - off lisinopril due to borderline BP  Eliquis on hold for procedure; will restart today   - s/p 5 stents last were 2 years ago  will restart Plavix     Plan: GI follow up on severe persistent abd pain

## 2024-11-17 NOTE — PROGRESS NOTE ADULT - ASSESSMENT
Abdominal pain worsened since ERCP and Stent placement  Vomiting ? Gastroparesis  Anxiety /Depression  REC  Reattempt GE study  Pain management

## 2024-11-18 ENCOUNTER — RESULT REVIEW (OUTPATIENT)
Age: 64
End: 2024-11-18

## 2024-11-18 ENCOUNTER — APPOINTMENT (OUTPATIENT)
Dept: ENDOCRINOLOGY | Facility: CLINIC | Age: 64
End: 2024-11-18

## 2024-11-18 LAB
ALBUMIN SERPL ELPH-MCNC: 2.7 G/DL — LOW (ref 3.3–5)
ALP SERPL-CCNC: 95 U/L — SIGNIFICANT CHANGE UP (ref 40–120)
ALT FLD-CCNC: 29 U/L — SIGNIFICANT CHANGE UP (ref 12–78)
AMYLASE P1 CFR SERPL: 208 U/L — HIGH (ref 25–115)
ANION GAP SERPL CALC-SCNC: 5 MMOL/L — SIGNIFICANT CHANGE UP (ref 5–17)
AST SERPL-CCNC: 17 U/L — SIGNIFICANT CHANGE UP (ref 15–37)
BILIRUB DIRECT SERPL-MCNC: 0.3 MG/DL — SIGNIFICANT CHANGE UP (ref 0–0.3)
BILIRUB INDIRECT FLD-MCNC: 0.6 MG/DL — SIGNIFICANT CHANGE UP (ref 0.2–1)
BILIRUB SERPL-MCNC: 0.9 MG/DL — SIGNIFICANT CHANGE UP (ref 0.2–1.2)
BUN SERPL-MCNC: 9 MG/DL — SIGNIFICANT CHANGE UP (ref 7–23)
CALCIUM SERPL-MCNC: 9.3 MG/DL — SIGNIFICANT CHANGE UP (ref 8.5–10.1)
CHLORIDE SERPL-SCNC: 103 MMOL/L — SIGNIFICANT CHANGE UP (ref 96–108)
CO2 SERPL-SCNC: 25 MMOL/L — SIGNIFICANT CHANGE UP (ref 22–31)
CREAT SERPL-MCNC: 0.86 MG/DL — SIGNIFICANT CHANGE UP (ref 0.5–1.3)
EGFR: 76 ML/MIN/1.73M2 — SIGNIFICANT CHANGE UP
GLUCOSE BLDC GLUCOMTR-MCNC: 191 MG/DL — HIGH (ref 70–99)
GLUCOSE BLDC GLUCOMTR-MCNC: 213 MG/DL — HIGH (ref 70–99)
GLUCOSE BLDC GLUCOMTR-MCNC: 215 MG/DL — HIGH (ref 70–99)
GLUCOSE BLDC GLUCOMTR-MCNC: 219 MG/DL — HIGH (ref 70–99)
GLUCOSE BLDC GLUCOMTR-MCNC: 239 MG/DL — HIGH (ref 70–99)
GLUCOSE SERPL-MCNC: 177 MG/DL — HIGH (ref 70–99)
HCT VFR BLD CALC: 32.4 % — LOW (ref 34.5–45)
HGB BLD-MCNC: 10.3 G/DL — LOW (ref 11.5–15.5)
LIDOCAIN IGE QN: 549 U/L — HIGH (ref 13–75)
MCHC RBC-ENTMCNC: 28.7 PG — SIGNIFICANT CHANGE UP (ref 27–34)
MCHC RBC-ENTMCNC: 31.8 G/DL — LOW (ref 32–36)
MCV RBC AUTO: 90.3 FL — SIGNIFICANT CHANGE UP (ref 80–100)
PLATELET # BLD AUTO: 179 K/UL — SIGNIFICANT CHANGE UP (ref 150–400)
POTASSIUM SERPL-MCNC: 3.5 MMOL/L — SIGNIFICANT CHANGE UP (ref 3.5–5.3)
POTASSIUM SERPL-SCNC: 3.5 MMOL/L — SIGNIFICANT CHANGE UP (ref 3.5–5.3)
PROT SERPL-MCNC: 7.6 GM/DL — SIGNIFICANT CHANGE UP (ref 6–8.3)
RBC # BLD: 3.59 M/UL — LOW (ref 3.8–5.2)
RBC # FLD: 15.7 % — HIGH (ref 10.3–14.5)
SODIUM SERPL-SCNC: 133 MMOL/L — LOW (ref 135–145)
WBC # BLD: 13.77 K/UL — HIGH (ref 3.8–10.5)
WBC # FLD AUTO: 13.77 K/UL — HIGH (ref 3.8–10.5)

## 2024-11-18 PROCEDURE — 99232 SBSQ HOSP IP/OBS MODERATE 35: CPT

## 2024-11-18 PROCEDURE — 93010 ELECTROCARDIOGRAM REPORT: CPT

## 2024-11-18 PROCEDURE — 88305 TISSUE EXAM BY PATHOLOGIST: CPT | Mod: 26

## 2024-11-18 PROCEDURE — 99233 SBSQ HOSP IP/OBS HIGH 50: CPT

## 2024-11-18 RX ORDER — INSULIN GLARGINE 100 [IU]/ML
8 INJECTION, SOLUTION SUBCUTANEOUS AT BEDTIME
Refills: 0 | Status: DISCONTINUED | OUTPATIENT
Start: 2024-11-18 | End: 2024-12-05

## 2024-11-18 RX ORDER — CEFTRIAXONE SODIUM 1 G
1000 VIAL (EA) INJECTION EVERY 24 HOURS
Refills: 0 | Status: COMPLETED | OUTPATIENT
Start: 2024-11-18 | End: 2024-11-22

## 2024-11-18 RX ORDER — CEFTRIAXONE SODIUM 1 G
1000 VIAL (EA) INJECTION EVERY 24 HOURS
Refills: 0 | Status: DISCONTINUED | OUTPATIENT
Start: 2024-11-18 | End: 2024-11-18

## 2024-11-18 RX ORDER — AZITHROMYCIN 250 MG/1
500 TABLET, FILM COATED ORAL EVERY 24 HOURS
Refills: 0 | Status: DISCONTINUED | OUTPATIENT
Start: 2024-11-18 | End: 2024-11-18

## 2024-11-18 RX ORDER — SODIUM CHLORIDE 9 MG/ML
250 INJECTION, SOLUTION INTRAMUSCULAR; INTRAVENOUS; SUBCUTANEOUS ONCE
Refills: 0 | Status: COMPLETED | OUTPATIENT
Start: 2024-11-18 | End: 2024-11-18

## 2024-11-18 RX ORDER — 0.9 % SODIUM CHLORIDE 0.9 %
1000 INTRAVENOUS SOLUTION INTRAVENOUS
Refills: 0 | Status: DISCONTINUED | OUTPATIENT
Start: 2024-11-18 | End: 2024-11-18

## 2024-11-18 RX ORDER — 0.9 % SODIUM CHLORIDE 0.9 %
1000 INTRAVENOUS SOLUTION INTRAVENOUS
Refills: 0 | Status: DISCONTINUED | OUTPATIENT
Start: 2024-11-18 | End: 2024-11-20

## 2024-11-18 RX ORDER — SUCRALFATE 1 G/1
1 TABLET ORAL
Refills: 0 | Status: DISCONTINUED | OUTPATIENT
Start: 2024-11-18 | End: 2024-12-10

## 2024-11-18 RX ORDER — SUCRALFATE 1 G/1
1 TABLET ORAL ONCE
Refills: 0 | Status: COMPLETED | OUTPATIENT
Start: 2024-11-18 | End: 2024-11-18

## 2024-11-18 RX ORDER — APIXABAN 2.5 MG/1
5 TABLET, FILM COATED ORAL EVERY 12 HOURS
Refills: 0 | Status: DISCONTINUED | OUTPATIENT
Start: 2024-11-18 | End: 2024-11-19

## 2024-11-18 RX ORDER — HYDROMORPHONE HYDROCHLORIDE 2 MG/1
1 TABLET ORAL EVERY 4 HOURS
Refills: 0 | Status: DISCONTINUED | OUTPATIENT
Start: 2024-11-18 | End: 2024-11-25

## 2024-11-18 RX ORDER — HYDROMORPHONE HYDROCHLORIDE 2 MG/1
0.5 TABLET ORAL EVERY 4 HOURS
Refills: 0 | Status: DISCONTINUED | OUTPATIENT
Start: 2024-11-18 | End: 2024-11-25

## 2024-11-18 RX ADMIN — HYDROMORPHONE HYDROCHLORIDE 1 MILLIGRAM(S): 2 TABLET ORAL at 11:03

## 2024-11-18 RX ADMIN — ROSUVASTATIN CALCIUM 10 MILLIGRAM(S): 5 TABLET, FILM COATED ORAL at 22:17

## 2024-11-18 RX ADMIN — METOCLOPRAMIDE HYDROCHLORIDE 10 MILLIGRAM(S): 10 TABLET ORAL at 06:26

## 2024-11-18 RX ADMIN — HYDROMORPHONE HYDROCHLORIDE 1 MILLIGRAM(S): 2 TABLET ORAL at 21:39

## 2024-11-18 RX ADMIN — SUCRALFATE 1 GRAM(S): 1 TABLET ORAL at 22:17

## 2024-11-18 RX ADMIN — SUCRALFATE 1 GRAM(S): 1 TABLET ORAL at 12:04

## 2024-11-18 RX ADMIN — Medication 115 MILLILITER(S): at 16:55

## 2024-11-18 RX ADMIN — HYDROMORPHONE HYDROCHLORIDE 1 MILLIGRAM(S): 2 TABLET ORAL at 22:11

## 2024-11-18 RX ADMIN — PANTOPRAZOLE SODIUM 40 MILLIGRAM(S): 40 TABLET, DELAYED RELEASE ORAL at 06:26

## 2024-11-18 RX ADMIN — ESCITALOPRAM OXALATE 10 MILLIGRAM(S): 10 TABLET, FILM COATED ORAL at 11:03

## 2024-11-18 RX ADMIN — FLUTICASONE PROPIONATE AND SALMETEROL XINAFOATE 1 DOSE(S): 45; 21 AEROSOL, METERED RESPIRATORY (INHALATION) at 09:11

## 2024-11-18 RX ADMIN — MONTELUKAST SODIUM 10 MILLIGRAM(S): 10 TABLET ORAL at 22:17

## 2024-11-18 RX ADMIN — Medication 100 MILLILITER(S): at 08:33

## 2024-11-18 RX ADMIN — ACETAMINOPHEN 500MG 650 MILLIGRAM(S): 500 TABLET, COATED ORAL at 01:50

## 2024-11-18 RX ADMIN — Medication 4: at 01:12

## 2024-11-18 RX ADMIN — Medication 2: at 06:30

## 2024-11-18 RX ADMIN — Medication 4: at 22:18

## 2024-11-18 RX ADMIN — APIXABAN 5 MILLIGRAM(S): 2.5 TABLET, FILM COATED ORAL at 22:17

## 2024-11-18 RX ADMIN — Medication 0.5 MILLIGRAM(S): at 22:17

## 2024-11-18 RX ADMIN — PIPERACILLIN SODIUM AND TAZOBACTAM SODIUM 25 GRAM(S): 4; .5 INJECTION, POWDER, LYOPHILIZED, FOR SOLUTION INTRAVENOUS at 06:25

## 2024-11-18 RX ADMIN — Medication 81 MILLIGRAM(S): at 16:56

## 2024-11-18 RX ADMIN — METOCLOPRAMIDE HYDROCHLORIDE 10 MILLIGRAM(S): 10 TABLET ORAL at 16:56

## 2024-11-18 RX ADMIN — Medication 250 MILLIGRAM(S): at 22:17

## 2024-11-18 RX ADMIN — FLUTICASONE PROPIONATE AND SALMETEROL XINAFOATE 1 DOSE(S): 45; 21 AEROSOL, METERED RESPIRATORY (INHALATION) at 20:53

## 2024-11-18 RX ADMIN — METOCLOPRAMIDE HYDROCHLORIDE 10 MILLIGRAM(S): 10 TABLET ORAL at 11:02

## 2024-11-18 RX ADMIN — Medication 250 MILLIGRAM(S): at 13:48

## 2024-11-18 RX ADMIN — DIPHENHYDRAMINE HYDROCHLORIDE AND LIDOCAINE HYDROCHLORIDE AND ALUMINUM HYDROXIDE AND MAGNESIUM HYDRO 5 MILLILITER(S): KIT at 06:26

## 2024-11-18 RX ADMIN — Medication 1000 MILLIGRAM(S): at 16:56

## 2024-11-18 RX ADMIN — INSULIN GLARGINE 8 UNIT(S): 100 INJECTION, SOLUTION SUBCUTANEOUS at 22:16

## 2024-11-18 RX ADMIN — HYDROMORPHONE HYDROCHLORIDE 1 MILLIGRAM(S): 2 TABLET ORAL at 15:11

## 2024-11-18 RX ADMIN — SODIUM CHLORIDE 250 MILLILITER(S): 9 INJECTION, SOLUTION INTRAMUSCULAR; INTRAVENOUS; SUBCUTANEOUS at 20:36

## 2024-11-18 RX ADMIN — METOPROLOL TARTRATE 100 MILLIGRAM(S): 100 TABLET, FILM COATED ORAL at 11:03

## 2024-11-18 RX ADMIN — SUCRALFATE 1 GRAM(S): 1 TABLET ORAL at 16:58

## 2024-11-18 RX ADMIN — ACETAMINOPHEN 500MG 650 MILLIGRAM(S): 500 TABLET, COATED ORAL at 22:19

## 2024-11-18 RX ADMIN — DIPHENHYDRAMINE HYDROCHLORIDE AND LIDOCAINE HYDROCHLORIDE AND ALUMINUM HYDROXIDE AND MAGNESIUM HYDRO 5 MILLILITER(S): KIT at 13:48

## 2024-11-18 RX ADMIN — Medication 4: at 16:59

## 2024-11-18 RX ADMIN — Medication 250 MILLIGRAM(S): at 06:25

## 2024-11-18 RX ADMIN — Medication 1 TABLET(S): at 02:26

## 2024-11-18 RX ADMIN — Medication 1 TABLET(S): at 02:55

## 2024-11-18 RX ADMIN — Medication 115 MILLILITER(S): at 18:27

## 2024-11-18 RX ADMIN — Medication 4: at 11:59

## 2024-11-18 RX ADMIN — ACETAMINOPHEN 500MG 650 MILLIGRAM(S): 500 TABLET, COATED ORAL at 01:13

## 2024-11-18 RX ADMIN — ARIPIPRAZOLE 2 MILLIGRAM(S): 15 TABLET ORAL at 11:03

## 2024-11-18 RX ADMIN — HYDROMORPHONE HYDROCHLORIDE 0.5 MILLIGRAM(S): 2 TABLET ORAL at 08:33

## 2024-11-18 NOTE — PROGRESS NOTE ADULT - NS ATTEND AMEND GEN_ALL_CORE FT
64 year old woman with failure to thrive and inability to tolerate PO, with cbd stone and ampullary adenoma, s/p ERCP and ampullectomy with stone removal, placement of cbd/pd stents, with post ampullectomy (for ampullary adenoma) pancreatitis.     Patient still without much improvement in eating. Has not been able to eat for over 3 weeks.   Whether or not has true gastroparesiss (failed GES and department won't try again), for symptoms of pain/vomiting and inability to tolerate PO (again this preceeded the pancreatitis by several weeks) will plan for  GJ tube later this week unless she is eating then.   Will allow pancreatitis to subside with maximum thearpy for other symptoms.   If still not tolerating PO will place GJ tube.
Plan of care discussed with NP. Agree with plan
I discussed case with GARRET Lobato; stable cardiac status; tolerated endoscopic procedures.
as above , stable for low risk procedure , plavix, eliquis on hold , which needs to be resumed after the procedure

## 2024-11-18 NOTE — PROGRESS NOTE ADULT - ASSESSMENT
64 year old female presenting with abdominal pain, initial workup for gastroparesis with plan for enteral feeding tube with on imaging acute CBD stone s/p ERCP 11/7, placement of PD/CBD stents with identified stone, on anticoagulation with subsequent ERCP post cessation anticoagulation 11/15 with sphincterotomy, ampullectomy, removal of stone with now further inability to tolerate PO, vomiting over weekend, and increased abdominal pain with elevated lipase and imaging +pancreatitis. 3/3 criteria met.    Imp: Post ercp pancreatitis    Rec:  ::At this point, would continue IVF and enc PO intake  ::Pain control prn  ::Continue PPI  ::Add carafate premeals and at bedtime for odynophagia with reflux  ::Underlying gastroparesis complicating PO intake as well  ::May opt for GJ tube should pain continue as high risk FTT

## 2024-11-18 NOTE — PROGRESS NOTE ADULT - SUBJECTIVE AND OBJECTIVE BOX
Patient is a 64y old  Female who presents with a chief complaint of inability to tolerate PO  Followup: ERCP CBD/PD stent, ampullectomy; post ercp pancreatitis  At bedside patient still reporting minimal PO intake with burning in throat when drinking. Admits to reflux. Per daughter, patient having loose nonbloody stools and not taking in any PO. Minimal sips vitamin water zero.    MEDICATIONS  (STANDING):  ALPRAZolam 0.5 milliGRAM(s) Oral at bedtime  ARIPiprazole 2 milliGRAM(s) Oral daily  dextrose 5%. 1000 milliLiter(s) (100 mL/Hr) IV Continuous <Continuous>  dextrose 5%. 1000 milliLiter(s) (50 mL/Hr) IV Continuous <Continuous>  dextrose 50% Injectable 25 Gram(s) IV Push once  dextrose 50% Injectable 12.5 Gram(s) IV Push once  dextrose 50% Injectable 25 Gram(s) IV Push once  erythromycin     enteric coated 250 milliGRAM(s) Oral three times a day  escitalopram 10 milliGRAM(s) Oral daily  FIRST- Mouthwash  BLM 5 milliLiter(s) Swish and Swallow three times a day  fluticasone propionate/ salmeterol 250-50 MICROgram(s) Diskus 1 Dose(s) Inhalation two times a day  glucagon  Injectable 1 milliGRAM(s) IntraMuscular once  influenza   Vaccine 0.5 milliLiter(s) IntraMuscular once  insulin lispro (ADMELOG) corrective regimen sliding scale   SubCutaneous every 6 hours  lactated ringers. 1000 milliLiter(s) (100 mL/Hr) IV Continuous <Continuous>  metoclopramide 10 milliGRAM(s) Oral three times a day before meals  metoprolol succinate  milliGRAM(s) Oral daily  montelukast 10 milliGRAM(s) Oral at bedtime  pantoprazole    Tablet 40 milliGRAM(s) Oral before breakfast  rosuvastatin 10 milliGRAM(s) Oral at bedtime  sucralfate suspension 1 Gram(s) Oral once  sucralfate suspension 1 Gram(s) Oral <User Schedule>    MEDICATIONS  (PRN):  acetaminophen     Tablet .. 650 milliGRAM(s) Oral every 6 hours PRN Moderate Pain (4 - 6)  acetaminophen 325 mG/butalbital 50 mG/caffeine 40 mG 1 Tablet(s) Oral every 8 hours PRN migraine  albuterol/ipratropium for Nebulization 3 milliLiter(s) Nebulizer every 4 hours PRN Shortness of Breath and/or Wheezing  ALPRAZolam 0.5 milliGRAM(s) Oral three times a day PRN anxiety  aluminum hydroxide/magnesium hydroxide/simethicone Suspension 30 milliLiter(s) Oral every 4 hours PRN Dyspepsia  benzocaine/menthol Lozenge 1 Lozenge Oral every 4 hours PRN Sore Throat  dextrose Oral Gel 15 Gram(s) Oral once PRN Blood Glucose LESS THAN 70 milliGRAM(s)/deciliter  guaiFENesin Oral Liquid (Sugar-Free) 200 milliGRAM(s) Oral every 6 hours PRN Cough  HYDROmorphone  Injectable 1 milliGRAM(s) IV Push every 4 hours PRN Severe Pain (7 - 10)  HYDROmorphone  Injectable 0.5 milliGRAM(s) IV Push every 4 hours PRN Moderate Pain (4 - 6)  ondansetron Injectable 4 milliGRAM(s) IV Push every 6 hours PRN Nausea and/or Vomiting      Vital Signs Last 24 Hrs  T(C): 37 (18 Nov 2024 07:45), Max: 37.8 (18 Nov 2024 00:35)  T(F): 98.6 (18 Nov 2024 07:45), Max: 100 (18 Nov 2024 00:35)  HR: 120 (18 Nov 2024 09:15) (120 - 125)  BP: 108/72 (18 Nov 2024 07:45) (108/72 - 146/105)  BP(mean): 94 (18 Nov 2024 06:35) (94 - 94)  RR: 18 (18 Nov 2024 07:45) (16 - 20)  SpO2: 95% (18 Nov 2024 09:15) (94% - 97%)    Parameters below as of 18 Nov 2024 09:15  Patient On (Oxygen Delivery Method): nasal cannula, 3L        PHYSICAL EXAM:    Constitutional: No acute distress, frail, ill appearing, non-toxic appearing  HEENT: good phonation, not icteric  Neck: supple, no lymphadenopathy  Respiratory: clear to ascultation bilaterally, no wheezing  Cardiovascular: S1 and S2, regular rate and rhythm, no murmurs rubs or gallops  Gastrointestinal: soft, TTP b/l UQ, non-distended, +bowel sounds, no rebound or guarding, no surgical scars, no drains  Extremities: No peripheral edema, no cyanosis or clubbing  Vascular: 2+ peripheral pulses, no venous stasis  Neurological: A/O x 3, no focal deficits, no asterixis  Psychiatric: Normal mood, normal affect  Skin: No rashes, not jaundiced    LABS:                        10.3   13.77 )-----------( 179      ( 18 Nov 2024 07:39 )             32.4     11-18    133[L]  |  103  |  9   ----------------------------<  177[H]  3.5   |  25  |  0.86    Ca    9.3      18 Nov 2024 07:39    TPro  7.6  /  Alb  2.7[L]  /  TBili  0.9  /  DBili  0.3  /  AST  17  /  ALT  29  /  AlkPhos  95  11-18      LIVER FUNCTIONS - ( 18 Nov 2024 07:39 )  Alb: 2.7 g/dL / Pro: 7.6 gm/dL / ALK PHOS: 95 U/L / ALT: 29 U/L / AST: 17 U/L / GGT: x             RADIOLOGY & ADDITIONAL STUDIES:  IMPRESSION:    Trace bilateral pleural effusions with atelectatic changes involving both   lower lobes, left greater than right, and the lingula. Findings may   represent developing consolidations. Correlate clinically for pneumonia.    Interval placement of stent within the mid/distal CBD with expected mild   to moderate pneumobilia. Interval placement of proximal main pancreatic   ductal stent.    Left upper quadrant fat stranding adjacent to the pancreatic tail raising   concern for acute pancreatitis. No fluid collection. Trace free fluid is   also seen in the left upper quadrant.    Bowel postoperative changes. Oral contrast is seen extending throughout   the bowel into the rectum without evidence of obstruction. No free air.    Underdistention and/or wall thickening of the urinary bladder, which may   represent cystitis. Correlate with urinalysis.    Pelvic congestion syndrome is suggested.    No other significant interval change.   Patient is a 64y old  Female who presents with a chief complaint of inability to tolerate PO    Followup for: ampullary adenoma, choledocholithiasis, abdominal pain  s/p ERCP CBD/PD stent, ampullectomy; post ercp pancreatitis    At bedside patient still reporting minimal PO intake with burning in throat when drinking. No change from prior to ERCP. Admits to reflux. Per daughter, patient having loose nonbloody stools and not taking in any PO. Minimal sips vitamin water zero.    MEDICATIONS  (STANDING):  ALPRAZolam 0.5 milliGRAM(s) Oral at bedtime  ARIPiprazole 2 milliGRAM(s) Oral daily  dextrose 5%. 1000 milliLiter(s) (100 mL/Hr) IV Continuous <Continuous>  dextrose 5%. 1000 milliLiter(s) (50 mL/Hr) IV Continuous <Continuous>  dextrose 50% Injectable 25 Gram(s) IV Push once  dextrose 50% Injectable 12.5 Gram(s) IV Push once  dextrose 50% Injectable 25 Gram(s) IV Push once  erythromycin     enteric coated 250 milliGRAM(s) Oral three times a day  escitalopram 10 milliGRAM(s) Oral daily  FIRST- Mouthwash  BLM 5 milliLiter(s) Swish and Swallow three times a day  fluticasone propionate/ salmeterol 250-50 MICROgram(s) Diskus 1 Dose(s) Inhalation two times a day  glucagon  Injectable 1 milliGRAM(s) IntraMuscular once  influenza   Vaccine 0.5 milliLiter(s) IntraMuscular once  insulin lispro (ADMELOG) corrective regimen sliding scale   SubCutaneous every 6 hours  lactated ringers. 1000 milliLiter(s) (100 mL/Hr) IV Continuous <Continuous>  metoclopramide 10 milliGRAM(s) Oral three times a day before meals  metoprolol succinate  milliGRAM(s) Oral daily  montelukast 10 milliGRAM(s) Oral at bedtime  pantoprazole    Tablet 40 milliGRAM(s) Oral before breakfast  rosuvastatin 10 milliGRAM(s) Oral at bedtime  sucralfate suspension 1 Gram(s) Oral once  sucralfate suspension 1 Gram(s) Oral <User Schedule>    MEDICATIONS  (PRN):  acetaminophen     Tablet .. 650 milliGRAM(s) Oral every 6 hours PRN Moderate Pain (4 - 6)  acetaminophen 325 mG/butalbital 50 mG/caffeine 40 mG 1 Tablet(s) Oral every 8 hours PRN migraine  albuterol/ipratropium for Nebulization 3 milliLiter(s) Nebulizer every 4 hours PRN Shortness of Breath and/or Wheezing  ALPRAZolam 0.5 milliGRAM(s) Oral three times a day PRN anxiety  aluminum hydroxide/magnesium hydroxide/simethicone Suspension 30 milliLiter(s) Oral every 4 hours PRN Dyspepsia  benzocaine/menthol Lozenge 1 Lozenge Oral every 4 hours PRN Sore Throat  dextrose Oral Gel 15 Gram(s) Oral once PRN Blood Glucose LESS THAN 70 milliGRAM(s)/deciliter  guaiFENesin Oral Liquid (Sugar-Free) 200 milliGRAM(s) Oral every 6 hours PRN Cough  HYDROmorphone  Injectable 1 milliGRAM(s) IV Push every 4 hours PRN Severe Pain (7 - 10)  HYDROmorphone  Injectable 0.5 milliGRAM(s) IV Push every 4 hours PRN Moderate Pain (4 - 6)  ondansetron Injectable 4 milliGRAM(s) IV Push every 6 hours PRN Nausea and/or Vomiting      Vital Signs Last 24 Hrs  T(C): 37 (18 Nov 2024 07:45), Max: 37.8 (18 Nov 2024 00:35)  T(F): 98.6 (18 Nov 2024 07:45), Max: 100 (18 Nov 2024 00:35)  HR: 120 (18 Nov 2024 09:15) (120 - 125)  BP: 108/72 (18 Nov 2024 07:45) (108/72 - 146/105)  BP(mean): 94 (18 Nov 2024 06:35) (94 - 94)  RR: 18 (18 Nov 2024 07:45) (16 - 20)  SpO2: 95% (18 Nov 2024 09:15) (94% - 97%)    Parameters below as of 18 Nov 2024 09:15  Patient On (Oxygen Delivery Method): nasal cannula, 3L        PHYSICAL EXAM:    Constitutional: No acute distress, frail, ill appearing, non-toxic appearing  HEENT: good phonation, not icteric  Neck: supple, no lymphadenopathy  Respiratory: unlabored  Gastrointestinal: soft, TTP b/l UQ, non-distended, +bowel sounds, no rebound or guarding  Extremities: No peripheral edema, no cyanosis or clubbing  Vascular: 2+ peripheral pulses, no venous stasis  Neurological: A/O x 3, no focal deficits, no asterixis  Psychiatric: Normal mood, normal affect  Skin: No rashes, not jaundiced    LABS:                        10.3   13.77 )-----------( 179      ( 18 Nov 2024 07:39 )             32.4     11-18    133[L]  |  103  |  9   ----------------------------<  177[H]  3.5   |  25  |  0.86    Ca    9.3      18 Nov 2024 07:39    TPro  7.6  /  Alb  2.7[L]  /  TBili  0.9  /  DBili  0.3  /  AST  17  /  ALT  29  /  AlkPhos  95  11-18      LIVER FUNCTIONS - ( 18 Nov 2024 07:39 )  Alb: 2.7 g/dL / Pro: 7.6 gm/dL / ALK PHOS: 95 U/L / ALT: 29 U/L / AST: 17 U/L / GGT: x             RADIOLOGY & ADDITIONAL STUDIES:  IMPRESSION:    Trace bilateral pleural effusions with atelectatic changes involving both   lower lobes, left greater than right, and the lingula. Findings may   represent developing consolidations. Correlate clinically for pneumonia.    Interval placement of stent within the mid/distal CBD with expected mild   to moderate pneumobilia. Interval placement of proximal main pancreatic   ductal stent.    Left upper quadrant fat stranding adjacent to the pancreatic tail raising   concern for acute pancreatitis. No fluid collection. Trace free fluid is   also seen in the left upper quadrant.    Bowel postoperative changes. Oral contrast is seen extending throughout   the bowel into the rectum without evidence of obstruction. No free air.    Underdistention and/or wall thickening of the urinary bladder, which may   represent cystitis. Correlate with urinalysis.    Pelvic congestion syndrome is suggested.    No other significant interval change.

## 2024-11-18 NOTE — PROGRESS NOTE ADULT - SUBJECTIVE AND OBJECTIVE BOX
CC: Patient is a 64y old  Female who presents with a chief complaint of inability to tolerate po (18 Nov 2024 11:22)      INTERVAL EVENTS: OZZY    SUBJECTIVE / INTERVAL HPI: Patient seen and examined at bedside.     ROS: negative unless otherwise stated above.    VITAL SIGNS:  Vital Signs Last 24 Hrs  T(C): 37.8 (18 Nov 2024 16:15), Max: 37.8 (18 Nov 2024 00:35)  T(F): 100 (18 Nov 2024 16:15), Max: 100 (18 Nov 2024 00:35)  HR: 118 (18 Nov 2024 16:15) (118 - 125)  BP: 96/53 (18 Nov 2024 16:15) (96/53 - 146/105)  BP(mean): 94 (18 Nov 2024 06:35) (94 - 94)  RR: 18 (18 Nov 2024 16:15) (16 - 20)  SpO2: 96% (18 Nov 2024 16:15) (94% - 97%)    Parameters below as of 18 Nov 2024 16:15  Patient On (Oxygen Delivery Method): nasal cannula  O2 Flow (L/min): 2          PHYSICAL EXAM:    General: NAD  HEENT: MMM  Neck: supple  Cardiovascular: +S1/S2; RRR  Respiratory: CTA B/L; no W/R/R  Gastrointestinal: soft, NT/ND  Extremities: WWP; no edema, clubbing or cyanosis  Vascular: 2+ radial, DP/PT pulses B/L  Neurological: AAOx3; no focal deficits    MEDICATIONS:  MEDICATIONS  (STANDING):  ALPRAZolam 0.5 milliGRAM(s) Oral at bedtime  apixaban 5 milliGRAM(s) Oral every 12 hours  ARIPiprazole 2 milliGRAM(s) Oral daily  aspirin  chewable 81 milliGRAM(s) Oral daily  azithromycin   Tablet 500 milliGRAM(s) Oral every 24 hours  cefTRIAXone Injectable. 1000 milliGRAM(s) IV Push every 24 hours  dextrose 5%. 1000 milliLiter(s) (100 mL/Hr) IV Continuous <Continuous>  dextrose 5%. 1000 milliLiter(s) (50 mL/Hr) IV Continuous <Continuous>  dextrose 50% Injectable 25 Gram(s) IV Push once  dextrose 50% Injectable 12.5 Gram(s) IV Push once  dextrose 50% Injectable 25 Gram(s) IV Push once  erythromycin     enteric coated 250 milliGRAM(s) Oral three times a day  escitalopram 10 milliGRAM(s) Oral daily  FIRST- Mouthwash  BLM 5 milliLiter(s) Swish and Swallow three times a day  fluticasone propionate/ salmeterol 250-50 MICROgram(s) Diskus 1 Dose(s) Inhalation two times a day  glucagon  Injectable 1 milliGRAM(s) IntraMuscular once  influenza   Vaccine 0.5 milliLiter(s) IntraMuscular once  insulin glargine Injectable (LANTUS) 8 Unit(s) SubCutaneous at bedtime  insulin lispro (ADMELOG) corrective regimen sliding scale   SubCutaneous every 6 hours  lactated ringers. 1000 milliLiter(s) (100 mL/Hr) IV Continuous <Continuous>  metoclopramide 10 milliGRAM(s) Oral three times a day before meals  metoprolol succinate  milliGRAM(s) Oral daily  montelukast 10 milliGRAM(s) Oral at bedtime  pantoprazole    Tablet 40 milliGRAM(s) Oral before breakfast  rosuvastatin 10 milliGRAM(s) Oral at bedtime  sucralfate suspension 1 Gram(s) Oral <User Schedule>    MEDICATIONS  (PRN):  acetaminophen     Tablet .. 650 milliGRAM(s) Oral every 6 hours PRN Moderate Pain (4 - 6)  acetaminophen 325 mG/butalbital 50 mG/caffeine 40 mG 1 Tablet(s) Oral every 8 hours PRN migraine  albuterol/ipratropium for Nebulization 3 milliLiter(s) Nebulizer every 4 hours PRN Shortness of Breath and/or Wheezing  ALPRAZolam 0.5 milliGRAM(s) Oral three times a day PRN anxiety  aluminum hydroxide/magnesium hydroxide/simethicone Suspension 30 milliLiter(s) Oral every 4 hours PRN Dyspepsia  benzocaine/menthol Lozenge 1 Lozenge Oral every 4 hours PRN Sore Throat  dextrose Oral Gel 15 Gram(s) Oral once PRN Blood Glucose LESS THAN 70 milliGRAM(s)/deciliter  guaiFENesin Oral Liquid (Sugar-Free) 200 milliGRAM(s) Oral every 6 hours PRN Cough  HYDROmorphone  Injectable 1 milliGRAM(s) IV Push every 4 hours PRN Severe Pain (7 - 10)  HYDROmorphone  Injectable 0.5 milliGRAM(s) IV Push every 4 hours PRN Moderate Pain (4 - 6)  ondansetron Injectable 4 milliGRAM(s) IV Push every 6 hours PRN Nausea and/or Vomiting      ALLERGIES:  Allergies    No Known Allergies    Intolerances        LABS:                        10.3   13.77 )-----------( 179      ( 18 Nov 2024 07:39 )             32.4     11-18    133[L]  |  103  |  9   ----------------------------<  177[H]  3.5   |  25  |  0.86    Ca    9.3      18 Nov 2024 07:39    TPro  7.6  /  Alb  2.7[L]  /  TBili  0.9  /  DBili  0.3  /  AST  17  /  ALT  29  /  AlkPhos  95  11-18      Urinalysis Basic - ( 18 Nov 2024 07:39 )    Color: x / Appearance: x / SG: x / pH: x  Gluc: 177 mg/dL / Ketone: x  / Bili: x / Urobili: x   Blood: x / Protein: x / Nitrite: x   Leuk Esterase: x / RBC: x / WBC x   Sq Epi: x / Non Sq Epi: x / Bacteria: x      CAPILLARY BLOOD GLUCOSE      POCT Blood Glucose.: 219 mg/dL (18 Nov 2024 16:38)      RADIOLOGY & ADDITIONAL TESTS: Reviewed.

## 2024-11-18 NOTE — CHART NOTE - NSCHARTNOTEFT_GEN_A_CORE
CT A/P    PRELIMINARY FINDINGS/  IMPRESSION:    Trace bilateral pleural effusions with atelectatic changes involving both   lower lobes, left greater than right, and the lingula. Findings may   represent developing consolidations. Correlate clinically for pneumonia.    Interval placement of stent within the mid/distal CBD with expected mild   to moderate pneumobilia. Interval placement of proximal main pancreatic   ductal stent.    Left upper quadrant fat stranding adjacent to the pancreatic tail raising   concern for acute pancreatitis. No fluid collection. Trace free fluid is   also seen in the left upper quadrant.    Bowel postoperative changes. Oral contrast is seen extending throughout   the bowel into the rectum without evidence of obstruction. No free air.    Underdistention and/or wall thickening of the urinary bladder, which may   represent cystitis. Correlate with urinalysis.    Pelvic congestion syndrome is suggested.    No other significant interval change.

## 2024-11-18 NOTE — PROGRESS NOTE ADULT - ASSESSMENT
64 year old woman with poor oral intake and epigastric pain with likely gastroparesis s/p TPN & removal due to hyperglycemia now s/p ercp with pancreatitis    #sepsis  patient with sepsis 11/17  elevated wbc & tachycardia  low grade temps  likely due to pancreatits - see below problem  however coughing with questionable developing pnas on ct scan  will cover for cap tx - although pt has been in hospital > 2 week s-- can broaden for hap if does not improve  -ctx azithro  -f/u urine strep legionella    #pancreatitis  patient with pancreatitis s/p ercp 11/15   seen on ct imaging with elevated lipase and worsening abdominal pain  on LR @ 100 cc/hr with dilaudid prns for pain   examined after Dilaudid with improvement  patient states she will not try to take po   remains tachycardic - expect improvement with pain and fluid resuscitation   -c/w fluids, pain control  -f/u gi recs    #Intractable nausea with poor oral intake likely due to Gastroparesis exacerbated by GLP-1  - h/o colectomy with iliorectal anastomosis for IBD  - EGD/colonoscopy --> gastritis, esophagitis, c/w PPI  -Gastroparesis clinically diagnosed as she was unable to complete Gastric emptying study on admission due to vomiting   -c/w Reglan  10mg TID +Erythromycin 250mg po Tid  -was on TPN for the past week; TPN stopped 11.16 due to persistent Hyperglycemia (despite Lantus 25u)   -clear liquid diet   -plan for possible gj tube later this week with expected improvement of above sx    #Choledocholithiasis:  s/p ERCP and stent placement last week   s/p repeat ERCP on 11.15 for persistent  14mm CBD stone ; CBD and Pancreatic duct cleared, sphincterotomy and metal stent placed  CT with pancreatitis suspected  see above problems    #Asthma exacerbation: resolved no wheezing today  s/p Solumedrol IV x 1  Albuterol/Atrovent nebs q 4hrs  c/w Advair, Singulair  Mucinex  CXray noted: no Pna, clear lungs     # DM type II: uncontrolled due to TPN  TPN stopped  restarted lantus for tonight as blood glucose remain elevated    # Moderate protein-calorie malnutrition:   poor oral intake   - started on TPN 11/8 via PICC line   - TPN stopped due to elevated BGMs    #Anxiety:   c/w Lexapro, c/w  Xanax prn Tid  On Abilify 5mg [place] for the past month, now dose lowered to 2mg /day.  Psych evaluation noted: c/w Abilify low dose for now     #Paroxymal afib / HTN / CAD:  - off lisinopril due to borderline BP  Eliquis on hold for procedure; will restart today   - s/p 5 stents last were 2 years ago  - will start asp for now hold plavix with possible gj tube plan  - restart eliquis today can transition to hep tomm pre procedure     #prophylactic measure  F: LR @ 100/hr   E: replete as needed  N: cld  DVT ppx: eliquis

## 2024-11-19 LAB
ANION GAP SERPL CALC-SCNC: 4 MMOL/L — LOW (ref 5–17)
APTT BLD: 31.6 SEC — SIGNIFICANT CHANGE UP (ref 24.5–35.6)
APTT BLD: 35.1 SEC — SIGNIFICANT CHANGE UP (ref 24.5–35.6)
BUN SERPL-MCNC: 11 MG/DL — SIGNIFICANT CHANGE UP (ref 7–23)
CALCIUM SERPL-MCNC: 8.6 MG/DL — SIGNIFICANT CHANGE UP (ref 8.5–10.1)
CHLORIDE SERPL-SCNC: 104 MMOL/L — SIGNIFICANT CHANGE UP (ref 96–108)
CO2 SERPL-SCNC: 28 MMOL/L — SIGNIFICANT CHANGE UP (ref 22–31)
CREAT SERPL-MCNC: 0.76 MG/DL — SIGNIFICANT CHANGE UP (ref 0.5–1.3)
EGFR: 89 ML/MIN/1.73M2 — SIGNIFICANT CHANGE UP
GLUCOSE BLDC GLUCOMTR-MCNC: 150 MG/DL — HIGH (ref 70–99)
GLUCOSE BLDC GLUCOMTR-MCNC: 178 MG/DL — HIGH (ref 70–99)
GLUCOSE BLDC GLUCOMTR-MCNC: 218 MG/DL — HIGH (ref 70–99)
GLUCOSE BLDC GLUCOMTR-MCNC: 240 MG/DL — HIGH (ref 70–99)
GLUCOSE SERPL-MCNC: 235 MG/DL — HIGH (ref 70–99)
HCT VFR BLD CALC: 28.1 % — LOW (ref 34.5–45)
HCT VFR BLD CALC: 28.1 % — LOW (ref 34.5–45)
HGB BLD-MCNC: 8.8 G/DL — LOW (ref 11.5–15.5)
HGB BLD-MCNC: 8.9 G/DL — LOW (ref 11.5–15.5)
INR BLD: 1.4 RATIO — HIGH (ref 0.85–1.16)
MCHC RBC-ENTMCNC: 28.4 PG — SIGNIFICANT CHANGE UP (ref 27–34)
MCHC RBC-ENTMCNC: 28.7 PG — SIGNIFICANT CHANGE UP (ref 27–34)
MCHC RBC-ENTMCNC: 31.3 G/DL — LOW (ref 32–36)
MCHC RBC-ENTMCNC: 31.7 G/DL — LOW (ref 32–36)
MCV RBC AUTO: 90.6 FL — SIGNIFICANT CHANGE UP (ref 80–100)
MCV RBC AUTO: 90.6 FL — SIGNIFICANT CHANGE UP (ref 80–100)
PLATELET # BLD AUTO: 155 K/UL — SIGNIFICANT CHANGE UP (ref 150–400)
PLATELET # BLD AUTO: 170 K/UL — SIGNIFICANT CHANGE UP (ref 150–400)
POTASSIUM SERPL-MCNC: 4 MMOL/L — SIGNIFICANT CHANGE UP (ref 3.5–5.3)
POTASSIUM SERPL-SCNC: 4 MMOL/L — SIGNIFICANT CHANGE UP (ref 3.5–5.3)
PROTHROM AB SERPL-ACNC: 16.1 SEC — HIGH (ref 9.9–13.4)
RBC # BLD: 3.1 M/UL — LOW (ref 3.8–5.2)
RBC # BLD: 3.1 M/UL — LOW (ref 3.8–5.2)
RBC # FLD: 15.1 % — HIGH (ref 10.3–14.5)
RBC # FLD: 15.3 % — HIGH (ref 10.3–14.5)
SODIUM SERPL-SCNC: 136 MMOL/L — SIGNIFICANT CHANGE UP (ref 135–145)
WBC # BLD: 10.52 K/UL — HIGH (ref 3.8–10.5)
WBC # BLD: 9.56 K/UL — SIGNIFICANT CHANGE UP (ref 3.8–10.5)
WBC # FLD AUTO: 10.52 K/UL — HIGH (ref 3.8–10.5)
WBC # FLD AUTO: 9.56 K/UL — SIGNIFICANT CHANGE UP (ref 3.8–10.5)

## 2024-11-19 PROCEDURE — 99233 SBSQ HOSP IP/OBS HIGH 50: CPT

## 2024-11-19 RX ORDER — HEPARIN SODIUM,PORCINE 1000/ML
4000 VIAL (ML) INJECTION EVERY 6 HOURS
Refills: 0 | Status: DISCONTINUED | OUTPATIENT
Start: 2024-11-19 | End: 2024-11-22

## 2024-11-19 RX ORDER — HYDROMORPHONE HYDROCHLORIDE 2 MG/1
0.5 TABLET ORAL ONCE
Refills: 0 | Status: DISCONTINUED | OUTPATIENT
Start: 2024-11-19 | End: 2024-11-19

## 2024-11-19 RX ORDER — HEPARIN SODIUM,PORCINE 1000/ML
4000 VIAL (ML) INJECTION ONCE
Refills: 0 | Status: DISCONTINUED | OUTPATIENT
Start: 2024-11-19 | End: 2024-11-19

## 2024-11-19 RX ORDER — HEPARIN SODIUM,PORCINE 1000/ML
2000 VIAL (ML) INJECTION EVERY 6 HOURS
Refills: 0 | Status: DISCONTINUED | OUTPATIENT
Start: 2024-11-19 | End: 2024-11-22

## 2024-11-19 RX ORDER — HEPARIN SODIUM,PORCINE 1000/ML
VIAL (ML) INJECTION
Qty: 25000 | Refills: 0 | Status: DISCONTINUED | OUTPATIENT
Start: 2024-11-19 | End: 2024-11-22

## 2024-11-19 RX ADMIN — MONTELUKAST SODIUM 10 MILLIGRAM(S): 10 TABLET ORAL at 20:56

## 2024-11-19 RX ADMIN — Medication 4: at 20:55

## 2024-11-19 RX ADMIN — ARIPIPRAZOLE 2 MILLIGRAM(S): 15 TABLET ORAL at 09:32

## 2024-11-19 RX ADMIN — HYDROMORPHONE HYDROCHLORIDE 0.5 MILLIGRAM(S): 2 TABLET ORAL at 23:34

## 2024-11-19 RX ADMIN — Medication 4000 UNIT(S): at 23:16

## 2024-11-19 RX ADMIN — DIPHENHYDRAMINE HYDROCHLORIDE AND LIDOCAINE HYDROCHLORIDE AND ALUMINUM HYDROXIDE AND MAGNESIUM HYDRO 5 MILLILITER(S): KIT at 15:54

## 2024-11-19 RX ADMIN — Medication 1000 MILLIGRAM(S): at 15:52

## 2024-11-19 RX ADMIN — METOCLOPRAMIDE HYDROCHLORIDE 10 MILLIGRAM(S): 10 TABLET ORAL at 05:03

## 2024-11-19 RX ADMIN — METOCLOPRAMIDE HYDROCHLORIDE 10 MILLIGRAM(S): 10 TABLET ORAL at 09:41

## 2024-11-19 RX ADMIN — Medication 1000 UNIT(S)/HR: at 19:17

## 2024-11-19 RX ADMIN — HYDROMORPHONE HYDROCHLORIDE 0.5 MILLIGRAM(S): 2 TABLET ORAL at 11:41

## 2024-11-19 RX ADMIN — Medication 0.5 MILLIGRAM(S): at 13:29

## 2024-11-19 RX ADMIN — HYDROMORPHONE HYDROCHLORIDE 0.5 MILLIGRAM(S): 2 TABLET ORAL at 08:48

## 2024-11-19 RX ADMIN — HYDROMORPHONE HYDROCHLORIDE 1 MILLIGRAM(S): 2 TABLET ORAL at 03:55

## 2024-11-19 RX ADMIN — ROSUVASTATIN CALCIUM 10 MILLIGRAM(S): 5 TABLET, FILM COATED ORAL at 20:56

## 2024-11-19 RX ADMIN — Medication 1000 UNIT(S)/HR: at 15:55

## 2024-11-19 RX ADMIN — SUCRALFATE 1 GRAM(S): 1 TABLET ORAL at 09:41

## 2024-11-19 RX ADMIN — HYDROMORPHONE HYDROCHLORIDE 0.5 MILLIGRAM(S): 2 TABLET ORAL at 19:18

## 2024-11-19 RX ADMIN — Medication 0.5 MILLIGRAM(S): at 20:56

## 2024-11-19 RX ADMIN — SUCRALFATE 1 GRAM(S): 1 TABLET ORAL at 15:52

## 2024-11-19 RX ADMIN — APIXABAN 5 MILLIGRAM(S): 2.5 TABLET, FILM COATED ORAL at 09:32

## 2024-11-19 RX ADMIN — FLUTICASONE PROPIONATE AND SALMETEROL XINAFOATE 1 DOSE(S): 45; 21 AEROSOL, METERED RESPIRATORY (INHALATION) at 09:50

## 2024-11-19 RX ADMIN — INSULIN GLARGINE 8 UNIT(S): 100 INJECTION, SOLUTION SUBCUTANEOUS at 20:55

## 2024-11-19 RX ADMIN — Medication 250 MILLIGRAM(S): at 15:52

## 2024-11-19 RX ADMIN — ACETAMINOPHEN 500MG 650 MILLIGRAM(S): 500 TABLET, COATED ORAL at 05:03

## 2024-11-19 RX ADMIN — Medication 115 MILLILITER(S): at 03:56

## 2024-11-19 RX ADMIN — ONDANSETRON HYDROCHLORIDE 4 MILLIGRAM(S): 4 TABLET, FILM COATED ORAL at 13:29

## 2024-11-19 RX ADMIN — Medication 81 MILLIGRAM(S): at 09:32

## 2024-11-19 RX ADMIN — ESCITALOPRAM OXALATE 10 MILLIGRAM(S): 10 TABLET, FILM COATED ORAL at 09:31

## 2024-11-19 RX ADMIN — SUCRALFATE 1 GRAM(S): 1 TABLET ORAL at 05:02

## 2024-11-19 RX ADMIN — Medication 250 MILLIGRAM(S): at 20:56

## 2024-11-19 RX ADMIN — Medication 1200 UNIT(S)/HR: at 23:11

## 2024-11-19 RX ADMIN — Medication 4: at 08:48

## 2024-11-19 RX ADMIN — Medication 2: at 12:54

## 2024-11-19 RX ADMIN — FLUTICASONE PROPIONATE AND SALMETEROL XINAFOATE 1 DOSE(S): 45; 21 AEROSOL, METERED RESPIRATORY (INHALATION) at 20:41

## 2024-11-19 RX ADMIN — METOCLOPRAMIDE HYDROCHLORIDE 10 MILLIGRAM(S): 10 TABLET ORAL at 15:53

## 2024-11-19 RX ADMIN — ACETAMINOPHEN 500MG 650 MILLIGRAM(S): 500 TABLET, COATED ORAL at 17:25

## 2024-11-19 RX ADMIN — PANTOPRAZOLE SODIUM 40 MILLIGRAM(S): 40 TABLET, DELAYED RELEASE ORAL at 05:03

## 2024-11-19 RX ADMIN — METOPROLOL TARTRATE 100 MILLIGRAM(S): 100 TABLET, FILM COATED ORAL at 09:31

## 2024-11-19 RX ADMIN — SUCRALFATE 1 GRAM(S): 1 TABLET ORAL at 20:55

## 2024-11-19 RX ADMIN — Medication 250 MILLIGRAM(S): at 05:02

## 2024-11-19 RX ADMIN — Medication 115 MILLILITER(S): at 19:19

## 2024-11-19 NOTE — PROGRESS NOTE ADULT - TIME BILLING
Time spent  coordinating the patient's care. This includes reviewing documentation pertinent to this admission, results and imaging. Further tests, medications, and procedures have been ordered as indicated. Laboratory results and the plan of care were communicated to the patient. Discussed care plan with consultants including GI. Supporting documentation was completed and added to the patient's chart.
30
Time spent  coordinating the patient's care. This includes reviewing documentation pertinent to this admission, results and imaging. Further tests, medications, and procedures have been ordered as indicated. Laboratory results and the plan of care were communicated to the patient. Discussed care plan with consultants including cardio . Supporting documentation was completed and added to the patient's chart.
40
30
30
Time spent  coordinating the patient's care. This includes reviewing documentation pertinent to this admission, results and imaging. Further tests, medications, and procedures have been ordered as indicated. Laboratory results and the plan of care were communicated to the patient. Discussed care plan with consultants including GI . Supporting documentation was completed and added to the patient's chart.
Time spent  coordinating the patient's care. This includes reviewing documentation pertinent to this admission, results and imaging. Further tests, medications, and procedures have been ordered as indicated. Laboratory results and the plan of care were communicated to the patient. Discussed care plan with consultants including GI. Supporting documentation was completed and added to the patient's chart.
Time spent  coordinating the patient's care. This includes reviewing documentation pertinent to this admission, results and imaging. Further tests, medications, and procedures have been ordered as indicated. Laboratory results and the plan of care were communicated to the patient. Discussed care plan with consultants including GI. Supporting documentation was completed and added to the patient's chart.

## 2024-11-19 NOTE — PROGRESS NOTE ADULT - ASSESSMENT
64 year old woman with poor oral intake and epigastric pain with likely gastroparesis s/p TPN & removal due to hyperglycemia now s/p ercp with pancreatitis    #sepsis  patient with sepsis 11/17  elevated wbc & tachycardia  low grade temps now improving   likely due to pancreatitis - see below problem  however coughing with questionable developing pnas on ct scan no urinary sx   will cover for cap tx - although pt has been in hospital > 2 week s-- can broaden for hap if does not improve  -ctx 3d total - patient improved on it already azithro difficult covg as patient on erythro - f/u legionella of urine  -f/u urine strep legionella    #pancreatitis  patient with pancreatitis s/p ercp 11/15   seen on ct imaging with elevated lipase and worsening abdominal pain  on LR @ 115 cc/hr - had to uptitrate and pain controlled with dilaudids   examined after Dilaudid with improvement  improvement of tachycardia   -c/w fluids, pain control  -f/u gi recs    #Intractable nausea with poor oral intake likely due to Gastroparesis exacerbated by GLP-1  - h/o colectomy with iliorectal anastomosis for IBD  - EGD/colonoscopy --> gastritis, esophagitis, c/w PPI  -Gastroparesis clinically diagnosed as she was unable to complete Gastric emptying study on admission due to vomiting   -c/w Reglan  10mg TID +Erythromycin 250mg po Tid  -was on TPN for the past week; TPN stopped 11.16 due to persistent Hyperglycemia (despite Lantus 25u)   -advanced diet today   -plan for possible gj tube later this week with expected improvement of above sx however improved po intake today - will monitor for next 24-48 hours to see if meeting adequate calories,     #Choledocholithiasis:  s/p ERCP and stent placement last week   s/p repeat ERCP on 11.15 for persistent  14mm CBD stone ; CBD and Pancreatic duct cleared, sphincterotomy and metal stent placed  CT with pancreatitis suspected  see above problems    #Asthma exacerbation: resolved no wheezing today  s/p Solumedrol IV x 1  Albuterol/Atrovent nebs q 4hrs  c/w Advair, Singulair  Mucinex  CXray noted: no Pna, clear lungs     # DM type II: uncontrolled due to TPN  TPN stopped  restarted lantus for tonight as blood glucose remain elevated    # Moderate protein-calorie malnutrition:   improved po intake today - will monitor for next 24-48 hours to see if meeting adequate calories, discussed with nutrition and gi   - started on TPN 11/8 via PICC line   - TPN stopped due to elevated BGMs    #Anxiety:   c/w Lexapro, c/w  Xanax prn Tid  On Abilify 5mg lawrence for the past month, now dose lowered to 2mg /day.  Psych evaluation noted: c/w Abilify low dose for now     #Paroxymal afib / HTN / CAD:  - off lisinopril due to borderline BP  Eliquis on hold for procedure; will restart today   - s/p 5 stents last were 2 years ago  - will start asp for now hold plavix with possible gj tube plan  - heparin drip     #prophylactic measure  F: LR @ 115/hr   E: replete as needed  N: reg  DVT ppx: heparin

## 2024-11-19 NOTE — PROGRESS NOTE ADULT - SUBJECTIVE AND OBJECTIVE BOX
Interval History:  Pt still complaining of pain and Nausea after the ERCP  MEDICATIONS  (STANDING):  ALPRAZolam 0.5 milliGRAM(s) Oral at bedtime  ARIPiprazole 2 milliGRAM(s) Oral daily  aspirin  chewable 81 milliGRAM(s) Oral daily  cefTRIAXone Injectable. 1000 milliGRAM(s) IV Push every 24 hours  dextrose 5%. 1000 milliLiter(s) (100 mL/Hr) IV Continuous <Continuous>  dextrose 5%. 1000 milliLiter(s) (50 mL/Hr) IV Continuous <Continuous>  dextrose 50% Injectable 25 Gram(s) IV Push once  dextrose 50% Injectable 12.5 Gram(s) IV Push once  dextrose 50% Injectable 25 Gram(s) IV Push once  erythromycin     enteric coated 250 milliGRAM(s) Oral three times a day  escitalopram 10 milliGRAM(s) Oral daily  FIRST- Mouthwash  BLM 5 milliLiter(s) Swish and Swallow three times a day  fluticasone propionate/ salmeterol 250-50 MICROgram(s) Diskus 1 Dose(s) Inhalation two times a day  glucagon  Injectable 1 milliGRAM(s) IntraMuscular once  heparin  Infusion.  Unit(s)/Hr (10 mL/Hr) IV Continuous <Continuous>  influenza   Vaccine 0.5 milliLiter(s) IntraMuscular once  insulin glargine Injectable (LANTUS) 8 Unit(s) SubCutaneous at bedtime  insulin lispro (ADMELOG) corrective regimen sliding scale   SubCutaneous Before meals and at bedtime  lactated ringers. 1000 milliLiter(s) (115 mL/Hr) IV Continuous <Continuous>  metoclopramide 10 milliGRAM(s) Oral three times a day before meals  metoprolol succinate  milliGRAM(s) Oral daily  montelukast 10 milliGRAM(s) Oral at bedtime  pantoprazole    Tablet 40 milliGRAM(s) Oral before breakfast  rosuvastatin 10 milliGRAM(s) Oral at bedtime  sucralfate suspension 1 Gram(s) Oral <User Schedule>    MEDICATIONS  (PRN):  acetaminophen     Tablet .. 650 milliGRAM(s) Oral every 6 hours PRN Moderate Pain (4 - 6)  acetaminophen 325 mG/butalbital 50 mG/caffeine 40 mG 1 Tablet(s) Oral every 8 hours PRN migraine  albuterol/ipratropium for Nebulization 3 milliLiter(s) Nebulizer every 4 hours PRN Shortness of Breath and/or Wheezing  ALPRAZolam 0.5 milliGRAM(s) Oral three times a day PRN anxiety  aluminum hydroxide/magnesium hydroxide/simethicone Suspension 30 milliLiter(s) Oral every 4 hours PRN Dyspepsia  benzocaine/menthol Lozenge 1 Lozenge Oral every 4 hours PRN Sore Throat  dextrose Oral Gel 15 Gram(s) Oral once PRN Blood Glucose LESS THAN 70 milliGRAM(s)/deciliter  guaiFENesin Oral Liquid (Sugar-Free) 200 milliGRAM(s) Oral every 6 hours PRN Cough  heparin   Injectable 4000 Unit(s) IV Push every 6 hours PRN For aPTT less than 40  heparin   Injectable 2000 Unit(s) IV Push every 6 hours PRN For aPTT between 40 - 57  HYDROmorphone  Injectable 1 milliGRAM(s) IV Push every 4 hours PRN Severe Pain (7 - 10)  HYDROmorphone  Injectable 0.5 milliGRAM(s) IV Push every 4 hours PRN Moderate Pain (4 - 6)  ondansetron Injectable 4 milliGRAM(s) IV Push every 6 hours PRN Nausea and/or Vomiting      Daily     Daily Weight in k.4 (2024 07:59)  BMI: 20.6 (11-13 @ 08:07)  Change in Weight:  Vital Signs Last 24 Hrs  T(C): 37.2 (2024 16:44), Max: 37.7 (2024 20:30)  T(F): 99 (2024 16:44), Max: 99.9 (2024 20:30)  HR: 115 (2024 16:44) (98 - 117)  BP: 117/71 (2024 16:44) (98/63 - 117/71)  BP(mean): --  RR: 17 (2024 16:44) (17 - 17)  SpO2: 95% (2024 16:44) (94% - 98%)    Parameters below as of 2024 16:44  Patient On (Oxygen Delivery Method): nasal cannula      I&O's Detail    2024 07:01  -  2024 07:00  --------------------------------------------------------  IN:    Lactated Ringers: 1000 mL  Total IN: 1000 mL    OUT:  Total OUT: 0 mL    Total NET: 1000 mL          PHYSICAL EXAM  General:  Well developed, well nourished, alert and active, no pallor, NAD.  HEENT:    Normal appearance of conjunctiva, ears, nose, lips, oropharynx, and oral mucosa, anicteric.  Neck:  No masses, no asymmetry.  Lymph Nodes:  No lymphadenopathy.   Cardiovascular:  RRR normal S1/S2, no murmur.  Respiratory:  CTA B/L, normal respiratory effort.   Abdominal:   soft, non specific tendernessrExtremities:   No clubbing or cyanosis, normal capillary refill, no edema.   Skin:   No rash, jaundice, lesions, eczema.   Musculoskeletal:  No joint swelling, erythema or tenderness.   Other:     Lab Results:                        8.9    10.52 )-----------( 155      ( 2024 08:01 )             28.1     -    136  |  104  |  11  ----------------------------<  235[H]  4.0   |  28  |  0.76    Ca    8.6      2024 08:01    TPro  7.6  /  Alb  2.7[L]  /  TBili  0.9  /  DBili  0.3  /  AST  17  /  ALT  29  /  AlkPhos  95  11-18    LIVER FUNCTIONS - ( 2024 07:39 )  Alb: 2.7 g/dL / Pro: 7.6 gm/dL / ALK PHOS: 95 U/L / ALT: 29 U/L / AST: 17 U/L / GGT: x           PT/INR - ( 2024 12:12 )   PT: 16.1 sec;   INR: 1.40 ratio         PTT - ( 2024 12:12 )  PTT:31.6 sec      Stool Results:          RADIOLOGY RESULTS:    SURGICAL PATHOLOGY:

## 2024-11-19 NOTE — PROGRESS NOTE ADULT - SUBJECTIVE AND OBJECTIVE BOX
CC: Patient is a 64y old  Female who presents with a chief complaint of inability to tolerate po (18 Nov 2024 16:34)      INTERVAL EVENTS: OZZY    SUBJECTIVE / INTERVAL HPI: Patient seen and examined at bedside. Patient states shes having the same pain as yesterday but has been able to keep food down    ROS: negative unless otherwise stated above.    VITAL SIGNS:  Vital Signs Last 24 Hrs  T(C): 36.8 (19 Nov 2024 07:59), Max: 37.8 (18 Nov 2024 16:15)  T(F): 98.3 (19 Nov 2024 07:59), Max: 100 (18 Nov 2024 16:15)  HR: 113 (19 Nov 2024 07:59) (98 - 118)  BP: 108/66 (19 Nov 2024 07:59) (96/53 - 111/66)  BP(mean): --  RR: 17 (19 Nov 2024 07:59) (17 - 18)  SpO2: 98% (19 Nov 2024 07:59) (94% - 98%)    Parameters below as of 19 Nov 2024 07:59  Patient On (Oxygen Delivery Method): nasal cannula          11-18-24 @ 07:01  -  11-19-24 @ 07:00  --------------------------------------------------------  IN: 1000 mL / OUT: 0 mL / NET: 1000 mL        PHYSICAL EXAM:    General: NAD  HEENT: MMM  Neck: supple  Cardiovascular: +S1/S2; RRR  Respiratory: CTA B/L; no W/R/R  Gastrointestinal: soft, distended, mildly tender diffusely  Extremities: WWP; no edema, clubbing or cyanosis  Vascular: 2+ radial, DP/PT pulses B/L  Neurological: AAOx3; no focal deficits    MEDICATIONS:  MEDICATIONS  (STANDING):  ALPRAZolam 0.5 milliGRAM(s) Oral at bedtime  ARIPiprazole 2 milliGRAM(s) Oral daily  aspirin  chewable 81 milliGRAM(s) Oral daily  cefTRIAXone Injectable. 1000 milliGRAM(s) IV Push every 24 hours  dextrose 5%. 1000 milliLiter(s) (100 mL/Hr) IV Continuous <Continuous>  dextrose 5%. 1000 milliLiter(s) (50 mL/Hr) IV Continuous <Continuous>  dextrose 50% Injectable 25 Gram(s) IV Push once  dextrose 50% Injectable 12.5 Gram(s) IV Push once  dextrose 50% Injectable 25 Gram(s) IV Push once  erythromycin     enteric coated 250 milliGRAM(s) Oral three times a day  escitalopram 10 milliGRAM(s) Oral daily  FIRST- Mouthwash  BLM 5 milliLiter(s) Swish and Swallow three times a day  fluticasone propionate/ salmeterol 250-50 MICROgram(s) Diskus 1 Dose(s) Inhalation two times a day  glucagon  Injectable 1 milliGRAM(s) IntraMuscular once  heparin   Injectable 4000 Unit(s) IV Push once  heparin  Infusion.  Unit(s)/Hr (10 mL/Hr) IV Continuous <Continuous>  influenza   Vaccine 0.5 milliLiter(s) IntraMuscular once  insulin glargine Injectable (LANTUS) 8 Unit(s) SubCutaneous at bedtime  insulin lispro (ADMELOG) corrective regimen sliding scale   SubCutaneous Before meals and at bedtime  lactated ringers. 1000 milliLiter(s) (115 mL/Hr) IV Continuous <Continuous>  metoclopramide 10 milliGRAM(s) Oral three times a day before meals  metoprolol succinate  milliGRAM(s) Oral daily  montelukast 10 milliGRAM(s) Oral at bedtime  pantoprazole    Tablet 40 milliGRAM(s) Oral before breakfast  rosuvastatin 10 milliGRAM(s) Oral at bedtime  sucralfate suspension 1 Gram(s) Oral <User Schedule>    MEDICATIONS  (PRN):  acetaminophen     Tablet .. 650 milliGRAM(s) Oral every 6 hours PRN Moderate Pain (4 - 6)  acetaminophen 325 mG/butalbital 50 mG/caffeine 40 mG 1 Tablet(s) Oral every 8 hours PRN migraine  albuterol/ipratropium for Nebulization 3 milliLiter(s) Nebulizer every 4 hours PRN Shortness of Breath and/or Wheezing  ALPRAZolam 0.5 milliGRAM(s) Oral three times a day PRN anxiety  aluminum hydroxide/magnesium hydroxide/simethicone Suspension 30 milliLiter(s) Oral every 4 hours PRN Dyspepsia  benzocaine/menthol Lozenge 1 Lozenge Oral every 4 hours PRN Sore Throat  dextrose Oral Gel 15 Gram(s) Oral once PRN Blood Glucose LESS THAN 70 milliGRAM(s)/deciliter  guaiFENesin Oral Liquid (Sugar-Free) 200 milliGRAM(s) Oral every 6 hours PRN Cough  heparin   Injectable 4000 Unit(s) IV Push every 6 hours PRN For aPTT less than 40  heparin   Injectable 2000 Unit(s) IV Push every 6 hours PRN For aPTT between 40 - 57  HYDROmorphone  Injectable 1 milliGRAM(s) IV Push every 4 hours PRN Severe Pain (7 - 10)  HYDROmorphone  Injectable 0.5 milliGRAM(s) IV Push every 4 hours PRN Moderate Pain (4 - 6)  ondansetron Injectable 4 milliGRAM(s) IV Push every 6 hours PRN Nausea and/or Vomiting      ALLERGIES:  Allergies    No Known Allergies    Intolerances        LABS:                        8.9    10.52 )-----------( 155      ( 19 Nov 2024 08:01 )             28.1     11-19    136  |  104  |  11  ----------------------------<  235[H]  4.0   |  28  |  0.76    Ca    8.6      19 Nov 2024 08:01    TPro  7.6  /  Alb  2.7[L]  /  TBili  0.9  /  DBili  0.3  /  AST  17  /  ALT  29  /  AlkPhos  95  11-18    PT/INR - ( 19 Nov 2024 12:12 )   PT: 16.1 sec;   INR: 1.40 ratio         PTT - ( 19 Nov 2024 12:12 )  PTT:31.6 sec  Urinalysis Basic - ( 19 Nov 2024 08:01 )    Color: x / Appearance: x / SG: x / pH: x  Gluc: 235 mg/dL / Ketone: x  / Bili: x / Urobili: x   Blood: x / Protein: x / Nitrite: x   Leuk Esterase: x / RBC: x / WBC x   Sq Epi: x / Non Sq Epi: x / Bacteria: x      CAPILLARY BLOOD GLUCOSE      POCT Blood Glucose.: 178 mg/dL (19 Nov 2024 11:52)      RADIOLOGY & ADDITIONAL TESTS: Reviewed.

## 2024-11-20 LAB
ANION GAP SERPL CALC-SCNC: 5 MMOL/L — SIGNIFICANT CHANGE UP (ref 5–17)
APTT BLD: 45 SEC — HIGH (ref 24.5–35.6)
APTT BLD: 54 SEC — HIGH (ref 24.5–35.6)
BUN SERPL-MCNC: 6 MG/DL — LOW (ref 7–23)
CALCIUM SERPL-MCNC: 8.4 MG/DL — LOW (ref 8.5–10.1)
CHLORIDE SERPL-SCNC: 103 MMOL/L — SIGNIFICANT CHANGE UP (ref 96–108)
CO2 SERPL-SCNC: 26 MMOL/L — SIGNIFICANT CHANGE UP (ref 22–31)
CREAT SERPL-MCNC: 0.65 MG/DL — SIGNIFICANT CHANGE UP (ref 0.5–1.3)
EGFR: 99 ML/MIN/1.73M2 — SIGNIFICANT CHANGE UP
GLUCOSE BLDC GLUCOMTR-MCNC: 185 MG/DL — HIGH (ref 70–99)
GLUCOSE BLDC GLUCOMTR-MCNC: 267 MG/DL — HIGH (ref 70–99)
GLUCOSE SERPL-MCNC: 188 MG/DL — HIGH (ref 70–99)
HCT VFR BLD CALC: 24 % — LOW (ref 34.5–45)
HGB BLD-MCNC: 7.8 G/DL — LOW (ref 11.5–15.5)
MCHC RBC-ENTMCNC: 29.1 PG — SIGNIFICANT CHANGE UP (ref 27–34)
MCHC RBC-ENTMCNC: 32.5 G/DL — SIGNIFICANT CHANGE UP (ref 32–36)
MCV RBC AUTO: 89.6 FL — SIGNIFICANT CHANGE UP (ref 80–100)
PLATELET # BLD AUTO: 154 K/UL — SIGNIFICANT CHANGE UP (ref 150–400)
POTASSIUM SERPL-MCNC: 3.3 MMOL/L — LOW (ref 3.5–5.3)
POTASSIUM SERPL-SCNC: 3.3 MMOL/L — LOW (ref 3.5–5.3)
RBC # BLD: 2.68 M/UL — LOW (ref 3.8–5.2)
RBC # FLD: 15 % — HIGH (ref 10.3–14.5)
SODIUM SERPL-SCNC: 134 MMOL/L — LOW (ref 135–145)
SURGICAL PATHOLOGY STUDY: SIGNIFICANT CHANGE UP
WBC # BLD: 7.82 K/UL — SIGNIFICANT CHANGE UP (ref 3.8–10.5)
WBC # FLD AUTO: 7.82 K/UL — SIGNIFICANT CHANGE UP (ref 3.8–10.5)

## 2024-11-20 PROCEDURE — 99233 SBSQ HOSP IP/OBS HIGH 50: CPT

## 2024-11-20 RX ORDER — POTASSIUM CHLORIDE 600 MG/1
40 TABLET, EXTENDED RELEASE ORAL ONCE
Refills: 0 | Status: COMPLETED | OUTPATIENT
Start: 2024-11-20 | End: 2024-11-20

## 2024-11-20 RX ORDER — 0.9 % SODIUM CHLORIDE 0.9 %
1000 INTRAVENOUS SOLUTION INTRAVENOUS
Refills: 0 | Status: DISCONTINUED | OUTPATIENT
Start: 2024-11-20 | End: 2024-11-21

## 2024-11-20 RX ADMIN — Medication 1200 UNIT(S)/HR: at 07:03

## 2024-11-20 RX ADMIN — ROSUVASTATIN CALCIUM 10 MILLIGRAM(S): 5 TABLET, FILM COATED ORAL at 20:41

## 2024-11-20 RX ADMIN — Medication 250 MILLIGRAM(S): at 20:42

## 2024-11-20 RX ADMIN — POTASSIUM CHLORIDE 40 MILLIEQUIVALENT(S): 600 TABLET, EXTENDED RELEASE ORAL at 11:12

## 2024-11-20 RX ADMIN — Medication 2000 UNIT(S): at 17:54

## 2024-11-20 RX ADMIN — ARIPIPRAZOLE 2 MILLIGRAM(S): 15 TABLET ORAL at 11:14

## 2024-11-20 RX ADMIN — Medication 250 MILLIGRAM(S): at 05:27

## 2024-11-20 RX ADMIN — MONTELUKAST SODIUM 10 MILLIGRAM(S): 10 TABLET ORAL at 20:42

## 2024-11-20 RX ADMIN — HYDROMORPHONE HYDROCHLORIDE 0.5 MILLIGRAM(S): 2 TABLET ORAL at 13:35

## 2024-11-20 RX ADMIN — Medication 1400 UNIT(S)/HR: at 17:51

## 2024-11-20 RX ADMIN — SUCRALFATE 1 GRAM(S): 1 TABLET ORAL at 20:41

## 2024-11-20 RX ADMIN — HYDROMORPHONE HYDROCHLORIDE 0.5 MILLIGRAM(S): 2 TABLET ORAL at 05:26

## 2024-11-20 RX ADMIN — HYDROMORPHONE HYDROCHLORIDE 0.5 MILLIGRAM(S): 2 TABLET ORAL at 09:31

## 2024-11-20 RX ADMIN — Medication 2: at 20:55

## 2024-11-20 RX ADMIN — Medication 2000 UNIT(S): at 09:18

## 2024-11-20 RX ADMIN — Medication 1400 UNIT(S)/HR: at 19:21

## 2024-11-20 RX ADMIN — Medication 1300 UNIT(S)/HR: at 15:40

## 2024-11-20 RX ADMIN — PANTOPRAZOLE SODIUM 40 MILLIGRAM(S): 40 TABLET, DELAYED RELEASE ORAL at 05:27

## 2024-11-20 RX ADMIN — HYDROMORPHONE HYDROCHLORIDE 0.5 MILLIGRAM(S): 2 TABLET ORAL at 20:40

## 2024-11-20 RX ADMIN — Medication 0.5 MILLIGRAM(S): at 20:42

## 2024-11-20 RX ADMIN — FLUTICASONE PROPIONATE AND SALMETEROL XINAFOATE 1 DOSE(S): 45; 21 AEROSOL, METERED RESPIRATORY (INHALATION) at 20:15

## 2024-11-20 RX ADMIN — METOPROLOL TARTRATE 100 MILLIGRAM(S): 100 TABLET, FILM COATED ORAL at 13:30

## 2024-11-20 RX ADMIN — METOCLOPRAMIDE HYDROCHLORIDE 10 MILLIGRAM(S): 10 TABLET ORAL at 05:27

## 2024-11-20 RX ADMIN — Medication 1300 UNIT(S)/HR: at 09:13

## 2024-11-20 RX ADMIN — Medication 0.5 MILLIGRAM(S): at 16:07

## 2024-11-20 RX ADMIN — Medication 6: at 11:19

## 2024-11-20 RX ADMIN — METOCLOPRAMIDE HYDROCHLORIDE 10 MILLIGRAM(S): 10 TABLET ORAL at 16:07

## 2024-11-20 RX ADMIN — Medication 250 MILLIGRAM(S): at 13:36

## 2024-11-20 RX ADMIN — Medication 81 MILLIGRAM(S): at 11:14

## 2024-11-20 RX ADMIN — ESCITALOPRAM OXALATE 10 MILLIGRAM(S): 10 TABLET, FILM COATED ORAL at 11:14

## 2024-11-20 RX ADMIN — HYDROMORPHONE HYDROCHLORIDE 0.5 MILLIGRAM(S): 2 TABLET ORAL at 20:55

## 2024-11-20 RX ADMIN — SUCRALFATE 1 GRAM(S): 1 TABLET ORAL at 18:03

## 2024-11-20 RX ADMIN — FLUTICASONE PROPIONATE AND SALMETEROL XINAFOATE 1 DOSE(S): 45; 21 AEROSOL, METERED RESPIRATORY (INHALATION) at 09:12

## 2024-11-20 RX ADMIN — METOCLOPRAMIDE HYDROCHLORIDE 10 MILLIGRAM(S): 10 TABLET ORAL at 11:14

## 2024-11-20 RX ADMIN — SUCRALFATE 1 GRAM(S): 1 TABLET ORAL at 11:15

## 2024-11-20 RX ADMIN — Medication 1000 MILLIGRAM(S): at 17:54

## 2024-11-20 RX ADMIN — Medication 75 MILLILITER(S): at 22:15

## 2024-11-20 RX ADMIN — INSULIN GLARGINE 8 UNIT(S): 100 INJECTION, SOLUTION SUBCUTANEOUS at 20:57

## 2024-11-20 RX ADMIN — SUCRALFATE 1 GRAM(S): 1 TABLET ORAL at 05:26

## 2024-11-20 NOTE — PROGRESS NOTE ADULT - ASSESSMENT
64 year old woman with poor oral intake and epigastric pain with likely gastroparesis s/p TPN & removal due to hyperglycemia now s/p ercp with pancreatitis    #sepsis  patient with sepsis 11/17  elevated wbc & tachycardia  low grade temps now improving   likely due to pancreatitis - see below problem  however coughing with questionable developing pnas on ct scan no urinary sx   -last day of ctx today for pna tx / covers for ?cystitis on ct but patient asx    #pancreatitis  patient with pancreatitis s/p ercp 11/15   seen on ct imaging with elevated lipase and worsening abdominal pain  decreased pain today - weaning off Dilaudid and pain control  improvement of tachycardia   -c/w fluids, pain control  -f/u gi recs    #Intractable nausea with poor oral intake likely due to Gastroparesis exacerbated by GLP-1  h/o colectomy with iliorectal anastomosis for IBD  EGD/colonoscopy --> gastritis, esophagitis, c/w PPI  Gastroparesis clinically diagnosed as she was unable to complete Gastric emptying study on admission due to vomiting   c/w Reglan  10mg TID +Erythromycin 250mg po Tid  was on TPN for the past week; TPN stopped 11.16 due to persistent Hyperglycemia (despite Lantus 25u)   couldnt tolerate diet advancement  plan for gj tube possibly tm pending discussion with family       #Choledocholithiasis:  s/p ERCP and stent placement last week   s/p repeat ERCP on 11.15 for persistent  14mm CBD stone ; CBD and Pancreatic duct cleared, sphincterotomy and metal stent placed  CT with pancreatitis suspected  see above problems    #Asthma exacerbation: resolved no wheezing today  s/p Solumedrol IV x 1  Albuterol/Atrovent nebs q 4hrs  c/w Advair, Singulair  Mucinex  CXray noted: no Pna, clear lungs     # DM type II: uncontrolled due to TPN  TPN stopped  -lantus & s/s    # Moderate protein-calorie malnutrition:   improved po intake today - will monitor for next 24-48 hours to see if meeting adequate calories, discussed with nutrition and gi   - started on TPN 11/8 via PICC line   - TPN stopped due to elevated BGMs  - plan for gj tube tm    #Anxiety:   c/w Lexapro, c/w  Xanax prn Tid  On Abilify 5mg lawrence for the past month, now dose lowered to 2mg /day.  Psych evaluation noted: c/w Abilify low dose for now     #Paroxymal afib / HTN / CAD:  - off lisinopril due to borderline BP  Eliquis on hold for procedure; will restart today   - s/p 5 stents last were 2 years ago  - will start asp for now hold plavix with possible gj tube plan  - heparin drip     #prophylactic measure  F: LR @  75/hr  E: replete as needed  N: reg  DVT ppx: heparin

## 2024-11-20 NOTE — PROGRESS NOTE ADULT - SUBJECTIVE AND OBJECTIVE BOX
CC: Patient is a 64y old  Female who presents with a chief complaint of inability to tolerate po (19 Nov 2024 17:05)      INTERVAL EVENTS: OZZY    SUBJECTIVE / INTERVAL HPI: Patient seen and examined at bedside. Patient states she was not able to keep her food down yesterday after a few hours    ROS: negative unless otherwise stated above.    VITAL SIGNS:  Vital Signs Last 24 Hrs  T(C): 37.5 (20 Nov 2024 08:03), Max: 37.6 (20 Nov 2024 05:20)  T(F): 99.5 (20 Nov 2024 08:03), Max: 99.7 (20 Nov 2024 05:20)  HR: 117 (20 Nov 2024 08:03) (115 - 121)  BP: 125/68 (20 Nov 2024 08:03) (104/60 - 125/68)  BP(mean): --  RR: 18 (20 Nov 2024 08:03) (17 - 18)  SpO2: 98% (20 Nov 2024 08:03) (92% - 98%)    Parameters below as of 20 Nov 2024 08:03  Patient On (Oxygen Delivery Method): nasal cannula  O2 Flow (L/min): 2          PHYSICAL EXAM:    General: NAD  HEENT: MMM  Neck: supple  Cardiovascular: +S1/S2; RRR  Respiratory: CTA B/L; no W/R/R  Gastrointestinal: soft, distended, mildly tender to palpation   Extremities: WWP; no edema, clubbing or cyanosis  Vascular: 2+ radial, DP/PT pulses B/L  Neurological: AAOx3; no focal deficits    MEDICATIONS:  MEDICATIONS  (STANDING):  ALPRAZolam 0.5 milliGRAM(s) Oral at bedtime  ARIPiprazole 2 milliGRAM(s) Oral daily  aspirin  chewable 81 milliGRAM(s) Oral daily  cefTRIAXone Injectable. 1000 milliGRAM(s) IV Push every 24 hours  dextrose 5%. 1000 milliLiter(s) (100 mL/Hr) IV Continuous <Continuous>  dextrose 5%. 1000 milliLiter(s) (50 mL/Hr) IV Continuous <Continuous>  dextrose 50% Injectable 25 Gram(s) IV Push once  dextrose 50% Injectable 12.5 Gram(s) IV Push once  dextrose 50% Injectable 25 Gram(s) IV Push once  erythromycin     enteric coated 250 milliGRAM(s) Oral three times a day  escitalopram 10 milliGRAM(s) Oral daily  FIRST- Mouthwash  BLM 5 milliLiter(s) Swish and Swallow three times a day  fluticasone propionate/ salmeterol 250-50 MICROgram(s) Diskus 1 Dose(s) Inhalation two times a day  glucagon  Injectable 1 milliGRAM(s) IntraMuscular once  heparin  Infusion.  Unit(s)/Hr (10 mL/Hr) IV Continuous <Continuous>  influenza   Vaccine 0.5 milliLiter(s) IntraMuscular once  insulin glargine Injectable (LANTUS) 8 Unit(s) SubCutaneous at bedtime  insulin lispro (ADMELOG) corrective regimen sliding scale   SubCutaneous Before meals and at bedtime  lactated ringers. 1000 milliLiter(s) (75 mL/Hr) IV Continuous <Continuous>  metoclopramide 10 milliGRAM(s) Oral three times a day before meals  metoprolol succinate  milliGRAM(s) Oral daily  montelukast 10 milliGRAM(s) Oral at bedtime  pantoprazole    Tablet 40 milliGRAM(s) Oral before breakfast  rosuvastatin 10 milliGRAM(s) Oral at bedtime  sucralfate suspension 1 Gram(s) Oral <User Schedule>    MEDICATIONS  (PRN):  acetaminophen     Tablet .. 650 milliGRAM(s) Oral every 6 hours PRN Moderate Pain (4 - 6)  acetaminophen 325 mG/butalbital 50 mG/caffeine 40 mG 1 Tablet(s) Oral every 8 hours PRN migraine  albuterol/ipratropium for Nebulization 3 milliLiter(s) Nebulizer every 4 hours PRN Shortness of Breath and/or Wheezing  ALPRAZolam 0.5 milliGRAM(s) Oral three times a day PRN anxiety  aluminum hydroxide/magnesium hydroxide/simethicone Suspension 30 milliLiter(s) Oral every 4 hours PRN Dyspepsia  benzocaine/menthol Lozenge 1 Lozenge Oral every 4 hours PRN Sore Throat  dextrose Oral Gel 15 Gram(s) Oral once PRN Blood Glucose LESS THAN 70 milliGRAM(s)/deciliter  guaiFENesin Oral Liquid (Sugar-Free) 200 milliGRAM(s) Oral every 6 hours PRN Cough  heparin   Injectable 4000 Unit(s) IV Push every 6 hours PRN For aPTT less than 40  heparin   Injectable 2000 Unit(s) IV Push every 6 hours PRN For aPTT between 40 - 57  HYDROmorphone  Injectable 1 milliGRAM(s) IV Push every 4 hours PRN Severe Pain (7 - 10)  HYDROmorphone  Injectable 0.5 milliGRAM(s) IV Push every 4 hours PRN Moderate Pain (4 - 6)  ondansetron Injectable 4 milliGRAM(s) IV Push every 6 hours PRN Nausea and/or Vomiting      ALLERGIES:  Allergies    No Known Allergies    Intolerances        LABS:                        7.8    7.82  )-----------( 154      ( 20 Nov 2024 06:49 )             24.0     11-20    134[L]  |  103  |  6[L]  ----------------------------<  188[H]  3.3[L]   |  26  |  0.65    Ca    8.4[L]      20 Nov 2024 06:49      PT/INR - ( 19 Nov 2024 12:12 )   PT: 16.1 sec;   INR: 1.40 ratio         PTT - ( 20 Nov 2024 06:49 )  PTT:45.0 sec  Urinalysis Basic - ( 20 Nov 2024 06:49 )    Color: x / Appearance: x / SG: x / pH: x  Gluc: 188 mg/dL / Ketone: x  / Bili: x / Urobili: x   Blood: x / Protein: x / Nitrite: x   Leuk Esterase: x / RBC: x / WBC x   Sq Epi: x / Non Sq Epi: x / Bacteria: x      CAPILLARY BLOOD GLUCOSE      POCT Blood Glucose.: 267 mg/dL (20 Nov 2024 11:10)      RADIOLOGY & ADDITIONAL TESTS: Reviewed.

## 2024-11-21 ENCOUNTER — RESULT REVIEW (OUTPATIENT)
Age: 64
End: 2024-11-21

## 2024-11-21 LAB
ANION GAP SERPL CALC-SCNC: 2 MMOL/L — LOW (ref 5–17)
APPEARANCE UR: ABNORMAL
APTT BLD: 55.5 SEC — HIGH (ref 24.5–35.6)
APTT BLD: 61.2 SEC — HIGH (ref 24.5–35.6)
APTT BLD: 63 SEC — HIGH (ref 24.5–35.6)
BACTERIA # UR AUTO: ABNORMAL /HPF
BILIRUB UR-MCNC: NEGATIVE — SIGNIFICANT CHANGE UP
BUN SERPL-MCNC: 4 MG/DL — LOW (ref 7–23)
CALCIUM SERPL-MCNC: 8.4 MG/DL — LOW (ref 8.5–10.1)
CAST: 1 /LPF — SIGNIFICANT CHANGE UP (ref 0–4)
CHLORIDE SERPL-SCNC: 105 MMOL/L — SIGNIFICANT CHANGE UP (ref 96–108)
CO2 SERPL-SCNC: 27 MMOL/L — SIGNIFICANT CHANGE UP (ref 22–31)
COLOR SPEC: YELLOW — SIGNIFICANT CHANGE UP
CREAT SERPL-MCNC: 0.6 MG/DL — SIGNIFICANT CHANGE UP (ref 0.5–1.3)
DIFF PNL FLD: ABNORMAL
EGFR: 101 ML/MIN/1.73M2 — SIGNIFICANT CHANGE UP
GLUCOSE BLDC GLUCOMTR-MCNC: 112 MG/DL — HIGH (ref 70–99)
GLUCOSE BLDC GLUCOMTR-MCNC: 138 MG/DL — HIGH (ref 70–99)
GLUCOSE BLDC GLUCOMTR-MCNC: 148 MG/DL — HIGH (ref 70–99)
GLUCOSE BLDC GLUCOMTR-MCNC: 211 MG/DL — HIGH (ref 70–99)
GLUCOSE SERPL-MCNC: 135 MG/DL — HIGH (ref 70–99)
GLUCOSE UR QL: NEGATIVE MG/DL — SIGNIFICANT CHANGE UP
HCT VFR BLD CALC: 23.9 % — LOW (ref 34.5–45)
HGB BLD-MCNC: 7.8 G/DL — LOW (ref 11.5–15.5)
KETONES UR-MCNC: ABNORMAL MG/DL
LACTATE SERPL-SCNC: 0.7 MMOL/L — SIGNIFICANT CHANGE UP (ref 0.7–2)
LEUKOCYTE ESTERASE UR-ACNC: ABNORMAL
MCHC RBC-ENTMCNC: 28.7 PG — SIGNIFICANT CHANGE UP (ref 27–34)
MCHC RBC-ENTMCNC: 32.6 G/DL — SIGNIFICANT CHANGE UP (ref 32–36)
MCV RBC AUTO: 87.9 FL — SIGNIFICANT CHANGE UP (ref 80–100)
NITRITE UR-MCNC: NEGATIVE — SIGNIFICANT CHANGE UP
PH UR: 7.5 — SIGNIFICANT CHANGE UP (ref 5–8)
PLATELET # BLD AUTO: 165 K/UL — SIGNIFICANT CHANGE UP (ref 150–400)
POTASSIUM SERPL-MCNC: 3.4 MMOL/L — LOW (ref 3.5–5.3)
POTASSIUM SERPL-SCNC: 3.4 MMOL/L — LOW (ref 3.5–5.3)
PROT UR-MCNC: 30 MG/DL
RBC # BLD: 2.72 M/UL — LOW (ref 3.8–5.2)
RBC # FLD: 15.3 % — HIGH (ref 10.3–14.5)
RBC CASTS # UR COMP ASSIST: 40 /HPF — HIGH (ref 0–4)
SODIUM SERPL-SCNC: 134 MMOL/L — LOW (ref 135–145)
SP GR SPEC: 1.01 — SIGNIFICANT CHANGE UP (ref 1–1.03)
SQUAMOUS # UR AUTO: 17 /HPF — HIGH (ref 0–5)
UROBILINOGEN FLD QL: 0.2 MG/DL — SIGNIFICANT CHANGE UP (ref 0.2–1)
WBC # BLD: 7.98 K/UL — SIGNIFICANT CHANGE UP (ref 3.8–10.5)
WBC # FLD AUTO: 7.98 K/UL — SIGNIFICANT CHANGE UP (ref 3.8–10.5)
WBC UR QL: 98 /HPF — HIGH (ref 0–5)

## 2024-11-21 PROCEDURE — 71045 X-RAY EXAM CHEST 1 VIEW: CPT | Mod: 26

## 2024-11-21 PROCEDURE — 99233 SBSQ HOSP IP/OBS HIGH 50: CPT

## 2024-11-21 PROCEDURE — 93356 MYOCRD STRAIN IMG SPCKL TRCK: CPT

## 2024-11-21 PROCEDURE — 74018 RADEX ABDOMEN 1 VIEW: CPT | Mod: 26

## 2024-11-21 PROCEDURE — 93306 TTE W/DOPPLER COMPLETE: CPT | Mod: 26

## 2024-11-21 PROCEDURE — 76376 3D RENDER W/INTRP POSTPROCES: CPT | Mod: 26

## 2024-11-21 RX ORDER — ALPRAZOLAM 0.5 MG
0.5 TABLET ORAL AT BEDTIME
Refills: 0 | Status: DISCONTINUED | OUTPATIENT
Start: 2024-11-21 | End: 2024-11-28

## 2024-11-21 RX ORDER — POTASSIUM CHLORIDE 600 MG/1
10 TABLET, EXTENDED RELEASE ORAL
Refills: 0 | Status: COMPLETED | OUTPATIENT
Start: 2024-11-21 | End: 2024-11-21

## 2024-11-21 RX ADMIN — Medication 2000 UNIT(S): at 00:50

## 2024-11-21 RX ADMIN — POTASSIUM CHLORIDE 100 MILLIEQUIVALENT(S): 600 TABLET, EXTENDED RELEASE ORAL at 09:17

## 2024-11-21 RX ADMIN — SUCRALFATE 1 GRAM(S): 1 TABLET ORAL at 21:14

## 2024-11-21 RX ADMIN — METOCLOPRAMIDE HYDROCHLORIDE 10 MILLIGRAM(S): 10 TABLET ORAL at 11:40

## 2024-11-21 RX ADMIN — Medication 1500 UNIT(S)/HR: at 16:08

## 2024-11-21 RX ADMIN — HYDROMORPHONE HYDROCHLORIDE 0.5 MILLIGRAM(S): 2 TABLET ORAL at 06:29

## 2024-11-21 RX ADMIN — FLUTICASONE PROPIONATE AND SALMETEROL XINAFOATE 1 DOSE(S): 45; 21 AEROSOL, METERED RESPIRATORY (INHALATION) at 09:15

## 2024-11-21 RX ADMIN — HYDROMORPHONE HYDROCHLORIDE 0.5 MILLIGRAM(S): 2 TABLET ORAL at 00:50

## 2024-11-21 RX ADMIN — Medication 75 MILLILITER(S): at 11:29

## 2024-11-21 RX ADMIN — Medication 1500 UNIT(S)/HR: at 00:39

## 2024-11-21 RX ADMIN — Medication 1000 MILLIGRAM(S): at 16:05

## 2024-11-21 RX ADMIN — POTASSIUM CHLORIDE 100 MILLIEQUIVALENT(S): 600 TABLET, EXTENDED RELEASE ORAL at 10:18

## 2024-11-21 RX ADMIN — HYDROMORPHONE HYDROCHLORIDE 0.5 MILLIGRAM(S): 2 TABLET ORAL at 11:06

## 2024-11-21 RX ADMIN — Medication 1500 UNIT(S)/HR: at 19:19

## 2024-11-21 RX ADMIN — FLUTICASONE PROPIONATE AND SALMETEROL XINAFOATE 1 DOSE(S): 45; 21 AEROSOL, METERED RESPIRATORY (INHALATION) at 20:19

## 2024-11-21 RX ADMIN — POTASSIUM CHLORIDE 100 MILLIEQUIVALENT(S): 600 TABLET, EXTENDED RELEASE ORAL at 11:29

## 2024-11-21 RX ADMIN — Medication 1500 UNIT(S)/HR: at 17:10

## 2024-11-21 RX ADMIN — Medication 0.5 MILLIGRAM(S): at 21:21

## 2024-11-21 RX ADMIN — METOCLOPRAMIDE HYDROCHLORIDE 10 MILLIGRAM(S): 10 TABLET ORAL at 16:05

## 2024-11-21 RX ADMIN — Medication 1500 UNIT(S)/HR: at 07:07

## 2024-11-21 RX ADMIN — HYDROMORPHONE HYDROCHLORIDE 0.5 MILLIGRAM(S): 2 TABLET ORAL at 16:05

## 2024-11-21 RX ADMIN — ROSUVASTATIN CALCIUM 10 MILLIGRAM(S): 5 TABLET, FILM COATED ORAL at 21:17

## 2024-11-21 RX ADMIN — Medication 1500 UNIT(S)/HR: at 07:36

## 2024-11-21 RX ADMIN — Medication 1500 UNIT(S)/HR: at 09:16

## 2024-11-21 RX ADMIN — Medication 250 MILLIGRAM(S): at 13:08

## 2024-11-21 RX ADMIN — Medication 250 MILLIGRAM(S): at 21:16

## 2024-11-21 RX ADMIN — Medication 4: at 17:32

## 2024-11-21 RX ADMIN — HYDROMORPHONE HYDROCHLORIDE 0.5 MILLIGRAM(S): 2 TABLET ORAL at 21:31

## 2024-11-21 RX ADMIN — SUCRALFATE 1 GRAM(S): 1 TABLET ORAL at 16:06

## 2024-11-21 RX ADMIN — Medication 75 MILLILITER(S): at 09:16

## 2024-11-21 RX ADMIN — MONTELUKAST SODIUM 10 MILLIGRAM(S): 10 TABLET ORAL at 21:16

## 2024-11-21 RX ADMIN — INSULIN GLARGINE 8 UNIT(S): 100 INJECTION, SOLUTION SUBCUTANEOUS at 21:17

## 2024-11-21 NOTE — PROGRESS NOTE ADULT - ASSESSMENT
64 year old woman with poor oral intake and epigastric pain with likely gastroparesis s/p TPN & removal due to hyperglycemia now s/p ercp with pancreatitis    #sepsis RESOLVING  patient with sepsis 11/17  elevated wbc & tachycardia  low grade temps now improving howver tachycardia sustaining  initially was thought to be due to pancreatitis however pain appears to be about the same   however coughing with questionable developing pnas on ct scan no urinary sx. ua positive however vague abdominal sx unclear.. patient tx with 3 days of ctx for past few days  -id consulted f/u recs    #pancreatitis  patient with pancreatitis s/p ercp 11/15   seen on ct imaging with elevated lipase and worsening abdominal pain  clinically looks good however continues to endorse ongoing pain  improvement of tachycardia   -f/u abd xr chest xr   -c/w fluids, pain control  -f/u gi recs    #Intractable nausea with poor oral intake likely due to Gastroparesis exacerbated by GLP-1  h/o colectomy with iliorectal anastomosis for IBD  EGD/colonoscopy --> gastritis, esophagitis, c/w PPI  Gastroparesis clinically diagnosed as she was unable to complete Gastric emptying study on admission due to vomiting   c/w Reglan  10mg TID +Erythromycin 250mg po Tid  was on TPN for the past week; TPN stopped 11.16 due to persistent Hyperglycemia (despite Lantus 25u)   couldnt tolerate diet advancement  plan for gj tube possibly tm      #Choledocholithiasis:  s/p ERCP and stent placement last week   s/p repeat ERCP on 11.15 for persistent  14mm CBD stone ; CBD and Pancreatic duct cleared, sphincterotomy and metal stent placed  CT with pancreatitis suspected  see above problems    #Asthma exacerbation: resolved no wheezing today  s/p Solumedrol IV x 1  Albuterol/Atrovent nebs q 4hrs  c/w Advair, Singulair  Mucinex  CXray noted: no Pna, clear lungs     # DM type II: uncontrolled due to TPN  TPN stopped  -lantus & s/s    # Moderate protein-calorie malnutrition:   improved po intake today - will monitor for next 24-48 hours to see if meeting adequate calories, discussed with nutrition and gi   - started on TPN 11/8 via PICC line   - TPN stopped due to elevated BGMs  - plan for gj tube tm    #Anxiety:   c/w Lexapro, c/w  Xanax prn Tid  On Abilify 5mg [place] for the past month, now dose lowered to 2mg /day.  Psych evaluation noted: c/w Abilify low dose for now     #Paroxymal afib / HTN / CAD:  - off lisinopril due to borderline BP  Eliquis on hold for procedure; will restart today   - s/p 5 stents last were 2 years ago  - will start asp for now hold plavix with possible gj tube plan  - heparin drip     #prophylactic measure  F: LR @  75/hr  E: replete as needed  N: cld  DVT ppx: heparin

## 2024-11-21 NOTE — PROGRESS NOTE ADULT - SUBJECTIVE AND OBJECTIVE BOX
CC: Patient is a 64y old  Female who presents with a chief complaint of inability to tolerate po (2024 12:43)      INTERVAL EVENTS: OZZY    SUBJECTIVE / INTERVAL HPI: Patient seen and examined at bedside.     ROS: negative unless otherwise stated above.    VITAL SIGNS:  Vital Signs Last 24 Hrs  T(C): 37 (2024 10:56), Max: 37.7 (2024 05:57)  T(F): 98.6 (2024 10:56), Max: 99.9 (2024 05:57)  HR: 99 (2024 10:56) (99 - 121)  BP: 115/65 (2024 10:56) (89/57 - 123/61)  BP(mean): 65 (2024 08:40) (65 - 65)  RR: 19 (2024 10:56) (18 - 19)  SpO2: 92% (2024 10:56) (92% - 97%)    Parameters below as of 2024 10:56  Patient On (Oxygen Delivery Method): nasal cannula  O2 Flow (L/min): 2          PHYSICAL EXAM:    General: NAD  HEENT: MMM  Neck: supple  Cardiovascular: +S1/S2; RRR  Respiratory: CTA B/L; no W/R/R  Gastrointestinal: distended mildly tender to palpation  Extremities: WWP; no edema, clubbing or cyanosis  Vascular: 2+ radial, DP/PT pulses B/L  Neurological: AAOx3; no focal deficits    MEDICATIONS:  MEDICATIONS  (STANDING):  ALPRAZolam 0.5 milliGRAM(s) Oral at bedtime  ARIPiprazole 2 milliGRAM(s) Oral daily  aspirin  chewable 81 milliGRAM(s) Oral daily  cefTRIAXone Injectable. 1000 milliGRAM(s) IV Push every 24 hours  dextrose 5%. 1000 milliLiter(s) (100 mL/Hr) IV Continuous <Continuous>  dextrose 5%. 1000 milliLiter(s) (50 mL/Hr) IV Continuous <Continuous>  dextrose 50% Injectable 25 Gram(s) IV Push once  dextrose 50% Injectable 12.5 Gram(s) IV Push once  dextrose 50% Injectable 25 Gram(s) IV Push once  erythromycin     enteric coated 250 milliGRAM(s) Oral three times a day  escitalopram 10 milliGRAM(s) Oral daily  FIRST- Mouthwash  BLM 5 milliLiter(s) Swish and Swallow three times a day  fluticasone propionate/ salmeterol 250-50 MICROgram(s) Diskus 1 Dose(s) Inhalation two times a day  glucagon  Injectable 1 milliGRAM(s) IntraMuscular once  heparin  Infusion.  Unit(s)/Hr (10 mL/Hr) IV Continuous <Continuous>  influenza   Vaccine 0.5 milliLiter(s) IntraMuscular once  insulin glargine Injectable (LANTUS) 8 Unit(s) SubCutaneous at bedtime  insulin lispro (ADMELOG) corrective regimen sliding scale   SubCutaneous Before meals and at bedtime  lactated ringers. 1000 milliLiter(s) (75 mL/Hr) IV Continuous <Continuous>  metoclopramide 10 milliGRAM(s) Oral three times a day before meals  metoprolol succinate  milliGRAM(s) Oral daily  montelukast 10 milliGRAM(s) Oral at bedtime  pantoprazole    Tablet 40 milliGRAM(s) Oral before breakfast  rosuvastatin 10 milliGRAM(s) Oral at bedtime  sucralfate suspension 1 Gram(s) Oral <User Schedule>    MEDICATIONS  (PRN):  acetaminophen     Tablet .. 650 milliGRAM(s) Oral every 6 hours PRN Moderate Pain (4 - 6)  acetaminophen 325 mG/butalbital 50 mG/caffeine 40 mG 1 Tablet(s) Oral every 8 hours PRN migraine  albuterol/ipratropium for Nebulization 3 milliLiter(s) Nebulizer every 4 hours PRN Shortness of Breath and/or Wheezing  aluminum hydroxide/magnesium hydroxide/simethicone Suspension 30 milliLiter(s) Oral every 4 hours PRN Dyspepsia  benzocaine/menthol Lozenge 1 Lozenge Oral every 4 hours PRN Sore Throat  dextrose Oral Gel 15 Gram(s) Oral once PRN Blood Glucose LESS THAN 70 milliGRAM(s)/deciliter  guaiFENesin Oral Liquid (Sugar-Free) 200 milliGRAM(s) Oral every 6 hours PRN Cough  heparin   Injectable 4000 Unit(s) IV Push every 6 hours PRN For aPTT less than 40  heparin   Injectable 2000 Unit(s) IV Push every 6 hours PRN For aPTT between 40 - 57  HYDROmorphone  Injectable 1 milliGRAM(s) IV Push every 4 hours PRN Severe Pain (7 - 10)  HYDROmorphone  Injectable 0.5 milliGRAM(s) IV Push every 4 hours PRN Moderate Pain (4 - 6)  ondansetron Injectable 4 milliGRAM(s) IV Push every 6 hours PRN Nausea and/or Vomiting      ALLERGIES:  Allergies    No Known Allergies    Intolerances        LABS:                        7.8    7.98  )-----------( 165      ( 2024 06:03 )             23.9     11-21    134[L]  |  105  |  4[L]  ----------------------------<  135[H]  3.4[L]   |  27  |  0.60    Ca    8.4[L]      2024 06:03      PTT - ( 2024 12:50 )  PTT:63.0 sec  Urinalysis Basic - ( 2024 11:23 )    Color: Yellow / Appearance: Cloudy / S.013 / pH: x  Gluc: x / Ketone: Trace mg/dL  / Bili: Negative / Urobili: 0.2 mg/dL   Blood: x / Protein: 30 mg/dL / Nitrite: Negative   Leuk Esterase: Large / RBC: 40 /HPF / WBC 98 /HPF   Sq Epi: x / Non Sq Epi: 17 /HPF / Bacteria: Occasional /HPF      CAPILLARY BLOOD GLUCOSE      POCT Blood Glucose.: 138 mg/dL (2024 12:01)      RADIOLOGY & ADDITIONAL TESTS: Reviewed.

## 2024-11-21 NOTE — CONSULT NOTE ADULT - REASON FOR ADMISSION
inability to tolerate po

## 2024-11-21 NOTE — CHART NOTE - NSCHARTNOTEFT_GEN_A_CORE
Patient on schedule for GJ tube placement today with worsening abdominal distension, tachycardia despite IVF, persistent low grade temps and persistent abdominal pain.  Discussed with primary hospitalist, Dr. Mathur. Will further evaluate with abdominal imaging, echo, labs, cultures. Not clinically stable for procedure at this time.

## 2024-11-21 NOTE — CONSULT NOTE ADULT - SUBJECTIVE AND OBJECTIVE BOX
Patient is a 64y old  Female who presents with a chief complaint of inability to tolerate po (20 Nov 2024 13:34)    HPI:  64-year-old female with h/o GERD  HTN, DM, polyps, and afib with 5 cardiac stents. presents with epigastric pain for the last couple of weeks.  Her daughter brought her in, after speaking with her GI doctor who stated patient should not have been on ozempic before and probably have taken insulin instead. She states that she had just uptitrated her dose of ozempic to 0.5 from 0.25 earlier this week. She had been on the 0.25 for 5 weeks. She states the pain bothers her more than the nausea and vomiting. Patient states she feels very minimal relief since she came in.  She denies NSAID use, bleeding or black stools. She reports 10lb weight loss and has been on ozempic for her diabetes. On 11/20 she met criteria for sepsis and was started on ceftriaxone. Noted with elevated wbc & tachycardia, low grade temps.     PMH: as above  PSH: as above  Meds: per reconciliation sheet, noted below  MEDICATIONS  (STANDING):  ARIPiprazole 2 milliGRAM(s) Oral daily  aspirin  chewable 81 milliGRAM(s) Oral daily  cefTRIAXone Injectable. 1000 milliGRAM(s) IV Push every 24 hours  dextrose 5%. 1000 milliLiter(s) (100 mL/Hr) IV Continuous <Continuous>  dextrose 5%. 1000 milliLiter(s) (50 mL/Hr) IV Continuous <Continuous>  dextrose 50% Injectable 25 Gram(s) IV Push once  dextrose 50% Injectable 12.5 Gram(s) IV Push once  dextrose 50% Injectable 25 Gram(s) IV Push once  erythromycin     enteric coated 250 milliGRAM(s) Oral three times a day  escitalopram 10 milliGRAM(s) Oral daily  FIRST- Mouthwash  BLM 5 milliLiter(s) Swish and Swallow three times a day  fluticasone propionate/ salmeterol 250-50 MICROgram(s) Diskus 1 Dose(s) Inhalation two times a day  glucagon  Injectable 1 milliGRAM(s) IntraMuscular once  heparin  Infusion.  Unit(s)/Hr (10 mL/Hr) IV Continuous <Continuous>  influenza   Vaccine 0.5 milliLiter(s) IntraMuscular once  insulin glargine Injectable (LANTUS) 8 Unit(s) SubCutaneous at bedtime  insulin lispro (ADMELOG) corrective regimen sliding scale   SubCutaneous Before meals and at bedtime  lactated ringers. 1000 milliLiter(s) (75 mL/Hr) IV Continuous <Continuous>  metoclopramide 10 milliGRAM(s) Oral three times a day before meals  metoprolol succinate  milliGRAM(s) Oral daily  montelukast 10 milliGRAM(s) Oral at bedtime  pantoprazole    Tablet 40 milliGRAM(s) Oral before breakfast  rosuvastatin 10 milliGRAM(s) Oral at bedtime  sucralfate suspension 1 Gram(s) Oral <User Schedule>    MEDICATIONS  (PRN):  acetaminophen     Tablet .. 650 milliGRAM(s) Oral every 6 hours PRN Moderate Pain (4 - 6)  acetaminophen 325 mG/butalbital 50 mG/caffeine 40 mG 1 Tablet(s) Oral every 8 hours PRN migraine  albuterol/ipratropium for Nebulization 3 milliLiter(s) Nebulizer every 4 hours PRN Shortness of Breath and/or Wheezing  aluminum hydroxide/magnesium hydroxide/simethicone Suspension 30 milliLiter(s) Oral every 4 hours PRN Dyspepsia  benzocaine/menthol Lozenge 1 Lozenge Oral every 4 hours PRN Sore Throat  dextrose Oral Gel 15 Gram(s) Oral once PRN Blood Glucose LESS THAN 70 milliGRAM(s)/deciliter  guaiFENesin Oral Liquid (Sugar-Free) 200 milliGRAM(s) Oral every 6 hours PRN Cough  heparin   Injectable 4000 Unit(s) IV Push every 6 hours PRN For aPTT less than 40  heparin   Injectable 2000 Unit(s) IV Push every 6 hours PRN For aPTT between 40 - 57  HYDROmorphone  Injectable 1 milliGRAM(s) IV Push every 4 hours PRN Severe Pain (7 - 10)  HYDROmorphone  Injectable 0.5 milliGRAM(s) IV Push every 4 hours PRN Moderate Pain (4 - 6)  ondansetron Injectable 4 milliGRAM(s) IV Push every 6 hours PRN Nausea and/or Vomiting    Allergies    No Known Allergies    Intolerances      Social: no smoking, no alcohol, no illegal drugs; no recent travel, no exposure to TB  FAMILY HISTORY:  FH: heart disease (Mother, Sibling)      no history of premature cardiovascular disease in first degree relatives    ROS: the patient denies fever, no chills, no HA, no seizures, no dizziness, no sore throat, no nasal congestion, no blurry vision, no CP, no palpitations, no SOB, no cough, has abdominal pain, no diarrhea, no N/V, no dysuria, no leg pain, no claudication, no rash, no joint aches, no rectal pain or bleeding, no night sweats  All other systems reviewed and are negative    Vital Signs Last 24 Hrs  T(C): 37 (21 Nov 2024 10:56), Max: 37.7 (21 Nov 2024 05:57)  T(F): 98.6 (21 Nov 2024 10:56), Max: 99.9 (21 Nov 2024 05:57)  HR: 99 (21 Nov 2024 10:56) (99 - 121)  BP: 115/65 (21 Nov 2024 10:56) (89/57 - 123/61)  BP(mean): 65 (21 Nov 2024 08:40) (65 - 65)  RR: 19 (21 Nov 2024 10:56) (18 - 19)  SpO2: 92% (21 Nov 2024 10:56) (92% - 97%)    Parameters below as of 21 Nov 2024 10:56  Patient On (Oxygen Delivery Method): nasal cannula  O2 Flow (L/min): 2    PE:    Constitutional:  No acute distress  HEENT: NC/AT, EOMI, PERRLA, conjunctivae clear; ears and nose atraumatic; pharynx benign  Neck: supple; thyroid not palpable  Back: no tenderness  Respiratory: respiratory effort normal; clear to auscultation  Cardiovascular: S1S2 regular, no murmurs  Abdomen: soft, not tender, not distended, positive BS; no liver or spleen organomegaly  Genitourinary: no suprapubic tenderness  Lymphatic: no LN palpable  Musculoskeletal: no muscle tenderness, no joint swelling or tenderness  Extremities: no pedal edema  Neurological/ Psychiatric: AxOx3, judgement and insight normal; moving all extremities  Skin: no rashes; no palpable lesions    Labs: all available labs reviewed                        7.8    7.98  )-----------( 165      ( 21 Nov 2024 06:03 )             23.9     11-21    134[L]  |  105  |  4[L]  ----------------------------<  135[H]  3.4[L]   |  27  |  0.60    Ca    8.4[L]      21 Nov 2024 06:03    Urinalysis (11-21 @ 11:23)  Urine Appearance: Cloudy  Protein, Urine: 30 mg/dL  Urine Microscopic-Add On (NC) (11-21 @ 11:23)  White Blood Cell - Urine: 98 /HPF  Red Blood Cell - Urine: 40 /HPF    Culture - Blood (collected 17 Nov 2024 21:24)  Source: .Blood BLOOD  Preliminary Report (21 Nov 2024 06:01):    No growth at 72 Hours    Culture - Blood (collected 17 Nov 2024 21:24)  Source: .Blood BLOOD  Preliminary Report (21 Nov 2024 06:01):    No growth at 72 Hours    Radiology: all available radiological tests reviewed    < from: CT Abdomen and Pelvis w/ Oral Cont and w/ IV Cont (11.17.24 @ 20:40) >  Slightly decreased biliary duct dilatation with CBD and pancreatic duct   stents. Mild infiltration along the tail of the pancreas and spleen,   question pancreatitis. Correlate with pancreatic enzymes.  < end of copied text >      Advanced directives addressed: full resuscitation

## 2024-11-21 NOTE — CHART NOTE - NSCHARTNOTEFT_GEN_A_CORE
Clinical Nutrition TF BRIEF NOTE    *64-year-old female with history of GERD  HTN, DM, polyps, and afib with 5 cardiac stents. presents with epigastric pain for the last couple of weeks.  Her daughter brought her in, after speaking with her GI doctor who stated patient should not have been on ozempic before and probably have taken insulin instead. She states that she had just uptitrated her dose of ozempic to 0.5 from 0.25 earlier this week. She had been on the 0.25 for 5 weeks. She states the pain bothers her more than the nausea and vomiting. Patient states she feels very minimal relief since she came in.  She denies NSAID use, bleeding or black stools. She reports 10lb weight loss and has been on ozempic for her diabetes. Denies diarrhea or urinary symptoms. Admitted for Nausea and vomiting     *current status: Remains off TPN 2/2 uncontrolled BG levels however per labs review POCT improving x 24 hrs. Was on clear liquids and transitioned to low fiber diet however was unable to tolerate PO, w/ vomiting episodes after consuming meals. D/w MD Mathur, pt planned to undergo GJ tube placement however d/t worsening abdominal distension, tachycardia despite IVF, persistent low grade temps and persistent abdominal pain, procedure put on hold at this time. Pending abdominal imaging, echo, labs, and cultures per GI note. Once GJ tube placed, will initiate TF of Glucerna 1.5 2/2 hyperglycemia. Please see below for TF recommendations.     *labs reviewed:  11-21    134[L]  |  105  |  4[L]  ----------------------------<  135[H]  3.4[L]   |  27  |  0.60    Ca    8.4[L]      21 Nov 2024 06:03    CAPILLARY BLOOD GLUCOSE  POCT Blood Glucose.: 138 mg/dL (21 Nov 2024 12:01)  POCT Blood Glucose.: 148 mg/dL (21 Nov 2024 06:29)  POCT Blood Glucose.: 185 mg/dL (20 Nov 2024 20:39)    Lipid Panel: Date/Time: 11-10-24 @ 04:34  Triglycerides, Serum: 160    *pertinent meds: Xanax, Abx, erythromycin, Lexapro, Lantus HS (8 units), Admelog sliding scale (8 units x 24 hrs), Reglan TID, Protonix, KCl powder, KCl IVPB x3, statin, carafate, Dilaudid prn, LR    *I&O's Detail - none documented     *No BM documented - not on bowel regimen     *malnutrition: Pt continues to meet criteria for moderate malnutrition in context of acute on chronic illness r/t decreased ability to meet increased nutrient needs 2/2 N/V, DM AEB >7.5% wt loss x 2mo, <50% ENN x 4 weeks, mild muscle/fat wasting    Estimated Needs: based on 55 Kg (wt from 10/27)  Calories: 7422-4313 Kcal (35-40 Kcal/Kg)  Protein: 77-99 g (1.4-1.8 g/Kg)  Fluids: 4950-5563 mL (35-40 mL/Kg)    RECOMMENDATIONS:  1) Once GJ tube placed, initiate Glucerna 1.5 @ 20 cc/hr, increase by 10 cc/hr q10 hours until goal rate of 65 cc/hr is met (total volume = 1300 mL). Will provide ~ 1950 kcal, 107 g protein, and 987 mL free water.   2) monitor hydration status; recommend free water flushes of 40 cc/hr (which provides ~800 mL of additional water per day)  3) monitor TF tolerance; keep back of bed > 45 degrees  4) monitor BM: if > 3 days without BM, add bowel regimen prn  5) daily wt checks to track/trend changes  6) Monitor closely for refeeding syndrome as pt at HIGH risk - obtain daily labs including BMP, Mg, and Phos, and provide repletion prn. Recommend adding 200mg thiamine and MVI w/ min daily. Also consider checking B6, B12, thiamine, folate, carnitine, and copper levels as malnutrition in cause these to be deficient     *will continue to monitor and adjust treatment plan prn*  Carmenza Hamilton, RDN, CDN [phone] Clinical Nutrition TF BRIEF NOTE    *64-year-old female with history of GERD  HTN, DM, polyps, and afib with 5 cardiac stents. presents with epigastric pain for the last couple of weeks.  Her daughter brought her in, after speaking with her GI doctor who stated patient should not have been on ozempic before and probably have taken insulin instead. She states that she had just uptitrated her dose of ozempic to 0.5 from 0.25 earlier this week. She had been on the 0.25 for 5 weeks. She states the pain bothers her more than the nausea and vomiting. Patient states she feels very minimal relief since she came in.  She denies NSAID use, bleeding or black stools. She reports 10lb weight loss and has been on ozempic for her diabetes. Denies diarrhea or urinary symptoms. Admitted for Nausea and vomiting     *current status: Remains off TPN 2/2 uncontrolled BG levels however per labs review POCT improving x 24 hrs. Was on clear liquids and transitioned to low fiber diet however was unable to tolerate PO, w/ vomiting episodes after consuming meals. D/w MD Mathur, pt planned to undergo GJ tube placement however d/t worsening abdominal distension, tachycardia despite IVF, persistent low grade temps and persistent abdominal pain, procedure put on hold at this time. Pending abdominal imaging, echo, labs, and cultures per GI note. Once GJ tube placed, will initiate TF of Glucerna 1.5 2/2 hyperglycemia. Please see below for TF recommendations.     *labs reviewed:  11-21    134[L]  |  105  |  4[L]  ----------------------------<  135[H]  3.4[L]   |  27  |  0.60    Ca    8.4[L]      21 Nov 2024 06:03    CAPILLARY BLOOD GLUCOSE  POCT Blood Glucose.: 138 mg/dL (21 Nov 2024 12:01)  POCT Blood Glucose.: 148 mg/dL (21 Nov 2024 06:29)  POCT Blood Glucose.: 185 mg/dL (20 Nov 2024 20:39)    Lipid Panel: Date/Time: 11-10-24 @ 04:34  Triglycerides, Serum: 160    *pertinent meds: Xanax, Abx, erythromycin, Lexapro, Lantus HS (8 units), Admelog sliding scale (8 units x 24 hrs), Reglan TID, Protonix, KCl powder, KCl IVPB x3, statin, carafate, Dilaudid prn, LR    *I&O's Detail - none documented     *No BM documented - not on bowel regimen     *malnutrition: Pt continues to meet criteria for moderate malnutrition in context of acute on chronic illness r/t decreased ability to meet increased nutrient needs 2/2 N/V, DM AEB >7.5% wt loss x 2mo, <50% ENN x 4 weeks, mild muscle/fat wasting    Estimated Needs: based on 55 Kg (wt from 10/27)  Calories: 0825-4939 Kcal (35-40 Kcal/Kg)  Protein: 77-99 g (1.4-1.8 g/Kg)  Fluids: 9181-7053 mL (35-40 mL/Kg)    RECOMMENDATIONS:  1) Once GJ tube placed, initiate Glucerna 1.5 @ 20 cc/hr, increase by 10 cc/hr q12 hours until goal rate of 65 cc/hr is met (total volume = 1300 mL). Will provide ~ 1950 kcal, 107 g protein, and 987 mL free water.   2) monitor hydration status; recommend free water flushes of 40 cc/hr (which provides ~800 mL of additional water per day)  3) monitor TF tolerance; keep back of bed > 45 degrees  4) monitor BM: if > 3 days without BM, add bowel regimen prn  5) daily wt checks to track/trend changes  6) Monitor closely for refeeding syndrome as pt at HIGH risk - obtain daily labs including BMP, Mg, and Phos, and provide repletion prn. Recommend adding 200mg thiamine and MVI w/ min daily. Also consider checking B6, B12, thiamine, folate, carnitine, and copper levels as malnutrition in cause these to be deficient     *will continue to monitor and adjust treatment plan prn*  Carmenza Hamilton, RDN, CDN [phone]

## 2024-11-21 NOTE — CONSULT NOTE ADULT - ASSESSMENT
64-year-old female with h/o GERD  HTN, DM, polyps, and afib with 5 cardiac stents. presents with epigastric pain for the last couple of weeks.  On 11/20 she met criteria for sepsis and was started on ceftriaxone. Noted with elevated wbc & tachycardia, low grade temps. She has poor oral intake and epigastric pain with likely gastroparesis s/p TPN & removal due to hyperglycemia now s/p ercp with pancreatitis    #Acute pancreatitis  #Possible sepsis  #Low grade fever  #Pyuria. Probable UTI.  #Choledocholithiasis s/p ERCP and stent placement last week   -elevated wbc & tachycardia are improivng  -imaging reviewed  -agree with ceftriaxone 1 gm IV qd for 3 days  -reason for abx use and side effects reviewed with patient; monitor BMP   -pain management per medicine  -poor oral intake likely due to Gastroparesis exacerbated by GLP-1  -h/o colectomy with iliorectal anastomosis for IBD  -EGD/colonoscopy --> gastritis, esophagitis  -f/u cultures  -old chart reviewed to assess prior cultures  -monitor temps  -f/u CBC  -supportive care  2. Other issues:   -care per medicine

## 2024-11-22 LAB
ADD ON TEST-SPECIMEN IN LAB: SIGNIFICANT CHANGE UP
APTT BLD: 60.9 SEC — HIGH (ref 24.5–35.6)
GLUCOSE BLDC GLUCOMTR-MCNC: 135 MG/DL — HIGH (ref 70–99)
GLUCOSE BLDC GLUCOMTR-MCNC: 141 MG/DL — HIGH (ref 70–99)
GLUCOSE BLDC GLUCOMTR-MCNC: 155 MG/DL — HIGH (ref 70–99)
GLUCOSE BLDC GLUCOMTR-MCNC: 193 MG/DL — HIGH (ref 70–99)
HCT VFR BLD CALC: 25.9 % — LOW (ref 34.5–45)
HGB BLD-MCNC: 8.3 G/DL — LOW (ref 11.5–15.5)
INR BLD: 1.17 RATIO — HIGH (ref 0.85–1.16)
MCHC RBC-ENTMCNC: 28.5 PG — SIGNIFICANT CHANGE UP (ref 27–34)
MCHC RBC-ENTMCNC: 32 G/DL — SIGNIFICANT CHANGE UP (ref 32–36)
MCV RBC AUTO: 89 FL — SIGNIFICANT CHANGE UP (ref 80–100)
PLATELET # BLD AUTO: 208 K/UL — SIGNIFICANT CHANGE UP (ref 150–400)
PROTHROM AB SERPL-ACNC: 13.8 SEC — HIGH (ref 9.9–13.4)
RBC # BLD: 2.91 M/UL — LOW (ref 3.8–5.2)
RBC # FLD: 15.2 % — HIGH (ref 10.3–14.5)
WBC # BLD: 8.24 K/UL — SIGNIFICANT CHANGE UP (ref 3.8–10.5)
WBC # FLD AUTO: 8.24 K/UL — SIGNIFICANT CHANGE UP (ref 3.8–10.5)

## 2024-11-22 PROCEDURE — 99233 SBSQ HOSP IP/OBS HIGH 50: CPT

## 2024-11-22 PROCEDURE — 49441 PLACE DUOD/JEJ TUBE PERC: CPT

## 2024-11-22 RX ORDER — FENTANYL 12 UG/H
25 PATCH, EXTENDED RELEASE TRANSDERMAL
Refills: 0 | Status: DISCONTINUED | OUTPATIENT
Start: 2024-11-22 | End: 2024-11-22

## 2024-11-22 RX ORDER — OXYCODONE HYDROCHLORIDE 30 MG/1
5 TABLET ORAL ONCE
Refills: 0 | Status: DISCONTINUED | OUTPATIENT
Start: 2024-11-22 | End: 2024-11-22

## 2024-11-22 RX ORDER — ONDANSETRON HYDROCHLORIDE 4 MG/1
4 TABLET, FILM COATED ORAL ONCE
Refills: 0 | Status: DISCONTINUED | OUTPATIENT
Start: 2024-11-22 | End: 2024-11-22

## 2024-11-22 RX ORDER — SODIUM CHLORIDE 9 MG/ML
1000 INJECTION, SOLUTION INTRAMUSCULAR; INTRAVENOUS; SUBCUTANEOUS
Refills: 0 | Status: DISCONTINUED | OUTPATIENT
Start: 2024-11-22 | End: 2024-11-22

## 2024-11-22 RX ADMIN — FENTANYL 25 MICROGRAM(S): 12 PATCH, EXTENDED RELEASE TRANSDERMAL at 15:25

## 2024-11-22 RX ADMIN — FENTANYL 25 MICROGRAM(S): 12 PATCH, EXTENDED RELEASE TRANSDERMAL at 15:10

## 2024-11-22 RX ADMIN — ROSUVASTATIN CALCIUM 10 MILLIGRAM(S): 5 TABLET, FILM COATED ORAL at 22:09

## 2024-11-22 RX ADMIN — Medication 250 MILLIGRAM(S): at 22:09

## 2024-11-22 RX ADMIN — FENTANYL 25 MICROGRAM(S): 12 PATCH, EXTENDED RELEASE TRANSDERMAL at 15:45

## 2024-11-22 RX ADMIN — HYDROMORPHONE HYDROCHLORIDE 0.5 MILLIGRAM(S): 2 TABLET ORAL at 04:57

## 2024-11-22 RX ADMIN — INSULIN GLARGINE 8 UNIT(S): 100 INJECTION, SOLUTION SUBCUTANEOUS at 22:07

## 2024-11-22 RX ADMIN — Medication 1500 UNIT(S)/HR: at 02:36

## 2024-11-22 RX ADMIN — FENTANYL 25 MICROGRAM(S): 12 PATCH, EXTENDED RELEASE TRANSDERMAL at 14:55

## 2024-11-22 RX ADMIN — OXYCODONE HYDROCHLORIDE 5 MILLIGRAM(S): 30 TABLET ORAL at 15:30

## 2024-11-22 RX ADMIN — SUCRALFATE 1 GRAM(S): 1 TABLET ORAL at 18:09

## 2024-11-22 RX ADMIN — FENTANYL 25 MICROGRAM(S): 12 PATCH, EXTENDED RELEASE TRANSDERMAL at 15:30

## 2024-11-22 RX ADMIN — METOCLOPRAMIDE HYDROCHLORIDE 10 MILLIGRAM(S): 10 TABLET ORAL at 18:11

## 2024-11-22 RX ADMIN — FLUTICASONE PROPIONATE AND SALMETEROL XINAFOATE 1 DOSE(S): 45; 21 AEROSOL, METERED RESPIRATORY (INHALATION) at 20:31

## 2024-11-22 RX ADMIN — ONDANSETRON HYDROCHLORIDE 4 MILLIGRAM(S): 4 TABLET, FILM COATED ORAL at 16:48

## 2024-11-22 RX ADMIN — SUCRALFATE 1 GRAM(S): 1 TABLET ORAL at 22:09

## 2024-11-22 RX ADMIN — Medication 0.5 MILLIGRAM(S): at 22:08

## 2024-11-22 RX ADMIN — FLUTICASONE PROPIONATE AND SALMETEROL XINAFOATE 1 DOSE(S): 45; 21 AEROSOL, METERED RESPIRATORY (INHALATION) at 09:04

## 2024-11-22 RX ADMIN — MONTELUKAST SODIUM 10 MILLIGRAM(S): 10 TABLET ORAL at 22:08

## 2024-11-22 RX ADMIN — HYDROMORPHONE HYDROCHLORIDE 0.5 MILLIGRAM(S): 2 TABLET ORAL at 22:10

## 2024-11-22 RX ADMIN — OXYCODONE HYDROCHLORIDE 5 MILLIGRAM(S): 30 TABLET ORAL at 16:00

## 2024-11-22 RX ADMIN — Medication 1500 UNIT(S)/HR: at 07:30

## 2024-11-22 RX ADMIN — HYDROMORPHONE HYDROCHLORIDE 1 MILLIGRAM(S): 2 TABLET ORAL at 18:11

## 2024-11-22 RX ADMIN — Medication 1000 MILLIGRAM(S): at 18:07

## 2024-11-22 NOTE — PROGRESS NOTE ADULT - SUBJECTIVE AND OBJECTIVE BOX
CC: Patient is a 64y old  Female who presents with a chief complaint of inability to tolerate po (2024 15:47)      INTERVAL EVENTS: OZZY    SUBJECTIVE / INTERVAL HPI: Patient seen and examined at bedside. Patient sitting comfortably on the side of the bed states her pain has not changed     ROS: negative unless otherwise stated above.    VITAL SIGNS:  Vital Signs Last 24 Hrs  T(C): 36.9 (2024 11:29), Max: 36.9 (2024 11:29)  T(F): 98.5 (2024 11:29), Max: 98.5 (2024 11:29)  HR: 107 (2024 07:17) (100 - 113)  BP: 113/63 (2024 07:17) (96/56 - 113/63)  BP(mean): 65 (2024 00:09) (65 - 65)  RR: 18 (2024 07:17) (16 - 18)  SpO2: 95% (2024 07:17) (93% - 97%)    Parameters below as of 2024 07:17  Patient On (Oxygen Delivery Method): nasal cannula            PHYSICAL EXAM:    General: NAD  HEENT: MMM  Neck: supple  Cardiovascular: +S1/S2; RRR  Respiratory: CTA B/L; no W/R/R  Gastrointestinal: soft, NT/ND  Extremities: WWP; no edema, clubbing or cyanosis  Vascular: 2+ radial, DP/PT pulses B/L  Neurological: AAOx3; no focal deficits    MEDICATIONS:  MEDICATIONS  (STANDING):  ALPRAZolam 0.5 milliGRAM(s) Oral at bedtime  ARIPiprazole 2 milliGRAM(s) Oral daily  aspirin  chewable 81 milliGRAM(s) Oral daily  cefTRIAXone Injectable. 1000 milliGRAM(s) IV Push every 24 hours  dextrose 5%. 1000 milliLiter(s) (100 mL/Hr) IV Continuous <Continuous>  dextrose 5%. 1000 milliLiter(s) (50 mL/Hr) IV Continuous <Continuous>  dextrose 50% Injectable 25 Gram(s) IV Push once  dextrose 50% Injectable 12.5 Gram(s) IV Push once  dextrose 50% Injectable 25 Gram(s) IV Push once  erythromycin     enteric coated 250 milliGRAM(s) Oral three times a day  escitalopram 10 milliGRAM(s) Oral daily  FIRST- Mouthwash  BLM 5 milliLiter(s) Swish and Swallow three times a day  fluticasone propionate/ salmeterol 250-50 MICROgram(s) Diskus 1 Dose(s) Inhalation two times a day  glucagon  Injectable 1 milliGRAM(s) IntraMuscular once  influenza   Vaccine 0.5 milliLiter(s) IntraMuscular once  insulin glargine Injectable (LANTUS) 8 Unit(s) SubCutaneous at bedtime  insulin lispro (ADMELOG) corrective regimen sliding scale   SubCutaneous Before meals and at bedtime  metoclopramide 10 milliGRAM(s) Oral three times a day before meals  metoprolol succinate  milliGRAM(s) Oral daily  montelukast 10 milliGRAM(s) Oral at bedtime  pantoprazole    Tablet 40 milliGRAM(s) Oral before breakfast  rosuvastatin 10 milliGRAM(s) Oral at bedtime  sucralfate suspension 1 Gram(s) Oral <User Schedule>    MEDICATIONS  (PRN):  acetaminophen     Tablet .. 650 milliGRAM(s) Oral every 6 hours PRN Moderate Pain (4 - 6)  acetaminophen 325 mG/butalbital 50 mG/caffeine 40 mG 1 Tablet(s) Oral every 8 hours PRN migraine  albuterol/ipratropium for Nebulization 3 milliLiter(s) Nebulizer every 4 hours PRN Shortness of Breath and/or Wheezing  aluminum hydroxide/magnesium hydroxide/simethicone Suspension 30 milliLiter(s) Oral every 4 hours PRN Dyspepsia  benzocaine/menthol Lozenge 1 Lozenge Oral every 4 hours PRN Sore Throat  dextrose Oral Gel 15 Gram(s) Oral once PRN Blood Glucose LESS THAN 70 milliGRAM(s)/deciliter  guaiFENesin Oral Liquid (Sugar-Free) 200 milliGRAM(s) Oral every 6 hours PRN Cough  HYDROmorphone  Injectable 1 milliGRAM(s) IV Push every 4 hours PRN Severe Pain (7 - 10)  HYDROmorphone  Injectable 0.5 milliGRAM(s) IV Push every 4 hours PRN Moderate Pain (4 - 6)  ondansetron Injectable 4 milliGRAM(s) IV Push every 6 hours PRN Nausea and/or Vomiting      ALLERGIES:  Allergies    No Known Allergies    Intolerances        LABS:                        8.3    8.24  )-----------( 208      ( 2024 08:19 )             25.9     11-21    134[L]  |  105  |  4[L]  ----------------------------<  135[H]  3.4[L]   |  27  |  0.60    Ca    8.4[L]      2024 06:03      PT/INR - ( 2024 08:19 )   PT: 13.8 sec;   INR: 1.17 ratio         PTT - ( 2024 08:19 )  PTT:60.9 sec  Urinalysis Basic - ( 2024 11:23 )    Color: Yellow / Appearance: Cloudy / S.013 / pH: x  Gluc: x / Ketone: Trace mg/dL  / Bili: Negative / Urobili: 0.2 mg/dL   Blood: x / Protein: 30 mg/dL / Nitrite: Negative   Leuk Esterase: Large / RBC: 40 /HPF / WBC 98 /HPF   Sq Epi: x / Non Sq Epi: 17 /HPF / Bacteria: Occasional /HPF      CAPILLARY BLOOD GLUCOSE      POCT Blood Glucose.: 135 mg/dL (2024 11:04)      RADIOLOGY & ADDITIONAL TESTS: Reviewed.

## 2024-11-22 NOTE — PROGRESS NOTE ADULT - ASSESSMENT
64 year old woman with poor oral intake and epigastric pain with likely gastroparesis s/p TPN & removal due to hyperglycemia now s/p ercp with pancreatitis    #sepsis RESOLVED  patient with sepsis 11/17  elevated wbc & tachycardia  low grade temps now improving howver tachycardia sustaining  initially was thought to be due to pancreatitis however pain appears to be about the same   however coughing with questionable developing pnas on ct scan no urinary sx. ua positive however vague abdominal sx unclear.. patient tx with 3 days of ctx for past few days  abd ultrasound with some colonic dilation reviewed with dr vanegas appears similar on ct earlier this week patients abdomen nt distention improving and continues to pass bms   id in agreement with no further abx    #pancreatitis RESOLVED  patient with pancreatitis s/p ercp 11/15   seen on ct imaging with elevated lipase and worsening abdominal pain  clinically looks good however continues to endorse ongoing pain  improvement of tachycardia   off of fluids now   -f/u gi recs    #sinus tachycardia  patient with sinus tachycardia persistently - echo wnl - rate improving low 100s   -monitor for now, no acute interventions as fever curve coming down and fluid status normalizing expect further improvement    #Intractable nausea with poor oral intake likely due to Gastroparesis exacerbated by GLP-1  h/o colectomy with iliorectal anastomosis for IBD  EGD/colonoscopy --> gastritis, esophagitis, c/w PPI  Gastroparesis clinically diagnosed as she was unable to complete Gastric emptying study on admission due to vomiting   c/w Reglan  10mg TID +Erythromycin 250mg po Tid  was on TPN for the past week; TPN stopped 11.16 due to persistent Hyperglycemia (despite Lantus 25u)   couldnt tolerate diet advancement  plan for gj tube today with IR -- will be a close watch for refeeding once feeds are restarted      #Choledocholithiasis:  s/p ERCP and stent placement last week   s/p repeat ERCP on 11.15 for persistent  14mm CBD stone ; CBD and Pancreatic duct cleared, sphincterotomy and metal stent placed    # DM type II: uncontrolled due to TPN  will have to trend sugars when feeds started this weekend   -lantus & s/s    # Moderate protein-calorie malnutrition:   improved po intake today - will monitor for next 24-48 hours to see if meeting adequate calories, discussed with nutrition and gi   - started on TPN 11/8 via PICC line   - TPN stopped due to elevated BGMs  - plan for gj tube today with IR     #Anxiety:   c/w Lexapro, c/w  Xanax prn Tid  On Abilify 5mg [place] for the past month, now dose lowered to 2mg /day.  Psych evaluation noted: c/w Abilify low dose for now     #Paroxymal afib / HTN / CAD:  - off lisinopril due to borderline BP  Eliquis on hold for procedure; will restart today   - s/p 5 stents last were 2 years ago  - will start asp for now hold plavix with gj tube plan  - heparin drip     #prophylactic measure  F: none  E: replete as needed  N: npo   DVT ppx: heparin

## 2024-11-23 LAB
ANION GAP SERPL CALC-SCNC: 4 MMOL/L — LOW (ref 5–17)
APTT BLD: 47.7 SEC — HIGH (ref 24.5–35.6)
BUN SERPL-MCNC: 9 MG/DL — SIGNIFICANT CHANGE UP (ref 7–23)
CALCIUM SERPL-MCNC: 8.7 MG/DL — SIGNIFICANT CHANGE UP (ref 8.5–10.1)
CHLORIDE SERPL-SCNC: 106 MMOL/L — SIGNIFICANT CHANGE UP (ref 96–108)
CO2 SERPL-SCNC: 26 MMOL/L — SIGNIFICANT CHANGE UP (ref 22–31)
CREAT SERPL-MCNC: 0.58 MG/DL — SIGNIFICANT CHANGE UP (ref 0.5–1.3)
CULTURE RESULTS: SIGNIFICANT CHANGE UP
CULTURE RESULTS: SIGNIFICANT CHANGE UP
EGFR: 102 ML/MIN/1.73M2 — SIGNIFICANT CHANGE UP
GLUCOSE BLDC GLUCOMTR-MCNC: 103 MG/DL — HIGH (ref 70–99)
GLUCOSE BLDC GLUCOMTR-MCNC: 113 MG/DL — HIGH (ref 70–99)
GLUCOSE BLDC GLUCOMTR-MCNC: 115 MG/DL — HIGH (ref 70–99)
GLUCOSE BLDC GLUCOMTR-MCNC: 163 MG/DL — HIGH (ref 70–99)
GLUCOSE SERPL-MCNC: 117 MG/DL — HIGH (ref 70–99)
HCT VFR BLD CALC: 26 % — LOW (ref 34.5–45)
HCT VFR BLD CALC: 27.3 % — LOW (ref 34.5–45)
HGB BLD-MCNC: 8.2 G/DL — LOW (ref 11.5–15.5)
HGB BLD-MCNC: 8.8 G/DL — LOW (ref 11.5–15.5)
MCHC RBC-ENTMCNC: 28 PG — SIGNIFICANT CHANGE UP (ref 27–34)
MCHC RBC-ENTMCNC: 28.4 PG — SIGNIFICANT CHANGE UP (ref 27–34)
MCHC RBC-ENTMCNC: 31.5 G/DL — LOW (ref 32–36)
MCHC RBC-ENTMCNC: 32.2 G/DL — SIGNIFICANT CHANGE UP (ref 32–36)
MCV RBC AUTO: 88.1 FL — SIGNIFICANT CHANGE UP (ref 80–100)
MCV RBC AUTO: 88.7 FL — SIGNIFICANT CHANGE UP (ref 80–100)
PLATELET # BLD AUTO: 251 K/UL — SIGNIFICANT CHANGE UP (ref 150–400)
PLATELET # BLD AUTO: 259 K/UL — SIGNIFICANT CHANGE UP (ref 150–400)
POTASSIUM SERPL-MCNC: 3.8 MMOL/L — SIGNIFICANT CHANGE UP (ref 3.5–5.3)
POTASSIUM SERPL-SCNC: 3.8 MMOL/L — SIGNIFICANT CHANGE UP (ref 3.5–5.3)
RBC # BLD: 2.93 M/UL — LOW (ref 3.8–5.2)
RBC # BLD: 3.1 M/UL — LOW (ref 3.8–5.2)
RBC # FLD: 15 % — HIGH (ref 10.3–14.5)
RBC # FLD: 15.3 % — HIGH (ref 10.3–14.5)
SODIUM SERPL-SCNC: 136 MMOL/L — SIGNIFICANT CHANGE UP (ref 135–145)
SPECIMEN SOURCE: SIGNIFICANT CHANGE UP
SPECIMEN SOURCE: SIGNIFICANT CHANGE UP
WBC # BLD: 7.51 K/UL — SIGNIFICANT CHANGE UP (ref 3.8–10.5)
WBC # BLD: 9.83 K/UL — SIGNIFICANT CHANGE UP (ref 3.8–10.5)
WBC # FLD AUTO: 7.51 K/UL — SIGNIFICANT CHANGE UP (ref 3.8–10.5)
WBC # FLD AUTO: 9.83 K/UL — SIGNIFICANT CHANGE UP (ref 3.8–10.5)

## 2024-11-23 PROCEDURE — 99233 SBSQ HOSP IP/OBS HIGH 50: CPT

## 2024-11-23 RX ORDER — HEPARIN SODIUM,PORCINE 1000/ML
2000 VIAL (ML) INJECTION EVERY 6 HOURS
Refills: 0 | Status: DISCONTINUED | OUTPATIENT
Start: 2024-11-23 | End: 2024-11-23

## 2024-11-23 RX ORDER — HEPARIN SODIUM,PORCINE 1000/ML
2000 VIAL (ML) INJECTION EVERY 6 HOURS
Refills: 0 | Status: DISCONTINUED | OUTPATIENT
Start: 2024-11-23 | End: 2024-11-26

## 2024-11-23 RX ORDER — HEPARIN SODIUM,PORCINE 1000/ML
4000 VIAL (ML) INJECTION ONCE
Refills: 0 | Status: DISCONTINUED | OUTPATIENT
Start: 2024-11-23 | End: 2024-11-23

## 2024-11-23 RX ORDER — HEPARIN SODIUM,PORCINE 1000/ML
4000 VIAL (ML) INJECTION EVERY 6 HOURS
Refills: 0 | Status: DISCONTINUED | OUTPATIENT
Start: 2024-11-23 | End: 2024-11-23

## 2024-11-23 RX ORDER — HEPARIN SODIUM,PORCINE 1000/ML
1500 VIAL (ML) INJECTION
Qty: 25000 | Refills: 0 | Status: DISCONTINUED | OUTPATIENT
Start: 2024-11-23 | End: 2024-11-23

## 2024-11-23 RX ORDER — HEPARIN SODIUM,PORCINE 1000/ML
4000 VIAL (ML) INJECTION EVERY 6 HOURS
Refills: 0 | Status: DISCONTINUED | OUTPATIENT
Start: 2024-11-23 | End: 2024-11-26

## 2024-11-23 RX ORDER — ALPRAZOLAM 0.5 MG
0.5 TABLET ORAL EVERY 8 HOURS
Refills: 0 | Status: DISCONTINUED | OUTPATIENT
Start: 2024-11-23 | End: 2024-11-26

## 2024-11-23 RX ORDER — HEPARIN SODIUM,PORCINE 1000/ML
1500 VIAL (ML) INJECTION
Qty: 25000 | Refills: 0 | Status: DISCONTINUED | OUTPATIENT
Start: 2024-11-23 | End: 2024-11-26

## 2024-11-23 RX ORDER — HEPARIN SODIUM,PORCINE 1000/ML
VIAL (ML) INJECTION
Qty: 25000 | Refills: 0 | Status: DISCONTINUED | OUTPATIENT
Start: 2024-11-23 | End: 2024-11-23

## 2024-11-23 RX ORDER — HYDROMORPHONE HYDROCHLORIDE 2 MG/1
1 TABLET ORAL ONCE
Refills: 0 | Status: DISCONTINUED | OUTPATIENT
Start: 2024-11-23 | End: 2024-11-23

## 2024-11-23 RX ADMIN — METOCLOPRAMIDE HYDROCHLORIDE 10 MILLIGRAM(S): 10 TABLET ORAL at 16:49

## 2024-11-23 RX ADMIN — Medication 1500 UNIT(S)/HR: at 19:17

## 2024-11-23 RX ADMIN — HYDROMORPHONE HYDROCHLORIDE 1 MILLIGRAM(S): 2 TABLET ORAL at 02:02

## 2024-11-23 RX ADMIN — ARIPIPRAZOLE 2 MILLIGRAM(S): 15 TABLET ORAL at 09:15

## 2024-11-23 RX ADMIN — Medication 0.5 MILLIGRAM(S): at 21:54

## 2024-11-23 RX ADMIN — SUCRALFATE 1 GRAM(S): 1 TABLET ORAL at 16:49

## 2024-11-23 RX ADMIN — HYDROMORPHONE HYDROCHLORIDE 1 MILLIGRAM(S): 2 TABLET ORAL at 10:21

## 2024-11-23 RX ADMIN — Medication 2: at 12:08

## 2024-11-23 RX ADMIN — FLUTICASONE PROPIONATE AND SALMETEROL XINAFOATE 1 DOSE(S): 45; 21 AEROSOL, METERED RESPIRATORY (INHALATION) at 20:40

## 2024-11-23 RX ADMIN — FLUTICASONE PROPIONATE AND SALMETEROL XINAFOATE 1 DOSE(S): 45; 21 AEROSOL, METERED RESPIRATORY (INHALATION) at 08:38

## 2024-11-23 RX ADMIN — Medication 81 MILLIGRAM(S): at 09:15

## 2024-11-23 RX ADMIN — Medication 1500 UNIT(S)/HR: at 16:14

## 2024-11-23 RX ADMIN — IPRATROPIUM BROMIDE AND ALBUTEROL SULFATE 3 MILLILITER(S): 2.5; .5 SOLUTION RESPIRATORY (INHALATION) at 08:37

## 2024-11-23 RX ADMIN — METOCLOPRAMIDE HYDROCHLORIDE 10 MILLIGRAM(S): 10 TABLET ORAL at 12:09

## 2024-11-23 RX ADMIN — ROSUVASTATIN CALCIUM 10 MILLIGRAM(S): 5 TABLET, FILM COATED ORAL at 21:54

## 2024-11-23 RX ADMIN — INSULIN GLARGINE 8 UNIT(S): 100 INJECTION, SOLUTION SUBCUTANEOUS at 21:53

## 2024-11-23 RX ADMIN — METOCLOPRAMIDE HYDROCHLORIDE 10 MILLIGRAM(S): 10 TABLET ORAL at 05:41

## 2024-11-23 RX ADMIN — METOPROLOL TARTRATE 100 MILLIGRAM(S): 100 TABLET, FILM COATED ORAL at 09:15

## 2024-11-23 RX ADMIN — HYDROMORPHONE HYDROCHLORIDE 1 MILLIGRAM(S): 2 TABLET ORAL at 22:05

## 2024-11-23 RX ADMIN — Medication 250 MILLIGRAM(S): at 21:53

## 2024-11-23 RX ADMIN — Medication 250 MILLIGRAM(S): at 14:27

## 2024-11-23 RX ADMIN — HYDROMORPHONE HYDROCHLORIDE 1 MILLIGRAM(S): 2 TABLET ORAL at 01:18

## 2024-11-23 RX ADMIN — SUCRALFATE 1 GRAM(S): 1 TABLET ORAL at 21:52

## 2024-11-23 RX ADMIN — Medication 0.5 MILLIGRAM(S): at 11:43

## 2024-11-23 RX ADMIN — DIPHENHYDRAMINE HYDROCHLORIDE AND LIDOCAINE HYDROCHLORIDE AND ALUMINUM HYDROXIDE AND MAGNESIUM HYDRO 5 MILLILITER(S): KIT at 14:28

## 2024-11-23 RX ADMIN — Medication 2000 UNIT(S): at 23:30

## 2024-11-23 RX ADMIN — SUCRALFATE 1 GRAM(S): 1 TABLET ORAL at 05:41

## 2024-11-23 RX ADMIN — HYDROMORPHONE HYDROCHLORIDE 1 MILLIGRAM(S): 2 TABLET ORAL at 21:53

## 2024-11-23 RX ADMIN — SUCRALFATE 1 GRAM(S): 1 TABLET ORAL at 12:07

## 2024-11-23 RX ADMIN — HYDROMORPHONE HYDROCHLORIDE 1 MILLIGRAM(S): 2 TABLET ORAL at 10:04

## 2024-11-23 RX ADMIN — Medication 250 MILLIGRAM(S): at 05:41

## 2024-11-23 RX ADMIN — ESCITALOPRAM OXALATE 10 MILLIGRAM(S): 10 TABLET, FILM COATED ORAL at 09:15

## 2024-11-23 RX ADMIN — HYDROMORPHONE HYDROCHLORIDE 1 MILLIGRAM(S): 2 TABLET ORAL at 14:43

## 2024-11-23 RX ADMIN — HYDROMORPHONE HYDROCHLORIDE 1 MILLIGRAM(S): 2 TABLET ORAL at 14:29

## 2024-11-23 RX ADMIN — PANTOPRAZOLE SODIUM 40 MILLIGRAM(S): 40 TABLET, DELAYED RELEASE ORAL at 05:41

## 2024-11-23 RX ADMIN — Medication 1600 UNIT(S)/HR: at 23:22

## 2024-11-23 RX ADMIN — MONTELUKAST SODIUM 10 MILLIGRAM(S): 10 TABLET ORAL at 21:54

## 2024-11-23 RX ADMIN — HYDROMORPHONE HYDROCHLORIDE 1 MILLIGRAM(S): 2 TABLET ORAL at 06:47

## 2024-11-23 NOTE — PROGRESS NOTE ADULT - ASSESSMENT
64 year old woman with poor oral intake and epigastric pain with likely gastroparesis s/p TPN & removal due to hyperglycemia now s/p ercp with pancreatitis, s/p feeding tube placement with need for advancement     #Intractable nausea with poor oral intake likely due to Gastroparesis exacerbated by GLP-1  # Moderate protein-calorie malnutrition:  h/o colectomy with iliorectal anastomosis for IBD  EGD/colonoscopy --> gastritis, esophagitis, c/w PPI  Gastroparesis clinically diagnosed as she was unable to complete Gastric emptying study on admission due to vomiting however after discussion with ir when gj tube was placed yesterday the contrast was not moving much so likely confirmed  c/w Reglan  10mg TID +Erythromycin 250mg Tid  -- switch to orals when able to take   barely tolerating clears  s/p gj tube placement with ir however needs to be endoscopically advanced prior to usage  -f/u gi recs re: advancement prior to starting feeds will need to monitor closely for refeeding syndrome as discussed with nutrition       #sepsis RESOLVED  patient with sepsis 11/17  elevated wbc & tachycardia  low grade temps now improving howver tachycardia sustaining  initially was thought to be due to pancreatitis however pain appears to be about the same   however coughing with questionable developing pnas on ct scan no urinary sx. ua positive however vague abdominal sx unclear.. patient tx with 3 days of ctx for past few days  abd ultrasound with some colonic dilation reviewed with dr vanegas appears similar on ct earlier this week patients abdomen nt distention improving and continues to pass bms   id in agreement with no further abx    #pancreatitis RESOLVED  patient with pancreatitis s/p ercp 11/15   seen on ct imaging with elevated lipase and worsening abdominal pain  clinically looks good however continues to endorse ongoing pain  improvement of tachycardia   off of fluids now   -f/u gi recs    #sinus tachycardia  patient with sinus tachycardia persistently - echo wnl - rate improving low 100s   -monitor for now, no acute interventions as fever curve coming down and fluid status normalizing expect further improvement    #Choledocholithiasis:  s/p ERCP and stent placement last week   s/p repeat ERCP on 11.15 for persistent  14mm CBD stone ; CBD and Pancreatic duct cleared, sphincterotomy and metal stent placed    # DM type II: uncontrolled due to TPN  will have to trend sugars when feeds started this weekend   -lantus & s/s      #Anxiety:   c/w Lexapro, c/w  Xanax prn Tid  On Abilify 5mg [place] for the past month, now dose lowered to 2mg /day.  Psych evaluation noted: c/w Abilify low dose for now     #Paroxymal afib / HTN / CAD:  - off lisinopril due to borderline BP  Eliquis on hold for procedure; will restart today   - s/p 5 stents last were 2 years ago  - will start asp for now hold plavix with gj tube plan  - heparin drip restarted today    #prophylactic measure  F: none  E: replete as needed  N: clear liquid diet - Nothing via gj tube until advanced  DVT ppx: heparin

## 2024-11-23 NOTE — PROGRESS NOTE ADULT - SUBJECTIVE AND OBJECTIVE BOX
CC: Patient is a 64y old  Female who presents with a chief complaint of inability to tolerate po (22 Nov 2024 13:02)      INTERVAL EVENTS: OZZY    SUBJECTIVE / INTERVAL HPI: Patient seen and examined at bedside. patient states shes having minor pain around the site of tube insertion but improves with pain medication    ROS: negative unless otherwise stated above.    VITAL SIGNS:  Vital Signs Last 24 Hrs  T(C): 36.8 (23 Nov 2024 07:15), Max: 36.8 (23 Nov 2024 07:15)  T(F): 98.2 (23 Nov 2024 07:15), Max: 98.2 (23 Nov 2024 07:15)  HR: 96 (23 Nov 2024 07:15) (63 - 107)  BP: 108/63 (23 Nov 2024 07:15) (95/62 - 122/53)  BP(mean): 67 (23 Nov 2024 01:15) (67 - 67)  RR: 18 (23 Nov 2024 07:15) (16 - 20)  SpO2: 96% (23 Nov 2024 08:40) (92% - 98%)    Parameters below as of 23 Nov 2024 08:40  Patient On (Oxygen Delivery Method): nasal cannula          11-22-24 @ 07:01  -  11-23-24 @ 07:00  --------------------------------------------------------  IN: 200 mL / OUT: 0 mL / NET: 200 mL        PHYSICAL EXAM:    General: NAD  HEENT: MMM  Neck: supple  Cardiovascular: +S1/S2; RRR  Respiratory: CTA B/L; no W/R/R  Gastrointestinal: soft, NT/ND  Extremities: WWP; no edema, clubbing or cyanosis  Vascular: 2+ radial, DP/PT pulses B/L  Neurological: AAOx3; no focal deficits    MEDICATIONS:  MEDICATIONS  (STANDING):  ALPRAZolam 0.5 milliGRAM(s) Oral at bedtime  ARIPiprazole 2 milliGRAM(s) Oral daily  aspirin  chewable 81 milliGRAM(s) Oral daily  dextrose 5%. 1000 milliLiter(s) (100 mL/Hr) IV Continuous <Continuous>  dextrose 5%. 1000 milliLiter(s) (50 mL/Hr) IV Continuous <Continuous>  dextrose 50% Injectable 25 Gram(s) IV Push once  dextrose 50% Injectable 12.5 Gram(s) IV Push once  dextrose 50% Injectable 25 Gram(s) IV Push once  erythromycin     enteric coated 250 milliGRAM(s) Oral three times a day  escitalopram 10 milliGRAM(s) Oral daily  FIRST- Mouthwash  BLM 5 milliLiter(s) Swish and Swallow three times a day  fluticasone propionate/ salmeterol 250-50 MICROgram(s) Diskus 1 Dose(s) Inhalation two times a day  glucagon  Injectable 1 milliGRAM(s) IntraMuscular once  influenza   Vaccine 0.5 milliLiter(s) IntraMuscular once  insulin glargine Injectable (LANTUS) 8 Unit(s) SubCutaneous at bedtime  insulin lispro (ADMELOG) corrective regimen sliding scale   SubCutaneous Before meals and at bedtime  metoclopramide 10 milliGRAM(s) Oral three times a day before meals  metoprolol succinate  milliGRAM(s) Oral daily  montelukast 10 milliGRAM(s) Oral at bedtime  pantoprazole    Tablet 40 milliGRAM(s) Oral before breakfast  rosuvastatin 10 milliGRAM(s) Oral at bedtime  sucralfate suspension 1 Gram(s) Oral <User Schedule>    MEDICATIONS  (PRN):  acetaminophen     Tablet .. 650 milliGRAM(s) Oral every 6 hours PRN Moderate Pain (4 - 6)  acetaminophen 325 mG/butalbital 50 mG/caffeine 40 mG 1 Tablet(s) Oral every 8 hours PRN migraine  albuterol/ipratropium for Nebulization 3 milliLiter(s) Nebulizer every 4 hours PRN Shortness of Breath and/or Wheezing  ALPRAZolam 0.5 milliGRAM(s) Oral every 8 hours PRN anxiety  aluminum hydroxide/magnesium hydroxide/simethicone Suspension 30 milliLiter(s) Oral every 4 hours PRN Dyspepsia  benzocaine/menthol Lozenge 1 Lozenge Oral every 4 hours PRN Sore Throat  dextrose Oral Gel 15 Gram(s) Oral once PRN Blood Glucose LESS THAN 70 milliGRAM(s)/deciliter  guaiFENesin Oral Liquid (Sugar-Free) 200 milliGRAM(s) Oral every 6 hours PRN Cough  HYDROmorphone  Injectable 1 milliGRAM(s) IV Push every 4 hours PRN Severe Pain (7 - 10)  HYDROmorphone  Injectable 0.5 milliGRAM(s) IV Push every 4 hours PRN Moderate Pain (4 - 6)  ondansetron Injectable 4 milliGRAM(s) IV Push every 6 hours PRN Nausea and/or Vomiting      ALLERGIES:  Allergies    No Known Allergies    Intolerances        LABS:                        8.2    7.51  )-----------( 251      ( 23 Nov 2024 06:59 )             26.0     11-23    136  |  106  |  9   ----------------------------<  117[H]  3.8   |  26  |  0.58    Ca    8.7      23 Nov 2024 06:59      PT/INR - ( 22 Nov 2024 08:19 )   PT: 13.8 sec;   INR: 1.17 ratio         PTT - ( 22 Nov 2024 08:19 )  PTT:60.9 sec  Urinalysis Basic - ( 23 Nov 2024 06:59 )    Color: x / Appearance: x / SG: x / pH: x  Gluc: 117 mg/dL / Ketone: x  / Bili: x / Urobili: x   Blood: x / Protein: x / Nitrite: x   Leuk Esterase: x / RBC: x / WBC x   Sq Epi: x / Non Sq Epi: x / Bacteria: x      CAPILLARY BLOOD GLUCOSE      POCT Blood Glucose.: 163 mg/dL (23 Nov 2024 12:02)      RADIOLOGY & ADDITIONAL TESTS: Reviewed.

## 2024-11-24 LAB
APTT BLD: 60.5 SEC — HIGH (ref 24.5–35.6)
APTT BLD: 77 SEC — HIGH (ref 24.5–35.6)
GLUCOSE BLDC GLUCOMTR-MCNC: 108 MG/DL — HIGH (ref 70–99)
GLUCOSE BLDC GLUCOMTR-MCNC: 153 MG/DL — HIGH (ref 70–99)
GLUCOSE BLDC GLUCOMTR-MCNC: 160 MG/DL — HIGH (ref 70–99)
GLUCOSE BLDC GLUCOMTR-MCNC: 161 MG/DL — HIGH (ref 70–99)
HCT VFR BLD CALC: 27.5 % — LOW (ref 34.5–45)
HGB BLD-MCNC: 8.7 G/DL — LOW (ref 11.5–15.5)
MCHC RBC-ENTMCNC: 28.1 PG — SIGNIFICANT CHANGE UP (ref 27–34)
MCHC RBC-ENTMCNC: 31.6 G/DL — LOW (ref 32–36)
MCV RBC AUTO: 88.7 FL — SIGNIFICANT CHANGE UP (ref 80–100)
PLATELET # BLD AUTO: 277 K/UL — SIGNIFICANT CHANGE UP (ref 150–400)
RBC # BLD: 3.1 M/UL — LOW (ref 3.8–5.2)
RBC # FLD: 14.9 % — HIGH (ref 10.3–14.5)
WBC # BLD: 9.98 K/UL — SIGNIFICANT CHANGE UP (ref 3.8–10.5)
WBC # FLD AUTO: 9.98 K/UL — SIGNIFICANT CHANGE UP (ref 3.8–10.5)

## 2024-11-24 PROCEDURE — 99233 SBSQ HOSP IP/OBS HIGH 50: CPT

## 2024-11-24 RX ADMIN — METOCLOPRAMIDE HYDROCHLORIDE 10 MILLIGRAM(S): 10 TABLET ORAL at 17:09

## 2024-11-24 RX ADMIN — Medication 1600 UNIT(S)/HR: at 12:50

## 2024-11-24 RX ADMIN — METOPROLOL TARTRATE 100 MILLIGRAM(S): 100 TABLET, FILM COATED ORAL at 09:40

## 2024-11-24 RX ADMIN — SUCRALFATE 1 GRAM(S): 1 TABLET ORAL at 20:20

## 2024-11-24 RX ADMIN — METOCLOPRAMIDE HYDROCHLORIDE 10 MILLIGRAM(S): 10 TABLET ORAL at 06:02

## 2024-11-24 RX ADMIN — HYDROMORPHONE HYDROCHLORIDE 1 MILLIGRAM(S): 2 TABLET ORAL at 09:50

## 2024-11-24 RX ADMIN — SUCRALFATE 1 GRAM(S): 1 TABLET ORAL at 11:37

## 2024-11-24 RX ADMIN — Medication 1600 UNIT(S)/HR: at 11:25

## 2024-11-24 RX ADMIN — ROSUVASTATIN CALCIUM 10 MILLIGRAM(S): 5 TABLET, FILM COATED ORAL at 20:20

## 2024-11-24 RX ADMIN — HYDROMORPHONE HYDROCHLORIDE 1 MILLIGRAM(S): 2 TABLET ORAL at 03:01

## 2024-11-24 RX ADMIN — INSULIN GLARGINE 8 UNIT(S): 100 INJECTION, SOLUTION SUBCUTANEOUS at 22:17

## 2024-11-24 RX ADMIN — ESCITALOPRAM OXALATE 10 MILLIGRAM(S): 10 TABLET, FILM COATED ORAL at 09:40

## 2024-11-24 RX ADMIN — Medication 2: at 22:30

## 2024-11-24 RX ADMIN — HYDROMORPHONE HYDROCHLORIDE 1 MILLIGRAM(S): 2 TABLET ORAL at 16:22

## 2024-11-24 RX ADMIN — ARIPIPRAZOLE 2 MILLIGRAM(S): 15 TABLET ORAL at 09:40

## 2024-11-24 RX ADMIN — Medication 1600 UNIT(S)/HR: at 19:27

## 2024-11-24 RX ADMIN — Medication 1600 UNIT(S)/HR: at 06:00

## 2024-11-24 RX ADMIN — Medication 250 MILLIGRAM(S): at 14:21

## 2024-11-24 RX ADMIN — HYDROMORPHONE HYDROCHLORIDE 1 MILLIGRAM(S): 2 TABLET ORAL at 14:21

## 2024-11-24 RX ADMIN — Medication 0.5 MILLIGRAM(S): at 11:37

## 2024-11-24 RX ADMIN — Medication 250 MILLIGRAM(S): at 06:02

## 2024-11-24 RX ADMIN — PANTOPRAZOLE SODIUM 40 MILLIGRAM(S): 40 TABLET, DELAYED RELEASE ORAL at 06:02

## 2024-11-24 RX ADMIN — IPRATROPIUM BROMIDE AND ALBUTEROL SULFATE 3 MILLILITER(S): 2.5; .5 SOLUTION RESPIRATORY (INHALATION) at 08:44

## 2024-11-24 RX ADMIN — Medication 250 MILLIGRAM(S): at 20:18

## 2024-11-24 RX ADMIN — MONTELUKAST SODIUM 10 MILLIGRAM(S): 10 TABLET ORAL at 20:19

## 2024-11-24 RX ADMIN — SUCRALFATE 1 GRAM(S): 1 TABLET ORAL at 06:01

## 2024-11-24 RX ADMIN — HYDROMORPHONE HYDROCHLORIDE 1 MILLIGRAM(S): 2 TABLET ORAL at 20:18

## 2024-11-24 RX ADMIN — Medication 1600 UNIT(S)/HR: at 07:25

## 2024-11-24 RX ADMIN — Medication 0.5 MILLIGRAM(S): at 20:19

## 2024-11-24 RX ADMIN — Medication 81 MILLIGRAM(S): at 09:40

## 2024-11-24 RX ADMIN — FLUTICASONE PROPIONATE AND SALMETEROL XINAFOATE 1 DOSE(S): 45; 21 AEROSOL, METERED RESPIRATORY (INHALATION) at 19:56

## 2024-11-24 RX ADMIN — FLUTICASONE PROPIONATE AND SALMETEROL XINAFOATE 1 DOSE(S): 45; 21 AEROSOL, METERED RESPIRATORY (INHALATION) at 08:44

## 2024-11-24 RX ADMIN — METOCLOPRAMIDE HYDROCHLORIDE 10 MILLIGRAM(S): 10 TABLET ORAL at 11:37

## 2024-11-24 RX ADMIN — Medication 2: at 11:37

## 2024-11-24 RX ADMIN — HYDROMORPHONE HYDROCHLORIDE 1 MILLIGRAM(S): 2 TABLET ORAL at 20:30

## 2024-11-24 NOTE — PROGRESS NOTE ADULT - SUBJECTIVE AND OBJECTIVE BOX
CC: Patient is a 64y old  Female who presents with a chief complaint of inability to tolerate po (23 Nov 2024 13:13)      INTERVAL EVENTS: OZZY    SUBJECTIVE / INTERVAL HPI: Patient seen and examined at bedside. Patient states shes having continued pain in her abdomen     ROS: negative unless otherwise stated above.    VITAL SIGNS:  Vital Signs Last 24 Hrs  T(C): 36.7 (24 Nov 2024 07:15), Max: 36.9 (23 Nov 2024 21:41)  T(F): 98 (24 Nov 2024 07:15), Max: 98.5 (23 Nov 2024 21:41)  HR: 91 (24 Nov 2024 07:15) (89 - 105)  BP: 120/68 (24 Nov 2024 07:15) (113/62 - 139/71)  BP(mean): --  RR: 18 (24 Nov 2024 07:15) (17 - 18)  SpO2: 92% (24 Nov 2024 07:15) (92% - 99%)    Parameters below as of 24 Nov 2024 08:47  Patient On (Oxygen Delivery Method): nasal cannula            PHYSICAL EXAM:    General: NAD  HEENT: MMM  Neck: supple  Cardiovascular: +S1/S2; RRR  Respiratory: CTA B/L; no W/R/R  Gastrointestinal: soft, NT/ND  Extremities: WWP; no edema, clubbing or cyanosis  Vascular: 2+ radial, DP/PT pulses B/L  Neurological: AAOx3; no focal deficits    MEDICATIONS:  MEDICATIONS  (STANDING):  ALPRAZolam 0.5 milliGRAM(s) Oral at bedtime  ARIPiprazole 2 milliGRAM(s) Oral daily  aspirin  chewable 81 milliGRAM(s) Oral daily  dextrose 5%. 1000 milliLiter(s) (100 mL/Hr) IV Continuous <Continuous>  dextrose 5%. 1000 milliLiter(s) (50 mL/Hr) IV Continuous <Continuous>  dextrose 50% Injectable 25 Gram(s) IV Push once  dextrose 50% Injectable 12.5 Gram(s) IV Push once  dextrose 50% Injectable 25 Gram(s) IV Push once  erythromycin     enteric coated 250 milliGRAM(s) Oral three times a day  escitalopram 10 milliGRAM(s) Oral daily  FIRST- Mouthwash  BLM 5 milliLiter(s) Swish and Swallow three times a day  fluticasone propionate/ salmeterol 250-50 MICROgram(s) Diskus 1 Dose(s) Inhalation two times a day  glucagon  Injectable 1 milliGRAM(s) IntraMuscular once  heparin  Infusion. 1500 Unit(s)/Hr (15 mL/Hr) IV Continuous <Continuous>  influenza   Vaccine 0.5 milliLiter(s) IntraMuscular once  insulin glargine Injectable (LANTUS) 8 Unit(s) SubCutaneous at bedtime  insulin lispro (ADMELOG) corrective regimen sliding scale   SubCutaneous Before meals and at bedtime  metoclopramide 10 milliGRAM(s) Oral three times a day before meals  metoprolol succinate  milliGRAM(s) Oral daily  montelukast 10 milliGRAM(s) Oral at bedtime  pantoprazole    Tablet 40 milliGRAM(s) Oral before breakfast  rosuvastatin 10 milliGRAM(s) Oral at bedtime  sucralfate suspension 1 Gram(s) Oral <User Schedule>    MEDICATIONS  (PRN):  acetaminophen     Tablet .. 650 milliGRAM(s) Oral every 6 hours PRN Moderate Pain (4 - 6)  acetaminophen 325 mG/butalbital 50 mG/caffeine 40 mG 1 Tablet(s) Oral every 8 hours PRN migraine  albuterol/ipratropium for Nebulization 3 milliLiter(s) Nebulizer every 4 hours PRN Shortness of Breath and/or Wheezing  ALPRAZolam 0.5 milliGRAM(s) Oral every 8 hours PRN anxiety  aluminum hydroxide/magnesium hydroxide/simethicone Suspension 30 milliLiter(s) Oral every 4 hours PRN Dyspepsia  benzocaine/menthol Lozenge 1 Lozenge Oral every 4 hours PRN Sore Throat  dextrose Oral Gel 15 Gram(s) Oral once PRN Blood Glucose LESS THAN 70 milliGRAM(s)/deciliter  guaiFENesin Oral Liquid (Sugar-Free) 200 milliGRAM(s) Oral every 6 hours PRN Cough  heparin   Injectable 4000 Unit(s) IV Push every 6 hours PRN For aPTT less than 40  heparin   Injectable 2000 Unit(s) IV Push every 6 hours PRN For aPTT between 40 - 57  HYDROmorphone  Injectable 1 milliGRAM(s) IV Push every 4 hours PRN Severe Pain (7 - 10)  HYDROmorphone  Injectable 0.5 milliGRAM(s) IV Push every 4 hours PRN Moderate Pain (4 - 6)  ondansetron Injectable 4 milliGRAM(s) IV Push every 6 hours PRN Nausea and/or Vomiting      ALLERGIES:  Allergies    No Known Allergies    Intolerances        LABS:                        8.7    9.98  )-----------( 277      ( 24 Nov 2024 05:21 )             27.5     11-23    136  |  106  |  9   ----------------------------<  117[H]  3.8   |  26  |  0.58    Ca    8.7      23 Nov 2024 06:59      PTT - ( 24 Nov 2024 05:21 )  PTT:77.0 sec  Urinalysis Basic - ( 23 Nov 2024 06:59 )    Color: x / Appearance: x / SG: x / pH: x  Gluc: 117 mg/dL / Ketone: x  / Bili: x / Urobili: x   Blood: x / Protein: x / Nitrite: x   Leuk Esterase: x / RBC: x / WBC x   Sq Epi: x / Non Sq Epi: x / Bacteria: x      CAPILLARY BLOOD GLUCOSE      POCT Blood Glucose.: 160 mg/dL (24 Nov 2024 11:34)      RADIOLOGY & ADDITIONAL TESTS: Reviewed.

## 2024-11-24 NOTE — PROGRESS NOTE ADULT - ASSESSMENT
64 year old woman with poor oral intake and epigastric pain with likely gastroparesis s/p TPN & removal due to hyperglycemia now s/p ercp with pancreatitis, s/p feeding tube placement with need for advancement     #Intractable nausea with poor oral intake likely due to Gastroparesis exacerbated by GLP-1  # Moderate protein-calorie malnutrition:  h/o colectomy with iliorectal anastomosis for IBD  EGD/colonoscopy --> gastritis, esophagitis, c/w PPI  Gastroparesis clinically diagnosed as she was unable to complete Gastric emptying study on admission due to vomiting however after discussion with ir when gj tube was placed yesterday the contrast was not moving much   c/w Reglan  10mg TID +Erythromycin 250mg Tid  -- switch to orals when able to take   barely tolerating clears  s/p gj tube placement with ir however needs to be endoscopically advanced prior to usage  -f/u gi recs re: advancement prior to starting feeds will need to monitor closely for refeeding syndrome as discussed with nutrition     #sepsis RESOLVED  patient with sepsis 11/17  elevated wbc & tachycardia  low grade temps now improving howver tachycardia sustaining  initially was thought to be due to pancreatitis however pain appears to be about the same   however coughing with questionable developing pnas on ct scan no urinary sx. ua positive however vague abdominal sx unclear.. patient tx with 3 days of ctx for past few days  abd ultrasound with some colonic dilation reviewed with dr vanegas appears similar on ct earlier this week patients abdomen nt distention improving and continues to pass bms   id in agreement with no further abx    #pancreatitis RESOLVED  patient with pancreatitis s/p ercp 11/15   seen on ct imaging with elevated lipase and worsening abdominal pain  clinically looks good however continues to endorse ongoing pain  improvement of tachycardia   off of fluids now   -f/u gi recs    #Choledocholithiasis:  s/p ERCP and stent placement last week   s/p repeat ERCP on 11.15 for persistent  14mm CBD stone ; CBD and Pancreatic duct cleared, sphincterotomy and metal stent placed    # DM type II: uncontrolled due to TPN  will have to trend sugars when feeds started this weekend   -lantus & s/s    #Anxiety:   c/w Lexapro, c/w  Xanax prn Tid  On Abilify 5mg [place] for the past month, now dose lowered to 2mg /day.  Psych evaluation noted: c/w Abilify low dose for now     #Paroxymal afib / HTN / CAD:  - off lisinopril due to borderline BP  Eliquis on hold for procedure; will restart today   - s/p 5 stents last were 2 years ago  - restart plavix after gj tube in place   - heparin drip restarted today    #prophylactic measure  F: none  E: replete as needed  N: clear liquid diet - Nothing via gj tube until advanced  DVT ppx: heparin

## 2024-11-25 LAB
ANION GAP SERPL CALC-SCNC: 3 MMOL/L — LOW (ref 5–17)
APTT BLD: 78.3 SEC — HIGH (ref 24.5–35.6)
BUN SERPL-MCNC: 7 MG/DL — SIGNIFICANT CHANGE UP (ref 7–23)
CALCIUM SERPL-MCNC: 9 MG/DL — SIGNIFICANT CHANGE UP (ref 8.5–10.1)
CHLORIDE SERPL-SCNC: 103 MMOL/L — SIGNIFICANT CHANGE UP (ref 96–108)
CO2 SERPL-SCNC: 30 MMOL/L — SIGNIFICANT CHANGE UP (ref 22–31)
CREAT SERPL-MCNC: 0.7 MG/DL — SIGNIFICANT CHANGE UP (ref 0.5–1.3)
EGFR: 98 ML/MIN/1.73M2 — SIGNIFICANT CHANGE UP
GLUCOSE BLDC GLUCOMTR-MCNC: 135 MG/DL — HIGH (ref 70–99)
GLUCOSE BLDC GLUCOMTR-MCNC: 146 MG/DL — HIGH (ref 70–99)
GLUCOSE BLDC GLUCOMTR-MCNC: 151 MG/DL — HIGH (ref 70–99)
GLUCOSE BLDC GLUCOMTR-MCNC: 164 MG/DL — HIGH (ref 70–99)
GLUCOSE SERPL-MCNC: 136 MG/DL — HIGH (ref 70–99)
HCT VFR BLD CALC: 28.3 % — LOW (ref 34.5–45)
HGB BLD-MCNC: 9.1 G/DL — LOW (ref 11.5–15.5)
MCHC RBC-ENTMCNC: 28.5 PG — SIGNIFICANT CHANGE UP (ref 27–34)
MCHC RBC-ENTMCNC: 32.2 G/DL — SIGNIFICANT CHANGE UP (ref 32–36)
MCV RBC AUTO: 88.7 FL — SIGNIFICANT CHANGE UP (ref 80–100)
PLATELET # BLD AUTO: 316 K/UL — SIGNIFICANT CHANGE UP (ref 150–400)
POTASSIUM SERPL-MCNC: 3.9 MMOL/L — SIGNIFICANT CHANGE UP (ref 3.5–5.3)
POTASSIUM SERPL-SCNC: 3.9 MMOL/L — SIGNIFICANT CHANGE UP (ref 3.5–5.3)
RBC # BLD: 3.19 M/UL — LOW (ref 3.8–5.2)
RBC # FLD: 15 % — HIGH (ref 10.3–14.5)
SODIUM SERPL-SCNC: 136 MMOL/L — SIGNIFICANT CHANGE UP (ref 135–145)
WBC # BLD: 10.57 K/UL — HIGH (ref 3.8–10.5)
WBC # FLD AUTO: 10.57 K/UL — HIGH (ref 3.8–10.5)

## 2024-11-25 PROCEDURE — 99232 SBSQ HOSP IP/OBS MODERATE 35: CPT

## 2024-11-25 RX ADMIN — HYDROMORPHONE HYDROCHLORIDE 1 MILLIGRAM(S): 2 TABLET ORAL at 01:29

## 2024-11-25 RX ADMIN — Medication 1600 UNIT(S)/HR: at 13:54

## 2024-11-25 RX ADMIN — INSULIN GLARGINE 8 UNIT(S): 100 INJECTION, SOLUTION SUBCUTANEOUS at 22:09

## 2024-11-25 RX ADMIN — Medication 1600 UNIT(S)/HR: at 19:38

## 2024-11-25 RX ADMIN — Medication 250 MILLIGRAM(S): at 13:55

## 2024-11-25 RX ADMIN — METOCLOPRAMIDE HYDROCHLORIDE 10 MILLIGRAM(S): 10 TABLET ORAL at 18:07

## 2024-11-25 RX ADMIN — SUCRALFATE 1 GRAM(S): 1 TABLET ORAL at 22:08

## 2024-11-25 RX ADMIN — HYDROMORPHONE HYDROCHLORIDE 1 MILLIGRAM(S): 2 TABLET ORAL at 13:55

## 2024-11-25 RX ADMIN — Medication 2: at 12:45

## 2024-11-25 RX ADMIN — HYDROMORPHONE HYDROCHLORIDE 1 MILLIGRAM(S): 2 TABLET ORAL at 22:30

## 2024-11-25 RX ADMIN — HYDROMORPHONE HYDROCHLORIDE 1 MILLIGRAM(S): 2 TABLET ORAL at 18:01

## 2024-11-25 RX ADMIN — SUCRALFATE 1 GRAM(S): 1 TABLET ORAL at 18:05

## 2024-11-25 RX ADMIN — MONTELUKAST SODIUM 10 MILLIGRAM(S): 10 TABLET ORAL at 22:07

## 2024-11-25 RX ADMIN — Medication 0.5 MILLIGRAM(S): at 22:06

## 2024-11-25 RX ADMIN — ROSUVASTATIN CALCIUM 10 MILLIGRAM(S): 5 TABLET, FILM COATED ORAL at 22:06

## 2024-11-25 RX ADMIN — Medication 1600 UNIT(S)/HR: at 07:17

## 2024-11-25 RX ADMIN — HYDROMORPHONE HYDROCHLORIDE 0.5 MILLIGRAM(S): 2 TABLET ORAL at 09:47

## 2024-11-25 RX ADMIN — METOPROLOL TARTRATE 100 MILLIGRAM(S): 100 TABLET, FILM COATED ORAL at 12:51

## 2024-11-25 RX ADMIN — METOCLOPRAMIDE HYDROCHLORIDE 10 MILLIGRAM(S): 10 TABLET ORAL at 12:51

## 2024-11-25 RX ADMIN — HYDROMORPHONE HYDROCHLORIDE 1 MILLIGRAM(S): 2 TABLET ORAL at 05:39

## 2024-11-25 RX ADMIN — FLUTICASONE PROPIONATE AND SALMETEROL XINAFOATE 1 DOSE(S): 45; 21 AEROSOL, METERED RESPIRATORY (INHALATION) at 20:16

## 2024-11-25 RX ADMIN — Medication 250 MILLIGRAM(S): at 22:07

## 2024-11-25 RX ADMIN — Medication 1600 UNIT(S)/HR: at 23:40

## 2024-11-25 RX ADMIN — FLUTICASONE PROPIONATE AND SALMETEROL XINAFOATE 1 DOSE(S): 45; 21 AEROSOL, METERED RESPIRATORY (INHALATION) at 08:19

## 2024-11-25 RX ADMIN — HYDROMORPHONE HYDROCHLORIDE 1 MILLIGRAM(S): 2 TABLET ORAL at 01:43

## 2024-11-25 RX ADMIN — Medication 0.5 MILLIGRAM(S): at 11:14

## 2024-11-25 RX ADMIN — HYDROMORPHONE HYDROCHLORIDE 1 MILLIGRAM(S): 2 TABLET ORAL at 22:09

## 2024-11-25 RX ADMIN — Medication 1600 UNIT(S)/HR: at 07:15

## 2024-11-25 NOTE — CHART NOTE - NSCHARTNOTEFT_GEN_A_CORE
Clinical Nutrition TF BRIEF NOTE    *64-year-old female with history of GERD  HTN, DM, polyps, and afib with 5 cardiac stents. presents with epigastric pain for the last couple of weeks.  Her daughter brought her in, after speaking with her GI doctor who stated patient should not have been on ozempic before and probably have taken insulin instead. She states that she had just uptitrated her dose of ozempic to 0.5 from 0.25 earlier this week. She had been on the 0.25 for 5 weeks. She states the pain bothers her more than the nausea and vomiting. Patient states she feels very minimal relief since she came in.  She denies NSAID use, bleeding or black stools. She reports 10lb weight loss and has been on ozempic for her diabetes. Denies diarrhea or urinary symptoms. Admitted for Nausea and vomiting   *11/21: Remains off TPN 2/2 uncontrolled BG levels however per labs review POCT improving x 24 hrs. Was on clear liquids and transitioned to low fiber diet however was unable to tolerate PO, w/ vomiting episodes after consuming meals. D/w MD Mathur, pt planned to undergo GJ tube placement however d/t worsening abdominal distension, tachycardia despite IVF, persistent low grade temps and persistent abdominal pain, procedure put on hold at this time. Pending abdominal imaging, echo, labs, and cultures per GI note. Once GJ tube placed, will initiate TF of Glucerna 1.5 2/2 hyperglycemia.     *current status: s/p GJ tube placement on 11/22, pending advancement of GJ tomorrow 11/26 (before feeds can be started) per hospitalist note. Gastroparesis clinically diagnosed, pt unable to complete Gastric emptying study on admission due to vomiting however after discussion with ir when gj tube was placed yesterday the contrast was not moving much; per hospitalist note. D/w MD Hernandez today, will pend TF recs to initiate once GJ tube advanced tomorrow. See TF recs below.    *labs reviewed:  11-25    136  |  103  |  7   ----------------------------<  136[H]  3.9   |  30  |  0.70    Ca    9.0      25 Nov 2024 07:08      CAPILLARY BLOOD GLUCOSE  POCT Blood Glucose.: 135 mg/dL (25 Nov 2024 05:50)  POCT Blood Glucose.: 161 mg/dL (24 Nov 2024 22:16)  POCT Blood Glucose.: 153 mg/dL (24 Nov 2024 17:01)    Lipid Panel: Date/Time: 11-10-24 @ 04:34  Triglycerides, Serum: 160    *pertinent meds: xanax, abilify, aspirin, abx, lexapro, lantus 8 units x 24 hours, ADMELOG 4 units x 24 hours, reglan, singulair, protonix, carafate, dilaudid    *I&O's Detail - none documented     *No BM documented - not on bowel regimen     *malnutrition: Pt continues to meet criteria for moderate malnutrition in context of acute on chronic illness r/t decreased ability to meet increased nutrient needs 2/2 N/V, DM AEB >7.5% wt loss x 2mo, <50% ENN x 4 weeks, mild muscle/fat wasting    Estimated Needs: based on 55 Kg (wt from 10/27)  Calories: 5319-2450 Kcal (35-40 Kcal/Kg)  Protein: 77-99 g (1.4-1.8 g/Kg)  Fluids: 9994-8318 mL (35-40 mL/Kg)    RECOMMENDATIONS:  1) Once GJ tube advanced, initiate Glucerna 1.5 @ 20 cc/hr, increase by 10 cc/hr q12 hours until goal rate of 65 cc/hr is met (total volume = 1300 mL). Will provide ~ 1950 kcal, 107 g protein, and 987 mL free water.   2) monitor hydration status; recommend free water flushes of 40 cc/hr (which provides ~800 mL of additional water per day)  3) monitor TF tolerance; keep back of bed > 45 degrees  4) monitor BM: if > 3 days without BM, add bowel regimen prn  5) daily wt checks to track/trend changes  6) Monitor closely for refeeding syndrome as pt at HIGH risk - obtain daily labs including BMP, Mg, and Phos, and provide repletion prn. Recommend adding 200mg thiamine and MVI w/ min daily. Also consider checking B6, B12, thiamine, folate, carnitine, and copper levels as malnutrition in cause these to be deficient     *will continue to monitor and adjust treatment plan prn*  Cecilia Gusman, PhD, MS, [phone]

## 2024-11-25 NOTE — PROGRESS NOTE ADULT - SUBJECTIVE AND OBJECTIVE BOX
CC: Abd pain    S:  11/25: I Introduced myself to patient Marjorie Mejia at bedside, and asked how she is feeling.  Pt states she still has ongoing abd pain.  I sat down at bedside to further discuss symptoms and plan of care.   Daughter was at bedside, she interjected, and asked me if the endoscopy will take place today.  I told her it will be tomorrow as per GI team.  The daughter was very upset by this news.  I tried to the best of my ability to address all questions and concerns however I as unable to do so as her daughter was speaking in an aggressive tone, loudly, and argumentatively.  As I attempted to further discuss her mothers care she continued to loudly speak in profanity and aggression.  She made it clear that she does not want me to take care of her mother in any capacity moving forward.    ROS: + abd pain.    Vital Signs Last 24 Hrs  T(C): 36.7 (25 Nov 2024 08:36), Max: 36.7 (24 Nov 2024 17:00)  T(F): 98 (25 Nov 2024 08:36), Max: 98.1 (24 Nov 2024 17:00)  HR: 99 (25 Nov 2024 11:11) (85 - 106)  BP: 111/72 (25 Nov 2024 11:11) (97/56 - 138/63)  BP(mean): --  RR: 17 (25 Nov 2024 08:36) (17 - 19)  SpO2: 98% (25 Nov 2024 08:36) (91% - 98%)    Parameters below as of 25 Nov 2024 08:36  Patient On (Oxygen Delivery Method): nasal cannula      Physical therapy:  GEN: Weak appearing, NAD  Unable to obtain full physical therapy as daughter did not want me to further evaluate her.      MEDICATIONS  (STANDING):  ALPRAZolam 0.5 milliGRAM(s) Oral at bedtime  ARIPiprazole 2 milliGRAM(s) Oral daily  aspirin  chewable 81 milliGRAM(s) Oral daily  dextrose 5%. 1000 milliLiter(s) (50 mL/Hr) IV Continuous <Continuous>  dextrose 5%. 1000 milliLiter(s) (100 mL/Hr) IV Continuous <Continuous>  dextrose 50% Injectable 25 Gram(s) IV Push once  dextrose 50% Injectable 12.5 Gram(s) IV Push once  dextrose 50% Injectable 25 Gram(s) IV Push once  erythromycin     enteric coated 250 milliGRAM(s) Oral three times a day  escitalopram 10 milliGRAM(s) Oral daily  FIRST- Mouthwash  BLM 5 milliLiter(s) Swish and Swallow three times a day  fluticasone propionate/ salmeterol 250-50 MICROgram(s) Diskus 1 Dose(s) Inhalation two times a day  glucagon  Injectable 1 milliGRAM(s) IntraMuscular once  heparin  Infusion. 1500 Unit(s)/Hr (15 mL/Hr) IV Continuous <Continuous>  influenza   Vaccine 0.5 milliLiter(s) IntraMuscular once  insulin glargine Injectable (LANTUS) 8 Unit(s) SubCutaneous at bedtime  insulin lispro (ADMELOG) corrective regimen sliding scale   SubCutaneous Before meals and at bedtime  metoclopramide 10 milliGRAM(s) Oral three times a day before meals  metoprolol succinate  milliGRAM(s) Oral daily  montelukast 10 milliGRAM(s) Oral at bedtime  pantoprazole    Tablet 40 milliGRAM(s) Oral before breakfast  rosuvastatin 10 milliGRAM(s) Oral at bedtime  sucralfate suspension 1 Gram(s) Oral <User Schedule>    MEDICATIONS  (PRN):  acetaminophen     Tablet .. 650 milliGRAM(s) Oral every 6 hours PRN Moderate Pain (4 - 6)  acetaminophen 325 mG/butalbital 50 mG/caffeine 40 mG 1 Tablet(s) Oral every 8 hours PRN migraine  albuterol/ipratropium for Nebulization 3 milliLiter(s) Nebulizer every 4 hours PRN Shortness of Breath and/or Wheezing  ALPRAZolam 0.5 milliGRAM(s) Oral every 8 hours PRN anxiety  aluminum hydroxide/magnesium hydroxide/simethicone Suspension 30 milliLiter(s) Oral every 4 hours PRN Dyspepsia  benzocaine/menthol Lozenge 1 Lozenge Oral every 4 hours PRN Sore Throat  dextrose Oral Gel 15 Gram(s) Oral once PRN Blood Glucose LESS THAN 70 milliGRAM(s)/deciliter  guaiFENesin Oral Liquid (Sugar-Free) 200 milliGRAM(s) Oral every 6 hours PRN Cough  heparin   Injectable 4000 Unit(s) IV Push every 6 hours PRN For aPTT less than 40  heparin   Injectable 2000 Unit(s) IV Push every 6 hours PRN For aPTT between 40 - 57  HYDROmorphone  Injectable 1 milliGRAM(s) IV Push every 4 hours PRN Severe Pain (7 - 10)  HYDROmorphone  Injectable 0.5 milliGRAM(s) IV Push every 4 hours PRN Moderate Pain (4 - 6)  ondansetron Injectable 4 milliGRAM(s) IV Push every 6 hours PRN Nausea and/or Vomiting                                9.1    10.57 )-----------( 316      ( 25 Nov 2024 07:08 )             28.3     11-25    136  |  103  |  7   ----------------------------<  136[H]  3.9   |  30  |  0.70    Ca    9.0      25 Nov 2024 07:08      CAPILLARY BLOOD GLUCOSE      POCT Blood Glucose.: 135 mg/dL (25 Nov 2024 05:50)  POCT Blood Glucose.: 161 mg/dL (24 Nov 2024 22:16)  POCT Blood Glucose.: 153 mg/dL (24 Nov 2024 17:01)      PTT - ( 25 Nov 2024 07:08 )  PTT:78.3 sec  Urinalysis Basic - ( 25 Nov 2024 07:08 )    Color: x / Appearance: x / SG: x / pH: x  Gluc: 136 mg/dL / Ketone: x  / Bili: x / Urobili: x   Blood: x / Protein: x / Nitrite: x   Leuk Esterase: x / RBC: x / WBC x   Sq Epi: x / Non Sq Epi: x / Bacteria: x          Assessment and Plan:  64 year old woman with persistent abdominal pain and poor oral intake with hospital course complicated by choledocholithiasis, pancreatitis.  	    Persistent nausea, abdominal pain likely due to Gastroparesis exacerbated by GLP-1 / Moderate to severe protein-calorie malnutrition:  - h/o colectomy with iliorectal anastomosis for IBD  - EGD/colonoscopy --> gastritis, esophagitis, c/w PPI  - Gastroparesis clinically diagnosed as she was unable to complete Gastric emptying study on admission due to vomiting however after discussion with ir when gj tube was placed the contrast was not moving much   - c/w Reglan  10mg TID +Erythromycin 250mg Tid  -- switch to orals when able to take   - s/p gj tube placement with ir however needs to be endoscopically advanced prior to usage, planned for tomorrow with Dr. Suh, clears for today and NPO after midnight.   - Restart feeds per nutrition recs once GJ able to be used and monitor closely for refeeding syndrome       Pancreatitis: RESOLVED  - Pancreatitis s/p ercp 11/15   - s/p IVFs    Choledocholithiasis:  - s/p ERCP and stent placement   - s/p repeat ERCP on 11/15 for persistent  14mm CBD stone ; CBD and Pancreatic duct cleared, sphincterotomy and metal stent placed    DM type II: uncontrolled due to TPN  - monitor blood sugars  - c/w insulin therapy       Anxiety:   - c/w Lexapro, c/w  Xanax prn Tid  - On Abilify 5mg qd prior toadmission.    - Psych evaluation noted: c/w Abilify low dose for now       #Paroxymal afib / HTN / CAD:  - off lisinopril due to borderline BP  - Eliquis on hold for EGD tomorrow  - s/p 5 stents last were 2 years ago  - restart plavix after gj tube in place   - c/w heparin drip       DVT ppx:  - heparin            CC: Abd pain    S:  11/25: I Introduced myself to patient Marjorie Mejia at bedside, and asked how she is feeling.  Pt states she still has ongoing abd pain.  I sat down at bedside to further discuss symptoms and plan of care.   Daughter was at bedside, she interjected, and asked me if the endoscopy will take place today.  I told her it will be tomorrow as per GI team.  The daughter was very upset by this news.  I tried to the best of my ability to address all questions and concerns however I as unable to do so as her daughter was speaking in an aggressive tone, loudly, and argumentatively.  As I attempted to further discuss her mothers care she continued to loudly speak in profanity and belligerence.  She made it clear that she does not want me to take care of her mother in any capacity moving forward.    ROS: + abd pain.    Vital Signs Last 24 Hrs  T(C): 36.7 (25 Nov 2024 08:36), Max: 36.7 (24 Nov 2024 17:00)  T(F): 98 (25 Nov 2024 08:36), Max: 98.1 (24 Nov 2024 17:00)  HR: 99 (25 Nov 2024 11:11) (85 - 106)  BP: 111/72 (25 Nov 2024 11:11) (97/56 - 138/63)  BP(mean): --  RR: 17 (25 Nov 2024 08:36) (17 - 19)  SpO2: 98% (25 Nov 2024 08:36) (91% - 98%)    Parameters below as of 25 Nov 2024 08:36  Patient On (Oxygen Delivery Method): nasal cannula      Physical therapy:  GEN: Weak appearing, NAD  Unable to obtain full physical therapy as daughter did not want me to further evaluate her.      MEDICATIONS  (STANDING):  ALPRAZolam 0.5 milliGRAM(s) Oral at bedtime  ARIPiprazole 2 milliGRAM(s) Oral daily  aspirin  chewable 81 milliGRAM(s) Oral daily  dextrose 5%. 1000 milliLiter(s) (50 mL/Hr) IV Continuous <Continuous>  dextrose 5%. 1000 milliLiter(s) (100 mL/Hr) IV Continuous <Continuous>  dextrose 50% Injectable 25 Gram(s) IV Push once  dextrose 50% Injectable 12.5 Gram(s) IV Push once  dextrose 50% Injectable 25 Gram(s) IV Push once  erythromycin     enteric coated 250 milliGRAM(s) Oral three times a day  escitalopram 10 milliGRAM(s) Oral daily  FIRST- Mouthwash  BLM 5 milliLiter(s) Swish and Swallow three times a day  fluticasone propionate/ salmeterol 250-50 MICROgram(s) Diskus 1 Dose(s) Inhalation two times a day  glucagon  Injectable 1 milliGRAM(s) IntraMuscular once  heparin  Infusion. 1500 Unit(s)/Hr (15 mL/Hr) IV Continuous <Continuous>  influenza   Vaccine 0.5 milliLiter(s) IntraMuscular once  insulin glargine Injectable (LANTUS) 8 Unit(s) SubCutaneous at bedtime  insulin lispro (ADMELOG) corrective regimen sliding scale   SubCutaneous Before meals and at bedtime  metoclopramide 10 milliGRAM(s) Oral three times a day before meals  metoprolol succinate  milliGRAM(s) Oral daily  montelukast 10 milliGRAM(s) Oral at bedtime  pantoprazole    Tablet 40 milliGRAM(s) Oral before breakfast  rosuvastatin 10 milliGRAM(s) Oral at bedtime  sucralfate suspension 1 Gram(s) Oral <User Schedule>    MEDICATIONS  (PRN):  acetaminophen     Tablet .. 650 milliGRAM(s) Oral every 6 hours PRN Moderate Pain (4 - 6)  acetaminophen 325 mG/butalbital 50 mG/caffeine 40 mG 1 Tablet(s) Oral every 8 hours PRN migraine  albuterol/ipratropium for Nebulization 3 milliLiter(s) Nebulizer every 4 hours PRN Shortness of Breath and/or Wheezing  ALPRAZolam 0.5 milliGRAM(s) Oral every 8 hours PRN anxiety  aluminum hydroxide/magnesium hydroxide/simethicone Suspension 30 milliLiter(s) Oral every 4 hours PRN Dyspepsia  benzocaine/menthol Lozenge 1 Lozenge Oral every 4 hours PRN Sore Throat  dextrose Oral Gel 15 Gram(s) Oral once PRN Blood Glucose LESS THAN 70 milliGRAM(s)/deciliter  guaiFENesin Oral Liquid (Sugar-Free) 200 milliGRAM(s) Oral every 6 hours PRN Cough  heparin   Injectable 4000 Unit(s) IV Push every 6 hours PRN For aPTT less than 40  heparin   Injectable 2000 Unit(s) IV Push every 6 hours PRN For aPTT between 40 - 57  HYDROmorphone  Injectable 1 milliGRAM(s) IV Push every 4 hours PRN Severe Pain (7 - 10)  HYDROmorphone  Injectable 0.5 milliGRAM(s) IV Push every 4 hours PRN Moderate Pain (4 - 6)  ondansetron Injectable 4 milliGRAM(s) IV Push every 6 hours PRN Nausea and/or Vomiting                                9.1    10.57 )-----------( 316      ( 25 Nov 2024 07:08 )             28.3     11-25    136  |  103  |  7   ----------------------------<  136[H]  3.9   |  30  |  0.70    Ca    9.0      25 Nov 2024 07:08      CAPILLARY BLOOD GLUCOSE      POCT Blood Glucose.: 135 mg/dL (25 Nov 2024 05:50)  POCT Blood Glucose.: 161 mg/dL (24 Nov 2024 22:16)  POCT Blood Glucose.: 153 mg/dL (24 Nov 2024 17:01)      PTT - ( 25 Nov 2024 07:08 )  PTT:78.3 sec  Urinalysis Basic - ( 25 Nov 2024 07:08 )    Color: x / Appearance: x / SG: x / pH: x  Gluc: 136 mg/dL / Ketone: x  / Bili: x / Urobili: x   Blood: x / Protein: x / Nitrite: x   Leuk Esterase: x / RBC: x / WBC x   Sq Epi: x / Non Sq Epi: x / Bacteria: x          Assessment and Plan:  64 year old woman with persistent abdominal pain and poor oral intake with hospital course complicated by choledocholithiasis, pancreatitis.  	    Persistent nausea, abdominal pain likely due to Gastroparesis exacerbated by GLP-1 / Moderate to severe protein-calorie malnutrition:  - h/o colectomy with iliorectal anastomosis for IBD  - EGD/colonoscopy --> gastritis, esophagitis, c/w PPI  - Gastroparesis clinically diagnosed as she was unable to complete Gastric emptying study on admission due to vomiting however after discussion with ir when gj tube was placed the contrast was not moving much   - c/w Reglan  10mg TID +Erythromycin 250mg Tid  -- switch to orals when able to take   - s/p gj tube placement with ir however needs to be endoscopically advanced prior to usage, planned for tomorrow with kaylynn Ramos for today and NPO after midnight.   - Restart feeds per nutrition recs once GJ able to be used and monitor closely for refeeding syndrome       Pancreatitis: RESOLVED  - Pancreatitis s/p ercp 11/15   - s/p IVFs    Choledocholithiasis:  - s/p ERCP and stent placement   - s/p repeat ERCP on 11/15 for persistent  14mm CBD stone ; CBD and Pancreatic duct cleared, sphincterotomy and metal stent placed    DM type II: uncontrolled due to TPN  - monitor blood sugars  - c/w insulin therapy       Anxiety:   - c/w Lexapro, c/w  Xanax prn Tid  - On Abilify 5mg qd prior toadmission.    - Psych evaluation noted: c/w Abilify low dose for now       #Paroxymal afib / HTN / CAD:  - off lisinopril due to borderline BP  - Eliquis on hold for EGD tomorrow  - s/p 5 stents last were 2 years ago  - restart plavix after gj tube in place   - c/w heparin drip       DVT ppx:  - heparin

## 2024-11-26 ENCOUNTER — TRANSCRIPTION ENCOUNTER (OUTPATIENT)
Age: 64
End: 2024-11-26

## 2024-11-26 LAB
APTT BLD: 29.2 SEC — SIGNIFICANT CHANGE UP (ref 24.5–35.6)
GLUCOSE BLDC GLUCOMTR-MCNC: 109 MG/DL — HIGH (ref 70–99)
GLUCOSE BLDC GLUCOMTR-MCNC: 112 MG/DL — HIGH (ref 70–99)
GLUCOSE BLDC GLUCOMTR-MCNC: 121 MG/DL — HIGH (ref 70–99)
GLUCOSE BLDC GLUCOMTR-MCNC: 129 MG/DL — HIGH (ref 70–99)
HCT VFR BLD CALC: 30.1 % — LOW (ref 34.5–45)
HGB BLD-MCNC: 9.4 G/DL — LOW (ref 11.5–15.5)
MCHC RBC-ENTMCNC: 28.1 PG — SIGNIFICANT CHANGE UP (ref 27–34)
MCHC RBC-ENTMCNC: 31.2 G/DL — LOW (ref 32–36)
MCV RBC AUTO: 90.1 FL — SIGNIFICANT CHANGE UP (ref 80–100)
PLATELET # BLD AUTO: 346 K/UL — SIGNIFICANT CHANGE UP (ref 150–400)
RBC # BLD: 3.34 M/UL — LOW (ref 3.8–5.2)
RBC # FLD: 15.2 % — HIGH (ref 10.3–14.5)
WBC # BLD: 9.35 K/UL — SIGNIFICANT CHANGE UP (ref 3.8–10.5)
WBC # FLD AUTO: 9.35 K/UL — SIGNIFICANT CHANGE UP (ref 3.8–10.5)

## 2024-11-26 PROCEDURE — 99232 SBSQ HOSP IP/OBS MODERATE 35: CPT

## 2024-11-26 RX ORDER — SODIUM CHLORIDE 9 MG/ML
10 INJECTION, SOLUTION INTRAMUSCULAR; INTRAVENOUS; SUBCUTANEOUS
Refills: 0 | Status: DISCONTINUED | OUTPATIENT
Start: 2024-11-26 | End: 2024-12-10

## 2024-11-26 RX ORDER — HYDROMORPHONE HYDROCHLORIDE 2 MG/1
1 TABLET ORAL EVERY 4 HOURS
Refills: 0 | Status: DISCONTINUED | OUTPATIENT
Start: 2024-11-26 | End: 2024-11-29

## 2024-11-26 RX ORDER — SODIUM CHLORIDE 9 MG/ML
1000 INJECTION, SOLUTION INTRAMUSCULAR; INTRAVENOUS; SUBCUTANEOUS
Refills: 0 | Status: DISCONTINUED | OUTPATIENT
Start: 2024-11-26 | End: 2024-11-29

## 2024-11-26 RX ORDER — CHLORHEXIDINE GLUCONATE 1.2 MG/ML
1 RINSE ORAL
Refills: 0 | Status: DISCONTINUED | OUTPATIENT
Start: 2024-11-26 | End: 2024-12-10

## 2024-11-26 RX ADMIN — HYDROMORPHONE HYDROCHLORIDE 1 MILLIGRAM(S): 2 TABLET ORAL at 22:46

## 2024-11-26 RX ADMIN — HYDROMORPHONE HYDROCHLORIDE 1 MILLIGRAM(S): 2 TABLET ORAL at 03:32

## 2024-11-26 RX ADMIN — FLUTICASONE PROPIONATE AND SALMETEROL XINAFOATE 1 DOSE(S): 45; 21 AEROSOL, METERED RESPIRATORY (INHALATION) at 08:05

## 2024-11-26 RX ADMIN — PANTOPRAZOLE SODIUM 40 MILLIGRAM(S): 40 TABLET, DELAYED RELEASE ORAL at 06:41

## 2024-11-26 RX ADMIN — HYDROMORPHONE HYDROCHLORIDE 1 MILLIGRAM(S): 2 TABLET ORAL at 08:53

## 2024-11-26 RX ADMIN — MONTELUKAST SODIUM 10 MILLIGRAM(S): 10 TABLET ORAL at 22:45

## 2024-11-26 RX ADMIN — Medication 0.5 MILLIGRAM(S): at 04:07

## 2024-11-26 RX ADMIN — Medication 250 MILLIGRAM(S): at 06:41

## 2024-11-26 RX ADMIN — HYDROMORPHONE HYDROCHLORIDE 1 MILLIGRAM(S): 2 TABLET ORAL at 18:03

## 2024-11-26 RX ADMIN — FLUTICASONE PROPIONATE AND SALMETEROL XINAFOATE 1 DOSE(S): 45; 21 AEROSOL, METERED RESPIRATORY (INHALATION) at 20:16

## 2024-11-26 RX ADMIN — SUCRALFATE 1 GRAM(S): 1 TABLET ORAL at 06:40

## 2024-11-26 RX ADMIN — Medication 0.5 MILLIGRAM(S): at 22:46

## 2024-11-26 RX ADMIN — SODIUM CHLORIDE 75 MILLILITER(S): 9 INJECTION, SOLUTION INTRAMUSCULAR; INTRAVENOUS; SUBCUTANEOUS at 20:24

## 2024-11-26 RX ADMIN — ROSUVASTATIN CALCIUM 10 MILLIGRAM(S): 5 TABLET, FILM COATED ORAL at 22:45

## 2024-11-26 RX ADMIN — METOCLOPRAMIDE HYDROCHLORIDE 10 MILLIGRAM(S): 10 TABLET ORAL at 18:03

## 2024-11-26 RX ADMIN — Medication 0.5 MILLIGRAM(S): at 19:00

## 2024-11-26 RX ADMIN — SUCRALFATE 1 GRAM(S): 1 TABLET ORAL at 22:45

## 2024-11-26 RX ADMIN — METOCLOPRAMIDE HYDROCHLORIDE 10 MILLIGRAM(S): 10 TABLET ORAL at 06:40

## 2024-11-26 RX ADMIN — Medication 250 MILLIGRAM(S): at 22:44

## 2024-11-26 RX ADMIN — HYDROMORPHONE HYDROCHLORIDE 1 MILLIGRAM(S): 2 TABLET ORAL at 13:07

## 2024-11-26 RX ADMIN — HYDROMORPHONE HYDROCHLORIDE 1 MILLIGRAM(S): 2 TABLET ORAL at 23:05

## 2024-11-26 RX ADMIN — INSULIN GLARGINE 8 UNIT(S): 100 INJECTION, SOLUTION SUBCUTANEOUS at 22:43

## 2024-11-26 RX ADMIN — HYDROMORPHONE HYDROCHLORIDE 1 MILLIGRAM(S): 2 TABLET ORAL at 03:50

## 2024-11-26 RX ADMIN — CHLORHEXIDINE GLUCONATE 1 APPLICATION(S): 1.2 RINSE ORAL at 13:22

## 2024-11-26 RX ADMIN — SUCRALFATE 1 GRAM(S): 1 TABLET ORAL at 18:03

## 2024-11-26 NOTE — DISCHARGE NOTE NURSING/CASE MANAGEMENT/SOCIAL WORK - NSDCPEFALRISK_GEN_ALL_CORE
For information on Fall & Injury Prevention, visit: [email or web address] OR  [email or web address] OR  https://www.cdc.gov/steadi/patient.html

## 2024-11-26 NOTE — DISCHARGE NOTE NURSING/CASE MANAGEMENT/SOCIAL WORK - NSPROEXTENSIONSOFSELF_GEN_A_NUR
1 cell phone, 1 ipad, 1 pair sneakers, 1 blanket, 1 sweater 1 cell phone, 1 ipad, 1 pair sneakers, 1 blanket, 1 sweater/none

## 2024-11-26 NOTE — DISCHARGE NOTE NURSING/CASE MANAGEMENT/SOCIAL WORK - FINANCIAL ASSISTANCE
[facility] provides services at a reduced cost to those who are determined to be eligible through [facility]’s financial assistance program. Information regarding [facility]’s financial assistance program can be found by going to [email or web address] or by calling [phone].

## 2024-11-26 NOTE — PROGRESS NOTE ADULT - SUBJECTIVE AND OBJECTIVE BOX
CC: Abd pain    S:  11/25: I Introduced myself to patient Marjorie Mejia at bedside, and asked how she is feeling.  Pt states she still has ongoing abd pain.  I sat down at bedside to further discuss symptoms and plan of care.   Daughter was at bedside, she interjected, and asked me if the endoscopy will take place today.  I told her it will be tomorrow as per GI team.  The daughter was very upset by this news.  I tried to the best of my ability to address all questions and concerns however I as unable to do so as her daughter was speaking in an aggressive tone, loudly, and argumentatively.  As I attempted to further discuss her mothers care she continued to loudly speak in profanity and belligerence.  She made it clear that she does not want me to take care of her mother in any capacity moving forward.    11.26: c/o abd pain, hasn't eaten in days   depressed and anxious               REVIEW OF SYSTEMS:    CONSTITUTIONAL: No weakness, No fevers or chills  ENT: No ear ache, No sorethroat  NECK: No pain, No stiffness  RESPIRATORY: No cough, No wheezing, No hemoptysis; No dyspnea  CARDIOVASCULAR: No chest pain, No palpitations  GASTROINTESTINAL: No abd pain, No nausea, No vomiting, No hematemesis, No diarrhea or constipation. No melena, No hematochezia.  GENITOURINARY: No dysuria, No  hematuria  NEUROLOGICAL: No diplopia, No paresthesia, No motor dysfunction  MUSCULOSKELETAL: No arthralgia, No myalgia  SKIN: No rashes, or lesions   PSYCH: no anxiety, no suicidal ideation    All other review of systems is negative unless indicated above    Vital Signs Last 24 Hrs  T(C): 36.8 (26 Nov 2024 07:53), Max: 36.8 (25 Nov 2024 21:50)  T(F): 98.3 (26 Nov 2024 07:53), Max: 98.3 (26 Nov 2024 07:53)  HR: 93 (26 Nov 2024 13:09) (85 - 95)  BP: 107/64 (26 Nov 2024 13:09) (105/67 - 113/64)  BP(mean): 76 (26 Nov 2024 03:05) (74 - 76)  RR: 18 (26 Nov 2024 07:53) (18 - 18)  SpO2: 97% (26 Nov 2024 08:05) (91% - 99%)    Parameters below as of 26 Nov 2024 08:05  Patient On (Oxygen Delivery Method): nasal cannula        PHYSICAL EXAM:    GENERAL: NAD  HEENT:  NC/AT, EOMI, PERRLA, No scleral icterus, Moist mucous membranes  NECK: Supple, No JVD  CNS:  Alert & Oriented X3, Motor Strength 5/5 B/L upper and lower extremities; DTRs 2+ intact   LUNG: Normal Breath sounds, Clear to auscultation bilaterally, No rales, No rhonchi, No wheezing  HEART: RRR; No murmurs, No rubs  ABDOMEN: +BS, ST/ND/NT  GENITOURINARY: Voiding, Bladder not distended  EXTREMITIES:  2+ Peripheral Pulses, No clubbing, No cyanosis, No tibial edema  MUSCULOSKELTAL: Joints normal ROM, No TTP, No effusion  VAGINAL: deferred  SKIN: no rashes  RECTAL: deferred, not indicated  BREAST: deferred                          9.4    9.35  )-----------( 346      ( 26 Nov 2024 08:13 )             30.1     11-25    136  |  103  |  7   ----------------------------<  136[H]  3.9   |  30  |  0.70    Ca    9.0      25 Nov 2024 07:08      Vancomycin levels:   Cultures:     MEDICATIONS  (STANDING):  ALPRAZolam 0.5 milliGRAM(s) Oral at bedtime  ARIPiprazole 2 milliGRAM(s) Oral daily  aspirin  chewable 81 milliGRAM(s) Oral daily  chlorhexidine 4% Liquid 1 Application(s) Topical <User Schedule>  dextrose 5%. 1000 milliLiter(s) (100 mL/Hr) IV Continuous <Continuous>  dextrose 5%. 1000 milliLiter(s) (50 mL/Hr) IV Continuous <Continuous>  dextrose 50% Injectable 25 Gram(s) IV Push once  dextrose 50% Injectable 12.5 Gram(s) IV Push once  dextrose 50% Injectable 25 Gram(s) IV Push once  erythromycin     enteric coated 250 milliGRAM(s) Oral three times a day  escitalopram 10 milliGRAM(s) Oral daily  FIRST- Mouthwash  BLM 5 milliLiter(s) Swish and Swallow three times a day  fluticasone propionate/ salmeterol 250-50 MICROgram(s) Diskus 1 Dose(s) Inhalation two times a day  glucagon  Injectable 1 milliGRAM(s) IntraMuscular once  influenza   Vaccine 0.5 milliLiter(s) IntraMuscular once  insulin glargine Injectable (LANTUS) 8 Unit(s) SubCutaneous at bedtime  insulin lispro (ADMELOG) corrective regimen sliding scale   SubCutaneous Before meals and at bedtime  metoclopramide 10 milliGRAM(s) Oral three times a day before meals  metoprolol succinate  milliGRAM(s) Oral daily  montelukast 10 milliGRAM(s) Oral at bedtime  pantoprazole    Tablet 40 milliGRAM(s) Oral before breakfast  rosuvastatin 10 milliGRAM(s) Oral at bedtime  sucralfate suspension 1 Gram(s) Oral <User Schedule>    MEDICATIONS  (PRN):  acetaminophen     Tablet .. 650 milliGRAM(s) Oral every 6 hours PRN Moderate Pain (4 - 6)  acetaminophen 325 mG/butalbital 50 mG/caffeine 40 mG 1 Tablet(s) Oral every 8 hours PRN migraine  albuterol/ipratropium for Nebulization 3 milliLiter(s) Nebulizer every 4 hours PRN Shortness of Breath and/or Wheezing  ALPRAZolam 0.5 milliGRAM(s) Oral every 8 hours PRN anxiety  aluminum hydroxide/magnesium hydroxide/simethicone Suspension 30 milliLiter(s) Oral every 4 hours PRN Dyspepsia  benzocaine/menthol Lozenge 1 Lozenge Oral every 4 hours PRN Sore Throat  dextrose Oral Gel 15 Gram(s) Oral once PRN Blood Glucose LESS THAN 70 milliGRAM(s)/deciliter  guaiFENesin Oral Liquid (Sugar-Free) 200 milliGRAM(s) Oral every 6 hours PRN Cough  HYDROmorphone  Injectable 1 milliGRAM(s) IV Push every 4 hours PRN Severe Pain (7 - 10)  ondansetron Injectable 4 milliGRAM(s) IV Push every 6 hours PRN Nausea and/or Vomiting  sodium chloride 0.9% lock flush 10 milliLiter(s) IV Push every 1 hour PRN Pre/post blood products, medications, blood draw, and to maintain line patency      all labs reviewed  all imaging reviewed          Assessment and Plan:  64 year old woman with persistent abdominal pain and poor oral intake with hospital course complicated by choledocholithiasis, pancreatitis.  	    Persistent nausea, abdominal pain likely due to Gastroparesis exacerbated by GLP-1 / Moderate to severe protein-calorie malnutrition:  - h/o colectomy with iliorectal anastomosis for IBD  - EGD/colonoscopy --> gastritis, esophagitis, c/w PPI  - Gastroparesis clinically diagnosed as she was unable to complete Gastric emptying study on admission due to vomiting however after discussion with ir when gj tube was placed the contrast was not moving much   - c/w Reglan  10mg TID +Erythromycin 250mg Tid  -- switch to orals when able to take   - s/p gj tube placement with ir however needs to be endoscopically advanced prior to usage, planned for today with Dr. Suh  - Restart feeds per nutrition recs once GJ able to be used and monitor closely for refeeding syndrome       Pancreatitis: RESOLVED  - Pancreatitis s/p ercp 11/15   - s/p IVFs    Choledocholithiasis:  - s/p ERCP and stent placement   - s/p repeat ERCP on 11/15 for persistent  14mm CBD stone ; CBD and Pancreatic duct cleared, sphincterotomy and metal stent placed    DM type II: uncontrolled due to TPN  - monitor blood sugars  - c/w insulin therapy       Anxiety:   - c/w Lexapro, c/w  Xanax prn Tid  - On Abilify 5mg qd prior toadmission.    - Psych evaluation noted: c/w Abilify low dose for now       #Paroxymal afib / HTN / CAD:  - off lisinopril due to borderline BP  - Eliquis on hold for EGD , will restart after EGD  - s/p 5 stents last were 2 years ago  - restart plavix after gj tube in place   - c/w heparin drip       DVT ppx:  - heparin

## 2024-11-26 NOTE — DISCHARGE NOTE NURSING/CASE MANAGEMENT/SOCIAL WORK - PATIENT PORTAL LINK FT
You can access the FollowMyHealth Patient Portal offered by [facility] by registering at the following website: [email or web address]. By joining [facility]’s FollowMyHealth portal, you will also be able to view your health information using other applications (apps) compatible with our system.

## 2024-11-27 LAB
GLUCOSE BLDC GLUCOMTR-MCNC: 102 MG/DL — HIGH (ref 70–99)
GLUCOSE BLDC GLUCOMTR-MCNC: 106 MG/DL — HIGH (ref 70–99)
GLUCOSE BLDC GLUCOMTR-MCNC: 108 MG/DL — HIGH (ref 70–99)
GLUCOSE BLDC GLUCOMTR-MCNC: 114 MG/DL — HIGH (ref 70–99)
HCT VFR BLD CALC: 28.1 % — LOW (ref 34.5–45)
HGB BLD-MCNC: 8.8 G/DL — LOW (ref 11.5–15.5)
MCHC RBC-ENTMCNC: 27.8 PG — SIGNIFICANT CHANGE UP (ref 27–34)
MCHC RBC-ENTMCNC: 31.3 G/DL — LOW (ref 32–36)
MCV RBC AUTO: 88.9 FL — SIGNIFICANT CHANGE UP (ref 80–100)
PLATELET # BLD AUTO: 299 K/UL — SIGNIFICANT CHANGE UP (ref 150–400)
RBC # BLD: 3.16 M/UL — LOW (ref 3.8–5.2)
RBC # FLD: 14.9 % — HIGH (ref 10.3–14.5)
WBC # BLD: 8.64 K/UL — SIGNIFICANT CHANGE UP (ref 3.8–10.5)
WBC # FLD AUTO: 8.64 K/UL — SIGNIFICANT CHANGE UP (ref 3.8–10.5)

## 2024-11-27 PROCEDURE — 99232 SBSQ HOSP IP/OBS MODERATE 35: CPT

## 2024-11-27 RX ORDER — 0.9 % SODIUM CHLORIDE 0.9 %
1000 INTRAVENOUS SOLUTION INTRAVENOUS
Refills: 0 | Status: DISCONTINUED | OUTPATIENT
Start: 2024-11-27 | End: 2024-11-27

## 2024-11-27 RX ORDER — ONDANSETRON HYDROCHLORIDE 4 MG/1
4 TABLET, FILM COATED ORAL ONCE
Refills: 0 | Status: DISCONTINUED | OUTPATIENT
Start: 2024-11-27 | End: 2024-11-27

## 2024-11-27 RX ADMIN — HYDROMORPHONE HYDROCHLORIDE 1 MILLIGRAM(S): 2 TABLET ORAL at 10:21

## 2024-11-27 RX ADMIN — Medication 0.5 MILLIGRAM(S): at 21:19

## 2024-11-27 RX ADMIN — ROSUVASTATIN CALCIUM 10 MILLIGRAM(S): 5 TABLET, FILM COATED ORAL at 21:17

## 2024-11-27 RX ADMIN — DIPHENHYDRAMINE HYDROCHLORIDE AND LIDOCAINE HYDROCHLORIDE AND ALUMINUM HYDROXIDE AND MAGNESIUM HYDRO 5 MILLILITER(S): KIT at 21:17

## 2024-11-27 RX ADMIN — CHLORHEXIDINE GLUCONATE 1 APPLICATION(S): 1.2 RINSE ORAL at 09:14

## 2024-11-27 RX ADMIN — SUCRALFATE 1 GRAM(S): 1 TABLET ORAL at 21:17

## 2024-11-27 RX ADMIN — FLUTICASONE PROPIONATE AND SALMETEROL XINAFOATE 1 DOSE(S): 45; 21 AEROSOL, METERED RESPIRATORY (INHALATION) at 21:07

## 2024-11-27 RX ADMIN — Medication 250 MILLIGRAM(S): at 21:17

## 2024-11-27 RX ADMIN — HYDROMORPHONE HYDROCHLORIDE 1 MILLIGRAM(S): 2 TABLET ORAL at 06:35

## 2024-11-27 RX ADMIN — HYDROMORPHONE HYDROCHLORIDE 1 MILLIGRAM(S): 2 TABLET ORAL at 18:14

## 2024-11-27 RX ADMIN — SUCRALFATE 1 GRAM(S): 1 TABLET ORAL at 06:35

## 2024-11-27 RX ADMIN — MONTELUKAST SODIUM 10 MILLIGRAM(S): 10 TABLET ORAL at 21:17

## 2024-11-27 RX ADMIN — METOCLOPRAMIDE HYDROCHLORIDE 10 MILLIGRAM(S): 10 TABLET ORAL at 18:15

## 2024-11-27 RX ADMIN — Medication 250 MILLIGRAM(S): at 06:35

## 2024-11-27 RX ADMIN — PANTOPRAZOLE SODIUM 40 MILLIGRAM(S): 40 TABLET, DELAYED RELEASE ORAL at 06:34

## 2024-11-27 RX ADMIN — INSULIN GLARGINE 8 UNIT(S): 100 INJECTION, SOLUTION SUBCUTANEOUS at 21:16

## 2024-11-27 RX ADMIN — SUCRALFATE 1 GRAM(S): 1 TABLET ORAL at 18:15

## 2024-11-27 RX ADMIN — METOCLOPRAMIDE HYDROCHLORIDE 10 MILLIGRAM(S): 10 TABLET ORAL at 06:34

## 2024-11-27 NOTE — PROGRESS NOTE ADULT - SUBJECTIVE AND OBJECTIVE BOX
CC: Abd pain      64-year-old female with history of GERD  HTN, DM, polyps, PAfib, CAD s/p 5 cardiac stents. presents with epigastric pain for the last couple of weeks.  Her daughter brought her in, after speaking with her GI doctor who stated patient should not have been on ozempic before and probably have taken insulin instead. She states that she had just uptitrated her dose of ozempic to 0.5 from 0.25 earlier this week. She had been on the 0.25 for 5 weeks. She states the pain bothers her more than the nausea and vomiting. Patient states she feels very minimal relief since she came in.  She denies NSAID use, bleeding or black stools. She reports 10lb weight loss and has been on ozempic for her diabetes. Denies diarrhea or urinary symptoms.     Adm for initiation of PPN for presumed gastroparesis; gastric emptying study for 11/6- failed.  Abnormal MRCP; s/p ERCP and stent placement for CBD stone followed by repeat ERCP on 11/15 with stone extraction, sphincterotomy, stent replacement  PICC inserted 11/9 for TPN initiation, now off TPN due to persistent hyperglycemia  Continues to have abd pain and poor oral intake   Developed Asthma exacerbation quickly resolved w inhalers and one dose steroids     11.26: c/o abd pain, hasn't eaten in days   depressed and anxious   11.27: PEG unable to be advanced to jejunum; c/o chronic abd pain and nausea             REVIEW OF SYSTEMS:    CONSTITUTIONAL: No weakness, No fevers or chills  ENT: No ear ache, No sorethroat  NECK: No pain, No stiffness  RESPIRATORY: No cough, No wheezing, No hemoptysis; No dyspnea  CARDIOVASCULAR: No chest pain, No palpitations  GASTROINTESTINAL: No abd pain, No nausea, No vomiting, No hematemesis, No diarrhea or constipation. No melena, No hematochezia.  GENITOURINARY: No dysuria, No  hematuria  NEUROLOGICAL: No diplopia, No paresthesia, No motor dysfunction  MUSCULOSKELETAL: No arthralgia, No myalgia  SKIN: No rashes, or lesions   PSYCH: no anxiety, no suicidal ideation    All other review of systems is negative unless indicated above    Vital Signs Last 24 Hrs  T(C): 36.1 (27 Nov 2024 15:00), Max: 36.9 (26 Nov 2024 17:56)  T(F): 97 (27 Nov 2024 15:00), Max: 98.4 (26 Nov 2024 17:56)  HR: 98 (27 Nov 2024 15:00) (89 - 102)  BP: 109/73 (27 Nov 2024 15:00) (105/84 - 136/81)  BP(mean): 94 (27 Nov 2024 06:30) (75 - 94)  RR: 18 (27 Nov 2024 15:00) (18 - 21)  SpO2: 96% (27 Nov 2024 15:00) (91% - 100%)    Parameters below as of 27 Nov 2024 15:00  Patient On (Oxygen Delivery Method): nasal cannula  O2 Flow (L/min): 2          PHYSICAL EXAM:    GENERAL: NAD  HEENT:  NC/AT, EOMI, PERRLA, No scleral icterus, Moist mucous membranes  NECK: Supple, No JVD  CNS:  Alert & Oriented X3, Motor Strength 5/5 B/L upper and lower extremities; DTRs 2+ intact   LUNG: Normal Breath sounds, Clear to auscultation bilaterally, No rales, No rhonchi, No wheezing  HEART: RRR; No murmurs, No rubs  ABDOMEN: +BS, ST/ND/NT  GENITOURINARY: Voiding, Bladder not distended  EXTREMITIES:  2+ Peripheral Pulses, No clubbing, No cyanosis, No tibial edema  MUSCULOSKELTAL: Joints normal ROM, No TTP, No effusion  VAGINAL: deferred  SKIN: no rashes  RECTAL: deferred, not indicated  BREAST: deferred                          9.4    9.35  )-----------( 346      ( 26 Nov 2024 08:13 )             30.1     11-25    136  |  103  |  7   ----------------------------<  136[H]  3.9   |  30  |  0.70    Ca    9.0      25 Nov 2024 07:08      Vancomycin levels:   Cultures:     MEDICATIONS  (STANDING):  ALPRAZolam 0.5 milliGRAM(s) Oral at bedtime  ARIPiprazole 2 milliGRAM(s) Oral daily  aspirin  chewable 81 milliGRAM(s) Oral daily  chlorhexidine 4% Liquid 1 Application(s) Topical <User Schedule>  erythromycin     enteric coated 250 milliGRAM(s) Oral three times a day  escitalopram 10 milliGRAM(s) Oral daily  FIRST- Mouthwash  BLM 5 milliLiter(s) Swish and Swallow three times a day  fluticasone propionate/ salmeterol 250-50 MICROgram(s) Diskus 1 Dose(s) Inhalation two times a day  glucagon  Injectable 1 milliGRAM(s) IntraMuscular once  influenza   Vaccine 0.5 milliLiter(s) IntraMuscular once  insulin glargine Injectable (LANTUS) 8 Unit(s) SubCutaneous at bedtime  insulin lispro (ADMELOG) corrective regimen sliding scale   SubCutaneous Before meals and at bedtime  metoclopramide 10 milliGRAM(s) Oral three times a day before meals  metoprolol succinate  milliGRAM(s) Oral daily  montelukast 10 milliGRAM(s) Oral at bedtime  pantoprazole    Tablet 40 milliGRAM(s) Oral before breakfast  rosuvastatin 10 milliGRAM(s) Oral at bedtime  sucralfate suspension 1 Gram(s) Oral <User Schedule>    MEDICATIONS  (PRN):  acetaminophen     Tablet .. 650 milliGRAM(s) Oral every 6 hours PRN Moderate Pain (4 - 6)  acetaminophen 325 mG/butalbital 50 mG/caffeine 40 mG 1 Tablet(s) Oral every 8 hours PRN migraine  albuterol/ipratropium for Nebulization 3 milliLiter(s) Nebulizer every 4 hours PRN Shortness of Breath and/or Wheezing  ALPRAZolam 0.5 milliGRAM(s) Oral every 8 hours PRN anxiety  aluminum hydroxide/magnesium hydroxide/simethicone Suspension 30 milliLiter(s) Oral every 4 hours PRN Dyspepsia  benzocaine/menthol Lozenge 1 Lozenge Oral every 4 hours PRN Sore Throat  dextrose Oral Gel 15 Gram(s) Oral once PRN Blood Glucose LESS THAN 70 milliGRAM(s)/deciliter  guaiFENesin Oral Liquid (Sugar-Free) 200 milliGRAM(s) Oral every 6 hours PRN Cough  HYDROmorphone  Injectable 1 milliGRAM(s) IV Push every 4 hours PRN Severe Pain (7 - 10)  ondansetron Injectable 4 milliGRAM(s) IV Push every 6 hours PRN Nausea and/or Vomiting  sodium chloride 0.9% lock flush 10 milliLiter(s) IV Push every 1 hour PRN Pre/post blood products, medications, blood draw, and to maintain line patency      all labs reviewed  all imaging reviewed          Assessment and Plan:  64 year old woman with persistent abdominal pain and poor oral intake with hospital course complicated by choledocholithiasis, pancreatitis.  	    Persistent nausea, abdominal pain likely due to Gastroparesis exacerbated by GLP-1 / Moderate to severe protein-calorie malnutrition:  - h/o colectomy with iliorectal anastomosis for IBD  - EGD/colonoscopy --> gastritis, esophagitis, c/w PPI  - Gastroparesis clinically diagnosed as she was unable to complete Gastric emptying study on admission due to vomiting however after discussion with ir when gj tube was placed the contrast was not moving much   - c/w Reglan  10mg TID +Erythromycin 250mg Tid  -- switch to orals when able to take   - s/p gj tube placement with ir however needs to be endoscopically advanced prior to usage, planned for today with Dr. Suh  - Restart feeds per nutrition recs once GJ able to be used and monitor closely for refeeding syndrome     will recall nutritionist for PPN     Pancreatitis: RESOLVED  - Pancreatitis s/p ercp 11/15   - s/p IVFs    Choledocholithiasis:  - s/p ERCP and stent placement   - s/p repeat ERCP on 11/15 for persistent  14mm CBD stone ; CBD and Pancreatic duct cleared, sphincterotomy and metal stent placed    DM type II: uncontrolled due to TPN  - monitor blood sugars  - c/w insulin therapy       Anxiety:   - c/w Lexapro, c/w  Xanax prn Tid  - On Abilify 5mg qd prior toadmission.    - Psych evaluation noted: c/w Abilify low dose for now       #Paroxymal afib / HTN / CAD:  - off lisinopril due to borderline BP  - Eliquis on hold for EGD , will restart after EGD  - s/p 5 stents last were 2 years ago  - restart plavix after gj tube in place   - c/w heparin drip       DVT ppx:  - heparin

## 2024-11-28 LAB
24R-OH-CALCIDIOL SERPL-MCNC: 27.4 NG/ML — LOW (ref 30–80)
BLD GP AB SCN SERPL QL: SIGNIFICANT CHANGE UP
GLUCOSE BLDC GLUCOMTR-MCNC: 123 MG/DL — HIGH (ref 70–99)
GLUCOSE BLDC GLUCOMTR-MCNC: 130 MG/DL — HIGH (ref 70–99)
GLUCOSE BLDC GLUCOMTR-MCNC: 136 MG/DL — HIGH (ref 70–99)
GLUCOSE BLDC GLUCOMTR-MCNC: 148 MG/DL — HIGH (ref 70–99)
HCT VFR BLD CALC: 29.7 % — LOW (ref 34.5–45)
HCT VFR BLD CALC: 30.7 % — LOW (ref 34.5–45)
HGB BLD-MCNC: 9.3 G/DL — LOW (ref 11.5–15.5)
HGB BLD-MCNC: 9.7 G/DL — LOW (ref 11.5–15.5)
MCHC RBC-ENTMCNC: 28.3 PG — SIGNIFICANT CHANGE UP (ref 27–34)
MCHC RBC-ENTMCNC: 31.3 G/DL — LOW (ref 32–36)
MCV RBC AUTO: 90.3 FL — SIGNIFICANT CHANGE UP (ref 80–100)
PLATELET # BLD AUTO: 312 K/UL — SIGNIFICANT CHANGE UP (ref 150–400)
RBC # BLD: 3.29 M/UL — LOW (ref 3.8–5.2)
RBC # FLD: 14.9 % — HIGH (ref 10.3–14.5)
WBC # BLD: 8.94 K/UL — SIGNIFICANT CHANGE UP (ref 3.8–10.5)
WBC # FLD AUTO: 8.94 K/UL — SIGNIFICANT CHANGE UP (ref 3.8–10.5)

## 2024-11-28 PROCEDURE — 99232 SBSQ HOSP IP/OBS MODERATE 35: CPT

## 2024-11-28 RX ORDER — PANTOPRAZOLE SODIUM 40 MG/1
40 TABLET, DELAYED RELEASE ORAL EVERY 12 HOURS
Refills: 0 | Status: DISCONTINUED | OUTPATIENT
Start: 2024-11-28 | End: 2024-12-03

## 2024-11-28 RX ORDER — FAT EMULSIONS 20 %
0.95 EMULSION INTRAVENOUS
Qty: 50 | Refills: 0 | Status: DISCONTINUED | OUTPATIENT
Start: 2024-11-28 | End: 2024-11-28

## 2024-11-28 RX ORDER — SODIUM/POT/MAG/CALC/CHLOR/ACET 35-20-5MEQ
1 VIAL (ML) INTRAVENOUS
Refills: 0 | Status: DISCONTINUED | OUTPATIENT
Start: 2024-11-28 | End: 2024-11-28

## 2024-11-28 RX ORDER — ENOXAPARIN SODIUM 30 MG/.3ML
50 INJECTION SUBCUTANEOUS EVERY 12 HOURS
Refills: 0 | Status: DISCONTINUED | OUTPATIENT
Start: 2024-11-28 | End: 2024-11-29

## 2024-11-28 RX ADMIN — Medication 250 MILLIGRAM(S): at 05:32

## 2024-11-28 RX ADMIN — SODIUM CHLORIDE 75 MILLILITER(S): 9 INJECTION, SOLUTION INTRAMUSCULAR; INTRAVENOUS; SUBCUTANEOUS at 15:28

## 2024-11-28 RX ADMIN — METOPROLOL TARTRATE 100 MILLIGRAM(S): 100 TABLET, FILM COATED ORAL at 10:54

## 2024-11-28 RX ADMIN — Medication 1 EACH: at 22:30

## 2024-11-28 RX ADMIN — Medication 250 MILLIGRAM(S): at 15:26

## 2024-11-28 RX ADMIN — Medication 0.5 MILLIGRAM(S): at 22:19

## 2024-11-28 RX ADMIN — FLUTICASONE PROPIONATE AND SALMETEROL XINAFOATE 1 DOSE(S): 45; 21 AEROSOL, METERED RESPIRATORY (INHALATION) at 20:19

## 2024-11-28 RX ADMIN — HYDROMORPHONE HYDROCHLORIDE 1 MILLIGRAM(S): 2 TABLET ORAL at 05:47

## 2024-11-28 RX ADMIN — ESCITALOPRAM OXALATE 10 MILLIGRAM(S): 10 TABLET, FILM COATED ORAL at 10:57

## 2024-11-28 RX ADMIN — CHLORHEXIDINE GLUCONATE 1 APPLICATION(S): 1.2 RINSE ORAL at 11:22

## 2024-11-28 RX ADMIN — METOCLOPRAMIDE HYDROCHLORIDE 10 MILLIGRAM(S): 10 TABLET ORAL at 10:54

## 2024-11-28 RX ADMIN — INSULIN GLARGINE 8 UNIT(S): 100 INJECTION, SOLUTION SUBCUTANEOUS at 22:19

## 2024-11-28 RX ADMIN — PANTOPRAZOLE SODIUM 40 MILLIGRAM(S): 40 TABLET, DELAYED RELEASE ORAL at 05:32

## 2024-11-28 RX ADMIN — MONTELUKAST SODIUM 10 MILLIGRAM(S): 10 TABLET ORAL at 22:19

## 2024-11-28 RX ADMIN — METOCLOPRAMIDE HYDROCHLORIDE 10 MILLIGRAM(S): 10 TABLET ORAL at 17:12

## 2024-11-28 RX ADMIN — HYDROMORPHONE HYDROCHLORIDE 1 MILLIGRAM(S): 2 TABLET ORAL at 00:30

## 2024-11-28 RX ADMIN — HYDROMORPHONE HYDROCHLORIDE 1 MILLIGRAM(S): 2 TABLET ORAL at 00:15

## 2024-11-28 RX ADMIN — FLUTICASONE PROPIONATE AND SALMETEROL XINAFOATE 1 DOSE(S): 45; 21 AEROSOL, METERED RESPIRATORY (INHALATION) at 10:36

## 2024-11-28 RX ADMIN — HYDROMORPHONE HYDROCHLORIDE 1 MILLIGRAM(S): 2 TABLET ORAL at 11:06

## 2024-11-28 RX ADMIN — ONDANSETRON HYDROCHLORIDE 4 MILLIGRAM(S): 4 TABLET, FILM COATED ORAL at 00:15

## 2024-11-28 RX ADMIN — ONDANSETRON HYDROCHLORIDE 4 MILLIGRAM(S): 4 TABLET, FILM COATED ORAL at 15:52

## 2024-11-28 RX ADMIN — Medication 81 MILLIGRAM(S): at 10:57

## 2024-11-28 RX ADMIN — DIPHENHYDRAMINE HYDROCHLORIDE AND LIDOCAINE HYDROCHLORIDE AND ALUMINUM HYDROXIDE AND MAGNESIUM HYDRO 5 MILLILITER(S): KIT at 05:32

## 2024-11-28 RX ADMIN — Medication 20.83 GM/KG/DAY: at 22:29

## 2024-11-28 RX ADMIN — HYDROMORPHONE HYDROCHLORIDE 1 MILLIGRAM(S): 2 TABLET ORAL at 05:32

## 2024-11-28 RX ADMIN — SUCRALFATE 1 GRAM(S): 1 TABLET ORAL at 22:19

## 2024-11-28 RX ADMIN — SUCRALFATE 1 GRAM(S): 1 TABLET ORAL at 17:10

## 2024-11-28 RX ADMIN — Medication 250 MILLIGRAM(S): at 22:19

## 2024-11-28 RX ADMIN — HYDROMORPHONE HYDROCHLORIDE 1 MILLIGRAM(S): 2 TABLET ORAL at 15:23

## 2024-11-28 RX ADMIN — ARIPIPRAZOLE 2 MILLIGRAM(S): 15 TABLET ORAL at 10:56

## 2024-11-28 RX ADMIN — ROSUVASTATIN CALCIUM 10 MILLIGRAM(S): 5 TABLET, FILM COATED ORAL at 22:53

## 2024-11-28 RX ADMIN — METOCLOPRAMIDE HYDROCHLORIDE 10 MILLIGRAM(S): 10 TABLET ORAL at 05:32

## 2024-11-28 RX ADMIN — SUCRALFATE 1 GRAM(S): 1 TABLET ORAL at 05:32

## 2024-11-28 RX ADMIN — SUCRALFATE 1 GRAM(S): 1 TABLET ORAL at 10:58

## 2024-11-28 RX ADMIN — ENOXAPARIN SODIUM 50 MILLIGRAM(S): 30 INJECTION SUBCUTANEOUS at 22:20

## 2024-11-28 NOTE — CHART NOTE - NSCHARTNOTEFT_GEN_A_CORE
Clinical Nutrition PN Follow Up Note    *64-year-old female with history of GERD  HTN, DM, polyps, and afib with 5 cardiac stents. presents with epigastric pain for the last couple of weeks.  Her daughter brought her in, after speaking with her GI doctor who stated patient should not have been on ozempic before and probably have taken insulin instead. She states that she had just uptitrated her dose of ozempic to 0.5 from 0.25 earlier this week. She had been on the 0.25 for 5 weeks. She states the pain bothers her more than the nausea and vomiting. Patient states she feels very minimal relief since she came in.  She denies NSAID use, bleeding or black stools. She reports 10lb weight loss and has been on ozempic for her diabetes. Denies diarrhea or urinary symptoms. Admitted for Nausea and vomiting   *11/1: PPN to be initiated as a bridge 2/2 prolonged PO intolerance/poor PO intake; awaiting gastric emptying study. Per GI note 10/27: abd USG inconclusive s/p cholecystectomy. s/p EGD and colonoscopy - egd with findings of gastritis and esophagitis without ulcers colonoscopy with patent anastomosis - no acute findings. Pt placed on regular low fiber diet - vomited lunch on 10/31 - complains of throat pain after egd; epigastric pain minimal.  Per hospitalist: "at this point, will need to rule out gastroparesis with gastric emptying study - per nuclear medicine the earliest it could be done would be Tuesday 11/5- need to order isotpope - Trial of 5mg liquid Reglan QID 15 min before meals and at bedtime."     *11/6: Did not receive PPN last night 2/2 per nuclear medicine not allowed to receive anything past midnight (?). EGD done yesterday which came back inconclusive.   *11/7: Abnormal MRCP; vascular also consulted. At this time diff diagnosis expanded 2/2 pain. 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 GI recommended to c/w FLD for now and trial Reglan 3x daily to alleviate symptoms. Passed bloody BM this morning w/ abdominal pain, trend H/H. Vascular surgery consulted to evaluate for median arcuate ligament syndrome however no intervention to be done at this time as no imaging finding suggestive of median arcuate ligament syndrome.   *11/8: GI consulted yesterday, per note: "Abdominal pain could be secondary to distal CBD stones/sludge with likely accomodation biliary flow due to dilated duct post choly (normal lft's)." Is s/p ERCP yesterday w/ Dr. Moncada - 2 stents were placed and obtained biopsy of nodular papilla. Currently c/o abdominal discomfort/ pain s/p ERCP yesterday.  11/9: S/p PICC placement (11/8) and transitioned to TPN. ?if GJ tube will be placed as pt has had intestines removed in the past.  *11/11: Remains on CLD. Now w/ low grade temp and worsening pain, started on empiric abx. Per hospitalist note: "If the stone is not the cause one could ask if this can be an IBD exacerbation on the small bowel mucosae since she did have some blood; check ESR and CRP- if markedly elevated it could be indicative of poss IBD and steroids might be necessary."  *11/12: Diet advanced to FLD yesterday. Per GI NP note - since pt w/ persistent abdominal pain s/p CBD stent placement, plan for repeat ERCP w/ stone removal on Thursday; was unable to remove stones originally 2/2 AC on board and there was a risk for bleeding.   *11/16: TPN stopped 2/2 persistent hyperglycemia   11/22: s/p GJ tube placement     PPN initiated 2/2 prolonged PO intolerance d/t gastroparesis, unable to advance PEG to GJ tube     *current status: Discussed with Dr. Murphy this morning, plan to restart PN 2/2 PEG unable to be advanced to GJ tube. Called RN, RN states that pt only has midline despite note from yesterday saying pt has PICC line. Per  policy, unable to run PN through midline. Will start PPN today, however can transition back to TPN if pt does in fact have PICC line - will follow back up Please see additional recommendations below.     *new pertinent meds: Xanax, Erythromycin, Magic Mouthwash, Lantus (8 units HS), Admelog (0 units x 24 hours), Reglan, Protonix, Carafate, Dilaudid, Zofran    *labs reviewed; No new labs since 11/25; will start all lytes at lower end and adjust based on tomorrows labs. T Bili previously WNL to add trace elements. corrected Ca elevated, DO NOT supplement calcium outside of PN bag at this time. POCTs x 24 hours WNL. TG WNL (160), will add 50g of lipids into PN bag.    BMI: BMI (kg/m2): 20.6 (11-13-24 @ 08:07)  HbA1c: A1C with Estimated Average Glucose Result: 6.6 % (10-26-24 @ 06:47)    Glucose: POCT Blood Glucose.: 130 mg/dL (11-28-24 @ 08:24)    BP: 99/59 (11-28-24 @ 07:48) (97/56 - 136/81)Vital Signs Last 24 Hrs  T(C): 36.7 (11-28-24 @ 07:48), Max: 36.8 (11-28-24 @ 00:15)  T(F): 98 (11-28-24 @ 07:48), Max: 98.2 (11-28-24 @ 00:15)  HR: 103 (11-28-24 @ 07:48) (98 - 105)  BP: 99/59 (11-28-24 @ 07:48) (99/59 - 121/69)  BP(mean): --  RR: 17 (11-28-24 @ 07:48) (17 - 21)  SpO2: 91% (11-28-24 @ 07:48) (91% - 100%)    Lipid Panel: Date/Time: 11-10-24 @ 04:34  Triglycerides, Serum: 160    POCT Blood Glucose.: 130 mg/dL (11-28-24 @ 08:24)  POCT Blood Glucose.: 106 mg/dL (11-27-24 @ 21:13)  POCT Blood Glucose.: 114 mg/dL (11-27-24 @ 16:55)  POCT Blood Glucose.: 102 mg/dL (11-27-24 @ 11:24)    *I&O's Detail    27 Nov 2024 07:01  -  28 Nov 2024 07:00  --------------------------------------------------------  IN:    Oral Fluid: 200 mL    Other (mL): 150 mL    sodium chloride 0.9%: 900 mL  Total IN: 1250 mL    OUT:  Total OUT: 0 mL    Total NET: 1250 mL  *fluid status: Positive, no output doc'd; Strict I&O's are rec'd when pt is on PN as per protocol   *BM (+) on 11/26 - small, formed. pt not on bowel regimen.  *No edema doc'd    *malnutrition: Pt continues to meet criteria for moderate malnutrition in context of acute on chronic illness r/t decreased ability to meet increased nutrient needs 2/2 N/V, DM AEB >7.5% wt loss x 2mo, <50% ENN x 2 weeks, mild muscle/fat wasting    Estimated Needs: based on 55 Kg (wt from 10/27)  Calories: 2087-5375 Kcal (35-40 Kcal/Kg)  Protein: 66-83 g (1.2-1.5 g/Kg)  Fluids: 3446-5152 mL (35-40 mL/Kg)    Diet, Clear Liquid (11-27-24 @ 16:33) [Active]    Weight History:  Height (cm): 160 (11-13-24 @ 08:07)  Weight (kg): 52.8 (10-25-24 @ 18:30)  BMI (kg/m2): 20.6 (11-13-24 @ 08:07)  BSA (m2): 1.54 (11-13-24 @ 08:07)    PPN Recommendations: via Peripheral Line  Total Volume: 2000mL x 24 hours  80 g  Amino Acids  100 g Dextrose  50 g Lipids 20%  80 mEq Sodium Chloride  47 mEq Sodium Acetate  20 mmol Sodium Phosphate  40 mEq Potassium Chloride  0 mEq Potassium Acetate  0 mmol Potassium Phosphate  0 mEq Calcium Gluconate  (CAPS out of PN solution)  8 mEq Magnesium Sulfate  200 mg Thiamine  1 ml Trace Elements  10 ml MVI    Total Calories   1160    (   Meets 53  %  of  Estimated Energy needs  and   100  %  Protein needs)  (osmolarity ~865)    Additional Recommendations:    1) Daily weights  2) Strict I & O's  3) Daily lyte checks including magnesium and phos  4) Weekly triglycerides/LFT checks  5) POCT q6hrs; maintain 140-180mg/dL  6) if on PN > 6 days, consider placing PICC line to meet 100% of nutr needs; per RN, pt has midline and no PICC line at this time  7) Consider inititaing trickle feeds if PEG can be advanced to GJ    *will continue to monitor and adjust PN prn*  Beatriz Calvo MS, RDN, CDN, [facility] [phone]  Certified Nutrition

## 2024-11-28 NOTE — PROVIDER CONTACT NOTE (OTHER) - ACTION/TREATMENT ORDERED:
STAT CBC ordered, H/H 11.4/35.6. KUNAL Kendall aware, no new orders at this time.
MD Aware. Spoke w/ Dr. Murphy. MD states they will keep the PICC Line in place until discharge.
NP aware follow sliding scale check at 0600
Ok per md. Will order resp tx for breathing concerns and reinstate PRN.
Patient in pain experiencing upper r quad pain and tachycardia. Weakness upon ambulating as well. MD made aware. Awaiting response. RN will administer metoprolol early administer tylenol and dilaudid.
Pause TPN see if patient tolerates more PO change to glucerna rather than ensure continue to monitor patient.
Voicemail left for MD.   RN gave morphine for pain, Zofran for nausea and 2L NC for slight drop in o2, Will follow up with provider again to update.
Kyaw Devine stated via teams she will put in order for pt to get pain medication.
MD aware. MD advising to keep heparin gtt off.
PA aware follow sliding sacle will receck at 0600
Provider made aware, H&H and type and screen, protonix ordered. provider made aware of the H&H results, no new orders at this time.
No

## 2024-11-28 NOTE — PROVIDER CONTACT NOTE (OTHER) - DATE AND TIME:
HM updated  
07-Nov-2024 02:41
28-Nov-2024 20:05
15-Nov-2024 00:23
16-Nov-2024 10:45
17-Nov-2024 07:57
23-Nov-2024 02:00
26-Nov-2024 08:24
13-Nov-2024 10:07
16-Nov-2024 00:42
26-Nov-2024 11:30
12-Nov-2024 14:33

## 2024-11-28 NOTE — PROGRESS NOTE ADULT - SUBJECTIVE AND OBJECTIVE BOX
CC: Abd pain      64-year-old female with history of GERD  HTN, DM, FAP s/p colon resection, PAfib, CAD s/p PCI March 2023, Anxiety  and Depression ,  presents with epigastric pain for the last couple of weeks.  Her daughter brought her in, after speaking with her GI doctor who stated patient should not have been on ozempic before and probably have taken insulin instead. She states that she had just up titrated her dose of ozempic to 0.5 from 0.25 earlier this week. She had been on the 0.25 for 5 weeks. She states the pain bothers her more than the nausea and vomiting. Patient states she feels very minimal relief since she came in.  She denies NSAID use, bleeding or black stools. She reports 10lb weight loss and has been on ozempic for her diabetes. Denies diarrhea or urinary symptoms.     Adm for initiation of PPN for presumed gastroparesis; gastric emptying study for 11/6- failed due to vomiting   PICC inserted 11/9 for TPN  Abnormal MRCP; s/p ERCP and stent placement for CBD stone followed by repeat ERCP on 11/15 with stone extraction, sphincterotomy, stent replacement  Continues to have abd pain and poor oral intake , s/p PEG placement   Developed Asthma exacerbation quickly resolved w inhalers and one dose steroids     11.26: c/o abd pain, hasn't eaten in days   depressed and anxious   11.27: PEG unable to be advanced to jejunum; c/o chronic abd pain and nausea   11.28: c/o severe abd pain           REVIEW OF SYSTEMS:    CONSTITUTIONAL: No weakness, No fevers or chills  ENT: No ear ache, No sorethroat  NECK: No pain, No stiffness  RESPIRATORY: No cough, No wheezing, No hemoptysis; No dyspnea  CARDIOVASCULAR: No chest pain, No palpitations  GASTROINTESTINAL: ++abd pain, No nausea, No vomiting, No hematemesis, No diarrhea or constipation. No melena, No hematochezia.  GENITOURINARY: No dysuria, No  hematuria  NEUROLOGICAL: No diplopia, No paresthesia, No motor dysfunction  MUSCULOSKELETAL: No arthralgia, No myalgia  SKIN: No rashes, or lesions   PSYCH: ++anxiety, no suicidal ideation    All other review of systems is negative unless indicated above    Vital Signs Last 24 Hrs  T(C): 36.7 (28 Nov 2024 07:48), Max: 36.8 (28 Nov 2024 00:15)  T(F): 98 (28 Nov 2024 07:48), Max: 98.2 (28 Nov 2024 00:15)  HR: 103 (28 Nov 2024 07:48) (98 - 105)  BP: 99/59 (28 Nov 2024 07:48) (99/59 - 121/69)  RR: 17 (28 Nov 2024 07:48) (17 - 21)  SpO2: 91% (28 Nov 2024 07:48) (91% - 100%)    Parameters below as of 28 Nov 2024 07:48  Patient On (Oxygen Delivery Method): nasal cannula        PHYSICAL EXAM:    GENERAL: NAD  HEENT:  NC/AT, EOMI, PERRLA, No scleral icterus, Moist mucous membranes  NECK: Supple, No JVD  CNS:  Alert & Oriented X3, Motor Strength 5/5 B/L upper and lower extremities; DTRs 2+ intact   LUNG: Normal Breath sounds, Clear to auscultation bilaterally, No rales, No rhonchi, No wheezing  HEART: RRR; No murmurs, No rubs  ABDOMEN: +BS, ST/ND, +TTP on epigastric and midline area, +PEG present no erythema no discharge around peg   GENITOURINARY: Voiding, Bladder not distended  EXTREMITIES:  2+ Peripheral Pulses, No clubbing, No cyanosis, No tibial edema  MUSCULOSKELTAL: Joints normal ROM, No TTP, No effusion  VAGINAL: deferred  SKIN: no rashes  RECTAL: deferred, not indicated  BREAST: deferred                          9.4    9.35  )-----------( 346      ( 26 Nov 2024 08:13 )             30.1     11-25    136  |  103  |  7   ----------------------------<  136[H]  3.9   |  30  |  0.70    Ca    9.0      25 Nov 2024 07:08      Vancomycin levels:   Cultures:     MEDICATIONS  (STANDING):  ALPRAZolam 0.5 milliGRAM(s) Oral at bedtime  ARIPiprazole 2 milliGRAM(s) Oral daily  aspirin  chewable 81 milliGRAM(s) Oral daily  chlorhexidine 4% Liquid 1 Application(s) Topical <User Schedule>  erythromycin     enteric coated 250 milliGRAM(s) Oral three times a day  escitalopram 10 milliGRAM(s) Oral daily  FIRST- Mouthwash  BLM 5 milliLiter(s) Swish and Swallow three times a day  fluticasone propionate/ salmeterol 250-50 MICROgram(s) Diskus 1 Dose(s) Inhalation two times a day  glucagon  Injectable 1 milliGRAM(s) IntraMuscular once  influenza   Vaccine 0.5 milliLiter(s) IntraMuscular once  insulin glargine Injectable (LANTUS) 8 Unit(s) SubCutaneous at bedtime  insulin lispro (ADMELOG) corrective regimen sliding scale   SubCutaneous Before meals and at bedtime  lipid, fat emulsion (Fish Oil and Plant Based) 20% Infusion 0.947 Gm/kG/Day (20.83 mL/Hr) IV Continuous <Continuous>  metoclopramide 10 milliGRAM(s) Oral three times a day before meals  metoprolol succinate  milliGRAM(s) Oral daily  montelukast 10 milliGRAM(s) Oral at bedtime  pantoprazole    Tablet 40 milliGRAM(s) Oral before breakfast  Parenteral Nutrition - Adult 1 Each (83 mL/Hr) TPN Continuous <Continuous>  rosuvastatin 10 milliGRAM(s) Oral at bedtime  sodium chloride 0.9%. 1000 milliLiter(s) (75 mL/Hr) IV Continuous <Continuous>  sucralfate suspension 1 Gram(s) Oral <User Schedule>          Assessment and Plan:  64 year old woman with persistent abdominal pain and poor oral intake with hospital course complicated by choledocholithiasis, pancreatitis.  	    Persistent nausea, abdominal pain likely due to Gastroparesis exacerbated by GLP-1  +suspect psychosomatic component present as well  -requiring Dilaudid IV around the clock   - h/o colectomy with iliorectal anastomosis for FAP  - EGD/colonoscopy --> gastritis, esophagitis, c/w PPI  - Gastroparesis clinically diagnosed as she was unable to complete Gastric emptying study on admission due to vomiting however after discussion with ir when gj tube was placed the contrast was not moving much   - c/w Reglan  10mg TID +Erythromycin 250mg Tid  - s/p gj tube placement with ir however needs to be endoscopically advanced to jejunum; will need EGD on monday with advanced endo specialist   - Restart feeds per nutrition recs once GJ able to be used and monitor closely for refeeding syndrome     -restarted TPN    Pancreatitis: resolved   - Pancreatitis s/p ercp 11/15   - s/p IVFs    Choledocholithiasis:  - s/p ERCP and stent placement   - s/p repeat ERCP on 11/15 for persistent  14mm CBD stone ; CBD and Pancreatic duct cleared, sphincterotomy and metal stent placed    DM type II: uncontrolled due to TPN  - monitor blood sugars  - c/w insulin therapy     Moderate to severe protein-calorie malnutrition:  on TPN    Anxiety:   - c/w Lexapro, c/w  Xanax prn Tid  - On Abilify 5mg qd prior toadmission.    - Psych evaluation noted: c/w Abilify low dose for now     #Paroxymal afib / HTN / CAD:  - off lisinopril due to borderline BP  -start Lovenox   - Eliquis on hold for EGD , will restart after EGD  - s/p 5 stents last were 2 years ago  - restart plavix after gj tube in place   - c/w heparin drip       DVT ppx:  - heparin

## 2024-11-28 NOTE — PROVIDER CONTACT NOTE (OTHER) - NAME OF MD/NP/PA/DO NOTIFIED:
GARRET edwards
Jeffrey
Kyaw Devine
GARRET Kendall
Jeffrey
Jeffrey FARRELL
KUNAL Kendall
Jeffrey FARRELL
KUNAL Kendall

## 2024-11-28 NOTE — PHYSICAL THERAPY INITIAL EVALUATION ADULT - REHAB POTENTIAL, PT EVAL
good, to achieve stated therapy goals
Patient discharged from acute care PT program at this time. At baseline level of mobility, independent without devic/none

## 2024-11-28 NOTE — PHYSICAL THERAPY INITIAL EVALUATION ADULT - GENERAL OBSERVATIONS, REHAB EVAL
The Pt was received on 5E seated at the edge of the bed pleasant, calm, cooperative, and willing to participate with PT, +TPN. Pt responded well to functional mobility trng, ambulation tx, and stair training. Independent in all aspects of mobility and ambulation. Supervision to ambulate up/down 4 steps with single rail support. Assisted back into room and adjusted for comfort, +alarm. The Pt was in NAD at end of tx.  Call bell, tray, and phone in place and within reach. NSG made aware.
Pt. received semi supine in bed + IV agreeable to PT. Pt. had vomited on her gown PT changer her with fresh gown , Bed mob with SBA, amb with HHA in hallway and was reaching for hallway handrails 50 ft. back to room back to bed for RN to change GIT dressing. + bed alarm.

## 2024-11-28 NOTE — PHYSICAL THERAPY INITIAL EVALUATION ADULT - PERTINENT HX OF CURRENT PROBLEM, REHAB EVAL
64 year old woman with persistent abdominal pain and poor oral intake with hospital course complicated by choledocholithiasis, pancreatitis. Persistent nausea, abdominal pain likely due to Gastroparesis exacerbated by GLP-1 / Moderate to severe protein-calorie malnutrition. - Gastroparesis clinically diagnosed as she was unable to complete Gastric emptying study on admission due to vomiting however after discussion with ir when gj tube was placed the contrast was not moving much 64 year old woman with persistent abdominal pain and poor oral intake with hospital course complicated by choledocholithiasis, pancreatitis. Persistent nausea, abdominal pain likely due to Gastroparesis exacerbated by GLP-1 / Moderate to severe protein-calorie malnutrition. - Gastroparesis clinically diagnosed as she was unable to complete Gastric emptying study on admission due to vomiting . s/p PEG placement, 11.27: PEG unable to be advanced to jejunum; c/o chronic abd pain and nausea. gj tube placement with ir however needs to be endoscopically advanced to jejunum; will need EGD on monday with advanced endo specialist.

## 2024-11-28 NOTE — CHART NOTE - NSCHARTNOTEFT_GEN_A_CORE
Advised by nurse that patient had a bloody bowl movement. Upon my evaluation, patient had just come out of the bathroom. Upon looking into the toilet, the bloody, watery diarrhea was noted. Patient has been having severe abdominal pain. Patient is currently NPO and is receiving TPN.     Plan  GI bleed  - Stat H/H, Type and screen   - Pantoprazole 40mg IVP Advised by nurse that patient had a bloody bowl movement. Upon my evaluation, patient had just come out of the bathroom. Upon looking into the toilet, the bloody, watery diarrhea was noted. Patient has been having severe abdominal pain. Patient is currently NPO and is receiving TPN.     Plan  GI bleed  - Stat H/H, Type and screen   - Pantoprazole 40mg IVP  - GI Following

## 2024-11-28 NOTE — PHYSICAL THERAPY INITIAL EVALUATION ADULT - ADDITIONAL COMMENTS
Patient lives in a home with 2STE and 4 steps to bedroom floor. Independent in all aspects of mobility prior to admission. Patient primarily resides at home and takes care of two young grandchildren. Patient was driving prior to admission.
Patient lives in a home with 2STE and 4 steps to bedroom floor. Independent in all aspects of mobility prior to admission. Patient primarily resides at home and takes care of two young grandchildren. Patient was driving prior to admission.

## 2024-11-28 NOTE — PROVIDER CONTACT NOTE (OTHER) - REASON
PICC LIne
Patient concerns of change in urine to a green color.
Patient having difficulty breathing o2 slight desaturation to 89,90% on RA.
Patient increase nausea and anxiety causing o2 to drop slightly to 93% on RA.
Patient presenting tachycardic 130s with beginning of low grade temp.
glucose 401
Pt reports she's in pain
Pt had bloody BM
2 BM with blood
Patient scheduled for Endo today off heparin gtt prematurely
pt glucose 406

## 2024-11-28 NOTE — PHYSICAL THERAPY INITIAL EVALUATION ADULT - LIVES WITH, PROFILE
Daughter and two young grandkids/children/other relative
Daughter and two young grandkids/children/other relative

## 2024-11-28 NOTE — PROVIDER CONTACT NOTE (OTHER) - SITUATION
pt. had 2 BM with blood, VSS.
Patient also requesting PRN xanax rx  and RN requested to reinstate if possible.
pt q 6 sugar on TPN sugar 406
/75 pulse 97 o2 94% on RA.
Pt had bloody BM. KUNAL Kendall made aware and came to bedside.
Patient has PICC line that was placed 11/8 initially for TPN. Patient is no longer receiving TPN. No other indications for PICC line at this time.
Patient visualized green urine that was a change from previous color. RN made MD aware.
pt on tpn blood sugar 401
Patient on heparin gtt scheduled for endo at 4pm today. MD ordered stated for it to be stopped at 1200 but was stopped at 2400. AM APTT scheduled

## 2024-11-29 LAB
ALBUMIN SERPL ELPH-MCNC: 2.3 G/DL — LOW (ref 3.3–5)
ALP SERPL-CCNC: 83 U/L — SIGNIFICANT CHANGE UP (ref 40–120)
ALT FLD-CCNC: 22 U/L — SIGNIFICANT CHANGE UP (ref 12–78)
ANION GAP SERPL CALC-SCNC: 3 MMOL/L — LOW (ref 5–17)
AST SERPL-CCNC: 21 U/L — SIGNIFICANT CHANGE UP (ref 15–37)
BILIRUB SERPL-MCNC: 0.3 MG/DL — SIGNIFICANT CHANGE UP (ref 0.2–1.2)
BUN SERPL-MCNC: 10 MG/DL — SIGNIFICANT CHANGE UP (ref 7–23)
CALCIUM SERPL-MCNC: 8.3 MG/DL — LOW (ref 8.5–10.1)
CHLORIDE SERPL-SCNC: 105 MMOL/L — SIGNIFICANT CHANGE UP (ref 96–108)
CO2 SERPL-SCNC: 27 MMOL/L — SIGNIFICANT CHANGE UP (ref 22–31)
CREAT SERPL-MCNC: 0.57 MG/DL — SIGNIFICANT CHANGE UP (ref 0.5–1.3)
EGFR: 103 ML/MIN/1.73M2 — SIGNIFICANT CHANGE UP
GLUCOSE BLDC GLUCOMTR-MCNC: 158 MG/DL — HIGH (ref 70–99)
GLUCOSE BLDC GLUCOMTR-MCNC: 175 MG/DL — HIGH (ref 70–99)
GLUCOSE BLDC GLUCOMTR-MCNC: 203 MG/DL — HIGH (ref 70–99)
GLUCOSE BLDC GLUCOMTR-MCNC: 235 MG/DL — HIGH (ref 70–99)
GLUCOSE SERPL-MCNC: 171 MG/DL — HIGH (ref 70–99)
HCT VFR BLD CALC: 27.1 % — LOW (ref 34.5–45)
HGB BLD-MCNC: 8.7 G/DL — LOW (ref 11.5–15.5)
MAGNESIUM SERPL-MCNC: 1.7 MG/DL — SIGNIFICANT CHANGE UP (ref 1.6–2.6)
MCHC RBC-ENTMCNC: 28.4 PG — SIGNIFICANT CHANGE UP (ref 27–34)
MCHC RBC-ENTMCNC: 32.1 G/DL — SIGNIFICANT CHANGE UP (ref 32–36)
MCV RBC AUTO: 88.6 FL — SIGNIFICANT CHANGE UP (ref 80–100)
PHOSPHATE SERPL-MCNC: 2.5 MG/DL — SIGNIFICANT CHANGE UP (ref 2.5–4.5)
PLATELET # BLD AUTO: 327 K/UL — SIGNIFICANT CHANGE UP (ref 150–400)
POTASSIUM SERPL-MCNC: 3.3 MMOL/L — LOW (ref 3.5–5.3)
POTASSIUM SERPL-SCNC: 3.3 MMOL/L — LOW (ref 3.5–5.3)
PROT SERPL-MCNC: 7.1 GM/DL — SIGNIFICANT CHANGE UP (ref 6–8.3)
RBC # BLD: 3.06 M/UL — LOW (ref 3.8–5.2)
RBC # FLD: 14.7 % — HIGH (ref 10.3–14.5)
SODIUM SERPL-SCNC: 135 MMOL/L — SIGNIFICANT CHANGE UP (ref 135–145)
TRIGL SERPL-MCNC: 176 MG/DL — HIGH
WBC # BLD: 8.59 K/UL — SIGNIFICANT CHANGE UP (ref 3.8–10.5)
WBC # FLD AUTO: 8.59 K/UL — SIGNIFICANT CHANGE UP (ref 3.8–10.5)

## 2024-11-29 PROCEDURE — 99232 SBSQ HOSP IP/OBS MODERATE 35: CPT

## 2024-11-29 RX ORDER — METOPROLOL TARTRATE 100 MG/1
5 TABLET, FILM COATED ORAL EVERY 6 HOURS
Refills: 0 | Status: DISCONTINUED | OUTPATIENT
Start: 2024-11-29 | End: 2024-12-04

## 2024-11-29 RX ORDER — SODIUM/POT/MAG/CALC/CHLOR/ACET 35-20-5MEQ
1 VIAL (ML) INTRAVENOUS
Refills: 0 | Status: DISCONTINUED | OUTPATIENT
Start: 2024-11-29 | End: 2024-11-29

## 2024-11-29 RX ORDER — FAT EMULSIONS 20 %
0.95 EMULSION INTRAVENOUS
Qty: 50 | Refills: 0 | Status: DISCONTINUED | OUTPATIENT
Start: 2024-11-29 | End: 2024-11-29

## 2024-11-29 RX ORDER — HYDROMORPHONE HYDROCHLORIDE 2 MG/1
1 TABLET ORAL EVERY 4 HOURS
Refills: 0 | Status: DISCONTINUED | OUTPATIENT
Start: 2024-11-29 | End: 2024-12-02

## 2024-11-29 RX ORDER — ALPRAZOLAM 0.5 MG
0.5 TABLET ORAL AT BEDTIME
Refills: 0 | Status: DISCONTINUED | OUTPATIENT
Start: 2024-11-29 | End: 2024-12-06

## 2024-11-29 RX ADMIN — Medication 0.5 MILLIGRAM(S): at 21:43

## 2024-11-29 RX ADMIN — Medication 4: at 12:13

## 2024-11-29 RX ADMIN — Medication 4: at 21:42

## 2024-11-29 RX ADMIN — METOCLOPRAMIDE HYDROCHLORIDE 10 MILLIGRAM(S): 10 TABLET ORAL at 06:11

## 2024-11-29 RX ADMIN — Medication 2: at 17:12

## 2024-11-29 RX ADMIN — Medication 20.8 GM/KG/DAY: at 22:08

## 2024-11-29 RX ADMIN — PANTOPRAZOLE SODIUM 40 MILLIGRAM(S): 40 TABLET, DELAYED RELEASE ORAL at 06:12

## 2024-11-29 RX ADMIN — METOPROLOL TARTRATE 5 MILLIGRAM(S): 100 TABLET, FILM COATED ORAL at 13:28

## 2024-11-29 RX ADMIN — Medication 1 EACH: at 22:09

## 2024-11-29 RX ADMIN — HYDROMORPHONE HYDROCHLORIDE 1 MILLIGRAM(S): 2 TABLET ORAL at 09:30

## 2024-11-29 RX ADMIN — Medication 250 MILLIGRAM(S): at 06:11

## 2024-11-29 RX ADMIN — ONDANSETRON HYDROCHLORIDE 4 MILLIGRAM(S): 4 TABLET, FILM COATED ORAL at 10:32

## 2024-11-29 RX ADMIN — BENZOCAINE, MENTHOL 1 LOZENGE: 15; 3.6 LOZENGE ORAL at 18:16

## 2024-11-29 RX ADMIN — HYDROMORPHONE HYDROCHLORIDE 1 MILLIGRAM(S): 2 TABLET ORAL at 00:53

## 2024-11-29 RX ADMIN — FLUTICASONE PROPIONATE AND SALMETEROL XINAFOATE 1 DOSE(S): 45; 21 AEROSOL, METERED RESPIRATORY (INHALATION) at 21:21

## 2024-11-29 RX ADMIN — HYDROMORPHONE HYDROCHLORIDE 1 MILLIGRAM(S): 2 TABLET ORAL at 00:23

## 2024-11-29 RX ADMIN — PANTOPRAZOLE SODIUM 40 MILLIGRAM(S): 40 TABLET, DELAYED RELEASE ORAL at 18:17

## 2024-11-29 RX ADMIN — METOPROLOL TARTRATE 5 MILLIGRAM(S): 100 TABLET, FILM COATED ORAL at 18:17

## 2024-11-29 RX ADMIN — HYDROMORPHONE HYDROCHLORIDE 1 MILLIGRAM(S): 2 TABLET ORAL at 20:54

## 2024-11-29 RX ADMIN — INSULIN GLARGINE 8 UNIT(S): 100 INJECTION, SOLUTION SUBCUTANEOUS at 21:42

## 2024-11-29 RX ADMIN — HYDROMORPHONE HYDROCHLORIDE 1 MILLIGRAM(S): 2 TABLET ORAL at 20:24

## 2024-11-29 RX ADMIN — SUCRALFATE 1 GRAM(S): 1 TABLET ORAL at 06:11

## 2024-11-29 RX ADMIN — Medication 2: at 06:12

## 2024-11-29 RX ADMIN — FLUTICASONE PROPIONATE AND SALMETEROL XINAFOATE 1 DOSE(S): 45; 21 AEROSOL, METERED RESPIRATORY (INHALATION) at 08:29

## 2024-11-29 NOTE — CHART NOTE - NSCHARTNOTEFT_GEN_A_CORE
Clinical Nutrition PN Follow Up Note    *64-year-old female with history of GERD  HTN, DM, polyps, and afib with 5 cardiac stents. presents with epigastric pain for the last couple of weeks.  Her daughter brought her in, after speaking with her GI doctor who stated patient should not have been on ozempic before and probably have taken insulin instead. She states that she had just uptitrated her dose of ozempic to 0.5 from 0.25 earlier this week. She had been on the 0.25 for 5 weeks. She states the pain bothers her more than the nausea and vomiting. Patient states she feels very minimal relief since she came in.  She denies NSAID use, bleeding or black stools. She reports 10lb weight loss and has been on ozempic for her diabetes. Denies diarrhea or urinary symptoms. Admitted for Nausea and vomiting   *11/1: PPN to be initiated as a bridge 2/2 prolonged PO intolerance/poor PO intake; awaiting gastric emptying study. Per GI note 10/27: abd USG inconclusive s/p cholecystectomy. s/p EGD and colonoscopy - egd with findings of gastritis and esophagitis without ulcers colonoscopy with patent anastomosis - no acute findings. Pt placed on regular low fiber diet - vomited lunch on 10/31 - complains of throat pain after egd; epigastric pain minimal.  Per hospitalist: "at this point, will need to rule out gastroparesis with gastric emptying study - per nuclear medicine the earliest it could be done would be Tuesday 11/5- need to order isotpope - Trial of 5mg liquid Reglan QID 15 min before meals and at bedtime."     *11/6: Did not receive PPN last night 2/2 per nuclear medicine not allowed to receive anything past midnight (?). EGD done yesterday which came back inconclusive.   *11/7: Abnormal MRCP; vascular also consulted. At this time diff diagnosis expanded 2/2 pain. 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 GI recommended to c/w FLD for now and trial Reglan 3x daily to alleviate symptoms. Passed bloody BM this morning w/ abdominal pain, trend H/H. Vascular surgery consulted to evaluate for median arcuate ligament syndrome however no intervention to be done at this time as no imaging finding suggestive of median arcuate ligament syndrome.   *11/8: GI consulted yesterday, per note: "Abdominal pain could be secondary to distal CBD stones/sludge with likely accomodation biliary flow due to dilated duct post choly (normal lft's)." Is s/p ERCP yesterday w/ Dr. Moncada - 2 stents were placed and obtained biopsy of nodular papilla. Currently c/o abdominal discomfort/ pain s/p ERCP yesterday.  11/9: S/p PICC placement (11/8) and transitioned to TPN. ?if GJ tube will be placed as pt has had intestines removed in the past.  *11/11: Remains on CLD. Now w/ low grade temp and worsening pain, started on empiric abx. Per hospitalist note: "If the stone is not the cause one could ask if this can be an IBD exacerbation on the small bowel mucosae since she did have some blood; check ESR and CRP- if markedly elevated it could be indicative of poss IBD and steroids might be necessary."  *11/12: Diet advanced to FLD yesterday. Per GI NP note - since pt w/ persistent abdominal pain s/p CBD stent placement, plan for repeat ERCP w/ stone removal on Thursday; was unable to remove stones originally 2/2 AC on board and there was a risk for bleeding.   *11/16: TPN stopped 2/2 persistent hyperglycemia   11/22: s/p GJ tube placement   *11/28: Discussed with Dr. Murphy this morning, plan to restart PN 2/2 PEG unable to be advanced to GJ tube. Called RN, RN states that pt only has midline despite note from yesterday saying pt has PICC line. Per  policy, unable to run PN through midline. Will start PPN today, however can transition back to TPN if pt does in fact have PICC line - will follow back up Please see additional recommendations below.     PPN initiated 2/2 prolonged PO intolerance d/t gastroparesis, unable to advance PEG to GJ tube     *current status: pt w/ bloody, liquid BM's overnight. Endorses severe abd pain. CLD, on PPN. Still unable to confirm PICC line placement. Attempted to call RN x 2 - awaiting callback. Can run PPN order through PICC line if available     *new pertinent meds: aspirin, erythromycin, FIRST Mouthwash, Lantus 8 units HS, ADMELOG, Metoclopramide, protonix     *labs reviewed; Na, Mg, and Phos low-end of normal; hypokalemia - will increase ALL in PPN bag. TBili WNL c/w trace elements. POCT WNL.  C/w 50g lipids, TG level WNL    11-29    135  |  105  |  10  ----------------------------<  171[H]  3.3[L]   |  27  |  0.57    Ca    8.3[L]      29 Nov 2024 04:48  Phos  2.5     11-29  Mg     1.7     11-29    TPro  7.1  /  Alb  2.3[L]  /  TBili  0.3  /  DBili  x   /  AST  21  /  ALT  22  /  AlkPhos  83  11-29    BMI: BMI (kg/m2): 20.6 (11-13-24 @ 08:07)  HbA1c: A1C with Estimated Average Glucose Result: 6.6 % (10-26-24 @ 06:47)    BP: 99/59 (11-28-24 @ 07:48) (97/56 - 136/81)Vital Signs Last 24 Hrs  T(C): 36.7 (11-28-24 @ 07:48), Max: 36.8 (11-28-24 @ 00:15)  T(F): 98 (11-28-24 @ 07:48), Max: 98.2 (11-28-24 @ 00:15)  HR: 103 (11-28-24 @ 07:48) (98 - 105)  BP: 99/59 (11-28-24 @ 07:48) (99/59 - 121/69)  BP(mean): --  RR: 17 (11-28-24 @ 07:48) (17 - 21)  SpO2: 91% (11-28-24 @ 07:48) (91% - 100%)    Lipid Panel: Date/Time: 11-10-24 @ 04:34  Triglycerides, Serum: 160    POCT Blood Glucose.: 175 mg/dL (29 Nov 2024 06:08)  POCT Blood Glucose.: 148 mg/dL (28 Nov 2024 22:17)  POCT Blood Glucose.: 123 mg/dL (28 Nov 2024 17:16)  POCT Blood Glucose.: 136 mg/dL (28 Nov 2024 12:04)    *I&O's Detail - Strict I&O's are rec'd when pt is on PN as per protocol   *BM (+) on 11/26 - small, formed. pt not on bowel regimen.  *No edema doc'd    *malnutrition: Pt continues to meet criteria for moderate malnutrition in context of acute on chronic illness r/t decreased ability to meet increased nutrient needs 2/2 N/V, DM AEB >7.5% wt loss x 2mo, <50% ENN x 2 weeks, mild muscle/fat wasting    Estimated Needs: based on 55 Kg (wt from 10/27)  Calories: 4563-9061 Kcal (35-40 Kcal/Kg)  Protein: 66-83 g (1.2-1.5 g/Kg)  Fluids: 0406-7594 mL (35-40 mL/Kg)    Diet, Clear Liquid (11-27-24 @ 16:33) [Active]    Weight History:  Height (cm): 160 (11-13-24 @ 08:07)  Weight (kg): 52.8 (10-25-24 @ 18:30)  BMI (kg/m2): 20.6 (11-13-24 @ 08:07)  BSA (m2): 1.54 (11-13-24 @ 08:07)    PPN Recommendations: via Peripheral Line  Total Volume: 2000mL x 24 hours  80 g  Amino Acids  100 g Dextrose  50 g Lipids 20%  80 mEq Sodium Chloride  53 mEq Sodium Acetate  20 mmol Sodium Phosphate  35 mEq Potassium Chloride  0 mEq Potassium Acetate  10 mmol Potassium Phosphate  0 mEq Calcium Gluconate  (CAPS out of PN solution)  10 mEq Magnesium Sulfate  200 mg Thiamine  1 ml Trace Elements  10 ml MVI    Total Calories   1160    (Meets 53%  of  Estimated Energy needs  and   100%  Protein needs)  (osmolarity ~879)    Additional Recommendations:    1) Daily weights  2) Strict I & O's  3) Daily lyte checks including magnesium and phos  4) Weekly triglycerides/LFT checks  5) POCT q6hrs; maintain 140-180mg/dL  6) if on PN > 6 days, consider placing PICC line to meet 100% of nutr needs; per RN, pt has midline and no PICC line at this time  7) Consider inititaing trickle feeds if PEG can be advanced to GJ    *will continue to monitor and adjust PN prn*  Isabel Mir, MS, RDN, CNSC, [phone]  Certified Nutrition

## 2024-11-29 NOTE — PROGRESS NOTE ADULT - SUBJECTIVE AND OBJECTIVE BOX
CC: Abd pain      64-year-old female with history of GERD  HTN, DM, FAP s/p colon resection, PAfib, CAD s/p PCI March 2023, Anxiety  and Depression ,  presents with epigastric pain for the last couple of weeks.  Her daughter brought her in, after speaking with her GI doctor who stated patient should not have been on ozempic before and probably have taken insulin instead. She states that she had just up titrated her dose of ozempic to 0.5 from 0.25 earlier this week. She had been on the 0.25 for 5 weeks. She states the pain bothers her more than the nausea and vomiting. Patient states she feels very minimal relief since she came in.  She denies NSAID use, bleeding or black stools. She reports 10lb weight loss and has been on ozempic for her diabetes. Denies diarrhea or urinary symptoms.     Adm for initiation of PPN for presumed gastroparesis; gastric emptying study for 11/6- failed due to vomiting   PICC inserted 11/9 for TPN  Abnormal MRCP; s/p ERCP and stent placement for CBD stone followed by repeat ERCP on 11/15 with stone extraction, sphincterotomy, stent replacement  Continues to have abd pain and poor oral intake , s/p PEG placement   Developed Asthma exacerbation quickly resolved w inhalers and one dose steroids     11.26: c/o abd pain, hasn't eaten in days   depressed and anxious   11.27: PEG unable to be advanced to jejunum; c/o chronic abd pain and nausea   11.28: c/o severe abd pain   11.29: had 2 episodes of small  hematochezia overnight, +vomiting but no hematemesis , +abdominal pain persists, unable to hold food down         REVIEW OF SYSTEMS:    CONSTITUTIONAL: No weakness, No fevers or chills  ENT: No ear ache, No sorethroat  NECK: No pain, No stiffness  RESPIRATORY: No cough, No wheezing, No hemoptysis; No dyspnea  CARDIOVASCULAR: No chest pain, No palpitations  GASTROINTESTINAL: ++abd pain, No nausea, No vomiting, No hematemesis, No diarrhea or constipation. No melena, No hematochezia.  GENITOURINARY: No dysuria, No  hematuria  NEUROLOGICAL: No diplopia, No paresthesia, No motor dysfunction  MUSCULOSKELETAL: No arthralgia, No myalgia  SKIN: No rashes, or lesions   PSYCH: ++anxiety, no suicidal ideation    All other review of systems is negative unless indicated above    Vital Signs Last 24 Hrs  T(C): 36.6 (29 Nov 2024 10:49), Max: 36.7 (28 Nov 2024 23:00)  T(F): 97.9 (29 Nov 2024 10:49), Max: 98 (28 Nov 2024 23:00)  HR: 86 (29 Nov 2024 13:23) (86 - 97)  BP: 114/59 (29 Nov 2024 13:23) (114/59 - 126/68)  RR: 17 (29 Nov 2024 10:49) (17 - 19)  SpO2: 93% (29 Nov 2024 10:49) (91% - 93%)    Parameters below as of 29 Nov 2024 10:49  Patient On (Oxygen Delivery Method): room air          PHYSICAL EXAM:    GENERAL: NAD  HEENT:  NC/AT, EOMI, PERRLA, No scleral icterus, Moist mucous membranes  NECK: Supple, No JVD  CNS:  Alert & Oriented X3, Motor Strength 5/5 B/L upper and lower extremities; DTRs 2+ intact   LUNG: Normal Breath sounds, Clear to auscultation bilaterally, No rales, No rhonchi, No wheezing  HEART: RRR; No murmurs, No rubs  ABDOMEN: +BS, ST/ND, +TTP on epigastric and midline area, +PEG present no erythema no discharge around peg   GENITOURINARY: Voiding, Bladder not distended  EXTREMITIES:  2+ Peripheral Pulses, No clubbing, No cyanosis, No tibial edema  MUSCULOSKELTAL: Joints normal ROM, No TTP, No effusion  VAGINAL: deferred  SKIN: no rashes  RECTAL: deferred, not indicated  BREAST: deferred                          9.4    9.35  )-----------( 346      ( 26 Nov 2024 08:13 )             30.1     11-25    136  |  103  |  7   ----------------------------<  136[H]  3.9   |  30  |  0.70    Ca    9.0      25 Nov 2024 07:08      Vancomycin levels:   Cultures:     MEDICATIONS  (STANDING):  ALPRAZolam 0.5 milliGRAM(s) Oral at bedtime  ARIPiprazole 2 milliGRAM(s) Oral daily  aspirin  chewable 81 milliGRAM(s) Oral daily  chlorhexidine 4% Liquid 1 Application(s) Topical <User Schedule>  erythromycin     enteric coated 250 milliGRAM(s) Oral three times a day  escitalopram 10 milliGRAM(s) Oral daily  FIRST- Mouthwash  BLM 5 milliLiter(s) Swish and Swallow three times a day  fluticasone propionate/ salmeterol 250-50 MICROgram(s) Diskus 1 Dose(s) Inhalation two times a day  glucagon  Injectable 1 milliGRAM(s) IntraMuscular once  influenza   Vaccine 0.5 milliLiter(s) IntraMuscular once  insulin glargine Injectable (LANTUS) 8 Unit(s) SubCutaneous at bedtime  insulin lispro (ADMELOG) corrective regimen sliding scale   SubCutaneous Before meals and at bedtime  lipid, fat emulsion (Fish Oil and Plant Based) 20% Infusion 0.947 Gm/kG/Day (20.83 mL/Hr) IV Continuous <Continuous>  metoclopramide 10 milliGRAM(s) Oral three times a day before meals  metoprolol succinate  milliGRAM(s) Oral daily  montelukast 10 milliGRAM(s) Oral at bedtime  pantoprazole    Tablet 40 milliGRAM(s) Oral before breakfast  Parenteral Nutrition - Adult 1 Each (83 mL/Hr) TPN Continuous <Continuous>  rosuvastatin 10 milliGRAM(s) Oral at bedtime  sodium chloride 0.9%. 1000 milliLiter(s) (75 mL/Hr) IV Continuous <Continuous>  sucralfate suspension 1 Gram(s) Oral <User Schedule>          Assessment and Plan:  64 year old woman with persistent abdominal pain and poor oral intake with hospital course complicated by choledocholithiasis, pancreatitis.  	    1. Persistent nausea, abdominal pain likely due to Gastroparesis exacerbated by GLP-1  +suspect psychosomatic component present as well  -requiring Dilaudid IV around the clock   - h/o colectomy with iliorectal anastomosis for FAP  - EGD/colonoscopy --> gastritis, esophagitis, c/w PPI  - Gastroparesis clinically diagnosed as she was unable to complete Gastric emptying study on admission due to vomiting however after discussion with ir when gj tube was placed the contrast was not moving much   - c/w Reglan  10mg TID, Erythromycin not helping   - s/p gj tube placement with ir however needs to be endoscopically advanced to jejunum; will need EGD on monday with advanced endo specialist   - Restart feeds per nutrition recs once GJ able to be used and monitor closely for refeeding syndrome     -restarted TPN    2. Pancreatitis: transient post ERCP resolved   - Pancreatitis s/p ercp 11/15   - s/p IVFs    3. Choledocholithiasis:  - s/p ERCP and stent placement   - s/p repeat ERCP on 11/15 for persistent  14mm CBD stone ; CBD and Pancreatic duct cleared, sphincterotomy and metal stent placed    4. DM type II: uncontrolled due to TPN  - monitor blood sugars  - c/w insulin therapy     5. Moderate to severe protein-calorie malnutrition:  on TPN  Glucerna supplements as tolerated     6. Anxiety:   - c/w Lexapro, c/w  Xanax prn Tid  - On Abilify 5mg qd prior toadmission.    - Psych evaluation noted: c/w Abilify low dose for now   +somatization suspected     7. Paroxymal afib:   - off lisinopril due to borderline BP  -anticoagulation on hold due to procedures and hematochezia      8. CAD:  restart Plavix when stable

## 2024-11-30 LAB
ALBUMIN SERPL ELPH-MCNC: 2.4 G/DL — LOW (ref 3.3–5)
ALP SERPL-CCNC: 77 U/L — SIGNIFICANT CHANGE UP (ref 40–120)
ALT FLD-CCNC: 18 U/L — SIGNIFICANT CHANGE UP (ref 12–78)
ANION GAP SERPL CALC-SCNC: 5 MMOL/L — SIGNIFICANT CHANGE UP (ref 5–17)
AST SERPL-CCNC: 16 U/L — SIGNIFICANT CHANGE UP (ref 15–37)
BILIRUB SERPL-MCNC: 0.2 MG/DL — SIGNIFICANT CHANGE UP (ref 0.2–1.2)
BUN SERPL-MCNC: 11 MG/DL — SIGNIFICANT CHANGE UP (ref 7–23)
CALCIUM SERPL-MCNC: 8.4 MG/DL — LOW (ref 8.5–10.1)
CHLORIDE SERPL-SCNC: 105 MMOL/L — SIGNIFICANT CHANGE UP (ref 96–108)
CO2 SERPL-SCNC: 26 MMOL/L — SIGNIFICANT CHANGE UP (ref 22–31)
CREAT SERPL-MCNC: 0.52 MG/DL — SIGNIFICANT CHANGE UP (ref 0.5–1.3)
EGFR: 105 ML/MIN/1.73M2 — SIGNIFICANT CHANGE UP
GLUCOSE BLDC GLUCOMTR-MCNC: 130 MG/DL — HIGH (ref 70–99)
GLUCOSE BLDC GLUCOMTR-MCNC: 173 MG/DL — HIGH (ref 70–99)
GLUCOSE BLDC GLUCOMTR-MCNC: 203 MG/DL — HIGH (ref 70–99)
GLUCOSE BLDC GLUCOMTR-MCNC: 213 MG/DL — HIGH (ref 70–99)
GLUCOSE SERPL-MCNC: 197 MG/DL — HIGH (ref 70–99)
HCT VFR BLD CALC: 27.2 % — LOW (ref 34.5–45)
HGB BLD-MCNC: 8.6 G/DL — LOW (ref 11.5–15.5)
MAGNESIUM SERPL-MCNC: 1.9 MG/DL — SIGNIFICANT CHANGE UP (ref 1.6–2.6)
MCHC RBC-ENTMCNC: 27.9 PG — SIGNIFICANT CHANGE UP (ref 27–34)
MCHC RBC-ENTMCNC: 31.6 G/DL — LOW (ref 32–36)
MCV RBC AUTO: 88.3 FL — SIGNIFICANT CHANGE UP (ref 80–100)
PHOSPHATE SERPL-MCNC: 2.9 MG/DL — SIGNIFICANT CHANGE UP (ref 2.5–4.5)
PLATELET # BLD AUTO: 325 K/UL — SIGNIFICANT CHANGE UP (ref 150–400)
POTASSIUM SERPL-MCNC: 3.3 MMOL/L — LOW (ref 3.5–5.3)
POTASSIUM SERPL-SCNC: 3.3 MMOL/L — LOW (ref 3.5–5.3)
PROT SERPL-MCNC: 7.2 GM/DL — SIGNIFICANT CHANGE UP (ref 6–8.3)
RBC # BLD: 3.08 M/UL — LOW (ref 3.8–5.2)
RBC # FLD: 14.7 % — HIGH (ref 10.3–14.5)
SODIUM SERPL-SCNC: 136 MMOL/L — SIGNIFICANT CHANGE UP (ref 135–145)
WBC # BLD: 7.6 K/UL — SIGNIFICANT CHANGE UP (ref 3.8–10.5)
WBC # FLD AUTO: 7.6 K/UL — SIGNIFICANT CHANGE UP (ref 3.8–10.5)

## 2024-11-30 PROCEDURE — 99232 SBSQ HOSP IP/OBS MODERATE 35: CPT

## 2024-11-30 RX ORDER — FAT EMULSIONS 20 %
0.95 EMULSION INTRAVENOUS
Qty: 50 | Refills: 0 | Status: DISCONTINUED | OUTPATIENT
Start: 2024-11-30 | End: 2024-11-30

## 2024-11-30 RX ORDER — POTASSIUM CHLORIDE 600 MG/1
10 TABLET, EXTENDED RELEASE ORAL
Refills: 0 | Status: COMPLETED | OUTPATIENT
Start: 2024-11-30 | End: 2024-11-30

## 2024-11-30 RX ORDER — POTASSIUM CHLORIDE 600 MG/1
10 TABLET, EXTENDED RELEASE ORAL
Refills: 0 | Status: DISCONTINUED | OUTPATIENT
Start: 2024-11-30 | End: 2024-11-30

## 2024-11-30 RX ORDER — SODIUM/POT/MAG/CALC/CHLOR/ACET 35-20-5MEQ
1 VIAL (ML) INTRAVENOUS
Refills: 0 | Status: DISCONTINUED | OUTPATIENT
Start: 2024-11-30 | End: 2024-11-30

## 2024-11-30 RX ADMIN — PANTOPRAZOLE SODIUM 40 MILLIGRAM(S): 40 TABLET, DELAYED RELEASE ORAL at 05:37

## 2024-11-30 RX ADMIN — PANTOPRAZOLE SODIUM 40 MILLIGRAM(S): 40 TABLET, DELAYED RELEASE ORAL at 17:33

## 2024-11-30 RX ADMIN — HYDROMORPHONE HYDROCHLORIDE 1 MILLIGRAM(S): 2 TABLET ORAL at 05:28

## 2024-11-30 RX ADMIN — INSULIN GLARGINE 8 UNIT(S): 100 INJECTION, SOLUTION SUBCUTANEOUS at 21:30

## 2024-11-30 RX ADMIN — Medication 2: at 18:35

## 2024-11-30 RX ADMIN — SUCRALFATE 1 GRAM(S): 1 TABLET ORAL at 21:30

## 2024-11-30 RX ADMIN — ONDANSETRON HYDROCHLORIDE 4 MILLIGRAM(S): 4 TABLET, FILM COATED ORAL at 14:02

## 2024-11-30 RX ADMIN — POTASSIUM CHLORIDE 100 MILLIEQUIVALENT(S): 600 TABLET, EXTENDED RELEASE ORAL at 11:50

## 2024-11-30 RX ADMIN — Medication 0.5 MILLIGRAM(S): at 21:30

## 2024-11-30 RX ADMIN — Medication 20.83 GM/KG/DAY: at 22:51

## 2024-11-30 RX ADMIN — FLUTICASONE PROPIONATE AND SALMETEROL XINAFOATE 1 DOSE(S): 45; 21 AEROSOL, METERED RESPIRATORY (INHALATION) at 08:20

## 2024-11-30 RX ADMIN — MONTELUKAST SODIUM 10 MILLIGRAM(S): 10 TABLET ORAL at 21:30

## 2024-11-30 RX ADMIN — METOPROLOL TARTRATE 5 MILLIGRAM(S): 100 TABLET, FILM COATED ORAL at 11:56

## 2024-11-30 RX ADMIN — METOPROLOL TARTRATE 5 MILLIGRAM(S): 100 TABLET, FILM COATED ORAL at 05:30

## 2024-11-30 RX ADMIN — POTASSIUM CHLORIDE 100 MILLIEQUIVALENT(S): 600 TABLET, EXTENDED RELEASE ORAL at 15:35

## 2024-11-30 RX ADMIN — METOPROLOL TARTRATE 5 MILLIGRAM(S): 100 TABLET, FILM COATED ORAL at 17:32

## 2024-11-30 RX ADMIN — HYDROMORPHONE HYDROCHLORIDE 1 MILLIGRAM(S): 2 TABLET ORAL at 09:49

## 2024-11-30 RX ADMIN — ROSUVASTATIN CALCIUM 10 MILLIGRAM(S): 5 TABLET, FILM COATED ORAL at 21:31

## 2024-11-30 RX ADMIN — METOPROLOL TARTRATE 5 MILLIGRAM(S): 100 TABLET, FILM COATED ORAL at 00:20

## 2024-11-30 RX ADMIN — HYDROMORPHONE HYDROCHLORIDE 1 MILLIGRAM(S): 2 TABLET ORAL at 14:02

## 2024-11-30 RX ADMIN — Medication 4: at 11:56

## 2024-11-30 RX ADMIN — Medication 1 EACH: at 22:52

## 2024-11-30 RX ADMIN — POTASSIUM CHLORIDE 100 MILLIEQUIVALENT(S): 600 TABLET, EXTENDED RELEASE ORAL at 14:01

## 2024-11-30 RX ADMIN — Medication 4: at 05:34

## 2024-11-30 RX ADMIN — HYDROMORPHONE HYDROCHLORIDE 1 MILLIGRAM(S): 2 TABLET ORAL at 21:48

## 2024-11-30 NOTE — CHART NOTE - NSCHARTNOTEFT_GEN_A_CORE
Clinical Nutrition PN Follow Up Note    *64-year-old female with history of GERD  HTN, DM, polyps, and afib with 5 cardiac stents. presents with epigastric pain for the last couple of weeks.  Her daughter brought her in, after speaking with her GI doctor who stated patient should not have been on ozempic before and probably have taken insulin instead. She states that she had just uptitrated her dose of ozempic to 0.5 from 0.25 earlier this week. She had been on the 0.25 for 5 weeks. She states the pain bothers her more than the nausea and vomiting. Patient states she feels very minimal relief since she came in.  She denies NSAID use, bleeding or black stools. She reports 10lb weight loss and has been on ozempic for her diabetes. Denies diarrhea or urinary symptoms. Admitted for Nausea and vomiting   *11/1: PPN to be initiated as a bridge 2/2 prolonged PO intolerance/poor PO intake; awaiting gastric emptying study. Per GI note 10/27: abd USG inconclusive s/p cholecystectomy. s/p EGD and colonoscopy - egd with findings of gastritis and esophagitis without ulcers colonoscopy with patent anastomosis - no acute findings. Pt placed on regular low fiber diet - vomited lunch on 10/31 - complains of throat pain after egd; epigastric pain minimal.  Per hospitalist: "at this point, will need to rule out gastroparesis with gastric emptying study - per nuclear medicine the earliest it could be done would be Tuesday 11/5- need to order isotpope - Trial of 5mg liquid Reglan QID 15 min before meals and at bedtime."     *11/6: Did not receive PPN last night 2/2 per nuclear medicine not allowed to receive anything past midnight (?). EGD done yesterday which came back inconclusive.   *11/7: Abnormal MRCP; vascular also consulted. At this time diff diagnosis expanded 2/2 pain. 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 GI recommended to c/w FLD for now and trial Reglan 3x daily to alleviate symptoms. Passed bloody BM this morning w/ abdominal pain, trend H/H. Vascular surgery consulted to evaluate for median arcuate ligament syndrome however no intervention to be done at this time as no imaging finding suggestive of median arcuate ligament syndrome.   *11/8: GI consulted yesterday, per note: "Abdominal pain could be secondary to distal CBD stones/sludge with likely accomodation biliary flow due to dilated duct post choly (normal lft's)." Is s/p ERCP yesterday w/ Dr. Moncada - 2 stents were placed and obtained biopsy of nodular papilla. Currently c/o abdominal discomfort/ pain s/p ERCP yesterday.  11/9: S/p PICC placement (11/8) and transitioned to TPN. ?if GJ tube will be placed as pt has had intestines removed in the past.  *11/11: Remains on CLD. Now w/ low grade temp and worsening pain, started on empiric abx. Per hospitalist note: "If the stone is not the cause one could ask if this can be an IBD exacerbation on the small bowel mucosae since she did have some blood; check ESR and CRP- if markedly elevated it could be indicative of poss IBD and steroids might be necessary."  *11/12: Diet advanced to FLD yesterday. Per GI NP note - since pt w/ persistent abdominal pain s/p CBD stent placement, plan for repeat ERCP w/ stone removal on Thursday; was unable to remove stones originally 2/2 AC on board and there was a risk for bleeding.   *11/16: TPN stopped 2/2 persistent hyperglycemia   11/22: s/p GJ tube placement   *11/28: Discussed with Dr. Murphy this morning, plan to restart PN 2/2 PEG unable to be advanced to GJ tube. Called RN, RN states that pt only has midline despite note from yesterday saying pt has PICC line. Per  policy, unable to run PN through midline. Will start PPN today, however can transition back to TPN if pt does in fact have PICC line - will follow back up Please see additional recommendations below.   *11/29: pt w/ bloody, liquid BM's overnight. Endorses severe abd pain. CLD, on PPN. Still unable to confirm PICC line placement. Attempted to call RN x 2 - awaiting callback. Can run PPN order through PICC line if available     PPN initiated 2/2 prolonged PO intolerance d/t gastroparesis, unable to advance PEG to GJ tube     *current status: No changes overnight. Remains w/ bloody BM's and severe abd pain. Remains on CLD, on PPN. Pt reports refusing to consume any liquids 2/2 fear of vomiting. Confirmed w/ RN this morning pt w/ PICC line placement and PPN currently running through PICC. Will transition to TPN. Possible plan for J tube advancement with Dr. Moncada on ?Monday (12/2).     *new pertinent meds: Xanax, Acetominophen, aspirin, erythromycin, FIRST Mouthwash, Lantus 8 units HS, ADMELOG sliding scale (16 units x 24 hrs), protonix, Zofran     *labs reviewed; Na and Phos low-end of normal. Hypokalemia persists - will increase in PN bag. Mg WNL. corrected Ca WNL, rec'd add 10mEq of Calcium Gluconate outside of PN bag as CAPS is out. TBili WNL c/w trace elements. POCT x 24 hrs mostly elevated. New TG WNL, will c/w 50g lipids. Titrate dextrose 50-75g daily until goal 200g is reached (GIR 2.5; WNL). D/t hyperglycemia, unable to increase dextrose goal however TPN at new dextrose goal will meet 74% ENN. Will closely monitor POCTs, if remain elevated, will add 5 units insulin into PN bag.    11-30    136  |  105  |  11  ----------------------------<  197[H]  3.3[L]   |  26  |  0.52    Ca    8.4[L]      30 Nov 2024 04:39  Phos  2.9     11-30  Mg     1.9     11-30    TPro  7.2  /  Alb  2.4[L]  /  TBili  0.2  /  DBili  x   /  AST  16  /  ALT  18  /  AlkPhos  77  11-30      BMI: BMI (kg/m2): 20.6 (11-13-24 @ 08:07)  HbA1c: A1C with Estimated Average Glucose Result: 6.6 % (10-26-24 @ 06:47)    BP: 99/59 (11-28-24 @ 07:48) (97/56 - 136/81)Vital Signs Last 24 Hrs  T(C): 36.7 (11-28-24 @ 07:48), Max: 36.8 (11-28-24 @ 00:15)  T(F): 98 (11-28-24 @ 07:48), Max: 98.2 (11-28-24 @ 00:15)  HR: 103 (11-28-24 @ 07:48) (98 - 105)  BP: 99/59 (11-28-24 @ 07:48) (99/59 - 121/69)  BP(mean): --  RR: 17 (11-28-24 @ 07:48) (17 - 21)  SpO2: 91% (11-28-24 @ 07:48) (91% - 100%)    Lipid Panel: Date/Time: 11-29-24 @ 04:48  Triglycerides, Serum: 176    POCT Blood Glucose.: 213 mg/dL (30 Nov 2024 05:22)  POCT Blood Glucose.: 203 mg/dL (29 Nov 2024 21:40)  POCT Blood Glucose.: 158 mg/dL (29 Nov 2024 16:54)  POCT Blood Glucose.: 235 mg/dL (29 Nov 2024 12:08)    *I&O's Detail - Strict I&O's are rec'd when pt is on PN as per protocol   *BM (+) on 11/29 - +abdominal discomfort. pt not on bowel regimen.  *No edema doc'd    *malnutrition: Pt continues to meet criteria for moderate malnutrition in context of acute on chronic illness r/t decreased ability to meet increased nutrient needs 2/2 N/V, DM AEB >7.5% wt loss x 2mo, <50% ENN x 2 weeks, mild muscle/fat wasting    Estimated Needs: based on 55 Kg (wt from 10/27)  Calories: 5771-6616 Kcal (35-40 Kcal/Kg)  Protein: 77-88 g (1.4-1.6 g/Kg)  Fluids: 6337-3476 mL (35-40 mL/Kg)    Diet, Clear Liquid (11-27-24 @ 16:33) [Active]    Weight History:  Height (cm): 160 (11-13-24 @ 08:07)  Weight (kg): 52.8 (10-25-24 @ 18:30)  BMI (kg/m2): 20.6 (11-13-24 @ 08:07)  BSA (m2): 1.54 (11-13-24 @ 08:07)    TPN Recommendations: via PICC Line  Total Volume: 2000mL x 24 hours  80 g  Amino Acids  150 g Dextrose (titrate 50-75g daily until goal 200g is met)  50 g Lipids 20%  80 mEq Sodium Chloride  53 mEq Sodium Acetate  20 mmol Sodium Phosphate  45 mEq Potassium Chloride  0 mEq Potassium Acetate  10 mmol Potassium Phosphate  0 mEq Calcium Gluconate  (CAPS out of PN solution)  10 mEq Magnesium Sulfate  200 mg Thiamine  1 ml Trace Elements  10 ml MVI    Total Calories   1330    (Meets 66%  of  Estimated Energy needs  and   100%  Protein needs)    Additional Recommendations:    1) Daily weights  2) Strict I & O's  3) Daily lyte checks including magnesium and phos  4) Weekly triglycerides/LFT checks  5) POCT q6hrs; maintain 140-180mg/dL  6) Titrate dextrose 50-75g daily until goal 200g is met; unable to increase dextrose goal 2/2 hyperglycemia; will add insulin to PN bag if hyperglycemia persists.  7) Consider initiating trickle feeds if PEG can be advanced to GJ - possible plan for J tube advancement on ?Monday (12/2)    *will continue to monitor and adjust PN prn*  Carmenza Hamilton, MIGUEL ÁNGELN, CDN [phone]

## 2024-11-30 NOTE — PROGRESS NOTE ADULT - SUBJECTIVE AND OBJECTIVE BOX
CC: Abd pain      64-year-old female with history of GERD  HTN, DM, FAP s/p colon resection, PAfib, CAD s/p PCI March 2023, Anxiety  and Depression ,  presents with epigastric pain for the last couple of weeks.  Her daughter brought her in, after speaking with her GI doctor who stated patient should not have been on ozempic before and probably have taken insulin instead. She states that she had just up titrated her dose of ozempic to 0.5 from 0.25 earlier this week. She had been on the 0.25 for 5 weeks. She states the pain bothers her more than the nausea and vomiting. Patient states she feels very minimal relief since she came in.  She denies NSAID use, bleeding or black stools. She reports 10lb weight loss and has been on ozempic for her diabetes. Denies diarrhea or urinary symptoms.     Adm for initiation of PPN for presumed gastroparesis; gastric emptying study for 11/6- failed due to vomiting   PICC inserted 11/9 for TPN  Abnormal MRCP; s/p ERCP and stent placement for CBD stone followed by repeat ERCP on 11/15 with stone extraction, sphincterotomy, stent replacement  Continues to have abd pain and poor oral intake , s/p PEG placement   Developed Asthma exacerbation quickly resolved w inhalers and one dose steroids     11.26: c/o abd pain, hasn't eaten in days   depressed and anxious   11.27: PEG unable to be advanced to jejunum; c/o chronic abd pain and nausea   11.28: c/o severe abd pain   11.29: had 2 episodes of small  hematochezia overnight, +vomiting but no hematemesis , +abdominal pain persists, unable to hold food down   1.30: no more hematochezia, not able to take po due to nausea/vomiting, ++anxiety, +abd pain       REVIEW OF SYSTEMS:    CONSTITUTIONAL: No weakness, No fevers or chills  ENT: No ear ache, No sorethroat  NECK: No pain, No stiffness  RESPIRATORY: No cough, No wheezing, No hemoptysis; No dyspnea  CARDIOVASCULAR: No chest pain, No palpitations  GASTROINTESTINAL: ++abd pain, ++ nausea, + vomiting, No hematemesis, No diarrhea or constipation. No melena, No hematochezia.  GENITOURINARY: No dysuria, No  hematuria  NEUROLOGICAL: No diplopia, No paresthesia, No motor dysfunction  MUSCULOSKELETAL: No arthralgia, No myalgia  SKIN: No rashes, or lesions   PSYCH: ++anxiety, no suicidal ideation    All other review of systems is negative unless indicated above    Vital Signs Last 24 Hrs  T(C): 36.6 (30 Nov 2024 08:31), Max: 36.7 (29 Nov 2024 16:03)  T(F): 97.9 (30 Nov 2024 08:31), Max: 98 (29 Nov 2024 16:03)  HR: 79 (30 Nov 2024 11:55) (79 - 91)  BP: 108/62 (30 Nov 2024 11:55) (105/50 - 129/70)  BP(mean): --  RR: 18 (30 Nov 2024 05:26) (18 - 18)  SpO2: 94% (30 Nov 2024 08:31) (94% - 96%)    Parameters below as of 30 Nov 2024 08:31  Patient On (Oxygen Delivery Method): room air              PHYSICAL EXAM:    GENERAL: anxious   HEENT:  NC/AT, EOMI, PERRLA, No scleral icterus, Moist mucous membranes  NECK: Supple, No JVD  CNS:  Alert & Oriented X3, Motor Strength 5/5 B/L upper and lower extremities; DTRs 2+ intact   LUNG: Normal Breath sounds, Clear to auscultation bilaterally, No rales, No rhonchi, No wheezing  HEART: RRR; No murmurs, No rubs  ABDOMEN: +BS, ST/ND, +TTP on epigastric and midline area, +PEG present no erythema no discharge around peg   GENITOURINARY: Voiding, Bladder not distended  EXTREMITIES:  2+ Peripheral Pulses, No clubbing, No cyanosis, No tibial edema  MUSCULOSKELTAL: Joints normal ROM, No TTP, No effusion  VAGINAL: deferred  SKIN: no rashes  RECTAL: deferred, not indicated  BREAST: deferred    Labs:                        8.6    7.60  )-----------( 325      ( 30 Nov 2024 04:39 )             27.2     11-30    136  |  105  |  11  ----------------------------<  197[H]  3.3[L]   |  26  |  0.52    Ca    8.4[L]      30 Nov 2024 04:39  Phos  2.9     11-30  Mg     1.9     11-30    TPro  7.2  /  Alb  2.4[L]  /  TBili  0.2  /  DBili  x   /  AST  16  /  ALT  18  /  AlkPhos  77  11-30         MEDICATIONS  (STANDING):  ALPRAZolam 0.5 milliGRAM(s) Oral at bedtime  ARIPiprazole 2 milliGRAM(s) Oral daily  aspirin  chewable 81 milliGRAM(s) Oral daily  chlorhexidine 4% Liquid 1 Application(s) Topical <User Schedule>  escitalopram 10 milliGRAM(s) Oral daily  fluticasone propionate/ salmeterol 250-50 MICROgram(s) Diskus 1 Dose(s) Inhalation two times a day  glucagon  Injectable 1 milliGRAM(s) IntraMuscular once  influenza   Vaccine 0.5 milliLiter(s) IntraMuscular once  insulin glargine Injectable (LANTUS) 8 Unit(s) SubCutaneous at bedtime  insulin lispro (ADMELOG) corrective regimen sliding scale   SubCutaneous Before meals and at bedtime  lipid, fat emulsion (Fish Oil and Plant Based) 20% Infusion 0.9469 Gm/kG/Day (20.83 mL/Hr) IV Continuous <Continuous>  lipid, fat emulsion (Fish Oil and Plant Based) 20% Infusion 0.947 Gm/kG/Day (20.8 mL/Hr) IV Continuous <Continuous>  metoprolol tartrate Injectable 5 milliGRAM(s) IV Push every 6 hours  montelukast 10 milliGRAM(s) Oral at bedtime  pantoprazole  Injectable 40 milliGRAM(s) IV Push every 12 hours  Parenteral Nutrition - Adult 1 Each (83 mL/Hr) TPN Continuous <Continuous>  Parenteral Nutrition - Adult 1 Each (83 mL/Hr) TPN Continuous <Continuous>  potassium chloride  10 mEq/100 mL IVPB 10 milliEquivalent(s) IV Intermittent every 1 hour  rosuvastatin 10 milliGRAM(s) Oral at bedtime  sucralfate suspension 1 Gram(s) Oral <User Schedule>          Assessment and Plan:  64 year old woman with persistent abdominal pain and poor oral intake with hospital course complicated by choledocholithiasis, pancreatitis.  	    1. Persistent nausea and abdominal pain likely due to Gastroparesis exacerbated by GLP-1  +suspect psychosomatic component present as well  -requiring Dilaudid IV around the clock : 1mg q 4hrs ; high dose analgesia likely worsening her GI motility as well     - EGD/colonoscopy --> gastritis, esophagitis, c/w PPI  - Gastroparesis clinically diagnosed as she was unable to complete Gastric emptying study on admission due to vomiting however after discussion with ir when gj tube was placed the contrast was not moving much   - c/w Reglan  10mg TID, Erythromycin not helping will stop   - s/p gj tube placement with ir however needs to be endoscopically advanced to jejunum; will need EGD on Monday with advanced endo specialist   - Restart feeds at low dose per nutrition recs once GJ able to be used and monitor closely for refeeding syndrome     -restarted TPN    Patient had transient hematochezia on 11.29; apixaban placed on hold  c/w IV PPI, EGD tomorrow  NPO after MN    2. Pancreatitis: transient post ERCP resolved   - Pancreatitis s/p ercp 11/15   - s/p IVFs    3. Choledocholithiasis:  - s/p ERCP and stent placement   - s/p repeat ERCP on 11/15 for persistent  14mm CBD stone ; CBD and Pancreatic duct cleared, sphincterotomy and metal stent placed    4. DM type II: uncontrolled due to TPN  - monitor blood sugars  expect high BGMs with parenteral nutrition   -on Lantus/ISS     5. Moderate to severe protein-calorie malnutrition:  on TPN  Glucerna supplements as tolerated     6. Anxiety:   - c/w Lexapro, Abilify     - Psych evaluation noted: c/w Abilify low dose for now   +somatization suspected     7. Paroxymal afib:   - off lisinopril due to borderline BP  -anticoagulation on hold due to procedures and hematochezia   restart Apixaban when stable ; first restart Plavix then add Apixaban if no signs of bleeding     8. CAD:  restart Plavix after EGD    9. AOCD:  transfuse for Hb<8

## 2024-12-01 LAB
ALBUMIN SERPL ELPH-MCNC: 2.5 G/DL — LOW (ref 3.3–5)
ALP SERPL-CCNC: 80 U/L — SIGNIFICANT CHANGE UP (ref 40–120)
ALT FLD-CCNC: 19 U/L — SIGNIFICANT CHANGE UP (ref 12–78)
ANION GAP SERPL CALC-SCNC: 4 MMOL/L — LOW (ref 5–17)
AST SERPL-CCNC: 24 U/L — SIGNIFICANT CHANGE UP (ref 15–37)
BILIRUB SERPL-MCNC: 0.2 MG/DL — SIGNIFICANT CHANGE UP (ref 0.2–1.2)
BUN SERPL-MCNC: 12 MG/DL — SIGNIFICANT CHANGE UP (ref 7–23)
CALCIUM SERPL-MCNC: 8.5 MG/DL — SIGNIFICANT CHANGE UP (ref 8.5–10.1)
CHLORIDE SERPL-SCNC: 104 MMOL/L — SIGNIFICANT CHANGE UP (ref 96–108)
CO2 SERPL-SCNC: 26 MMOL/L — SIGNIFICANT CHANGE UP (ref 22–31)
CREAT SERPL-MCNC: 0.58 MG/DL — SIGNIFICANT CHANGE UP (ref 0.5–1.3)
EGFR: 102 ML/MIN/1.73M2 — SIGNIFICANT CHANGE UP
GLUCOSE BLDC GLUCOMTR-MCNC: 195 MG/DL — HIGH (ref 70–99)
GLUCOSE BLDC GLUCOMTR-MCNC: 199 MG/DL — HIGH (ref 70–99)
GLUCOSE BLDC GLUCOMTR-MCNC: 203 MG/DL — HIGH (ref 70–99)
GLUCOSE BLDC GLUCOMTR-MCNC: 249 MG/DL — HIGH (ref 70–99)
GLUCOSE BLDC GLUCOMTR-MCNC: 263 MG/DL — HIGH (ref 70–99)
GLUCOSE SERPL-MCNC: 269 MG/DL — HIGH (ref 70–99)
HCT VFR BLD CALC: 27.9 % — LOW (ref 34.5–45)
HGB BLD-MCNC: 8.9 G/DL — LOW (ref 11.5–15.5)
MAGNESIUM SERPL-MCNC: 2 MG/DL — SIGNIFICANT CHANGE UP (ref 1.6–2.6)
MCHC RBC-ENTMCNC: 28.3 PG — SIGNIFICANT CHANGE UP (ref 27–34)
MCHC RBC-ENTMCNC: 31.9 G/DL — LOW (ref 32–36)
MCV RBC AUTO: 88.9 FL — SIGNIFICANT CHANGE UP (ref 80–100)
PHOSPHATE SERPL-MCNC: 3.4 MG/DL — SIGNIFICANT CHANGE UP (ref 2.5–4.5)
PLATELET # BLD AUTO: 314 K/UL — SIGNIFICANT CHANGE UP (ref 150–400)
POTASSIUM SERPL-MCNC: 3.9 MMOL/L — SIGNIFICANT CHANGE UP (ref 3.5–5.3)
POTASSIUM SERPL-SCNC: 3.9 MMOL/L — SIGNIFICANT CHANGE UP (ref 3.5–5.3)
PROT SERPL-MCNC: 7.3 GM/DL — SIGNIFICANT CHANGE UP (ref 6–8.3)
RBC # BLD: 3.14 M/UL — LOW (ref 3.8–5.2)
RBC # FLD: 15.1 % — HIGH (ref 10.3–14.5)
SODIUM SERPL-SCNC: 134 MMOL/L — LOW (ref 135–145)
WBC # BLD: 6.57 K/UL — SIGNIFICANT CHANGE UP (ref 3.8–10.5)
WBC # FLD AUTO: 6.57 K/UL — SIGNIFICANT CHANGE UP (ref 3.8–10.5)

## 2024-12-01 PROCEDURE — 99233 SBSQ HOSP IP/OBS HIGH 50: CPT

## 2024-12-01 RX ORDER — FAT EMULSIONS 20 %
0.95 EMULSION INTRAVENOUS
Qty: 50 | Refills: 0 | Status: DISCONTINUED | OUTPATIENT
Start: 2024-12-01 | End: 2024-12-01

## 2024-12-01 RX ORDER — SODIUM/POT/MAG/CALC/CHLOR/ACET 35-20-5MEQ
1 VIAL (ML) INTRAVENOUS
Refills: 0 | Status: DISCONTINUED | OUTPATIENT
Start: 2024-12-01 | End: 2024-12-01

## 2024-12-01 RX ADMIN — Medication 1 EACH: at 22:42

## 2024-12-01 RX ADMIN — HYDROMORPHONE HYDROCHLORIDE 1 MILLIGRAM(S): 2 TABLET ORAL at 19:22

## 2024-12-01 RX ADMIN — FLUTICASONE PROPIONATE AND SALMETEROL XINAFOATE 1 DOSE(S): 45; 21 AEROSOL, METERED RESPIRATORY (INHALATION) at 19:42

## 2024-12-01 RX ADMIN — ONDANSETRON HYDROCHLORIDE 4 MILLIGRAM(S): 4 TABLET, FILM COATED ORAL at 10:08

## 2024-12-01 RX ADMIN — METOPROLOL TARTRATE 5 MILLIGRAM(S): 100 TABLET, FILM COATED ORAL at 06:08

## 2024-12-01 RX ADMIN — ROSUVASTATIN CALCIUM 10 MILLIGRAM(S): 5 TABLET, FILM COATED ORAL at 21:17

## 2024-12-01 RX ADMIN — INSULIN GLARGINE 8 UNIT(S): 100 INJECTION, SOLUTION SUBCUTANEOUS at 21:20

## 2024-12-01 RX ADMIN — HYDROMORPHONE HYDROCHLORIDE 1 MILLIGRAM(S): 2 TABLET ORAL at 10:08

## 2024-12-01 RX ADMIN — PANTOPRAZOLE SODIUM 40 MILLIGRAM(S): 40 TABLET, DELAYED RELEASE ORAL at 06:08

## 2024-12-01 RX ADMIN — PANTOPRAZOLE SODIUM 40 MILLIGRAM(S): 40 TABLET, DELAYED RELEASE ORAL at 18:26

## 2024-12-01 RX ADMIN — METOPROLOL TARTRATE 5 MILLIGRAM(S): 100 TABLET, FILM COATED ORAL at 18:22

## 2024-12-01 RX ADMIN — FLUTICASONE PROPIONATE AND SALMETEROL XINAFOATE 1 DOSE(S): 45; 21 AEROSOL, METERED RESPIRATORY (INHALATION) at 07:58

## 2024-12-01 RX ADMIN — HYDROMORPHONE HYDROCHLORIDE 1 MILLIGRAM(S): 2 TABLET ORAL at 04:18

## 2024-12-01 RX ADMIN — METOPROLOL TARTRATE 5 MILLIGRAM(S): 100 TABLET, FILM COATED ORAL at 13:13

## 2024-12-01 RX ADMIN — Medication 2: at 18:21

## 2024-12-01 RX ADMIN — Medication 4: at 09:34

## 2024-12-01 RX ADMIN — Medication 20.83 GM/KG/DAY: at 22:43

## 2024-12-01 RX ADMIN — SUCRALFATE 1 GRAM(S): 1 TABLET ORAL at 21:17

## 2024-12-01 RX ADMIN — MONTELUKAST SODIUM 10 MILLIGRAM(S): 10 TABLET ORAL at 21:17

## 2024-12-01 RX ADMIN — SUCRALFATE 1 GRAM(S): 1 TABLET ORAL at 06:34

## 2024-12-01 RX ADMIN — Medication 6: at 13:11

## 2024-12-01 RX ADMIN — Medication 2: at 21:20

## 2024-12-01 RX ADMIN — Medication 0.5 MILLIGRAM(S): at 21:17

## 2024-12-01 RX ADMIN — HYDROMORPHONE HYDROCHLORIDE 1 MILLIGRAM(S): 2 TABLET ORAL at 14:29

## 2024-12-01 RX ADMIN — METOPROLOL TARTRATE 5 MILLIGRAM(S): 100 TABLET, FILM COATED ORAL at 00:15

## 2024-12-01 NOTE — CHART NOTE - NSCHARTNOTEFT_GEN_A_CORE
Clinical Nutrition PN Follow Up Note    *64-year-old female with history of GERD  HTN, DM, polyps, and afib with 5 cardiac stents. presents with epigastric pain for the last couple of weeks.  Her daughter brought her in, after speaking with her GI doctor who stated patient should not have been on ozempic before and probably have taken insulin instead. She states that she had just uptitrated her dose of ozempic to 0.5 from 0.25 earlier this week. She had been on the 0.25 for 5 weeks. She states the pain bothers her more than the nausea and vomiting. Patient states she feels very minimal relief since she came in.  She denies NSAID use, bleeding or black stools. She reports 10lb weight loss and has been on ozempic for her diabetes. Denies diarrhea or urinary symptoms. Admitted for Nausea and vomiting   *11/1: PPN to be initiated as a bridge 2/2 prolonged PO intolerance/poor PO intake; awaiting gastric emptying study. Per GI note 10/27: abd USG inconclusive s/p cholecystectomy. s/p EGD and colonoscopy - egd with findings of gastritis and esophagitis without ulcers colonoscopy with patent anastomosis - no acute findings. Pt placed on regular low fiber diet - vomited lunch on 10/31 - complains of throat pain after egd; epigastric pain minimal.  Per hospitalist: "at this point, will need to rule out gastroparesis with gastric emptying study - per nuclear medicine the earliest it could be done would be Tuesday 11/5- need to order isotpope - Trial of 5mg liquid Reglan QID 15 min before meals and at bedtime."     *11/6: Did not receive PPN last night 2/2 per nuclear medicine not allowed to receive anything past midnight (?). EGD done yesterday which came back inconclusive.   *11/7: Abnormal MRCP; vascular also consulted. At this time diff diagnosis expanded 2/2 pain. 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 GI recommended to c/w FLD for now and trial Reglan 3x daily to alleviate symptoms. Passed bloody BM this morning w/ abdominal pain, trend H/H. Vascular surgery consulted to evaluate for median arcuate ligament syndrome however no intervention to be done at this time as no imaging finding suggestive of median arcuate ligament syndrome.   *11/8: GI consulted yesterday, per note: "Abdominal pain could be secondary to distal CBD stones/sludge with likely accomodation biliary flow due to dilated duct post choly (normal lft's)." Is s/p ERCP yesterday w/ Dr. Moncada - 2 stents were placed and obtained biopsy of nodular papilla. Currently c/o abdominal discomfort/ pain s/p ERCP yesterday.  11/9: S/p PICC placement (11/8) and transitioned to TPN. ?if GJ tube will be placed as pt has had intestines removed in the past.  *11/11: Remains on CLD. Now w/ low grade temp and worsening pain, started on empiric abx. Per hospitalist note: "If the stone is not the cause one could ask if this can be an IBD exacerbation on the small bowel mucosae since she did have some blood; check ESR and CRP- if markedly elevated it could be indicative of poss IBD and steroids might be necessary."  *11/12: Diet advanced to FLD yesterday. Per GI NP note - since pt w/ persistent abdominal pain s/p CBD stent placement, plan for repeat ERCP w/ stone removal on Thursday; was unable to remove stones originally 2/2 AC on board and there was a risk for bleeding.   *11/16: TPN stopped 2/2 persistent hyperglycemia   11/22: s/p GJ tube placement   *11/28: Discussed with Dr. Murphy this morning, plan to restart PN 2/2 PEG unable to be advanced to GJ tube. Called RN, RN states that pt only has midline despite note from yesterday saying pt has PICC line. Per  policy, unable to run PN through midline. Will start PPN today, however can transition back to TPN if pt does in fact have PICC line - will follow back up Please see additional recommendations below.   *11/29: pt w/ bloody, liquid BM's overnight. Endorses severe abd pain. CLD, on PPN. Still unable to confirm PICC line placement. Attempted to call RN x 2 - awaiting callback. Can run PPN order through PICC line if available   *11/30: No changes overnight. Remains w/ bloody BM's and severe abd pain. Remains on CLD, on PPN. Pt reports refusing to consume any liquids 2/2 fear of vomiting. Confirmed w/ RN this morning pt w/ PICC line placement and PPN currently running through PICC. Will transition to TPN. Possible plan for J tube advancement with Dr. Moncada on ?Monday (12/2).     PPN initiated 2/2 prolonged PO intolerance d/t gastroparesis, unable to advance PEG to GJ tube     *current status: Remains on CLD however refusing to consume anything 2/2 fear of vomiting. Transitioned to TPN yesterday, POCTs WNL x 24 hrs - will titrate dextrose to adjusted goal today. ?Possible plan for J tube advancement with Dr. Moncada tomorrow (12/2). Will renew TPN for tomorrow.     *pertinent meds: Xanax, Acetominophen, aspirin, Lantus 8 units HS, ADMELOG sliding scale (10 units x 24 hrs), protonix, KCl IVPB, Carafate, Zofran     *labs reviewed; Na (corrected for hyperglycemia), Mg and Phos WNL. K WNL (s/p repletion) however remains on lower end  - will increase in PN bag. corrected Ca WNL, rec'd add 10mEq of Calcium Gluconate outside of PN bag as CAPS is out. TBili WNL to c/w trace elements. New TG WNL, will c/w 50g lipids. POCT x 24 hrs WNL (1 elevated level). Titrate dextrose 50-75g daily until goal 200g is reached (GIR 2.5; WNL). D/t hyperglycemia, unable to increase dextrose goal however TPN at new dextrose goal will meet 75% ENN. Once dextrose at new goal will not cycle x 24 hrs 2/2 hyperglycemia. Will closely monitor POCTs, if remain elevated, will add 5 units insulin into PN bag. **dextrose at goal today**  12-01    134[L]  |  104  |  12  ----------------------------<  269[H]  3.9   |  26  |  0.58    Ca    8.5      01 Dec 2024 04:57  Phos  3.4     12-01  Mg     2.0     12-01    TPro  7.3  /  Alb  2.5[L]  /  TBili  0.2  /  DBili  x   /  AST  24  /  ALT  19  /  AlkPhos  80  12-01      BMI: BMI (kg/m2): 20.6 (11-13-24 @ 08:07)  HbA1c: A1C with Estimated Average Glucose Result: 6.6 % (10-26-24 @ 06:47)    BP: 99/59 (11-28-24 @ 07:48) (97/56 - 136/81)Vital Signs Last 24 Hrs  T(C): 36.7 (11-28-24 @ 07:48), Max: 36.8 (11-28-24 @ 00:15)  T(F): 98 (11-28-24 @ 07:48), Max: 98.2 (11-28-24 @ 00:15)  HR: 103 (11-28-24 @ 07:48) (98 - 105)  BP: 99/59 (11-28-24 @ 07:48) (99/59 - 121/69)  BP(mean): --  RR: 17 (11-28-24 @ 07:48) (17 - 21)  SpO2: 91% (11-28-24 @ 07:48) (91% - 100%)    Lipid Panel: Date/Time: 11-29-24 @ 04:48  Triglycerides, Serum: 176    POCT Blood Glucose.: 130 mg/dL (30 Nov 2024 21:28)  POCT Blood Glucose.: 173 mg/dL (30 Nov 2024 17:17)  POCT Blood Glucose.: 203 mg/dL (30 Nov 2024 11:44)    *I&O's Detail - Strict I&O's are rec'd when pt is on PN as per protocol   *BM (+) on 11/29 - +abdominal discomfort. pt not on bowel regimen.  *No edema doc'd    *malnutrition: Pt continues to meet criteria for moderate malnutrition in context of acute on chronic illness r/t decreased ability to meet increased nutrient needs 2/2 N/V, DM AEB >7.5% wt loss x 2mo, <50% ENN x 2 weeks, mild muscle/fat wasting    Estimated Needs: based on 55 Kg (wt from 10/27)  Calories: 3548-5558 Kcal (35-40 Kcal/Kg)  Protein: 77-88 g (1.4-1.6 g/Kg)  Fluids: 2406-7251 mL (35-40 mL/Kg)    Diet, NPO after Midnight:      NPO Start Date: 01-Dec-2024,   NPO Start Time: 23:59 (12-01-24 @ 07:49) [Active]  Diet, Clear Liquid:   Supplement Feeding Modality:  Oral  Glucerna Shake Cans or Servings Per Day:  3       Frequency:  Three Times a day (11-29-24 @ 11:24) [Active]  Parenteral Nutrition - Adult 1 Each (83 mL/Hr) TPN Continuous <Continuous>, 11-30-24 @ 22:00, 22:00, Stop order after: 1 Days    Weight History:  Height (cm): 160 (11-13-24 @ 08:07)  Weight (kg): 52.8 (10-25-24 @ 18:30)  BMI (kg/m2): 20.6 (11-13-24 @ 08:07)  BSA (m2): 1.54 (11-13-24 @ 08:07)    TPN Recommendations: via PICC Line  Total Volume: 2000mL x 24 hours  80 g  Amino Acids  200 g Dextrose (at goal)  50 g Lipids 20%  80 mEq Sodium Chloride  53 mEq Sodium Acetate  20 mmol Sodium Phosphate  50 mEq Potassium Chloride  0 mEq Potassium Acetate  10 mmol Potassium Phosphate  0 mEq Calcium Gluconate  (CAPS out of PN solution)  10 mEq Magnesium Sulfate  200 mg Thiamine  1 ml Trace Elements  10 ml MVI    Total Calories   1500    (Meets 75%  of  Estimated Energy needs  and   100%  Protein needs)    Additional Recommendations:    1) Daily weights  2) Strict I & O's  3) Daily lyte checks including magnesium and phos  4) Weekly triglycerides/LFT checks  5) POCT q6hrs; maintain 140-180mg/dL  6) Titrate dextrose 50-75g daily until goal 200g is met; unable to increase dextrose goal 2/2 hyperglycemia; will add insulin to PN bag if hyperglycemia persists.  7) Consider initiating trickle feeds if PEG can be advanced to GJ - possible plan for J tube advancement on ?Monday (12/2)    *will continue to monitor and adjust PN prn*  Carmenza Hamilton RDN, CDN [phone]

## 2024-12-01 NOTE — PROGRESS NOTE ADULT - SUBJECTIVE AND OBJECTIVE BOX
CC: Patient is a 64y old  Female who presents with a chief complaint of inability to tolerate po (30 Nov 2024 12:37)      INTERVAL EVENTS: OZZY    SUBJECTIVE / INTERVAL HPI: Patient seen and examined at bedside.  Patient states she has nausea this mroning, has not eaten anything She also complains of mild abdominal pain. Has had no more brbpr    ROS: negative unless otherwise stated above.    VITAL SIGNS:  Vital Signs Last 24 Hrs  T(C): 36.6 (01 Dec 2024 08:45), Max: 36.6 (30 Nov 2024 15:32)  T(F): 97.9 (01 Dec 2024 08:45), Max: 97.9 (01 Dec 2024 08:45)  HR: 77 (01 Dec 2024 08:45) (73 - 88)  BP: 122/62 (01 Dec 2024 08:45) (100/54 - 125/85)  BP(mean): --  RR: 18 (01 Dec 2024 04:16) (18 - 19)  SpO2: 95% (01 Dec 2024 08:45) (94% - 96%)    Parameters below as of 01 Dec 2024 08:45  Patient On (Oxygen Delivery Method): room air            PHYSICAL EXAM:    General: NAD  HEENT: MMM  Neck: supple  Cardiovascular: +S1/S2; RRR  Respiratory: CTA B/L; no W/R/R  Gastrointestinal: soft, NT/ND  Extremities: WWP; no edema, clubbing or cyanosis  Vascular: 2+ radial, DP/PT pulses B/L  Neurological: AAOx3; no focal deficits    MEDICATIONS:  MEDICATIONS  (STANDING):  ALPRAZolam 0.5 milliGRAM(s) Oral at bedtime  ARIPiprazole 2 milliGRAM(s) Oral daily  aspirin  chewable 81 milliGRAM(s) Oral daily  chlorhexidine 4% Liquid 1 Application(s) Topical <User Schedule>  dextrose 5%. 1000 milliLiter(s) (50 mL/Hr) IV Continuous <Continuous>  dextrose 5%. 1000 milliLiter(s) (100 mL/Hr) IV Continuous <Continuous>  dextrose 50% Injectable 25 Gram(s) IV Push once  dextrose 50% Injectable 12.5 Gram(s) IV Push once  dextrose 50% Injectable 25 Gram(s) IV Push once  escitalopram 10 milliGRAM(s) Oral daily  fluticasone propionate/ salmeterol 250-50 MICROgram(s) Diskus 1 Dose(s) Inhalation two times a day  glucagon  Injectable 1 milliGRAM(s) IntraMuscular once  influenza   Vaccine 0.5 milliLiter(s) IntraMuscular once  insulin glargine Injectable (LANTUS) 8 Unit(s) SubCutaneous at bedtime  insulin lispro (ADMELOG) corrective regimen sliding scale   SubCutaneous Before meals and at bedtime  lipid, fat emulsion (Fish Oil and Plant Based) 20% Infusion 0.9469 Gm/kG/Day (20.83 mL/Hr) IV Continuous <Continuous>  lipid, fat emulsion (Fish Oil and Plant Based) 20% Infusion 0.9469 Gm/kG/Day (20.83 mL/Hr) IV Continuous <Continuous>  metoprolol tartrate Injectable 5 milliGRAM(s) IV Push every 6 hours  montelukast 10 milliGRAM(s) Oral at bedtime  pantoprazole  Injectable 40 milliGRAM(s) IV Push every 12 hours  Parenteral Nutrition - Adult 1 Each (83 mL/Hr) TPN Continuous <Continuous>  Parenteral Nutrition - Adult 1 Each (83 mL/Hr) TPN Continuous <Continuous>  rosuvastatin 10 milliGRAM(s) Oral at bedtime  sucralfate suspension 1 Gram(s) Oral <User Schedule>    MEDICATIONS  (PRN):  acetaminophen     Tablet .. 650 milliGRAM(s) Oral every 6 hours PRN Moderate Pain (4 - 6)  acetaminophen 325 mG/butalbital 50 mG/caffeine 40 mG 1 Tablet(s) Oral every 8 hours PRN migraine  albuterol/ipratropium for Nebulization 3 milliLiter(s) Nebulizer every 4 hours PRN Shortness of Breath and/or Wheezing  aluminum hydroxide/magnesium hydroxide/simethicone Suspension 30 milliLiter(s) Oral every 4 hours PRN Dyspepsia  benzocaine/menthol Lozenge 1 Lozenge Oral every 4 hours PRN Sore Throat  dextrose Oral Gel 15 Gram(s) Oral once PRN Blood Glucose LESS THAN 70 milliGRAM(s)/deciliter  guaiFENesin Oral Liquid (Sugar-Free) 200 milliGRAM(s) Oral every 6 hours PRN Cough  HYDROmorphone  Injectable 1 milliGRAM(s) IV Push every 4 hours PRN Severe Pain (7 - 10)  ondansetron Injectable 4 milliGRAM(s) IV Push every 6 hours PRN Nausea and/or Vomiting  sodium chloride 0.9% lock flush 10 milliLiter(s) IV Push every 1 hour PRN Pre/post blood products, medications, blood draw, and to maintain line patency      ALLERGIES:  Allergies    No Known Allergies    Intolerances        LABS:                        8.9    6.57  )-----------( 314      ( 01 Dec 2024 04:57 )             27.9     12-01    134[L]  |  104  |  12  ----------------------------<  269[H]  3.9   |  26  |  0.58    Ca    8.5      01 Dec 2024 04:57  Phos  3.4     12-01  Mg     2.0     12-01    TPro  7.3  /  Alb  2.5[L]  /  TBili  0.2  /  DBili  x   /  AST  24  /  ALT  19  /  AlkPhos  80  12-01      Urinalysis Basic - ( 01 Dec 2024 04:57 )    Color: x / Appearance: x / SG: x / pH: x  Gluc: 269 mg/dL / Ketone: x  / Bili: x / Urobili: x   Blood: x / Protein: x / Nitrite: x   Leuk Esterase: x / RBC: x / WBC x   Sq Epi: x / Non Sq Epi: x / Bacteria: x      CAPILLARY BLOOD GLUCOSE      POCT Blood Glucose.: 249 mg/dL (01 Dec 2024 09:15)      RADIOLOGY & ADDITIONAL TESTS: Reviewed.

## 2024-12-01 NOTE — PROGRESS NOTE ADULT - ASSESSMENT
64 year old woman with persistent abdominal pain and poor oral intake with hospital course complicated by choledocholithiasis, pancreatitis.  	    #Persistent nausea and abdominal pain likely due to Gastroparesis exacerbated by GLP-1  +suspect psychosomatic component present as well  -requiring Dilaudid IV around the clock : 1mg q 4hrs ; high dose analgesia likely worsening her GI motility as well - plan to switch to PO when able to use feeding tube  - EGD/colonoscopy --> gastritis, esophagitis, c/w PPI  - Gastroparesis clinically diagnosed as she was unable to complete Gastric emptying study on admission due to vomiting however after discussion with ir when gj tube was placed the contrast was not moving much   - dc Reglan  10mg TID, Erythromycin as no improvement over the past 30+ days   - s/p gj tube placement with ir however needs to be endoscopically advanced to jejunum; plan for EGD on Monday to attempt to advance tube  - Restart feeds at low dose per nutrition recs once GJ able to be used and monitor closely for refeeding syndrome   - restarted TPN in mean time   - Patient had transient hematochezia on 11.29; apixaban placed on hold, likely hemorrhoids no repeat instance  - c/w IV PPI in mean time   - NPO after MN    # Pancreatitis: transient post ERCP resolved   - Pancreatitis s/p ercp 11/15   - s/p IVFs    # Choledocholithiasis:  - s/p ERCP and stent placement   - s/p repeat ERCP on 11/15 for persistent  14mm CBD stone ; CBD and Pancreatic duct cleared, sphincterotomy and metal stent placed    #DM type II: uncontrolled due to TPN  - monitor blood sugars  - expect high BGMs with parenteral nutrition   - on Lantus/ISS     # Moderate to severe protein-calorie malnutrition:  on TPN  Glucerna supplements as tolerated     # Anxiety:   - c/w Lexapro, Abilify     - Psych evaluation noted: c/w Abilify low dose for now   +somatization suspected     # Paroxymal afib:   - off lisinopril due to borderline BP  -anticoagulation on hold due to procedures and hematochezia   - restart Apixaban when stable ; first restart Plavix then add Apixaban if no signs of bleeding     # CAD:  restart Plavix after EGD    # AOCD:  transfuse for Hb<8

## 2024-12-02 LAB
ALBUMIN SERPL ELPH-MCNC: 2.5 G/DL — LOW (ref 3.3–5)
ALP SERPL-CCNC: 80 U/L — SIGNIFICANT CHANGE UP (ref 40–120)
ALT FLD-CCNC: 18 U/L — SIGNIFICANT CHANGE UP (ref 12–78)
ANION GAP SERPL CALC-SCNC: 5 MMOL/L — SIGNIFICANT CHANGE UP (ref 5–17)
APTT BLD: 23.8 SEC — LOW (ref 24.5–35.6)
AST SERPL-CCNC: 17 U/L — SIGNIFICANT CHANGE UP (ref 15–37)
BILIRUB SERPL-MCNC: 0.2 MG/DL — SIGNIFICANT CHANGE UP (ref 0.2–1.2)
BUN SERPL-MCNC: 14 MG/DL — SIGNIFICANT CHANGE UP (ref 7–23)
CALCIUM SERPL-MCNC: 8.9 MG/DL — SIGNIFICANT CHANGE UP (ref 8.5–10.1)
CHLORIDE SERPL-SCNC: 102 MMOL/L — SIGNIFICANT CHANGE UP (ref 96–108)
CO2 SERPL-SCNC: 27 MMOL/L — SIGNIFICANT CHANGE UP (ref 22–31)
CREAT SERPL-MCNC: 0.68 MG/DL — SIGNIFICANT CHANGE UP (ref 0.5–1.3)
EGFR: 98 ML/MIN/1.73M2 — SIGNIFICANT CHANGE UP
GLUCOSE BLDC GLUCOMTR-MCNC: 152 MG/DL — HIGH (ref 70–99)
GLUCOSE BLDC GLUCOMTR-MCNC: 211 MG/DL — HIGH (ref 70–99)
GLUCOSE BLDC GLUCOMTR-MCNC: 279 MG/DL — HIGH (ref 70–99)
GLUCOSE SERPL-MCNC: 286 MG/DL — HIGH (ref 70–99)
HCT VFR BLD CALC: 29.3 % — LOW (ref 34.5–45)
HGB BLD-MCNC: 9.1 G/DL — LOW (ref 11.5–15.5)
INR BLD: 1.08 RATIO — SIGNIFICANT CHANGE UP (ref 0.85–1.16)
MAGNESIUM SERPL-MCNC: 2 MG/DL — SIGNIFICANT CHANGE UP (ref 1.6–2.6)
MCHC RBC-ENTMCNC: 27.8 PG — SIGNIFICANT CHANGE UP (ref 27–34)
MCHC RBC-ENTMCNC: 31.1 G/DL — LOW (ref 32–36)
MCV RBC AUTO: 89.6 FL — SIGNIFICANT CHANGE UP (ref 80–100)
PHOSPHATE SERPL-MCNC: 3.9 MG/DL — SIGNIFICANT CHANGE UP (ref 2.5–4.5)
PLATELET # BLD AUTO: 289 K/UL — SIGNIFICANT CHANGE UP (ref 150–400)
POTASSIUM SERPL-MCNC: 4.4 MMOL/L — SIGNIFICANT CHANGE UP (ref 3.5–5.3)
POTASSIUM SERPL-SCNC: 4.4 MMOL/L — SIGNIFICANT CHANGE UP (ref 3.5–5.3)
PROT SERPL-MCNC: 7.5 GM/DL — SIGNIFICANT CHANGE UP (ref 6–8.3)
PROTHROM AB SERPL-ACNC: 12.7 SEC — SIGNIFICANT CHANGE UP (ref 9.9–13.4)
RBC # BLD: 3.27 M/UL — LOW (ref 3.8–5.2)
RBC # FLD: 14.8 % — HIGH (ref 10.3–14.5)
SODIUM SERPL-SCNC: 134 MMOL/L — LOW (ref 135–145)
WBC # BLD: 6.86 K/UL — SIGNIFICANT CHANGE UP (ref 3.8–10.5)
WBC # FLD AUTO: 6.86 K/UL — SIGNIFICANT CHANGE UP (ref 3.8–10.5)

## 2024-12-02 PROCEDURE — 43235 EGD DIAGNOSTIC BRUSH WASH: CPT | Mod: 59

## 2024-12-02 PROCEDURE — 43275 ERCP REMOVE FORGN BODY DUCT: CPT

## 2024-12-02 PROCEDURE — 99232 SBSQ HOSP IP/OBS MODERATE 35: CPT

## 2024-12-02 PROCEDURE — 43264 ERCP REMOVE DUCT CALCULI: CPT

## 2024-12-02 RX ORDER — HYDROMORPHONE HYDROCHLORIDE 2 MG/1
1 TABLET ORAL EVERY 6 HOURS
Refills: 0 | Status: DISCONTINUED | OUTPATIENT
Start: 2024-12-02 | End: 2024-12-04

## 2024-12-02 RX ORDER — ACETAMINOPHEN 500MG 500 MG/1
1000 TABLET, COATED ORAL ONCE
Refills: 0 | Status: DISCONTINUED | OUTPATIENT
Start: 2024-12-02 | End: 2024-12-10

## 2024-12-02 RX ORDER — ONDANSETRON HYDROCHLORIDE 4 MG/1
4 TABLET, FILM COATED ORAL ONCE
Refills: 0 | Status: DISCONTINUED | OUTPATIENT
Start: 2024-12-02 | End: 2024-12-02

## 2024-12-02 RX ORDER — FENTANYL 12 UG/H
25 PATCH, EXTENDED RELEASE TRANSDERMAL ONCE
Refills: 0 | Status: DISCONTINUED | OUTPATIENT
Start: 2024-12-02 | End: 2024-12-02

## 2024-12-02 RX ORDER — APIXABAN 2.5 MG/1
5 TABLET, FILM COATED ORAL EVERY 12 HOURS
Refills: 0 | Status: DISCONTINUED | OUTPATIENT
Start: 2024-12-02 | End: 2024-12-10

## 2024-12-02 RX ORDER — HYDROMORPHONE HYDROCHLORIDE 2 MG/1
2 TABLET ORAL EVERY 6 HOURS
Refills: 0 | Status: DISCONTINUED | OUTPATIENT
Start: 2024-12-02 | End: 2024-12-07

## 2024-12-02 RX ADMIN — ONDANSETRON HYDROCHLORIDE 4 MILLIGRAM(S): 4 TABLET, FILM COATED ORAL at 21:48

## 2024-12-02 RX ADMIN — ROSUVASTATIN CALCIUM 10 MILLIGRAM(S): 5 TABLET, FILM COATED ORAL at 21:43

## 2024-12-02 RX ADMIN — HYDROMORPHONE HYDROCHLORIDE 2 MILLIGRAM(S): 2 TABLET ORAL at 21:42

## 2024-12-02 RX ADMIN — Medication 81 MILLIGRAM(S): at 16:54

## 2024-12-02 RX ADMIN — SUCRALFATE 1 GRAM(S): 1 TABLET ORAL at 16:52

## 2024-12-02 RX ADMIN — Medication 6: at 07:25

## 2024-12-02 RX ADMIN — MONTELUKAST SODIUM 10 MILLIGRAM(S): 10 TABLET ORAL at 21:43

## 2024-12-02 RX ADMIN — METOPROLOL TARTRATE 5 MILLIGRAM(S): 100 TABLET, FILM COATED ORAL at 00:03

## 2024-12-02 RX ADMIN — HYDROMORPHONE HYDROCHLORIDE 1 MILLIGRAM(S): 2 TABLET ORAL at 11:02

## 2024-12-02 RX ADMIN — SUCRALFATE 1 GRAM(S): 1 TABLET ORAL at 05:54

## 2024-12-02 RX ADMIN — FENTANYL 25 MICROGRAM(S): 12 PATCH, EXTENDED RELEASE TRANSDERMAL at 14:29

## 2024-12-02 RX ADMIN — Medication 0.5 MILLIGRAM(S): at 21:45

## 2024-12-02 RX ADMIN — PANTOPRAZOLE SODIUM 40 MILLIGRAM(S): 40 TABLET, DELAYED RELEASE ORAL at 05:55

## 2024-12-02 RX ADMIN — HYDROMORPHONE HYDROCHLORIDE 1 MILLIGRAM(S): 2 TABLET ORAL at 11:17

## 2024-12-02 RX ADMIN — CHLORHEXIDINE GLUCONATE 1 APPLICATION(S): 1.2 RINSE ORAL at 05:55

## 2024-12-02 RX ADMIN — HYDROMORPHONE HYDROCHLORIDE 1 MILLIGRAM(S): 2 TABLET ORAL at 00:09

## 2024-12-02 RX ADMIN — INSULIN GLARGINE 8 UNIT(S): 100 INJECTION, SOLUTION SUBCUTANEOUS at 22:03

## 2024-12-02 RX ADMIN — HYDROMORPHONE HYDROCHLORIDE 2 MILLIGRAM(S): 2 TABLET ORAL at 22:12

## 2024-12-02 RX ADMIN — FENTANYL 25 MICROGRAM(S): 12 PATCH, EXTENDED RELEASE TRANSDERMAL at 14:50

## 2024-12-02 RX ADMIN — METOPROLOL TARTRATE 5 MILLIGRAM(S): 100 TABLET, FILM COATED ORAL at 05:54

## 2024-12-02 RX ADMIN — FLUTICASONE PROPIONATE AND SALMETEROL XINAFOATE 1 DOSE(S): 45; 21 AEROSOL, METERED RESPIRATORY (INHALATION) at 08:33

## 2024-12-02 RX ADMIN — Medication 4: at 16:51

## 2024-12-02 RX ADMIN — METOPROLOL TARTRATE 5 MILLIGRAM(S): 100 TABLET, FILM COATED ORAL at 16:52

## 2024-12-02 RX ADMIN — APIXABAN 5 MILLIGRAM(S): 2.5 TABLET, FILM COATED ORAL at 21:43

## 2024-12-02 RX ADMIN — SUCRALFATE 1 GRAM(S): 1 TABLET ORAL at 21:44

## 2024-12-02 RX ADMIN — PANTOPRAZOLE SODIUM 40 MILLIGRAM(S): 40 TABLET, DELAYED RELEASE ORAL at 16:53

## 2024-12-02 RX ADMIN — ONDANSETRON HYDROCHLORIDE 4 MILLIGRAM(S): 4 TABLET, FILM COATED ORAL at 11:02

## 2024-12-02 RX ADMIN — FLUTICASONE PROPIONATE AND SALMETEROL XINAFOATE 1 DOSE(S): 45; 21 AEROSOL, METERED RESPIRATORY (INHALATION) at 19:44

## 2024-12-02 RX ADMIN — Medication 2: at 22:02

## 2024-12-02 NOTE — BRIEF OPERATIVE NOTE - OPERATION/FINDINGS
20F GJ placed with Fluoro guidance
ERCP:   Ampullectomy  Sphincterotomy  CBD stone removal  Placement of cbd/pd stents
EUS with dilated cbd and distal CBD stones. No mass.   ERCP with placement of 10 Fr stent.   Biopsy of nodular papilla.   Placement of CBD and PD stents. 
ERCP: stent removal and removal of sludge.   EGD: placement of J tube in jejunum. not coiled.

## 2024-12-02 NOTE — PROGRESS NOTE ADULT - ASSESSMENT
64 year old woman with persistent abdominal pain and poor oral intake with hospital course complicated by choledocholithiasis, pancreatitis pending feeding tube adjustment   	    #Persistent nausea and abdominal pain likely due to Gastroparesis exacerbated by GLP-1  +suspect psychosomatic component present as well  -requiring Dilaudid IV around the clock : 1mg q 4hrs ; high dose analgesia likely worsening her GI motility as well - plan to switch to PO when able to use feeding tube  - EGD/colonoscopy --> gastritis, esophagitis, c/w PPI  - Gastroparesis clinically diagnosed as she was unable to complete Gastric emptying study on admission due to vomiting however after discussion with ir when gj tube was placed the contrast was not moving much   - dc Reglan  10mg TID, Erythromycin as no improvement over the past 30+ days   - s/p gj tube placement with ir however needs to be endoscopically advanced to jejunum; plan for advancement today   - Restart feeds at low dose per nutrition recs once GJ able to be used and monitor closely for refeeding syndrome   - restarted TPN in mean time   - Patient had transient hematochezia on 11.29; apixaban placed on hold, likely hemorrhoids no repeat instance  - c/w IV PPI in mean time   - NPO after MN    # Pancreatitis: transient post ERCP resolved   - Pancreatitis s/p ercp 11/15   - s/p IVFs    # Choledocholithiasis:  - s/p ERCP and stent placement   - s/p repeat ERCP on 11/15 for persistent  14mm CBD stone ; CBD and Pancreatic duct cleared, sphincterotomy and metal stent placed    #DM type II: uncontrolled due to TPN  - monitor blood sugars  - expect high BGMs with parenteral nutrition   - on Lantus/ISS     # Moderate to severe protein-calorie malnutrition:  on TPN  Glucerna supplements as tolerated     # Anxiety:   - c/w Lexapro, Abilify     - Psych evaluation noted: c/w Abilify low dose for now   +somatization suspected     # Paroxymal afib:   - off lisinopril due to borderline BP  -anticoagulation on hold due to procedures and hematochezia   - restart Apixaban when stable ; first restart Plavix then add Apixaban if no signs of bleeding     # CAD:  restart Plavix after EGD    # AOCD:  transfuse for Hb<8

## 2024-12-02 NOTE — CHART NOTE - NSCHARTNOTEFT_GEN_A_CORE
Clinical Nutrition PN Follow Up Note    *64-year-old female with history of GERD  HTN, DM, polyps, and afib with 5 cardiac stents. presents with epigastric pain for the last couple of weeks.  Her daughter brought her in, after speaking with her GI doctor who stated patient should not have been on ozempic before and probably have taken insulin instead. She states that she had just uptitrated her dose of ozempic to 0.5 from 0.25 earlier this week. She had been on the 0.25 for 5 weeks. She states the pain bothers her more than the nausea and vomiting. Patient states she feels very minimal relief since she came in.  She denies NSAID use, bleeding or black stools. She reports 10lb weight loss and has been on ozempic for her diabetes. Denies diarrhea or urinary symptoms. Admitted for Nausea and vomiting   *11/1: PPN to be initiated as a bridge 2/2 prolonged PO intolerance/poor PO intake; awaiting gastric emptying study. Per GI note 10/27: abd USG inconclusive s/p cholecystectomy. s/p EGD and colonoscopy - egd with findings of gastritis and esophagitis without ulcers colonoscopy with patent anastomosis - no acute findings. Pt placed on regular low fiber diet - vomited lunch on 10/31 - complains of throat pain after egd; epigastric pain minimal.  Per hospitalist: "at this point, will need to rule out gastroparesis with gastric emptying study - per nuclear medicine the earliest it could be done would be Tuesday 11/5- need to order isotpope - Trial of 5mg liquid Reglan QID 15 min before meals and at bedtime."     *11/6: Did not receive PPN last night 2/2 per nuclear medicine not allowed to receive anything past midnight (?). EGD done yesterday which came back inconclusive.   *11/7: Abnormal MRCP; vascular also consulted. At this time diff diagnosis expanded 2/2 pain. 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 GI recommended to c/w FLD for now and trial Reglan 3x daily to alleviate symptoms. Passed bloody BM this morning w/ abdominal pain, trend H/H. Vascular surgery consulted to evaluate for median arcuate ligament syndrome however no intervention to be done at this time as no imaging finding suggestive of median arcuate ligament syndrome.   *11/8: GI consulted yesterday, per note: "Abdominal pain could be secondary to distal CBD stones/sludge with likely accomodation biliary flow due to dilated duct post choly (normal lft's)." Is s/p ERCP yesterday w/ Dr. Moncada - 2 stents were placed and obtained biopsy of nodular papilla. Currently c/o abdominal discomfort/ pain s/p ERCP yesterday.  11/9: S/p PICC placement (11/8) and transitioned to TPN. ?if GJ tube will be placed as pt has had intestines removed in the past.  *11/11: Remains on CLD. Now w/ low grade temp and worsening pain, started on empiric abx. Per hospitalist note: "If the stone is not the cause one could ask if this can be an IBD exacerbation on the small bowel mucosae since she did have some blood; check ESR and CRP- if markedly elevated it could be indicative of poss IBD and steroids might be necessary."  *11/12: Diet advanced to FLD yesterday. Per GI NP note - since pt w/ persistent abdominal pain s/p CBD stent placement, plan for repeat ERCP w/ stone removal on Thursday; was unable to remove stones originally 2/2 AC on board and there was a risk for bleeding.   *11/16: TPN stopped 2/2 persistent hyperglycemia   11/22: s/p GJ tube placement   *11/28: Discussed with Dr. Murphy this morning, plan to restart PN 2/2 PEG unable to be advanced to GJ tube. Called RN, RN states that pt only has midline despite note from yesterday saying pt has PICC line. Per  policy, unable to run PN through midline. Will start PPN today, however can transition back to TPN if pt does in fact have PICC line - will follow back up.  *11/29: pt w/ bloody, liquid BM's overnight. Endorses severe abd pain. CLD, on PPN. Still unable to confirm PICC line placement. Attempted to call RN x 2 - awaiting callback. Can run PPN order through PICC line if available   *11/30: No changes overnight. Remains w/ bloody BM's and severe abd pain. Remains on CLD, on PPN. Pt reports refusing to consume any liquids 2/2 fear of vomiting. Confirmed w/ RN this morning pt w/ PICC line placement and PPN currently running through PICC. Will transition to TPN.   *12/1: Remains on CLD however refusing to consume anything 2/2 fear of vomiting. Transitioned to TPN yesterday, POCTs WNL x 24 hrs - will titrate dextrose to adjusted goal today. ?Possible plan for J tube advancement with Dr. Moncada tomorrow (12/2). Will renew TPN for tomorrow.     PPN initiated 2/2 prolonged PO intolerance d/t gastroparesis, unable to advance PEG to GJ tube     *current status: Pt w/ nausea and mild abdominal pain; still not eating anything. NPO at midnight (12/1) - plan for J tube advancement with Dr Moncada today. Would recommend to start trickle feeds if J tube is able to be advanced. Will c/w TPN for today.     *pertinent meds: lopressor, statin, protonix, carafate, dilaudid, zofran, Lantus 8 units HS, ADMELOG (20 units x 24 hours)    *labs reviewed; Na (corrected for hyperglycemia), Mg, K, and Phos WNL. Phos uptrending over last few days will decrease in PN bag today. Replete lytes outside of PN bag prn. corrected Ca elevated, DO NOT supplement calcium outside of PN bag at this time. TBili WNL to c/w trace elements. TG (176) WNL, will c/w 50g lipids. POCT x 24 hrs elevated. Will add 5 units of insulin into the PN bag today. Will continue to monitor POCTs; if continues to remain elevated will increase insulin in the PN bag. Titrate dextrose 50-75g daily until goal 200g is reached (GIR 2.5; WNL). D/t hyperglycemia, unable to increase dextrose goal however TPN at new dextrose goal will meet 75% ENN. Once dextrose at new goal will not cycle x 24 hrs 2/2 hyperglycemia.  **dextrose at goal today**    12-02    134[L]  |  102  |  14  ----------------------------<  286[H]  4.4   |  27  |  0.68    Ca    8.9      02 Dec 2024 04:52  Phos  3.9     12-02  Mg     2.0     12-02    TPro  7.5  /  Alb  2.5[L]  /  TBili  0.2  /  DBili  x   /  AST  17  /  ALT  18  /  AlkPhos  80  12-02    BMI: BMI (kg/m2): 20.6 (11-13-24 @ 08:07)  HbA1c: A1C with Estimated Average Glucose Result: 6.6 % (10-26-24 @ 06:47)    Glucose: POCT Blood Glucose.: 279 mg/dL (12-02-24 @ 07:21)    BP: 119/70 (12-02-24 @ 07:15) (100/54 - 129/70)Vital Signs Last 24 Hrs  T(C): 36.6 (12-02-24 @ 07:15), Max: 36.6 (12-01-24 @ 08:45)  T(F): 97.8 (12-02-24 @ 07:15), Max: 97.9 (12-01-24 @ 08:45)  HR: 76 (12-02-24 @ 07:15) (72 - 81)  BP: 119/70 (12-02-24 @ 07:15) (106/54 - 125/69)  RR: 18 (12-02-24 @ 07:15) (18 - 18)  SpO2: 95% (12-02-24 @ 07:15) (95% - 95%)    Lipid Panel: Date/Time: 11-29-24 @ 04:48  Triglycerides, Serum: 176    POCT Blood Glucose.: 279 mg/dL (02 Dec 2024 07:21)  POCT Blood Glucose.: 199 mg/dL (01 Dec 2024 21:13)  POCT Blood Glucose.: 195 mg/dL (01 Dec 2024 18:21)  POCT Blood Glucose.: 203 mg/dL (01 Dec 2024 16:48)  POCT Blood Glucose.: 263 mg/dL (01 Dec 2024 12:32)  POCT Blood Glucose.: 249 mg/dL (01 Dec 2024 09:15)    I&O's Detail  none doc'd     *fluid status - Strict I&O's are rec'd when pt is on PN as per protocol   *BM (+) on 11/29 - +abdominal discomfort. pt not on bowel regimen.  *No edema doc'd    *malnutrition: Pt continues to meet criteria for moderate malnutrition in context of acute on chronic illness r/t decreased ability to meet increased nutrient needs 2/2 N/V, DM AEB >7.5% wt loss x 2mo, <50% ENN x 2 weeks, mild muscle/fat wasting    Estimated Needs: based on 55 Kg (wt from 10/27)  Calories: 0804-4277 Kcal (35-40 Kcal/Kg)  Protein: 77-88 g (1.4-1.6 g/Kg)  Fluids: 6047-8586 mL (35-40 mL/Kg)    Diet, NPO after Midnight:      NPO Start Date: 01-Dec-2024,   NPO Start Time: 23:59 (12-01-24 @ 07:49) [Active]  Diet, NPO after Midnight:      NPO Start Date: 30-Nov-2024,   NPO Start Time: 23:59  Except Medications  With Ice Chips/Sips of Water (11-30-24 @ 12:47) [Active]  Parenteral Nutrition - Adult 1 Each (83 mL/Hr) TPN Continuous <Continuous>, 12-01-24 @ 22:00, 22:00, Stop order after: 1 Days    Weight History:  Height (cm): 160 (11-13-24 @ 08:07)  Weight (kg): 52.8 (10-25-24 @ 18:30)  BMI (kg/m2): 20.6 (11-13-24 @ 08:07)  BSA (m2): 1.54 (11-13-24 @ 08:07)    TPN Recommendations: via PICC Line  Total Volume: 2000mL x 24 hours  80 g  Amino Acids  200 g Dextrose (at goal)  50 g Lipids 20%  80 mEq Sodium Chloride  53 mEq Sodium Acetate  20 mmol Sodium Phosphate  58 mEq Potassium Chloride  0 mEq Potassium Acetate  5 mmol Potassium Phosphate  0 mEq Calcium Gluconate  (CAPS out of PN solution)  10 mEq Magnesium Sulfate  200 mg Thiamine  1 ml Trace Elements  10 ml MVI  5 units Insulin    Total Calories   1500    (Meets 75%  of  Estimated Energy needs  and   100%  Protein needs)    Additional Recommendations:    1) Daily weights  2) Strict I & O's  3) Daily lyte checks including magnesium and phos  4) Weekly triglycerides/LFT checks  5) POCT q6hrs; maintain 140-180mg/dL  6) Titrate dextrose 50-75g daily until goal 200g is met; unable to increase dextrose goal 2/2 hyperglycemia; will increase insulin in PN bag if hyperglycemia persists.  7) Consider initiating trickle feeds if PEG can be advanced to GJ - possible plan for J tube advancement on ?Monday (12/2)    *will continue to monitor and adjust PN prn*  Cecilia Gusman, PhD, MS, [phone] Clinical Nutrition PN Follow Up Note    *64-year-old female with history of GERD  HTN, DM, polyps, and afib with 5 cardiac stents. presents with epigastric pain for the last couple of weeks.  Her daughter brought her in, after speaking with her GI doctor who stated patient should not have been on ozempic before and probably have taken insulin instead. She states that she had just uptitrated her dose of ozempic to 0.5 from 0.25 earlier this week. She had been on the 0.25 for 5 weeks. She states the pain bothers her more than the nausea and vomiting. Patient states she feels very minimal relief since she came in.  She denies NSAID use, bleeding or black stools. She reports 10lb weight loss and has been on ozempic for her diabetes. Denies diarrhea or urinary symptoms. Admitted for Nausea and vomiting   *11/1: PPN to be initiated as a bridge 2/2 prolonged PO intolerance/poor PO intake; awaiting gastric emptying study. Per GI note 10/27: abd USG inconclusive s/p cholecystectomy. s/p EGD and colonoscopy - egd with findings of gastritis and esophagitis without ulcers colonoscopy with patent anastomosis - no acute findings. Pt placed on regular low fiber diet - vomited lunch on 10/31 - complains of throat pain after egd; epigastric pain minimal.  Per hospitalist: "at this point, will need to rule out gastroparesis with gastric emptying study - per nuclear medicine the earliest it could be done would be Tuesday 11/5- need to order isotpope - Trial of 5mg liquid Reglan QID 15 min before meals and at bedtime."     *11/6: Did not receive PPN last night 2/2 per nuclear medicine not allowed to receive anything past midnight (?). EGD done yesterday which came back inconclusive.   *11/7: Abnormal MRCP; vascular also consulted. At this time diff diagnosis expanded 2/2 pain. 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 GI recommended to c/w FLD for now and trial Reglan 3x daily to alleviate symptoms. Passed bloody BM this morning w/ abdominal pain, trend H/H. Vascular surgery consulted to evaluate for median arcuate ligament syndrome however no intervention to be done at this time as no imaging finding suggestive of median arcuate ligament syndrome.   *11/8: GI consulted yesterday, per note: "Abdominal pain could be secondary to distal CBD stones/sludge with likely accomodation biliary flow due to dilated duct post choly (normal lft's)." Is s/p ERCP yesterday w/ Dr. Moncada - 2 stents were placed and obtained biopsy of nodular papilla. Currently c/o abdominal discomfort/ pain s/p ERCP yesterday.  11/9: S/p PICC placement (11/8) and transitioned to TPN. ?if GJ tube will be placed as pt has had intestines removed in the past.  *11/11: Remains on CLD. Now w/ low grade temp and worsening pain, started on empiric abx. Per hospitalist note: "If the stone is not the cause one could ask if this can be an IBD exacerbation on the small bowel mucosae since she did have some blood; check ESR and CRP- if markedly elevated it could be indicative of poss IBD and steroids might be necessary."  *11/12: Diet advanced to FLD yesterday. Per GI NP note - since pt w/ persistent abdominal pain s/p CBD stent placement, plan for repeat ERCP w/ stone removal on Thursday; was unable to remove stones originally 2/2 AC on board and there was a risk for bleeding.   *11/16: TPN stopped 2/2 persistent hyperglycemia   11/22: s/p GJ tube placement   *11/28: Discussed with Dr. Murphy this morning, plan to restart PN 2/2 PEG unable to be advanced to GJ tube. Called RN, RN states that pt only has midline despite note from yesterday saying pt has PICC line. Per  policy, unable to run PN through midline. Will start PPN today, however can transition back to TPN if pt does in fact have PICC line - will follow back up.  *11/29: pt w/ bloody, liquid BM's overnight. Endorses severe abd pain. CLD, on PPN. Still unable to confirm PICC line placement. Attempted to call RN x 2 - awaiting callback. Can run PPN order through PICC line if available   *11/30: No changes overnight. Remains w/ bloody BM's and severe abd pain. Remains on CLD, on PPN. Pt reports refusing to consume any liquids 2/2 fear of vomiting. Confirmed w/ RN this morning pt w/ PICC line placement and PPN currently running through PICC. Will transition to TPN.   *12/1: Remains on CLD however refusing to consume anything 2/2 fear of vomiting. Transitioned to TPN yesterday, POCTs WNL x 24 hrs - will titrate dextrose to adjusted goal today. ?Possible plan for J tube advancement with Dr. Moncada tomorrow (12/2). Will renew TPN for tomorrow.     PPN initiated 2/2 prolonged PO intolerance d/t gastroparesis, unable to advance PEG to GJ tube     *current status: Pt w/ nausea and mild abdominal pain; still not eating anything. NPO at midnight (12/1) - plan for J tube advancement with Dr Moncada today. D/w MD Kevan today. Per MD will finish current TPN bag but WILL NOT renew for tomorrow 2/2 elevated POCTs and plan for J-tube advancement today. Would recommend to start trickle feeds once J tube is able to be advanced. TF recommendations below.    *pertinent meds: lopressor, statin, protonix, carafate, dilaudid, zofran, Lantus 8 units HS, ADMELOG (20 units x 24 hours)    *labs reviewed;     12-02    134[L]  |  102  |  14  ----------------------------<  286[H]  4.4   |  27  |  0.68    Ca    8.9      02 Dec 2024 04:52  Phos  3.9     12-02  Mg     2.0     12-02    TPro  7.5  /  Alb  2.5[L]  /  TBili  0.2  /  DBili  x   /  AST  17  /  ALT  18  /  AlkPhos  80  12-02    BMI: BMI (kg/m2): 20.6 (11-13-24 @ 08:07)  HbA1c: A1C with Estimated Average Glucose Result: 6.6 % (10-26-24 @ 06:47)    Glucose: POCT Blood Glucose.: 279 mg/dL (12-02-24 @ 07:21)    BP: 119/70 (12-02-24 @ 07:15) (100/54 - 129/70)Vital Signs Last 24 Hrs  T(C): 36.6 (12-02-24 @ 07:15), Max: 36.6 (12-01-24 @ 08:45)  T(F): 97.8 (12-02-24 @ 07:15), Max: 97.9 (12-01-24 @ 08:45)  HR: 76 (12-02-24 @ 07:15) (72 - 81)  BP: 119/70 (12-02-24 @ 07:15) (106/54 - 125/69)  RR: 18 (12-02-24 @ 07:15) (18 - 18)  SpO2: 95% (12-02-24 @ 07:15) (95% - 95%)    Lipid Panel: Date/Time: 11-29-24 @ 04:48  Triglycerides, Serum: 176    POCT Blood Glucose.: 279 mg/dL (02 Dec 2024 07:21)  POCT Blood Glucose.: 199 mg/dL (01 Dec 2024 21:13)  POCT Blood Glucose.: 195 mg/dL (01 Dec 2024 18:21)  POCT Blood Glucose.: 203 mg/dL (01 Dec 2024 16:48)  POCT Blood Glucose.: 263 mg/dL (01 Dec 2024 12:32)  POCT Blood Glucose.: 249 mg/dL (01 Dec 2024 09:15)    I&O's Detail  none doc'd     *fluid status - Strict I&O's are rec'd when pt is on PN as per protocol   *BM (+) on 11/29 - +abdominal discomfort. pt not on bowel regimen.  *No edema doc'd    *malnutrition: Pt continues to meet criteria for moderate malnutrition in context of acute on chronic illness r/t decreased ability to meet increased nutrient needs 2/2 N/V, DM AEB >7.5% wt loss x 2mo, <50% ENN x 2 weeks, mild muscle/fat wasting    Estimated Needs: based on 55 Kg (wt from 10/27)  Calories: 6696-0463 Kcal (35-40 Kcal/Kg)  Protein: 77-88 g (1.4-1.6 g/Kg)  Fluids: 1706-4465 mL (35-40 mL/Kg)    Diet, NPO after Midnight:      NPO Start Date: 01-Dec-2024,   NPO Start Time: 23:59 (12-01-24 @ 07:49) [Active]  Diet, NPO after Midnight:      NPO Start Date: 30-Nov-2024,   NPO Start Time: 23:59  Except Medications  With Ice Chips/Sips of Water (11-30-24 @ 12:47) [Active]  Parenteral Nutrition - Adult 1 Each (83 mL/Hr) TPN Continuous <Continuous>, 12-01-24 @ 22:00, 22:00, Stop order after: 1 Days    Weight History:  Height (cm): 160 (11-13-24 @ 08:07)  Weight (kg): 52.8 (10-25-24 @ 18:30)  BMI (kg/m2): 20.6 (11-13-24 @ 08:07)  BSA (m2): 1.54 (11-13-24 @ 08:07)    Additional Recommendations:    1) Once GJ tube advanced, initiate Glucerna 1.5 @ 20 cc/hr, increase by 10 cc/hr q12 hours until goal rate of 65 cc/hr is met (total volume = 1300 mL). Will provide ~ 1950 kcal, 107 g protein, and 987 mL free water.   2) monitor hydration status; recommend free water flushes of 40 cc/hr (which provides ~800 mL of additional water per day)  3) monitor TF tolerance; keep back of bed > 45 degrees  4) monitor BM: if > 3 days without BM, consider adding bowel regimen prn  5) daily wt checks to track/trend changes  6) Monitor closely for refeeding syndrome as pt at HIGH risk - obtain daily labs including BMP, Mg, and Phos, and provide repletion prn. Recommend adding 200mg thiamine and MVI w/ min daily.  7) Monitor and optimize BG levels between 140-180 mg/dL by medical management     *will continue to monitor and adjust treatment plan prn*  Cecilia Gusman, PhD, MS, [phone]

## 2024-12-02 NOTE — BRIEF OPERATIVE NOTE - COMMENTS
GJ tube will require additional advancement with endoscopy prior to feeding. D/w GI service. 
ok to resume A/C tonight  clears as tolerated  GJ tube ok to be used immediately
clears as tolerated  post op LR
Post OP LR  Please resume A/C in 48 hours  Clears tonight as tolerated  Assess clinical response to ERCP. It no improvement in symptoms, d/w Dr. Suh may need enteral feeding tube.

## 2024-12-02 NOTE — PROGRESS NOTE ADULT - SUBJECTIVE AND OBJECTIVE BOX
CC: Patient is a 64y old  Female who presents with a chief complaint of inability to tolerate po (01 Dec 2024 11:39)      INTERVAL EVENTS: OZZY    SUBJECTIVE / INTERVAL HPI: Patient seen and examined at bedside. Patient comfortable this morning has not required pain medication this am     ROS: negative unless otherwise stated above.    VITAL SIGNS:  Vital Signs Last 24 Hrs  T(C): 36.6 (02 Dec 2024 07:15), Max: 36.6 (02 Dec 2024 07:15)  T(F): 97.8 (02 Dec 2024 07:15), Max: 97.8 (02 Dec 2024 07:15)  HR: 84 (02 Dec 2024 08:34) (72 - 86)  BP: 119/70 (02 Dec 2024 07:15) (106/54 - 125/69)  BP(mean): --  RR: 18 (02 Dec 2024 07:15) (18 - 18)  SpO2: 96% (02 Dec 2024 08:34) (95% - 96%)    Parameters below as of 02 Dec 2024 08:34  Patient On (Oxygen Delivery Method): room air            PHYSICAL EXAM:    General: NAD  HEENT: MMM  Neck: supple  Cardiovascular: +S1/S2; RRR  Respiratory: CTA B/L; no W/R/R  Gastrointestinal: soft, NT/ND  Extremities: WWP; no edema, clubbing or cyanosis  Vascular: 2+ radial, DP/PT pulses B/L  Neurological: AAOx3; no focal deficits    MEDICATIONS:  MEDICATIONS  (STANDING):  ALPRAZolam 0.5 milliGRAM(s) Oral at bedtime  ARIPiprazole 2 milliGRAM(s) Oral daily  aspirin  chewable 81 milliGRAM(s) Oral daily  chlorhexidine 4% Liquid 1 Application(s) Topical <User Schedule>  dextrose 5%. 1000 milliLiter(s) (50 mL/Hr) IV Continuous <Continuous>  dextrose 5%. 1000 milliLiter(s) (100 mL/Hr) IV Continuous <Continuous>  dextrose 50% Injectable 12.5 Gram(s) IV Push once  dextrose 50% Injectable 25 Gram(s) IV Push once  dextrose 50% Injectable 25 Gram(s) IV Push once  escitalopram 10 milliGRAM(s) Oral daily  fluticasone propionate/ salmeterol 250-50 MICROgram(s) Diskus 1 Dose(s) Inhalation two times a day  glucagon  Injectable 1 milliGRAM(s) IntraMuscular once  influenza   Vaccine 0.5 milliLiter(s) IntraMuscular once  insulin glargine Injectable (LANTUS) 8 Unit(s) SubCutaneous at bedtime  insulin lispro (ADMELOG) corrective regimen sliding scale   SubCutaneous Before meals and at bedtime  lipid, fat emulsion (Fish Oil and Plant Based) 20% Infusion 0.9469 Gm/kG/Day (20.83 mL/Hr) IV Continuous <Continuous>  metoprolol tartrate Injectable 5 milliGRAM(s) IV Push every 6 hours  montelukast 10 milliGRAM(s) Oral at bedtime  pantoprazole  Injectable 40 milliGRAM(s) IV Push every 12 hours  Parenteral Nutrition - Adult 1 Each (83 mL/Hr) TPN Continuous <Continuous>  rosuvastatin 10 milliGRAM(s) Oral at bedtime  sucralfate suspension 1 Gram(s) Oral <User Schedule>    MEDICATIONS  (PRN):  acetaminophen     Tablet .. 650 milliGRAM(s) Oral every 6 hours PRN Moderate Pain (4 - 6)  acetaminophen 325 mG/butalbital 50 mG/caffeine 40 mG 1 Tablet(s) Oral every 8 hours PRN migraine  albuterol/ipratropium for Nebulization 3 milliLiter(s) Nebulizer every 4 hours PRN Shortness of Breath and/or Wheezing  aluminum hydroxide/magnesium hydroxide/simethicone Suspension 30 milliLiter(s) Oral every 4 hours PRN Dyspepsia  benzocaine/menthol Lozenge 1 Lozenge Oral every 4 hours PRN Sore Throat  dextrose Oral Gel 15 Gram(s) Oral once PRN Blood Glucose LESS THAN 70 milliGRAM(s)/deciliter  guaiFENesin Oral Liquid (Sugar-Free) 200 milliGRAM(s) Oral every 6 hours PRN Cough  HYDROmorphone  Injectable 1 milliGRAM(s) IV Push every 4 hours PRN Severe Pain (7 - 10)  ondansetron Injectable 4 milliGRAM(s) IV Push every 6 hours PRN Nausea and/or Vomiting  sodium chloride 0.9% lock flush 10 milliLiter(s) IV Push every 1 hour PRN Pre/post blood products, medications, blood draw, and to maintain line patency      ALLERGIES:  Allergies    No Known Allergies    Intolerances        LABS:                        9.1    6.86  )-----------( 289      ( 02 Dec 2024 04:52 )             29.3     12-02    134[L]  |  102  |  14  ----------------------------<  286[H]  4.4   |  27  |  0.68    Ca    8.9      02 Dec 2024 04:52  Phos  3.9     12-02  Mg     2.0     12-02    TPro  7.5  /  Alb  2.5[L]  /  TBili  0.2  /  DBili  x   /  AST  17  /  ALT  18  /  AlkPhos  80  12-02    PT/INR - ( 02 Dec 2024 04:52 )   PT: 12.7 sec;   INR: 1.08 ratio         PTT - ( 02 Dec 2024 04:52 )  PTT:23.8 sec  Urinalysis Basic - ( 02 Dec 2024 04:52 )    Color: x / Appearance: x / SG: x / pH: x  Gluc: 286 mg/dL / Ketone: x  / Bili: x / Urobili: x   Blood: x / Protein: x / Nitrite: x   Leuk Esterase: x / RBC: x / WBC x   Sq Epi: x / Non Sq Epi: x / Bacteria: x      CAPILLARY BLOOD GLUCOSE      POCT Blood Glucose.: 279 mg/dL (02 Dec 2024 07:21)      RADIOLOGY & ADDITIONAL TESTS: Reviewed.

## 2024-12-03 LAB
ADD ON TEST-SPECIMEN IN LAB: SIGNIFICANT CHANGE UP
ALBUMIN SERPL ELPH-MCNC: 2.9 G/DL — LOW (ref 3.3–5)
ALP SERPL-CCNC: 103 U/L — SIGNIFICANT CHANGE UP (ref 40–120)
ALT FLD-CCNC: 21 U/L — SIGNIFICANT CHANGE UP (ref 12–78)
ANION GAP SERPL CALC-SCNC: 4 MMOL/L — LOW (ref 5–17)
AST SERPL-CCNC: 23 U/L — SIGNIFICANT CHANGE UP (ref 15–37)
BILIRUB SERPL-MCNC: 0.3 MG/DL — SIGNIFICANT CHANGE UP (ref 0.2–1.2)
BUN SERPL-MCNC: 13 MG/DL — SIGNIFICANT CHANGE UP (ref 7–23)
CALCIUM SERPL-MCNC: 9.6 MG/DL — SIGNIFICANT CHANGE UP (ref 8.5–10.1)
CHLORIDE SERPL-SCNC: 103 MMOL/L — SIGNIFICANT CHANGE UP (ref 96–108)
CO2 SERPL-SCNC: 27 MMOL/L — SIGNIFICANT CHANGE UP (ref 22–31)
CREAT SERPL-MCNC: 0.76 MG/DL — SIGNIFICANT CHANGE UP (ref 0.5–1.3)
EGFR: 89 ML/MIN/1.73M2 — SIGNIFICANT CHANGE UP
GLUCOSE BLDC GLUCOMTR-MCNC: 160 MG/DL — HIGH (ref 70–99)
GLUCOSE BLDC GLUCOMTR-MCNC: 162 MG/DL — HIGH (ref 70–99)
GLUCOSE BLDC GLUCOMTR-MCNC: 170 MG/DL — HIGH (ref 70–99)
GLUCOSE BLDC GLUCOMTR-MCNC: 185 MG/DL — HIGH (ref 70–99)
GLUCOSE BLDC GLUCOMTR-MCNC: 200 MG/DL — HIGH (ref 70–99)
GLUCOSE SERPL-MCNC: 173 MG/DL — HIGH (ref 70–99)
HCT VFR BLD CALC: 33.1 % — LOW (ref 34.5–45)
HGB BLD-MCNC: 10.3 G/DL — LOW (ref 11.5–15.5)
MAGNESIUM SERPL-MCNC: 2 MG/DL — SIGNIFICANT CHANGE UP (ref 1.6–2.6)
MCHC RBC-ENTMCNC: 27.8 PG — SIGNIFICANT CHANGE UP (ref 27–34)
MCHC RBC-ENTMCNC: 31.1 G/DL — LOW (ref 32–36)
MCV RBC AUTO: 89.2 FL — SIGNIFICANT CHANGE UP (ref 80–100)
PHOSPHATE SERPL-MCNC: 4.5 MG/DL — SIGNIFICANT CHANGE UP (ref 2.5–4.5)
PLATELET # BLD AUTO: 352 K/UL — SIGNIFICANT CHANGE UP (ref 150–400)
POTASSIUM SERPL-MCNC: 4.1 MMOL/L — SIGNIFICANT CHANGE UP (ref 3.5–5.3)
POTASSIUM SERPL-SCNC: 4.1 MMOL/L — SIGNIFICANT CHANGE UP (ref 3.5–5.3)
PROT SERPL-MCNC: 8.3 GM/DL — SIGNIFICANT CHANGE UP (ref 6–8.3)
RBC # BLD: 3.71 M/UL — LOW (ref 3.8–5.2)
RBC # FLD: 15 % — HIGH (ref 10.3–14.5)
SODIUM SERPL-SCNC: 134 MMOL/L — LOW (ref 135–145)
WBC # BLD: 10.38 K/UL — SIGNIFICANT CHANGE UP (ref 3.8–10.5)
WBC # FLD AUTO: 10.38 K/UL — SIGNIFICANT CHANGE UP (ref 3.8–10.5)

## 2024-12-03 PROCEDURE — 99232 SBSQ HOSP IP/OBS MODERATE 35: CPT

## 2024-12-03 RX ORDER — CLOPIDOGREL 75 MG/1
75 TABLET, FILM COATED ORAL DAILY
Refills: 0 | Status: DISCONTINUED | OUTPATIENT
Start: 2024-12-04 | End: 2024-12-10

## 2024-12-03 RX ORDER — PANTOPRAZOLE SODIUM 40 MG/1
40 TABLET, DELAYED RELEASE ORAL
Refills: 0 | Status: DISCONTINUED | OUTPATIENT
Start: 2024-12-03 | End: 2024-12-04

## 2024-12-03 RX ADMIN — Medication 2: at 08:48

## 2024-12-03 RX ADMIN — CHLORHEXIDINE GLUCONATE 1 APPLICATION(S): 1.2 RINSE ORAL at 05:17

## 2024-12-03 RX ADMIN — APIXABAN 5 MILLIGRAM(S): 2.5 TABLET, FILM COATED ORAL at 11:18

## 2024-12-03 RX ADMIN — ROSUVASTATIN CALCIUM 10 MILLIGRAM(S): 5 TABLET, FILM COATED ORAL at 20:42

## 2024-12-03 RX ADMIN — INSULIN GLARGINE 8 UNIT(S): 100 INJECTION, SOLUTION SUBCUTANEOUS at 22:05

## 2024-12-03 RX ADMIN — FLUTICASONE PROPIONATE AND SALMETEROL XINAFOATE 1 DOSE(S): 45; 21 AEROSOL, METERED RESPIRATORY (INHALATION) at 10:35

## 2024-12-03 RX ADMIN — METOPROLOL TARTRATE 5 MILLIGRAM(S): 100 TABLET, FILM COATED ORAL at 12:20

## 2024-12-03 RX ADMIN — HYDROMORPHONE HYDROCHLORIDE 2 MILLIGRAM(S): 2 TABLET ORAL at 15:49

## 2024-12-03 RX ADMIN — APIXABAN 5 MILLIGRAM(S): 2.5 TABLET, FILM COATED ORAL at 20:42

## 2024-12-03 RX ADMIN — ONDANSETRON HYDROCHLORIDE 4 MILLIGRAM(S): 4 TABLET, FILM COATED ORAL at 15:47

## 2024-12-03 RX ADMIN — Medication 0.5 MILLIGRAM(S): at 20:42

## 2024-12-03 RX ADMIN — Medication 2: at 12:19

## 2024-12-03 RX ADMIN — METOPROLOL TARTRATE 5 MILLIGRAM(S): 100 TABLET, FILM COATED ORAL at 00:21

## 2024-12-03 RX ADMIN — PANTOPRAZOLE SODIUM 40 MILLIGRAM(S): 40 TABLET, DELAYED RELEASE ORAL at 05:35

## 2024-12-03 RX ADMIN — ARIPIPRAZOLE 2 MILLIGRAM(S): 15 TABLET ORAL at 11:17

## 2024-12-03 RX ADMIN — MONTELUKAST SODIUM 10 MILLIGRAM(S): 10 TABLET ORAL at 20:42

## 2024-12-03 RX ADMIN — METOPROLOL TARTRATE 5 MILLIGRAM(S): 100 TABLET, FILM COATED ORAL at 05:17

## 2024-12-03 RX ADMIN — Medication 81 MILLIGRAM(S): at 11:18

## 2024-12-03 RX ADMIN — HYDROMORPHONE HYDROCHLORIDE 2 MILLIGRAM(S): 2 TABLET ORAL at 15:46

## 2024-12-03 RX ADMIN — ESCITALOPRAM OXALATE 10 MILLIGRAM(S): 10 TABLET, FILM COATED ORAL at 11:17

## 2024-12-03 RX ADMIN — HYDROMORPHONE HYDROCHLORIDE 2 MILLIGRAM(S): 2 TABLET ORAL at 05:17

## 2024-12-03 RX ADMIN — Medication 2: at 17:33

## 2024-12-03 RX ADMIN — HYDROMORPHONE HYDROCHLORIDE 2 MILLIGRAM(S): 2 TABLET ORAL at 05:47

## 2024-12-03 RX ADMIN — SUCRALFATE 1 GRAM(S): 1 TABLET ORAL at 20:42

## 2024-12-03 RX ADMIN — METOPROLOL TARTRATE 5 MILLIGRAM(S): 100 TABLET, FILM COATED ORAL at 17:32

## 2024-12-03 NOTE — PROGRESS NOTE ADULT - ASSESSMENT
64 year old woman with persistent abdominal pain and poor oral intake with hospital course complicated by choledocholithiasis, pancreatitis pending feeding tube adjustment   	    #Persistent nausea and abdominal pain likely due to Gastroparesis exacerbated by GLP-1  +suspect psychosomatic component present as well  -requiring Dilaudid IV around the clock : 1mg q 4hrs ; high dose analgesia likely worsening her GI motility as well - plan to switch to PO when able to use feeding tube  - EGD/colonoscopy --> gastritis, esophagitis, c/w PPI  - Gastroparesis clinically diagnosed as she was unable to complete Gastric emptying study on admission due to vomiting however after discussion with ir when gj tube was placed the contrast was not moving much   - dc Reglan  10mg TID, Erythromycin as no improvement over the past 30+ days   - s/p gj tube placement with ir now endoscopically advanced to jejunum; started on feeds, monitor for refeeding  -switch ppi po now       # Pancreatitis: transient post ERCP resolved   - Pancreatitis s/p ercp 11/15   - s/p IVFs    # Choledocholithiasis:  - s/p ERCP and stent placement   - s/p repeat ERCP on 11/15 for persistent  14mm CBD stone ; CBD and Pancreatic duct cleared, sphincterotomy and metal stent placed -- now removed 12/2     #DM type II: uncontrolled due to TPN  - monitor blood sugars on glucerna feeds  -mISS & lantus 8     # Moderate to severe protein-calorie malnutrition:  on TPN  Glucerna supplements as tolerated     # Anxiety:   - c/w Lexapro, Abilify     - Psych evaluation noted: c/w Abilify low dose for now   +somatization suspected     # Paroxymal afib:   -back on eliquis tolerating - restarting plavix for am      # CAD:  restarting plavix for tomm am    # AOCD:  transfuse for Hb<8    #dc planning - for 12/6 as feeds will reach goal and be able to be condensed

## 2024-12-03 NOTE — PROGRESS NOTE ADULT - SUBJECTIVE AND OBJECTIVE BOX
CC: Patient is a 64y old  Female who presents with a chief complaint of inability to tolerate po (02 Dec 2024 11:13)      INTERVAL EVENTS: OZZY    SUBJECTIVE / INTERVAL HPI: Patient seen and examined at bedside. Patient feels okay, feels full this morning     ROS: negative unless otherwise stated above.    VITAL SIGNS:  Vital Signs Last 24 Hrs  T(C): 36.5 (03 Dec 2024 07:34), Max: 36.8 (02 Dec 2024 13:48)  T(F): 97.7 (03 Dec 2024 07:34), Max: 98.2 (02 Dec 2024 13:48)  HR: 84 (03 Dec 2024 07:34) (76 - 124)  BP: 102/70 (03 Dec 2024 07:34) (101/62 - 118/74)  BP(mean): --  RR: 18 (03 Dec 2024 07:34) (12 - 18)  SpO2: 94% (03 Dec 2024 07:34) (93% - 97%)    Parameters below as of 03 Dec 2024 07:34  Patient On (Oxygen Delivery Method): room air          12-02-24 @ 07:01  -  12-03-24 @ 07:00  --------------------------------------------------------  IN: 200 mL / OUT: 0 mL / NET: 200 mL        PHYSICAL EXAM:    General: NAD  HEENT: MMM  Neck: supple  Cardiovascular: +S1/S2; RRR  Respiratory: CTA B/L; no W/R/R  Gastrointestinal: soft, NT/ND  Extremities: WWP; no edema, clubbing or cyanosis  Vascular: 2+ radial, DP/PT pulses B/L  Neurological: AAOx3; no focal deficits    MEDICATIONS:  MEDICATIONS  (STANDING):  ALPRAZolam 0.5 milliGRAM(s) Oral at bedtime  apixaban 5 milliGRAM(s) Enteral Tube every 12 hours  ARIPiprazole 2 milliGRAM(s) Oral daily  aspirin  chewable 81 milliGRAM(s) Oral daily  chlorhexidine 4% Liquid 1 Application(s) Topical <User Schedule>  dextrose 5%. 1000 milliLiter(s) (50 mL/Hr) IV Continuous <Continuous>  dextrose 5%. 1000 milliLiter(s) (100 mL/Hr) IV Continuous <Continuous>  dextrose 50% Injectable 25 Gram(s) IV Push once  dextrose 50% Injectable 12.5 Gram(s) IV Push once  dextrose 50% Injectable 25 Gram(s) IV Push once  escitalopram 10 milliGRAM(s) Oral daily  fluticasone propionate/ salmeterol 250-50 MICROgram(s) Diskus 1 Dose(s) Inhalation two times a day  glucagon  Injectable 1 milliGRAM(s) IntraMuscular once  influenza   Vaccine 0.5 milliLiter(s) IntraMuscular once  insulin glargine Injectable (LANTUS) 8 Unit(s) SubCutaneous at bedtime  insulin lispro (ADMELOG) corrective regimen sliding scale   SubCutaneous every 6 hours  metoprolol tartrate Injectable 5 milliGRAM(s) IV Push every 6 hours  montelukast 10 milliGRAM(s) Oral at bedtime  pantoprazole  Injectable 40 milliGRAM(s) IV Push every 12 hours  rosuvastatin 10 milliGRAM(s) Oral at bedtime  sucralfate suspension 1 Gram(s) Oral <User Schedule>    MEDICATIONS  (PRN):  acetaminophen     Tablet .. 650 milliGRAM(s) Oral every 6 hours PRN Mild Pain (1 - 3)  acetaminophen   IVPB .. 1000 milliGRAM(s) IV Intermittent once PRN Mild Pain (1 - 3), Moderate Pain (4 - 6), Severe Pain (7 - 10)  acetaminophen 325 mG/butalbital 50 mG/caffeine 40 mG 1 Tablet(s) Oral every 8 hours PRN migraine  albuterol/ipratropium for Nebulization 3 milliLiter(s) Nebulizer every 4 hours PRN Shortness of Breath and/or Wheezing  aluminum hydroxide/magnesium hydroxide/simethicone Suspension 30 milliLiter(s) Oral every 4 hours PRN Dyspepsia  benzocaine/menthol Lozenge 1 Lozenge Oral every 4 hours PRN Sore Throat  dextrose Oral Gel 15 Gram(s) Oral once PRN Blood Glucose LESS THAN 70 milliGRAM(s)/deciliter  guaiFENesin Oral Liquid (Sugar-Free) 200 milliGRAM(s) Oral every 6 hours PRN Cough  HYDROmorphone   Tablet 1 milliGRAM(s) Oral every 6 hours PRN Moderate Pain (4 - 6)  HYDROmorphone   Tablet 2 milliGRAM(s) Oral every 6 hours PRN Severe Pain (7 - 10)  ondansetron Injectable 4 milliGRAM(s) IV Push every 6 hours PRN Nausea and/or Vomiting  sodium chloride 0.9% lock flush 10 milliLiter(s) IV Push every 1 hour PRN Pre/post blood products, medications, blood draw, and to maintain line patency      ALLERGIES:  Allergies    No Known Allergies    Intolerances        LABS:                        10.3   10.38 )-----------( 352      ( 03 Dec 2024 08:39 )             33.1     12-03    134[L]  |  103  |  13  ----------------------------<  173[H]  4.1   |  27  |  0.76    Ca    9.6      03 Dec 2024 08:39  Phos  3.9     12-02  Mg     2.0     12-02    TPro  8.3  /  Alb  2.9[L]  /  TBili  0.3  /  DBili  x   /  AST  23  /  ALT  21  /  AlkPhos  103  12-03    PT/INR - ( 02 Dec 2024 04:52 )   PT: 12.7 sec;   INR: 1.08 ratio         PTT - ( 02 Dec 2024 04:52 )  PTT:23.8 sec  Urinalysis Basic - ( 03 Dec 2024 08:39 )    Color: x / Appearance: x / SG: x / pH: x  Gluc: 173 mg/dL / Ketone: x  / Bili: x / Urobili: x   Blood: x / Protein: x / Nitrite: x   Leuk Esterase: x / RBC: x / WBC x   Sq Epi: x / Non Sq Epi: x / Bacteria: x      CAPILLARY BLOOD GLUCOSE      POCT Blood Glucose.: 160 mg/dL (03 Dec 2024 07:47)      RADIOLOGY & ADDITIONAL TESTS: Reviewed.

## 2024-12-04 LAB
ANION GAP SERPL CALC-SCNC: 1 MMOL/L — LOW (ref 5–17)
BUN SERPL-MCNC: 17 MG/DL — SIGNIFICANT CHANGE UP (ref 7–23)
CALCIUM SERPL-MCNC: 9.8 MG/DL — SIGNIFICANT CHANGE UP (ref 8.5–10.1)
CHLORIDE SERPL-SCNC: 102 MMOL/L — SIGNIFICANT CHANGE UP (ref 96–108)
CO2 SERPL-SCNC: 29 MMOL/L — SIGNIFICANT CHANGE UP (ref 22–31)
CREAT SERPL-MCNC: 0.8 MG/DL — SIGNIFICANT CHANGE UP (ref 0.5–1.3)
EGFR: 83 ML/MIN/1.73M2 — SIGNIFICANT CHANGE UP
GLUCOSE BLDC GLUCOMTR-MCNC: 173 MG/DL — HIGH (ref 70–99)
GLUCOSE BLDC GLUCOMTR-MCNC: 178 MG/DL — HIGH (ref 70–99)
GLUCOSE BLDC GLUCOMTR-MCNC: 179 MG/DL — HIGH (ref 70–99)
GLUCOSE BLDC GLUCOMTR-MCNC: 187 MG/DL — HIGH (ref 70–99)
GLUCOSE BLDC GLUCOMTR-MCNC: 198 MG/DL — HIGH (ref 70–99)
GLUCOSE SERPL-MCNC: 184 MG/DL — HIGH (ref 70–99)
HCT VFR BLD CALC: 33.8 % — LOW (ref 34.5–45)
HGB BLD-MCNC: 10.6 G/DL — LOW (ref 11.5–15.5)
MAGNESIUM SERPL-MCNC: 1.9 MG/DL — SIGNIFICANT CHANGE UP (ref 1.6–2.6)
MCHC RBC-ENTMCNC: 27.8 PG — SIGNIFICANT CHANGE UP (ref 27–34)
MCHC RBC-ENTMCNC: 31.4 G/DL — LOW (ref 32–36)
MCV RBC AUTO: 88.7 FL — SIGNIFICANT CHANGE UP (ref 80–100)
PHOSPHATE SERPL-MCNC: 3.4 MG/DL — SIGNIFICANT CHANGE UP (ref 2.5–4.5)
PLATELET # BLD AUTO: 384 K/UL — SIGNIFICANT CHANGE UP (ref 150–400)
POTASSIUM SERPL-MCNC: 4.3 MMOL/L — SIGNIFICANT CHANGE UP (ref 3.5–5.3)
POTASSIUM SERPL-SCNC: 4.3 MMOL/L — SIGNIFICANT CHANGE UP (ref 3.5–5.3)
RBC # BLD: 3.81 M/UL — SIGNIFICANT CHANGE UP (ref 3.8–5.2)
RBC # FLD: 15 % — HIGH (ref 10.3–14.5)
SODIUM SERPL-SCNC: 132 MMOL/L — LOW (ref 135–145)
WBC # BLD: 8.8 K/UL — SIGNIFICANT CHANGE UP (ref 3.8–10.5)
WBC # FLD AUTO: 8.8 K/UL — SIGNIFICANT CHANGE UP (ref 3.8–10.5)

## 2024-12-04 PROCEDURE — 99232 SBSQ HOSP IP/OBS MODERATE 35: CPT

## 2024-12-04 RX ORDER — ARIPIPRAZOLE 15 MG/1
1 TABLET ORAL DAILY
Refills: 0 | Status: DISCONTINUED | OUTPATIENT
Start: 2024-12-04 | End: 2024-12-10

## 2024-12-04 RX ORDER — ONDANSETRON HYDROCHLORIDE 4 MG/1
4 TABLET, FILM COATED ORAL EVERY 6 HOURS
Refills: 0 | Status: DISCONTINUED | OUTPATIENT
Start: 2024-12-04 | End: 2024-12-10

## 2024-12-04 RX ORDER — METOPROLOL TARTRATE 100 MG/1
50 TABLET, FILM COATED ORAL
Refills: 0 | Status: DISCONTINUED | OUTPATIENT
Start: 2024-12-04 | End: 2024-12-10

## 2024-12-04 RX ORDER — PANTOPRAZOLE SODIUM 40 MG/1
40 TABLET, DELAYED RELEASE ORAL DAILY
Refills: 0 | Status: DISCONTINUED | OUTPATIENT
Start: 2024-12-05 | End: 2024-12-10

## 2024-12-04 RX ADMIN — INSULIN GLARGINE 8 UNIT(S): 100 INJECTION, SOLUTION SUBCUTANEOUS at 21:36

## 2024-12-04 RX ADMIN — METOPROLOL TARTRATE 5 MILLIGRAM(S): 100 TABLET, FILM COATED ORAL at 12:12

## 2024-12-04 RX ADMIN — METOPROLOL TARTRATE 5 MILLIGRAM(S): 100 TABLET, FILM COATED ORAL at 05:29

## 2024-12-04 RX ADMIN — ONDANSETRON HYDROCHLORIDE 4 MILLIGRAM(S): 4 TABLET, FILM COATED ORAL at 23:43

## 2024-12-04 RX ADMIN — Medication 2: at 23:44

## 2024-12-04 RX ADMIN — ESCITALOPRAM OXALATE 10 MILLIGRAM(S): 10 TABLET, FILM COATED ORAL at 09:44

## 2024-12-04 RX ADMIN — Medication 2: at 05:41

## 2024-12-04 RX ADMIN — CHLORHEXIDINE GLUCONATE 1 APPLICATION(S): 1.2 RINSE ORAL at 05:42

## 2024-12-04 RX ADMIN — Medication 2: at 11:57

## 2024-12-04 RX ADMIN — Medication 2: at 17:50

## 2024-12-04 RX ADMIN — ARIPIPRAZOLE 2 MILLIGRAM(S): 15 TABLET ORAL at 09:45

## 2024-12-04 RX ADMIN — Medication 81 MILLIGRAM(S): at 09:45

## 2024-12-04 RX ADMIN — METOPROLOL TARTRATE 50 MILLIGRAM(S): 100 TABLET, FILM COATED ORAL at 18:31

## 2024-12-04 RX ADMIN — ONDANSETRON HYDROCHLORIDE 4 MILLIGRAM(S): 4 TABLET, FILM COATED ORAL at 00:04

## 2024-12-04 RX ADMIN — APIXABAN 5 MILLIGRAM(S): 2.5 TABLET, FILM COATED ORAL at 20:57

## 2024-12-04 RX ADMIN — ROSUVASTATIN CALCIUM 10 MILLIGRAM(S): 5 TABLET, FILM COATED ORAL at 21:03

## 2024-12-04 RX ADMIN — PANTOPRAZOLE SODIUM 40 MILLIGRAM(S): 40 TABLET, DELAYED RELEASE ORAL at 05:29

## 2024-12-04 RX ADMIN — HYDROMORPHONE HYDROCHLORIDE 1 MILLIGRAM(S): 2 TABLET ORAL at 09:43

## 2024-12-04 RX ADMIN — CLOPIDOGREL 75 MILLIGRAM(S): 75 TABLET, FILM COATED ORAL at 09:45

## 2024-12-04 RX ADMIN — APIXABAN 5 MILLIGRAM(S): 2.5 TABLET, FILM COATED ORAL at 09:44

## 2024-12-04 RX ADMIN — METOPROLOL TARTRATE 5 MILLIGRAM(S): 100 TABLET, FILM COATED ORAL at 00:16

## 2024-12-04 RX ADMIN — FLUTICASONE PROPIONATE AND SALMETEROL XINAFOATE 1 DOSE(S): 45; 21 AEROSOL, METERED RESPIRATORY (INHALATION) at 09:09

## 2024-12-04 RX ADMIN — SUCRALFATE 1 GRAM(S): 1 TABLET ORAL at 21:04

## 2024-12-04 RX ADMIN — SUCRALFATE 1 GRAM(S): 1 TABLET ORAL at 05:29

## 2024-12-04 RX ADMIN — Medication 0.5 MILLIGRAM(S): at 21:04

## 2024-12-04 RX ADMIN — ONDANSETRON HYDROCHLORIDE 4 MILLIGRAM(S): 4 TABLET, FILM COATED ORAL at 09:43

## 2024-12-04 RX ADMIN — MONTELUKAST SODIUM 10 MILLIGRAM(S): 10 TABLET ORAL at 21:03

## 2024-12-04 RX ADMIN — HYDROMORPHONE HYDROCHLORIDE 1 MILLIGRAM(S): 2 TABLET ORAL at 23:43

## 2024-12-04 RX ADMIN — Medication 2: at 00:15

## 2024-12-04 NOTE — PROGRESS NOTE ADULT - SUBJECTIVE AND OBJECTIVE BOX
CC: Abd pain      64-year-old female with history of GERD  HTN, DM, FAP s/p colon resection, PAfib, CAD s/p PCI March 2023, Anxiety  and Depression ,  presents with epigastric pain for the last couple of weeks.  Her daughter brought her in, after speaking with her GI doctor who stated patient should not have been on ozempic before and probably have taken insulin instead. She states that she had just up titrated her dose of ozempic to 0.5 from 0.25 earlier this week. She had been on the 0.25 for 5 weeks. She states the pain bothers her more than the nausea and vomiting. Patient states she feels very minimal relief since she came in.  She denies NSAID use, bleeding or black stools. She reports 10lb weight loss and has been on ozempic for her diabetes. Denies diarrhea or urinary symptoms.     Adm for initiation of PPN for presumed gastroparesis; gastric emptying study for 11/6- failed due to vomiting   PICC inserted 11/9 for TPN  Abnormal MRCP; s/p ERCP and stent placement for CBD stone followed by repeat ERCP on 11/15 with stone extraction, sphincterotomy, stent replacement  Continues to have abd pain and poor oral intake , s/p PEG placement   Developed Asthma exacerbation quickly resolved w inhalers and one dose steroids   -s/p G-J tube placement     12.4: feels a bit better, less abd pain, less nausea   tolerating TF    REVIEW OF SYSTEMS:    CONSTITUTIONAL: No weakness, No fevers or chills  ENT: No ear ache, No sorethroat  NECK: No pain, No stiffness  RESPIRATORY: No cough, No wheezing, No hemoptysis; No dyspnea  CARDIOVASCULAR: No chest pain, No palpitations  GASTROINTESTINAL: +abd pain, + nausea, + vomiting, No hematemesis, No diarrhea or constipation. No melena, No hematochezia.  GENITOURINARY: No dysuria, No  hematuria  NEUROLOGICAL: No diplopia, No paresthesia, No motor dysfunction  MUSCULOSKELETAL: No arthralgia, No myalgia  SKIN: No rashes, or lesions   PSYCH: ++anxiety, no suicidal ideation    All other review of systems is negative unless indicated above    Vital Signs Last 24 Hrs  T(C): 36.6 (04 Dec 2024 08:22), Max: 36.7 (03 Dec 2024 23:55)  T(F): 97.9 (04 Dec 2024 08:22), Max: 98.1 (03 Dec 2024 23:55)  HR: 78 (04 Dec 2024 12:20) (72 - 88)  BP: 131/74 (04 Dec 2024 12:20) (108/69 - 131/76)  BP(mean): --  RR: 17 (04 Dec 2024 05:24) (17 - 18)  SpO2: 96% (04 Dec 2024 08:22) (96% - 97%)    Parameters below as of 04 Dec 2024 08:22  Patient On (Oxygen Delivery Method): room air              PHYSICAL EXAM:    GENERAL: anxious   HEENT:  NC/AT, EOMI, PERRLA, No scleral icterus, Moist mucous membranes  NECK: Supple, No JVD  CNS:  Alert & Oriented X3, Motor Strength 5/5 B/L upper and lower extremities; DTRs 2+ intact   LUNG: Normal Breath sounds, Clear to auscultation bilaterally, No rales, No rhonchi, No wheezing  HEART: RRR; No murmurs, No rubs  ABDOMEN: +BS, ST/ND, +TTP on epigastric and midline area, +PEJ present no erythema no discharge around peg   GENITOURINARY: Voiding, Bladder not distended  EXTREMITIES:  2+ Peripheral Pulses, No clubbing, No cyanosis, No tibial edema  MUSCULOSKELTAL: Joints normal ROM, No TTP, No effusion  VAGINAL: deferred  SKIN: no rashes  RECTAL: deferred, not indicated  BREAST: deferred      MEDICATIONS  (STANDING):  ALPRAZolam 0.5 milliGRAM(s) Oral at bedtime  apixaban 5 milliGRAM(s) Enteral Tube every 12 hours  ARIPiprazole 1 milliGRAM(s) Oral daily  aspirin  chewable 81 milliGRAM(s) Oral daily  chlorhexidine 4% Liquid 1 Application(s) Topical <User Schedule>  clopidogrel Tablet 75 milliGRAM(s) Enteral Tube daily  escitalopram 10 milliGRAM(s) Oral daily  fluticasone propionate/ salmeterol 250-50 MICROgram(s) Diskus 1 Dose(s) Inhalation two times a day  glucagon  Injectable 1 milliGRAM(s) IntraMuscular once  influenza   Vaccine 0.5 milliLiter(s) IntraMuscular once  insulin glargine Injectable (LANTUS) 8 Unit(s) SubCutaneous at bedtime  insulin lispro (ADMELOG) corrective regimen sliding scale   SubCutaneous every 6 hours  metoprolol tartrate Injectable 5 milliGRAM(s) IV Push every 6 hours  montelukast 10 milliGRAM(s) Oral at bedtime  rosuvastatin 10 milliGRAM(s) Oral at bedtime  sucralfate suspension 1 Gram(s) Oral <User Schedule>    Labs:                        10.6   8.80  )-----------( 384      ( 04 Dec 2024 08:53 )             33.8     12-04    132[L]  |  102  |  17  ----------------------------<  184[H]  4.3   |  29  |  0.80    Ca    9.8      04 Dec 2024 08:53  Phos  3.4     12-04  Mg     1.9     12-04    TPro  8.3  /  Alb  2.9[L]  /  TBili  0.3  /  DBili  x   /  AST  23  /  ALT  21  /  AlkPhos  103  12-03            Assessment and Plan:  64 year old woman with persistent abdominal pain and poor oral intake with hospital course complicated by choledocholithiasis, pancreatitis.  	    1. Persistent nausea and abdominal pain likely due to Gastroparesis exacerbated by GLP-1  +suspect psychosomatic component present as well  -now on Dilaudid 1mg po q 6hrs prn pain  ; high dose analgesia likely worsening her GI motility as well     - EGD/colonoscopy --> gastritis, esophagitis, c/w PPI  - Gastroparesis clinically diagnosed as she was unable to complete Gastric emptying study on admission due to vomiting however after discussion with ir when gj tube was placed the contrast was not moving much   - c/w Reglan  10mg TID, Erythromycin not helping will stop   - s/p PEJ placement   - increase TF by 10cc/day to prevent  refeeding syndrome     dc PICC  c/w PPI    2. Pancreatitis: transient post ERCP resolved   - Pancreatitis s/p ercp 11/15   - s/p IVFs    3. Choledocholithiasis:  - s/p ERCP and stent placement   - s/p repeat ERCP on 11/15 for persistent  14mm CBD stone ; CBD and Pancreatic duct cleared, sphincterotomy and metal stent placed    4. DM type II: uncontrolled due to TPN  - monitor blood sugars  c/w Lantus , ISS, Glucerna     5. Moderate to severe protein-calorie malnutrition:  on TPN  Glucerna supplements as tolerated     6. Anxiety:   - c/w Lexapro, Abilify     - Psych evaluation noted: c/w Abilify low dose for now   +somatization suspected     7. Paroxymal afib:   - off lisinopril due to borderline BP  c/w Apixaban    8. CAD:  c/w Plavix    9. AOCD:  transfuse for Hb<8

## 2024-12-05 LAB
GLUCOSE BLDC GLUCOMTR-MCNC: 176 MG/DL — HIGH (ref 70–99)
GLUCOSE BLDC GLUCOMTR-MCNC: 182 MG/DL — HIGH (ref 70–99)
GLUCOSE BLDC GLUCOMTR-MCNC: 209 MG/DL — HIGH (ref 70–99)
GLUCOSE BLDC GLUCOMTR-MCNC: 217 MG/DL — HIGH (ref 70–99)
HCT VFR BLD CALC: 33.7 % — LOW (ref 34.5–45)
HGB BLD-MCNC: 10.6 G/DL — LOW (ref 11.5–15.5)
MCHC RBC-ENTMCNC: 27.8 PG — SIGNIFICANT CHANGE UP (ref 27–34)
MCHC RBC-ENTMCNC: 31.5 G/DL — LOW (ref 32–36)
MCV RBC AUTO: 88.5 FL — SIGNIFICANT CHANGE UP (ref 80–100)
PLATELET # BLD AUTO: 352 K/UL — SIGNIFICANT CHANGE UP (ref 150–400)
RBC # BLD: 3.81 M/UL — SIGNIFICANT CHANGE UP (ref 3.8–5.2)
RBC # FLD: 14.8 % — HIGH (ref 10.3–14.5)
WBC # BLD: 9.1 K/UL — SIGNIFICANT CHANGE UP (ref 3.8–10.5)
WBC # FLD AUTO: 9.1 K/UL — SIGNIFICANT CHANGE UP (ref 3.8–10.5)

## 2024-12-05 PROCEDURE — 99232 SBSQ HOSP IP/OBS MODERATE 35: CPT

## 2024-12-05 RX ORDER — INSULIN GLARGINE 100 [IU]/ML
10 INJECTION, SOLUTION SUBCUTANEOUS AT BEDTIME
Refills: 0 | Status: DISCONTINUED | OUTPATIENT
Start: 2024-12-05 | End: 2024-12-10

## 2024-12-05 RX ADMIN — ROSUVASTATIN CALCIUM 10 MILLIGRAM(S): 5 TABLET, FILM COATED ORAL at 21:19

## 2024-12-05 RX ADMIN — ONDANSETRON HYDROCHLORIDE 4 MILLIGRAM(S): 4 TABLET, FILM COATED ORAL at 12:40

## 2024-12-05 RX ADMIN — SUCRALFATE 1 GRAM(S): 1 TABLET ORAL at 17:46

## 2024-12-05 RX ADMIN — PANTOPRAZOLE SODIUM 40 MILLIGRAM(S): 40 TABLET, DELAYED RELEASE ORAL at 09:16

## 2024-12-05 RX ADMIN — SUCRALFATE 1 GRAM(S): 1 TABLET ORAL at 12:42

## 2024-12-05 RX ADMIN — APIXABAN 5 MILLIGRAM(S): 2.5 TABLET, FILM COATED ORAL at 20:52

## 2024-12-05 RX ADMIN — Medication 4: at 05:17

## 2024-12-05 RX ADMIN — FLUTICASONE PROPIONATE AND SALMETEROL XINAFOATE 1 DOSE(S): 45; 21 AEROSOL, METERED RESPIRATORY (INHALATION) at 09:04

## 2024-12-05 RX ADMIN — ARIPIPRAZOLE 1 MILLIGRAM(S): 15 TABLET ORAL at 09:16

## 2024-12-05 RX ADMIN — Medication 0.5 MILLIGRAM(S): at 21:20

## 2024-12-05 RX ADMIN — HYDROMORPHONE HYDROCHLORIDE 1 MILLIGRAM(S): 2 TABLET ORAL at 00:13

## 2024-12-05 RX ADMIN — CHLORHEXIDINE GLUCONATE 1 APPLICATION(S): 1.2 RINSE ORAL at 05:27

## 2024-12-05 RX ADMIN — INSULIN GLARGINE 10 UNIT(S): 100 INJECTION, SOLUTION SUBCUTANEOUS at 22:47

## 2024-12-05 RX ADMIN — Medication 2: at 23:03

## 2024-12-05 RX ADMIN — APIXABAN 5 MILLIGRAM(S): 2.5 TABLET, FILM COATED ORAL at 09:16

## 2024-12-05 RX ADMIN — MONTELUKAST SODIUM 10 MILLIGRAM(S): 10 TABLET ORAL at 21:19

## 2024-12-05 RX ADMIN — METOPROLOL TARTRATE 50 MILLIGRAM(S): 100 TABLET, FILM COATED ORAL at 05:17

## 2024-12-05 RX ADMIN — METOPROLOL TARTRATE 50 MILLIGRAM(S): 100 TABLET, FILM COATED ORAL at 21:19

## 2024-12-05 RX ADMIN — SUCRALFATE 1 GRAM(S): 1 TABLET ORAL at 05:17

## 2024-12-05 RX ADMIN — Medication 2: at 17:45

## 2024-12-05 RX ADMIN — Medication 4: at 12:45

## 2024-12-05 RX ADMIN — CLOPIDOGREL 75 MILLIGRAM(S): 75 TABLET, FILM COATED ORAL at 09:15

## 2024-12-05 RX ADMIN — ESCITALOPRAM OXALATE 10 MILLIGRAM(S): 10 TABLET, FILM COATED ORAL at 09:16

## 2024-12-05 RX ADMIN — FLUTICASONE PROPIONATE AND SALMETEROL XINAFOATE 1 DOSE(S): 45; 21 AEROSOL, METERED RESPIRATORY (INHALATION) at 19:44

## 2024-12-05 RX ADMIN — SUCRALFATE 1 GRAM(S): 1 TABLET ORAL at 21:29

## 2024-12-05 NOTE — PROGRESS NOTE ADULT - SUBJECTIVE AND OBJECTIVE BOX
CC: Abd pain      64-year-old female with history of GERD  HTN, DM, FAP s/p colon resection, PAfib, CAD s/p PCI March 2023, Anxiety  and Depression ,  presents with epigastric pain for the last couple of weeks.  Her daughter brought her in, after speaking with her GI doctor who stated patient should not have been on ozempic before and probably have taken insulin instead. She states that she had just up titrated her dose of ozempic to 0.5 from 0.25 earlier this week. She had been on the 0.25 for 5 weeks. She states the pain bothers her more than the nausea and vomiting. Patient states she feels very minimal relief since she came in.  She denies NSAID use, bleeding or black stools. She reports 10lb weight loss and has been on ozempic for her diabetes. Denies diarrhea or urinary symptoms.     Adm for initiation of PPN for presumed gastroparesis; gastric emptying study for 11/6- failed due to vomiting   PICC inserted 11/9 for TPN  Abnormal MRCP; s/p ERCP and stent placement for CBD stone followed by repeat ERCP on 11/15 with stone extraction, sphincterotomy, stent replacement  Continues to have abd pain and poor oral intake , s/p PEG placement   Developed Asthma exacerbation quickly resolved w inhalers and one dose steroids   -s/p G-J tube placement     12.4: feels a bit better, less abd pain, less nausea   tolerating TF  12.5: feels bloated, c/o improving abd pain     REVIEW OF SYSTEMS:    CONSTITUTIONAL: No weakness, No fevers or chills  ENT: No ear ache, No sorethroat  NECK: No pain, No stiffness  RESPIRATORY: No cough, No wheezing, No hemoptysis; No dyspnea  CARDIOVASCULAR: No chest pain, No palpitations  GASTROINTESTINAL: +abd pain, + nausea, + vomiting, No hematemesis, No diarrhea or constipation. No melena, No hematochezia.  GENITOURINARY: No dysuria, No  hematuria  NEUROLOGICAL: No diplopia, No paresthesia, No motor dysfunction  MUSCULOSKELETAL: No arthralgia, No myalgia  SKIN: No rashes, or lesions   PSYCH: ++anxiety, no suicidal ideation    All other review of systems is negative unless indicated above    Vital Signs Last 24 Hrs  T(C): 36.6 (05 Dec 2024 08:07), Max: 36.8 (04 Dec 2024 15:54)  T(F): 97.8 (05 Dec 2024 08:07), Max: 98.3 (04 Dec 2024 15:54)  HR: 90 (05 Dec 2024 09:00) (78 - 90)  BP: 111/70 (05 Dec 2024 08:07) (111/70 - 133/76)  BP(mean): --  RR: 18 (05 Dec 2024 08:07) (18 - 18)  SpO2: 94% (05 Dec 2024 08:07) (94% - 96%)    Parameters below as of 05 Dec 2024 08:07  Patient On (Oxygen Delivery Method): room air                PHYSICAL EXAM:    GENERAL: anxious   HEENT:  NC/AT, EOMI, PERRLA, No scleral icterus, Moist mucous membranes  NECK: Supple, No JVD  CNS:  Alert & Oriented X3, Motor Strength 5/5 B/L upper and lower extremities; DTRs 2+ intact   LUNG: Normal Breath sounds, Clear to auscultation bilaterally, No rales, No rhonchi, No wheezing  HEART: RRR; No murmurs, No rubs  ABDOMEN: +BS, ST/ND, +TTP on epigastric and midline area, +PEJ present no erythema no discharge around peg   GENITOURINARY: Voiding, Bladder not distended  EXTREMITIES:  2+ Peripheral Pulses, No clubbing, No cyanosis, No tibial edema  MUSCULOSKELTAL: Joints normal ROM, No TTP, No effusion  VAGINAL: deferred  SKIN: no rashes  RECTAL: deferred, not indicated  BREAST: deferred      MEDICATIONS  (STANDING):  ALPRAZolam 0.5 milliGRAM(s) Oral at bedtime  apixaban 5 milliGRAM(s) Enteral Tube every 12 hours  ARIPiprazole 1 milliGRAM(s) Oral daily  chlorhexidine 4% Liquid 1 Application(s) Topical <User Schedule>  clopidogrel Tablet 75 milliGRAM(s) Enteral Tube daily  escitalopram 10 milliGRAM(s) Oral daily  fluticasone propionate/ salmeterol 250-50 MICROgram(s) Diskus 1 Dose(s) Inhalation two times a day  glucagon  Injectable 1 milliGRAM(s) IntraMuscular once  influenza   Vaccine 0.5 milliLiter(s) IntraMuscular once  insulin glargine Injectable (LANTUS) 8 Unit(s) SubCutaneous at bedtime  insulin lispro (ADMELOG) corrective regimen sliding scale   SubCutaneous every 6 hours  metoprolol tartrate 50 milliGRAM(s) Oral two times a day  montelukast 10 milliGRAM(s) Oral at bedtime  pantoprazole   Suspension 40 milliGRAM(s) Oral daily  rosuvastatin 10 milliGRAM(s) Oral at bedtime  sucralfate suspension 1 Gram(s) Oral <User Schedule>    Labs:                        10.6   8.80  )-----------( 384      ( 04 Dec 2024 08:53 )             33.8     12-04    132[L]  |  102  |  17  ----------------------------<  184[H]  4.3   |  29  |  0.80    Ca    9.8      04 Dec 2024 08:53  Phos  3.4     12-04  Mg     1.9     12-04    TPro  8.3  /  Alb  2.9[L]  /  TBili  0.3  /  DBili  x   /  AST  23  /  ALT  21  /  AlkPhos  103  12-03            Assessment and Plan:  64 year old woman with persistent abdominal pain and poor oral intake with hospital course complicated by choledocholithiasis, pancreatitis.  	    1. Persistent nausea and abdominal pain likely due to Gastroparesis exacerbated by GLP-1  +suspect psychosomatic component present as well  -now on Dilaudid 1mg po q 6hrs prn pain  ; high dose analgesia likely worsening her GI motility as well     - EGD/colonoscopy --> gastritis, esophagitis, c/w PPI  - Gastroparesis clinically diagnosed as she was unable to complete Gastric emptying study on admission due to vomiting however after discussion with ir when gj tube was placed the contrast was not moving much   - s/p PEJ placement   - increase TF by 10cc/day to prevent  refeeding syndrome   Didnt tolerated 65cc/hr, will decrease to 60cc/hr     dc PICC  c/w PPI    2. Pancreatitis: transient post ERCP resolved   - Pancreatitis s/p ercp 11/15   - s/p IVFs    3. Choledocholithiasis:  - s/p ERCP and stent placement   - s/p repeat ERCP on 11/15 for persistent  14mm CBD stone ; CBD and Pancreatic duct cleared, sphincterotomy and metal stent placed    4. DM type II: uncontrolled due to TPN  - monitor blood sugars  c/w Lantus , ISS, Glucerna     5. Moderate to severe protein-calorie malnutrition:  on TF    6. Anxiety:   - c/w Lexapro, Abilify     - Psych evaluation noted: c/w Abilify low dose for now   +somatization suspected     7. Paroxymal afib:   - off lisinopril due to borderline BP  c/w Apixaban    8. CAD:  c/w Plavix    9. AOCD:  transfuse for Hb<8

## 2024-12-06 ENCOUNTER — TRANSCRIPTION ENCOUNTER (OUTPATIENT)
Age: 64
End: 2024-12-06

## 2024-12-06 ENCOUNTER — APPOINTMENT (OUTPATIENT)
Dept: NEUROLOGY | Facility: CLINIC | Age: 64
End: 2024-12-06

## 2024-12-06 LAB
GLUCOSE BLDC GLUCOMTR-MCNC: 169 MG/DL — HIGH (ref 70–99)
GLUCOSE BLDC GLUCOMTR-MCNC: 209 MG/DL — HIGH (ref 70–99)
GLUCOSE BLDC GLUCOMTR-MCNC: 235 MG/DL — HIGH (ref 70–99)
GLUCOSE BLDC GLUCOMTR-MCNC: 248 MG/DL — HIGH (ref 70–99)
HCT VFR BLD CALC: 32.7 % — LOW (ref 34.5–45)
HGB BLD-MCNC: 10.3 G/DL — LOW (ref 11.5–15.5)
MCHC RBC-ENTMCNC: 27.6 PG — SIGNIFICANT CHANGE UP (ref 27–34)
MCHC RBC-ENTMCNC: 31.5 G/DL — LOW (ref 32–36)
MCV RBC AUTO: 87.7 FL — SIGNIFICANT CHANGE UP (ref 80–100)
PLATELET # BLD AUTO: 320 K/UL — SIGNIFICANT CHANGE UP (ref 150–400)
RBC # BLD: 3.73 M/UL — LOW (ref 3.8–5.2)
RBC # FLD: 15 % — HIGH (ref 10.3–14.5)
WBC # BLD: 7.6 K/UL — SIGNIFICANT CHANGE UP (ref 3.8–10.5)
WBC # FLD AUTO: 7.6 K/UL — SIGNIFICANT CHANGE UP (ref 3.8–10.5)

## 2024-12-06 PROCEDURE — 99239 HOSP IP/OBS DSCHRG MGMT >30: CPT

## 2024-12-06 RX ORDER — BUTALB/ACETAMINOPHEN/CAFFEINE 50-325-40
1 TABLET ORAL
Qty: 0 | Refills: 0 | DISCHARGE
Start: 2024-12-06

## 2024-12-06 RX ORDER — GLIPIZIDE 5 MG/1
1 TABLET, FILM COATED, EXTENDED RELEASE ORAL
Refills: 0 | DISCHARGE

## 2024-12-06 RX ORDER — ONDANSETRON HYDROCHLORIDE 4 MG/1
1 TABLET, FILM COATED ORAL
Qty: 30 | Refills: 0
Start: 2024-12-06

## 2024-12-06 RX ORDER — ESCITALOPRAM OXALATE 10 MG/1
1 TABLET, FILM COATED ORAL
Refills: 0 | DISCHARGE

## 2024-12-06 RX ORDER — PANTOPRAZOLE SODIUM 40 MG/1
1 TABLET, DELAYED RELEASE ORAL
Qty: 30 | Refills: 0
Start: 2024-12-06

## 2024-12-06 RX ORDER — METOCLOPRAMIDE HYDROCHLORIDE 10 MG/1
1 TABLET ORAL
Qty: 90 | Refills: 0
Start: 2024-12-06

## 2024-12-06 RX ORDER — APIXABAN 2.5 MG/1
1 TABLET, FILM COATED ORAL
Qty: 0 | Refills: 0 | DISCHARGE
Start: 2024-12-06

## 2024-12-06 RX ORDER — HYDROMORPHONE HYDROCHLORIDE 2 MG/1
1 TABLET ORAL
Qty: 21 | Refills: 0
Start: 2024-12-06 | End: 2024-12-12

## 2024-12-06 RX ORDER — ARIPIPRAZOLE 15 MG/1
1 TABLET ORAL
Refills: 0 | DISCHARGE

## 2024-12-06 RX ORDER — INSULIN GLARGINE 100 [IU]/ML
10 INJECTION, SOLUTION SUBCUTANEOUS
Qty: 3 | Refills: 0
Start: 2024-12-06

## 2024-12-06 RX ORDER — ROSUVASTATIN CALCIUM 5 MG/1
1 TABLET, FILM COATED ORAL
Qty: 0 | Refills: 0 | DISCHARGE
Start: 2024-12-06

## 2024-12-06 RX ORDER — SUCRALFATE 1 G/1
10 TABLET ORAL
Qty: 900 | Refills: 0
Start: 2024-12-06

## 2024-12-06 RX ORDER — ARIPIPRAZOLE 15 MG/1
0.5 TABLET ORAL
Qty: 0 | Refills: 0 | DISCHARGE
Start: 2024-12-06

## 2024-12-06 RX ORDER — METOPROLOL TARTRATE 100 MG/1
1 TABLET, FILM COATED ORAL
Qty: 0 | Refills: 0 | DISCHARGE
Start: 2024-12-06

## 2024-12-06 RX ORDER — ESCITALOPRAM OXALATE 10 MG/1
1 TABLET, FILM COATED ORAL
Qty: 0 | Refills: 0 | DISCHARGE
Start: 2024-12-06

## 2024-12-06 RX ORDER — ORAL SEMAGLUTIDE 7 MG/1
0.5 TABLET ORAL
Refills: 0 | DISCHARGE

## 2024-12-06 RX ADMIN — Medication 2: at 06:27

## 2024-12-06 RX ADMIN — CLOPIDOGREL 75 MILLIGRAM(S): 75 TABLET, FILM COATED ORAL at 10:13

## 2024-12-06 RX ADMIN — HYDROMORPHONE HYDROCHLORIDE 2 MILLIGRAM(S): 2 TABLET ORAL at 05:30

## 2024-12-06 RX ADMIN — SUCRALFATE 1 GRAM(S): 1 TABLET ORAL at 11:15

## 2024-12-06 RX ADMIN — ARIPIPRAZOLE 1 MILLIGRAM(S): 15 TABLET ORAL at 10:16

## 2024-12-06 RX ADMIN — CHLORHEXIDINE GLUCONATE 1 APPLICATION(S): 1.2 RINSE ORAL at 10:17

## 2024-12-06 RX ADMIN — Medication 4: at 13:17

## 2024-12-06 RX ADMIN — APIXABAN 5 MILLIGRAM(S): 2.5 TABLET, FILM COATED ORAL at 22:23

## 2024-12-06 RX ADMIN — SUCRALFATE 1 GRAM(S): 1 TABLET ORAL at 07:00

## 2024-12-06 RX ADMIN — METOPROLOL TARTRATE 50 MILLIGRAM(S): 100 TABLET, FILM COATED ORAL at 10:16

## 2024-12-06 RX ADMIN — Medication 4: at 17:28

## 2024-12-06 RX ADMIN — INSULIN GLARGINE 10 UNIT(S): 100 INJECTION, SOLUTION SUBCUTANEOUS at 22:24

## 2024-12-06 RX ADMIN — SUCRALFATE 1 GRAM(S): 1 TABLET ORAL at 22:24

## 2024-12-06 RX ADMIN — Medication 4: at 22:26

## 2024-12-06 RX ADMIN — ROSUVASTATIN CALCIUM 10 MILLIGRAM(S): 5 TABLET, FILM COATED ORAL at 22:23

## 2024-12-06 RX ADMIN — MONTELUKAST SODIUM 10 MILLIGRAM(S): 10 TABLET ORAL at 22:23

## 2024-12-06 RX ADMIN — FLUTICASONE PROPIONATE AND SALMETEROL XINAFOATE 1 DOSE(S): 45; 21 AEROSOL, METERED RESPIRATORY (INHALATION) at 10:16

## 2024-12-06 RX ADMIN — APIXABAN 5 MILLIGRAM(S): 2.5 TABLET, FILM COATED ORAL at 10:16

## 2024-12-06 RX ADMIN — ESCITALOPRAM OXALATE 10 MILLIGRAM(S): 10 TABLET, FILM COATED ORAL at 10:16

## 2024-12-06 RX ADMIN — PANTOPRAZOLE SODIUM 40 MILLIGRAM(S): 40 TABLET, DELAYED RELEASE ORAL at 10:16

## 2024-12-06 RX ADMIN — METOPROLOL TARTRATE 50 MILLIGRAM(S): 100 TABLET, FILM COATED ORAL at 22:24

## 2024-12-06 RX ADMIN — SUCRALFATE 1 GRAM(S): 1 TABLET ORAL at 17:29

## 2024-12-06 RX ADMIN — Medication 0.5 MILLIGRAM(S): at 22:23

## 2024-12-06 NOTE — CHART NOTE - NSCHARTNOTEFT_GEN_A_CORE
Clinical Nutrition Follow Up Note    *64-year-old female with history of GERD  HTN, DM, polyps, and afib with 5 cardiac stents. presents with epigastric pain for the last couple of weeks.  Her daughter brought her in, after speaking with her GI doctor who stated patient should not have been on ozempic before and probably have taken insulin instead. She states that she had just uptitrated her dose of ozempic to 0.5 from 0.25 earlier this week. She had been on the 0.25 for 5 weeks. She states the pain bothers her more than the nausea and vomiting. Patient states she feels very minimal relief since she came in.  She denies NSAID use, bleeding or black stools. She reports 10lb weight loss and has been on ozempic for her diabetes. Denies diarrhea or urinary symptoms. Admitted for Nausea and vomiting     H/O pertinent events during hospitalization:   *11/1: PPN to be initiated as a bridge 2/2 prolonged PO intolerance/poor PO intake; awaiting gastric emptying study. Per GI note 10/27: abd USG inconclusive s/p cholecystectomy. s/p EGD and colonoscopy - egd with findings of gastritis and esophagitis without ulcers colonoscopy with patent anastomosis - no acute findings. Pt placed on regular low fiber diet - vomited lunch on 10/31 - complains of throat pain after egd; epigastric pain minimal.  Per hospitalist: "at this point, will need to rule out gastroparesis with gastric emptying study - per nuclear medicine the earliest it could be done would be Tuesday 11/5- need to order isotpope - Trial of 5mg liquid Reglan QID 15 min before meals and at bedtime."     *11/6: Did not receive PPN last night 2/2 per nuclear medicine not allowed to receive anything past midnight (?). EGD done yesterday which came back inconclusive.   *11/7: Abnormal MRCP; vascular also consulted. At this time diff diagnosis expanded 2/2 pain. 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 GI recommended to c/w FLD for now and trial Reglan 3x daily to alleviate symptoms. Passed bloody BM this morning w/ abdominal pain, trend H/H. Vascular surgery consulted to evaluate for median arcuate ligament syndrome however no intervention to be done at this time as no imaging finding suggestive of median arcuate ligament syndrome.   *11/8: GI consulted yesterday, per note: "Abdominal pain could be secondary to distal CBD stones/sludge with likely accomodation biliary flow due to dilated duct post choly (normal lft's)." Is s/p ERCP yesterday w/ Dr. Moncada - 2 stents were placed and obtained biopsy of nodular papilla. Currently c/o abdominal discomfort/ pain s/p ERCP yesterday.  11/9: S/p PICC placement (11/8) and transitioned to TPN. ?if GJ tube will be placed as pt has had intestines removed in the past.  *11/11: Remains on CLD. Now w/ low grade temp and worsening pain, started on empiric abx. Per hospitalist note: "If the stone is not the cause one could ask if this can be an IBD exacerbation on the small bowel mucosae since she did have some blood; check ESR and CRP- if markedly elevated it could be indicative of poss IBD and steroids might be necessary."  *11/12: Diet advanced to FLD yesterday. Per GI NP note - since pt w/ persistent abdominal pain s/p CBD stent placement, plan for repeat ERCP w/ stone removal on Thursday; was unable to remove stones originally 2/2 AC on board and there was a risk for bleeding.   *11/16: TPN stopped 2/2 persistent hyperglycemia   11/22: s/p GJ tube placement   *11/28: Discussed with Dr. Murphy this morning, plan to restart PN 2/2 PEG unable to be advanced to GJ tube. Called RN, RN states that pt only has midline despite note from yesterday saying pt has PICC line. Per  policy, unable to run PN through midline. Will start PPN today, however can transition back to TPN if pt does in fact have PICC line - will follow back up.  *11/30: No changes overnight. Remains w/ bloody BM's and severe abd pain. Remains on CLD, on PPN. Pt reports refusing to consume any liquids 2/2 fear of vomiting. Confirmed w/ RN this morning pt w/ PICC line placement and PPN currently running through PICC. Will transition to TPN.   *12/2: Pt w/ nausea and mild abdominal pain; still not eating anything. NPO at midnight (12/1) - plan for J tube advancement with Dr Moncada today. D/w MD Mathur today. Per MD will finish current TPN bag but WILL NOT renew for tomorrow 2/2 elevated POCTs and plan for J-tube advancement today. Would recommend to start trickle feeds once J tube is able to be advanced.     *current status:  Per discussion w/ RN yesterday, obtained +residuals in G tube however RD discussed to NOT check residuals as pt being fed via J TUBE, residuals present from G port is gastric content. As per discussion in IDR, was unable to tolerate TF at RD recommended goal rate (65cc/hr) 2/2 c/o abd bloating and +nausea. TF was held overnight x 3 hrs 2/2 c/o discomfort/ bloating, however now TF currently running at 60cc/hr (via GJ tube). No decompression is occurring with G port (? reasoning) - **G port decompression will help alleviate symptoms pt currently experiencing**. As d/w hospitalist in IDR, plan to d/c home today w/ TF at current rate (60 cc/hr x 20 hrs). Would like to convert to nocturnal feeds however pt requesting to keep rate at 60cc/hr 2/2 discomfort. RD discussed w/ pt and pt's dtr that goal rate of 60cc/hr will indicate pt needs to be on pump x 20hr every day - both receptive and verbalized understanding. STRONGLY recommend for visiting RN (once pt d/c-ed home) to PLEASE DECOMPRESS G tube to alleviate symptoms - also d/w pt and dtr. For now, will d/c home (as per MD) on Glucerna 1.5 (2/2 pt w/ T2DM and hyperglycemia) @ 60cc/hr x 20 hrs. [facility] to manage TF outpatient as per discussion w/ . Eventual goal is to increase goal rate of 90cc/hr x 14 hrs (which will provide 1890 kcal, 105g protein, 956 mL free water). Please see below for TF discharge instructions.    *pertinent meds: lopressor, statin, protonix, carafate, dilaudid, zofran, Lantus 10 units HS, ADMELOG (14 units x 24 hours)    *labs reviewed;   12-04 (last obtained)    132[L]  |  102  |  17  ----------------------------<  184[H]  4.3   |  29  |  0.80    Ca    9.8      04 Dec 2024 08:53  Phos  3.4     12-04  Mg     1.9     12-04    BMI: BMI (kg/m2): 20.6 (11-13-24 @ 08:07)  HbA1c: A1C with Estimated Average Glucose Result: 6.6 % (10-26-24 @ 06:47)    CAPILLARY BLOOD GLUCOSE  POCT Blood Glucose.: 248 mg/dL (06 Dec 2024 11:55)  POCT Blood Glucose.: 169 mg/dL (06 Dec 2024 06:02)  POCT Blood Glucose.: 176 mg/dL (05 Dec 2024 22:44)  POCT Blood Glucose.: 182 mg/dL (05 Dec 2024 17:43)    Lipid Panel: Date/Time: 11-29-24 @ 04:48  Triglycerides, Serum: 176    POCT Blood Glucose.: 279 mg/dL (02 Dec 2024 07:21)  POCT Blood Glucose.: 199 mg/dL (01 Dec 2024 21:13)  POCT Blood Glucose.: 195 mg/dL (01 Dec 2024 18:21)  POCT Blood Glucose.: 203 mg/dL (01 Dec 2024 16:48)  POCT Blood Glucose.: 263 mg/dL (01 Dec 2024 12:32)  POCT Blood Glucose.: 249 mg/dL (01 Dec 2024 09:15)    I&O's Detail    05 Dec 2024 07:01  -  06 Dec 2024 07:00  --------------------------------------------------------  IN:  Total IN: 0 mL    OUT:    Voided (mL): 300 mL  Total OUT: 300 mL    Total NET: -300 mL    *BM (+) on 12/3 - +abdominal discomfort. pt not on bowel regimen.  *No edema doc'd    *malnutrition: Pt continues to meet criteria for moderate malnutrition in context of acute on chronic illness r/t decreased ability to meet increased nutrient needs 2/2 N/V, DM AEB >7.5% wt loss x 2mo, <50% ENN x 2 weeks, mild muscle/fat wasting    Estimated Needs: based on 52.3 Kg (wt from 12/6)  Calories: 9725-5259 Kcal (30-35 Kcal/Kg)  Protein: 73-94 g (1.4-1.8 g/Kg)  Fluids: 4053-9997 mL (30-35 mL/Kg)    Diet, Clear Liquid:   Tube Feeding Modality: Gastro-Jejunostomy  Glucerna 1.5 Hubert (GLUCERNA1.5)  Total Volume for 24 Hours (mL): 1560  Continuous  Starting Tube Feed Rate {mL per Hour}: 20  Increase Tube Feed Rate by (mL): 10     Every 12 hours  Until Goal Tube Feed Rate (mL per Hour): 65  Tube Feed Duration (in Hours): 24  Tube Feed Start Time: 00:00  Free Water Flush  Free Water Flush Instructions:  40 cc/hr free water flush; monitor hydration status and adjust free water flushes per MD (12-02-24 @ 11:28) [Active]    Weight History:  Height (cm): 160 (11-13-24 @ 08:07)  Weight (kg): 52.8 (10-25-24 @ 18:30)  BMI (kg/m2): 20.6 (11-13-24 @ 08:07)  BSA (m2): 1.54 (11-13-24 @ 08:07)    TF Discharge Recommendations:  1) Glucerna 1.5 @ 60 cc/hr x 20 hrs (total volume = 1200 mL). Will provide ~ 1800 kcal, 100 g protein, and 911 mL free water (**will meet 100% kcal and protein needs**). Allow PO pleasure feeds as tolerated. Please utilize G port for decompression to alleviate symptoms.  2) Free water flushes of 40 cc/hr (which provides ~800 mL of additional water per day)  3) monitor TF tolerance; keep back of bed > 45 degrees  4) monitor BM: if > 3 days without BM, consider adding bowel regimen prn  5) weekly wt checks to track/trend changes  6) Monitor and optimize BG levels between 140-180 mg/dL    *will continue to monitor and adjust treatment plan prn*  Carmenza Hamilton, MIGUEL ÁNGELN, CDN [phone]

## 2024-12-06 NOTE — DISCHARGE NOTE PROVIDER - HOSPITAL COURSE
64-year-old female with history of GERD  HTN, DM, FAP s/p colon resection, PAfib, CAD s/p PCI March 2023, Anxiety  and Depression ,  presents with epigastric pain for the last couple of weeks.  Her daughter brought her in, after speaking with her GI doctor who stated patient should not have been on ozempic before and probably have taken insulin instead. She states that she had just up titrated her dose of ozempic to 0.5 from 0.25 earlier this week. She had been on the 0.25 for 5 weeks. She states the pain bothers her more than the nausea and vomiting. Patient states she feels very minimal relief since she came in.  She denies NSAID use, bleeding or black stools. She reports 10lb weight loss and has been on ozempic for her diabetes. Denies diarrhea or urinary symptoms.     Adm for initiation of PPN for presumed gastroparesis; gastric emptying study for 11/6- failed due to vomiting   PICC inserted 11/9 for TPN  Abnormal MRCP; s/p ERCP and stent placement for CBD stone followed by repeat ERCP on 11/15 with stone extraction, sphincterotomy, stent replacement  Continues to have abd pain and poor oral intake , s/p PEG placement   Developed Asthma exacerbation quickly resolved w inhalers and one dose steroids   -s/p G-J tube placement     12.4: feels a bit better, less abd pain, less nausea   tolerating TF  12.5: feels bloated, c/o improving abd pain     Vital Signs Last 24 Hrs  T(C): 36.6 (05 Dec 2024 08:07), Max: 36.8 (04 Dec 2024 15:54)  T(F): 97.8 (05 Dec 2024 08:07), Max: 98.3 (04 Dec 2024 15:54)  HR: 90 (05 Dec 2024 09:00) (78 - 90)  BP: 111/70 (05 Dec 2024 08:07) (111/70 - 133/76)  RR: 18 (05 Dec 2024 08:07) (18 - 18)  SpO2: 94% (05 Dec 2024 08:07) (94% - 96%)    Parameters below as of 05 Dec 2024 08:07  Patient On (Oxygen Delivery Method): room air    PHYSICAL EXAM:    GENERAL: anxious   HEENT:  NC/AT, EOMI, PERRLA, No scleral icterus, Moist mucous membranes  NECK: Supple, No JVD  CNS:  Alert & Oriented X3, Motor Strength 5/5 B/L upper and lower extremities; DTRs 2+ intact   LUNG: Normal Breath sounds, Clear to auscultation bilaterally, No rales, No rhonchi, No wheezing  HEART: RRR; No murmurs, No rubs  ABDOMEN: +BS, ST/ND, +TTP on epigastric and midline area, +PEJ present no erythema no discharge around peg   GENITOURINARY: Voiding, Bladder not distended  EXTREMITIES:  2+ Peripheral Pulses, No clubbing, No cyanosis, No tibial edema  MUSCULOSKELTAL: Joints normal ROM, No TTP, No effusion  VAGINAL: deferred  SKIN: no rashes  RECTAL: deferred, not indicated  BREAST: deferred     64 year old woman with persistent abdominal pain and poor oral intake with hospital course complicated by choledocholithiasis, pancreatitis.        1. Persistent nausea and abdominal pain likely due to Gastroparesis exacerbated by GLP-1  +suspect psychosomatic component present as well  -now on Dilaudid 1mg po q 6hrs prn pain  ; high dose analgesia likely worsening her GI motility as well   - EGD/colonoscopy --> gastritis, esophagitis, c/w PPI  - Gastroparesis clinically diagnosed as she was unable to complete Gastric emptying study on admission due to vomiting however after discussion with ir when gj tube was placed the contrast was not moving much   - s/p PEJ placement   -will c/w  Glucerna 1.5 at 60cc/hr   c/w PPI, Reglan Tid    2. Pancreatitis: transient post ERCP resolved   - Pancreatitis s/p ercp 11/15   - s/p IVFs    3. Choledocholithiasis:  - s/p ERCP and stent placement   - s/p repeat ERCP on 11/15 for persistent  14mm CBD stone ; CBD and Pancreatic duct cleared, sphincterotomy and metal stent placed    4. DM type II: uncontrolled due to TPN  - monitor blood sugars  c/w Lantus 10u qHS    5. Moderate to severe protein-calorie malnutrition:  on TF    6. Anxiety:   - c/w Lexapro, Abilify to be tapered off as outpatient    - Psych evaluation outpatient     7. Paroxymal afib:   - off lisinopril due to borderline BP  c/w Apixaban    8. CAD:  c/w Plavix    9. AOCD:  transfuse for Hb<8   64-year-old female with history of GERD  HTN, DM, FAP s/p colon resection, PAfib, CAD s/p PCI March 2023, Anxiety  and Depression ,  presents with epigastric pain for the last couple of weeks.  Her daughter brought her in, after speaking with her GI doctor who stated patient should not have been on ozempic before and probably have taken insulin instead. She states that she had just up titrated her dose of ozempic to 0.5 from 0.25 earlier this week. She had been on the 0.25 for 5 weeks. She states the pain bothers her more than the nausea and vomiting. Patient states she feels very minimal relief since she came in.  She denies NSAID use, bleeding or black stools. She reports 10lb weight loss and has been on ozempic for her diabetes. Denies diarrhea or urinary symptoms.     Adm for initiation of PPN for presumed gastroparesis; gastric emptying study for 11/6- failed due to vomiting   PICC inserted 11/9 for TPN  Abnormal MRCP; s/p ERCP and stent placement for CBD stone followed by repeat ERCP on 11/15 with stone extraction, sphincterotomy, stent replacement  Continues to have abd pain and poor oral intake , s/p PEG placement   Developed Asthma exacerbation quickly resolved w inhalers and one dose steroids   -s/p G-J tube placement     12.4: feels a bit better, less abd pain, less nausea   tolerating TF  12.5: feels bloated, c/o improving abd pain   12.10: PEJ unclogged yesterday by IR, TF running well at 60cc/hr    Vital Signs Last 24 Hrs  T(C): 36.6 (05 Dec 2024 08:07), Max: 36.8 (04 Dec 2024 15:54)  T(F): 97.8 (05 Dec 2024 08:07), Max: 98.3 (04 Dec 2024 15:54)  HR: 90 (05 Dec 2024 09:00) (78 - 90)  BP: 111/70 (05 Dec 2024 08:07) (111/70 - 133/76)  RR: 18 (05 Dec 2024 08:07) (18 - 18)  SpO2: 94% (05 Dec 2024 08:07) (94% - 96%)    Parameters below as of 05 Dec 2024 08:07  Patient On (Oxygen Delivery Method): room air    PHYSICAL EXAM:    GENERAL: anxious   HEENT:  NC/AT, EOMI, PERRLA, No scleral icterus, Moist mucous membranes  NECK: Supple, No JVD  CNS:  Alert & Oriented X3, Motor Strength 5/5 B/L upper and lower extremities; DTRs 2+ intact   LUNG: Normal Breath sounds, Clear to auscultation bilaterally, No rales, No rhonchi, No wheezing  HEART: RRR; No murmurs, No rubs  ABDOMEN: +BS, ST/ND, +TTP on epigastric and midline area, +PEJ present no erythema no discharge around peg   GENITOURINARY: Voiding, Bladder not distended  EXTREMITIES:  2+ Peripheral Pulses, No clubbing, No cyanosis, No tibial edema  MUSCULOSKELTAL: Joints normal ROM, No TTP, No effusion  VAGINAL: deferred  SKIN: no rashes  RECTAL: deferred, not indicated  BREAST: deferred     64 year old woman with persistent abdominal pain and poor oral intake with hospital course complicated by choledocholithiasis, pancreatitis.        1. Persistent nausea and abdominal pain likely due to Gastroparesis exacerbated by GLP-1  +suspect psychosomatic component present as well  -now on Dilaudid 1mg po q 6hrs prn pain  ; high dose analgesia likely worsening her GI motility as well   - EGD/colonoscopy --> gastritis, esophagitis, c/w PPI  - Gastroparesis clinically diagnosed as she was unable to complete Gastric emptying study on admission due to vomiting however after discussion with ir when gj tube was placed the contrast was not moving much   - s/p PEJ placement   -will c/w  Glucerna 1.5 at 60cc/hr   c/w PPI, Reglan Tid    2. Pancreatitis: transient post ERCP resolved   - Pancreatitis s/p ercp 11/15   - s/p IVFs    3. Choledocholithiasis:  - s/p ERCP and stent placement   - s/p repeat ERCP on 11/15 for persistent  14mm CBD stone ; CBD and Pancreatic duct cleared, sphincterotomy and metal stent placed    4. DM type II: uncontrolled due to TPN  - monitor blood sugars  c/w Lantus 10u qHS    5. Moderate to severe protein-calorie malnutrition:  on TF    6. Anxiety:   - c/w Lexapro, Abilify to be tapered off as outpatient    - Psych evaluation outpatient     7. Paroxymal afib:   - off lisinopril due to borderline BP  c/w Apixaban    8. CAD:  c/w Plavix    9. AOCD:  transfuse for Hb<8

## 2024-12-06 NOTE — DISCHARGE NOTE PROVIDER - NSDCFUSCHEDAPPT_GEN_ALL_CORE_FT
Shelton Lee  [facility] Physician [facility]  CARDIOLOGY [address]  Scheduled Appointment: 01/17/2025

## 2024-12-06 NOTE — DISCHARGE NOTE PROVIDER - NSDCMRMEDTOKEN_GEN_ALL_CORE_FT
NP, GI, ICU, ID ALBUTEROL HFA 90 MCG INHALER: 1 pump(s) inhaled every 6 hours INHALE 1 TO 2 PUFFS BY MOUTH EVERY 4 TO 6 HOURS AS NEEDED  ALPRAZolam 0.5 mg oral tablet: 1 tab(s) orally once a day (at bedtime) as needed for sleep  apixaban 5 mg oral tablet: 1 tab(s) orally every 12 hours  ARIPiprazole 2 mg oral tablet: 0.5 tab(s) orally once a day  butalbital/acetaminophen/caffeine 50 mg-325 mg-40 mg oral tablet: 1 tab(s) orally every 8 hours As needed migraine  CLOPIDOGREL 75 MG TABLET: 1 tab(s) orally once a day  Dilaudid 2 mg oral tablet: 1 tab(s) orally 3 times a day as needed for  severe pain MDD: 3tb  escitalopram 10 mg oral tablet: 1 tab(s) orally once a day  Insulin Glargine Solostar Pen 100 units/mL subcutaneous solution: 10 subcutaneous once a day (at bedtime)  metoclopramide 5 mg oral tablet: 1 tab(s) orally 3 times a day  metoprolol tartrate 50 mg oral tablet: 1 tab(s) orally 2 times a day  ondansetron 4 mg oral tablet, disintegratin tab(s) orally 3 times a day as needed for Nausea and/or Vomiting  pantoprazole 40 mg oral granule, delayed release: 1 tab(s) orally once a day  Reglan 10 mg oral tablet: 1 tab(s) orally 3 times a day  rosuvastatin 10 mg oral tablet: 1 tab(s) orally once a day (at bedtime)  sucralfate 1 g/10 mL oral suspension: 10 milliliter(s) orally 3 times a day   ALBUTEROL HFA 90 MCG INHALER: 1 pump(s) inhaled every 6 hours INHALE 1 TO 2 PUFFS BY MOUTH EVERY 4 TO 6 HOURS AS NEEDED  ALPRAZolam 0.5 mg oral tablet: 1 tab(s) orally once a day (at bedtime) as needed for sleep  apixaban 5 mg oral tablet: 1 tab(s) orally every 12 hours  ARIPiprazole 2 mg oral tablet: 0.5 tab(s) orally once a day  butalbital/acetaminophen/caffeine 50 mg-325 mg-40 mg oral tablet: 1 tab(s) orally every 8 hours As needed migraine  CLOPIDOGREL 75 MG TABLET: 1 tab(s) orally once a day  Dilaudid 2 mg oral tablet: 1 tab(s) orally 3 times a day as needed for  severe pain MDD: 3tb  escitalopram 10 mg oral tablet: 1 tab(s) orally once a day  Insulin Glargine Solostar Pen 100 units/mL subcutaneous solution: 10 subcutaneous once a day (at bedtime)  metoclopramide 5 mg oral tablet: 1 tab(s) orally 3 times a day  metoprolol tartrate 50 mg oral tablet: 1 tab(s) orally 2 times a day  ondansetron 4 mg oral tablet, disintegratin tab(s) orally 3 times a day as needed for Nausea and/or Vomiting  pantoprazole 40 mg oral granule, delayed release: 1 tab(s) orally once a day  rosuvastatin 10 mg oral tablet: 1 tab(s) orally once a day (at bedtime)  sucralfate 1 g/10 mL oral suspension: 10 milliliter(s) orally 3 times a day

## 2024-12-06 NOTE — DISCHARGE NOTE PROVIDER - CARE PROVIDER_API CALL
Odilon Suh  Gastroenterology  [address], [address]  [place] [address]  Phone: [phone]  Fax: [phone]  Follow Up Time: 2 weeks

## 2024-12-06 NOTE — DISCHARGE NOTE PROVIDER - NSDCCPCAREPLAN_GEN_ALL_CORE_FT
PRINCIPAL DISCHARGE DIAGNOSIS  Diagnosis: Nausea and vomiting  Assessment and Plan of Treatment: Gastroparesis s/p PEJ placement      SECONDARY DISCHARGE DIAGNOSES  Diagnosis: CAD (coronary artery disease)  Assessment and Plan of Treatment:     Diagnosis: Chronic atrial fibrillation  Assessment and Plan of Treatment:

## 2024-12-06 NOTE — PHARMACOTHERAPY INTERVENTION NOTE - COMMENTS
Med history complete, reviewed medications and allergies with patient and confirmed medication list with doctor first med profile, all medication related questions answered  
Spoke to [facility] pharmacy ([phone]), per pharmacist, Lantus prescription went through insurance for $0 copay and is able to be filled. All other prescriptions sent can be filled for $0 copay as well. Of note, pantoprazole granules prescription was not covered under the insurance and the pharmacist informed that the suspension is not covered by Medicaid either. Will attempt to submit a PA for the granules.

## 2024-12-07 LAB
GLUCOSE BLDC GLUCOMTR-MCNC: 127 MG/DL — HIGH (ref 70–99)
GLUCOSE BLDC GLUCOMTR-MCNC: 177 MG/DL — HIGH (ref 70–99)
GLUCOSE BLDC GLUCOMTR-MCNC: 180 MG/DL — HIGH (ref 70–99)
GLUCOSE BLDC GLUCOMTR-MCNC: 207 MG/DL — HIGH (ref 70–99)
HCT VFR BLD CALC: 34.6 % — SIGNIFICANT CHANGE UP (ref 34.5–45)
HGB BLD-MCNC: 10.8 G/DL — LOW (ref 11.5–15.5)
MCHC RBC-ENTMCNC: 27.6 PG — SIGNIFICANT CHANGE UP (ref 27–34)
MCHC RBC-ENTMCNC: 31.2 G/DL — LOW (ref 32–36)
MCV RBC AUTO: 88.5 FL — SIGNIFICANT CHANGE UP (ref 80–100)
PLATELET # BLD AUTO: 320 K/UL — SIGNIFICANT CHANGE UP (ref 150–400)
RBC # BLD: 3.91 M/UL — SIGNIFICANT CHANGE UP (ref 3.8–5.2)
RBC # FLD: 15.1 % — HIGH (ref 10.3–14.5)
WBC # BLD: 7.66 K/UL — SIGNIFICANT CHANGE UP (ref 3.8–10.5)
WBC # FLD AUTO: 7.66 K/UL — SIGNIFICANT CHANGE UP (ref 3.8–10.5)

## 2024-12-07 PROCEDURE — 99232 SBSQ HOSP IP/OBS MODERATE 35: CPT

## 2024-12-07 RX ADMIN — PANTOPRAZOLE SODIUM 40 MILLIGRAM(S): 40 TABLET, DELAYED RELEASE ORAL at 09:08

## 2024-12-07 RX ADMIN — SUCRALFATE 1 GRAM(S): 1 TABLET ORAL at 13:11

## 2024-12-07 RX ADMIN — SUCRALFATE 1 GRAM(S): 1 TABLET ORAL at 21:16

## 2024-12-07 RX ADMIN — METOPROLOL TARTRATE 50 MILLIGRAM(S): 100 TABLET, FILM COATED ORAL at 09:07

## 2024-12-07 RX ADMIN — SUCRALFATE 1 GRAM(S): 1 TABLET ORAL at 17:00

## 2024-12-07 RX ADMIN — FLUTICASONE PROPIONATE AND SALMETEROL XINAFOATE 1 DOSE(S): 45; 21 AEROSOL, METERED RESPIRATORY (INHALATION) at 08:13

## 2024-12-07 RX ADMIN — ROSUVASTATIN CALCIUM 10 MILLIGRAM(S): 5 TABLET, FILM COATED ORAL at 21:16

## 2024-12-07 RX ADMIN — HYDROMORPHONE HYDROCHLORIDE 2 MILLIGRAM(S): 2 TABLET ORAL at 05:44

## 2024-12-07 RX ADMIN — HYDROMORPHONE HYDROCHLORIDE 2 MILLIGRAM(S): 2 TABLET ORAL at 21:18

## 2024-12-07 RX ADMIN — HYDROMORPHONE HYDROCHLORIDE 2 MILLIGRAM(S): 2 TABLET ORAL at 06:00

## 2024-12-07 RX ADMIN — SUCRALFATE 1 GRAM(S): 1 TABLET ORAL at 05:45

## 2024-12-07 RX ADMIN — ESCITALOPRAM OXALATE 10 MILLIGRAM(S): 10 TABLET, FILM COATED ORAL at 09:07

## 2024-12-07 RX ADMIN — CLOPIDOGREL 75 MILLIGRAM(S): 75 TABLET, FILM COATED ORAL at 09:07

## 2024-12-07 RX ADMIN — MONTELUKAST SODIUM 10 MILLIGRAM(S): 10 TABLET ORAL at 21:16

## 2024-12-07 RX ADMIN — Medication 2: at 13:10

## 2024-12-07 RX ADMIN — Medication 4: at 17:00

## 2024-12-07 RX ADMIN — ARIPIPRAZOLE 1 MILLIGRAM(S): 15 TABLET ORAL at 09:07

## 2024-12-07 RX ADMIN — APIXABAN 5 MILLIGRAM(S): 2.5 TABLET, FILM COATED ORAL at 21:16

## 2024-12-07 RX ADMIN — APIXABAN 5 MILLIGRAM(S): 2.5 TABLET, FILM COATED ORAL at 09:08

## 2024-12-07 RX ADMIN — Medication 2: at 22:53

## 2024-12-07 RX ADMIN — INSULIN GLARGINE 10 UNIT(S): 100 INJECTION, SOLUTION SUBCUTANEOUS at 22:51

## 2024-12-07 RX ADMIN — METOPROLOL TARTRATE 50 MILLIGRAM(S): 100 TABLET, FILM COATED ORAL at 21:16

## 2024-12-07 NOTE — PROGRESS NOTE ADULT - SUBJECTIVE AND OBJECTIVE BOX
64-year-old female with history of GERD  HTN, DM, FAP s/p colon resection, PAfib, CAD s/p PCI March 2023, Anxiety  and Depression ,  presents with epigastric pain for the last couple of weeks.  Her daughter brought her in, after speaking with her GI doctor who stated patient should not have been on ozempic before and probably have taken insulin instead. She states that she had just up titrated her dose of ozempic to 0.5 from 0.25 earlier this week. She had been on the 0.25 for 5 weeks. She states the pain bothers her more than the nausea and vomiting. Patient states she feels very minimal relief since she came in.  She denies NSAID use, bleeding or black stools. She reports 10lb weight loss and has been on ozempic for her diabetes. Denies diarrhea or urinary symptoms.     Adm for initiation of PPN for presumed gastroparesis; gastric emptying study for 11/6- failed due to vomiting   PICC inserted 11/9 for TPN  Abnormal MRCP; s/p ERCP and stent placement for CBD stone followed by repeat ERCP on 11/15 with stone extraction, sphincterotomy, stent replacement  Continues to have abd pain and poor oral intake , s/p PEG placement   Developed Asthma exacerbation quickly resolved w inhalers and one dose steroids   -s/p G-J tube placement     12.4: feels a bit better, less abd pain, less nausea   tolerating TF  12.5: feels bloated, c/o improving abd pain   12.7: tolerated feedings well, only mild intermittent bloating     Vital Signs Last 24 Hrs  T(C): 36.6 (07 Dec 2024 08:41), Max: 36.8 (06 Dec 2024 22:39)  T(F): 97.8 (07 Dec 2024 08:41), Max: 98.3 (06 Dec 2024 22:39)  HR: 89 (07 Dec 2024 08:41) (77 - 89)  BP: 109/71 (07 Dec 2024 08:41) (105/63 - 117/61)  RR: 17 (07 Dec 2024 08:41) (17 - 19)  SpO2: 97% (07 Dec 2024 08:41) (96% - 97%)    Parameters below as of 07 Dec 2024 08:41  Patient On (Oxygen Delivery Method): room air      PHYSICAL EXAM:    GENERAL: anxious   HEENT:  NC/AT, EOMI, PERRLA, No scleral icterus, Moist mucous membranes  NECK: Supple, No JVD  CNS:  Alert & Oriented X3, Motor Strength 5/5 B/L upper and lower extremities; DTRs 2+ intact   LUNG: Normal Breath sounds, Clear to auscultation bilaterally, No rales, No rhonchi, No wheezing  HEART: RRR; No murmurs, No rubs  ABDOMEN: +BS, ST/ND, +TTP on epigastric and midline area, +PEJ present no erythema no discharge around peg   GENITOURINARY: Voiding, Bladder not distended  EXTREMITIES:  2+ Peripheral Pulses, No clubbing, No cyanosis, No tibial edema  MUSCULOSKELTAL: Joints normal ROM, No TTP, No effusion  VAGINAL: deferred  SKIN: no rashes  RECTAL: deferred, not indicated  BREAST: deferred     64 year old woman with persistent abdominal pain and poor oral intake with hospital course complicated by choledocholithiasis, pancreatitis.  	    1. Persistent nausea and abdominal pain likely due to Gastroparesis exacerbated by GLP-1  +suspect psychosomatic component present as well  -now on Dilaudid 1mg po q 6hrs prn pain  ; high dose analgesia likely worsening her GI motility as well   - EGD/colonoscopy --> gastritis, esophagitis, c/w PPI  - Gastroparesis clinically diagnosed as she was unable to complete Gastric emptying study on admission due to vomiting however after discussion with ir when gj tube was placed the contrast was not moving much   - s/p PEJ placement   -will c/w  Glucerna 1.5 at 60cc/hr   c/w PPI, Reglan Tid    2. Pancreatitis: transient post ERCP resolved   - Pancreatitis s/p ercp 11/15   - s/p IVFs    3. Choledocholithiasis:  - s/p ERCP and stent placement   - s/p repeat ERCP on 11/15 for persistent  14mm CBD stone ; CBD and Pancreatic duct cleared, sphincterotomy and metal stent placed    4. DM type II: uncontrolled due to TPN  - monitor blood sugars  c/w Lantus 10u qHS    5. Moderate to severe protein-calorie malnutrition:  on TF    6. Anxiety:   - c/w Lexapro, Abilify to be tapered off as outpatient    - Psych evaluation outpatient     7. Paroxymal afib:   - off lisinopril due to borderline BP  c/w Apixaban    8. CAD:  c/w Plavix, BB, Statins    9. AOCD:  stable, Hb>8    dc planning

## 2024-12-08 LAB
GLUCOSE BLDC GLUCOMTR-MCNC: 162 MG/DL — HIGH (ref 70–99)
GLUCOSE BLDC GLUCOMTR-MCNC: 194 MG/DL — HIGH (ref 70–99)
GLUCOSE BLDC GLUCOMTR-MCNC: 206 MG/DL — HIGH (ref 70–99)
HCT VFR BLD CALC: 34.7 % — SIGNIFICANT CHANGE UP (ref 34.5–45)
HGB BLD-MCNC: 10.8 G/DL — LOW (ref 11.5–15.5)
MCHC RBC-ENTMCNC: 27.6 PG — SIGNIFICANT CHANGE UP (ref 27–34)
MCHC RBC-ENTMCNC: 31.1 G/DL — LOW (ref 32–36)
MCV RBC AUTO: 88.5 FL — SIGNIFICANT CHANGE UP (ref 80–100)
PLATELET # BLD AUTO: 291 K/UL — SIGNIFICANT CHANGE UP (ref 150–400)
RBC # BLD: 3.92 M/UL — SIGNIFICANT CHANGE UP (ref 3.8–5.2)
RBC # FLD: 15.1 % — HIGH (ref 10.3–14.5)
WBC # BLD: 6.81 K/UL — SIGNIFICANT CHANGE UP (ref 3.8–10.5)
WBC # FLD AUTO: 6.81 K/UL — SIGNIFICANT CHANGE UP (ref 3.8–10.5)

## 2024-12-08 PROCEDURE — 99232 SBSQ HOSP IP/OBS MODERATE 35: CPT

## 2024-12-08 RX ADMIN — INSULIN GLARGINE 10 UNIT(S): 100 INJECTION, SOLUTION SUBCUTANEOUS at 23:29

## 2024-12-08 RX ADMIN — SUCRALFATE 1 GRAM(S): 1 TABLET ORAL at 17:44

## 2024-12-08 RX ADMIN — APIXABAN 5 MILLIGRAM(S): 2.5 TABLET, FILM COATED ORAL at 21:22

## 2024-12-08 RX ADMIN — ESCITALOPRAM OXALATE 10 MILLIGRAM(S): 10 TABLET, FILM COATED ORAL at 10:15

## 2024-12-08 RX ADMIN — METOPROLOL TARTRATE 50 MILLIGRAM(S): 100 TABLET, FILM COATED ORAL at 21:23

## 2024-12-08 RX ADMIN — Medication 2: at 12:23

## 2024-12-08 RX ADMIN — MONTELUKAST SODIUM 10 MILLIGRAM(S): 10 TABLET ORAL at 21:22

## 2024-12-08 RX ADMIN — APIXABAN 5 MILLIGRAM(S): 2.5 TABLET, FILM COATED ORAL at 10:15

## 2024-12-08 RX ADMIN — Medication 4: at 18:09

## 2024-12-08 RX ADMIN — FLUTICASONE PROPIONATE AND SALMETEROL XINAFOATE 1 DOSE(S): 45; 21 AEROSOL, METERED RESPIRATORY (INHALATION) at 08:57

## 2024-12-08 RX ADMIN — HYDROMORPHONE HYDROCHLORIDE 2 MILLIGRAM(S): 2 TABLET ORAL at 06:36

## 2024-12-08 RX ADMIN — PANTOPRAZOLE SODIUM 40 MILLIGRAM(S): 40 TABLET, DELAYED RELEASE ORAL at 10:15

## 2024-12-08 RX ADMIN — ARIPIPRAZOLE 1 MILLIGRAM(S): 15 TABLET ORAL at 11:15

## 2024-12-08 RX ADMIN — ROSUVASTATIN CALCIUM 10 MILLIGRAM(S): 5 TABLET, FILM COATED ORAL at 21:22

## 2024-12-08 RX ADMIN — SUCRALFATE 1 GRAM(S): 1 TABLET ORAL at 12:07

## 2024-12-08 RX ADMIN — SUCRALFATE 1 GRAM(S): 1 TABLET ORAL at 06:24

## 2024-12-08 RX ADMIN — Medication 2: at 23:30

## 2024-12-08 RX ADMIN — CHLORHEXIDINE GLUCONATE 1 APPLICATION(S): 1.2 RINSE ORAL at 11:15

## 2024-12-08 RX ADMIN — SUCRALFATE 1 GRAM(S): 1 TABLET ORAL at 21:23

## 2024-12-08 RX ADMIN — FLUTICASONE PROPIONATE AND SALMETEROL XINAFOATE 1 DOSE(S): 45; 21 AEROSOL, METERED RESPIRATORY (INHALATION) at 20:12

## 2024-12-08 RX ADMIN — CLOPIDOGREL 75 MILLIGRAM(S): 75 TABLET, FILM COATED ORAL at 10:14

## 2024-12-08 RX ADMIN — METOPROLOL TARTRATE 50 MILLIGRAM(S): 100 TABLET, FILM COATED ORAL at 10:14

## 2024-12-08 NOTE — PROGRESS NOTE ADULT - SUBJECTIVE AND OBJECTIVE BOX
64-year-old female with history of GERD  HTN, DM, FAP s/p colon resection, PAfib, CAD s/p PCI March 2023, Anxiety  and Depression ,  presents with epigastric pain for the last couple of weeks.  Her daughter brought her in, after speaking with her GI doctor who stated patient should not have been on ozempic before and probably have taken insulin instead. She states that she had just up titrated her dose of ozempic to 0.5 from 0.25 earlier this week. She had been on the 0.25 for 5 weeks. She states the pain bothers her more than the nausea and vomiting. Patient states she feels very minimal relief since she came in.  She denies NSAID use, bleeding or black stools. She reports 10lb weight loss and has been on ozempic for her diabetes. Denies diarrhea or urinary symptoms.     Adm for initiation of PPN for presumed gastroparesis; gastric emptying study for 11/6- failed due to vomiting   PICC inserted 11/9 for TPN  Abnormal MRCP; s/p ERCP and stent placement for CBD stone followed by repeat ERCP on 11/15 with stone extraction, sphincterotomy, stent replacement  Continues to have abd pain and poor oral intake , s/p PEG placement   Developed Asthma exacerbation quickly resolved w inhalers and one dose steroids   -s/p G-J tube placement     12.4: feels a bit better, less abd pain, less nausea   tolerating TF  12.5: feels bloated, c/o improving abd pain   12.7: tolerated feedings well, only mild intermittent bloating   12.8: tolerated feedings well, awaiting delivery of TF pump     Vital Signs Last 24 Hrs  T(C): 36.6 (07 Dec 2024 08:41), Max: 36.8 (06 Dec 2024 22:39)  T(F): 97.8 (07 Dec 2024 08:41), Max: 98.3 (06 Dec 2024 22:39)  HR: 89 (07 Dec 2024 08:41) (77 - 89)  BP: 109/71 (07 Dec 2024 08:41) (105/63 - 117/61)  RR: 17 (07 Dec 2024 08:41) (17 - 19)  SpO2: 97% (07 Dec 2024 08:41) (96% - 97%)    Parameters below as of 07 Dec 2024 08:41  Patient On (Oxygen Delivery Method): room air      PHYSICAL EXAM:    GENERAL: anxious   HEENT:  NC/AT, EOMI, PERRLA, No scleral icterus, Moist mucous membranes  NECK: Supple, No JVD  CNS:  Alert & Oriented X3, Motor Strength 5/5 B/L upper and lower extremities; DTRs 2+ intact   LUNG: Normal Breath sounds, Clear to auscultation bilaterally, No rales, No rhonchi, No wheezing  HEART: RRR; No murmurs, No rubs  ABDOMEN: +BS, ST/ND, +TTP on epigastric and midline area, +PEJ present no erythema no discharge around peg   GENITOURINARY: Voiding, Bladder not distended  EXTREMITIES:  2+ Peripheral Pulses, No clubbing, No cyanosis, No tibial edema  MUSCULOSKELTAL: Joints normal ROM, No TTP, No effusion  VAGINAL: deferred  SKIN: no rashes  RECTAL: deferred, not indicated  BREAST: deferred     64 year old woman with persistent abdominal pain and poor oral intake with hospital course complicated by choledocholithiasis, pancreatitis.  	    1. Persistent nausea and abdominal pain likely due to Gastroparesis exacerbated by GLP-1  +suspect psychosomatic component present as well  -now on Dilaudid 1mg po q 6hrs prn pain  ; high dose analgesia likely worsening her GI motility as well   - EGD/colonoscopy --> gastritis, esophagitis, c/w PPI  - Gastroparesis clinically diagnosed as she was unable to complete Gastric emptying study on admission due to vomiting however after discussion with ir when gj tube was placed the contrast was not moving much   - s/p PEJ placement   -will c/w  Glucerna 1.5 at 60cc/hr   c/w PPI, Reglan Tid    2. Pancreatitis: transient post ERCP resolved   - Pancreatitis s/p ercp 11/15   - s/p IVFs    3. Choledocholithiasis:  - s/p ERCP and stent placement   - s/p repeat ERCP on 11/15 for persistent  14mm CBD stone ; CBD and Pancreatic duct cleared, sphincterotomy and metal stent placed    4. DM type II: uncontrolled due to TPN  - monitor blood sugars  c/w Lantus 10u qHS    5. Moderate to severe protein-calorie malnutrition:  on TF    6. Anxiety:   - c/w Lexapro, Abilify to be tapered off as outpatient    - Psych evaluation outpatient     7. Paroxymal afib:   - off lisinopril due to borderline BP  c/w Apixaban    8. CAD:  c/w Plavix, BB, Statins    9. AOCD:  stable, Hb>8    dc planning

## 2024-12-09 LAB
GLUCOSE BLDC GLUCOMTR-MCNC: 147 MG/DL — HIGH (ref 70–99)
GLUCOSE BLDC GLUCOMTR-MCNC: 153 MG/DL — HIGH (ref 70–99)
GLUCOSE BLDC GLUCOMTR-MCNC: 162 MG/DL — HIGH (ref 70–99)
GLUCOSE BLDC GLUCOMTR-MCNC: 181 MG/DL — HIGH (ref 70–99)

## 2024-12-09 PROCEDURE — 99232 SBSQ HOSP IP/OBS MODERATE 35: CPT

## 2024-12-09 RX ADMIN — SUCRALFATE 1 GRAM(S): 1 TABLET ORAL at 16:56

## 2024-12-09 RX ADMIN — CLOPIDOGREL 75 MILLIGRAM(S): 75 TABLET, FILM COATED ORAL at 11:16

## 2024-12-09 RX ADMIN — METOPROLOL TARTRATE 50 MILLIGRAM(S): 100 TABLET, FILM COATED ORAL at 11:15

## 2024-12-09 RX ADMIN — SUCRALFATE 1 GRAM(S): 1 TABLET ORAL at 11:14

## 2024-12-09 RX ADMIN — Medication 2: at 22:40

## 2024-12-09 RX ADMIN — FLUTICASONE PROPIONATE AND SALMETEROL XINAFOATE 1 DOSE(S): 45; 21 AEROSOL, METERED RESPIRATORY (INHALATION) at 09:09

## 2024-12-09 RX ADMIN — SUCRALFATE 1 GRAM(S): 1 TABLET ORAL at 22:12

## 2024-12-09 RX ADMIN — INSULIN GLARGINE 10 UNIT(S): 100 INJECTION, SOLUTION SUBCUTANEOUS at 22:39

## 2024-12-09 RX ADMIN — ARIPIPRAZOLE 1 MILLIGRAM(S): 15 TABLET ORAL at 11:15

## 2024-12-09 RX ADMIN — PANTOPRAZOLE SODIUM 40 MILLIGRAM(S): 40 TABLET, DELAYED RELEASE ORAL at 11:15

## 2024-12-09 RX ADMIN — APIXABAN 5 MILLIGRAM(S): 2.5 TABLET, FILM COATED ORAL at 22:12

## 2024-12-09 RX ADMIN — Medication 2: at 16:57

## 2024-12-09 RX ADMIN — METOPROLOL TARTRATE 50 MILLIGRAM(S): 100 TABLET, FILM COATED ORAL at 22:13

## 2024-12-09 RX ADMIN — SUCRALFATE 1 GRAM(S): 1 TABLET ORAL at 05:15

## 2024-12-09 RX ADMIN — APIXABAN 5 MILLIGRAM(S): 2.5 TABLET, FILM COATED ORAL at 11:15

## 2024-12-09 RX ADMIN — MONTELUKAST SODIUM 10 MILLIGRAM(S): 10 TABLET ORAL at 22:13

## 2024-12-09 RX ADMIN — ESCITALOPRAM OXALATE 10 MILLIGRAM(S): 10 TABLET, FILM COATED ORAL at 11:16

## 2024-12-09 RX ADMIN — Medication 2: at 05:15

## 2024-12-09 RX ADMIN — ROSUVASTATIN CALCIUM 10 MILLIGRAM(S): 5 TABLET, FILM COATED ORAL at 22:13

## 2024-12-09 RX ADMIN — FLUTICASONE PROPIONATE AND SALMETEROL XINAFOATE 1 DOSE(S): 45; 21 AEROSOL, METERED RESPIRATORY (INHALATION) at 19:40

## 2024-12-09 NOTE — PROGRESS NOTE ADULT - SUBJECTIVE AND OBJECTIVE BOX
64-year-old female with history of GERD  HTN, DM, FAP s/p colon resection, PAfib, CAD s/p PCI March 2023, Anxiety  and Depression ,  presents with epigastric pain for the last couple of weeks.  Her daughter brought her in, after speaking with her GI doctor who stated patient should not have been on ozempic before and probably have taken insulin instead. She states that she had just up titrated her dose of ozempic to 0.5 from 0.25 earlier this week. She had been on the 0.25 for 5 weeks. She states the pain bothers her more than the nausea and vomiting. Patient states she feels very minimal relief since she came in.  She denies NSAID use, bleeding or black stools. She reports 10lb weight loss and has been on ozempic for her diabetes. Denies diarrhea or urinary symptoms.     Adm for initiation of PPN for presumed gastroparesis; gastric emptying study for 11/6- failed due to vomiting   PICC inserted 11/9 for TPN  Abnormal MRCP; s/p ERCP and stent placement for CBD stone followed by repeat ERCP on 11/15 with stone extraction, sphincterotomy, stent replacement  Continues to have abd pain and poor oral intake , s/p PEG placement   Developed Asthma exacerbation quickly resolved w inhalers and one dose steroids   -s/p G-J tube placement     12.4: feels a bit better, less abd pain, less nausea   tolerating TF  12.5: feels bloated, c/o improving abd pain   12.7: tolerated feedings well, only mild intermittent bloating   12.8: tolerated feedings well, awaiting delivery of TF pump   12.9: tube feeding clogged this am, unclogged later during the day  no abd pain, no n/v/d    Vital Signs Last 24 Hrs  T(C): 36.7 (09 Dec 2024 15:13), Max: 36.8 (09 Dec 2024 08:24)  T(F): 98 (09 Dec 2024 15:13), Max: 98.3 (09 Dec 2024 08:24)  HR: 94 (09 Dec 2024 15:13) (76 - 94)  BP: 124/69 (09 Dec 2024 15:13) (122/61 - 124/69)  RR: 18 (09 Dec 2024 15:13) (18 - 18)  SpO2: 97% (09 Dec 2024 15:13) (95% - 97%)    Parameters below as of 09 Dec 2024 15:13  Patient On (Oxygen Delivery Method): room air          PHYSICAL EXAM:    GENERAL: anxious   HEENT:  NC/AT, EOMI, PERRLA, No scleral icterus, Moist mucous membranes  NECK: Supple, No JVD  CNS:  Alert & Oriented X3, Motor Strength 5/5 B/L upper and lower extremities; DTRs 2+ intact   LUNG: Normal Breath sounds, Clear to auscultation bilaterally, No rales, No rhonchi, No wheezing  HEART: RRR; No murmurs, No rubs  ABDOMEN: +BS, ST/ND, +TTP on epigastric and midline area, +PEJ present no erythema no discharge around peg   GENITOURINARY: Voiding, Bladder not distended  EXTREMITIES:  2+ Peripheral Pulses, No clubbing, No cyanosis, No tibial edema  MUSCULOSKELTAL: Joints normal ROM, No TTP, No effusion  VAGINAL: deferred  SKIN: no rashes  RECTAL: deferred, not indicated  BREAST: deferred     64 year old woman with persistent abdominal pain and poor oral intake with hospital course complicated by choledocholithiasis, pancreatitis.  	    1. Persistent nausea and abdominal pain likely due to Gastroparesis exacerbated by GLP-1  +suspect psychosomatic component present as well  -now on Dilaudid 1mg po q 6hrs prn pain  ; high dose analgesia likely worsening her GI motility as well   - EGD/colonoscopy --> gastritis, esophagitis, c/w PPI  - Gastroparesis clinically diagnosed as she was unable to complete Gastric emptying study on admission due to vomiting however after discussion with ir when gj tube was placed the contrast was not moving much   - s/p PEJ placement   -will c/w  Glucerna 1.5 at 60cc/hr   c/w PPI, Reglan Tid    PEJ unclogged at bed side by IR PA; TF restarated    2. Pancreatitis: transient post ERCP resolved   - Pancreatitis s/p ercp 11/15   - s/p IVFs    3. Choledocholithiasis:  - s/p ERCP and stent placement   - s/p repeat ERCP on 11/15 for persistent  14mm CBD stone ; CBD and Pancreatic duct cleared, sphincterotomy and metal stent placed    4. DM type II: uncontrolled due to TPN  - monitor blood sugars  c/w Lantus 10u qHS    5. Moderate to severe protein-calorie malnutrition:  on TF    6. Anxiety:   - c/w Lexapro, Abilify to be tapered off as outpatient    - Psych evaluation outpatient     7. Paroxymal afib:   - off lisinopril due to borderline BP  c/w Apixaban    8. CAD:  c/w Plavix, BB, Statins    9. AOCD:  stable, Hb>8    dc planning

## 2024-12-10 VITALS
OXYGEN SATURATION: 96 % | SYSTOLIC BLOOD PRESSURE: 127 MMHG | HEART RATE: 79 BPM | TEMPERATURE: 98 F | DIASTOLIC BLOOD PRESSURE: 85 MMHG

## 2024-12-10 LAB — GLUCOSE BLDC GLUCOMTR-MCNC: 213 MG/DL — HIGH (ref 70–99)

## 2024-12-10 PROCEDURE — 99232 SBSQ HOSP IP/OBS MODERATE 35: CPT

## 2024-12-10 RX ORDER — INSULIN GLARGINE 100 [IU]/ML
10 INJECTION, SOLUTION SUBCUTANEOUS
Qty: 3 | Refills: 0
Start: 2024-12-10

## 2024-12-10 RX ORDER — INSULIN GLARGINE 100 [IU]/ML
10 INJECTION, SOLUTION SUBCUTANEOUS
Qty: 3 | Refills: 0 | DISCHARGE
Start: 2024-12-10

## 2024-12-10 RX ADMIN — ONDANSETRON HYDROCHLORIDE 4 MILLIGRAM(S): 4 TABLET, FILM COATED ORAL at 09:40

## 2024-12-10 RX ADMIN — APIXABAN 5 MILLIGRAM(S): 2.5 TABLET, FILM COATED ORAL at 09:44

## 2024-12-10 RX ADMIN — Medication 4: at 06:23

## 2024-12-10 RX ADMIN — PANTOPRAZOLE SODIUM 40 MILLIGRAM(S): 40 TABLET, DELAYED RELEASE ORAL at 09:41

## 2024-12-10 RX ADMIN — SUCRALFATE 1 GRAM(S): 1 TABLET ORAL at 09:43

## 2024-12-10 RX ADMIN — ESCITALOPRAM OXALATE 10 MILLIGRAM(S): 10 TABLET, FILM COATED ORAL at 09:44

## 2024-12-10 RX ADMIN — ARIPIPRAZOLE 1 MILLIGRAM(S): 15 TABLET ORAL at 09:43

## 2024-12-10 RX ADMIN — CLOPIDOGREL 75 MILLIGRAM(S): 75 TABLET, FILM COATED ORAL at 09:44

## 2024-12-10 RX ADMIN — FLUTICASONE PROPIONATE AND SALMETEROL XINAFOATE 1 DOSE(S): 45; 21 AEROSOL, METERED RESPIRATORY (INHALATION) at 07:53

## 2024-12-10 RX ADMIN — METOPROLOL TARTRATE 50 MILLIGRAM(S): 100 TABLET, FILM COATED ORAL at 09:44

## 2024-12-10 RX ADMIN — SUCRALFATE 1 GRAM(S): 1 TABLET ORAL at 05:33

## 2024-12-10 NOTE — PROGRESS NOTE ADULT - REASON FOR ADMISSION
inability to tolerate po

## 2024-12-10 NOTE — PROGRESS NOTE ADULT - SUBJECTIVE AND OBJECTIVE BOX
64-year-old female with history of GERD  HTN, DM, FAP s/p colon resection, PAfib, CAD s/p PCI March 2023, Anxiety  and Depression ,  presents with epigastric pain for the last couple of weeks.  Her daughter brought her in, after speaking with her GI doctor who stated patient should not have been on ozempic before and probably have taken insulin instead. She states that she had just up titrated her dose of ozempic to 0.5 from 0.25 earlier this week. She had been on the 0.25 for 5 weeks. She states the pain bothers her more than the nausea and vomiting. Patient states she feels very minimal relief since she came in.  She denies NSAID use, bleeding or black stools. She reports 10lb weight loss and has been on ozempic for her diabetes. Denies diarrhea or urinary symptoms.     Adm for initiation of PPN for presumed gastroparesis; gastric emptying study for 11/6- failed due to vomiting   PICC inserted 11/9 for TPN  Abnormal MRCP; s/p ERCP and stent placement for CBD stone followed by repeat ERCP on 11/15 with stone extraction, sphincterotomy, stent replacement  Continues to have abd pain and poor oral intake , s/p PEG placement   Developed Asthma exacerbation quickly resolved w inhalers and one dose steroids   -s/p G-J tube placement     12.4: feels a bit better, less abd pain, less nausea   tolerating TF  12.5: feels bloated, c/o improving abd pain   12.7: tolerated feedings well, only mild intermittent bloating   12.8: tolerated feedings well, awaiting delivery of TF pump   12.9: tube feeding clogged this am, unclogged later during the day  no abd pain, no n/v/d  12.10: TF working well    Vital Signs Last 24 Hrs  T(C): 36.7 (09 Dec 2024 15:13), Max: 36.8 (09 Dec 2024 08:24)  T(F): 98 (09 Dec 2024 15:13), Max: 98.3 (09 Dec 2024 08:24)  HR: 94 (09 Dec 2024 15:13) (76 - 94)  BP: 124/69 (09 Dec 2024 15:13) (122/61 - 124/69)  RR: 18 (09 Dec 2024 15:13) (18 - 18)  SpO2: 97% (09 Dec 2024 15:13) (95% - 97%)    Parameters below as of 09 Dec 2024 15:13  Patient On (Oxygen Delivery Method): room air          PHYSICAL EXAM:    GENERAL: anxious   HEENT:  NC/AT, EOMI, PERRLA, No scleral icterus, Moist mucous membranes  NECK: Supple, No JVD  CNS:  Alert & Oriented X3, Motor Strength 5/5 B/L upper and lower extremities; DTRs 2+ intact   LUNG: Normal Breath sounds, Clear to auscultation bilaterally, No rales, No rhonchi, No wheezing  HEART: RRR; No murmurs, No rubs  ABDOMEN: +BS, ST/ND, +TTP on epigastric and midline area, +PEJ present no erythema no discharge around peg   GENITOURINARY: Voiding, Bladder not distended  EXTREMITIES:  2+ Peripheral Pulses, No clubbing, No cyanosis, No tibial edema  MUSCULOSKELTAL: Joints normal ROM, No TTP, No effusion  VAGINAL: deferred  SKIN: no rashes  RECTAL: deferred, not indicated  BREAST: deferred     64 year old woman with persistent abdominal pain and poor oral intake with hospital course complicated by choledocholithiasis, pancreatitis.  	    1. Persistent nausea and abdominal pain likely due to Gastroparesis exacerbated by GLP-1  +suspect psychosomatic component present as well  -now on Dilaudid 1mg po q 6hrs prn pain  ; high dose analgesia likely worsening her GI motility as well   - EGD/colonoscopy --> gastritis, esophagitis, c/w PPI  - Gastroparesis clinically diagnosed as she was unable to complete Gastric emptying study on admission due to vomiting however after discussion with ir when gj tube was placed the contrast was not moving much   - s/p PEJ placement   -will c/w  Glucerna 1.5 at 60cc/hr   c/w PPI, Reglan Tid    PEJ unclogged at bed side by IR PA; TF restarated    2. Pancreatitis: transient post ERCP resolved   - Pancreatitis s/p ercp 11/15   - s/p IVFs    3. Choledocholithiasis:  - s/p ERCP and stent placement   - s/p repeat ERCP on 11/15 for persistent  14mm CBD stone ; CBD and Pancreatic duct cleared, sphincterotomy and metal stent placed    4. DM type II: uncontrolled due to TPN  - monitor blood sugars  c/w Lantus 10u qHS    5. Moderate to severe protein-calorie malnutrition:  on TF    6. Anxiety:   - c/w Lexapro, Abilify to be tapered off as outpatient    - Psych evaluation outpatient     7. Paroxymal afib:   - off lisinopril due to borderline BP  c/w Apixaban    8. CAD:  c/w Plavix, BB, Statins    9. AOCD:  stable, Hb>8    dc planning

## 2024-12-10 NOTE — PROGRESS NOTE ADULT - NUTRITIONAL ASSESSMENT
This patient has been assessed with a concern for Malnutrition and has been determined to have a diagnosis/diagnoses of Moderate protein-calorie malnutrition.    This patient is being managed with:   Diet Clear Liquid-  Consistent Carbohydrate Gestational Diabetic {3 Snacks} (CSTCHOGDM)  Supplement Feeding Modality:  Oral  Glucerna Shake Cans or Servings Per Day:  3       Frequency:  Three Times a day  Entered: Nov 16 2024 11:37AM  
This patient has been assessed with a concern for Malnutrition and has been determined to have a diagnosis/diagnoses of Moderate protein-calorie malnutrition.    This patient is being managed with:   Diet Clear Liquid-  Tube Feeding Modality: Gastro-Jejunostomy  Glucerna 1.5 Hubert (GLUCERNA1.5)  Total Volume for 24 Hours (mL): 1560  Continuous  Starting Tube Feed Rate {mL per Hour}: 20  Increase Tube Feed Rate by (mL): 10     Every 12 hours  Until Goal Tube Feed Rate (mL per Hour): 65  Tube Feed Duration (in Hours): 24  Tube Feed Start Time: 00:00  Free Water Flush  Free Water Flush Instructions:  40 cc/hr free water flush; monitor hydration status and adjust free water flushes per MD  Entered: Dec  2 2024 11:28AM  
This patient has been assessed with a concern for Malnutrition and has been determined to have a diagnosis/diagnoses of Moderate protein-calorie malnutrition.    This patient is being managed with:   Diet Full Liquid-  Entered: Oct 27 2024  6:08PM  
This patient has been assessed with a concern for Malnutrition and has been determined to have a diagnosis/diagnoses of Moderate protein-calorie malnutrition.    This patient is being managed with:   Diet Low Fiber-  Entered: Oct 30 2024  3:33PM  
This patient has been assessed with a concern for Malnutrition and has been determined to have a diagnosis/diagnoses of Moderate protein-calorie malnutrition.    This patient is being managed with:   Diet NPO after Midnight-     NPO Start Date: 25-Nov-2024   NPO Start Time: 23:59  Entered: Nov 25 2024 10:18AM    Diet Clear Liquid-  Entered: Nov 21 2024 11:39AM  
This patient has been assessed with a concern for Malnutrition and has been determined to have a diagnosis/diagnoses of Moderate protein-calorie malnutrition.    This patient is being managed with:   Diet NPO after Midnight-     NPO Start Date: 25-Nov-2024   NPO Start Time: 23:59  Entered: Nov 25 2024 10:18AM    Diet Clear Liquid-  Entered: Nov 21 2024 11:39AM    The following pending diet order is being considered for treatment of Moderate protein-calorie malnutrition:  Diet Clear Liquid-  Tube Feeding Modality: Gastro-Jejunostomy  Glucerna 1.5 Hubert (GLUCERNA1.5)  Total Volume for 24 Hours (mL): 1560  Continuous  Starting Tube Feed Rate {mL per Hour}: 20  Increase Tube Feed Rate by (mL): 10     Every 12 hours  Until Goal Tube Feed Rate (mL per Hour): 65  Tube Feed Duration (in Hours): 24  Tube Feed Start Time: 00:00  Free Water Flush  Free Water Flush Instructions:  Recommend free water flush 40 cc/hr; monitor and adjust free water flushes per MD to maintain hydration status  Entered: Nov 25 2024 12:27PM  
This patient has been assessed with a concern for Malnutrition and has been determined to have a diagnosis/diagnoses of Moderate protein-calorie malnutrition.    This patient is being managed with:   Diet NPO after Midnight-     NPO Start Date: 26-Nov-2024   NPO Start Time: 23:59  Entered: Nov 26 2024  7:00PM    Diet NPO after Midnight-     NPO Start Date: 25-Nov-2024   NPO Start Time: 23:59  Entered: Nov 25 2024 10:18AM    Diet Clear Liquid-  Entered: Nov 21 2024 11:39AM    The following pending diet order is being considered for treatment of Moderate protein-calorie malnutrition:  Diet Clear Liquid-  Tube Feeding Modality: Gastro-Jejunostomy  Glucerna 1.5 Hubert (GLUCERNA1.5)  Total Volume for 24 Hours (mL): 1560  Continuous  Starting Tube Feed Rate {mL per Hour}: 20  Increase Tube Feed Rate by (mL): 10     Every 12 hours  Until Goal Tube Feed Rate (mL per Hour): 65  Tube Feed Duration (in Hours): 24  Tube Feed Start Time: 00:00  Free Water Flush  Free Water Flush Instructions:  Recommend free water flush 40 cc/hr; monitor and adjust free water flushes per MD to maintain hydration status  Entered: Nov 25 2024 12:27PM  
This patient has been assessed with a concern for Malnutrition and has been determined to have a diagnosis/diagnoses of Moderate protein-calorie malnutrition.    This patient is being managed with:   lipid fat emulsion (Fish Oil and Plant Based) 20% Infusion-[Known as SMOFLIPID 20% Infusion]  50 Gram(s) in IV Solution 250 milliLiter(s) infuse at 20.8 mL/Hr  Dose Rate: 0.9469 Gm/kG/Day Infuse Over: 12 Hours; Stop After 12 Hours  Administration Instructions: Use 1.2 micron in-line filter  Entered: Nov  3 2024 10:00PM    Parenteral Nutrition - Adult-  Entered: Nov  3 2024 10:00PM    Diet Consistent Carbohydrate w/Evening Snack-  Entered: Nov  3 2024 11:39AM  
This patient has been assessed with a concern for Malnutrition and has been determined to have a diagnosis/diagnoses of Moderate protein-calorie malnutrition.    This patient is being managed with:   lipid fat emulsion (Fish Oil and Plant Based) 20% Infusion-[Known as SMOFLIPID 20% Infusion]  50 Gram(s) in IV Solution 250 milliLiter(s) infuse at 20.8 mL/Hr  Dose Rate: 0.9469 Gm/kG/Day Infuse Over: 12 Hours; Stop After 12 Hours  Administration Instructions: Use 1.2 micron in-line filter  Entered: Nov 15 2024  5:00PM    Parenteral Nutrition - Adult-  Entered: Nov 15 2024  5:00PM    lipid fat emulsion (Fish Oil and Plant Based) 20% Infusion-[Known as SMOFLIPID 20% Infusion]  50 Gram(s) in IV Solution 250 milliLiter(s) infuse at 20.8 mL/Hr  Dose Rate: 0.947 Gm/kG/Day Infuse Over: 12 Hours; Stop After 12 Hours  Administration Instructions: Use 1.2 micron in-line filter  Entered: Nov 14 2024 10:00PM    Parenteral Nutrition - Adult-  Entered: Nov 14 2024 10:00PM    Diet NPO after Midnight-     NPO Start Date: 14-Nov-2024   NPO Start Time: 23:59  Entered: Nov 14 2024  1:10PM    Diet Full Liquid-  Entered: Nov 14 2024  1:10PM  
This patient has been assessed with a concern for Malnutrition and has been determined to have a diagnosis/diagnoses of Moderate protein-calorie malnutrition.    This patient is being managed with:   lipid fat emulsion (Fish Oil and Plant Based) 20% Infusion-[Known as SMOFLIPID 20% Infusion]  50 Gram(s) in IV Solution 250 milliLiter(s) infuse at 20.83 mL/Hr  Dose Rate: 0.9469 Gm/kG/Day Infuse Over: 12 Hours; Stop After 12 Hours  Administration Instructions: Use 1.2 micron in-line filter  Entered: Dec  1 2024 10:00PM    Parenteral Nutrition - Adult-  Entered: Dec  1 2024 10:00PM    Diet NPO after Midnight-     NPO Start Date: 01-Dec-2024   NPO Start Time: 23:59  Entered: Dec  1 2024  7:49AM    Diet NPO after Midnight-     NPO Start Date: 30-Nov-2024   NPO Start Time: 23:59  Except Medications  With Ice Chips/Sips of Water  Entered: Nov 30 2024 12:47PM  
This patient has been assessed with a concern for Malnutrition and has been determined to have a diagnosis/diagnoses of Moderate protein-calorie malnutrition.    This patient is being managed with:   lipid fat emulsion (Fish Oil and Plant Based) 20% Infusion-[Known as SMOFLIPID 20% Infusion]  50 Gram(s) in IV Solution 250 milliLiter(s) infuse at 20.83 mL/Hr  Dose Rate: 0.9469 Gm/kG/Day Infuse Over: 12 Hours; Stop After 12 Hours  Administration Instructions: Use 1.2 micron in-line filter  Entered: Dec  1 2024 10:00PM    Parenteral Nutrition - Adult-  Entered: Dec  1 2024 10:00PM    Diet NPO after Midnight-     NPO Start Date: 01-Dec-2024   NPO Start Time: 23:59  Entered: Dec  1 2024  7:49AM    lipid fat emulsion (Fish Oil and Plant Based) 20% Infusion-[Known as SMOFLIPID 20% Infusion]  50 Gram(s) in IV Solution 250 milliLiter(s) infuse at 20.83 mL/Hr  Dose Rate: 0.9469 Gm/kG/Day Infuse Over: 12 Hours; Stop After 12 Hours  Administration Instructions: Use 1.2 micron in-line filter  Entered: Nov 30 2024 10:00PM    Parenteral Nutrition - Adult-  Entered: Nov 30 2024 10:00PM    Diet NPO after Midnight-     NPO Start Date: 30-Nov-2024   NPO Start Time: 23:59  Except Medications  With Ice Chips/Sips of Water  Entered: Nov 30 2024 12:47PM    Diet Consistent Carbohydrate Clear Liquid-  Entered: Nov 30 2024 10:41AM  
This patient has been assessed with a concern for Malnutrition and has been determined to have a diagnosis/diagnoses of Moderate protein-calorie malnutrition.    This patient is being managed with:   lipid fat emulsion (Fish Oil and Plant Based) 20% Infusion-[Known as SMOFLIPID 20% Infusion]  50 Gram(s) in IV Solution 250 milliLiter(s) infuse at 20.83 mL/Hr  Dose Rate: 0.9469 Gm/kG/Day Infuse Over: 12 Hours; Stop After 12 Hours  Administration Instructions: Use 1.2 micron in-line filter  Entered: Nov 11 2024 10:00PM    Parenteral Nutrition - Adult-  Entered: Nov 11 2024 10:00PM    lipid fat emulsion (Fish Oil and Plant Based) 20% Infusion-[Known as SMOFLIPID 20% Infusion]  50 Gram(s) in IV Solution 250 milliLiter(s) infuse at 20.83 mL/Hr  Dose Rate: 0.947 Gm/kG/Day Infuse Over: 12 Hours; Stop After 12 Hours  Administration Instructions: Use 1.2 micron in-line filter  Entered: Nov 10 2024 10:00PM    Parenteral Nutrition - Adult-  Entered: Nov 10 2024 10:00PM    Diet Clear Liquid-  Entered: Nov 7 2024  2:27PM  
This patient has been assessed with a concern for Malnutrition and has been determined to have a diagnosis/diagnoses of Moderate protein-calorie malnutrition.    This patient is being managed with:   lipid fat emulsion (Fish Oil and Plant Based) 20% Infusion-[Known as SMOFLIPID 20% Infusion]  50 Gram(s) in IV Solution 250 milliLiter(s) infuse at 20.83 mL/Hr  Dose Rate: 0.9469 Gm/kG/Day Infuse Over: 12 Hours; Stop After 12 Hours  Administration Instructions: Use 1.2 micron in-line filter  Entered: Nov 2 2024 10:00PM    Parenteral Nutrition - Adult-  Entered: Nov 2 2024 10:00PM    Diet Low Fiber-  Entered: Oct 30 2024  3:33PM  
This patient has been assessed with a concern for Malnutrition and has been determined to have a diagnosis/diagnoses of Moderate protein-calorie malnutrition.    This patient is being managed with:   lipid fat emulsion (Fish Oil and Plant Based) 20% Infusion-[Known as SMOFLIPID 20% Infusion]  50 Gram(s) in IV Solution 250 milliLiter(s) infuse at 20.83 mL/Hr  Dose Rate: 0.9469 Gm/kG/Day Infuse Over: 12 Hours; Stop After 12 Hours  Administration Instructions: Use 1.2 micron in-line filter  Entered: Nov 2 2024 10:00PM    Parenteral Nutrition - Adult-  Entered: Nov 2 2024 10:00PM    Parenteral Nutrition - Adult-  Entered: Nov 1 2024 10:00PM    Diet Low Fiber-  Entered: Oct 30 2024  3:33PM  
This patient has been assessed with a concern for Malnutrition and has been determined to have a diagnosis/diagnoses of Moderate protein-calorie malnutrition.    This patient is being managed with:   lipid fat emulsion (Fish Oil and Plant Based) 20% Infusion-[Known as SMOFLIPID 20% Infusion]  50 Gram(s) in IV Solution 250 milliLiter(s) infuse at 20.83 mL/Hr  Dose Rate: 0.9469 Gm/kG/Day Infuse Over: 12 Hours; Stop After 12 Hours  Administration Instructions: Use 1.2 micron in-line filter  Entered: Nov 30 2024 10:00PM    Parenteral Nutrition - Adult-  Entered: Nov 30 2024 10:00PM    lipid fat emulsion (Fish Oil and Plant Based) 20% Infusion-[Known as SMOFLIPID 20% Infusion]  50 Gram(s) in IV Solution 250 milliLiter(s) infuse at 20.8 mL/Hr  Dose Rate: 0.947 Gm/kG/Day Infuse Over: 12 Hours; Stop After 12 Hours  Administration Instructions: Use 1.2 micron in-line filter  Entered: Nov 29 2024 10:00PM    Parenteral Nutrition - Adult-  Entered: Nov 29 2024  5:00PM    Diet Clear Liquid-  Supplement Feeding Modality:  Oral  Glucerna Shake Cans or Servings Per Day:  3       Frequency:  Three Times a day  Entered: Nov 29 2024 11:24AM    The following pending diet order is being considered for treatment of Moderate protein-calorie malnutrition:  Diet Consistent Carbohydrate Clear Liquid-  Entered: Nov 30 2024 10:41AM  
This patient has been assessed with a concern for Malnutrition and has been determined to have a diagnosis/diagnoses of Moderate protein-calorie malnutrition.    This patient is being managed with:   Diet Clear Liquid-  Consistent Carbohydrate Gestational Diabetic {3 Snacks} (CSTCHOGDM)  Supplement Feeding Modality:  Oral  Glucerna Shake Cans or Servings Per Day:  3       Frequency:  Three Times a day  Entered: Nov 16 2024 11:37AM  
This patient has been assessed with a concern for Malnutrition and has been determined to have a diagnosis/diagnoses of Moderate protein-calorie malnutrition.    This patient is being managed with:   Diet Clear Liquid-  Entered: Nov 19 2024  7:16PM  
This patient has been assessed with a concern for Malnutrition and has been determined to have a diagnosis/diagnoses of Moderate protein-calorie malnutrition.    This patient is being managed with:   Diet Consistent Carbohydrate Clear Liquid-  Entered: Oct 28 2024  2:22PM  
This patient has been assessed with a concern for Malnutrition and has been determined to have a diagnosis/diagnoses of Moderate protein-calorie malnutrition.    This patient is being managed with:   Diet Low Fiber-  Entered: Oct 30 2024  3:33PM  
This patient has been assessed with a concern for Malnutrition and has been determined to have a diagnosis/diagnoses of Moderate protein-calorie malnutrition.    This patient is being managed with:   lipid fat emulsion (Fish Oil and Plant Based) 20% Infusion-[Known as SMOFLIPID 20% Infusion]  50 Gram(s) in IV Solution 250 milliLiter(s) infuse at 20.8 mL/Hr  Dose Rate: 0.9469 Gm/kG/Day Infuse Over: 12 Hours; Stop After 12 Hours  Administration Instructions: Use 1.2 micron in-line filter  Entered: Nov 6 2024 10:00PM    Parenteral Nutrition - Adult-  Entered: Nov 6 2024 10:00PM    Diet Consistent Carbohydrate/No Snacks-  Entered: Nov 6 2024  9:29AM  
This patient has been assessed with a concern for Malnutrition and has been determined to have a diagnosis/diagnoses of Moderate protein-calorie malnutrition.    This patient is being managed with:   lipid fat emulsion (Fish Oil and Plant Based) 20% Infusion-[Known as SMOFLIPID 20% Infusion]  50 Gram(s) in IV Solution 250 milliLiter(s) infuse at 20.8 mL/Hr  Dose Rate: 0.947 Gm/kG/Day Infuse Over: 12 Hours; Stop After 12 Hours  Administration Instructions: Use 1.2 micron in-line filter  Entered: Nov 14 2024 10:00PM    Parenteral Nutrition - Adult-  Entered: Nov 14 2024 10:00PM    Diet NPO after Midnight-     NPO Start Date: 14-Nov-2024   NPO Start Time: 23:59  Entered: Nov 14 2024  1:10PM    Diet Full Liquid-  Entered: Nov 14 2024  1:10PM    lipid fat emulsion (Fish Oil and Plant Based) 20% Infusion-[Known as SMOFLIPID 20% Infusion]  50 Gram(s) in IV Solution 250 milliLiter(s) infuse at 20.8 mL/Hr  Dose Rate: 0.9469 Gm/kG/Day Infuse Over: 12 Hours; Stop After 12 Hours  Administration Instructions: Use 1.2 micron in-line filter  Entered: Nov 13 2024 10:00PM    Parenteral Nutrition - Adult-  Entered: Nov 13 2024 10:00PM  
This patient has been assessed with a concern for Malnutrition and has been determined to have a diagnosis/diagnoses of Moderate protein-calorie malnutrition.    This patient is being managed with:   lipid fat emulsion (Fish Oil and Plant Based) 20% Infusion-[Known as SMOFLIPID 20% Infusion]  50 Gram(s) in IV Solution 250 milliLiter(s) infuse at 20.8 mL/Hr  Dose Rate: 0.947 Gm/kG/Day Infuse Over: 12 Hours; Stop After 12 Hours  Administration Instructions: Use 1.2 micron in-line filter  Entered: Nov 4 2024 10:00PM    Parenteral Nutrition - Adult-  Entered: Nov 4 2024 10:00PM    Diet Consistent Carbohydrate Renal/No Snacks-  Supplement Feeding Modality:  Oral  Ensure Enlive Cans or Servings Per Day:  1       Frequency:  Three Times a day  Entered: Nov 4 2024  4:04PM  
This patient has been assessed with a concern for Malnutrition and has been determined to have a diagnosis/diagnoses of Moderate protein-calorie malnutrition.    This patient is being managed with:   lipid fat emulsion (Fish Oil and Plant Based) 20% Infusion-[Known as SMOFLIPID 20% Infusion]  50 Gram(s) in IV Solution 250 milliLiter(s) infuse at 20.8 mL/Hr  Dose Rate: 0.947 Gm/kG/Day Infuse Over: 12 Hours; Stop After 12 Hours  Administration Instructions: Use 1.2 micron in-line filter  Entered: Nov 9 2024 10:00PM    Parenteral Nutrition - Adult-  Entered: Nov 9 2024 10:00PM    Diet Clear Liquid-  Entered: Nov 7 2024  2:27PM  
This patient has been assessed with a concern for Malnutrition and has been determined to have a diagnosis/diagnoses of Moderate protein-calorie malnutrition.    This patient is being managed with:   Diet Clear Liquid-  Tube Feeding Modality: Gastro-Jejunostomy  Glucerna 1.5 Hubert (GLUCERNA1.5)  Total Volume for 24 Hours (mL): 1560  Continuous  Starting Tube Feed Rate {mL per Hour}: 20  Increase Tube Feed Rate by (mL): 10     Every 12 hours  Until Goal Tube Feed Rate (mL per Hour): 65  Tube Feed Duration (in Hours): 24  Tube Feed Start Time: 00:00  Free Water Flush  Free Water Flush Instructions:  40 cc/hr free water flush; monitor hydration status and adjust free water flushes per MD  Entered: Dec  2 2024 11:28AM  
This patient has been assessed with a concern for Malnutrition and has been determined to have a diagnosis/diagnoses of Moderate protein-calorie malnutrition.    This patient is being managed with:   Diet Clear Liquid-  Tube Feeding Modality: Gastro-Jejunostomy  Glucerna 1.5 Hubert (GLUCERNA1.5)  Total Volume for 24 Hours (mL): 1560  Continuous  Starting Tube Feed Rate {mL per Hour}: 20  Increase Tube Feed Rate by (mL): 10     Every 12 hours  Until Goal Tube Feed Rate (mL per Hour): 65  Tube Feed Duration (in Hours): 24  Tube Feed Start Time: 00:00  Free Water Flush  Free Water Flush Instructions:  40 cc/hr free water flush; monitor hydration status and adjust free water flushes per MD  Entered: Dec  2 2024 11:28AM  
This patient has been assessed with a concern for Malnutrition and has been determined to have a diagnosis/diagnoses of Moderate protein-calorie malnutrition.    This patient is being managed with:   Diet NPO after Midnight-     NPO Start Date: 29-Oct-2024   NPO Start Time: 23:59  Entered: Oct 29 2024  2:30PM    Diet NPO after Midnight-     NPO Start Date: 29-Oct-2024   NPO Start Time: 23:59  Entered: Oct 29 2024  2:30PM    Diet Consistent Carbohydrate Clear Liquid-  Entered: Oct 28 2024  2:22PM  
This patient has been assessed with a concern for Malnutrition and has been determined to have a diagnosis/diagnoses of Moderate protein-calorie malnutrition.    This patient is being managed with:   Parenteral Nutrition - Adult-  Entered: Nov 1 2024 10:00PM    Diet Low Fiber-  Entered: Oct 30 2024  3:33PM  
This patient has been assessed with a concern for Malnutrition and has been determined to have a diagnosis/diagnoses of Moderate protein-calorie malnutrition.    This patient is being managed with:   lipid fat emulsion (Fish Oil and Plant Based) 20% Infusion-[Known as SMOFLIPID 20% Infusion]  50 Gram(s) in IV Solution 250 milliLiter(s) infuse at 20.8 mL/Hr  Dose Rate: 0.947 Gm/kG/Day Infuse Over: 12 Hours; Stop After 12 Hours  Administration Instructions: Use 1.2 micron in-line filter  Entered: Nov 29 2024 10:00PM    Parenteral Nutrition - Adult-  Entered: Nov 29 2024  5:00PM    Diet Clear Liquid-  Supplement Feeding Modality:  Oral  Glucerna Shake Cans or Servings Per Day:  3       Frequency:  Three Times a day  Entered: Nov 29 2024 11:24AM    lipid fat emulsion (Fish Oil and Plant Based) 20% Infusion-[Known as SMOFLIPID 20% Infusion]  50 Gram(s) in IV Solution 250 milliLiter(s) infuse at 20.83 mL/Hr  Dose Rate: 0.947 Gm/kG/Day Infuse Over: 12 Hours; Stop After 12 Hours  Administration Instructions: Use 1.2 micron in-line filter  Entered: Nov 28 2024 10:00PM    Parenteral Nutrition - Adult-  Entered: Nov 28 2024 10:00PM  
This patient has been assessed with a concern for Malnutrition and has been determined to have a diagnosis/diagnoses of Moderate protein-calorie malnutrition.    This patient is being managed with:   lipid fat emulsion (Fish Oil and Plant Based) 20% Infusion-[Known as SMOFLIPID 20% Infusion]  50 Gram(s) in IV Solution 250 milliLiter(s) infuse at 20.83 mL/Hr  Dose Rate: 0.9469 Gm/kG/Day Infuse Over: 12 Hours; Stop After 12 Hours  Administration Instructions: Use 1.2 micron in-line filter  Entered: Nov 7 2024 10:00PM    Parenteral Nutrition - Adult-  Entered: Nov 7 2024 10:00PM    Diet Clear Liquid-  Entered: Nov 7 2024  2:27PM  
This patient has been assessed with a concern for Malnutrition and has been determined to have a diagnosis/diagnoses of Moderate protein-calorie malnutrition.    This patient is being managed with:   lipid fat emulsion (Fish Oil and Plant Based) 20% Infusion-[Known as SMOFLIPID 20% Infusion]  50 Gram(s) in IV Solution 250 milliLiter(s) infuse at 20.83 mL/Hr  Dose Rate: 0.947 Gm/kG/Day Infuse Over: 12 Hours; Stop After 12 Hours  Administration Instructions: Use 1.2 micron in-line filter  Entered: Nov 28 2024 10:00PM    Parenteral Nutrition - Adult-  Entered: Nov 28 2024 10:00PM    Diet Clear Liquid-  Entered: Nov 27 2024  4:33PM    Diet NPO after Midnight-     NPO Start Date: 26-Nov-2024   NPO Start Time: 23:59  Entered: Nov 26 2024  7:00PM    Diet NPO after Midnight-     NPO Start Date: 25-Nov-2024   NPO Start Time: 23:59  Entered: Nov 25 2024 10:18AM    The following pending diet order is being considered for treatment of Moderate protein-calorie malnutrition:  Diet Clear Liquid-  Tube Feeding Modality: Gastro-Jejunostomy  Glucerna 1.5 Hubert (GLUCERNA1.5)  Total Volume for 24 Hours (mL): 1560  Continuous  Starting Tube Feed Rate {mL per Hour}: 20  Increase Tube Feed Rate by (mL): 10     Every 12 hours  Until Goal Tube Feed Rate (mL per Hour): 65  Tube Feed Duration (in Hours): 24  Tube Feed Start Time: 00:00  Free Water Flush  Free Water Flush Instructions:  Recommend free water flush 40 cc/hr; monitor and adjust free water flushes per MD to maintain hydration status  Entered: Nov 25 2024 12:27PM  
This patient has been assessed with a concern for Malnutrition and has been determined to have a diagnosis/diagnoses of Moderate protein-calorie malnutrition.    This patient is being managed with:   Diet Clear Liquid-  Consistent Carbohydrate Gestational Diabetic {3 Snacks} (CSTCHOGDM)  Supplement Feeding Modality:  Oral  Glucerna Shake Cans or Servings Per Day:  3       Frequency:  Three Times a day  Entered: Nov 16 2024 11:37AM  
This patient has been assessed with a concern for Malnutrition and has been determined to have a diagnosis/diagnoses of Moderate protein-calorie malnutrition.    This patient is being managed with:   Diet Clear Liquid-  Entered: Nov 21 2024 11:39AM    Diet NPO after Midnight-     NPO Start Date: 20-Nov-2024   NPO Start Time: 23:59  Entered: Nov 20 2024  4:17PM  
This patient has been assessed with a concern for Malnutrition and has been determined to have a diagnosis/diagnoses of Moderate protein-calorie malnutrition.    This patient is being managed with:   Diet Clear Liquid-  Tube Feeding Modality: Gastro-Jejunostomy  Glucerna 1.5 Hubert (GLUCERNA1.5)  Total Volume for 24 Hours (mL): 1560  Continuous  Starting Tube Feed Rate {mL per Hour}: 20  Increase Tube Feed Rate by (mL): 10     Every 12 hours  Until Goal Tube Feed Rate (mL per Hour): 65  Tube Feed Duration (in Hours): 24  Tube Feed Start Time: 00:00  Free Water Flush  Free Water Flush Instructions:  40 cc/hr free water flush; monitor hydration status and adjust free water flushes per MD  Entered: Dec  2 2024 11:28AM  
This patient has been assessed with a concern for Malnutrition and has been determined to have a diagnosis/diagnoses of Moderate protein-calorie malnutrition.    This patient is being managed with:   Diet Consistent Carbohydrate Clear Liquid-  Entered: Oct 28 2024  2:22PM  
This patient has been assessed with a concern for Malnutrition and has been determined to have a diagnosis/diagnoses of Moderate protein-calorie malnutrition.    This patient is being managed with:   Diet Low Fiber-  Entered: Oct 30 2024  3:33PM  
This patient has been assessed with a concern for Malnutrition and has been determined to have a diagnosis/diagnoses of Moderate protein-calorie malnutrition.    This patient is being managed with:   Diet NPO after Midnight-     NPO Start Date: 24-Nov-2024   NPO Start Time: 23:59  Entered: Nov 24 2024 11:45AM    Diet Clear Liquid-  Entered: Nov 21 2024 11:39AM    Diet NPO after Midnight-     NPO Start Date: 20-Nov-2024   NPO Start Time: 23:59  Entered: Nov 20 2024  4:17PM  
This patient has been assessed with a concern for Malnutrition and has been determined to have a diagnosis/diagnoses of Moderate protein-calorie malnutrition.    This patient is being managed with:   Diet Regular-  Fiber/Residue Restricted (LOWFIBER)  Supplement Feeding Modality:  Oral  Glucerna Shake Cans or Servings Per Day:  1       Frequency:  Three Times a day  Entered: Nov 19 2024 12:05PM  
This patient has been assessed with a concern for Malnutrition and has been determined to have a diagnosis/diagnoses of Moderate protein-calorie malnutrition.    This patient is being managed with:   lipid fat emulsion (Fish Oil and Plant Based) 20% Infusion-[Known as SMOFLIPID 20% Infusion]  50 Gram(s) in IV Solution 250 milliLiter(s) infuse at 20.8 mL/Hr  Dose Rate: 0.9469 Gm/kG/Day Infuse Over: 12 Hours; Stop After 12 Hours  Administration Instructions: Use 1.2 micron in-line filter  Entered: Nov 12 2024 10:00PM    Parenteral Nutrition - Adult-  Entered: Nov 12 2024 10:00PM    lipid fat emulsion (Fish Oil and Plant Based) 20% Infusion-[Known as SMOFLIPID 20% Infusion]  50 Gram(s) in IV Solution 250 milliLiter(s) infuse at 20.83 mL/Hr  Dose Rate: 0.9469 Gm/kG/Day Infuse Over: 12 Hours; Stop After 12 Hours  Administration Instructions: Use 1.2 micron in-line filter  Entered: Nov 11 2024 10:00PM    Parenteral Nutrition - Adult-  Entered: Nov 11 2024 10:00PM    Diet Full Liquid-  Consistent Carbohydrate {No Snacks} (CSTCHO)  Supplement Feeding Modality:  Oral  Glucerna Shake Cans or Servings Per Day:  3       Frequency:  Three Times a day  Entered: Nov 11 2024  3:52PM  
This patient has been assessed with a concern for Malnutrition and has been determined to have a diagnosis/diagnoses of Moderate protein-calorie malnutrition.    This patient is being managed with:   lipid fat emulsion (Fish Oil and Plant Based) 20% Infusion-[Known as SMOFLIPID 20% Infusion]  50 Gram(s) in IV Solution 250 milliLiter(s) infuse at 20.8 mL/Hr  Dose Rate: 0.9469 Gm/kG/Day Infuse Over: 12 Hours; Stop After 12 Hours  Administration Instructions: Use 1.2 micron in-line filter  Entered: Nov 13 2024 10:00PM    Parenteral Nutrition - Adult-  Entered: Nov 13 2024 10:00PM    lipid fat emulsion (Fish Oil and Plant Based) 20% Infusion-[Known as SMOFLIPID 20% Infusion]  50 Gram(s) in IV Solution 250 milliLiter(s) infuse at 20.8 mL/Hr  Dose Rate: 0.9469 Gm/kG/Day Infuse Over: 12 Hours; Stop After 12 Hours  Administration Instructions: Use 1.2 micron in-line filter  Entered: Nov 12 2024 10:00PM    Parenteral Nutrition - Adult-  Entered: Nov 12 2024 10:00PM    Diet Full Liquid-  Consistent Carbohydrate {No Snacks} (CSTCHO)  Supplement Feeding Modality:  Oral  Glucerna Shake Cans or Servings Per Day:  3       Frequency:  Three Times a day  Entered: Nov 11 2024  3:52PM  
This patient has been assessed with a concern for Malnutrition and has been determined to have a diagnosis/diagnoses of Moderate protein-calorie malnutrition.    This patient is being managed with:   lipid fat emulsion (Fish Oil and Plant Based) 20% Infusion-[Known as SMOFLIPID 20% Infusion]  50 Gram(s) in IV Solution 250 milliLiter(s) infuse at 20.8 mL/Hr  Dose Rate: 0.9469 Gm/kG/Day Infuse Over: 12 Hours; Stop After 12 Hours  Administration Instructions: Use 1.2 micron in-line filter  Entered: Nov 6 2024 10:00PM    Parenteral Nutrition - Adult-  Entered: Nov 6 2024 10:00PM    Diet Consistent Carbohydrate Full Liquid-  Entered: Nov 6 2024  5:41PM  
This patient has been assessed with a concern for Malnutrition and has been determined to have a diagnosis/diagnoses of Moderate protein-calorie malnutrition.    This patient is being managed with:   lipid fat emulsion (Fish Oil and Plant Based) 20% Infusion-[Known as SMOFLIPID 20% Infusion]  50 Gram(s) in IV Solution 250 milliLiter(s) infuse at 20.8 mL/Hr  Dose Rate: 0.9469 Gm/kG/Day Infuse Over: 12 Hours; Stop After 12 Hours  Administration Instructions: Use 1.2 micron in-line filter  Entered: Nov 8 2024 10:00PM    Parenteral Nutrition - Adult-  Entered: Nov 8 2024 10:00PM    Diet Clear Liquid-  Entered: Nov 7 2024  2:27PM  
This patient has been assessed with a concern for Malnutrition and has been determined to have a diagnosis/diagnoses of Moderate protein-calorie malnutrition.    This patient is being managed with:   Diet Clear Liquid-  Entered: Nov 21 2024 11:39AM    Diet NPO after Midnight-     NPO Start Date: 20-Nov-2024   NPO Start Time: 23:59  Entered: Nov 20 2024  4:17PM  
This patient has been assessed with a concern for Malnutrition and has been determined to have a diagnosis/diagnoses of Moderate protein-calorie malnutrition.    This patient is being managed with:   Diet Clear Liquid-  Entered: Nov 21 2024 11:39AM    Diet NPO after Midnight-     NPO Start Date: 20-Nov-2024   NPO Start Time: 23:59  Entered: Nov 20 2024  4:17PM  
This patient has been assessed with a concern for Malnutrition and has been determined to have a diagnosis/diagnoses of Moderate protein-calorie malnutrition.    This patient is being managed with:   Diet NPO after Midnight-     NPO Start Date: 29-Oct-2024   NPO Start Time: 23:59  Entered: Oct 29 2024  2:30PM    Diet NPO after Midnight-     NPO Start Date: 29-Oct-2024   NPO Start Time: 23:59  Entered: Oct 29 2024  2:30PM    Diet Consistent Carbohydrate Clear Liquid-  Entered: Oct 28 2024  2:22PM

## 2024-12-11 ENCOUNTER — NON-APPOINTMENT (OUTPATIENT)
Age: 64
End: 2024-12-11

## 2024-12-12 DIAGNOSIS — Z98.0 INTESTINAL BYPASS AND ANASTOMOSIS STATUS: ICD-10-CM

## 2024-12-12 DIAGNOSIS — R62.7 ADULT FAILURE TO THRIVE: ICD-10-CM

## 2024-12-12 DIAGNOSIS — Z79.85 LONG-TERM (CURRENT) USE OF INJECTABLE NON-INSULIN ANTIDIABETIC DRUGS: ICD-10-CM

## 2024-12-12 DIAGNOSIS — E11.65 TYPE 2 DIABETES MELLITUS WITH HYPERGLYCEMIA: ICD-10-CM

## 2024-12-12 DIAGNOSIS — K31.7 POLYP OF STOMACH AND DUODENUM: ICD-10-CM

## 2024-12-12 DIAGNOSIS — J45.901 UNSPECIFIED ASTHMA WITH (ACUTE) EXACERBATION: ICD-10-CM

## 2024-12-12 DIAGNOSIS — K80.50 CALCULUS OF BILE DUCT WITHOUT CHOLANGITIS OR CHOLECYSTITIS WITHOUT OBSTRUCTION: ICD-10-CM

## 2024-12-12 DIAGNOSIS — Z95.5 PRESENCE OF CORONARY ANGIOPLASTY IMPLANT AND GRAFT: ICD-10-CM

## 2024-12-12 DIAGNOSIS — Z79.01 LONG TERM (CURRENT) USE OF ANTICOAGULANTS: ICD-10-CM

## 2024-12-12 DIAGNOSIS — E78.5 HYPERLIPIDEMIA, UNSPECIFIED: ICD-10-CM

## 2024-12-12 DIAGNOSIS — K31.84 GASTROPARESIS: ICD-10-CM

## 2024-12-12 DIAGNOSIS — F41.9 ANXIETY DISORDER, UNSPECIFIED: ICD-10-CM

## 2024-12-12 DIAGNOSIS — K21.00 GASTRO-ESOPHAGEAL REFLUX DISEASE WITH ESOPHAGITIS, WITHOUT BLEEDING: ICD-10-CM

## 2024-12-12 DIAGNOSIS — D13.5 BENIGN NEOPLASM OF EXTRAHEPATIC BILE DUCTS: ICD-10-CM

## 2024-12-12 DIAGNOSIS — D13.91 FAMILIAL ADENOMATOUS POLYPOSIS: ICD-10-CM

## 2024-12-12 DIAGNOSIS — I48.0 PAROXYSMAL ATRIAL FIBRILLATION: ICD-10-CM

## 2024-12-12 DIAGNOSIS — E11.43 TYPE 2 DIABETES MELLITUS WITH DIABETIC AUTONOMIC (POLY)NEUROPATHY: ICD-10-CM

## 2024-12-12 DIAGNOSIS — K29.70 GASTRITIS, UNSPECIFIED, WITHOUT BLEEDING: ICD-10-CM

## 2024-12-12 DIAGNOSIS — F33.9 MAJOR DEPRESSIVE DISORDER, RECURRENT, UNSPECIFIED: ICD-10-CM

## 2024-12-12 DIAGNOSIS — E44.0 MODERATE PROTEIN-CALORIE MALNUTRITION: ICD-10-CM

## 2024-12-12 DIAGNOSIS — I25.10 ATHEROSCLEROTIC HEART DISEASE OF NATIVE CORONARY ARTERY WITHOUT ANGINA PECTORIS: ICD-10-CM

## 2024-12-12 DIAGNOSIS — Z79.84 LONG TERM (CURRENT) USE OF ORAL HYPOGLYCEMIC DRUGS: ICD-10-CM

## 2024-12-12 DIAGNOSIS — D63.8 ANEMIA IN OTHER CHRONIC DISEASES CLASSIFIED ELSEWHERE: ICD-10-CM

## 2024-12-12 DIAGNOSIS — K94.23 GASTROSTOMY MALFUNCTION: ICD-10-CM

## 2024-12-12 DIAGNOSIS — A41.9 SEPSIS, UNSPECIFIED ORGANISM: ICD-10-CM

## 2024-12-13 ENCOUNTER — APPOINTMENT (OUTPATIENT)
Dept: INTERNAL MEDICINE | Facility: CLINIC | Age: 64
End: 2024-12-13

## 2024-12-13 VITALS
HEART RATE: 74 BPM | SYSTOLIC BLOOD PRESSURE: 130 MMHG | DIASTOLIC BLOOD PRESSURE: 72 MMHG | OXYGEN SATURATION: 99 % | HEIGHT: 59 IN | BODY MASS INDEX: 21.57 KG/M2 | TEMPERATURE: 98.7 F | WEIGHT: 107 LBS

## 2024-12-13 DIAGNOSIS — Z93.4 OTHER ARTIFICIAL OPENINGS OF GASTROINTESTINAL TRACT STATUS: ICD-10-CM

## 2024-12-13 DIAGNOSIS — E11.9 TYPE 2 DIABETES MELLITUS W/OUT COMPLICATIONS: ICD-10-CM

## 2024-12-13 DIAGNOSIS — I10 ESSENTIAL (PRIMARY) HYPERTENSION: ICD-10-CM

## 2024-12-13 PROCEDURE — 99496 TRANSJ CARE MGMT HIGH F2F 7D: CPT

## 2024-12-13 PROCEDURE — G2211 COMPLEX E/M VISIT ADD ON: CPT | Mod: NC

## 2024-12-14 ENCOUNTER — INPATIENT (INPATIENT)
Facility: HOSPITAL | Age: 64
LOS: 3 days | Discharge: HOME | DRG: 393 | End: 2024-12-18
Attending: STUDENT IN AN ORGANIZED HEALTH CARE EDUCATION/TRAINING PROGRAM | Admitting: HOSPITALIST
Payer: MEDICAID

## 2024-12-14 VITALS
TEMPERATURE: 98 F | RESPIRATION RATE: 16 BRPM | HEIGHT: 59 IN | OXYGEN SATURATION: 99 % | WEIGHT: 106.92 LBS | DIASTOLIC BLOOD PRESSURE: 90 MMHG | SYSTOLIC BLOOD PRESSURE: 152 MMHG | HEART RATE: 84 BPM

## 2024-12-14 DIAGNOSIS — I10 ESSENTIAL (PRIMARY) HYPERTENSION: ICD-10-CM

## 2024-12-14 DIAGNOSIS — K94.23 GASTROSTOMY MALFUNCTION: ICD-10-CM

## 2024-12-14 DIAGNOSIS — Z98.890 OTHER SPECIFIED POSTPROCEDURAL STATES: Chronic | ICD-10-CM

## 2024-12-14 DIAGNOSIS — Z29.9 ENCOUNTER FOR PROPHYLACTIC MEASURES, UNSPECIFIED: ICD-10-CM

## 2024-12-14 DIAGNOSIS — I48.0 PAROXYSMAL ATRIAL FIBRILLATION: ICD-10-CM

## 2024-12-14 DIAGNOSIS — D64.9 ANEMIA, UNSPECIFIED: ICD-10-CM

## 2024-12-14 DIAGNOSIS — Z95.5 PRESENCE OF CORONARY ANGIOPLASTY IMPLANT AND GRAFT: Chronic | ICD-10-CM

## 2024-12-14 DIAGNOSIS — E11.9 TYPE 2 DIABETES MELLITUS WITHOUT COMPLICATIONS: ICD-10-CM

## 2024-12-14 DIAGNOSIS — I25.10 ATHEROSCLEROTIC HEART DISEASE OF NATIVE CORONARY ARTERY WITHOUT ANGINA PECTORIS: ICD-10-CM

## 2024-12-14 DIAGNOSIS — Z79.899 OTHER LONG TERM (CURRENT) DRUG THERAPY: ICD-10-CM

## 2024-12-14 DIAGNOSIS — Z90.49 ACQUIRED ABSENCE OF OTHER SPECIFIED PARTS OF DIGESTIVE TRACT: Chronic | ICD-10-CM

## 2024-12-14 DIAGNOSIS — T85.598A OTHER MECHANICAL COMPLICATION OF OTHER GASTROINTESTINAL PROSTHETIC DEVICES, IMPLANTS AND GRAFTS, INITIAL ENCOUNTER: ICD-10-CM

## 2024-12-14 DIAGNOSIS — F41.9 ANXIETY DISORDER, UNSPECIFIED: ICD-10-CM

## 2024-12-14 DIAGNOSIS — R74.8 ABNORMAL LEVELS OF OTHER SERUM ENZYMES: ICD-10-CM

## 2024-12-14 DIAGNOSIS — Z93.1 GASTROSTOMY STATUS: Chronic | ICD-10-CM

## 2024-12-14 LAB
ALBUMIN SERPL ELPH-MCNC: 4 G/DL — SIGNIFICANT CHANGE UP (ref 3.3–5)
ALP SERPL-CCNC: 141 U/L — HIGH (ref 40–120)
ALT FLD-CCNC: 29 U/L — SIGNIFICANT CHANGE UP (ref 10–45)
ANION GAP SERPL CALC-SCNC: 13 MMOL/L — SIGNIFICANT CHANGE UP (ref 5–17)
AST SERPL-CCNC: 26 U/L — SIGNIFICANT CHANGE UP (ref 10–40)
BASOPHILS # BLD AUTO: 0.03 K/UL — SIGNIFICANT CHANGE UP (ref 0–0.2)
BASOPHILS NFR BLD AUTO: 0.4 % — SIGNIFICANT CHANGE UP (ref 0–2)
BILIRUB SERPL-MCNC: 0.2 MG/DL — SIGNIFICANT CHANGE UP (ref 0.2–1.2)
BUN SERPL-MCNC: 21 MG/DL — SIGNIFICANT CHANGE UP (ref 7–23)
CALCIUM SERPL-MCNC: 9.7 MG/DL — SIGNIFICANT CHANGE UP (ref 8.4–10.5)
CHLORIDE SERPL-SCNC: 99 MMOL/L — SIGNIFICANT CHANGE UP (ref 96–108)
CO2 SERPL-SCNC: 25 MMOL/L — SIGNIFICANT CHANGE UP (ref 22–31)
CREAT SERPL-MCNC: 0.6 MG/DL — SIGNIFICANT CHANGE UP (ref 0.5–1.3)
EGFR: 101 ML/MIN/1.73M2 — SIGNIFICANT CHANGE UP
EOSINOPHIL # BLD AUTO: 0.5 K/UL — SIGNIFICANT CHANGE UP (ref 0–0.5)
EOSINOPHIL NFR BLD AUTO: 6.4 % — HIGH (ref 0–6)
GLUCOSE BLDC GLUCOMTR-MCNC: 127 MG/DL — HIGH (ref 70–99)
GLUCOSE BLDC GLUCOMTR-MCNC: 161 MG/DL — HIGH (ref 70–99)
GLUCOSE SERPL-MCNC: 161 MG/DL — HIGH (ref 70–99)
HCT VFR BLD CALC: 34.3 % — LOW (ref 34.5–45)
HGB BLD-MCNC: 10.4 G/DL — LOW (ref 11.5–15.5)
IMM GRANULOCYTES NFR BLD AUTO: 0.4 % — SIGNIFICANT CHANGE UP (ref 0–0.9)
LYMPHOCYTES # BLD AUTO: 2.31 K/UL — SIGNIFICANT CHANGE UP (ref 1–3.3)
LYMPHOCYTES # BLD AUTO: 29.5 % — SIGNIFICANT CHANGE UP (ref 13–44)
MCHC RBC-ENTMCNC: 27.4 PG — SIGNIFICANT CHANGE UP (ref 27–34)
MCHC RBC-ENTMCNC: 30.3 G/DL — LOW (ref 32–36)
MCV RBC AUTO: 90.5 FL — SIGNIFICANT CHANGE UP (ref 80–100)
MONOCYTES # BLD AUTO: 0.64 K/UL — SIGNIFICANT CHANGE UP (ref 0–0.9)
MONOCYTES NFR BLD AUTO: 8.2 % — SIGNIFICANT CHANGE UP (ref 2–14)
NEUTROPHILS # BLD AUTO: 4.31 K/UL — SIGNIFICANT CHANGE UP (ref 1.8–7.4)
NEUTROPHILS NFR BLD AUTO: 55.1 % — SIGNIFICANT CHANGE UP (ref 43–77)
NRBC # BLD: 0 /100 WBCS — SIGNIFICANT CHANGE UP (ref 0–0)
PLATELET # BLD AUTO: 200 K/UL — SIGNIFICANT CHANGE UP (ref 150–400)
POTASSIUM SERPL-MCNC: 4.2 MMOL/L — SIGNIFICANT CHANGE UP (ref 3.5–5.3)
POTASSIUM SERPL-SCNC: 4.2 MMOL/L — SIGNIFICANT CHANGE UP (ref 3.5–5.3)
PROT SERPL-MCNC: 8.1 G/DL — SIGNIFICANT CHANGE UP (ref 6–8.3)
RBC # BLD: 3.79 M/UL — LOW (ref 3.8–5.2)
RBC # FLD: 15.9 % — HIGH (ref 10.3–14.5)
SODIUM SERPL-SCNC: 137 MMOL/L — SIGNIFICANT CHANGE UP (ref 135–145)
WBC # BLD: 7.82 K/UL — SIGNIFICANT CHANGE UP (ref 3.8–10.5)
WBC # FLD AUTO: 7.82 K/UL — SIGNIFICANT CHANGE UP (ref 3.8–10.5)

## 2024-12-14 PROCEDURE — 99285 EMERGENCY DEPT VISIT HI MDM: CPT

## 2024-12-14 PROCEDURE — 99223 1ST HOSP IP/OBS HIGH 75: CPT

## 2024-12-14 PROCEDURE — 74177 CT ABD & PELVIS W/CONTRAST: CPT | Mod: 26,MC

## 2024-12-14 PROCEDURE — 93010 ELECTROCARDIOGRAM REPORT: CPT

## 2024-12-14 RX ORDER — METOCLOPRAMIDE HYDROCHLORIDE 10 MG/1
5 TABLET ORAL THREE TIMES A DAY
Refills: 0 | Status: DISCONTINUED | OUTPATIENT
Start: 2024-12-14 | End: 2024-12-18

## 2024-12-14 RX ORDER — 0.9 % SODIUM CHLORIDE 0.9 %
1000 INTRAVENOUS SOLUTION INTRAVENOUS
Refills: 0 | Status: DISCONTINUED | OUTPATIENT
Start: 2024-12-14 | End: 2024-12-18

## 2024-12-14 RX ORDER — ROSUVASTATIN CALCIUM 5 MG/1
10 TABLET, FILM COATED ORAL AT BEDTIME
Refills: 0 | Status: DISCONTINUED | OUTPATIENT
Start: 2024-12-14 | End: 2024-12-18

## 2024-12-14 RX ORDER — ALBUTEROL 90 MCG
2 AEROSOL (GRAM) INHALATION EVERY 6 HOURS
Refills: 0 | Status: DISCONTINUED | OUTPATIENT
Start: 2024-12-14 | End: 2024-12-18

## 2024-12-14 RX ORDER — ESCITALOPRAM OXALATE 10 MG/1
10 TABLET, FILM COATED ORAL DAILY
Refills: 0 | Status: DISCONTINUED | OUTPATIENT
Start: 2024-12-14 | End: 2024-12-18

## 2024-12-14 RX ORDER — APIXABAN 2.5 MG/1
5 TABLET, FILM COATED ORAL
Refills: 0 | Status: DISCONTINUED | OUTPATIENT
Start: 2024-12-14 | End: 2024-12-18

## 2024-12-14 RX ORDER — PANTOPRAZOLE SODIUM 40 MG/1
40 TABLET, DELAYED RELEASE ORAL DAILY
Refills: 0 | Status: DISCONTINUED | OUTPATIENT
Start: 2024-12-14 | End: 2024-12-18

## 2024-12-14 RX ORDER — CLOPIDOGREL 75 MG/1
75 TABLET, FILM COATED ORAL DAILY
Refills: 0 | Status: DISCONTINUED | OUTPATIENT
Start: 2024-12-14 | End: 2024-12-18

## 2024-12-14 RX ORDER — INSULIN GLARGINE 100 [IU]/ML
5 INJECTION, SOLUTION SUBCUTANEOUS AT BEDTIME
Refills: 0 | Status: DISCONTINUED | OUTPATIENT
Start: 2024-12-14 | End: 2024-12-16

## 2024-12-14 RX ORDER — GLUCAGON INJECTION, SOLUTION 0.5 MG/.1ML
1 INJECTION, SOLUTION SUBCUTANEOUS ONCE
Refills: 0 | Status: DISCONTINUED | OUTPATIENT
Start: 2024-12-14 | End: 2024-12-18

## 2024-12-14 RX ORDER — SUCRALFATE 1 G/1
1 TABLET ORAL
Refills: 0 | Status: DISCONTINUED | OUTPATIENT
Start: 2024-12-14 | End: 2024-12-18

## 2024-12-14 RX ADMIN — ROSUVASTATIN CALCIUM 10 MILLIGRAM(S): 5 TABLET, FILM COATED ORAL at 21:47

## 2024-12-14 RX ADMIN — METOCLOPRAMIDE HYDROCHLORIDE 5 MILLIGRAM(S): 10 TABLET ORAL at 21:47

## 2024-12-14 RX ADMIN — Medication 75 MILLILITER(S): at 21:50

## 2024-12-14 RX ADMIN — SUCRALFATE 1 GRAM(S): 1 TABLET ORAL at 21:46

## 2024-12-14 RX ADMIN — Medication 1: at 23:59

## 2024-12-14 RX ADMIN — INSULIN GLARGINE 5 UNIT(S): 100 INJECTION, SOLUTION SUBCUTANEOUS at 21:00

## 2024-12-14 NOTE — CONSULT NOTE ADULT - ASSESSMENT
64F PMH T2DM, HTN, GERD, FAP (s/p colon resection), CAD (s/p PCI 3/2023, Plavix), pAFib (Eliquis), AOCD, asthma, anxiety, depression, admit HNT for abd pain and poor PO intake 10/25-12/10 for PPN i/s/o presumed gastroparesis, s/p ERCP with stent placement for CBD stone followed by repeat ERCP 11/15 with stone extraction, sphincterotomy, stent replacement, c/c/b post ERCP pancreatitis and asthma exacerbation, s/p GJ tube placement (11/22 unable to be placed into jejunum initially, advanced endoscopically 12/2) who presents to ED d/t GJ tube dysfunction.     #GJ placement for FTT/gastroparesis (no NMGES performed)   #H/o pancreatitis 2/2 GS s/p ERCP with stent placement     Recommendations:  - Will see and assess patient in AM     Note incomplete     Jeannine Singh MD  Gastroenterology/Hepatology Fellow, PGY-5    NON-URGENT CONSULTS:  Please email [email or web address]  [email or web address]     64F PMH T2DM, HTN, GERD, FAP (s/p colon resection), CAD (s/p PCI 3/2023, Plavix), pAFib (Eliquis), AOCD, asthma, anxiety, depression, admit HNT for abd pain and poor PO intake 10/25-12/10 for PPN i/s/o presumed gastroparesis, s/p ERCP with stent placement for CBD stone followed by repeat ERCP 11/15 with stone extraction, sphincterotomy, stent replacement, c/c/b post ERCP pancreatitis and asthma exacerbation, s/p GJ tube placement (11/22 unable to be placed into jejunum initially, advanced endoscopically 12/2) who presents to ED d/t GJ tube dysfunction.     #GJ placement for FTT/gastroparesis (no NMGES performed)   #H/o pancreatitis 2/2 GS s/p ERCP with stent placement   #Pancreatitis     Recommendations:  - Will see and assess patient in AM     Note incomplete     Jeannine Singh MD  Gastroenterology/Hepatology Fellow, PGY-5    NON-URGENT CONSULTS:  Please email [email or web address]  [email or web address]     64F PMH T2DM, HTN, GERD, FAP (s/p colon resection), CAD (s/p PCI 3/2023, Plavix), pAFib (Eliquis), AOCD, asthma, anxiety, depression, admit HNT for abd pain and poor PO intake 10/25-12/10 for PPN i/s/o presumed gastroparesis, s/p ERCP with stent placement for CBD stone followed by repeat ERCP 11/15 with stone extraction, sphincterotomy, stent replacement, c/c/b post ERCP pancreatitis and asthma exacerbation, s/p GJ tube placement (11/22 unable to be placed into jejunum initially, advanced endoscopically 12/2) who presents to ED d/t GJ tube dysfunction.     #GJ placement for FTT/gastroparesis (no NMGES performed)   #H/o pancreatitis 2/2 GS s/p ERCP with stent placement   #Pancreatitis     Recommendations:  - Federico keep patient NPO at [facility] for GJ revision tomorrow   - Please get cardiology clearance prior to procedure   - Patient with abd pain likely from pancreatitis given elevated lipase, CT findings and abd pain   - Please treat pancreatitis with fluids and pain medication    - AM labs with electrolyte correction       Jeannine Singh MD  Gastroenterology/Hepatology Fellow, PGY-5    NON-URGENT CONSULTS:  Please email [email or web address]  [email or web address]

## 2024-12-14 NOTE — ED PROVIDER NOTE - ATTENDING CONTRIBUTION TO CARE
Chief Complaint:    - GJ tube malfunctioning     HPI:    - The patient, a 64-year-old female, with a medical history of gastroparesis has come in due to her GJ tube malfunctioning. She has been unable to feed or flush the jejunal portion of the tube. A GJ tube was placed in her at [facility] on November 22nd by an interventional radiologist. There hasn't been any change in the insertion site, nor any edema or fevers. She does report that the suturing around the GJ site became disconnected, and she and her family experienced a popping sensation where one of the sutures popped.     Past Medical History:    -  Gastroparesis     Past Surgical History:    -  GJ placement     Review of Systems:    -  Negative for fever or chills     Physical Examination:    -  On evaluation, the patient's abdomen was soft, non-tender, without any signs of rebound or guarding. The GJ tube insertion site showed no signs of inflammation or edema, but the suturing around the GJ site is disconnected.     Labs and Studies:    -  IV, CBC, CMP to be done     Medical Decision Making:    -  The critical issue was the malfunctioning of the GJ tube, which was placed due to a history of gastroparesis. From examining the site, it was clear that the suturing on the tube had become disconnected, which was likely responsible for the 'popping' sensation that the patient and her family reported. Given the situation, attempts were made to manually flush the tube with a piston syringe, however, water could not be injected into the jejunal portion of the tube. At this point, the goals were to start an IV, draw a CBC, and perform a CMP. There's a potential requirement for admission or consultation with interventional radiology for tube exchange if resources were available. If unavailable, the patient would need TPN and tube exchange at the earliest available date for interventional radiology or surgery. An interventional radiologist was consulted to check their availability for the tube exchange procedure. The patient's condition will be monitored, reassessed, and a decision on disposition will be made based on clinical indicators.     Impression:    -  GJ tube malfunction    Will follow up on labs, therapeutics, imaging, reassess and disposition as clinically indicated.  *The above represents an initial assessment/impression. Please refer to my progress notes below for potential changes in patient clinical course*     Kody Turner MD FACEP note of transfer at the usual time of sign out: Receiving team will follow up on labs, analgesia, any clinical imaging, reassess and disposition as clinically indicated.  Details of patient and plan conveyed to receiving physician and conveyed back for understanding.  There were no questions at this time about the patient's status, disposition, and plan. Patient's care to be taken over by receiving physician at this time, all decisions regarding the progression of care will be made at their discretion.

## 2024-12-14 NOTE — H&P ADULT - NSHPLABSRESULTS_GEN_ALL_CORE
no EKG in MUSE or commented upon by ED provider note no EKG in MUSE, physical chart, or commented upon by ED provider note

## 2024-12-14 NOTE — ED PROVIDER NOTE - DISPOSITION TYPE
Operative Note      Patient: Lars Manning  YOB: 2021  MRN: [id number]    Date of Procedure: 5/7/2024    Pre-Op Diagnosis Codes:     * Redundant foreskin [N47.8]    Post-Op Diagnosis: Same       Procedure(s):  CIRCUMCISION PEDIATRIC    Surgeon(s):  Fatoumata Garcia MD    Assistant:   Resident: Jakob Fish MD    Anesthesia: General    Estimated Blood Loss (mL): Minimal    Complications: None    Specimens:   * No specimens in log *    Implants:  * No implants in log *      Drains: * No LDAs found *    Findings:  Infection Present At Time Of Surgery (PATOS) (choose all levels that have infection present):  No infection present  Other Findings: redundant skin removed, artificial erection confirmed no minimal ventral chordee    Detailed Description of Procedure:   After informed consent was attained the patient was taken to the operating room and placed on the operating room table in the supine position.  General endotracheal anesthesia was induced and an operative time out was performed to confirm patient identification and the procedure to be performed.  All his pressure points were padded and he was prepped and draped in the normal sterile fashion.    A 5-0 Prolene traction suture was placed at the glans penis. A 10 f bougie was used to size the meatus.  A circumferential incision was made to create a 5 mm coronal collar and this incision was brought around ventrally under the glans,  there was concern for some ventral curvature.  The skin was carefully  off the urethra and we degloved the penis above the level of Hernandez's fascia down to proximal shaft of the penis to release chordee.  By dividing all these chordee elements we straightend the penis and got extra penile length. I decided to confirm there was no chordee by performing an artificial erection so I used  a 27G butterfly  needly and injected the corpora with injectable saline and there was about 5' residual ventral curvature  ADMIT

## 2024-12-14 NOTE — H&P ADULT - PROBLEM SELECTOR PLAN 7
- c/w eliquis (with applesauce), BB (IV conversion)  - obtain screening EKG (not obtained by ED), apparent regular rhythm on exam - c/w eliquis (with applesauce), holding BB  - obtain screening EKG (not obtained by ED), apparent regular rhythm on exam

## 2024-12-14 NOTE — CONSULT NOTE ADULT - CONSULT REQUESTED DATE/TIME
14-Dec-2024 21:30
FYI- pt was NOT fasting (even though it says she was)   during these tests. She had a slight breakfast and coffee.  Does she still need A1c?  
14-Dec-2024 17:28

## 2024-12-14 NOTE — CONSULT NOTE ADULT - PROVIDER SPECIALTY LIST ADULT
CERTIFICATE OF WORK      October 14, 2019      Re: Kayla Flower  [address]  [facility] [address]      This is to certify that Kayla Flower has been under my professional care.    She was cleared to return to work on 9/16/2019. No restrictions.        SIGNATURE:___________________________________________          DO Meg [facility] [address]  Dept Phone: [phone]    
Gastroenterology
[facility]

## 2024-12-14 NOTE — ED ADULT NURSE NOTE - NSFALLUNIVINTERV_ED_ALL_ED
Bed/Stretcher in lowest position, wheels locked, appropriate side rails in place/Call bell, personal items and telephone in reach/Instruct patient to call for assistance before getting out of bed/chair/stretcher/Non-slip footwear applied when patient is off stretcher/[place] to call system/Physically safe environment - no spills, clutter or unnecessary equipment/Purposeful proactive rounding/Room/bathroom lighting operational, light cord in reach

## 2024-12-14 NOTE — H&P ADULT - NSICDXPASTMEDICALHX_GEN_ALL_CORE_FT
PAST MEDICAL HISTORY:  Asthma     Benign essential HTN     CAD (coronary artery disease)     CAD (coronary artery disease)     DM (diabetes mellitus)     FAP (familial adenomatous polyposis)     GERD (gastroesophageal reflux disease)     H/O lichen planus     History of rectal bleeding     HLD (hyperlipidemia)     HTN (hypertension)     Insomnia     Stented coronary artery     Type 2 diabetes mellitus      PAST MEDICAL HISTORY:  Asthma     Benign essential HTN     CAD (coronary artery disease)     CAD (coronary artery disease)     DM (diabetes mellitus)     FAP (familial adenomatous polyposis)     Gastroparesis     GERD (gastroesophageal reflux disease)     H/O lichen planus     History of rectal bleeding     HLD (hyperlipidemia)     HTN (hypertension)     Insomnia     Pancreatitis     Stented coronary artery     Type 2 diabetes mellitus

## 2024-12-14 NOTE — ED ADULT NURSE NOTE - OBJECTIVE STATEMENT
64 y female presents to the ED AAOx4 ambulatory with complaints of J tube not draining. Pt states she felt the tube became dislodged last night as patient leaned forward and felt a pop and noticed that part of the stitching holding tube in place was broken. Pt states daughter tried flushing tube and was unable to. Pt states has pain around site of GJ tube, Pt abd is soft, nondistended, tender around GJ site. No redness noted  Denies headache, dizziness, vision changes, chest pain, shortness of breath, nausea, vomiting, diarrhea, fevers, chills, dysuria, hematuria, recent illness travel or fall.

## 2024-12-14 NOTE — ED ADULT NURSE NOTE - CAS TRG GEN SKIN COLOR
Implanted Venous Access Port     WHAT YOU NEED TO KNOW:   An implanted venous access port is a device used to give treatments and take blood. It may also be called a central venous access device (CVAD). The port is a small container that is placed under your skin, usually in your upper chest. The port is attached to a catheter that enters a large vein. DISCHARGE INSTRUCTIONS:   Resume your normal diet. Small sips of flat soda will help with mild nausea. Prevent an infection:   Wash your hands often. Use soap and water. Clean your hands before and after you care for your port. Remind everyone who cares for your port to wash their hands. Check your skin for infection every day. Look for redness, swelling, or fluid oozing from the port site. Care for your port:   1. You may shower beginning 48 hours after procedure. 2.  Leave glue in place. 3. It is normal for some bruising to occur. 4. Use Tylenol for pain. 5. Limit use of arm on the side that your port was placed. Lift nothing heavier than 5 pounds for 1 week, and then gradually increase activity as tolerated. 6. DO NOT apply ointment, lotion or cream to port site until incision is healed. Allow glue to fall off. DO NOT attempt to peel glue from skin even it it begins to flake. 7. After the port incision is healed you may swim, bathe. Notify the Interventional Radiologist if you have any of the followin. Fever above 101 F    2. Increased redness or swelling after 1st day. 3. Increased pain after 1st day. 4. Any sign of infection (drainage from port site, skin separation, hot to touch). 5. Persistent nausea or vomiting. Contact Interventional Radiology at [phone] [facility] PATIENTS: Contact Interventional Radiology at [phone]) ([address] [facility] 281: Contact Interventional Radiology at [phone]). Normal for race

## 2024-12-14 NOTE — H&P ADULT - PROBLEM SELECTOR PLAN 1
CT revealing percutaneous gastrostomy with balloon in appropriate location opposed to anterior abdominal wall. Tip of gastrostomy at level junction second and third duodenum tenting duodenal wall which may be partially obstructing.  - IR evaluated, recommending GI evaluation   - GI c/s emailed, awaiting recommendation; can also discuss regarding increased inflammatory changes associated with the pancreatic tail and splenic hilum with tiny loculated perisplenic collection. Patient w/o significant pain/tenderness, is afebrile w/o leukocytosis, will monitor in interim  - mIVF in interim given NPO status

## 2024-12-14 NOTE — H&P ADULT - NSHPPHYSICALEXAM_GEN_ALL_CORE
PHYSICAL EXAM:  GENERAL: NAD, well-groomed, well-developed, pleasant to interview accompanied by sister and daughter   HEAD: Atraumatic, Normocephalic  EYES: PERRL, conjunctiva and sclera clear  ENMT: No tonsillar erythema, exudates, or enlargement; Moist mucous membranes  NECK: Supple w/o JVD  NERVOUS SYSTEM: Alert & Oriented X4, Good concentration; Motor Strength 5/5 B/L upper and lower extremities  CHEST/LUNG: Clear to auscultation bilaterally; No rales, rhonchi, wheezing, or rubs  HEART: Regular rate and rhythm; No murmurs, rubs, or gallops  ABDOMEN: Soft, Nondistended; Bowel sounds present. No guarding, rebound tenderness, or rigidity. +slight tenderness RLQ +GJ tube site w/ superior R bumper out of place, no erthema/warmth/tenderness to palpation surrounding  EXTREMITIES: 2+ Peripheral Pulses, No edema/warmth/erythema/tenderness to palpation  SKIN: +hyperpigmented discoloration of face (reports chronic x years i/s/o lichen planus)

## 2024-12-14 NOTE — H&P ADULT - PROBLEM SELECTOR PLAN 10
- repeat CBC with differential given slight eosinophilia on presentation  - bladder distended on CT, obtain post-void BS r/o retention  - DOAC re VTE ppx  - NPO w/ mIVF  - fall, aspiration precautions  - disposition pending course  - dermatology referral on discharge regarding facial hyperpigmentation, reported by daughter to be chronic lichen planus

## 2024-12-14 NOTE — H&P ADULT - PROBLEM SELECTOR PLAN 9
meds usually crushed in GJ at home, daughter states can also take crushed in applesauce: discussed with pharmacy and will crush rosuvastatin, eliquis, plavix in applesauce, convert lopressor to IV, and give escitalopram as solution.  - f/u GI intervention to convert meds back once appropriate meds usually crushed in GJ at home, daughter states can also take crushed in applesauce: discussed with pharmacy and will crush rosuvastatin, eliquis, plavix in applesauce, and give escitalopram as solution.  - f/u GI intervention to convert meds back once appropriate (including BB)

## 2024-12-14 NOTE — ED PROVIDER NOTE - CLINICAL SUMMARY MEDICAL DECISION MAKING FREE TEXT BOX
64-year-old female with past medical history of GERD, HTN, DM, FAP s/p colon resection, paroxysmal A-fib, CAD status post PCI March 2023, recent admission at [place] for choledocholithiasis requiring MRCP and ERCP with stent placement for CBD stone complicated by stone extraction sphincterotomy and GJ tube placement by IR there.  Patient arrives today as that he felt the GJ tube became dislodged last night as patient leaned forward and felt a pop and noticed that part of the stitching holding tube in place was broken.  Patient's daughter then tried to flush the jejunal port and has not been able to flush it.  She states she is seeing particulate matter come back out may have been unable to put pin food feedings.  Having a little bit of abdominal discomfort distention but had a bowel movement this morning.  No vomiting, denies fevers, urinary symptoms, diarrhea, bloody stools, melena.    On exam appears nontoxic, hemodynamically stable without hypotension or tachycardia, afebrile, not hypoxic.  Abdomen is soft, no tenderness on exam.  GJ tube site appears to insert into the abdomen, attempted flushing however was not able to.  No overlying warmth erythema or drainage.  Spoke with IR who recommend CT abdomen pelvis to evaluate placement and then reassess.  If everything is normal patient can likely discharge home and follow-up in clinic on Monday versus be admitted to the CDU until IR can see them on Monday they typically do not do GJ tube exchanges on the weekend.

## 2024-12-14 NOTE — H&P ADULT - ASSESSMENT
64F PMH T2DM, HTN, GERD, FAP (s/p colon resection), CAD (s/p PCI 3/2023, Plavix), pAFib (Eliquis), AOCD, asthma, anxiety, depression, admit HNT for abd pain and poor PO intake 10/25-12/10 for PPN i/s/o presumed gastroparesis, s/p ERCP with stent placement for CBD stone followed by repeat ERCP 11/15 with stone extraction, sphincterotomy, stent replacement, c/c/b post ERCP pancreatitis and asthma exacerbation, s/p GJ tube placement (11/22 unable to be placed into jejunum initially, advanced endoscopically 12/2), presents today to ED d/t GJ tube dysfunction

## 2024-12-14 NOTE — CONSULT NOTE ADULT - ASSESSMENT
Interventional Radiology    Evaluate for Procedure: GJ exchange    HPI: 64F s/p GJ placement 11/22/24 which was unable to be placed into the jejunum during initial placement and had to be advanced endoscopically on 12/2 at [place] presents to ED w malfunctioning GJ. J port unable to be flushed. IR consulted for evaluation.    Allergies: No Known Allergies    Medications (Abx/Cardiac/Anticoagulation/Blood Products)      Data:  149.9  48.5  T(C): 36.7  HR: 82  BP: 122/80  RR: 18  SpO2: 98%    -WBC 7.82 / HgB 10.4 / Hct 34.3 / Plt 200  -Na 137 / Cl 99 / BUN 21 / Glucose 161  -K 4.2 / CO2 25 / Cr 0.60  -ALT 29 / Alk Phos 141 / T.Bili 0.2  -INR 1.08 / PTT 23.8      Radiology: reviewed    Assessment/Plan:   64F s/p GJ placement 11/22/24 which was unable to be advanced into the jejunum during initial placement and had to be advanced endoscopically on 12/2 at [place] presents to ED w malfunctioning GJ. J port unable to be flushed. IR consulted for evaluation.    CT abd/pelvis reviewed. GJ tube currently malpositioned, retracted into the stomach. As IR was unable to pass the wire down to the jejunum during initial placement requiring GI to advance the tube endoscopically, would rec GI consult for GJ repositioning.    Discussed with ED Dr. Stahl.

## 2024-12-14 NOTE — H&P ADULT - PROBLEM SELECTOR PLAN 6
- c/w statin (with applesauce), BB (IV conversion), and plavix (with applesauce) - c/w statin (with applesauce), and plavix (with applesauce), holding BB

## 2024-12-14 NOTE — CONSULT NOTE ADULT - ATTENDING COMMENTS
This is a 63 yo woman with DM, HTN, GERD, FAP (s/p colon resection), CAD (s/p PCI 3/2023, Plavix), pAFib (Eliquis), AOCD, asthma, anxiety, depression, who we are asked to see because of PEG/J dysfunction. CT scan shows PEG to be in the correct position and J-tube in the 2nd portion of duodenum. However, J-tube will not flush. On exam, the abdomen is mildly protuberant but soft. There is mild tenderness to deep palpation. The G tube site is without erythema or induration. We attempted to clear the J-tube by passing a brush and then a guidewire, but there was resistance even to the wire suggesting that there is a bend in the tube that is prohibiting flow. Tube will require endoscopic replacement or repositioning.

## 2024-12-14 NOTE — H&P ADULT - HISTORY OF PRESENT ILLNESS
64F PMH T2DM, HTN, GERD, FAP (s/p colon resection), CAD (s/p PCI 3/2023, Plavix), pAFib (Eliquis), AOCD, asthma, anxiety, depression, admit HNT for abd pain and poor PO intake 10/25-12/10 for PPN i/s/o presumed gastroparesis, s/p ERCP with stent placement for CBD stone followed by repeat ERCP 11/15 with stone extraction, sphincterotomy, stent replacement, c/c/b post ERCP pancreatitis and asthma exacerbation, s/p GJ tube placement (11/22 unable to be placed into jejunum initially, advanced endoscopically 12/2), presents to ED today d/t sensation of "pop" with noticing GJ tube stitch out of place and difficulty utilizing for feeds, ED unable to flush J port.     BP 120s-150s/80-90    eosinophil predominance, H/H 10.4/34.3 (normocytic);      CT A/P IC:   - Percutaneous gastrostomy with balloon in appropriate location opposed to anterior abdominal wall. Tip of gastrostomy at level junction second and third duodenum tenting duodenal wall which may be partially obstructing.  - Increased inflammatory changes associated with the pancreatic tail and splenic hilum with tiny loculated perisplenic collection.    IR evaluated, GJ tube currently malpositioned, retracted into the stomach. As IR was unable to pass the wire down to the jejunum during initial placement requiring GI to advance the tube endoscopically, would rec GI consult for GJ repositioning. 64F PMH T2DM, HTN, GERD, FAP (s/p colon resection), CAD (s/p PCI 3/2023, Plavix), pAFib (Eliquis), AOCD, asthma, anxiety, depression, admit HNT for abd pain and poor PO intake 10/25-12/10 for PPN i/s/o presumed gastroparesis, s/p ERCP with stent placement for CBD stone followed by repeat ERCP 11/15 with stone extraction, sphincterotomy, stent replacement, c/c/b post ERCP pancreatitis and asthma exacerbation, s/p GJ tube placement (11/22 unable to be placed into jejunum initially, advanced endoscopically 12/2), presents today to ED d/t GJ tube dysfunction. Accompanied by sister and daughter, daughter assists in collateral, reporting baseline health since recent discharge when bending over she heard "pop" and noticed GJ tube bumper stitch out of place, after which was unable to utilize for feeds prompting ED presentation. Additionally reports mild abd distension, otherwise denies fever, chills, abdominal pain, n/v/d, constipation (BM this AM), melena, hematochezia, dysuria, hematuria, or other complaint.     BP 120s-150s/80-90    eosinophil predominance, H/H 10.4/34.3 (normocytic);      CT A/P IC:   - Percutaneous gastrostomy with balloon in appropriate location opposed to anterior abdominal wall. Tip of gastrostomy at level junction second and third duodenum tenting duodenal wall which may be partially obstructing.  - Increased inflammatory changes associated with the pancreatic tail and splenic hilum with tiny loculated perisplenic collection.    ED unable to flush. IR evaluated, GJ tube currently malpositioned, retracted into the stomach. As IR was unable to pass the wire down to the jejunum during initial placement requiring GI to advance the tube endoscopically, would rec GI consult for GJ repositioning. 64F PMH T2DM, HTN, GERD, FAP (s/p colon resection), CAD (s/p PCI 3/2023, Plavix), pAFib (Eliquis), AOCD, asthma, anxiety, depression, admit HNT for abd pain and poor PO intake 10/25-12/10 for PPN i/s/o presumed gastroparesis, s/p ERCP with stent placement for CBD stone followed by repeat ERCP 11/15 with stone extraction, sphincterotomy, stent replacement, c/c/b post ERCP pancreatitis and asthma exacerbation, s/p GJ tube placement (11/22 unable to be placed into jejunum initially, advanced endoscopically 12/2), presents today to ED d/t GJ tube dysfunction. Accompanied by sister and daughter, daughter assists in collateral, reporting baseline health since recent discharge when bending over she heard "pop" and noticed GJ tube bumper stitch out of place, after which was unable to utilize for feeds prompting ED presentation. Additionally reports mild abd distension, otherwise denies fever, chills, abdominal pain, n/v/d, constipation (BM this AM), melena, hematochezia, dysuria, hematuria, or other complaint. Of note, states that she cannot swallow pills w/o crushing in applesauce or through GJ tube. Med rec completed by pharmacy team and verified personally with daughter.    BP 120s-150s/80-90    eosinophil predominance, H/H 10.4/34.3 (normocytic);      CT A/P IC:   - Percutaneous gastrostomy with balloon in appropriate location opposed to anterior abdominal wall. Tip of gastrostomy at level junction second and third duodenum tenting duodenal wall which may be partially obstructing.  - Increased inflammatory changes associated with the pancreatic tail and splenic hilum with tiny loculated perisplenic collection.    ED unable to flush. IR evaluated, GJ tube currently malpositioned, retracted into the stomach. As IR was unable to pass the wire down to the jejunum during initial placement requiring GI to advance the tube endoscopically, would rec GI consult for GJ repositioning.

## 2024-12-14 NOTE — PATIENT PROFILE ADULT - FUNCTIONAL ASSESSMENT - BASIC MOBILITY 2.
Subjective:     Patient ID: Eloy Roach is a 64year old female. Presents for follow-up on multiple medical conditions    HPI  Patient is seen in the presence of her son, she continues to gain weight, states that she walks every day tries to limit food she eats I doubt that she is following proper diet. Reports that she can move much better than previously less leg pain  , Less leg edema. Physical therapy helped Planter fasciitis, she decided against surgical intervention by podiatry. Continues to be bothered pain. She voices that she feels depressed about her weight, wants to return back to [place], frustrated with the way she feels. Feels depressed and anxious about needing things in her life  Takes blood pressure medications as prescribed, also needs refill on omeprazole it was helpful  Current Outpatient Medications   Medication Sig Dispense Refill    DULoxetine 30 MG Oral Cap DR Particles Take 1 capsule (30 mg total) by mouth daily. 30 capsule 1    Omeprazole 40 MG Oral Capsule Delayed Release Take 1 capsule (40 mg total) by mouth daily. 30 capsule 3    azelastine 0.1 % Nasal Solution 2 sprays by Nasal route 2 (two) times daily. 30 mL 0    mometasone 0.1 % External Ointment Apply 1 Application topically daily. Apply to entrance of ear canal before sleep 1 each 0    metoprolol succinate ER 25 MG Oral Tablet 24 Hr Take 1 tablet (25 mg total) by mouth daily. 90 tablet 1    valsartan 80 MG Oral Tab Take 1 tablet (80 mg total) by mouth daily. In the evening 90 tablet 3     Allergies:No Known Allergies    Past Medical History:   Diagnosis Date    Hypertension     Varicose vein       History reviewed. No pertinent surgical history. History reviewed. No pertinent family history.    Social History:   Social History     Socioeconomic History    Marital status:    Tobacco Use    Smoking status: Never    Smokeless tobacco: Never   Vaping Use    Vaping Use: Never used   Substance and Sexual Activity Alcohol use: Not Currently    Drug use: Never        Objective:   Physical Exam  Constitutional:       Appearance: Normal appearance. She is obese. HENT:      Head: Normocephalic and atraumatic. Eyes:      General: No scleral icterus. Extraocular Movements: Extraocular movements intact. Conjunctiva/sclera: Conjunctivae normal.      Pupils: Pupils are equal, round, and reactive to light. Cardiovascular:      Rate and Rhythm: Normal rate and regular rhythm. Heart sounds: No murmur heard. No gallop. Abdominal:      General: Bowel sounds are normal.      Palpations: Abdomen is soft. There is no mass. Tenderness: There is no guarding or rebound. Comments: Large abdominal wall   Musculoskeletal:         General: Normal range of motion. Right lower leg: No edema. Left lower leg: No edema. Skin:     General: Skin is warm. Coloration: Skin is not jaundiced. Neurological:      General: No focal deficit present. Mental Status: She is alert and oriented to person, place, and time. Mental status is at baseline. Psychiatric:         Mood and Affect: Mood normal.         Behavior: Behavior normal.         Thought Content:  Thought content normal.         Assessment & Plan:   Class 3 severe obesity due to excess calories with serious comorbidity and body mass index (BMI) of 50.0 to 59.9 in [facility])  (primary encounter diagnosis) patient referred to see weight loss nurse practitioner, stressed importance of eating properly limit carbohydrate and sweet intake, referred patient to see endocrinologist/3 years history of treatment with prednisone, off prednisone for 6 months now  Encounter for vitamin deficiency screening  Hepatic steatosis discussed importance of low-fat low-cholesterol diet weight loss recommended    Varicose veins of lower extremity with pain, bilateral compression support stockings knee-high's recommended  GERD without esophagitis discussed GERD diet, patient will start omeprazole 40 mg daily, will monitor \"upper and lower endoscopies   major depressive disorder single episode mild, will start patient on duloxetine 30 mg daily advised patient that dose needs to be increased in 4 to 6 weeks is here to report how she is feeling    Orders Placed This Encounter      CBC With Differential With Platelet      Comp Metabolic Panel (14)      Hemoglobin A1C      Assay, Thyroid Stim Hormone      Free T3 (Triiodothryronine)      Free T4, (Free Thyroxine)      Vitamin B12      Vitamin D      Meds This Visit:  Requested Prescriptions     Signed Prescriptions Disp Refills    DULoxetine 30 MG Oral Cap DR Particles 30 capsule 1     Sig: Take 1 capsule (30 mg total) by mouth daily. Omeprazole 40 MG Oral Capsule Delayed Release 30 capsule 3     Sig: Take 1 capsule (40 mg total) by mouth daily.        Imaging & Referrals:  ENDOCRINOLOGY - INTERNAL 4 = No assist / stand by assistance

## 2024-12-14 NOTE — ED PROVIDER NOTE - PHYSICAL EXAMINATION
GENERAL: Awake, alert, NAD  HEAD: NC/AT, neck supple, moist mucous membranes  EYES: PERRL, EOM grossly intact, sclera anicteric  LUNGS: normal WOB on RA  CARDIAC: RRR  ABDOMEN: Soft, non tender, non distended, no rebound, no guarding, no warmth or erythema surrounding GJ tub site, appears to enter stomach, unable to flush well  EXT: No edema, no calf tenderness, no deformities.  NEURO: A&Ox3. Moving all extremities.  SKIN: Warm and dry. No rash.  PSYCH: Normal affect.

## 2024-12-14 NOTE — H&P ADULT - NSICDXPASTSURGICALHX_GEN_ALL_CORE_FT
PAST SURGICAL HISTORY:  History of colon resection     Stented coronary artery      PAST SURGICAL HISTORY:  Gastrojejunal (GJ) tube in place     History of biliary stent insertion     History of colon resection     Stented coronary artery

## 2024-12-14 NOTE — H&P ADULT - PROBLEM SELECTOR PLAN 5
- c/w lopressor (IV conversion from 50mg PO BID -> 5mg q6h w/ hold parameters pending GI intervention), monitor BP - hold BB ON, monitor BP and restart s/p GI intervention

## 2024-12-15 LAB
ALBUMIN SERPL ELPH-MCNC: 3.5 G/DL — SIGNIFICANT CHANGE UP (ref 3.3–5)
ALBUMIN SERPL ELPH-MCNC: 3.7 G/DL — SIGNIFICANT CHANGE UP (ref 3.3–5)
ALP SERPL-CCNC: 110 U/L — SIGNIFICANT CHANGE UP (ref 40–120)
ALP SERPL-CCNC: <15 U/L — LOW (ref 40–120)
ALT FLD-CCNC: 24 U/L — SIGNIFICANT CHANGE UP (ref 10–45)
ALT FLD-CCNC: 26 U/L — SIGNIFICANT CHANGE UP (ref 10–45)
ANION GAP SERPL CALC-SCNC: 12 MMOL/L — SIGNIFICANT CHANGE UP (ref 5–17)
ANION GAP SERPL CALC-SCNC: 23 MMOL/L — HIGH (ref 5–17)
AST SERPL-CCNC: 24 U/L — SIGNIFICANT CHANGE UP (ref 10–40)
AST SERPL-CCNC: 29 U/L — SIGNIFICANT CHANGE UP (ref 10–40)
BASOPHILS # BLD AUTO: 0.04 K/UL — SIGNIFICANT CHANGE UP (ref 0–0.2)
BASOPHILS NFR BLD AUTO: 0.7 % — SIGNIFICANT CHANGE UP (ref 0–2)
BILIRUB SERPL-MCNC: 0.3 MG/DL — SIGNIFICANT CHANGE UP (ref 0.2–1.2)
BILIRUB SERPL-MCNC: 0.3 MG/DL — SIGNIFICANT CHANGE UP (ref 0.2–1.2)
BUN SERPL-MCNC: 17 MG/DL — SIGNIFICANT CHANGE UP (ref 7–23)
BUN SERPL-MCNC: 18 MG/DL — SIGNIFICANT CHANGE UP (ref 7–23)
CALCIUM SERPL-MCNC: 9.3 MG/DL — SIGNIFICANT CHANGE UP (ref 8.4–10.5)
CALCIUM SERPL-MCNC: <3 MG/DL — CRITICAL LOW (ref 8.4–10.5)
CHLORIDE SERPL-SCNC: 102 MMOL/L — SIGNIFICANT CHANGE UP (ref 96–108)
CHLORIDE SERPL-SCNC: 96 MMOL/L — SIGNIFICANT CHANGE UP (ref 96–108)
CO2 SERPL-SCNC: 17 MMOL/L — LOW (ref 22–31)
CO2 SERPL-SCNC: 24 MMOL/L — SIGNIFICANT CHANGE UP (ref 22–31)
CREAT SERPL-MCNC: 0.59 MG/DL — SIGNIFICANT CHANGE UP (ref 0.5–1.3)
CREAT SERPL-MCNC: 0.6 MG/DL — SIGNIFICANT CHANGE UP (ref 0.5–1.3)
EGFR: 101 ML/MIN/1.73M2 — SIGNIFICANT CHANGE UP
EGFR: 102 ML/MIN/1.73M2 — SIGNIFICANT CHANGE UP
EOSINOPHIL # BLD AUTO: 0.64 K/UL — HIGH (ref 0–0.5)
EOSINOPHIL NFR BLD AUTO: 10.7 % — HIGH (ref 0–6)
GLUCOSE BLDC GLUCOMTR-MCNC: 108 MG/DL — HIGH (ref 70–99)
GLUCOSE BLDC GLUCOMTR-MCNC: 113 MG/DL — HIGH (ref 70–99)
GLUCOSE BLDC GLUCOMTR-MCNC: 157 MG/DL — HIGH (ref 70–99)
GLUCOSE BLDC GLUCOMTR-MCNC: 165 MG/DL — HIGH (ref 70–99)
GLUCOSE SERPL-MCNC: 158 MG/DL — HIGH (ref 70–99)
GLUCOSE SERPL-MCNC: 181 MG/DL — HIGH (ref 70–99)
HCT VFR BLD CALC: 32.3 % — LOW (ref 34.5–45)
HGB BLD-MCNC: 10.3 G/DL — LOW (ref 11.5–15.5)
IMM GRANULOCYTES NFR BLD AUTO: 0.3 % — SIGNIFICANT CHANGE UP (ref 0–0.9)
LYMPHOCYTES # BLD AUTO: 2.21 K/UL — SIGNIFICANT CHANGE UP (ref 1–3.3)
LYMPHOCYTES # BLD AUTO: 37 % — SIGNIFICANT CHANGE UP (ref 13–44)
MAGNESIUM SERPL-MCNC: 2 MG/DL — SIGNIFICANT CHANGE UP (ref 1.6–2.6)
MAGNESIUM SERPL-MCNC: <.6 MG/DL — CRITICAL LOW (ref 1.6–2.6)
MCHC RBC-ENTMCNC: 28.6 PG — SIGNIFICANT CHANGE UP (ref 27–34)
MCHC RBC-ENTMCNC: 31.9 G/DL — LOW (ref 32–36)
MCV RBC AUTO: 89.7 FL — SIGNIFICANT CHANGE UP (ref 80–100)
MONOCYTES # BLD AUTO: 0.57 K/UL — SIGNIFICANT CHANGE UP (ref 0–0.9)
MONOCYTES NFR BLD AUTO: 9.5 % — SIGNIFICANT CHANGE UP (ref 2–14)
NEUTROPHILS # BLD AUTO: 2.49 K/UL — SIGNIFICANT CHANGE UP (ref 1.8–7.4)
NEUTROPHILS NFR BLD AUTO: 41.8 % — LOW (ref 43–77)
NRBC # BLD: 0 /100 WBCS — SIGNIFICANT CHANGE UP (ref 0–0)
PHOSPHATE SERPL-MCNC: 4.3 MG/DL — SIGNIFICANT CHANGE UP (ref 2.5–4.5)
PHOSPHATE SERPL-MCNC: 4.6 MG/DL — HIGH (ref 2.5–4.5)
PLATELET # BLD AUTO: 191 K/UL — SIGNIFICANT CHANGE UP (ref 150–400)
POTASSIUM SERPL-MCNC: 3.9 MMOL/L — SIGNIFICANT CHANGE UP (ref 3.5–5.3)
POTASSIUM SERPL-MCNC: >9 MMOL/L — CRITICAL HIGH (ref 3.5–5.3)
POTASSIUM SERPL-SCNC: 3.9 MMOL/L — SIGNIFICANT CHANGE UP (ref 3.5–5.3)
POTASSIUM SERPL-SCNC: >9 MMOL/L — CRITICAL HIGH (ref 3.5–5.3)
PROT SERPL-MCNC: 7.4 G/DL — SIGNIFICANT CHANGE UP (ref 6–8.3)
PROT SERPL-MCNC: 7.4 G/DL — SIGNIFICANT CHANGE UP (ref 6–8.3)
RBC # BLD: 3.6 M/UL — LOW (ref 3.8–5.2)
RBC # FLD: 16.2 % — HIGH (ref 10.3–14.5)
SODIUM SERPL-SCNC: 136 MMOL/L — SIGNIFICANT CHANGE UP (ref 135–145)
SODIUM SERPL-SCNC: 138 MMOL/L — SIGNIFICANT CHANGE UP (ref 135–145)
WBC # BLD: 5.97 K/UL — SIGNIFICANT CHANGE UP (ref 3.8–10.5)
WBC # FLD AUTO: 5.97 K/UL — SIGNIFICANT CHANGE UP (ref 3.8–10.5)

## 2024-12-15 PROCEDURE — 99232 SBSQ HOSP IP/OBS MODERATE 35: CPT

## 2024-12-15 PROCEDURE — 99222 1ST HOSP IP/OBS MODERATE 55: CPT

## 2024-12-15 PROCEDURE — 93010 ELECTROCARDIOGRAM REPORT: CPT

## 2024-12-15 RX ORDER — ACETAMINOPHEN 500MG 500 MG/1
725 TABLET, COATED ORAL ONCE
Refills: 0 | Status: COMPLETED | OUTPATIENT
Start: 2024-12-15 | End: 2024-12-16

## 2024-12-15 RX ORDER — ACETAMINOPHEN 500MG 500 MG/1
725 TABLET, COATED ORAL ONCE
Refills: 0 | Status: COMPLETED | OUTPATIENT
Start: 2024-12-15 | End: 2024-12-15

## 2024-12-15 RX ADMIN — Medication 1: at 05:56

## 2024-12-15 RX ADMIN — INSULIN GLARGINE 5 UNIT(S): 100 INJECTION, SOLUTION SUBCUTANEOUS at 22:53

## 2024-12-15 RX ADMIN — METOCLOPRAMIDE HYDROCHLORIDE 5 MILLIGRAM(S): 10 TABLET ORAL at 05:56

## 2024-12-15 RX ADMIN — METOCLOPRAMIDE HYDROCHLORIDE 5 MILLIGRAM(S): 10 TABLET ORAL at 22:46

## 2024-12-15 RX ADMIN — ACETAMINOPHEN 500MG 290 MILLIGRAM(S): 500 TABLET, COATED ORAL at 09:09

## 2024-12-15 RX ADMIN — APIXABAN 5 MILLIGRAM(S): 2.5 TABLET, FILM COATED ORAL at 18:05

## 2024-12-15 RX ADMIN — Medication 1: at 11:49

## 2024-12-15 RX ADMIN — PANTOPRAZOLE SODIUM 40 MILLIGRAM(S): 40 TABLET, DELAYED RELEASE ORAL at 12:30

## 2024-12-15 RX ADMIN — SUCRALFATE 1 GRAM(S): 1 TABLET ORAL at 22:47

## 2024-12-15 RX ADMIN — ESCITALOPRAM OXALATE 10 MILLIGRAM(S): 10 TABLET, FILM COATED ORAL at 12:19

## 2024-12-15 RX ADMIN — SUCRALFATE 1 GRAM(S): 1 TABLET ORAL at 14:36

## 2024-12-15 RX ADMIN — ROSUVASTATIN CALCIUM 10 MILLIGRAM(S): 5 TABLET, FILM COATED ORAL at 22:47

## 2024-12-15 RX ADMIN — ACETAMINOPHEN 500MG 725 MILLIGRAM(S): 500 TABLET, COATED ORAL at 09:39

## 2024-12-15 RX ADMIN — SUCRALFATE 1 GRAM(S): 1 TABLET ORAL at 05:54

## 2024-12-15 RX ADMIN — APIXABAN 5 MILLIGRAM(S): 2.5 TABLET, FILM COATED ORAL at 05:54

## 2024-12-15 RX ADMIN — CLOPIDOGREL 75 MILLIGRAM(S): 75 TABLET, FILM COATED ORAL at 12:16

## 2024-12-15 RX ADMIN — METOCLOPRAMIDE HYDROCHLORIDE 5 MILLIGRAM(S): 10 TABLET ORAL at 14:35

## 2024-12-15 NOTE — PROVIDER CONTACT NOTE (CRITICAL VALUE NOTIFICATION) - ACTION/TREATMENT ORDERED:
DAY ACP Gabriella Gupta made aware. EKG done as per orders. Repeat labs sent. Will continue to [facility]

## 2024-12-16 LAB
ALBUMIN SERPL ELPH-MCNC: 3.8 G/DL — SIGNIFICANT CHANGE UP (ref 3.3–5)
ALP SERPL-CCNC: 110 U/L — SIGNIFICANT CHANGE UP (ref 40–120)
ALT FLD-CCNC: 29 U/L — SIGNIFICANT CHANGE UP (ref 10–45)
ANION GAP SERPL CALC-SCNC: 15 MMOL/L — SIGNIFICANT CHANGE UP (ref 5–17)
AST SERPL-CCNC: 33 U/L — SIGNIFICANT CHANGE UP (ref 10–40)
BASOPHILS # BLD AUTO: 0.04 K/UL — SIGNIFICANT CHANGE UP (ref 0–0.2)
BASOPHILS NFR BLD AUTO: 0.6 % — SIGNIFICANT CHANGE UP (ref 0–2)
BILIRUB SERPL-MCNC: 0.5 MG/DL — SIGNIFICANT CHANGE UP (ref 0.2–1.2)
BUN SERPL-MCNC: 20 MG/DL — SIGNIFICANT CHANGE UP (ref 7–23)
CALCIUM SERPL-MCNC: 9.8 MG/DL — SIGNIFICANT CHANGE UP (ref 8.4–10.5)
CHLORIDE SERPL-SCNC: 101 MMOL/L — SIGNIFICANT CHANGE UP (ref 96–108)
CO2 SERPL-SCNC: 21 MMOL/L — LOW (ref 22–31)
CREAT SERPL-MCNC: 0.71 MG/DL — SIGNIFICANT CHANGE UP (ref 0.5–1.3)
EGFR: 96 ML/MIN/1.73M2 — SIGNIFICANT CHANGE UP
EOSINOPHIL # BLD AUTO: 0.84 K/UL — HIGH (ref 0–0.5)
EOSINOPHIL NFR BLD AUTO: 12.3 % — HIGH (ref 0–6)
GLUCOSE BLDC GLUCOMTR-MCNC: 124 MG/DL — HIGH (ref 70–99)
GLUCOSE BLDC GLUCOMTR-MCNC: 128 MG/DL — HIGH (ref 70–99)
GLUCOSE BLDC GLUCOMTR-MCNC: 91 MG/DL — SIGNIFICANT CHANGE UP (ref 70–99)
GLUCOSE SERPL-MCNC: 118 MG/DL — HIGH (ref 70–99)
HCT VFR BLD CALC: 34.9 % — SIGNIFICANT CHANGE UP (ref 34.5–45)
HGB BLD-MCNC: 11 G/DL — LOW (ref 11.5–15.5)
IMM GRANULOCYTES NFR BLD AUTO: 0.1 % — SIGNIFICANT CHANGE UP (ref 0–0.9)
LYMPHOCYTES # BLD AUTO: 2.55 K/UL — SIGNIFICANT CHANGE UP (ref 1–3.3)
LYMPHOCYTES # BLD AUTO: 37.4 % — SIGNIFICANT CHANGE UP (ref 13–44)
MCHC RBC-ENTMCNC: 28.3 PG — SIGNIFICANT CHANGE UP (ref 27–34)
MCHC RBC-ENTMCNC: 31.5 G/DL — LOW (ref 32–36)
MCV RBC AUTO: 89.7 FL — SIGNIFICANT CHANGE UP (ref 80–100)
MONOCYTES # BLD AUTO: 0.55 K/UL — SIGNIFICANT CHANGE UP (ref 0–0.9)
MONOCYTES NFR BLD AUTO: 8.1 % — SIGNIFICANT CHANGE UP (ref 2–14)
NEUTROPHILS # BLD AUTO: 2.83 K/UL — SIGNIFICANT CHANGE UP (ref 1.8–7.4)
NEUTROPHILS NFR BLD AUTO: 41.5 % — LOW (ref 43–77)
NRBC # BLD: 0 /100 WBCS — SIGNIFICANT CHANGE UP (ref 0–0)
PLATELET # BLD AUTO: 201 K/UL — SIGNIFICANT CHANGE UP (ref 150–400)
POTASSIUM SERPL-MCNC: 3.9 MMOL/L — SIGNIFICANT CHANGE UP (ref 3.5–5.3)
POTASSIUM SERPL-SCNC: 3.9 MMOL/L — SIGNIFICANT CHANGE UP (ref 3.5–5.3)
PROT SERPL-MCNC: 7.8 G/DL — SIGNIFICANT CHANGE UP (ref 6–8.3)
RBC # BLD: 3.89 M/UL — SIGNIFICANT CHANGE UP (ref 3.8–5.2)
RBC # FLD: 16.2 % — HIGH (ref 10.3–14.5)
SODIUM SERPL-SCNC: 137 MMOL/L — SIGNIFICANT CHANGE UP (ref 135–145)
WBC # BLD: 6.82 K/UL — SIGNIFICANT CHANGE UP (ref 3.8–10.5)
WBC # FLD AUTO: 6.82 K/UL — SIGNIFICANT CHANGE UP (ref 3.8–10.5)

## 2024-12-16 PROCEDURE — 43235 EGD DIAGNOSTIC BRUSH WASH: CPT | Mod: GC

## 2024-12-16 PROCEDURE — 99232 SBSQ HOSP IP/OBS MODERATE 35: CPT

## 2024-12-16 DEVICE — CLIP RESOLUTION 360 235CM: Type: IMPLANTABLE DEVICE | Status: FUNCTIONAL

## 2024-12-16 RX ORDER — SODIUM CHLORIDE 9 MG/ML
500 INJECTION, SOLUTION INTRAMUSCULAR; INTRAVENOUS; SUBCUTANEOUS
Refills: 0 | Status: DISCONTINUED | OUTPATIENT
Start: 2024-12-16 | End: 2024-12-17

## 2024-12-16 RX ORDER — OXYCODONE HYDROCHLORIDE 30 MG/1
5 TABLET ORAL ONCE
Refills: 0 | Status: DISCONTINUED | OUTPATIENT
Start: 2024-12-16 | End: 2024-12-16

## 2024-12-16 RX ADMIN — OXYCODONE HYDROCHLORIDE 5 MILLIGRAM(S): 30 TABLET ORAL at 18:51

## 2024-12-16 RX ADMIN — APIXABAN 5 MILLIGRAM(S): 2.5 TABLET, FILM COATED ORAL at 17:24

## 2024-12-16 RX ADMIN — ROSUVASTATIN CALCIUM 10 MILLIGRAM(S): 5 TABLET, FILM COATED ORAL at 21:37

## 2024-12-16 RX ADMIN — ACETAMINOPHEN 500MG 290 MILLIGRAM(S): 500 TABLET, COATED ORAL at 00:28

## 2024-12-16 RX ADMIN — METOCLOPRAMIDE HYDROCHLORIDE 5 MILLIGRAM(S): 10 TABLET ORAL at 05:48

## 2024-12-16 RX ADMIN — METOCLOPRAMIDE HYDROCHLORIDE 5 MILLIGRAM(S): 10 TABLET ORAL at 21:36

## 2024-12-16 RX ADMIN — APIXABAN 5 MILLIGRAM(S): 2.5 TABLET, FILM COATED ORAL at 05:48

## 2024-12-16 RX ADMIN — SUCRALFATE 1 GRAM(S): 1 TABLET ORAL at 21:36

## 2024-12-16 RX ADMIN — SUCRALFATE 1 GRAM(S): 1 TABLET ORAL at 05:48

## 2024-12-16 RX ADMIN — ACETAMINOPHEN 500MG 725 MILLIGRAM(S): 500 TABLET, COATED ORAL at 00:58

## 2024-12-16 RX ADMIN — CLOPIDOGREL 75 MILLIGRAM(S): 75 TABLET, FILM COATED ORAL at 15:47

## 2024-12-16 NOTE — PRE-ANESTHESIA EVALUATION ADULT - TEMPERATURE IN CELSIUS (DEGREES C)
Patient AAO age appropriate Lung CTA x4 laceration to left medial planta pedis 1.5 inch opening, family x2 to bedside . Md aware of patient explained to family treatment at this time. Mother verbalized understanding at this time X- ray complete   36.1

## 2024-12-16 NOTE — PRE PROCEDURE NOTE - PRE PROCEDURE EVALUATION
Attending Physician: Dr Nicholas                            Procedure: EGD/GJ replacement    Indication for Procedure: GJ tube malfunction  ________________________________________________________  PAST MEDICAL & SURGICAL HISTORY:  CAD (coronary artery disease)      DM (diabetes mellitus)      HTN (hypertension)      Asthma      Benign essential HTN      HLD (hyperlipidemia)      Type 2 diabetes mellitus      CAD (coronary artery disease)      Stented coronary artery      FAP (familial adenomatous polyposis)      Insomnia      GERD (gastroesophageal reflux disease)      H/O lichen planus      History of rectal bleeding      Gastroparesis      Pancreatitis      History of colon resection      Stented coronary artery      Gastrojejunal (GJ) tube in place      History of biliary stent insertion        ALLERGIES:  No Known Allergies    HOME MEDICATIONS:  ALBUTEROL HFA 90 MCG INHALER: 1 pump(s) inhaled every 6 hours INHALE 1 TO 2 PUFFS BY MOUTH EVERY 4 TO 6 HOURS AS NEEDED  ALPRAZolam 0.5 mg oral tablet: 1 tab(s) orally once a day (at bedtime) as needed for sleep  apixaban 5 mg oral tablet: 1 tab(s) orally every 12 hours  CLOPIDOGREL 75 MG TABLET: 1 tab(s) orally once a day  escitalopram 10 mg oral tablet: 1 tab(s) orally once a day  Insulin Glargine Solostar Pen 100 units/mL subcutaneous solution: 10 unit(s) subcutaneous once a day (at bedtime)  metoprolol tartrate 50 mg oral tablet: 1 tab(s) orally 2 times a day  rosuvastatin 10 mg oral tablet: 1 tab(s) orally once a day (at bedtime)    AICD/PPM: [ ] yes   [ x] no    PERTINENT LAB DATA:                        11.0   6.82  )-----------( 201      ( 16 Dec 2024 07:20 )             34.9     12-16    137  |  101  |  20  ----------------------------<  118[H]  3.9   |  21[L]  |  0.71    Ca    9.8      16 Dec 2024 07:20  Phos  4.3     12-15  Mg     2.0     12-15    TPro  7.8  /  Alb  3.8  /  TBili  0.5  /  DBili  x   /  AST  33  /  ALT  29  /  AlkPhos  110  12-16                PHYSICAL EXAMINATION:    T(C): 36.6  HR: 100  BP: 143/94  RR: 18  SpO2: 97%    Constitutional: NAD    Neck:  No JVD  Respiratory: normal effort   Cardiovascular: RRR  Extremities: No peripheral edema  Neurological: A/O x 4, no focal deficits        COMMENTS:    The patient is a suitable candidate for the planned procedure unless box checked [ ]  No, explain:    
negative

## 2024-12-17 LAB
ANION GAP SERPL CALC-SCNC: 17 MMOL/L — SIGNIFICANT CHANGE UP (ref 5–17)
BUN SERPL-MCNC: 25 MG/DL — HIGH (ref 7–23)
CALCIUM SERPL-MCNC: 9.5 MG/DL — SIGNIFICANT CHANGE UP (ref 8.4–10.5)
CHLORIDE SERPL-SCNC: 101 MMOL/L — SIGNIFICANT CHANGE UP (ref 96–108)
CO2 SERPL-SCNC: 19 MMOL/L — LOW (ref 22–31)
CREAT SERPL-MCNC: 0.67 MG/DL — SIGNIFICANT CHANGE UP (ref 0.5–1.3)
EGFR: 99 ML/MIN/1.73M2 — SIGNIFICANT CHANGE UP
GLUCOSE BLDC GLUCOMTR-MCNC: 102 MG/DL — HIGH (ref 70–99)
GLUCOSE BLDC GLUCOMTR-MCNC: 88 MG/DL — SIGNIFICANT CHANGE UP (ref 70–99)
GLUCOSE BLDC GLUCOMTR-MCNC: 91 MG/DL — SIGNIFICANT CHANGE UP (ref 70–99)
GLUCOSE BLDC GLUCOMTR-MCNC: 91 MG/DL — SIGNIFICANT CHANGE UP (ref 70–99)
GLUCOSE BLDC GLUCOMTR-MCNC: 93 MG/DL — SIGNIFICANT CHANGE UP (ref 70–99)
GLUCOSE BLDC GLUCOMTR-MCNC: 96 MG/DL — SIGNIFICANT CHANGE UP (ref 70–99)
GLUCOSE SERPL-MCNC: 91 MG/DL — SIGNIFICANT CHANGE UP (ref 70–99)
POTASSIUM SERPL-MCNC: 3.7 MMOL/L — SIGNIFICANT CHANGE UP (ref 3.5–5.3)
POTASSIUM SERPL-SCNC: 3.7 MMOL/L — SIGNIFICANT CHANGE UP (ref 3.5–5.3)
SODIUM SERPL-SCNC: 137 MMOL/L — SIGNIFICANT CHANGE UP (ref 135–145)

## 2024-12-17 PROCEDURE — 99232 SBSQ HOSP IP/OBS MODERATE 35: CPT

## 2024-12-17 PROCEDURE — 74018 RADEX ABDOMEN 1 VIEW: CPT | Mod: 26

## 2024-12-17 PROCEDURE — 99232 SBSQ HOSP IP/OBS MODERATE 35: CPT | Mod: GC

## 2024-12-17 RX ADMIN — SUCRALFATE 1 GRAM(S): 1 TABLET ORAL at 15:56

## 2024-12-17 RX ADMIN — METOCLOPRAMIDE HYDROCHLORIDE 5 MILLIGRAM(S): 10 TABLET ORAL at 22:15

## 2024-12-17 RX ADMIN — APIXABAN 5 MILLIGRAM(S): 2.5 TABLET, FILM COATED ORAL at 05:13

## 2024-12-17 RX ADMIN — METOCLOPRAMIDE HYDROCHLORIDE 5 MILLIGRAM(S): 10 TABLET ORAL at 15:58

## 2024-12-17 RX ADMIN — CLOPIDOGREL 75 MILLIGRAM(S): 75 TABLET, FILM COATED ORAL at 15:56

## 2024-12-17 RX ADMIN — SUCRALFATE 1 GRAM(S): 1 TABLET ORAL at 05:13

## 2024-12-17 RX ADMIN — ESCITALOPRAM OXALATE 10 MILLIGRAM(S): 10 TABLET, FILM COATED ORAL at 15:58

## 2024-12-17 RX ADMIN — SUCRALFATE 1 GRAM(S): 1 TABLET ORAL at 22:14

## 2024-12-17 RX ADMIN — ROSUVASTATIN CALCIUM 10 MILLIGRAM(S): 5 TABLET, FILM COATED ORAL at 22:15

## 2024-12-17 RX ADMIN — PANTOPRAZOLE SODIUM 40 MILLIGRAM(S): 40 TABLET, DELAYED RELEASE ORAL at 15:57

## 2024-12-17 RX ADMIN — METOCLOPRAMIDE HYDROCHLORIDE 5 MILLIGRAM(S): 10 TABLET ORAL at 05:13

## 2024-12-17 RX ADMIN — APIXABAN 5 MILLIGRAM(S): 2.5 TABLET, FILM COATED ORAL at 17:00

## 2024-12-17 NOTE — DIETITIAN INITIAL EVALUATION ADULT - ORAL INTAKE PTA/DIET HISTORY
Patient visited. H/o gastroparesis, s/p GJ tube noted. Patient reports enteral nutrition regimen prior to admission to be Glucerna at 60mL/hr x 20 hour infusion.  Patient unable to report if she was on Glucerna 1.2 or 1.5. Additionally, Patient reports to consume Glucerna Shakes TID (660kcals, 30g protein), reports to only consume liquids PO. NFKA or intolerances reported. Per Patient, denies micronutrient supplementation prior to admission.

## 2024-12-17 NOTE — PROGRESS NOTE ADULT - PROBLEM SELECTOR PROBLEM 3
Elevated alkaline phosphatase level

## 2024-12-17 NOTE — DIETITIAN INITIAL EVALUATION ADULT - PHYSCIAL ASSESSMENT
[facility] 116.2lbs (10/25/2024), 106.7lbs (12/16/2024). 19.6% weight loss x 3 months indicated.     - Per RD note from previous admission, Patient's weight documented to be 116lbs (10/25/2024).    - Per electronic medical record, current dosing weight 106lbs (12/16/2024). Patient confirmed dosing weight to be accurate and denies recent unintentional weight loss. Question Patient's weight hx accuracy.

## 2024-12-17 NOTE — DIETITIAN INITIAL EVALUATION ADULT - NSICDXPASTMEDICALHX_GEN_ALL_CORE_FT
PAST MEDICAL HISTORY:  Asthma     Benign essential HTN     CAD (coronary artery disease)     CAD (coronary artery disease)     DM (diabetes mellitus)     FAP (familial adenomatous polyposis)     Gastroparesis     GERD (gastroesophageal reflux disease)     H/O lichen planus     History of rectal bleeding     HLD (hyperlipidemia)     HTN (hypertension)     Insomnia     Pancreatitis     Stented coronary artery     Type 2 diabetes mellitus

## 2024-12-17 NOTE — DIETITIAN INITIAL EVALUATION ADULT - NSICDXPASTSURGICALHX_GEN_ALL_CORE_FT
PAST SURGICAL HISTORY:  Gastrojejunal (GJ) tube in place     History of biliary stent insertion     History of colon resection     Stented coronary artery

## 2024-12-17 NOTE — DIETITIAN INITIAL EVALUATION ADULT - PROBLEM SELECTOR PLAN 9
meds usually crushed in GJ at home, daughter states can also take crushed in applesauce: discussed with pharmacy and will crush rosuvastatin, eliquis, plavix in applesauce, and give escitalopram as solution.  - f/u GI intervention to convert meds back once appropriate (including BB)

## 2024-12-17 NOTE — DIETITIAN INITIAL EVALUATION ADULT - REASON INDICATOR FOR ASSESSMENT
Dietitian consult received for: MST score 2 or >, enteral/ parenteral nutrition prior to admission   Chart reviewed, events noted   Information obtained from: electronic medical record, Patient

## 2024-12-17 NOTE — PROGRESS NOTE ADULT - PROBLEM SELECTOR PLAN 4
home lantus 10u QHS  - will give lantus 5u QHS while NPO with MERLIN q6h

## 2024-12-17 NOTE — PROGRESS NOTE ADULT - PROBLEM SELECTOR PLAN 1
CT revealing percutaneous gastrostomy with balloon in appropriate location opposed to anterior abdominal wall. Tip of gastrostomy at level junction second and third duodenum tenting duodenal wall which may be partially obstructing.  - IR evaluated, recommending GI evaluation, GI full consult to see today   - GI c/s emailed, awaiting recommendation; can also discuss regarding increased inflammatory changes associated with the pancreatic tail and splenic hilum with tiny loculated perisplenic collection.   - IVF in interim given NPO status
CT revealing percutaneous gastrostomy with balloon in appropriate location opposed to anterior abdominal wall. Tip of gastrostomy at level junction second and third duodenum tenting duodenal wall which may be partially obstructing.  - IR evaluated, recommending GI evaluation,   - s/p endoscopic intervention with GI, rec f/u tube study, oredered, back on tube feeds  - monitor response to feed re-initiation
CT revealing percutaneous gastrostomy with balloon in appropriate location opposed to anterior abdominal wall. Tip of gastrostomy at level junction second and third duodenum tenting duodenal wall which may be partially obstructing.  - IR evaluated, recommending GI evaluation,   - s/p endoscopic intervention with GI, rec f/u tube study, oredered, keep NPO until study performed

## 2024-12-17 NOTE — DIETITIAN INITIAL EVALUATION ADULT - ADD RECOMMEND
1. Recommend Glucerna 1.2 at 20mL/hr and slowly advance to goal rate 65mL/hr x 20 hour infusion (tube feed to provide: 1560kcals, 78g protein, and 1300mL formula) to appropriately align with Patient's enteral regimen prior to admission.   - Defer free water flushes to medical team   2. Should PO diet become indicated, recommend trial Glucerna Shake once daily to optimize PO intake (220kcals, 10g protein).  - Should Patient exhibit s/s of aspiration, recommend NPO diet order and order swallow evaluation to determine safest PO intake and consistencies.   3. Resolve electrolyte derangement per medical team discretion   4. Monitor routine weights, nutrition related labs, fingersticks, diet advancements, tube feed tolerance, PO tolerance, oral nutrition supplement compliance, and skin integrity

## 2024-12-17 NOTE — PROGRESS NOTE ADULT - PROBLEM SELECTOR PLAN 8
- c/w escitalopram (oral solution), daughter reports Abilify was tapered off per primary team

## 2024-12-17 NOTE — PROGRESS NOTE ADULT - PROBLEM SELECTOR PLAN 2
- stable, CBC daily and outpatient f/u

## 2024-12-17 NOTE — DIETITIAN INITIAL EVALUATION ADULT - LITERATURE/VIDEOS GIVEN
Patient NPO during visit. Diet education not appropriate/ warranted at this time. RD remains available.

## 2024-12-17 NOTE — DIETITIAN INITIAL EVALUATION ADULT - PERTINENT MEDS FT
MEDICATIONS  (STANDING):  apixaban 5 milliGRAM(s) Oral two times a day  clopidogrel Tablet 75 milliGRAM(s) Oral daily  dextrose 5% + lactated ringers. 1000 milliLiter(s) (75 mL/Hr) IV Continuous <Continuous>  dextrose 5%. 1000 milliLiter(s) (50 mL/Hr) IV Continuous <Continuous>  dextrose 5%. 1000 milliLiter(s) (100 mL/Hr) IV Continuous <Continuous>  dextrose 50% Injectable 25 Gram(s) IV Push once  dextrose 50% Injectable 12.5 Gram(s) IV Push once  dextrose 50% Injectable 25 Gram(s) IV Push once  escitalopram Solution 10 milliGRAM(s) Oral daily  glucagon  Injectable 1 milliGRAM(s) IntraMuscular once  insulin lispro (ADMELOG) corrective regimen sliding scale   SubCutaneous every 6 hours  metoclopramide   Syrup 5 milliGRAM(s) Oral three times a day  pantoprazole   Suspension 40 milliGRAM(s) Oral daily  rosuvastatin 10 milliGRAM(s) Oral at bedtime  sodium chloride 0.9%. 500 milliLiter(s) (30 mL/Hr) IV Continuous <Continuous>  sucralfate suspension 1 Gram(s) Oral <User Schedule>    MEDICATIONS  (PRN):  albuterol    90 MICROgram(s) HFA Inhaler 2 Puff(s) Inhalation every 6 hours PRN Shortness of Breath and/or Wheezing  dextrose Oral Gel 15 Gram(s) Oral once PRN Blood Glucose LESS THAN 70 milliGRAM(s)/deciliter

## 2024-12-17 NOTE — DIETITIAN INITIAL EVALUATION ADULT - OTHER INFO
Pertinent History:   Per H&P, hx Type two diabetes mellitus, Gastroparesis, previous hospital admission (10/25-12/10) noted to be on PPN. S/p GJ tube placement     Pertinent Nutrition Related Labs:  - POCT  (H), Glucose 91 (WNL). HbA1c 6.6% (10/26), indicating good glycemic control  - BUN 25 (H)  - Hyperkalemia and hyperphosphatemia noted 12/15. Now resolved.    Pertinent Medications:   Insulin (ADMELOG)

## 2024-12-17 NOTE — PROGRESS NOTE ADULT - PROBLEM SELECTOR PLAN 5
- hold BB ON, monitor BP and restart s/p GI intervention

## 2024-12-17 NOTE — PROGRESS NOTE ADULT - SUBJECTIVE AND OBJECTIVE BOX
Gastroenterology/Hepatology Progress Note    Interval Events:   - no acute ON events  - no use of PEJ tube yet     Allergies:  No Known Allergies      Hospital Medications:  albuterol    90 MICROgram(s) HFA Inhaler 2 Puff(s) Inhalation every 6 hours PRN  apixaban 5 milliGRAM(s) Oral two times a day  clopidogrel Tablet 75 milliGRAM(s) Oral daily  dextrose 5% + lactated ringers. 1000 milliLiter(s) IV Continuous <Continuous>  dextrose 5%. 1000 milliLiter(s) IV Continuous <Continuous>  dextrose 5%. 1000 milliLiter(s) IV Continuous <Continuous>  dextrose 50% Injectable 25 Gram(s) IV Push once  dextrose 50% Injectable 12.5 Gram(s) IV Push once  dextrose 50% Injectable 25 Gram(s) IV Push once  dextrose Oral Gel 15 Gram(s) Oral once PRN  escitalopram Solution 10 milliGRAM(s) Oral daily  glucagon  Injectable 1 milliGRAM(s) IntraMuscular once  insulin lispro (ADMELOG) corrective regimen sliding scale   SubCutaneous every 6 hours  metoclopramide   Syrup 5 milliGRAM(s) Oral three times a day  pantoprazole   Suspension 40 milliGRAM(s) Oral daily  rosuvastatin 10 milliGRAM(s) Oral at bedtime  sodium chloride 0.9%. 500 milliLiter(s) IV Continuous <Continuous>  sucralfate suspension 1 Gram(s) Oral <User Schedule>      ROS: 14 point ROS negative unless otherwise state in subjective    PHYSICAL EXAM:   Vital Signs:  Vital Signs Last 24 Hrs  T(C): 36.8 (17 Dec 2024 08:14), Max: 36.8 (16 Dec 2024 20:02)  T(F): 98.2 (17 Dec 2024 08:14), Max: 98.2 (16 Dec 2024 20:02)  HR: 90 (17 Dec 2024 08:14) (81 - 97)  BP: 108/70 (17 Dec 2024 08:14) (105/68 - 128/87)  BP(mean): --  RR: 18 (17 Dec 2024 08:14) (18 - 18)  SpO2: 95% (17 Dec 2024 08:14) (94% - 95%)    Parameters below as of 17 Dec 2024 08:14  Patient On (Oxygen Delivery Method): room air      Daily     Daily     GENERAL:  No acute distress, thin   HEENT:  NCAT, no scleral icterus  CHEST: no resp distress  HEART:  RRR  ABDOMEN:  Soft, non-tender, non-distended, no masses, PEJ tube in place   EXTREMITIES:  No cyanosis, clubbing, or edema  SKIN:  No rash/erythema/ecchymoses/petechiae/wounds/abscess/warm/dry  NEURO:  Alert and oriented x 3     LABS:                        11.0   6.82  )-----------( 201      ( 16 Dec 2024 07:20 )             34.9       12-17    137  |  101  |  25[H]  ----------------------------<  91  3.7   |  19[L]  |  0.67    Ca    9.5      17 Dec 2024 07:27    TPro  7.8  /  Alb  3.8  /  TBili  0.5  /  DBili  x   /  AST  33  /  ALT  29  /  AlkPhos  110  12-16    LIVER FUNCTIONS - ( 16 Dec 2024 07:20 )  Alb: 3.8 g/dL / Pro: 7.8 g/dL / ALK PHOS: 110 U/L / ALT: 29 U/L / AST: 33 U/L / GGT: x             Urinalysis Basic - ( 17 Dec 2024 07:27 )    Color: x / Appearance: x / SG: x / pH: x  Gluc: 91 mg/dL / Ketone: x  / Bili: x / Urobili: x   Blood: x / Protein: x / Nitrite: x   Leuk Esterase: x / RBC: x / WBC x   Sq Epi: x / Non Sq Epi: x / Bacteria: x            Imaging:          
Baudilio Lebron MD  Division of Hospital Medicine  Available on [employer] Teams    PROGRESS NOTE:     Patient is a 64y old  Female who presents with a chief complaint of Gastrostomy malfunction     (17 Dec 2024 15:21)      SUBJECTIVE / OVERNIGHT EVENTS: Patient seen and examined. No acute overnight events.    MEDICATIONS  (STANDING):  apixaban 5 milliGRAM(s) Oral two times a day  clopidogrel Tablet 75 milliGRAM(s) Oral daily  dextrose 5% + lactated ringers. 1000 milliLiter(s) (75 mL/Hr) IV Continuous <Continuous>  dextrose 5%. 1000 milliLiter(s) (50 mL/Hr) IV Continuous <Continuous>  dextrose 5%. 1000 milliLiter(s) (100 mL/Hr) IV Continuous <Continuous>  dextrose 50% Injectable 25 Gram(s) IV Push once  dextrose 50% Injectable 12.5 Gram(s) IV Push once  dextrose 50% Injectable 25 Gram(s) IV Push once  escitalopram Solution 10 milliGRAM(s) Oral daily  glucagon  Injectable 1 milliGRAM(s) IntraMuscular once  insulin lispro (ADMELOG) corrective regimen sliding scale   SubCutaneous every 6 hours  metoclopramide   Syrup 5 milliGRAM(s) Oral three times a day  pantoprazole   Suspension 40 milliGRAM(s) Oral daily  rosuvastatin 10 milliGRAM(s) Oral at bedtime  sodium chloride 0.9%. 500 milliLiter(s) (30 mL/Hr) IV Continuous <Continuous>  sucralfate suspension 1 Gram(s) Oral <User Schedule>    MEDICATIONS  (PRN):  albuterol    90 MICROgram(s) HFA Inhaler 2 Puff(s) Inhalation every 6 hours PRN Shortness of Breath and/or Wheezing  dextrose Oral Gel 15 Gram(s) Oral once PRN Blood Glucose LESS THAN 70 milliGRAM(s)/deciliter      CAPILLARY BLOOD GLUCOSE      POCT Blood Glucose.: 102 mg/dL (17 Dec 2024 11:26)  POCT Blood Glucose.: 93 mg/dL (17 Dec 2024 07:16)  POCT Blood Glucose.: 91 mg/dL (17 Dec 2024 06:33)  POCT Blood Glucose.: 88 mg/dL (17 Dec 2024 00:27)  POCT Blood Glucose.: 91 mg/dL (16 Dec 2024 18:54)    I&O's Summary    16 Dec 2024 07:01  -  17 Dec 2024 07:00  --------------------------------------------------------  IN: 50 mL / OUT: 0 mL / NET: 50 mL    17 Dec 2024 07:01  -  17 Dec 2024 17:39  --------------------------------------------------------  IN: 0 mL / OUT: 0 mL / NET: 0 mL        PHYSICAL EXAM:  Vital Signs Last 24 Hrs  T(C): 36.6 (17 Dec 2024 16:00), Max: 36.8 (16 Dec 2024 20:02)  T(F): 97.9 (17 Dec 2024 16:00), Max: 98.2 (16 Dec 2024 20:02)  HR: 94 (17 Dec 2024 16:00) (85 - 97)  BP: 113/83 (17 Dec 2024 16:00) (105/68 - 128/87)  BP(mean): --  RR: 18 (17 Dec 2024 16:00) (18 - 18)  SpO2: 94% (17 Dec 2024 16:00) (94% - 97%)    Parameters below as of 17 Dec 2024 16:00  Patient On (Oxygen Delivery Method): room air      CONSTITUTIONAL: NAD, well-developed, well-groomed  NECK: Supple  RESPIRATORY: Normal respiratory effort; lungs are clear to auscultation bilaterally  CARDIOVASCULAR: Regular rate and rhythm, normal S1 and S2  ABDOMEN: Nontender to palpation, normoactive bowel sounds  PSYCH: A+O to person, place, and time    LABS:                        11.0   6.82  )-----------( 201      ( 16 Dec 2024 07:20 )             34.9     12-17    137  |  101  |  25[H]  ----------------------------<  91  3.7   |  19[L]  |  0.67    Ca    9.5      17 Dec 2024 07:27    TPro  7.8  /  Alb  3.8  /  TBili  0.5  /  DBili  x   /  AST  33  /  ALT  29  /  AlkPhos  110  12-16          Urinalysis Basic - ( 17 Dec 2024 07:27 )    Color: x / Appearance: x / SG: x / pH: x  Gluc: 91 mg/dL / Ketone: x  / Bili: x / Urobili: x   Blood: x / Protein: x / Nitrite: x   Leuk Esterase: x / RBC: x / WBC x   Sq Epi: x / Non Sq Epi: x / Bacteria: x          RADIOLOGY & ADDITIONAL TESTS:  Results Reviewed: Y  Imaging Personally Reviewed:Y  Electrocardiogram Personally Reviewed:Y    COORDINATION OF CARE:  Care Discussed with Consultants/Other Providers [Y/N]:Y  Prior or Outpatient Records Reviewed [Y/N]:Y  
Baudilio Lebron MD  Division of Hospital Medicine  Available on [employer] Teams    PROGRESS NOTE:     Patient is a 64y old  Female who presents with a chief complaint of GJ tube malfunction (15 Dec 2024 12:58)      SUBJECTIVE / OVERNIGHT EVENTS: Patient seen and examined. No acute overnight events. Denies fever, chills, chest pain, SOB, abdominal pain, nausea, vomiting, diarrhea, or constipation.    MEDICATIONS  (STANDING):  apixaban 5 milliGRAM(s) Oral two times a day  clopidogrel Tablet 75 milliGRAM(s) Oral daily  dextrose 5% + lactated ringers. 1000 milliLiter(s) (75 mL/Hr) IV Continuous <Continuous>  dextrose 5%. 1000 milliLiter(s) (50 mL/Hr) IV Continuous <Continuous>  dextrose 5%. 1000 milliLiter(s) (100 mL/Hr) IV Continuous <Continuous>  dextrose 50% Injectable 25 Gram(s) IV Push once  dextrose 50% Injectable 12.5 Gram(s) IV Push once  dextrose 50% Injectable 25 Gram(s) IV Push once  escitalopram Solution 10 milliGRAM(s) Oral daily  glucagon  Injectable 1 milliGRAM(s) IntraMuscular once  insulin lispro (ADMELOG) corrective regimen sliding scale   SubCutaneous every 6 hours  metoclopramide   Syrup 5 milliGRAM(s) Oral three times a day  pantoprazole   Suspension 40 milliGRAM(s) Oral daily  rosuvastatin 10 milliGRAM(s) Oral at bedtime  sodium chloride 0.9%. 500 milliLiter(s) (30 mL/Hr) IV Continuous <Continuous>  sucralfate suspension 1 Gram(s) Oral <User Schedule>    MEDICATIONS  (PRN):  albuterol    90 MICROgram(s) HFA Inhaler 2 Puff(s) Inhalation every 6 hours PRN Shortness of Breath and/or Wheezing  dextrose Oral Gel 15 Gram(s) Oral once PRN Blood Glucose LESS THAN 70 milliGRAM(s)/deciliter      CAPILLARY BLOOD GLUCOSE      POCT Blood Glucose.: 128 mg/dL (16 Dec 2024 11:48)  POCT Blood Glucose.: 124 mg/dL (16 Dec 2024 05:53)  POCT Blood Glucose.: 108 mg/dL (15 Dec 2024 22:51)  POCT Blood Glucose.: 113 mg/dL (15 Dec 2024 18:22)    I&O's Summary    16 Dec 2024 07:01  -  16 Dec 2024 18:00  --------------------------------------------------------  IN: 100 mL / OUT: 0 mL / NET: 100 mL        PHYSICAL EXAM:  Vital Signs Last 24 Hrs  T(C): 36.2 (16 Dec 2024 16:16), Max: 36.6 (16 Dec 2024 09:21)  T(F): 97.1 (16 Dec 2024 16:16), Max: 97.9 (16 Dec 2024 09:21)  HR: 81 (16 Dec 2024 16:16) (79 - 104)  BP: 111/72 (16 Dec 2024 16:16) (106/70 - 143/94)  BP(mean): --  RR: 18 (16 Dec 2024 16:16) (18 - 18)  SpO2: 95% (16 Dec 2024 16:16) (95% - 98%)    Parameters below as of 16 Dec 2024 16:16  Patient On (Oxygen Delivery Method): room air      CONSTITUTIONAL: NAD, well-developed, well-groomed  NECK: Supple  RESPIRATORY: Normal respiratory effort; lungs are clear to auscultation bilaterally  CARDIOVASCULAR: Regular rate and rhythm, normal S1 and S2  ABDOMEN: Nontender to palpation, normoactive bowel sounds  PSYCH: A+O to person, place, and time    LABS:                        11.0   6.82  )-----------( 201      ( 16 Dec 2024 07:20 )             34.9     12-16    137  |  101  |  20  ----------------------------<  118[H]  3.9   |  21[L]  |  0.71    Ca    9.8      16 Dec 2024 07:20  Phos  4.3     12-15  Mg     2.0     12-15    TPro  7.8  /  Alb  3.8  /  TBili  0.5  /  DBili  x   /  AST  33  /  ALT  29  /  AlkPhos  110  12-16          Urinalysis Basic - ( 16 Dec 2024 07:20 )    Color: x / Appearance: x / SG: x / pH: x  Gluc: 118 mg/dL / Ketone: x  / Bili: x / Urobili: x   Blood: x / Protein: x / Nitrite: x   Leuk Esterase: x / RBC: x / WBC x   Sq Epi: x / Non Sq Epi: x / Bacteria: x          RADIOLOGY & ADDITIONAL TESTS:  Results Reviewed: Y  Imaging Personally Reviewed:Y  Electrocardiogram Personally Reviewed:Y    COORDINATION OF CARE:  Care Discussed with Consultants/Other Providers [Y/N]:Y  Prior or Outpatient Records Reviewed [Y/N]:Y  
[facility] Division of Hospital Medicine  Luisa Caro MD  Available via MS Teams      SUBJECTIVE / OVERNIGHT EVENTS: Pt complains of tenderness around G tube site. No n/v/d. Given IV Tylenol to help with pain    ADDITIONAL REVIEW OF SYSTEMS: ROS negative otherwise     MEDICATIONS  (STANDING):  apixaban 5 milliGRAM(s) Oral two times a day  clopidogrel Tablet 75 milliGRAM(s) Oral daily  dextrose 5% + lactated ringers. 1000 milliLiter(s) (75 mL/Hr) IV Continuous <Continuous>  dextrose 5%. 1000 milliLiter(s) (50 mL/Hr) IV Continuous <Continuous>  dextrose 5%. 1000 milliLiter(s) (100 mL/Hr) IV Continuous <Continuous>  dextrose 50% Injectable 25 Gram(s) IV Push once  dextrose 50% Injectable 12.5 Gram(s) IV Push once  dextrose 50% Injectable 25 Gram(s) IV Push once  escitalopram Solution 10 milliGRAM(s) Oral daily  glucagon  Injectable 1 milliGRAM(s) IntraMuscular once  insulin glargine Injectable (LANTUS) 5 Unit(s) SubCutaneous at bedtime  insulin lispro (ADMELOG) corrective regimen sliding scale   SubCutaneous every 6 hours  metoclopramide   Syrup 5 milliGRAM(s) Oral three times a day  pantoprazole   Suspension 40 milliGRAM(s) Oral daily  rosuvastatin 10 milliGRAM(s) Oral at bedtime  sucralfate suspension 1 Gram(s) Oral <User Schedule>    MEDICATIONS  (PRN):  albuterol    90 MICROgram(s) HFA Inhaler 2 Puff(s) Inhalation every 6 hours PRN Shortness of Breath and/or Wheezing  dextrose Oral Gel 15 Gram(s) Oral once PRN Blood Glucose LESS THAN 70 milliGRAM(s)/deciliter      I&O's Summary      PHYSICAL EXAM:  Vital Signs Last 24 Hrs  T(C): 36.6 (15 Dec 2024 12:33), Max: 36.7 (14 Dec 2024 16:34)  T(F): 97.8 (15 Dec 2024 12:33), Max: 98.1 (14 Dec 2024 16:34)  HR: 89 (15 Dec 2024 12:33) (77 - 89)  BP: 135/84 (15 Dec 2024 12:33) (111/74 - 135/84)  BP(mean): --  RR: 18 (15 Dec 2024 12:33) (18 - 18)  SpO2: 97% (15 Dec 2024 12:33) (95% - 98%)    Parameters below as of 15 Dec 2024 12:33  Patient On (Oxygen Delivery Method): room air      CONSTITUTIONAL: NAD, well-developed, well-groomed  NECK: Supple  RESPIRATORY: Normal respiratory effort; lungs are clear to auscultation bilaterally  CARDIOVASCULAR: Regular rate and rhythm, normal S1 and S2  ABDOMEN: Nontender to palpation, normoactive bowel sounds  PSYCH: A+O to person, place, and time    LABS:                        10.3   5.97  )-----------( 191      ( 15 Dec 2024 07:08 )             32.3     12-15    138  |  102  |  17  ----------------------------<  181[H]  3.9   |  24  |  0.60    Ca    9.3      15 Dec 2024 09:08  Phos  4.3     12-15  Mg     2.0     12-15    TPro  7.4  /  Alb  3.7  /  TBili  0.3  /  DBili  x   /  AST  24  /  ALT  24  /  AlkPhos  110  12-15          Urinalysis Basic - ( 15 Dec 2024 09:08 )    Color: x / Appearance: x / SG: x / pH: x  Gluc: 181 mg/dL / Ketone: x  / Bili: x / Urobili: x   Blood: x / Protein: x / Nitrite: x   Leuk Esterase: x / RBC: x / WBC x   Sq Epi: x / Non Sq Epi: x / Bacteria: x            RADIOLOGY & ADDITIONAL TESTS:  New Imaging Personally Reviewed Today:  New Electrocardiogram Personally Reviewed Today:  Other Results Reviewed Today:   Prior or Outpatient Records Reviewed Today with Summary:    COORDINATION OF CARE:  Consultant Communication and Details of Discussion (where applicable):

## 2024-12-17 NOTE — PROGRESS NOTE ADULT - ASSESSMENT
64F PMH T2DM, HTN, GERD, FAP (s/p colon resection), CAD (s/p PCI 3/2023, Plavix), pAFib (Eliquis), AOCD, asthma, anxiety, depression, admit HNT for abd pain and poor PO intake 10/25-12/10 for PPN i/s/o presumed gastroparesis, s/p ERCP with stent placement for CBD stone followed by repeat ERCP 11/15 with stone extraction, sphincterotomy, stent replacement, c/c/b post ERCP pancreatitis and asthma exacerbation, s/p GJ tube placement (11/22 unable to be placed into jejunum initially, advanced endoscopically 12/2) who presents to ED d/t GJ tube dysfunction.     #GJ placement for FTT/gastroparesis (no NMGES performed)   #H/o pancreatitis 2/2 GS s/p ERCP with stent placement   #Pancreatitis     Recommendations:  - s/p PEJ revision with J tube clipped in distal duodenum/jejunum   - okay to use the PEJ tube for feeds and medications   - rest of care per primary team     All recommendations are tentative until note is attested by attending.     Eva Garcia, PGY-6  Gastroenterology/Hepatology Fellow  Available on [employer] Teams  [phone] ([facility] Short Range Pager)  [phone] ([facility] Long Range Pager)    On Weekends/Holidays (All day) and Weekdays after 5 PM to 8AM:  For non-urgent consults, please email [email or web address] or [email or web address]  For emergent consults, please contact on call GI team.  See Amion schedule ([facility]), [employer] paging system ([facility]), or call hospital  ([facility])     
64F PMH T2DM, HTN, GERD, FAP (s/p colon resection), CAD (s/p PCI 3/2023, Plavix), pAFib (Eliquis), AOCD, asthma, anxiety, depression, admit HNT for abd pain and poor PO intake 10/25-12/10 for PPN i/s/o presumed gastroparesis, s/p ERCP with stent placement for CBD stone followed by repeat ERCP 11/15 with stone extraction, sphincterotomy, stent replacement, c/c/b post ERCP pancreatitis and asthma exacerbation, s/p GJ tube placement (11/22 unable to be placed into jejunum initially, advanced endoscopically 12/2), presents today to ED d/t GJ tube dysfunction
64F PMH T2DM, HTN, GERD, FAP (s/p colon resection), CAD (s/p PCI 3/2023, Plavix), pAFib (Eliquis), AOCD, asthma, anxiety, depression, admitted with GJ tube dysfunction, s/p endoscopic intervention and re-initiated on feeds.
64F PMH T2DM, HTN, GERD, FAP (s/p colon resection), CAD (s/p PCI 3/2023, Plavix), pAFib (Eliquis), AOCD, asthma, anxiety, depression, admitted with GJ tube dysfunction

## 2024-12-17 NOTE — PROGRESS NOTE ADULT - PROBLEM SELECTOR PLAN 6
- c/w statin (with applesauce), and plavix (with applesauce), holding BB

## 2024-12-17 NOTE — PROGRESS NOTE ADULT - PROBLEM SELECTOR PLAN 10
- DOAC re VTE ppx  - NPO  - fall, aspiration precautions  - disposition pending course- eval by GI  Dermatology referral on discharge regarding facial hyperpigmentation, reported by daughter to be chronic lichen planus  D/w pt at bedside
- repeat CBC with differential given slight eosinophilia on presentation  - bladder distended on CT, obtain post-void BS r/o retention  - DOAC re VTE ppx  - NPO w/ IVF  - fall, aspiration precautions  - disposition pending course- eval by GI  Dermatology referral on discharge regarding facial hyperpigmentation, reported by daughter to be chronic lichen planus  D/w pt at bedside
- DOAC re VTE ppx  - NPO  - fall, aspiration precautions  - disposition pending course- eval by GI  Dermatology referral on discharge regarding facial hyperpigmentation, reported by daughter to be chronic lichen planus  D/w pt at bedside

## 2024-12-17 NOTE — DIETITIAN INITIAL EVALUATION ADULT - PROBLEM SELECTOR PLAN 7
- c/w eliquis (with applesauce), holding BB  - obtain screening EKG (not obtained by ED), apparent regular rhythm on exam

## 2024-12-17 NOTE — PROGRESS NOTE ADULT - PROBLEM SELECTOR PLAN 7
- c/w eliquis (with applesauce), holding BB  - obtain screening EKG (not obtained by ED), apparent regular rhythm on exam
- c/w eliquis (with applesauce), holding BB
- c/w eliquis (with applesauce), holding BB

## 2024-12-17 NOTE — DIETITIAN INITIAL EVALUATION ADULT - SIGNS/SYMPTOMS
as evidenced by NPO diet order, Patient meeting < 50% estimated energy requirement for >/= 4 days as evidenced by Patient receiving alternate means of nutrition prior to admission

## 2024-12-18 ENCOUNTER — TRANSCRIPTION ENCOUNTER (OUTPATIENT)
Age: 64
End: 2024-12-18

## 2024-12-18 VITALS
OXYGEN SATURATION: 95 % | TEMPERATURE: 97 F | RESPIRATION RATE: 18 BRPM | HEART RATE: 105 BPM | DIASTOLIC BLOOD PRESSURE: 90 MMHG | SYSTOLIC BLOOD PRESSURE: 138 MMHG

## 2024-12-18 LAB
ANION GAP SERPL CALC-SCNC: 16 MMOL/L — SIGNIFICANT CHANGE UP (ref 5–17)
BUN SERPL-MCNC: 27 MG/DL — HIGH (ref 7–23)
CALCIUM SERPL-MCNC: 9.8 MG/DL — SIGNIFICANT CHANGE UP (ref 8.4–10.5)
CHLORIDE SERPL-SCNC: 100 MMOL/L — SIGNIFICANT CHANGE UP (ref 96–108)
CO2 SERPL-SCNC: 20 MMOL/L — LOW (ref 22–31)
CREAT SERPL-MCNC: 0.58 MG/DL — SIGNIFICANT CHANGE UP (ref 0.5–1.3)
EGFR: 102 ML/MIN/1.73M2 — SIGNIFICANT CHANGE UP
GLUCOSE BLDC GLUCOMTR-MCNC: 133 MG/DL — HIGH (ref 70–99)
GLUCOSE BLDC GLUCOMTR-MCNC: 174 MG/DL — HIGH (ref 70–99)
GLUCOSE SERPL-MCNC: 111 MG/DL — HIGH (ref 70–99)
POTASSIUM SERPL-MCNC: 3.7 MMOL/L — SIGNIFICANT CHANGE UP (ref 3.5–5.3)
POTASSIUM SERPL-SCNC: 3.7 MMOL/L — SIGNIFICANT CHANGE UP (ref 3.5–5.3)
SODIUM SERPL-SCNC: 136 MMOL/L — SIGNIFICANT CHANGE UP (ref 135–145)

## 2024-12-18 PROCEDURE — 99239 HOSP IP/OBS DSCHRG MGMT >30: CPT

## 2024-12-18 PROCEDURE — 93005 ELECTROCARDIOGRAM TRACING: CPT

## 2024-12-18 PROCEDURE — 74018 RADEX ABDOMEN 1 VIEW: CPT

## 2024-12-18 PROCEDURE — C1889: CPT

## 2024-12-18 PROCEDURE — 85025 COMPLETE CBC W/AUTO DIFF WBC: CPT

## 2024-12-18 PROCEDURE — 82962 GLUCOSE BLOOD TEST: CPT

## 2024-12-18 PROCEDURE — 83735 ASSAY OF MAGNESIUM: CPT

## 2024-12-18 PROCEDURE — 84100 ASSAY OF PHOSPHORUS: CPT

## 2024-12-18 PROCEDURE — 74177 CT ABD & PELVIS W/CONTRAST: CPT | Mod: MC

## 2024-12-18 PROCEDURE — 80053 COMPREHEN METABOLIC PANEL: CPT

## 2024-12-18 PROCEDURE — 99285 EMERGENCY DEPT VISIT HI MDM: CPT

## 2024-12-18 PROCEDURE — 36415 COLL VENOUS BLD VENIPUNCTURE: CPT

## 2024-12-18 PROCEDURE — 80048 BASIC METABOLIC PNL TOTAL CA: CPT

## 2024-12-18 RX ORDER — METOPROLOL TARTRATE 100 MG/1
1 TABLET, FILM COATED ORAL
Qty: 60 | Refills: 0
Start: 2024-12-18 | End: 2025-01-16

## 2024-12-18 RX ORDER — ALPRAZOLAM 0.5 MG
0.25 TABLET ORAL ONCE
Refills: 0 | Status: DISCONTINUED | OUTPATIENT
Start: 2024-12-18 | End: 2024-12-18

## 2024-12-18 RX ADMIN — PANTOPRAZOLE SODIUM 40 MILLIGRAM(S): 40 TABLET, DELAYED RELEASE ORAL at 12:13

## 2024-12-18 RX ADMIN — CLOPIDOGREL 75 MILLIGRAM(S): 75 TABLET, FILM COATED ORAL at 12:13

## 2024-12-18 RX ADMIN — ESCITALOPRAM OXALATE 10 MILLIGRAM(S): 10 TABLET, FILM COATED ORAL at 12:23

## 2024-12-18 RX ADMIN — APIXABAN 5 MILLIGRAM(S): 2.5 TABLET, FILM COATED ORAL at 05:20

## 2024-12-18 RX ADMIN — METOCLOPRAMIDE HYDROCHLORIDE 5 MILLIGRAM(S): 10 TABLET ORAL at 05:20

## 2024-12-18 RX ADMIN — SUCRALFATE 1 GRAM(S): 1 TABLET ORAL at 05:20

## 2024-12-18 RX ADMIN — METOCLOPRAMIDE HYDROCHLORIDE 5 MILLIGRAM(S): 10 TABLET ORAL at 14:09

## 2024-12-18 RX ADMIN — Medication 0.25 MILLIGRAM(S): at 03:50

## 2024-12-18 RX ADMIN — Medication 1: at 12:13

## 2024-12-18 RX ADMIN — SUCRALFATE 1 GRAM(S): 1 TABLET ORAL at 14:10

## 2024-12-18 NOTE — DISCHARGE NOTE PROVIDER - CARE PROVIDER_API CALL
Shannan Prasad  Internal Medicine  [address] [place] [address]  Phone: [phone]  Fax: [phone]  Follow Up Time:     Elvia Rose  Gastroenterology  [address] [place] [id number]  Phone: [phone]  Fax: [phone]  Follow Up Time:

## 2024-12-18 NOTE — DISCHARGE NOTE PROVIDER - INSTRUCTIONS
Glucerna 1.2 Hubert continuous  Rate: 60 cc/hr   Tube feed duration: 20 hrs   Tube feed start time: 5 pm

## 2024-12-18 NOTE — DISCHARGE NOTE PROVIDER - NSFOLLOWUPCLINICS_GEN_ALL_ED_FT
[facility]  Dermatology  [address], [address]  [place] [address]  Phone: [phone]  Fax:

## 2024-12-18 NOTE — DISCHARGE NOTE NURSING/CASE MANAGEMENT/SOCIAL WORK - NSDCPEFALRISK_GEN_ALL_CORE
For information on Fall & Injury Prevention, visit: [email or web address] OR  [email or web address] OR  https://www.cdc.gov/steadi/patient.html

## 2024-12-18 NOTE — DISCHARGE NOTE PROVIDER - CARE PROVIDERS DIRECT ADDRESSES
[email or web address]

## 2024-12-18 NOTE — DISCHARGE NOTE PROVIDER - NSDCCPCAREPLAN_GEN_ALL_CORE_FT
PRINCIPAL DISCHARGE DIAGNOSIS  Diagnosis: Malfunction of percutaneous endoscopic gastrostomy (PEG) tube  Assessment and Plan of Treatment: tolerating tube feed at goal rate 60/hr - continue as ordered   follow up with GI outpatient   if GJ tube appears to be malfunctioning/symptoms return- contact provider      SECONDARY DISCHARGE DIAGNOSES  Diagnosis: Paroxysmal atrial fibrillation  Assessment and Plan of Treatment: continue with medication as prescribed

## 2024-12-18 NOTE — DISCHARGE NOTE PROVIDER - HOSPITAL COURSE
HPI:  64F PMH T2DM, HTN, GERD, FAP (s/p colon resection), CAD (s/p PCI 3/2023, Plavix), pAFib (Eliquis), AOCD, asthma, anxiety, depression, admit HNT for abd pain and poor PO intake 10/25-12/10 for PPN i/s/o presumed gastroparesis, s/p ERCP with stent placement for CBD stone followed by repeat ERCP 11/15 with stone extraction, sphincterotomy, stent replacement, c/c/b post ERCP pancreatitis and asthma exacerbation, s/p GJ tube placement (11/22 unable to be placed into jejunum initially, advanced endoscopically 12/2), presents today to ED d/t GJ tube dysfunction. Accompanied by sister and daughter, daughter assists in collateral, reporting baseline health since recent discharge when bending over she heard "pop" and noticed GJ tube bumper stitch out of place, after which was unable to utilize for feeds prompting ED presentation. Additionally reports mild abd distension, otherwise denies fever, chills, abdominal pain, n/v/d, constipation (BM this AM), melena, hematochezia, dysuria, hematuria, or other complaint. Of note, states that she cannot swallow pills w/o crushing in applesauce or through GJ tube. Med rec completed by pharmacy team and verified personally with daughter.  BP 120s-150s/80-90  eosinophil predominance, H/H 10.4/34.3 (normocytic);     ED unable to flush. IR evaluated, GJ tube currently malpositioned, retracted into the stomach. As IR was unable to pass the wire down to the jejunum during initial placement requiring GI to advance the tube endoscopically, would rec GI consult for GJ repositioning.    Hospital Course:  Pt presented with Feeding tube dysfunction.  CT revealing percutaneous gastrostomy with balloon in appropriate location opposed to anterior abdominal wall. Tip of gastrostomy at level junction second and third duodenum tenting duodenal wall which may be partially obstructing. IR and GI evaled, pt now s/p endoscopic intervention with GI, rec f/u tube study- confirmed, now back on tube feeds, tolerating feeds at goal. Pt to follow up as outpatient. Pt w/ normocytic anemia, stable on CBC. Labs also noted for elevated alkaline phosphatase level, mild. Pt to c/w meds for hx of DM2. Pt has facial hyperpigmentation, reported by daughter to be chronic lichen planus, outpt derm follow up. Pt on statin/plavix of hx of CAD. Outpt follow up PCP.     Important Medication Changes and Reason:  - see med rec    Active or Pending Issues Requiring Follow-up:  - PCP, GI, dermatology     Advanced Directives:   [X] Full code  [ ] DNR  [ ] Hospice    Discharge Diagnoses:  - GJ tube malfunction  - DM2   - afib         Discharge/Dispo/Med rec discussed with attending Dr. Lebron. Patient medically cleared for discharge Home with outpatient follow up with PCP, GI and dermatology

## 2024-12-18 NOTE — DISCHARGE NOTE PROVIDER - NSDCMRMEDTOKEN_GEN_ALL_CORE_FT
ALBUTEROL HFA 90 MCG INHALER: 1 pump(s) inhaled every 6 hours INHALE 1 TO 2 PUFFS BY MOUTH EVERY 4 TO 6 HOURS AS NEEDED  ALPRAZolam 0.5 mg oral tablet: 1 tab(s) orally once a day (at bedtime) as needed for sleep  apixaban 5 mg oral tablet: 1 tab(s) orally every 12 hours  CLOPIDOGREL 75 MG TABLET: 1 tab(s) orally once a day  Dilaudid 2 mg oral tablet: 1 tab(s) orally 3 times a day as needed for  severe pain MDD: 3tb  escitalopram 10 mg oral tablet: 1 tab(s) orally once a day  Insulin Glargine Solostar Pen 100 units/mL subcutaneous solution: 10 unit(s) subcutaneous once a day (at bedtime)  metoclopramide 5 mg oral tablet: 1 tab(s) orally 3 times a day  metoprolol tartrate 50 mg oral tablet: 1 tab(s) orally 2 times a day  ondansetron 4 mg oral tablet, disintegratin tab(s) orally 3 times a day as needed for Nausea and/or Vomiting  pantoprazole 40 mg oral granule, delayed release: 1 tab(s) orally once a day  rosuvastatin 10 mg oral tablet: 1 tab(s) orally once a day (at bedtime)  sucralfate 1 g/10 mL oral suspension: 10 milliliter(s) orally 3 times a day   Albuterol (Eqv-Proventil HFA) 90 mcg/inh inhalation aerosol: 1 puff(s) inhaled every 6 hours as needed for  shortness of breath and/or wheezing  ALPRAZolam 0.5 mg oral tablet: 1 tab(s) orally once a day (at bedtime) as needed for sleep  apixaban 5 mg oral tablet: 1 tab(s) orally every 12 hours  clopidogrel 75 mg oral tablet: 1 tab(s) orally once a day  escitalopram 10 mg oral tablet: 1 tab(s) orally once a day  Insulin Glargine Solostar Pen 100 units/mL subcutaneous solution: 10 unit(s) subcutaneous once a day (at bedtime)  metoclopramide 5 mg oral tablet: 1 tab(s) orally 3 times a day  metoprolol tartrate 50 mg oral tablet: 1 tab(s) orally 2 times a day  pantoprazole 40 mg oral granule, delayed release: 1 packet(s) orally once a day  rosuvastatin 10 mg oral tablet: 1 tab(s) orally once a day (at bedtime)

## 2024-12-18 NOTE — DISCHARGE NOTE PROVIDER - NSDCFUSCHEDAPPT_GEN_ALL_CORE_FT
Shelton Lee  [facility]  CARDIOLOGY [address]  Scheduled Appointment: 01/17/2025    Shannan Prasad  [facility]  INTMED [address]  Scheduled Appointment: 03/18/2025

## 2024-12-18 NOTE — DISCHARGE NOTE PROVIDER - ATTENDING DISCHARGE PHYSICAL EXAMINATION:
CONSTITUTIONAL: NAD, well-developed, well-groomed  NECK: Supple  RESPIRATORY: Normal respiratory effort; lungs are clear to auscultation bilaterally  CARDIOVASCULAR: Regular rate and rhythm, normal S1 and S2  ABDOMEN: Nontender to palpation, normoactive bowel sounds  PSYCH: A+O to person, place, and time

## 2024-12-18 NOTE — DISCHARGE NOTE NURSING/CASE MANAGEMENT/SOCIAL WORK - FINANCIAL ASSISTANCE
[facility] provides services at a reduced cost to those who are determined to be eligible through [facility]’s financial assistance program. Information regarding [facility]’s financial assistance program can be found by going to [email or web address] or by calling [phone].

## 2024-12-18 NOTE — DISCHARGE NOTE NURSING/CASE MANAGEMENT/SOCIAL WORK - PATIENT PORTAL LINK FT
You can access the FollowMyHealth Patient Portal offered by [facility] by registering at the following website: [email or web address]. By joining "[employer]’s FollowMyHealth portal, you will also be able to view your health information using other applications (apps) compatible with our system.

## 2024-12-18 NOTE — DISCHARGE NOTE NURSING/CASE MANAGEMENT/SOCIAL WORK - NSDCFUADDAPPT_GEN_ALL_CORE_FT
APPTS ARE READY TO BE MADE: [X] YES    Best Family or Patient Contact (if needed):    Additional Information about above appointments (if needed):    1: PCP  2: GI  3: dermatology     Other comments or requests:

## 2024-12-18 NOTE — DISCHARGE NOTE PROVIDER - NSDCFUADDAPPT_GEN_ALL_CORE_FT
APPTS ARE READY TO BE MADE: [X] YES    Best Family or Patient Contact (if needed):    Additional Information about above appointments (if needed):    1: PCP  2: GI  3: dermatology     Other comments or requests:    APPTS ARE READY TO BE MADE: [X] YES    Best Family or Patient Contact (if needed):    Additional Information about above appointments (if needed):    1: PCP  2: GI  3: dermatology     Other comments or requests:       Patients daughter answered the phone and advised us that the patient has been readmitted to the hospital in [place] and discharge from there could be on Monday. She asked us to follow up on Monday to talk further about appointments.

## 2024-12-19 ENCOUNTER — INPATIENT (INPATIENT)
Facility: HOSPITAL | Age: 64
LOS: 7 days | Discharge: HOME | DRG: 253 | End: 2024-12-27
Attending: STUDENT IN AN ORGANIZED HEALTH CARE EDUCATION/TRAINING PROGRAM | Admitting: STUDENT IN AN ORGANIZED HEALTH CARE EDUCATION/TRAINING PROGRAM
Payer: MEDICAID

## 2024-12-19 VITALS
RESPIRATION RATE: 16 BRPM | HEIGHT: 59 IN | DIASTOLIC BLOOD PRESSURE: 82 MMHG | TEMPERATURE: 98 F | SYSTOLIC BLOOD PRESSURE: 124 MMHG | HEART RATE: 107 BPM | OXYGEN SATURATION: 98 %

## 2024-12-19 DIAGNOSIS — K62.5 HEMORRHAGE OF ANUS AND RECTUM: ICD-10-CM

## 2024-12-19 DIAGNOSIS — Z90.49 ACQUIRED ABSENCE OF OTHER SPECIFIED PARTS OF DIGESTIVE TRACT: Chronic | ICD-10-CM

## 2024-12-19 DIAGNOSIS — Z98.890 OTHER SPECIFIED POSTPROCEDURAL STATES: Chronic | ICD-10-CM

## 2024-12-19 DIAGNOSIS — Z93.1 GASTROSTOMY STATUS: Chronic | ICD-10-CM

## 2024-12-19 DIAGNOSIS — Z95.5 PRESENCE OF CORONARY ANGIOPLASTY IMPLANT AND GRAFT: Chronic | ICD-10-CM

## 2024-12-19 PROBLEM — Z93.4 JEJUNOSTOMY TUBE IN SITU: Status: ACTIVE | Noted: 2024-12-19

## 2024-12-19 PROBLEM — K31.84 GASTROPARESIS: Chronic | Status: ACTIVE | Noted: 2024-12-14

## 2024-12-19 PROBLEM — K85.90 ACUTE PANCREATITIS WITHOUT NECROSIS OR INFECTION, UNSPECIFIED: Chronic | Status: ACTIVE | Noted: 2024-12-14

## 2024-12-19 LAB
ALBUMIN SERPL ELPH-MCNC: 2.8 G/DL — LOW (ref 3.3–5)
ALBUMIN SERPL ELPH-MCNC: 3.6 G/DL — SIGNIFICANT CHANGE UP (ref 3.3–5)
ALP SERPL-CCNC: 114 U/L — SIGNIFICANT CHANGE UP (ref 40–120)
ALP SERPL-CCNC: 78 U/L — SIGNIFICANT CHANGE UP (ref 40–120)
ALT FLD-CCNC: 22 U/L — SIGNIFICANT CHANGE UP (ref 12–78)
ALT FLD-CCNC: 35 U/L — SIGNIFICANT CHANGE UP (ref 12–78)
ANION GAP SERPL CALC-SCNC: 3 MMOL/L — LOW (ref 5–17)
ANION GAP SERPL CALC-SCNC: 6 MMOL/L — SIGNIFICANT CHANGE UP (ref 5–17)
APTT BLD: 21.5 SEC — LOW (ref 24.5–35.6)
APTT BLD: 28.5 SEC — SIGNIFICANT CHANGE UP (ref 24.5–35.6)
AST SERPL-CCNC: 16 U/L — SIGNIFICANT CHANGE UP (ref 15–37)
AST SERPL-CCNC: 29 U/L — SIGNIFICANT CHANGE UP (ref 15–37)
BASOPHILS # BLD AUTO: 0.03 K/UL — SIGNIFICANT CHANGE UP (ref 0–0.2)
BASOPHILS NFR BLD AUTO: 0.4 % — SIGNIFICANT CHANGE UP (ref 0–2)
BILIRUB SERPL-MCNC: 0.4 MG/DL — SIGNIFICANT CHANGE UP (ref 0.2–1.2)
BILIRUB SERPL-MCNC: 0.5 MG/DL — SIGNIFICANT CHANGE UP (ref 0.2–1.2)
BLD GP AB SCN SERPL QL: SIGNIFICANT CHANGE UP
BUN SERPL-MCNC: 19 MG/DL — SIGNIFICANT CHANGE UP (ref 7–23)
BUN SERPL-MCNC: 25 MG/DL — HIGH (ref 7–23)
CALCIUM SERPL-MCNC: 8 MG/DL — LOW (ref 8.5–10.1)
CALCIUM SERPL-MCNC: 9.3 MG/DL — SIGNIFICANT CHANGE UP (ref 8.5–10.1)
CHLORIDE SERPL-SCNC: 106 MMOL/L — SIGNIFICANT CHANGE UP (ref 96–108)
CHLORIDE SERPL-SCNC: 110 MMOL/L — HIGH (ref 96–108)
CO2 SERPL-SCNC: 22 MMOL/L — SIGNIFICANT CHANGE UP (ref 22–31)
CO2 SERPL-SCNC: 23 MMOL/L — SIGNIFICANT CHANGE UP (ref 22–31)
CREAT SERPL-MCNC: 0.56 MG/DL — SIGNIFICANT CHANGE UP (ref 0.5–1.3)
CREAT SERPL-MCNC: 0.84 MG/DL — SIGNIFICANT CHANGE UP (ref 0.5–1.3)
EGFR: 103 ML/MIN/1.73M2 — SIGNIFICANT CHANGE UP
EGFR: 79 ML/MIN/1.73M2 — SIGNIFICANT CHANGE UP
EOSINOPHIL # BLD AUTO: 0.13 K/UL — SIGNIFICANT CHANGE UP (ref 0–0.5)
EOSINOPHIL NFR BLD AUTO: 1.6 % — SIGNIFICANT CHANGE UP (ref 0–6)
FIBRINOGEN PPP-MCNC: 215 MG/DL — SIGNIFICANT CHANGE UP (ref 200–435)
GLUCOSE BLDC GLUCOMTR-MCNC: 128 MG/DL — HIGH (ref 70–99)
GLUCOSE SERPL-MCNC: 131 MG/DL — HIGH (ref 70–99)
GLUCOSE SERPL-MCNC: 224 MG/DL — HIGH (ref 70–99)
HCT VFR BLD CALC: 27.9 % — LOW (ref 34.5–45)
HCT VFR BLD CALC: 29.8 % — LOW (ref 34.5–45)
HCT VFR BLD CALC: 34.2 % — LOW (ref 34.5–45)
HGB BLD-MCNC: 10.8 G/DL — LOW (ref 11.5–15.5)
HGB BLD-MCNC: 8.8 G/DL — LOW (ref 11.5–15.5)
HGB BLD-MCNC: 9.3 G/DL — LOW (ref 11.5–15.5)
IMM GRANULOCYTES NFR BLD AUTO: 0.4 % — SIGNIFICANT CHANGE UP (ref 0–0.9)
INR BLD: 0.98 RATIO — SIGNIFICANT CHANGE UP (ref 0.85–1.16)
INR BLD: 1.2 RATIO — HIGH (ref 0.85–1.16)
LACTATE SERPL-SCNC: 1 MMOL/L — SIGNIFICANT CHANGE UP (ref 0.7–2)
LIDOCAIN IGE QN: 108 U/L — HIGH (ref 13–75)
LYMPHOCYTES # BLD AUTO: 1.92 K/UL — SIGNIFICANT CHANGE UP (ref 1–3.3)
LYMPHOCYTES # BLD AUTO: 23.9 % — SIGNIFICANT CHANGE UP (ref 13–44)
MAGNESIUM SERPL-MCNC: 1.7 MG/DL — SIGNIFICANT CHANGE UP (ref 1.6–2.6)
MAGNESIUM SERPL-MCNC: 1.7 MG/DL — SIGNIFICANT CHANGE UP (ref 1.6–2.6)
MCHC RBC-ENTMCNC: 27.8 PG — SIGNIFICANT CHANGE UP (ref 27–34)
MCHC RBC-ENTMCNC: 28.1 PG — SIGNIFICANT CHANGE UP (ref 27–34)
MCHC RBC-ENTMCNC: 28.2 PG — SIGNIFICANT CHANGE UP (ref 27–34)
MCHC RBC-ENTMCNC: 31.2 G/DL — LOW (ref 32–36)
MCHC RBC-ENTMCNC: 31.5 G/DL — LOW (ref 32–36)
MCHC RBC-ENTMCNC: 31.6 G/DL — LOW (ref 32–36)
MCV RBC AUTO: 89 FL — SIGNIFICANT CHANGE UP (ref 80–100)
MCV RBC AUTO: 89.1 FL — SIGNIFICANT CHANGE UP (ref 80–100)
MCV RBC AUTO: 89.3 FL — SIGNIFICANT CHANGE UP (ref 80–100)
MONOCYTES # BLD AUTO: 0.51 K/UL — SIGNIFICANT CHANGE UP (ref 0–0.9)
MONOCYTES NFR BLD AUTO: 6.4 % — SIGNIFICANT CHANGE UP (ref 2–14)
NEUTROPHILS # BLD AUTO: 5.41 K/UL — SIGNIFICANT CHANGE UP (ref 1.8–7.4)
NEUTROPHILS NFR BLD AUTO: 67.3 % — SIGNIFICANT CHANGE UP (ref 43–77)
PHOSPHATE SERPL-MCNC: 2.9 MG/DL — SIGNIFICANT CHANGE UP (ref 2.5–4.5)
PLATELET # BLD AUTO: 158 K/UL — SIGNIFICANT CHANGE UP (ref 150–400)
PLATELET # BLD AUTO: 205 K/UL — SIGNIFICANT CHANGE UP (ref 150–400)
PLATELET # BLD AUTO: 243 K/UL — SIGNIFICANT CHANGE UP (ref 150–400)
POTASSIUM SERPL-MCNC: 4.1 MMOL/L — SIGNIFICANT CHANGE UP (ref 3.5–5.3)
POTASSIUM SERPL-MCNC: 4.6 MMOL/L — SIGNIFICANT CHANGE UP (ref 3.5–5.3)
POTASSIUM SERPL-SCNC: 4.1 MMOL/L — SIGNIFICANT CHANGE UP (ref 3.5–5.3)
POTASSIUM SERPL-SCNC: 4.6 MMOL/L — SIGNIFICANT CHANGE UP (ref 3.5–5.3)
PROT SERPL-MCNC: 6.2 GM/DL — SIGNIFICANT CHANGE UP (ref 6–8.3)
PROT SERPL-MCNC: 8.6 GM/DL — HIGH (ref 6–8.3)
PROTHROM AB SERPL-ACNC: 11.3 SEC — SIGNIFICANT CHANGE UP (ref 9.9–13.4)
PROTHROM AB SERPL-ACNC: 13.8 SEC — HIGH (ref 9.9–13.4)
RBC # BLD: 3.13 M/UL — LOW (ref 3.8–5.2)
RBC # BLD: 3.35 M/UL — LOW (ref 3.8–5.2)
RBC # BLD: 3.83 M/UL — SIGNIFICANT CHANGE UP (ref 3.8–5.2)
RBC # FLD: 15.6 % — HIGH (ref 10.3–14.5)
RBC # FLD: 15.8 % — HIGH (ref 10.3–14.5)
RBC # FLD: 15.9 % — HIGH (ref 10.3–14.5)
SODIUM SERPL-SCNC: 135 MMOL/L — SIGNIFICANT CHANGE UP (ref 135–145)
SODIUM SERPL-SCNC: 135 MMOL/L — SIGNIFICANT CHANGE UP (ref 135–145)
WBC # BLD: 6.7 K/UL — SIGNIFICANT CHANGE UP (ref 3.8–10.5)
WBC # BLD: 8.03 K/UL — SIGNIFICANT CHANGE UP (ref 3.8–10.5)
WBC # BLD: 8.22 K/UL — SIGNIFICANT CHANGE UP (ref 3.8–10.5)
WBC # FLD AUTO: 6.7 K/UL — SIGNIFICANT CHANGE UP (ref 3.8–10.5)
WBC # FLD AUTO: 8.03 K/UL — SIGNIFICANT CHANGE UP (ref 3.8–10.5)
WBC # FLD AUTO: 8.22 K/UL — SIGNIFICANT CHANGE UP (ref 3.8–10.5)

## 2024-12-19 PROCEDURE — 80048 BASIC METABOLIC PNL TOTAL CA: CPT

## 2024-12-19 PROCEDURE — 74018 RADEX ABDOMEN 1 VIEW: CPT

## 2024-12-19 PROCEDURE — 85384 FIBRINOGEN ACTIVITY: CPT

## 2024-12-19 PROCEDURE — 36415 COLL VENOUS BLD VENIPUNCTURE: CPT

## 2024-12-19 PROCEDURE — 93010 ELECTROCARDIOGRAM REPORT: CPT | Mod: 76

## 2024-12-19 PROCEDURE — 86923 COMPATIBILITY TEST ELECTRIC: CPT

## 2024-12-19 PROCEDURE — 37244 VASC EMBOLIZE/OCCLUDE BLEED: CPT

## 2024-12-19 PROCEDURE — 84100 ASSAY OF PHOSPHORUS: CPT

## 2024-12-19 PROCEDURE — C1889: CPT

## 2024-12-19 PROCEDURE — C1894: CPT

## 2024-12-19 PROCEDURE — C1769: CPT

## 2024-12-19 PROCEDURE — 83605 ASSAY OF LACTIC ACID: CPT

## 2024-12-19 PROCEDURE — 36246 INS CATH ABD/L-EXT ART 2ND: CPT

## 2024-12-19 PROCEDURE — 81003 URINALYSIS AUTO W/O SCOPE: CPT

## 2024-12-19 PROCEDURE — 85025 COMPLETE CBC W/AUTO DIFF WBC: CPT

## 2024-12-19 PROCEDURE — 80053 COMPREHEN METABOLIC PANEL: CPT

## 2024-12-19 PROCEDURE — 93005 ELECTROCARDIOGRAM TRACING: CPT

## 2024-12-19 PROCEDURE — 85610 PROTHROMBIN TIME: CPT

## 2024-12-19 PROCEDURE — C1760: CPT

## 2024-12-19 PROCEDURE — 99291 CRITICAL CARE FIRST HOUR: CPT

## 2024-12-19 PROCEDURE — 74178 CT ABD&PLV WO CNTR FLWD CNTR: CPT | Mod: 26,MC

## 2024-12-19 PROCEDURE — 87086 URINE CULTURE/COLONY COUNT: CPT

## 2024-12-19 PROCEDURE — 85027 COMPLETE CBC AUTOMATED: CPT

## 2024-12-19 PROCEDURE — 36430 TRANSFUSION BLD/BLD COMPNT: CPT

## 2024-12-19 PROCEDURE — 85730 THROMBOPLASTIN TIME PARTIAL: CPT

## 2024-12-19 PROCEDURE — 97116 GAIT TRAINING THERAPY: CPT | Mod: GP

## 2024-12-19 PROCEDURE — 82962 GLUCOSE BLOOD TEST: CPT

## 2024-12-19 PROCEDURE — P9016: CPT

## 2024-12-19 PROCEDURE — 93926 LOWER EXTREMITY STUDY: CPT | Mod: RT

## 2024-12-19 PROCEDURE — C1887: CPT

## 2024-12-19 PROCEDURE — 83735 ASSAY OF MAGNESIUM: CPT

## 2024-12-19 PROCEDURE — 97163 PT EVAL HIGH COMPLEX 45 MIN: CPT | Mod: GP

## 2024-12-19 PROCEDURE — 36246 INS CATH ABD/L-EXT ART 2ND: CPT | Mod: RT

## 2024-12-19 PROCEDURE — 94640 AIRWAY INHALATION TREATMENT: CPT

## 2024-12-19 PROCEDURE — 49452 REPLACE G-J TUBE PERC: CPT

## 2024-12-19 PROCEDURE — 82652 VIT D 1 25-DIHYDROXY: CPT

## 2024-12-19 RX ORDER — HYDROMORPHONE HCL 4 MG
0.2 TABLET ORAL
Refills: 0 | Status: DISCONTINUED | OUTPATIENT
Start: 2024-12-19 | End: 2024-12-19

## 2024-12-19 RX ORDER — IPRATROPIUM BROMIDE AND ALBUTEROL SULFATE .5; 2.5 MG/3ML; MG/3ML
3 SOLUTION RESPIRATORY (INHALATION) EVERY 6 HOURS
Refills: 0 | Status: DISCONTINUED | OUTPATIENT
Start: 2024-12-19 | End: 2024-12-24

## 2024-12-19 RX ORDER — DEXTROSE MONOHYDRATE 25 G/50ML
25 INJECTION, SOLUTION INTRAVENOUS ONCE
Refills: 0 | Status: DISCONTINUED | OUTPATIENT
Start: 2024-12-19 | End: 2024-12-27

## 2024-12-19 RX ORDER — ONDANSETRON 4 MG/1
4 TABLET ORAL EVERY 4 HOURS
Refills: 0 | Status: DISCONTINUED | OUTPATIENT
Start: 2024-12-19 | End: 2024-12-27

## 2024-12-19 RX ORDER — INSULIN LISPRO 100/ML
VIAL (ML) SUBCUTANEOUS EVERY 6 HOURS
Refills: 0 | Status: DISCONTINUED | OUTPATIENT
Start: 2024-12-19 | End: 2024-12-27

## 2024-12-19 RX ORDER — ALBUTEROL SULFATE 90 UG/1
1 INHALANT RESPIRATORY (INHALATION)
Refills: 0 | DISCHARGE

## 2024-12-19 RX ORDER — SODIUM CHLORIDE 9 MG/ML
1000 INJECTION, SOLUTION INTRAVENOUS
Refills: 0 | Status: DISCONTINUED | OUTPATIENT
Start: 2024-12-19 | End: 2024-12-22

## 2024-12-19 RX ORDER — METOPROLOL TARTRATE 50 MG/1
50 TABLET ORAL TWICE DAILY
Refills: 0 | Status: ACTIVE | COMMUNITY

## 2024-12-19 RX ORDER — ACETAMINOPHEN 80 MG/.8ML
700 SOLUTION/ DROPS ORAL ONCE
Refills: 0 | Status: DISCONTINUED | OUTPATIENT
Start: 2024-12-19 | End: 2024-12-19

## 2024-12-19 RX ORDER — ALPRAZOLAM 0.25 MG/1
0.25 TABLET ORAL AT BEDTIME
Refills: 0 | Status: DISCONTINUED | OUTPATIENT
Start: 2024-12-19 | End: 2024-12-26

## 2024-12-19 RX ORDER — PANTOPRAZOLE 40 MG/1
80 TABLET, DELAYED RELEASE ORAL ONCE
Refills: 0 | Status: COMPLETED | OUTPATIENT
Start: 2024-12-19 | End: 2024-12-19

## 2024-12-19 RX ORDER — DEXTROSE MONOHYDRATE 25 G/50ML
15 INJECTION, SOLUTION INTRAVENOUS ONCE
Refills: 0 | Status: DISCONTINUED | OUTPATIENT
Start: 2024-12-19 | End: 2024-12-27

## 2024-12-19 RX ORDER — ESCITALOPRAM OXALATE 10 MG/1
10 TABLET ORAL DAILY
Refills: 0 | Status: DISCONTINUED | OUTPATIENT
Start: 2024-12-19 | End: 2024-12-27

## 2024-12-19 RX ORDER — SODIUM CHLORIDE 9 MG/ML
1000 INJECTION, SOLUTION INTRAVENOUS
Refills: 0 | Status: DISCONTINUED | OUTPATIENT
Start: 2024-12-19 | End: 2024-12-27

## 2024-12-19 RX ORDER — INSULIN GLARGINE 100 [IU]/ML
100 INJECTION, SOLUTION SUBCUTANEOUS AS DIRECTED
Refills: 0 | Status: ACTIVE | COMMUNITY

## 2024-12-19 RX ORDER — SODIUM CHLORIDE 9 MG/ML
500 INJECTION, SOLUTION INTRAMUSCULAR; INTRAVENOUS; SUBCUTANEOUS ONCE
Refills: 0 | Status: COMPLETED | OUTPATIENT
Start: 2024-12-19 | End: 2024-12-19

## 2024-12-19 RX ORDER — HYDROMORPHONE HCL 4 MG
0.2 TABLET ORAL ONCE
Refills: 0 | Status: DISCONTINUED | OUTPATIENT
Start: 2024-12-19 | End: 2024-12-19

## 2024-12-19 RX ORDER — ONDANSETRON 4 MG/1
4 TABLET ORAL ONCE
Refills: 0 | Status: DISCONTINUED | OUTPATIENT
Start: 2024-12-19 | End: 2024-12-19

## 2024-12-19 RX ORDER — HYDROMORPHONE HYDROCHLORIDE 2 MG/1
2 TABLET ORAL EVERY 6 HOURS
Refills: 0 | Status: ACTIVE | COMMUNITY

## 2024-12-19 RX ORDER — CLOPIDOGREL BISULFATE 75 MG/1
1 TABLET, FILM COATED ORAL
Refills: 0 | DISCHARGE

## 2024-12-19 RX ORDER — SODIUM CHLORIDE 9 MG/ML
1000 INJECTION, SOLUTION INTRAMUSCULAR; INTRAVENOUS; SUBCUTANEOUS
Refills: 0 | Status: DISCONTINUED | OUTPATIENT
Start: 2024-12-19 | End: 2024-12-19

## 2024-12-19 RX ORDER — METOPROLOL TARTRATE 50 MG
1 TABLET ORAL
Refills: 0 | DISCHARGE

## 2024-12-19 RX ORDER — ACETAMINOPHEN 80 MG/.8ML
650 SOLUTION/ DROPS ORAL ONCE
Refills: 0 | Status: COMPLETED | OUTPATIENT
Start: 2024-12-19 | End: 2024-12-19

## 2024-12-19 RX ORDER — PANTOPRAZOLE 40 MG/1
40 TABLET, DELAYED RELEASE ORAL DAILY
Refills: 0 | Status: DISCONTINUED | OUTPATIENT
Start: 2024-12-19 | End: 2024-12-27

## 2024-12-19 RX ORDER — ROSUVASTATIN 40 MG/1
10 TABLET, FILM COATED ORAL AT BEDTIME
Refills: 0 | Status: DISCONTINUED | OUTPATIENT
Start: 2024-12-19 | End: 2024-12-27

## 2024-12-19 RX ORDER — PANTOPRAZOLE 40 MG/1
1 TABLET, DELAYED RELEASE ORAL
Refills: 0 | DISCHARGE

## 2024-12-19 RX ORDER — ESCITALOPRAM OXALATE 10 MG/1
10 TABLET, FILM COATED ORAL DAILY
Refills: 0 | Status: ACTIVE | COMMUNITY

## 2024-12-19 RX ORDER — CHLORHEXIDINE GLUCONATE 1.2 MG/ML
1 RINSE ORAL
Refills: 0 | Status: DISCONTINUED | OUTPATIENT
Start: 2024-12-19 | End: 2024-12-27

## 2024-12-19 RX ORDER — HYDROMORPHONE HCL 4 MG
0.5 TABLET ORAL EVERY 4 HOURS
Refills: 0 | Status: DISCONTINUED | OUTPATIENT
Start: 2024-12-19 | End: 2024-12-26

## 2024-12-19 RX ORDER — HYDROMORPHONE HCL 4 MG
0.5 TABLET ORAL ONCE
Refills: 0 | Status: DISCONTINUED | OUTPATIENT
Start: 2024-12-19 | End: 2024-12-19

## 2024-12-19 RX ORDER — ONDANSETRON HYDROCHLORIDE 4 MG/1
4 TABLET, FILM COATED ORAL
Refills: 0 | Status: ACTIVE | COMMUNITY

## 2024-12-19 RX ORDER — PROTHROMBIN COMPLEX CONCENTRATE (HUMAN) 25.5; 16.5; 24; 22; 22; 26 [IU]/ML; [IU]/ML; [IU]/ML; [IU]/ML; [IU]/ML; [IU]/ML
2000 POWDER, FOR SOLUTION INTRAVENOUS ONCE
Refills: 0 | Status: COMPLETED | OUTPATIENT
Start: 2024-12-19 | End: 2024-12-19

## 2024-12-19 RX ORDER — ACETAMINOPHEN 80 MG/.8ML
1000 SOLUTION/ DROPS ORAL EVERY 6 HOURS
Refills: 0 | Status: DISCONTINUED | OUTPATIENT
Start: 2024-12-19 | End: 2024-12-19

## 2024-12-19 RX ORDER — GLUCAGON INJECTION, SOLUTION 0.5 MG/.1ML
1 INJECTION, SOLUTION SUBCUTANEOUS ONCE
Refills: 0 | Status: DISCONTINUED | OUTPATIENT
Start: 2024-12-19 | End: 2024-12-27

## 2024-12-19 RX ORDER — DEXTROSE MONOHYDRATE 25 G/50ML
12.5 INJECTION, SOLUTION INTRAVENOUS ONCE
Refills: 0 | Status: DISCONTINUED | OUTPATIENT
Start: 2024-12-19 | End: 2024-12-27

## 2024-12-19 RX ORDER — ARIPIPRAZOLE 2 MG/1
2 TABLET ORAL DAILY
Refills: 0 | Status: ACTIVE | COMMUNITY

## 2024-12-19 RX ADMIN — Medication 0.5 MILLIGRAM(S): at 16:15

## 2024-12-19 RX ADMIN — SODIUM CHLORIDE 500 MILLILITER(S): 9 INJECTION, SOLUTION INTRAMUSCULAR; INTRAVENOUS; SUBCUTANEOUS at 09:36

## 2024-12-19 RX ADMIN — ALPRAZOLAM 0.25 MILLIGRAM(S): 0.25 TABLET ORAL at 21:23

## 2024-12-19 RX ADMIN — Medication 0.2 MILLIGRAM(S): at 21:22

## 2024-12-19 RX ADMIN — Medication 0.2 MILLIGRAM(S): at 20:07

## 2024-12-19 RX ADMIN — Medication 0.2 MILLIGRAM(S): at 22:00

## 2024-12-19 RX ADMIN — Medication 0.2 MILLIGRAM(S): at 20:40

## 2024-12-19 RX ADMIN — Medication 0.5 MILLIGRAM(S): at 16:45

## 2024-12-19 RX ADMIN — ROSUVASTATIN 10 MILLIGRAM(S): 40 TABLET, FILM COATED ORAL at 21:48

## 2024-12-19 RX ADMIN — SODIUM CHLORIDE 500 MILLILITER(S): 9 INJECTION, SOLUTION INTRAMUSCULAR; INTRAVENOUS; SUBCUTANEOUS at 10:35

## 2024-12-19 RX ADMIN — PANTOPRAZOLE 80 MILLIGRAM(S): 40 TABLET, DELAYED RELEASE ORAL at 09:36

## 2024-12-19 RX ADMIN — SODIUM CHLORIDE 50 MILLILITER(S): 9 INJECTION, SOLUTION INTRAVENOUS at 16:00

## 2024-12-19 RX ADMIN — PANTOPRAZOLE 40 MILLIGRAM(S): 40 TABLET, DELAYED RELEASE ORAL at 18:16

## 2024-12-19 RX ADMIN — PROTHROMBIN COMPLEX CONCENTRATE (HUMAN) 400 INTERNATIONAL UNIT(S): 25.5; 16.5; 24; 22; 22; 26 POWDER, FOR SOLUTION INTRAVENOUS at 14:24

## 2024-12-19 RX ADMIN — IPRATROPIUM BROMIDE AND ALBUTEROL SULFATE 3 MILLILITER(S): .5; 2.5 SOLUTION RESPIRATORY (INHALATION) at 20:30

## 2024-12-19 RX ADMIN — ACETAMINOPHEN 650 MILLIGRAM(S): 80 SOLUTION/ DROPS ORAL at 12:38

## 2024-12-19 NOTE — BRIEF OPERATIVE NOTE - OPERATION/FINDINGS
1) Active extravasation noted near ileocolic anastomosis  2) Coil embolization to hemostasis, likely ileocolic artery given altered post-surgical anatomy  3) R CFA closed with Mynx

## 2024-12-19 NOTE — CONSULT NOTE ADULT - SUBJECTIVE AND OBJECTIVE BOX
HPI:  65 yo F w/ h/o FAP s/p colectomy w/ ileorectal anastomosis (30+ y ago at [facility]), CAD s/p stents (on Plavix, last placed 6/2022), tachyarrhythmia (on Eliquis, last dose 12/18 PM), T2DM, HTN, HLD, recent admissions for GJ tube malfunction s/p exchange and ERCP for choledocholithiasis, who presented with rectal bleeding that began last night. Accompanied by daughter who provide collateral information and interpretation service. Patient has had multiple episodes of bowel movement consisting only of red blood and clots. Patient is also c/o nausea with some intermittent lightheadedness. Denies fever, abdominal pain or hematemesis.      ROS: 14 systems reviewed with pertinent positives and negatives as above.    PAST MEDICAL & SURGICAL HISTORY:  CAD (coronary artery disease)      DM (diabetes mellitus)      HTN (hypertension)      Asthma      Benign essential HTN      HLD (hyperlipidemia)      Type 2 diabetes mellitus      CAD (coronary artery disease)      Stented coronary artery      FAP (familial adenomatous polyposis)      Insomnia      GERD (gastroesophageal reflux disease)      H/O lichen planus      History of rectal bleeding      Gastroparesis      Pancreatitis      History of colon resection      Stented coronary artery      Gastrojejunal (GJ) tube in place      History of biliary stent insertion          MEDICATIONS  (STANDING):  albuterol/ipratropium for Nebulization 3 milliLiter(s) Nebulizer every 6 hours  ALPRAZolam 0.25 milliGRAM(s) Oral at bedtime  chlorhexidine 2% Cloths 1 Application(s) Topical <User Schedule>  cholecalciferol 1000 Unit(s) Oral daily  dextrose 5%. 1000 milliLiter(s) (50 mL/Hr) IV Continuous <Continuous>  dextrose 5%. 1000 milliLiter(s) (100 mL/Hr) IV Continuous <Continuous>  dextrose 50% Injectable 25 Gram(s) IV Push once  dextrose 50% Injectable 12.5 Gram(s) IV Push once  dextrose 50% Injectable 25 Gram(s) IV Push once  escitalopram 10 milliGRAM(s) Oral daily  glucagon  Injectable 1 milliGRAM(s) IntraMuscular once  insulin lispro (ADMELOG) corrective regimen sliding scale   SubCutaneous every 6 hours  lactated ringers. 1000 milliLiter(s) (50 mL/Hr) IV Continuous <Continuous>  pantoprazole  Injectable 40 milliGRAM(s) IV Push daily  rosuvastatin 10 milliGRAM(s) Oral at bedtime    MEDICATIONS  (PRN):  dextrose Oral Gel 15 Gram(s) Oral once PRN Blood Glucose LESS THAN 70 milliGRAM(s)/deciliter  HYDROmorphone  Injectable 0.5 milliGRAM(s) IV Push every 4 hours PRN Severe Pain (7 - 10)  ondansetron Injectable 4 milliGRAM(s) IV Push every 4 hours PRN Nausea and/or Vomiting      Allergies    No Known Allergies    Intolerances        SOCIAL HISTORY: Denies    FAMILY HISTORY:  FH: heart disease (Mother, Sibling)        Physical Exam:  GENERAL: NAD, well developed, pale  HEAD: Atraumatic, normocephalic  EYES: EOMI, PERRLA, conjunctiva and sclera clear  ENT: moist mucous membrane  NECK: supple, No JVD, midline trachea  CHEST/LUNG: No increased WOB, symmetric excursions  Heart: Fast rate, regular rhythm, no peripheral edema  ABDOMEN: round, soft, nondistended, nontender  RECTAL: Hypertonic sphincter, gross blood in vault  EXTREMITIES: brisk cap refill. no clubbing, cyanosis or edema  NERVOUS SYSTEM: AOx4, speech clear, no neuro-deficits  MSK: full ROM, no deformities  SKIN: warm to touch, no rash or lesions        Vital Signs Last 24 Hrs  T(C): 36.3 (20 Dec 2024 06:40), Max: 36.6 (19 Dec 2024 08:20)  T(F): 97.3 (20 Dec 2024 06:40), Max: 97.8 (19 Dec 2024 08:20)  HR: 87 (20 Dec 2024 06:00) (80 - 154)  BP: 93/65 (20 Dec 2024 06:00) (87/66 - 127/74)  BP(mean): 75 (20 Dec 2024 06:00) (70 - 106)  RR: 15 (20 Dec 2024 06:00) (13 - 19)  SpO2: 98% (20 Dec 2024 06:00) (96% - 100%)    Parameters below as of 19 Dec 2024 20:00  Patient On (Oxygen Delivery Method): room air        I&O's Summary    19 Dec 2024 07:01  -  20 Dec 2024 07:00  --------------------------------------------------------  IN: 1591 mL / OUT: 500 mL / NET: 1091 mL            LABS:                        9.7    6.67  )-----------( 112      ( 20 Dec 2024 06:24 )             30.1     12-19    135  |  110[H]  |  19  ----------------------------<  131[H]  4.1   |  22  |  0.56    Ca    8.0[L]      19 Dec 2024 17:18  Phos  2.9     12-19  Mg     1.7     12-19    TPro  6.2  /  Alb  2.8[L]  /  TBili  0.4  /  DBili  x   /  AST  16  /  ALT  22  /  AlkPhos  78  12-19    PT/INR - ( 19 Dec 2024 17:20 )   PT: 11.3 sec;   INR: 0.98 ratio         PTT - ( 19 Dec 2024 17:20 )  PTT:21.5 sec  Urinalysis Basic - ( 20 Dec 2024 06:45 )    Color: Yellow / Appearance: Clear / SG: >1.030 / pH: x  Gluc: x / Ketone: 15 mg/dL  / Bili: Negative / Urobili: 0.2 mg/dL   Blood: x / Protein: Negative mg/dL / Nitrite: Negative   Leuk Esterase: Negative / RBC: x / WBC x   Sq Epi: x / Non Sq Epi: x / Bacteria: x      CAPILLARY BLOOD GLUCOSE      POCT Blood Glucose.: 105 mg/dL (20 Dec 2024 06:23)  POCT Blood Glucose.: 129 mg/dL (20 Dec 2024 00:40)  POCT Blood Glucose.: 128 mg/dL (19 Dec 2024 17:07)    LIVER FUNCTIONS - ( 19 Dec 2024 17:18 )  Alb: 2.8 g/dL / Pro: 6.2 gm/dL / ALK PHOS: 78 U/L / ALT: 22 U/L / AST: 16 U/L / GGT: x             RADIOLOGY & ADDITIONAL STUDIES:  12/19 CT A/P w/ & w/o con  IMPRESSION:  Active GI bleed just proximal to the ileorectal anastomosis.    This finding was discussed with Dr. SHANNAN JAQUEZ  on 12/19/2024   11:25 AM.    Inflammatory change associated with the pancreatic tail and splenic hilum   with tiny loculated perisplenic collection, similar to prior.    
HPI:Lower GI bleed    INTERVAL HPI/OVERNIGHT EVENTS/SUBJECTIVE:63 yo F w/ h/o FAP s/p colectomy w/ ileorectal anastomosis (30+ y ago at [place]), CAD s/p stents (on Plavix, last placed 6/2022), tachyarrhythmia (on Eliquis, last dose 12/18 PM), who p/w imaging c/w GIB proximal to ileorectal anastomosis.   CO Generalized weakness, dizziness from last night when BRBPR began.  Denies CP, SOB, Ab pain NV or other CO.  Daughters at bedside to help with Hx      ICU Vital Signs Last 24 Hrs  T(C): 36.6 (19 Dec 2024 13:45), Max: 36.6 (19 Dec 2024 08:20)  T(F): 97.8 (19 Dec 2024 13:45), Max: 97.8 (19 Dec 2024 08:20)  HR: 108 (19 Dec 2024 13:45) (100 - 154)  BP: 113/82 (19 Dec 2024 13:45) (107/74 - 138/90)  BP(mean): 90 (19 Dec 2024 12:00) (85 - 106)  ABP: --  ABP(mean): --  RR: 17 (19 Dec 2024 13:45) (14 - 19)  SpO2: 97% (19 Dec 2024 13:45) (95% - 100%)    O2 Parameters below as of 19 Dec 2024 13:45  Patient On (Oxygen Delivery Method): room air            I&O's Detail            MEDICATIONS  (STANDING):  chlorhexidine 2% Cloths 1 Application(s) Topical <User Schedule>  lactated ringers. 1000 milliLiter(s) (50 mL/Hr) IV Continuous <Continuous>  pantoprazole  Injectable 40 milliGRAM(s) IV Push daily  prothrombin complex concentrate human-lans IVPB (BALFAXAR) 2000 International Unit(s) IV Intermittent once    MEDICATIONS  (PRN):      NUTRITION/IVF:     Gen: NAD, chronically ill, frail, mildly pale appearing, No cyanosis    Eyes: PERRL ~ 3mm, EOMI,     Neurological: A&Ox3, GCS 15, No focal defficit.     Pulmonary: NAD, CTA, = BL .      Cardiovascular: mild-moderate tachycardia, S1, S2, No Murmurs, rubs or gallops noted.    Gastrointestinal: G/J tube in place.  No erythema, edema noted. ND, Soft, NT.    Extremities: NT, AT, no edema, erythema or palpable cord noted.  FROM, = 2+ pulses throughout.          LABS:  CBC Full  -  ( 19 Dec 2024 12:17 )  WBC Count : 8.22 K/uL  RBC Count : 3.13 M/uL  Hemoglobin : 8.8 g/dL  Hematocrit : 27.9 %  Platelet Count - Automated : 205 K/uL  Mean Cell Volume : 89.1 fl  Mean Cell Hemoglobin : 28.1 pg  Mean Cell Hemoglobin Concentration : 31.5 g/dL  Auto Neutrophil # : x  Auto Lymphocyte # : x  Auto Monocyte # : x  Auto Eosinophil # : x  Auto Basophil # : x  Auto Neutrophil % : x  Auto Lymphocyte % : x  Auto Monocyte % : x  Auto Eosinophil % : x  Auto Basophil % : x    12-19    135  |  106  |  25[H]  ----------------------------<  224[H]  4.6   |  23  |  0.84    Ca    9.3      19 Dec 2024 09:28  Mg     1.7     12-19    TPro  8.6[H]  /  Alb  3.6  /  TBili  0.5  /  DBili  x   /  AST  29  /  ALT  35  /  AlkPhos  114  12-19    PT/INR - ( 19 Dec 2024 09:28 )   PT: 13.8 sec;   INR: 1.20 ratio         PTT - ( 19 Dec 2024 09:28 )  PTT:28.5 sec  Urinalysis Basic - ( 19 Dec 2024 09:28 )    Color: x / Appearance: x / SG: x / pH: x  Gluc: 224 mg/dL / Ketone: x  / Bili: x / Urobili: x   Blood: x / Protein: x / Nitrite: x   Leuk Esterase: x / RBC: x / WBC x   Sq Epi: x / Non Sq Epi: x / Bacteria: x      RECENT CULTURES:      LIVER FUNCTIONS - ( 19 Dec 2024 09:28 )  Alb: 3.6 g/dL / Pro: 8.6 gm/dL / ALK PHOS: 114 U/L / ALT: 35 U/L / AST: 29 U/L / GGT: x               CAPILLARY BLOOD GLUCOSE      RADIOLOGY & ADDITIONAL STUDIES:    ASSESSMENT/PLAN:  64yFemale presenting with: Lower GI Bleed with active extravasation  Acute blood loss anemia, tachycardia sinus.    Neurological: Home meds only    Pulmonary: IS Ordered    Cardiovascular: Tachycardia is result of occult shock from blood loss.  Hgb to 8.8 from 10.8 earlier today, from 11 three days ago. I will transfuse 1 PRBC at this time. Pt has 20g PIV x 2.  A third being placed by nursing now. Given current relative stability, no need for central access.    Gastrointestinal: NPO for potential procedure.  Colorectal on case. GI Being called by ED.  I have spoken with IR directly.  They will attempt Angio.    Heme: Last Eliquis 8 pm last night.  Given active hemorrhage, I will order K [facility]ra for reversal.  SCD only for DVT proph. I have discussed risk vs benefit with family and pt of stopping and reversing Eliquis.  They agree with reversal.    ID: None    Lines/ Tubes: PIV, G/J    Dispo: I will admit to critical care. Case discussed with Dr Ni.     CRITICAL CARE TIME SPENT: Pt is at high risk of further organ failure with ongoing bleeding.  I am actively supporting and manipulating vital organs WITH BLOOD TRANSFUSION, ANTICOAGULANT REVERSAL, discussing care with multiple services.   Critical care time spent: 60 minutes of critical care time spent providing medical care for patient's acute illness/conditions that impairs at least one vital organ system and/or poses a high risk of imminent or life threatening deterioration in the patient's condition. It includes time spent evaluating and treating the patient's acute illness as well as time spent reviewing labs, radiology, discussing goals of care with patient and/or patient's family, and discussing the case with a multidisciplinary team in an effort to prevent further life threatening deterioration or end organ damage. This time is independent of any procedures performed.  (19 Dec 2024 13:09)      PAST MEDICAL & SURGICAL HISTORY:  CAD (coronary artery disease)      DM (diabetes mellitus)      HTN (hypertension)      Asthma      Benign essential HTN      HLD (hyperlipidemia)      Type 2 diabetes mellitus      CAD (coronary artery disease)      Stented coronary artery      FAP (familial adenomatous polyposis)      Insomnia      GERD (gastroesophageal reflux disease)      H/O lichen planus      History of rectal bleeding      Gastroparesis      Pancreatitis      History of colon resection      Stented coronary artery      Gastrojejunal (GJ) tube in place      History of biliary stent insertion          MEDICATIONS  (STANDING):  albuterol/ipratropium for Nebulization 3 milliLiter(s) Nebulizer every 6 hours  ALPRAZolam 0.25 milliGRAM(s) Oral at bedtime  chlorhexidine 2% Cloths 1 Application(s) Topical <User Schedule>  dextrose 5%. 1000 milliLiter(s) (50 mL/Hr) IV Continuous <Continuous>  dextrose 5%. 1000 milliLiter(s) (100 mL/Hr) IV Continuous <Continuous>  dextrose 50% Injectable 25 Gram(s) IV Push once  dextrose 50% Injectable 12.5 Gram(s) IV Push once  dextrose 50% Injectable 25 Gram(s) IV Push once  escitalopram 10 milliGRAM(s) Oral daily  glucagon  Injectable 1 milliGRAM(s) IntraMuscular once  insulin lispro (ADMELOG) corrective regimen sliding scale   SubCutaneous every 6 hours  lactated ringers. 1000 milliLiter(s) (50 mL/Hr) IV Continuous <Continuous>  pantoprazole  Injectable 40 milliGRAM(s) IV Push daily  rosuvastatin 10 milliGRAM(s) Oral at bedtime    MEDICATIONS  (PRN):  dextrose Oral Gel 15 Gram(s) Oral once PRN Blood Glucose LESS THAN 70 milliGRAM(s)/deciliter  ondansetron Injectable 4 milliGRAM(s) IV Push every 4 hours PRN Nausea and/or Vomiting      Allergies    No Known Allergies    Intolerances        SOCIAL HISTORY:    FAMILY HISTORY:  FH: heart disease (Mother, Sibling)     Non-contributory    REVIEW OF SYSTEMS      General:	    Respiratory and Thorax:  	  Cardiovascular:	    Gastrointestinal:	    Musculoskeletal:	   Vital Signs Last 24 Hrs  T(C): 36.6 (19 Dec 2024 13:45), Max: 36.6 (19 Dec 2024 08:20)  T(F): 97.8 (19 Dec 2024 13:45), Max: 97.8 (19 Dec 2024 08:20)  HR: 108 (19 Dec 2024 13:45) (100 - 154)  BP: 113/82 (19 Dec 2024 13:45) (107/74 - 138/90)  BP(mean): 90 (19 Dec 2024 12:00) (85 - 106)  RR: 17 (19 Dec 2024 13:45) (14 - 19)  SpO2: 97% (19 Dec 2024 13:45) (95% - 100%)    Parameters below as of 19 Dec 2024 13:45  Patient On (Oxygen Delivery Method): room air        HEENT Pallor  Chest : Clear to Auscultation  CVS : S1S2 Normal.No murmurs.  Abdomen: Soft.Non tender .Normal bowel sounds.No Organomegaly.  CNS: Alert.Oriented to Time,Place and Person.No focal deficit.  EXT: Normal Range of motion.No pitting edema.    LABS:                        8.8    8.22  )-----------( 205      ( 19 Dec 2024 12:17 )             27.9     12-19    135  |  106  |  25[H]  ----------------------------<  224[H]  4.6   |  23  |  0.84    Ca    9.3      19 Dec 2024 09:28  Mg     1.7     12-19    TPro  8.6[H]  /  Alb  3.6  /  TBili  0.5  /  DBili  x   /  AST  29  /  ALT  35  /  AlkPhos  114  12-19    PT/INR - ( 19 Dec 2024 09:28 )   PT: 13.8 sec;   INR: 1.20 ratio         PTT - ( 19 Dec 2024 09:28 )  PTT:28.5 sec  LIVER FUNCTIONS - ( 19 Dec 2024 09:28 )  Alb: 3.6 g/dL / Pro: 8.6 gm/dL / ALK PHOS: 114 U/L / ALT: 35 U/L / AST: 29 U/L / GGT: x             RADIOLOGY & ADDITIONAL STUDIES:

## 2024-12-19 NOTE — ED PROVIDER NOTE - PHYSICAL EXAMINATION
Vitals: I have reviewed the patients vital signs  General: Pale appearing   HEENT: Atraumatic, normocephalic, airway patent  Eyes: EOMI, tracking appropriately  Neck: no tracheal deviation  Chest/Lungs: symmetric, no WOB  Heart: tachycardia   Abdomen: soft and nondistended, GJ tube in place draining yellow fluid, no brisk rectal bleeding noted   Neuro: A+Ox3, CN grossly intact  MSK: strength at baseline in all extremities, no muscle wasting or atrophy  Skin: no new emergent lesions

## 2024-12-19 NOTE — ED PROVIDER NOTE - PROGRESS NOTE DETAILS
Silvestre Attending Physician  CTAP showed active GI bleed in the ileorectal anastomosis. GI, colorectal and ICU consulted. Will admit to ICU for further management. Silvestre Attending Physician  CTAP showed active GI bleed in the ileorectal anastomosis. Initial H/H appears to be around baseline; HR remains in the 100s however BP continues to be stable. No indication for blood transfusion at this time, will repeat CBC. GI, colorectal and ICU consulted. Will admit to ICU for further management. Updated daughter at bedside.

## 2024-12-19 NOTE — ED PROVIDER NOTE - OBJECTIVE STATEMENT
63 y/o female with a PMHx of Afib on Eliquis, asthma, benign essential HTN, CAD, DM2, , FAP s/p resection, gastroparesis, GERD, HTN, HLD, insomnia, Lichen planus, pancreatitis, rectal bleeding, recent admission for G tube malfunction s/p exchange presents to the ED BIB daughter for rectal bleeding. Daughter states since last night pt has multiple episode of blood diarrhea with clots. Pt c/o nausea. Denies hematemesis. No other complaints at this time. 63 y/o female with a PMHx of Afib on Eliquis, asthma, benign essential HTN, CAD, DM2, , FAP s/p resection, gastroparesis, GERD, HTN, HLD, insomnia, Lichen planus, pancreatitis, rectal bleeding, recent admission for G tube malfunction s/p exchange presents to the ED BIB daughter for rectal bleeding. Daughter states since last night pt had multiple episodes of bloody diarrhea with clots. Pt c/o nausea. Denies hematemesis. No other complaints at this time. Patient is a 64 year-old-female with history of atrial fibrillation on Eliquis, CAD on Plavic, T2DM, HTN, HLD, recent admission for GJ tube malfunction s/p exchange presents with rectal bleeding. Accompanied by daughter who provide collateral information and interpretation service. Daughter reports that since last night, patient has had multiple episodes of bowel movement consisting only of red blood and clots. Patient is also c/o nausea with some intermittent lightheadedness. Denies fever, abdominal pain or hematemesis.

## 2024-12-19 NOTE — ED ADULT NURSE NOTE - NSFALLHARMRISKINTERV_ED_ALL_ED
Assistance OOB with selected safe patient handling equipment if applicable/Assistance with ambulation/Communicate risk of Fall with Harm to all staff, patient, and family/Monitor gait and stability/Provide visual cue: red socks, yellow wristband, yellow gown, etc/Reinforce activity limits and safety measures with patient and family/Toileting schedule using arm’s reach rule for commode and bathroom/Bed in lowest position, wheels locked, appropriate side rails in place/Call bell, personal items and telephone in reach/Instruct patient to call for assistance before getting out of bed/chair/stretcher/Non-slip footwear applied when patient is off stretcher/[place] to call system/Physically safe environment - no spills, clutter or unnecessary equipment/Purposeful Proactive Rounding/Room/bathroom lighting operational, light cord in reach

## 2024-12-19 NOTE — PATIENT PROFILE ADULT - NSPROHMDIABETMGMTSTRAT_GEN_A_NUR
activity/adequate rest/blood glucose testing/coping strategies/diet modification/exercise/medication therapy/routine screenings/weight management normal...

## 2024-12-19 NOTE — H&P ADULT - HISTORY OF PRESENT ILLNESS
INTERVAL HPI/OVERNIGHT EVENTS/SUBJECTIVE:65 yo F w/ h/o FAP s/p colectomy w/ ileorectal anastomosis (30+ y ago at [place]), CAD s/p stents (on Plavix, last placed 6/2022), tachyarrhythmia (on Eliquis, last dose 12/18 PM), who p/w imaging c/w GIB proximal to ileorectal anastomosis.   CO Generalized weakness, dizziness from last night when BRBPR began.  Denies CP, SOB, Ab pain NV or other CO.  Daughters at bedside to help with Hx      ICU Vital Signs Last 24 Hrs  T(C): 36.6 (19 Dec 2024 13:45), Max: 36.6 (19 Dec 2024 08:20)  T(F): 97.8 (19 Dec 2024 13:45), Max: 97.8 (19 Dec 2024 08:20)  HR: 108 (19 Dec 2024 13:45) (100 - 154)  BP: 113/82 (19 Dec 2024 13:45) (107/74 - 138/90)  BP(mean): 90 (19 Dec 2024 12:00) (85 - 106)  ABP: --  ABP(mean): --  RR: 17 (19 Dec 2024 13:45) (14 - 19)  SpO2: 97% (19 Dec 2024 13:45) (95% - 100%)    O2 Parameters below as of 19 Dec 2024 13:45  Patient On (Oxygen Delivery Method): room air            I&O's Detail            MEDICATIONS  (STANDING):  chlorhexidine 2% Cloths 1 Application(s) Topical <User Schedule>  lactated ringers. 1000 milliLiter(s) (50 mL/Hr) IV Continuous <Continuous>  pantoprazole  Injectable 40 milliGRAM(s) IV Push daily  prothrombin complex concentrate human-lans IVPB (BALFAXAR) 2000 International Unit(s) IV Intermittent once    MEDICATIONS  (PRN):      NUTRITION/IVF:     Gen: NAD, chronically ill, frail, mildly pale appearing, No cyanosis    Eyes: PERRL ~ 3mm, EOMI,     Neurological: A&Ox3, GCS 15, No focal defficit.     Pulmonary: NAD, CTA, = BL .      Cardiovascular: mild-moderate tachycardia, S1, S2, No Murmurs, rubs or gallops noted.    Gastrointestinal: G/J tube in place.  No erythema, edema noted. ND, Soft, NT.    Extremities: NT, AT, no edema, erythema or palpable cord noted.  FROM, = 2+ pulses throughout.          LABS:  CBC Full  -  ( 19 Dec 2024 12:17 )  WBC Count : 8.22 K/uL  RBC Count : 3.13 M/uL  Hemoglobin : 8.8 g/dL  Hematocrit : 27.9 %  Platelet Count - Automated : 205 K/uL  Mean Cell Volume : 89.1 fl  Mean Cell Hemoglobin : 28.1 pg  Mean Cell Hemoglobin Concentration : 31.5 g/dL  Auto Neutrophil # : x  Auto Lymphocyte # : x  Auto Monocyte # : x  Auto Eosinophil # : x  Auto Basophil # : x  Auto Neutrophil % : x  Auto Lymphocyte % : x  Auto Monocyte % : x  Auto Eosinophil % : x  Auto Basophil % : x    12-19    135  |  106  |  25[H]  ----------------------------<  224[H]  4.6   |  23  |  0.84    Ca    9.3      19 Dec 2024 09:28  Mg     1.7     12-19    TPro  8.6[H]  /  Alb  3.6  /  TBili  0.5  /  DBili  x   /  AST  29  /  ALT  35  /  AlkPhos  114  12-19    PT/INR - ( 19 Dec 2024 09:28 )   PT: 13.8 sec;   INR: 1.20 ratio         PTT - ( 19 Dec 2024 09:28 )  PTT:28.5 sec  Urinalysis Basic - ( 19 Dec 2024 09:28 )    Color: x / Appearance: x / SG: x / pH: x  Gluc: 224 mg/dL / Ketone: x  / Bili: x / Urobili: x   Blood: x / Protein: x / Nitrite: x   Leuk Esterase: x / RBC: x / WBC x   Sq Epi: x / Non Sq Epi: x / Bacteria: x      RECENT CULTURES:      LIVER FUNCTIONS - ( 19 Dec 2024 09:28 )  Alb: 3.6 g/dL / Pro: 8.6 gm/dL / ALK PHOS: 114 U/L / ALT: 35 U/L / AST: 29 U/L / GGT: x               CAPILLARY BLOOD GLUCOSE      RADIOLOGY & ADDITIONAL STUDIES:    ASSESSMENT/PLAN:  64yFemale presenting with: Lower GI Bleed with active extravasation  Acute blood loss anemia, tachycardia sinus.    Neurological: Home meds only    Pulmonary: IS Ordered    Cardiovascular: Tachycardia is result of occult shock from blood loss.  Hgb to 8.8 from 10.8 earlier today, from 11 three days ago. I will transfuse 1 PRBC at this time. Pt has 20g PIV x 2.  A third being placed by nursing now. Given current relative stability, no need for central access.    Gastrointestinal: NPO for potential procedure.  Colorectal on case. GI Being called by ED.  I have spoken with IR directly.  They will attempt Angio.    Heme: Last Eliquis 8 pm last night.  Given active hemorrhage, I will order K Centra for reversal.  SCD only for DVT proph. I have discussed risk vs benefit with family and pt of stopping and reversing Eliquis.  They agree with reversal.    ID: None    Lines/ Tubes: PIV, G/J    Dispo: I will admit to critical care. Case discussed with Dr Ni.     CRITICAL CARE TIME SPENT: Pt is at high risk of further organ failure with ongoing bleeding.  I am actively supporting and manipulating vital organs WITH BLOOD TRANSFUSION, ANTICOAGULANT REVERSAL, discussing care with multiple services.   Critical care time spent: 60 minutes of critical care time spent providing medical care for patient's acute illness/conditions that impairs at least one vital organ system and/or poses a high risk of imminent or life threatening deterioration in the patient's condition. It includes time spent evaluating and treating the patient's acute illness as well as time spent reviewing labs, radiology, discussing goals of care with patient and/or patient's family, and discussing the case with a multidisciplinary team in an effort to prevent further life threatening deterioration or end organ damage. This time is independent of any procedures performed.

## 2024-12-19 NOTE — ED PROVIDER NOTE - CRITICAL CARE ATTENDING CONTRIBUTION TO CARE
Upon my evaluation, this patient had a high probability of imminent or life-threatening deterioration due to active GI bleeding, which required my direct attention, intervention, and personal management. The patient has a medical condition that impairs one or more vital organ systems. Frequent personal assessment and adjustment of medical interventions was performed.    I have personally provided 45 minutes of critical care time exclusive of time spent on separately billable procedures. Time includes review of laboratory data, radiology results, discussion with consultants, patient and family; monitoring for potential decompensation, as well as time spent retrieving data and reviewing the chart and documenting the visit. Interventions were performed as documented above.

## 2024-12-19 NOTE — ED PROVIDER NOTE - CLINICAL SUMMARY MEDICAL DECISION MAKING FREE TEXT BOX
Unclear whether upper or lower GI bleed. Will obtain labs, CTA abd to evaluate for source of bleed, start on pt Protonix. Patient is a 64 year-old-female with history of atrial fibrillation on Eliquis, CAD on Plavic, T2DM, HTN, HLD, recent admission for GJ tube malfunction s/p exchange presents with rectal bleeding. Accompanied by daughter who provide collateral information and interpretation service. Daughter reports that since last night, patient has had multiple episodes of bowel movement consisting only of red blood and clots. Patient is also c/o nausea with some intermittent lightheadedness. Denies fever, abdominal pain or hematemesis. Concerning for lower GIB vs upper GIB given recent hospitalization and GJ tube replacement. Workup includes labs and CTAP to evaluate for sites of active GI bleed. Will provide gentle hydration for mild tachycardia; otherwise patient is hemodynamically stable at this time.

## 2024-12-19 NOTE — CONSULT NOTE ADULT - ASSESSMENT
63 yo F w/ h/o FAP s/p colectomy w/ ileorectal anastomosis (30+ y ago at [place]), CAD s/p stents (on Plavix, last placed 6/2022), tachyarrhythmia (on Eliquis, last dose 12/18 PM), who p/w imaging c/w GIB proximal to ileorectal anastomosis. Tachycardic with stable BP currently. [facility] stable from 3 days ago, despite multiple episodes of BRBPR overnight.    Recommendations:  -Admission to Medicine  -Trend H/H, transfuse PRN  -GI consult for potential endoscopic intervention  -Possible need for IR consult pending efficacy of GI intervention  -Surgery as last resort  -Serial exams    Discussed w/ Dr. Urias
IleoRectal Anastomotic Bleed-resolving  REC  Hold Eliquis  Sigmoidoscopy if bleeding recurs  Will follow

## 2024-12-19 NOTE — ED ADULT NURSE NOTE - OBJECTIVE STATEMENT
bibems from home with family c/o bright red bloody diarrhea since midnight. On Eliquis and plavix. peg tube in place. pmh afib

## 2024-12-19 NOTE — PATIENT PROFILE ADULT - FALL HARM RISK - HARM RISK INTERVENTIONS

## 2024-12-19 NOTE — H&P ADULT - NS PANP COMMENT GEN_ALL_CORE FT
65 y/o F with hx of FAP s/p colon resection with ileorectal anastomosis many years ago, CAD s/p PCI on plavix, afib on eliquis, asthma, chronic anemia, anxiety/depression, recently admitted 10/25 to 12/10 for PPN  for gastroparesis, also choledocholithiasis s/p ERCP with stent placement for CBD stone, and repeat ercp for stone extraction, sphincterotomy, stent placement, complicated with asthma exacerbation/pancreatitis, and sp GJ tube palcement.  Admitted again from 12/14 to 12/18 for possible disloged GJ tube.  And now present today 12/19 for BRBPR.   Hemodynamically stable but noted tachycardia, hgb dropped from 10 at 9 am to 8.8 on repeat at 12 noon.  CTA abd found active GI bleed at the ileorectal anastomosis site.    CCU consulted for active GI bleed.   IR consulted, will proceed with possible embolization.    colorectal consult, no acute intervention  GI consulted by ED, pending further rec    WIll admit to CCU for acute blood loss anemia 2/2 to active GI bleed at the ileorectal anastomosis site  pending IR interventio  will transfuse 1 unit of pRBC  2 large bore PIV   cbc q6h  Kcentra to reverse eliquis  will admit to CCU for further management

## 2024-12-19 NOTE — ED ADULT TRIAGE NOTE - LOCATION:
Detail Level: Zone
Plan: Recommend broad spectrum sunscreen with SPF 30 or greater daily on face and chest and more areas if outside. Reapply every 2 hours or 80 minutes if in water
Right arm;

## 2024-12-19 NOTE — PATIENT PROFILE ADULT - NSPROREFERSVCHOMEDIABETES_GEN_A_NUR
Detail Level: Detailed Medical Necessity Clause: This procedure was medically necessary because the lesions that were treated were: Spray Paint Technique: No Spray Paint Text: The liquid nitrogen was applied to the skin utilizing a spray paint frosting technique. Post-Care Instructions: I reviewed with the patient in detail post-care instructions. Patient is to wear sunprotection, and avoid picking at any of the treated lesions. Pt may apply Vaseline to crusted or scabbing areas. Show Spray Paint Technique Variable?: Yes Consent: The patient's consent was obtained including but not limited to risks of crusting, scabbing, blistering, scarring, darker or lighter pigmentary change, recurrence, incomplete removal and infection. Medical Necessity Information: It is in your best interest to select a reason for this procedure from the list below. All of these items fulfill various CMS LCD requirements except the new and changing color options. Number Of Freeze-Thaw Cycles: 3 freeze-thaw cycles no

## 2024-12-19 NOTE — ED ADULT TRIAGE NOTE - CHIEF COMPLAINT QUOTE
Biba from home with bloody diarrhea since midnight. hypotensive when EMT arrived now normotensive.  20g PIV and and NS bolus started en route. On Eliquis and plavix.

## 2024-12-19 NOTE — PRE PROCEDURE NOTE - PRE PROCEDURE EVALUATION
Interventional Radiology    Reason for consult: GI bleed     HPI: 63 yo F w/ h/o FAP s/p colectomy w/ ileorectal anastomosis (30+ y ago at [place]), CAD s/p stents (on Plavix, last placed 6/2022), tachyarrhythmia (on Eliquis, last dose 12/18 PM), who p/w imaging c/w GIB proximal to ileorectal anastomosis. Pt seen at bedside. Pt with BRBPR x 6 episodes. n/v yesterday. IR consulted for angiogram.    Allergies: No Known Allergies    Medications (Abx/Cardiac/Anticoagulation/Blood Products)    apixaban: 5 milliGRAM(s) Oral (12-18 @ 05:20)  clopidogrel Tablet: 75 milliGRAM(s) Oral (12-18 @ 12:13)    Data:  149.9  46.3  T(C): 36.4  HR: 100  BP: 107/74  RR: 18  SpO2: 97%    -WBC 8.22 / HgB 8.8 / Hct 27.9 / Plt 205  -Na 135 / Cl 106 / BUN 25 / Glucose 224  -K 4.6 / CO2 23 / Cr 0.84  -ALT 35 / Alk Phos 114 / T.Bili 0.5  -INR 1.20 / PTT 28.5          Radiology: < from: CT Abdomen and Pelvis w/wo IV Cont (12.19.24 @ 10:49) >  Active GI bleed just proximal to the ileorectal anastomosis.    < end of copied text >      Assessment/Plan: - 63 yo F w/ h/o FAP s/p colectomy w/ ileorectal anastomosis (30+ y ago at [place]), CAD s/p stents (on Plavix, last placed 6/2022), tachyarrhythmia (on Eliquis, last dose 12/18 PM), who p/w imaging c/w GIB proximal to ileorectal anastomosis. Pt seen at bedside. Pt with BRBPR x 6 episodes. n/v yesterday. IR consulted for angiogram.  Pt pending a PRBC transfusion. Also, getting Balfaxar    - Case reviewed with Dr. Sol, will plan for angiogram today.  - Pt npo since last night.      show

## 2024-12-20 DIAGNOSIS — K92.2 GASTROINTESTINAL HEMORRHAGE, UNSPECIFIED: ICD-10-CM

## 2024-12-20 DIAGNOSIS — E87.6 HYPOKALEMIA: ICD-10-CM

## 2024-12-20 DIAGNOSIS — E83.42 HYPOMAGNESEMIA: ICD-10-CM

## 2024-12-20 DIAGNOSIS — D62 ACUTE POSTHEMORRHAGIC ANEMIA: ICD-10-CM

## 2024-12-20 LAB
ANION GAP SERPL CALC-SCNC: 4 MMOL/L — LOW (ref 5–17)
APPEARANCE UR: CLEAR — SIGNIFICANT CHANGE UP
BASOPHILS # BLD AUTO: 0.01 K/UL — SIGNIFICANT CHANGE UP (ref 0–0.2)
BASOPHILS NFR BLD AUTO: 0.1 % — SIGNIFICANT CHANGE UP (ref 0–2)
BILIRUB UR-MCNC: NEGATIVE — SIGNIFICANT CHANGE UP
BUN SERPL-MCNC: 22 MG/DL — SIGNIFICANT CHANGE UP (ref 7–23)
CALCIUM SERPL-MCNC: 8.2 MG/DL — LOW (ref 8.5–10.1)
CHLORIDE SERPL-SCNC: 112 MMOL/L — HIGH (ref 96–108)
CO2 SERPL-SCNC: 24 MMOL/L — SIGNIFICANT CHANGE UP (ref 22–31)
COLOR SPEC: YELLOW — SIGNIFICANT CHANGE UP
CREAT SERPL-MCNC: 0.54 MG/DL — SIGNIFICANT CHANGE UP (ref 0.5–1.3)
DIFF PNL FLD: NEGATIVE — SIGNIFICANT CHANGE UP
EGFR: 104 ML/MIN/1.73M2 — SIGNIFICANT CHANGE UP
EOSINOPHIL # BLD AUTO: 0.11 K/UL — SIGNIFICANT CHANGE UP (ref 0–0.5)
EOSINOPHIL NFR BLD AUTO: 1.6 % — SIGNIFICANT CHANGE UP (ref 0–6)
GLUCOSE BLDC GLUCOMTR-MCNC: 105 MG/DL — HIGH (ref 70–99)
GLUCOSE BLDC GLUCOMTR-MCNC: 116 MG/DL — HIGH (ref 70–99)
GLUCOSE BLDC GLUCOMTR-MCNC: 129 MG/DL — HIGH (ref 70–99)
GLUCOSE BLDC GLUCOMTR-MCNC: 145 MG/DL — HIGH (ref 70–99)
GLUCOSE SERPL-MCNC: 109 MG/DL — HIGH (ref 70–99)
GLUCOSE UR QL: NEGATIVE MG/DL — SIGNIFICANT CHANGE UP
HCT VFR BLD CALC: 26.8 % — LOW (ref 34.5–45)
HCT VFR BLD CALC: 30.1 % — LOW (ref 34.5–45)
HGB BLD-MCNC: 8.5 G/DL — LOW (ref 11.5–15.5)
HGB BLD-MCNC: 9.7 G/DL — LOW (ref 11.5–15.5)
IMM GRANULOCYTES NFR BLD AUTO: 0.4 % — SIGNIFICANT CHANGE UP (ref 0–0.9)
KETONES UR-MCNC: 15 MG/DL
LEUKOCYTE ESTERASE UR-ACNC: NEGATIVE — SIGNIFICANT CHANGE UP
LYMPHOCYTES # BLD AUTO: 1.86 K/UL — SIGNIFICANT CHANGE UP (ref 1–3.3)
LYMPHOCYTES # BLD AUTO: 27.9 % — SIGNIFICANT CHANGE UP (ref 13–44)
MAGNESIUM SERPL-MCNC: 1.4 MG/DL — LOW (ref 1.6–2.6)
MCHC RBC-ENTMCNC: 28 PG — SIGNIFICANT CHANGE UP (ref 27–34)
MCHC RBC-ENTMCNC: 28.1 PG — SIGNIFICANT CHANGE UP (ref 27–34)
MCHC RBC-ENTMCNC: 31.7 G/DL — LOW (ref 32–36)
MCHC RBC-ENTMCNC: 32.2 G/DL — SIGNIFICANT CHANGE UP (ref 32–36)
MCV RBC AUTO: 87.2 FL — SIGNIFICANT CHANGE UP (ref 80–100)
MCV RBC AUTO: 88.2 FL — SIGNIFICANT CHANGE UP (ref 80–100)
MONOCYTES # BLD AUTO: 0.58 K/UL — SIGNIFICANT CHANGE UP (ref 0–0.9)
MONOCYTES NFR BLD AUTO: 8.7 % — SIGNIFICANT CHANGE UP (ref 2–14)
NEUTROPHILS # BLD AUTO: 4.08 K/UL — SIGNIFICANT CHANGE UP (ref 1.8–7.4)
NEUTROPHILS NFR BLD AUTO: 61.3 % — SIGNIFICANT CHANGE UP (ref 43–77)
NITRITE UR-MCNC: NEGATIVE — SIGNIFICANT CHANGE UP
PH UR: 5 — SIGNIFICANT CHANGE UP (ref 5–8)
PHOSPHATE SERPL-MCNC: 2.7 MG/DL — SIGNIFICANT CHANGE UP (ref 2.5–4.5)
PLATELET # BLD AUTO: 112 K/UL — LOW (ref 150–400)
PLATELET # BLD AUTO: 130 K/UL — LOW (ref 150–400)
POTASSIUM SERPL-MCNC: 3.3 MMOL/L — LOW (ref 3.5–5.3)
POTASSIUM SERPL-SCNC: 3.3 MMOL/L — LOW (ref 3.5–5.3)
PROT UR-MCNC: NEGATIVE MG/DL — SIGNIFICANT CHANGE UP
RBC # BLD: 3.04 M/UL — LOW (ref 3.8–5.2)
RBC # BLD: 3.45 M/UL — LOW (ref 3.8–5.2)
RBC # FLD: 15.5 % — HIGH (ref 10.3–14.5)
RBC # FLD: 15.9 % — HIGH (ref 10.3–14.5)
SODIUM SERPL-SCNC: 140 MMOL/L — SIGNIFICANT CHANGE UP (ref 135–145)
SP GR SPEC: >1.03 — HIGH (ref 1–1.03)
UROBILINOGEN FLD QL: 0.2 MG/DL — SIGNIFICANT CHANGE UP (ref 0.2–1)
VIT D25+D1,25 OH+D1,25 PNL SERPL-MCNC: 55.6 PG/ML — SIGNIFICANT CHANGE UP (ref 19.9–79.3)
WBC # BLD: 6.67 K/UL — SIGNIFICANT CHANGE UP (ref 3.8–10.5)
WBC # BLD: 8.92 K/UL — SIGNIFICANT CHANGE UP (ref 3.8–10.5)
WBC # FLD AUTO: 6.67 K/UL — SIGNIFICANT CHANGE UP (ref 3.8–10.5)
WBC # FLD AUTO: 8.92 K/UL — SIGNIFICANT CHANGE UP (ref 3.8–10.5)

## 2024-12-20 PROCEDURE — 93010 ELECTROCARDIOGRAM REPORT: CPT

## 2024-12-20 PROCEDURE — 99231 SBSQ HOSP IP/OBS SF/LOW 25: CPT

## 2024-12-20 PROCEDURE — 99291 CRITICAL CARE FIRST HOUR: CPT

## 2024-12-20 PROCEDURE — 93926 LOWER EXTREMITY STUDY: CPT | Mod: 26,RT

## 2024-12-20 RX ORDER — CHOLECALCIFEROL (VITAMIN D3) 10 MCG
1000 TABLET ORAL DAILY
Refills: 0 | Status: DISCONTINUED | OUTPATIENT
Start: 2024-12-20 | End: 2024-12-27

## 2024-12-20 RX ORDER — ALPRAZOLAM 0.25 MG/1
0.25 TABLET ORAL ONCE
Refills: 0 | Status: DISCONTINUED | OUTPATIENT
Start: 2024-12-20 | End: 2024-12-20

## 2024-12-20 RX ORDER — MAGNESIUM SULFATE 500 MG/ML
2 INJECTION, SOLUTION INTRAMUSCULAR; INTRAVENOUS ONCE
Refills: 0 | Status: DISCONTINUED | OUTPATIENT
Start: 2024-12-20 | End: 2024-12-20

## 2024-12-20 RX ORDER — MAGNESIUM SULFATE 500 MG/ML
2 INJECTION, SOLUTION INTRAMUSCULAR; INTRAVENOUS ONCE
Refills: 0 | Status: COMPLETED | OUTPATIENT
Start: 2024-12-20 | End: 2024-12-20

## 2024-12-20 RX ORDER — POTASSIUM PHOSPHATE, MONOBASIC AND POTASSIUM PHOSPHATE, DIBASIC 224; 236 MG/ML; MG/ML
30 INJECTION, SOLUTION INTRAVENOUS ONCE
Refills: 0 | Status: COMPLETED | OUTPATIENT
Start: 2024-12-20 | End: 2024-12-20

## 2024-12-20 RX ORDER — POTASSIUM CHLORIDE 600 MG/1
40 TABLET, FILM COATED, EXTENDED RELEASE ORAL ONCE
Refills: 0 | Status: DISCONTINUED | OUTPATIENT
Start: 2024-12-20 | End: 2024-12-20

## 2024-12-20 RX ORDER — POTASSIUM CHLORIDE 600 MG/1
40 TABLET, FILM COATED, EXTENDED RELEASE ORAL ONCE
Refills: 0 | Status: COMPLETED | OUTPATIENT
Start: 2024-12-20 | End: 2024-12-20

## 2024-12-20 RX ORDER — SODIUM CHLORIDE 9 MG/ML
500 INJECTION, SOLUTION INTRAVENOUS ONCE
Refills: 0 | Status: COMPLETED | OUTPATIENT
Start: 2024-12-20 | End: 2024-12-20

## 2024-12-20 RX ADMIN — IPRATROPIUM BROMIDE AND ALBUTEROL SULFATE 3 MILLILITER(S): .5; 2.5 SOLUTION RESPIRATORY (INHALATION) at 09:25

## 2024-12-20 RX ADMIN — ALPRAZOLAM 0.25 MILLIGRAM(S): 0.25 TABLET ORAL at 21:11

## 2024-12-20 RX ADMIN — POTASSIUM CHLORIDE 40 MILLIEQUIVALENT(S): 600 TABLET, FILM COATED, EXTENDED RELEASE ORAL at 10:44

## 2024-12-20 RX ADMIN — SODIUM CHLORIDE 1500 MILLILITER(S): 9 INJECTION, SOLUTION INTRAVENOUS at 01:53

## 2024-12-20 RX ADMIN — ALPRAZOLAM 0.25 MILLIGRAM(S): 0.25 TABLET ORAL at 10:44

## 2024-12-20 RX ADMIN — Medication 0.5 MILLIGRAM(S): at 22:00

## 2024-12-20 RX ADMIN — IPRATROPIUM BROMIDE AND ALBUTEROL SULFATE 3 MILLILITER(S): .5; 2.5 SOLUTION RESPIRATORY (INHALATION) at 02:02

## 2024-12-20 RX ADMIN — Medication 0.5 MILLIGRAM(S): at 03:20

## 2024-12-20 RX ADMIN — ROSUVASTATIN 10 MILLIGRAM(S): 40 TABLET, FILM COATED ORAL at 21:10

## 2024-12-20 RX ADMIN — Medication 1000 UNIT(S): at 10:44

## 2024-12-20 RX ADMIN — Medication 0.5 MILLIGRAM(S): at 21:21

## 2024-12-20 RX ADMIN — MAGNESIUM SULFATE 25 GRAM(S): 500 INJECTION, SOLUTION INTRAMUSCULAR; INTRAVENOUS at 10:43

## 2024-12-20 RX ADMIN — Medication 0.5 MILLIGRAM(S): at 02:50

## 2024-12-20 RX ADMIN — PANTOPRAZOLE 40 MILLIGRAM(S): 40 TABLET, DELAYED RELEASE ORAL at 10:44

## 2024-12-20 RX ADMIN — POTASSIUM PHOSPHATE, MONOBASIC AND POTASSIUM PHOSPHATE, DIBASIC 83.33 MILLIMOLE(S): 224; 236 INJECTION, SOLUTION INTRAVENOUS at 10:43

## 2024-12-20 RX ADMIN — SODIUM CHLORIDE 50 MILLILITER(S): 9 INJECTION, SOLUTION INTRAVENOUS at 01:53

## 2024-12-20 RX ADMIN — SODIUM CHLORIDE 50 MILLILITER(S): 9 INJECTION, SOLUTION INTRAVENOUS at 21:11

## 2024-12-20 RX ADMIN — ESCITALOPRAM OXALATE 10 MILLIGRAM(S): 10 TABLET ORAL at 11:22

## 2024-12-20 RX ADMIN — IPRATROPIUM BROMIDE AND ALBUTEROL SULFATE 3 MILLILITER(S): .5; 2.5 SOLUTION RESPIRATORY (INHALATION) at 20:35

## 2024-12-20 NOTE — DIETITIAN INITIAL EVALUATION ADULT - ENTERAL
Initiate Glucerna 1.5 @ 20 cc/hr, increase by 10 cc/hr q6 hours until goal rate of 60cc/hr met. Will provide ~ 1800 kcal, 100 g protein, and 911 mL free water.

## 2024-12-20 NOTE — DIETITIAN INITIAL EVALUATION ADULT - NSFNSGIIOFT_GEN_A_CORE
I&O's Detail    19 Dec 2024 07:01  -  20 Dec 2024 07:00  --------------------------------------------------------  IN:    Lactated Ringers: 723 mL    Lactated Ringers Bolus: 500 mL    PRBCs (Packed Red Blood Cells): 368 mL  Total IN: 1591 mL    OUT:    Intermittent Catheterization - Urethral (mL): 500 mL  Total OUT: 500 mL    Total NET: 1091 mL

## 2024-12-20 NOTE — DIETITIAN INITIAL EVALUATION ADULT - PERTINENT LABORATORY DATA
12-20    140  |  112[H]  |  22  ----------------------------<  109[H]  3.3[L]   |  24  |  0.54    Ca    8.2[L]      20 Dec 2024 06:24  Phos  2.7     12-20  Mg     1.4     12-20    TPro  6.2  /  Alb  2.8[L]  /  TBili  0.4  /  DBili  x   /  AST  16  /  ALT  22  /  AlkPhos  78  12-19  POCT Blood Glucose.: 105 mg/dL (12-20-24 @ 06:23)  A1C with Estimated Average Glucose Result: 6.6 % (10-26-24 @ 06:47)  A1C with Estimated Average Glucose Result: 8.0 % (06-26-24 @ 07:33)  A1C with Estimated Average Glucose Result: 6.9 % (04-01-24 @ 06:46)    Vitamin D, 25-Hydroxy: 27.4 ng/mL (11-28-24 @ 11:45)

## 2024-12-20 NOTE — PROGRESS NOTE ADULT - ASSESSMENT
63 yo F w/ h/o FAP s/p colectomy w/ ileorectal anastomosis (30+ y ago at [place]), CAD s/p stents (on Plavix, last placed 6/2022), tachyarrhythmia (on Eliquis, last dose 12/18 PM), who p/w imaging c/w GIB proximal to ileorectal anastomosis. Stable after IR embolization of likely ileocolic artery. Has received 2 u pRBC this admission.    Recommendations:  -Trend H/H, transfuse PRN  -GI & IR following, Surgery as last resort  -Serial exams    Will discuss w/ CRS team & update accordingly

## 2024-12-20 NOTE — DIETITIAN NUTRITION RISK NOTIFICATION - ADDITIONAL COMMENTS/DIETITIAN RECOMMENDATIONS
1) Initiate TF + CLD as soon as medically feasible   2) Obtain vitamin D 25OH level to assess nutriture and consider increasing cholecalciferol dose  3) monitor hydration status; adjust free water flushes prn, consider free water flushes of 40mL/hr (which provides ~ 800mL of water per day)  4) monitor TF tolerance; keep back of bed > 35 degrees  5) monitor BM: if > 3 days without BM, add bowel regimen prn  6) daily wt checks to track/trend changes  7) MVI w/ minerals daily to ensure 100% RDA met  8) Consider adding thiamine 100 mg daily 2/2 poor PO intake/ malnutrition   9) Follow for ability to increase rate and decrease time ONLY if pt receptive/ would like to try  10) Monitor and optimize BG levels between 140-180 mg/dL by medical management   RD will continue to monitor PO intake, labs, hydration, and wt prn.

## 2024-12-20 NOTE — PROGRESS NOTE ADULT - SUBJECTIVE AND OBJECTIVE BOX
SURGERY DAILY PROGRESS NOTE:     Subjective:  Patient seen and examined this AM at bedside. Received PCC & 1 u pRBC in ED. IR embolized ileocolic artery. Received 1 u pRBC o/n. No acute events overnight and patient resting comfortably. Denies bloody BMs, fever/chills, shortness of breath, chest pain. VS reviewed    Objective:    MEDICATIONS  (STANDING):  albuterol/ipratropium for Nebulization 3 milliLiter(s) Nebulizer every 6 hours  ALPRAZolam 0.25 milliGRAM(s) Oral at bedtime  chlorhexidine 2% Cloths 1 Application(s) Topical <User Schedule>  cholecalciferol 1000 Unit(s) Oral daily  dextrose 5%. 1000 milliLiter(s) (50 mL/Hr) IV Continuous <Continuous>  dextrose 5%. 1000 milliLiter(s) (100 mL/Hr) IV Continuous <Continuous>  dextrose 50% Injectable 25 Gram(s) IV Push once  dextrose 50% Injectable 12.5 Gram(s) IV Push once  dextrose 50% Injectable 25 Gram(s) IV Push once  escitalopram 10 milliGRAM(s) Oral daily  glucagon  Injectable 1 milliGRAM(s) IntraMuscular once  insulin lispro (ADMELOG) corrective regimen sliding scale   SubCutaneous every 6 hours  lactated ringers. 1000 milliLiter(s) (50 mL/Hr) IV Continuous <Continuous>  pantoprazole  Injectable 40 milliGRAM(s) IV Push daily  rosuvastatin 10 milliGRAM(s) Oral at bedtime    MEDICATIONS  (PRN):  dextrose Oral Gel 15 Gram(s) Oral once PRN Blood Glucose LESS THAN 70 milliGRAM(s)/deciliter  HYDROmorphone  Injectable 0.5 milliGRAM(s) IV Push every 4 hours PRN Severe Pain (7 - 10)  ondansetron Injectable 4 milliGRAM(s) IV Push every 4 hours PRN Nausea and/or Vomiting      Vital Signs Last 24 Hrs  T(C): 36.3 (20 Dec 2024 06:40), Max: 36.6 (19 Dec 2024 08:20)  T(F): 97.3 (20 Dec 2024 06:40), Max: 97.8 (19 Dec 2024 08:20)  HR: 85 (20 Dec 2024 07:00) (80 - 154)  BP: 94/65 (20 Dec 2024 07:00) (87/66 - 127/74)  BP(mean): 75 (20 Dec 2024 07:00) (70 - 106)  RR: 15 (20 Dec 2024 07:00) (13 - 19)  SpO2: 96% (20 Dec 2024 07:00) (96% - 100%)    Parameters below as of 19 Dec 2024 20:00  Patient On (Oxygen Delivery Method): room air          PHYSICAL EXAM   GENERAL: NAD, well developed  HEAD: Atraumatic, normocephalic  EYES: EOMI, PERRLA, conjunctiva and sclera clear  ENT: moist mucous membrane  NECK: supple, No JVD, midline trachea  CHEST/LUNG: No increased WOB, symmetric excursions  Heart: RRR ppp, no peripheral edema  ABDOMEN: Round, nondistended, soft, nontender. no organomegaly  EXTREMITIES: Brisk cap refill. no clubbing or cyanosis  NERVOUS SYSTEM: AOx4, speech clear, no neuro-deficits  MSK: full ROM, no deformities  SKIN: warm to touch, no rash or lesions      I&O's Detail    19 Dec 2024 07:01  -  20 Dec 2024 07:00  --------------------------------------------------------  IN:    Lactated Ringers: 723 mL    Lactated Ringers Bolus: 500 mL    PRBCs (Packed Red Blood Cells): 368 mL  Total IN: 1591 mL    OUT:    Intermittent Catheterization - Urethral (mL): 500 mL  Total OUT: 500 mL    Total NET: 1091 mL          Daily Height in cm: 149.86 (19 Dec 2024 08:20)    Daily Weight in k.5 (20 Dec 2024 06:40)    LABS:                        9.7    6.67  )-----------( 112      ( 20 Dec 2024 06:24 )             30.1     12-20    140  |  112[H]  |  22  ----------------------------<  109[H]  3.3[L]   |  24  |  0.54    Ca    8.2[L]      20 Dec 2024 06:24  Phos  2.7     12-20  Mg     1.4     12-20    TPro  6.2  /  Alb  2.8[L]  /  TBili  0.4  /  DBili  x   /  AST  16  /  ALT  22  /  AlkPhos  78  12-    PT/INR - ( 19 Dec 2024 17:20 )   PT: 11.3 sec;   INR: 0.98 ratio         PTT - ( 19 Dec 2024 17:20 )  PTT:21.5 sec  Urinalysis Basic - ( 20 Dec 2024 06:45 )    Color: Yellow / Appearance: Clear / SG: >1.030 / pH: x  Gluc: x / Ketone: 15 mg/dL  / Bili: Negative / Urobili: 0.2 mg/dL   Blood: x / Protein: Negative mg/dL / Nitrite: Negative   Leuk Esterase: Negative / RBC: x / WBC x   Sq Epi: x / Non Sq Epi: x / Bacteria: x

## 2024-12-20 NOTE — PROGRESS NOTE ADULT - SUBJECTIVE AND OBJECTIVE BOX
64y old  Female s/p IR angioembolization of GIB.     Vital Signs Last 24 Hrs  T(C): 36.8 (20 Dec 2024 15:37), Max: 36.8 (20 Dec 2024 15:37)  T(F): 98.3 (20 Dec 2024 15:37), Max: 98.3 (20 Dec 2024 15:37)  HR: 100 (20 Dec 2024 14:00) (80 - 116)  BP: 103/66 (20 Dec 2024 14:00) (87/66 - 127/74)  BP(mean): 79 (20 Dec 2024 14:00) (64 - 90)  RR: 20 (20 Dec 2024 14:00) (14 - 20)  SpO2: 97% (20 Dec 2024 14:00) (96% - 100%)    Parameters below as of 20 Dec 2024 09:25  Patient On (Oxygen Delivery Method): room air    GENERAL APPEARANCE: Well developed, in no acute distress.    LUNGS: Auscultation of the lungs revealed normal breath sounds without any other adventitious sounds or rubs.    CARDIOVASCULAR: There was a regular rate and rhythm    ABDOMEN: Soft and nontender with normal bowel sounds.     NEUROLOGIC: Alert and oriented x 3. Normal affect.     GROIN: Right groin site tender to palpation    CBC                        9.7    6.67  )-----------( 112      ( 20 Dec 2024 06:24 )             30.1       Chemistry  12-20    140  |  112[H]  |  22  ----------------------------<  109[H]  3.3[L]   |  24  |  0.54    Ca    8.2[L]      20 Dec 2024 06:24  Phos  2.7     12-20  Mg     1.4     12-20    TPro  6.2  /  Alb  2.8[L]  /  TBili  0.4  /  DBili  x   /  AST  16  /  ALT  22  /  AlkPhos  78  12-19    Coags  PT/INR - ( 19 Dec 2024 17:20 )   PT: 11.3 sec;   INR: 0.98 ratio    PTT - ( 19 Dec 2024 17:20 )  PTT:21.5 sec

## 2024-12-20 NOTE — DIETITIAN INITIAL EVALUATION ADULT - ETIOLOGY
r/t decreased ability to meet increased nutrient needs 2/2 gastroparesis, FAP sp colectomy with ileorectal anastomosis, prolonged hospitalization

## 2024-12-20 NOTE — DIETITIAN INITIAL EVALUATION ADULT - REASON INDICATOR FOR ASSESSMENT
Rhean Cr is a 63 y.o. male    Chief Complaint   Patient presents with    Annual Exam    Coronary Artery Disease    Hypertension    Cholesterol Problem     HLD         Visit Vitals  /80 (BP 1 Location: Left arm, BP Patient Position: Sitting)   Pulse 64   Resp 16   Ht 6' (1.829 m)   Wt 212 lb (96.2 kg)   SpO2 96%   BMI 28.75 kg/m²       1. Have you been to the ER, urgent care clinic since your last visit? Hospitalized since your last visit? no    2. Have you seen or consulted any other health care providers outside of the [address] [place] [address] since your last visit? Include any pap smears or colon screening.   no Enteral/Parenteral nutrition prior to admission  MST Score 2 or >

## 2024-12-20 NOTE — DIETITIAN INITIAL EVALUATION ADULT - FLUID ACCUMULATION
RADIOFREQUENCY ABLATION      Post procedure instructions:      Make sure you walk for 20 minutes before you leave the facility and at least 10 minutes per hour for the remainder of the day of your procedure. 1. You will be asked to return to the office 3 days after your procedure for a follow up ultrasound unless told otherwise. 2. You are to wear your compression stocking for 72 hours following your procedure. After the 72 hours you are to wear your compression stockings only during the day for at least 2 weeks. You do not sleep with your stockings on except for the initial 72 hours. 3. If you are sent home with a bandage wrap, do not get it wet. If you are to take a shower after the procedure, tie a plastic bag over the bandage in order to keep it dry. 4. Please do not lift anything over 20 pounds for 7 days following your procedure. If you are going to rest after your procedure please place pillows under your leg and elevate that leg so your feet are at the level of your heart. 5. Notify our staff if your employer requires an excused doctors note for any reason pertaining to the day of your procedure and follow ups. 6. After your procedure you may return to your normal routine/activities unless told otherwise. 7. Please notify Dr. Russ Clayton if you experience any discoloration of your toes or discomfort of the bandage wrap, inability to move your toes, bleeding, or pain you feel is not tolerable. Our office number is [phone]. (12/19) Edema: 1+ R groin

## 2024-12-20 NOTE — PROGRESS NOTE ADULT - SUBJECTIVE AND OBJECTIVE BOX
63 yo F w/ h/o FAP s/p colectomy w/ ileorectal anastomosis (30+ y ago at [place]), CAD s/p stents (on Plavix, last placed 6/2022), tachyarrhythmia (on Eliquis, last dose 12/18 PM), who p/w imaging c/w GIB proximal to ileorectal anastomosis.   CO Generalized weakness, dizziness from last night when BRBPR began.  Denies CP, SOB, Ab pain NV or other CO.  12/20 Case discussed on CCU rounds, clinically stable, no further bleeding.    ICU Vital Signs Last 24 Hrs  T(C): 36.4 (20 Dec 2024 10:30), Max: 36.6 (19 Dec 2024 13:45)  T(F): 97.6 (20 Dec 2024 10:30), Max: 97.8 (19 Dec 2024 13:45)  HR: 110 (20 Dec 2024 10:00) (80 - 116)  BP: 91/53 (20 Dec 2024 10:00) (87/66 - 127/74)  BP(mean): 64 (20 Dec 2024 10:00) (64 - 95)  RR: 16 (20 Dec 2024 10:00) (13 - 18)  SpO2: 96% (20 Dec 2024 10:00) (96% - 100%)    O2 Parameters below as of 20 Dec 2024 09:25  Patient On (Oxygen Delivery Method): room air                                9.7    6.67  )-----------( 112      ( 20 Dec 2024 06:24 )             30.1         12-20    140  |  112[H]  |  22  ----------------------------<  109[H]  3.3[L]   |  24  |  0.54    Ca    8.2[L]      20 Dec 2024 06:24  Phos  2.7     12-20  Mg     1.4     12-20    TPro  6.2  /  Alb  2.8[L]  /  TBili  0.4  /  DBili  x   /  AST  16  /  ALT  22  /  AlkPhos  78  12-19    LIVER FUNCTIONS - ( 19 Dec 2024 17:18 )  Alb: 2.8 g/dL / Pro: 6.2 gm/dL / ALK PHOS: 78 U/L / ALT: 22 U/L / AST: 16 U/L / GGT: x

## 2024-12-20 NOTE — DIETITIAN INITIAL EVALUATION ADULT - ORAL INTAKE PTA/DIET HISTORY
Pt reports okay appetite/ intake pta. Takes CLD for pleasure feeds and tube feeding of Glucerna 1.5 with a rate of 60cc/hr over 20 hours (provides 1800kcals, 100g pro, and 911cc free water). Was tolerating feeds w/ no N/V/D. Reports that she was unable to increase TF past a rate of 60cc/hr because it causes abdominal pain. Vents G-Tube.

## 2024-12-20 NOTE — DIETITIAN NUTRITION RISK NOTIFICATION - TREATMENT: THE FOLLOWING DIET HAS BEEN RECOMMENDED
Diet, Clear Liquid:   Tube Feeding Modality: Gastro-Jejunostomy  Glucerna 1.5 Hubert (GLUCERNA1.5)  Total Volume for 24 Hours (mL): 1200  Continuous  Starting Tube Feed Rate {mL per Hour}: 30  Increase Tube Feed Rate by (mL): 10     Every 4 hours  Until Goal Tube Feed Rate (mL per Hour): 60  Tube Feed Duration (in Hours): 20  Tube Feed Start Time: 17:00  Tube Feed Stop Time: 13:00  Free Water Flush  Free Water Flush Instructions:  Free water flushes of 40cc/hr (provides ~800cc/day); monitor and adjust free water flushes PRN per MD (12-20-24 @ 09:46) [Active]

## 2024-12-20 NOTE — PROGRESS NOTE ADULT - SUBJECTIVE AND OBJECTIVE BOX
Patient is a 64y old  Female who presents with a chief complaint of Rectal Bleeding (19 Dec 2024 14:44)      BRIEF HOSPITAL COURSE:   64F with PMHx CAD sp stents, DM, asthma, HLD, FAP sp colectomy with ileorectal anastamosis, sp GJ tube for gastroparesis who presented with BRBPR. CTA revealed active bleeding proximal to ileorectal anastamosis. Urgently to IR, angio revealed bleeding at prior ileorectal anastamosis, underwent ileocolic artery embolization. Admitted to CCU.        Events last 24 hours: afebrile, BP soft tonight, no further bloody BMs noted overnight, repeat Hbg tonight 8.5 down from 9.3, tachycardic, sp 1U pRBC on presentation. Denies CP, SOB, N/V, back pain.    PAST MEDICAL & SURGICAL HISTORY:  CAD (coronary artery disease)      DM (diabetes mellitus)      HTN (hypertension)      Asthma      Benign essential HTN      HLD (hyperlipidemia)      Type 2 diabetes mellitus      CAD (coronary artery disease)      Stented coronary artery      FAP (familial adenomatous polyposis)      Insomnia      GERD (gastroesophageal reflux disease)      H/O lichen planus      History of rectal bleeding      Gastroparesis      Pancreatitis      History of colon resection      Stented coronary artery      Gastrojejunal (GJ) tube in place      History of biliary stent insertion          Review of Systems:  12 pt ROS negative unless otherwise stated      Medications:      albuterol/ipratropium for Nebulization 3 milliLiter(s) Nebulizer every 6 hours    ALPRAZolam 0.25 milliGRAM(s) Oral at bedtime  escitalopram 10 milliGRAM(s) Oral daily  HYDROmorphone  Injectable 0.5 milliGRAM(s) IV Push every 4 hours PRN  ondansetron Injectable 4 milliGRAM(s) IV Push every 4 hours PRN        pantoprazole  Injectable 40 milliGRAM(s) IV Push daily      dextrose 50% Injectable 25 Gram(s) IV Push once  dextrose 50% Injectable 12.5 Gram(s) IV Push once  dextrose 50% Injectable 25 Gram(s) IV Push once  dextrose Oral Gel 15 Gram(s) Oral once PRN  glucagon  Injectable 1 milliGRAM(s) IntraMuscular once  insulin lispro (ADMELOG) corrective regimen sliding scale   SubCutaneous every 6 hours  rosuvastatin 10 milliGRAM(s) Oral at bedtime    cholecalciferol 1000 Unit(s) Oral daily  dextrose 5%. 1000 milliLiter(s) IV Continuous <Continuous>  dextrose 5%. 1000 milliLiter(s) IV Continuous <Continuous>  lactated ringers. 1000 milliLiter(s) IV Continuous <Continuous>      chlorhexidine 2% Cloths 1 Application(s) Topical <User Schedule>            ICU Vital Signs Last 24 Hrs  T(C): 36 (20 Dec 2024 00:33), Max: 36.6 (19 Dec 2024 08:20)  T(F): 96.8 (20 Dec 2024 00:33), Max: 97.8 (19 Dec 2024 08:20)  HR: 106 (20 Dec 2024 00:00) (82 - 154)  BP: 89/63 (20 Dec 2024 00:00) (89/63 - 127/74)  BP(mean): 70 (20 Dec 2024 00:00) (70 - 106)  ABP: --  ABP(mean): --  RR: 14 (20 Dec 2024 00:00) (13 - 19)  SpO2: 97% (20 Dec 2024 00:00) (96% - 100%)    O2 Parameters below as of 19 Dec 2024 20:00  Patient On (Oxygen Delivery Method): room air        I&O's Summary                LABS:                        8.5    8.92  )-----------( 130      ( 20 Dec 2024 00:39 )             26.8     12-19    135  |  110[H]  |  19  ----------------------------<  131[H]  4.1   |  22  |  0.56    Ca    8.0[L]      19 Dec 2024 17:18  Phos  2.9     12-19  Mg     1.7     12-19    TPro  6.2  /  Alb  2.8[L]  /  TBili  0.4  /  DBili  x   /  AST  16  /  ALT  22  /  AlkPhos  78  12-19          CAPILLARY BLOOD GLUCOSE      POCT Blood Glucose.: 129 mg/dL (20 Dec 2024 00:40)    PT/INR - ( 19 Dec 2024 17:20 )   PT: 11.3 sec;   INR: 0.98 ratio         PTT - ( 19 Dec 2024 17:20 )  PTT:21.5 sec  Urinalysis Basic - ( 19 Dec 2024 17:18 )    Color: x / Appearance: x / SG: x / pH: x  Gluc: 131 mg/dL / Ketone: x  / Bili: x / Urobili: x   Blood: x / Protein: x / Nitrite: x   Leuk Esterase: x / RBC: x / WBC x   Sq Epi: x / Non Sq Epi: x / Bacteria: x        Physical Examination:    General: No acute distress.  Alert, oriented x 3, interactive, nonfocal    HEENT: Pupils equal, reactive to light.  Symmetric.    PULM: Clear to auscultation bilaterally    CVS: Regular rhythm,     ABD: Soft, nondistended, mild diffuse tenderness, hypoactive BS, no rebound     EXT: No edema, nontender    SKIN: Warm and well perfused    RADIOLOGY:     ACC: [id number] EXAM:  CT ABDOMEN AND PELVIS WAW IC   ORDERED BY: SHANNAN JAQUEZ     PROCEDURE DATE:  12/19/2024          INTERPRETATION:  CLINICAL INFORMATION: Rectal bleeding    ADDITIONAL CLINICAL INFORMATION: Other, Non-specified    COMPARISON: 12/14/2024, 10/25/2024.    CONTRAST/COMPLICATIONS:  IV Contrast: Omnipaque 350  90 cc administered   0 cc discarded  Oral Contrast: NONE  .    PROCEDURE:  CT of the Abdomen and Pelvis was performed.  Precontrast, Arterial and Delayed phases were performed.  Sagittal and coronal reformats were performed.    FINDINGS:  LOWER CHEST: Within normal limits.    LIVER: Within normal limits.  BILE DUCTS: Common bile duct is dilated up to 1.4 cm, similar to prior.  GALLBLADDER: Cholecystectomy.  SPLEEN: Within normal limits.  PANCREAS: Increased stranding adjacent to the pancreatic tail with small   loculated perisplenic fluid.  ADRENALS: Within normal limits.  KIDNEYS/URETERS: Within normal limits.    BLADDER: Minimally distended.  REPRODUCTIVE ORGANS: Uterus and adnexa within normal limits.    BOWEL: There is active bleeding just proximal to the ileorectal   anastomosis Percutaneous gastrostomy tube with the tip ending at the   junction of the first/second portion of the duodenum. Status post   colectomy with ileorectal anastomosis.  PERITONEUM/RETROPERITONEUM: Within normal limits.  VESSELS: Within normal limits.  LYMPH NODES: No lymphadenopathy.  ABDOMINAL WALL: Within normal limits.  BONES: Degenerative changes.    IMPRESSION:  Active GI bleed just proximal to the ileorectal anastomosis.    This finding was discussed with Dr. SHANNAN JAQUEZ  on 12/19/2024   11:25 AM.    Inflammatory change associated with the pancreatic tail and splenic hilum   with tiny loculated perisplenic collection, similar to prior.    --- End of Report ---            JEANNETTE VICKERS MD; Attending Radiologist  This document has been electronically signed. Dec 19 2024 11:26AM    Critical Care time: 35 mins assessing presenting problems of acute illness that poses high probability of life threatening deterioration or end organ damage/dysfunction.  Medical decision making including Initiating plan of care, reviewing data, reviewing radiology, direct patient bedside evaluation and interpretation of vital signs, discussion with multidisciplinary team, all non inclusive of procedures. Date of entry of note is equal to date of services rendered.

## 2024-12-20 NOTE — DIETITIAN INITIAL EVALUATION ADULT - PERTINENT MEDS FT
MEDICATIONS  (STANDING):  albuterol/ipratropium for Nebulization 3 milliLiter(s) Nebulizer every 6 hours  ALPRAZolam 0.25 milliGRAM(s) Oral at bedtime  chlorhexidine 2% Cloths 1 Application(s) Topical <User Schedule>  cholecalciferol 1000 Unit(s) Oral daily  dextrose 5%. 1000 milliLiter(s) (50 mL/Hr) IV Continuous <Continuous>  dextrose 5%. 1000 milliLiter(s) (100 mL/Hr) IV Continuous <Continuous>  dextrose 50% Injectable 25 Gram(s) IV Push once  dextrose 50% Injectable 12.5 Gram(s) IV Push once  dextrose 50% Injectable 25 Gram(s) IV Push once  escitalopram 10 milliGRAM(s) Oral daily  glucagon  Injectable 1 milliGRAM(s) IntraMuscular once  insulin lispro (ADMELOG) corrective regimen sliding scale   SubCutaneous every 6 hours  lactated ringers. 1000 milliLiter(s) (50 mL/Hr) IV Continuous <Continuous>  magnesium sulfate  IVPB 2 Gram(s) IV Intermittent once  pantoprazole  Injectable 40 milliGRAM(s) IV Push daily  potassium chloride   Powder 40 milliEquivalent(s) Oral once  potassium phosphate IVPB 30 milliMole(s) IV Intermittent once  rosuvastatin 10 milliGRAM(s) Oral at bedtime    MEDICATIONS  (PRN):  dextrose Oral Gel 15 Gram(s) Oral once PRN Blood Glucose LESS THAN 70 milliGRAM(s)/deciliter  HYDROmorphone  Injectable 0.5 milliGRAM(s) IV Push every 4 hours PRN Severe Pain (7 - 10)  ondansetron Injectable 4 milliGRAM(s) IV Push every 4 hours PRN Nausea and/or Vomiting    Home Medications:  Albuterol (Eqv-Proventil HFA) 90 mcg/inh inhalation aerosol: 1 puff(s) inhaled every 6 hours as needed for  shortness of breath and/or wheezing (19 Dec 2024 13:25)  ALPRAZolam 0.5 mg oral tablet: 1 tab(s) orally once a day (at bedtime) as needed for sleep (19 Dec 2024 13:13)  apixaban 5 mg oral tablet: 1 tab(s) orally every 12 hours (19 Dec 2024 13:13)  clopidogrel 75 mg oral tablet: 1 tab(s) orally once a day (19 Dec 2024 13:19)  escitalopram 10 mg oral tablet: 1 tab(s) orally once a day (19 Dec 2024 13:16)  Insulin Glargine Solostar Pen 100 units/mL subcutaneous solution: 10 unit(s) subcutaneous once a day (at bedtime) (19 Dec 2024 13:13)  pantoprazole 40 mg oral granule, delayed release: 1 packet(s) orally once a day (19 Dec 2024 13:19)  rosuvastatin 10 mg oral tablet: 1 tab(s) orally once a day (at bedtime) (19 Dec 2024 13:13)

## 2024-12-20 NOTE — PROGRESS NOTE ADULT - ASSESSMENT
Impression:  1. acute blood loss anemia  2. CAD with prior stents  3. acute LGIB, sp IR embolization of ileocolic artery  4. diabetes mellitus  5. Vitamin D insufficiency    Plan:  Neuro - nonfocal               PRN analgesia with dilaudid    CV -  MAP remains >65, BP on softer side, ST on tele           hx CAD with stents, no CP/SOB           lactate normal           prior Echo EF nml           IV bolus 500ml x 1 with balanced fluids    Pulm -  stable on RA, nonlabored    GI -  PPI daily         clears as per GI         no further BMs tonight         sp IR embolization of ileocolic artery         f/u GI and colorectal surgery         Vit D consistent with deficiency on recent labs, start Vit D, repeat level in am    Renal - Cr stable              strict I/Os              gentle IV hydration with balanced fluids              bolus 500ml LR x 1 now              BMP in am    Heme -  no Pharmacologic DVT PPx given ABLA               on AC prior, now held, sp Balfaxar               SCDs only                Hbg on repeat tonight 8.5 from 9.3 earlier                no overt further GI bleeding, no BRBPR/melena                tx 1U pRBCs given small drop and soft BP with mild tachycardia                serial CBCs    ID - afebrile, WBC normal        no indication for abx at this time        trend WBC and temp curve     Endo -  Hx DM, prior A1c 6.6              BS stable, no hyperglycemia              FSG [id number], institute ISS coverage as needed to keep euglycemic 120-180mg/dl

## 2024-12-21 LAB
ALBUMIN SERPL ELPH-MCNC: 2.8 G/DL — LOW (ref 3.3–5)
ALBUMIN SERPL ELPH-MCNC: 2.8 G/DL — LOW (ref 3.3–5)
ALP SERPL-CCNC: 78 U/L — SIGNIFICANT CHANGE UP (ref 40–120)
ALP SERPL-CCNC: 85 U/L — SIGNIFICANT CHANGE UP (ref 40–120)
ALT FLD-CCNC: 16 U/L — SIGNIFICANT CHANGE UP (ref 12–78)
ALT FLD-CCNC: 21 U/L — SIGNIFICANT CHANGE UP (ref 12–78)
ANION GAP SERPL CALC-SCNC: 4 MMOL/L — LOW (ref 5–17)
ANION GAP SERPL CALC-SCNC: 4 MMOL/L — LOW (ref 5–17)
AST SERPL-CCNC: 14 U/L — LOW (ref 15–37)
AST SERPL-CCNC: 19 U/L — SIGNIFICANT CHANGE UP (ref 15–37)
BILIRUB SERPL-MCNC: 0.3 MG/DL — SIGNIFICANT CHANGE UP (ref 0.2–1.2)
BILIRUB SERPL-MCNC: 0.5 MG/DL — SIGNIFICANT CHANGE UP (ref 0.2–1.2)
BUN SERPL-MCNC: 7 MG/DL — SIGNIFICANT CHANGE UP (ref 7–23)
BUN SERPL-MCNC: 8 MG/DL — SIGNIFICANT CHANGE UP (ref 7–23)
CALCIUM SERPL-MCNC: 8.8 MG/DL — SIGNIFICANT CHANGE UP (ref 8.5–10.1)
CALCIUM SERPL-MCNC: 9.1 MG/DL — SIGNIFICANT CHANGE UP (ref 8.5–10.1)
CHLORIDE SERPL-SCNC: 110 MMOL/L — HIGH (ref 96–108)
CHLORIDE SERPL-SCNC: 111 MMOL/L — HIGH (ref 96–108)
CO2 SERPL-SCNC: 25 MMOL/L — SIGNIFICANT CHANGE UP (ref 22–31)
CO2 SERPL-SCNC: 26 MMOL/L — SIGNIFICANT CHANGE UP (ref 22–31)
CREAT SERPL-MCNC: 0.57 MG/DL — SIGNIFICANT CHANGE UP (ref 0.5–1.3)
CREAT SERPL-MCNC: 0.65 MG/DL — SIGNIFICANT CHANGE UP (ref 0.5–1.3)
CULTURE RESULTS: NO GROWTH — SIGNIFICANT CHANGE UP
EGFR: 103 ML/MIN/1.73M2 — SIGNIFICANT CHANGE UP
EGFR: 99 ML/MIN/1.73M2 — SIGNIFICANT CHANGE UP
GLUCOSE BLDC GLUCOMTR-MCNC: 102 MG/DL — HIGH (ref 70–99)
GLUCOSE BLDC GLUCOMTR-MCNC: 113 MG/DL — HIGH (ref 70–99)
GLUCOSE BLDC GLUCOMTR-MCNC: 131 MG/DL — HIGH (ref 70–99)
GLUCOSE BLDC GLUCOMTR-MCNC: 152 MG/DL — HIGH (ref 70–99)
GLUCOSE SERPL-MCNC: 139 MG/DL — HIGH (ref 70–99)
GLUCOSE SERPL-MCNC: 155 MG/DL — HIGH (ref 70–99)
HCT VFR BLD CALC: 29.4 % — LOW (ref 34.5–45)
HCT VFR BLD CALC: 30.1 % — LOW (ref 34.5–45)
HGB BLD-MCNC: 9.5 G/DL — LOW (ref 11.5–15.5)
HGB BLD-MCNC: 9.9 G/DL — LOW (ref 11.5–15.5)
MAGNESIUM SERPL-MCNC: 1.9 MG/DL — SIGNIFICANT CHANGE UP (ref 1.6–2.6)
MAGNESIUM SERPL-MCNC: 2 MG/DL — SIGNIFICANT CHANGE UP (ref 1.6–2.6)
MCHC RBC-ENTMCNC: 28.1 PG — SIGNIFICANT CHANGE UP (ref 27–34)
MCHC RBC-ENTMCNC: 28.8 PG — SIGNIFICANT CHANGE UP (ref 27–34)
MCHC RBC-ENTMCNC: 32.3 G/DL — SIGNIFICANT CHANGE UP (ref 32–36)
MCHC RBC-ENTMCNC: 32.9 G/DL — SIGNIFICANT CHANGE UP (ref 32–36)
MCV RBC AUTO: 87 FL — SIGNIFICANT CHANGE UP (ref 80–100)
MCV RBC AUTO: 87.5 FL — SIGNIFICANT CHANGE UP (ref 80–100)
PHOSPHATE SERPL-MCNC: 2.8 MG/DL — SIGNIFICANT CHANGE UP (ref 2.5–4.5)
PHOSPHATE SERPL-MCNC: 3.3 MG/DL — SIGNIFICANT CHANGE UP (ref 2.5–4.5)
PLATELET # BLD AUTO: 114 K/UL — LOW (ref 150–400)
PLATELET # BLD AUTO: 133 K/UL — LOW (ref 150–400)
POTASSIUM SERPL-MCNC: 3.4 MMOL/L — LOW (ref 3.5–5.3)
POTASSIUM SERPL-MCNC: 4.3 MMOL/L — SIGNIFICANT CHANGE UP (ref 3.5–5.3)
POTASSIUM SERPL-SCNC: 3.4 MMOL/L — LOW (ref 3.5–5.3)
POTASSIUM SERPL-SCNC: 4.3 MMOL/L — SIGNIFICANT CHANGE UP (ref 3.5–5.3)
PROT SERPL-MCNC: 6.3 GM/DL — SIGNIFICANT CHANGE UP (ref 6–8.3)
PROT SERPL-MCNC: 6.5 GM/DL — SIGNIFICANT CHANGE UP (ref 6–8.3)
RBC # BLD: 3.38 M/UL — LOW (ref 3.8–5.2)
RBC # BLD: 3.44 M/UL — LOW (ref 3.8–5.2)
RBC # FLD: 15.9 % — HIGH (ref 10.3–14.5)
RBC # FLD: 16 % — HIGH (ref 10.3–14.5)
SODIUM SERPL-SCNC: 139 MMOL/L — SIGNIFICANT CHANGE UP (ref 135–145)
SODIUM SERPL-SCNC: 141 MMOL/L — SIGNIFICANT CHANGE UP (ref 135–145)
SPECIMEN SOURCE: SIGNIFICANT CHANGE UP
WBC # BLD: 6.28 K/UL — SIGNIFICANT CHANGE UP (ref 3.8–10.5)
WBC # BLD: 6.39 K/UL — SIGNIFICANT CHANGE UP (ref 3.8–10.5)
WBC # FLD AUTO: 6.28 K/UL — SIGNIFICANT CHANGE UP (ref 3.8–10.5)
WBC # FLD AUTO: 6.39 K/UL — SIGNIFICANT CHANGE UP (ref 3.8–10.5)

## 2024-12-21 PROCEDURE — 99233 SBSQ HOSP IP/OBS HIGH 50: CPT

## 2024-12-21 PROCEDURE — 99232 SBSQ HOSP IP/OBS MODERATE 35: CPT

## 2024-12-21 RX ORDER — POTASSIUM CHLORIDE 600 MG/1
40 TABLET, FILM COATED, EXTENDED RELEASE ORAL ONCE
Refills: 0 | Status: COMPLETED | OUTPATIENT
Start: 2024-12-21 | End: 2024-12-21

## 2024-12-21 RX ADMIN — ROSUVASTATIN 10 MILLIGRAM(S): 40 TABLET, FILM COATED ORAL at 21:27

## 2024-12-21 RX ADMIN — Medication 2: at 11:07

## 2024-12-21 RX ADMIN — ESCITALOPRAM OXALATE 10 MILLIGRAM(S): 10 TABLET ORAL at 09:48

## 2024-12-21 RX ADMIN — CHLORHEXIDINE GLUCONATE 1 APPLICATION(S): 1.2 RINSE ORAL at 06:14

## 2024-12-21 RX ADMIN — IPRATROPIUM BROMIDE AND ALBUTEROL SULFATE 3 MILLILITER(S): .5; 2.5 SOLUTION RESPIRATORY (INHALATION) at 01:18

## 2024-12-21 RX ADMIN — IPRATROPIUM BROMIDE AND ALBUTEROL SULFATE 3 MILLILITER(S): .5; 2.5 SOLUTION RESPIRATORY (INHALATION) at 08:47

## 2024-12-21 RX ADMIN — ALPRAZOLAM 0.25 MILLIGRAM(S): 0.25 TABLET ORAL at 21:26

## 2024-12-21 RX ADMIN — Medication 0.5 MILLIGRAM(S): at 15:16

## 2024-12-21 RX ADMIN — SODIUM CHLORIDE 50 MILLILITER(S): 9 INJECTION, SOLUTION INTRAVENOUS at 16:53

## 2024-12-21 RX ADMIN — PANTOPRAZOLE 40 MILLIGRAM(S): 40 TABLET, DELAYED RELEASE ORAL at 09:48

## 2024-12-21 RX ADMIN — Medication 1000 UNIT(S): at 09:48

## 2024-12-21 RX ADMIN — IPRATROPIUM BROMIDE AND ALBUTEROL SULFATE 3 MILLILITER(S): .5; 2.5 SOLUTION RESPIRATORY (INHALATION) at 13:56

## 2024-12-21 RX ADMIN — POTASSIUM CHLORIDE 40 MILLIEQUIVALENT(S): 600 TABLET, FILM COATED, EXTENDED RELEASE ORAL at 14:53

## 2024-12-21 RX ADMIN — Medication 0.5 MILLIGRAM(S): at 14:46

## 2024-12-21 RX ADMIN — ONDANSETRON 4 MILLIGRAM(S): 4 TABLET ORAL at 16:59

## 2024-12-21 NOTE — CHART NOTE - NSCHARTNOTEFT_GEN_A_CORE
Clinical Nutrition TF BRIEF NOTE    *63 y/o F with a PMHx of CAD sp stents, DM, asthma, HLD, FAP sp colectomy with ileorectal anastomosis, sp GJ tube for gastroparesis who presented with BRBPR. CTA revealed active bleeding proximal to ileorectal anastomosis. Urgently to IR, angio revealed bleeding at prior ileorectal anastomosis, underwent ileocolic artery embolization (). Admitted for acute blood loss anemia; admitted to CCU.    *Pt well known to RD from previous admission; was on TPN/ PPN and TF. Pt consuming CLD at time of visits. Reports okay appetite/ intake and takes liquids for pleasure. Endorses weight loss over the last few months 2/2 prolonged hospitalization and previous Ozempic use. UBW was 133# in 2024 and she weighed 107# when she was home this week. RD obtained bedscale wt on  - 115#; 1+ edema may be skewing weight. Weight hx reviewed: 121.6# (taken by RD on 10/26/24; met criteria for moderate malnutrition); weight loss of 18# (13.5%) x 4 mon per pt report - clinsig and severe. NFPE reveals mild muscle/ fat wasting, pt continues to meet criteria for PCM at this time. Discussed in IDR this morning, plan to initiate TF + CLD today.     *current status: Spoke with RN in CCU this morning; asked to pend new diet order low fiber + TF. Please see additional recommendations below.     *labs reviewed: POCTs WNL x 24 hours  12-    140  |  112[H]  |  22  ----------------------------<  109[H]  3.3[L]   |  24  |  0.54    Ca    8.2[L]      20 Dec 2024 06:24  Phos  2.7     12-  Mg     1.4     -    TPro  6.2  /  Alb  2.8[L]  /  TBili  0.4  /  DBili  x   /  AST  16  /  ALT  22  /  AlkPhos  78        *I&O's    24 @ 07:01  -  24 @ 07:00  --------------------------------------------------------  IN:    IV PiggyBack: 550 mL    Lactated Ringers: 440 mL  Total IN: 990 mL    OUT:    Voided (mL): 500 mL  Total OUT: 500 mL    Total NET: 490 mL      *BM documented: liquid; dark red/brown ; not on a bowel regimen      *ESTIMATED NUTR NEEDS: 52.1 Kg based on    1563 - 1823   Kcal ( 30 - 35  Kcal/Kg)   78.15 - 104.2 g protein (1.5 - 2 g/Kg)   1563 - 1823  mL   ( 30 - 35 mL/Kg)    Daily Weight in k.7 (21 Dec 2024 05:56)    RECOMMENDATIONS:  1) Initiate TF + Low fiber diet - Glucerna 1.5 @ 30 cc/hr, increase by 10 cc/hr q4 hours until goal rate of 60cc/hr met. Will provide ~ 1800 kcal, 100 g protein, and 911 mL free water.   2) monitor hydration status; adjust free water flushes prn, consider free water flushes of 40 cc/hr (which provides ~ 800mL of water per day)  3) monitor TF tolerance; keep back of bed > 35 degrees  4) monitor BM: if > 3 days without BM, add bowel regimen prn  5) daily wt checks to track/trend changes  6) Monitor and optimize BG levels between 140-180 mg/dL by medical management     *will continue to monitor and adjust treatment plan prn*  Cecilia Gusman, PhD, MS, [phone]

## 2024-12-21 NOTE — PROGRESS NOTE ADULT - SUBJECTIVE AND OBJECTIVE BOX
Patient seen and examined at the bedside this AM.  Tolerating diet, denies further BBM.  AVSS        PAST MEDICAL & SURGICAL HISTORY:  CAD (coronary artery disease)      DM (diabetes mellitus)      HTN (hypertension)      Asthma      Benign essential HTN      HLD (hyperlipidemia)      Type 2 diabetes mellitus      CAD (coronary artery disease)      Stented coronary artery      FAP (familial adenomatous polyposis)      Insomnia      GERD (gastroesophageal reflux disease)      H/O lichen planus      History of rectal bleeding      Gastroparesis      Pancreatitis      History of colon resection      Stented coronary artery      Gastrojejunal (GJ) tube in place      History of biliary stent insertion          MEDICATIONS  (STANDING):  albuterol/ipratropium for Nebulization 3 milliLiter(s) Nebulizer every 6 hours  ALPRAZolam 0.25 milliGRAM(s) Oral at bedtime  chlorhexidine 2% Cloths 1 Application(s) Topical <User Schedule>  cholecalciferol 1000 Unit(s) Oral daily  dextrose 5%. 1000 milliLiter(s) (50 mL/Hr) IV Continuous <Continuous>  dextrose 5%. 1000 milliLiter(s) (100 mL/Hr) IV Continuous <Continuous>  dextrose 50% Injectable 25 Gram(s) IV Push once  dextrose 50% Injectable 12.5 Gram(s) IV Push once  dextrose 50% Injectable 25 Gram(s) IV Push once  escitalopram 10 milliGRAM(s) Oral daily  glucagon  Injectable 1 milliGRAM(s) IntraMuscular once  insulin lispro (ADMELOG) corrective regimen sliding scale   SubCutaneous every 6 hours  lactated ringers. 1000 milliLiter(s) (50 mL/Hr) IV Continuous <Continuous>  pantoprazole  Injectable 40 milliGRAM(s) IV Push daily  rosuvastatin 10 milliGRAM(s) Oral at bedtime    MEDICATIONS  (PRN):  dextrose Oral Gel 15 Gram(s) Oral once PRN Blood Glucose LESS THAN 70 milliGRAM(s)/deciliter  HYDROmorphone  Injectable 0.5 milliGRAM(s) IV Push every 4 hours PRN Severe Pain (7 - 10)  ondansetron Injectable 4 milliGRAM(s) IV Push every 4 hours PRN Nausea and/or Vomiting      Allergies    No Known Allergies    Intolerances        SOCIAL HISTORY:    FAMILY HISTORY:  FH: heart disease (Mother, Sibling)          PHYSICAL EXAM   GENERAL: NAD, well developed  HEAD: Atraumatic, normocephalic  EYES: EOMI, PERRLA, conjunctiva and sclera clear  ENT: moist mucous membrane  NECK: supple, No JVD, midline trachea  CHEST/LUNG: No increased WOB, symmetric excursions  Heart: RRR ppp, no peripheral edema  ABDOMEN: Round, nondistended, soft, nontender. no organomegaly  EXTREMITIES: Brisk cap refill. no clubbing or cyanosis  NERVOUS SYSTEM: AOx4, speech clear, no neuro-deficits  MSK: full ROM, no deformities  SKIN: warm to touch, no rash or lesions        Vital Signs Last 24 Hrs  T(C): 36.9 (20 Dec 2024 20:00), Max: 36.9 (20 Dec 2024 20:00)  T(F): 98.5 (20 Dec 2024 20:00), Max: 98.5 (20 Dec 2024 20:00)  HR: 106 (21 Dec 2024 01:00) (85 - 117)  BP: 96/71 (21 Dec 2024 01:00) (89/60 - 111/70)  BP(mean): 80 (21 Dec 2024 01:00) (64 - 84)  RR: 18 (21 Dec 2024 01:00) (14 - 23)  SpO2: 96% (21 Dec 2024 01:00) (94% - 100%)    Parameters below as of 20 Dec 2024 20:00  Patient On (Oxygen Delivery Method): room air        I&O's Summary    19 Dec 2024 07:01  -  20 Dec 2024 07:00  --------------------------------------------------------  IN: 1591 mL / OUT: 500 mL / NET: 1091 mL    20 Dec 2024 07:01  -  21 Dec 2024 02:52  --------------------------------------------------------  IN: 550 mL / OUT: 0 mL / NET: 550 mL            LABS:                        9.7    6.67  )-----------( 112      ( 20 Dec 2024 06:24 )             30.1     12-20    140  |  112[H]  |  22  ----------------------------<  109[H]  3.3[L]   |  24  |  0.54    Ca    8.2[L]      20 Dec 2024 06:24  Phos  2.7     12-20  Mg     1.4     12-20    TPro  6.2  /  Alb  2.8[L]  /  TBili  0.4  /  DBili  x   /  AST  16  /  ALT  22  /  AlkPhos  78  12-19    PT/INR - ( 19 Dec 2024 17:20 )   PT: 11.3 sec;   INR: 0.98 ratio         PTT - ( 19 Dec 2024 17:20 )  PTT:21.5 sec  Urinalysis Basic - ( 20 Dec 2024 06:45 )    Color: Yellow / Appearance: Clear / SG: >1.030 / pH: x  Gluc: x / Ketone: 15 mg/dL  / Bili: Negative / Urobili: 0.2 mg/dL   Blood: x / Protein: Negative mg/dL / Nitrite: Negative   Leuk Esterase: Negative / RBC: x / WBC x   Sq Epi: x / Non Sq Epi: x / Bacteria: x      CAPILLARY BLOOD GLUCOSE      POCT Blood Glucose.: 102 mg/dL (21 Dec 2024 00:17)  POCT Blood Glucose.: 116 mg/dL (20 Dec 2024 17:03)  POCT Blood Glucose.: 145 mg/dL (20 Dec 2024 10:48)  POCT Blood Glucose.: 105 mg/dL (20 Dec 2024 06:23)    LIVER FUNCTIONS - ( 19 Dec 2024 17:18 )  Alb: 2.8 g/dL / Pro: 6.2 gm/dL / ALK PHOS: 78 U/L / ALT: 22 U/L / AST: 16 U/L / GGT: x             Cultures:      RADIOLOGY & ADDITIONAL STUDIES:

## 2024-12-21 NOTE — PROGRESS NOTE ADULT - ASSESSMENT
63 yo F w/ h/o FAP s/p colectomy w/ ileorectal anastomosis (30+ y ago at [place]), CAD s/p stents (on Plavix, last placed 6/2022), tachyarrhythmia (on Eliquis, last dose 12/18 PM), who p/w imaging c/w GIB proximal to ileorectal anastomosis. Stable after IR embolization of likely ileocolic artery. Has received 2 u pRBC this admission. No further GI bleeding post embolization.     Recommendations:  -Trend H/H, transfuse PRN  -GI & IR following, Surgery as last resort  -Serial exams    Will discuss w/ CRS team & update accordingly

## 2024-12-21 NOTE — PROGRESS NOTE ADULT - SUBJECTIVE AND OBJECTIVE BOX
63 yo F w/ h/o FAP s/p colectomy w/ ileorectal anastomosis (30+ y ago at [place]), CAD s/p stents (on Plavix, last placed 6/2022), tachyarrhythmia (on Eliquis, last dose 12/18 PM), who p/w imaging c/w GIB proximal to ileorectal anastomosis.   CO Generalized weakness, dizziness from last night when BRBPR began.  Denies CP, SOB, Ab pain NV or other CO.  12/20 Case discussed on CCU rounds, clinically stable, no further bleeding.  12/21  Case discussed on CCU rounds, Stable.    ICU Vital Signs Last 24 Hrs  T(C): 36.9 (21 Dec 2024 05:56), Max: 36.9 (20 Dec 2024 20:00)  T(F): 98.5 (21 Dec 2024 05:56), Max: 98.5 (20 Dec 2024 20:00)  HR: 101 (21 Dec 2024 13:00) (92 - 126)  BP: 108/70 (21 Dec 2024 13:00) (88/59 - 111/70)  BP(mean): 83 (21 Dec 2024 13:00) (67 - 86)  RR: 19 (21 Dec 2024 13:00) (13 - 23)  SpO2: 96% (21 Dec 2024 13:00) (93% - 100%)    O2 Parameters below as of 21 Dec 2024 08:47  Patient On (Oxygen Delivery Method): room air                                  9.5    6.39  )-----------( 114      ( 21 Dec 2024 09:35 )             29.4         12-21    139  |  110[H]  |  7   ----------------------------<  155[H]  3.4[L]   |  25  |  0.57    Ca    8.8      21 Dec 2024 09:35  Phos  3.3     12-21  Mg     2.0     12-21

## 2024-12-21 NOTE — PROGRESS NOTE ADULT - ATTENDING COMMENTS
Patient received 1 PRBC 12/20, Hgb stable at this time  Continue diet, advance as tolerated, trend H&H. Follow up stool quality  We will continue to follow    Vital Signs Last 24 Hrs  T(C): 36.9 (21 Dec 2024 05:56), Max: 36.9 (20 Dec 2024 20:00)  T(F): 98.5 (21 Dec 2024 05:56), Max: 98.5 (20 Dec 2024 20:00)  HR: 113 (21 Dec 2024 15:00) (91 - 126)  BP: 113/69 (21 Dec 2024 15:00) (88/59 - 121/75)  BP(mean): 81 (21 Dec 2024 15:00) (67 - 90)  RR: 16 (21 Dec 2024 15:00) (13 - 23)  SpO2: 95% (21 Dec 2024 15:00) (93% - 100%)    Parameters below as of 21 Dec 2024 13:56  Patient On (Oxygen Delivery Method): room air                          9.5    6.39  )-----------( 114      ( 21 Dec 2024 09:35 )             29.4

## 2024-12-22 LAB
GLUCOSE BLDC GLUCOMTR-MCNC: 138 MG/DL — HIGH (ref 70–99)
GLUCOSE BLDC GLUCOMTR-MCNC: 142 MG/DL — HIGH (ref 70–99)
GLUCOSE BLDC GLUCOMTR-MCNC: 175 MG/DL — HIGH (ref 70–99)
GLUCOSE BLDC GLUCOMTR-MCNC: 185 MG/DL — HIGH (ref 70–99)

## 2024-12-22 PROCEDURE — 99231 SBSQ HOSP IP/OBS SF/LOW 25: CPT

## 2024-12-22 PROCEDURE — 99232 SBSQ HOSP IP/OBS MODERATE 35: CPT

## 2024-12-22 RX ORDER — ALPRAZOLAM 0.25 MG/1
0.25 TABLET ORAL ONCE
Refills: 0 | Status: DISCONTINUED | OUTPATIENT
Start: 2024-12-22 | End: 2024-12-22

## 2024-12-22 RX ORDER — METOPROLOL TARTRATE 50 MG
12.5 TABLET ORAL EVERY 12 HOURS
Refills: 0 | Status: DISCONTINUED | OUTPATIENT
Start: 2024-12-22 | End: 2024-12-27

## 2024-12-22 RX ORDER — ALPRAZOLAM 0.25 MG/1
0.25 TABLET ORAL EVERY 8 HOURS
Refills: 0 | Status: DISCONTINUED | OUTPATIENT
Start: 2024-12-22 | End: 2024-12-27

## 2024-12-22 RX ADMIN — ESCITALOPRAM OXALATE 10 MILLIGRAM(S): 10 TABLET ORAL at 09:06

## 2024-12-22 RX ADMIN — Medication 0.5 MILLIGRAM(S): at 06:50

## 2024-12-22 RX ADMIN — Medication 0.5 MILLIGRAM(S): at 02:04

## 2024-12-22 RX ADMIN — IPRATROPIUM BROMIDE AND ALBUTEROL SULFATE 3 MILLILITER(S): .5; 2.5 SOLUTION RESPIRATORY (INHALATION) at 20:40

## 2024-12-22 RX ADMIN — Medication 0.5 MILLIGRAM(S): at 06:41

## 2024-12-22 RX ADMIN — Medication 0.5 MILLIGRAM(S): at 16:04

## 2024-12-22 RX ADMIN — Medication 0.5 MILLIGRAM(S): at 22:06

## 2024-12-22 RX ADMIN — ROSUVASTATIN 10 MILLIGRAM(S): 40 TABLET, FILM COATED ORAL at 21:36

## 2024-12-22 RX ADMIN — Medication 2: at 12:15

## 2024-12-22 RX ADMIN — ALPRAZOLAM 0.25 MILLIGRAM(S): 0.25 TABLET ORAL at 09:07

## 2024-12-22 RX ADMIN — PANTOPRAZOLE 40 MILLIGRAM(S): 40 TABLET, DELAYED RELEASE ORAL at 09:06

## 2024-12-22 RX ADMIN — Medication 0.5 MILLIGRAM(S): at 02:45

## 2024-12-22 RX ADMIN — ONDANSETRON 4 MILLIGRAM(S): 4 TABLET ORAL at 15:57

## 2024-12-22 RX ADMIN — Medication 0.5 MILLIGRAM(S): at 21:36

## 2024-12-22 RX ADMIN — IPRATROPIUM BROMIDE AND ALBUTEROL SULFATE 3 MILLILITER(S): .5; 2.5 SOLUTION RESPIRATORY (INHALATION) at 13:03

## 2024-12-22 RX ADMIN — Medication 12.5 MILLIGRAM(S): at 21:36

## 2024-12-22 RX ADMIN — ONDANSETRON 4 MILLIGRAM(S): 4 TABLET ORAL at 11:07

## 2024-12-22 RX ADMIN — Medication 1000 UNIT(S): at 09:07

## 2024-12-22 RX ADMIN — IPRATROPIUM BROMIDE AND ALBUTEROL SULFATE 3 MILLILITER(S): .5; 2.5 SOLUTION RESPIRATORY (INHALATION) at 08:15

## 2024-12-22 RX ADMIN — Medication 2: at 23:14

## 2024-12-22 RX ADMIN — ALPRAZOLAM 0.25 MILLIGRAM(S): 0.25 TABLET ORAL at 21:36

## 2024-12-22 RX ADMIN — Medication 0.5 MILLIGRAM(S): at 16:34

## 2024-12-22 NOTE — PROGRESS NOTE ADULT - ASSESSMENT
65 yo F w/ h/o FAP s/p colectomy w/ ileorectal anastomosis (30+ y ago at [place]), CAD s/p stents (on Plavix, last placed 6/2022), tachyarrhythmia (on Eliquis, last dose 12/18 PM), who p/w imaging c/w GIB proximal to ileorectal anastomosis. Stable after IR embolization of likely ileocolic artery. Has received 2 u pRBC this admission. No further GI bleeding post embolization.     Recommendations:  -Trend H/H, transfuse PRN  -GI & IR following, Surgery as last resort  -Serial exams    Will discuss w/ CRS team & update accordingly

## 2024-12-22 NOTE — PROGRESS NOTE ADULT - SUBJECTIVE AND OBJECTIVE BOX
65 yo F w/ h/o FAP s/p colectomy w/ ileorectal anastomosis (30+ y ago at [facility]), CAD s/p stents (on Plavix, last placed 6/2022), tachyarrhythmia (on Eliquis, last dose 12/18 PM), who p/w imaging c/w GIB proximal to ileorectal anastomosis.   CO Generalized weakness, dizziness from last night when BRBPR began.  Denies CP, SOB, Ab pain NV or other CO.  12/20 Case discussed on CCU rounds, clinically stable, no further bleeding.  12/21  Case discussed on CCU rounds, Stable.  12/22 Case discussed on CCU rounds, stable, no further bleeding      ICU Vital Signs Last 24 Hrs  T(C): 36.4 (22 Dec 2024 06:28), Max: 36.6 (21 Dec 2024 20:40)  T(F): 97.5 (22 Dec 2024 06:28), Max: 97.9 (21 Dec 2024 20:40)  HR: 95 (22 Dec 2024 13:07) (83 - 113)  BP: 113/78 (22 Dec 2024 12:00) (106/71 - 154/139)  BP(mean): 90 (22 Dec 2024 12:00) (79 - 146)  RR: 16 (22 Dec 2024 12:00) (13 - 24)  SpO2: 100% (22 Dec 2024 13:07) (93% - 100%)    O2 Parameters below as of 22 Dec 2024 13:07  Patient On (Oxygen Delivery Method): room air                                  9.9    6.28  )-----------( 133      ( 21 Dec 2024 22:10 )             30.1

## 2024-12-22 NOTE — PROGRESS NOTE ADULT - SUBJECTIVE AND OBJECTIVE BOX
Addended by: LAY PATRICK on: 5/1/2024 04:37 PM     Modules accepted: Orders     Patient seen and examined at the bedside this AM.  Tolerating diet, + Melenas yesterday  AVSS         PHYSICAL EXAM   GENERAL: NAD,    HEAD: Atraumatic, normocephalic  EYES: EOMI, PERRLA, conjunctiva and sclera clear  ENT: moist mucous membrane  NECK: supple, No JVD, midline trachea  CHEST/LUNG: No increased WOB, symmetric excursions  Heart: RRR ppp, no peripheral edema  ABDOMEN: Round, nondistended, soft, nontender. no organomegaly  EXTREMITIES: Brisk cap refill. no clubbing or cyanosis  NERVOUS SYSTEM: AOx4, speech clear, no neuro-deficits  MSK: full ROM, no deformities  SKIN: warm to touch, no rash or lesions       Chief complaint:      PMHx:  CAD (coronary artery disease)    DM (diabetes mellitus)    HTN (hypertension)    Asthma    Benign essential HTN    HLD (hyperlipidemia)    Type 2 diabetes mellitus    CAD (coronary artery disease)    Stented coronary artery    FAP (familial adenomatous polyposis)    Insomnia    GERD (gastroesophageal reflux disease)    H/O lichen planus    History of rectal bleeding    Gastroparesis    Pancreatitis        PSHx:  History of colon resection    Stented coronary artery    Gastrojejunal (GJ) tube in place    History of biliary stent insertion        FHx:  FH: heart disease (Mother, Sibling)        Vitals:  T(C): 36.4 ( @ 06:28), Max: 36.6 ( @ 20:40)  HR: 83 ( @ 08:00) (83 - 126)  BP: 112/69 ( @ 08:00) (96/64 - 154/139)  RR: 15 ( @ 08:00) (13 - 24)  SpO2: 94% ( @ 08:00) (93% - 100%)      I&Os     @ 07:01  -   @ 07:00  --------------------------------------------------------  IN:    Enteral Tube Flush: 522 mL    Glucerna 1.5: 681 mL    Lactated Ringers: 500 mL  Total IN: 1703 mL    OUT:    Voided (mL): 2100 mL  Total OUT: 2100 mL    Total NET: -397 mL        .    Labs:   @ 22:10                    9.9  CBC: 6.28>)-------(<133                     30.1                 141 | 111 | 8    CMP:  ----------------------< 139               4.3 | 26 | 0.65                      Ca:9.1  Phos:2.8  M.9               0.3|      |19        LFTs:  ------|85|-----             -|      |-   @ 09:35                    9.5  CBC: 6.39>)-------(<114                     29.4                 139 | 110 | 7    CMP:  ----------------------< 155               3.4 | 25 | 0.57                      Ca:8.8  Phos:3.3  M.0               0.5|      |14        LFTs:  ------|78|-----             -|      |-        Cultures:    Culture - Urine (collected 24 @ 06:45)  Source: Clean Catch Clean Catch (Midstream)  Final Report (24 @ 11:35):    No growth          Current Inpatient Medications:  albuterol/ipratropium for Nebulization 3 milliLiter(s) Nebulizer every 6 hours  ALPRAZolam 0.25 milliGRAM(s) Oral at bedtime  chlorhexidine 2% Cloths 1 Application(s) Topical <User Schedule>  cholecalciferol 1000 Unit(s) Oral daily  dextrose 5%. 1000 milliLiter(s) (50 mL/Hr) IV Continuous <Continuous>  dextrose 5%. 1000 milliLiter(s) (100 mL/Hr) IV Continuous <Continuous>  dextrose 50% Injectable 25 Gram(s) IV Push once  dextrose 50% Injectable 12.5 Gram(s) IV Push once  dextrose 50% Injectable 25 Gram(s) IV Push once  dextrose Oral Gel 15 Gram(s) Oral once PRN  escitalopram 10 milliGRAM(s) Oral daily  glucagon  Injectable 1 milliGRAM(s) IntraMuscular once  HYDROmorphone  Injectable 0.5 milliGRAM(s) IV Push every 4 hours PRN  insulin lispro (ADMELOG) corrective regimen sliding scale   SubCutaneous every 6 hours  lactated ringers. 1000 milliLiter(s) (50 mL/Hr) IV Continuous <Continuous>  ondansetron Injectable 4 milliGRAM(s) IV Push every 4 hours PRN  pantoprazole  Injectable 40 milliGRAM(s) IV Push daily  rosuvastatin 10 milliGRAM(s) Oral at bedtime            RADIOLOGY & ADDITIONAL STUDIES:

## 2024-12-22 NOTE — PROGRESS NOTE ADULT - ATTENDING COMMENTS
Patient seen and evaluated at bedside this morning  H&H stable, No further lower GI bleed, normal bowel movement this morning  Continue care per primary, please re-consult as needed                           9.9    6.28  )-----------( 133      ( 21 Dec 2024 22:10 )             30.1

## 2024-12-22 NOTE — PROGRESS NOTE ADULT - PROBLEM SELECTOR PLAN 1
- Reviewed with Dr. Sol, US negative for pseudoaneurysm or hematoma, pt stable after angioembo.
monitor  resolved
resolved with IR  monitor
resolved  norberto [facility]

## 2024-12-23 LAB
ANION GAP SERPL CALC-SCNC: 5 MMOL/L — SIGNIFICANT CHANGE UP (ref 5–17)
BUN SERPL-MCNC: 14 MG/DL — SIGNIFICANT CHANGE UP (ref 7–23)
CALCIUM SERPL-MCNC: 9.5 MG/DL — SIGNIFICANT CHANGE UP (ref 8.5–10.1)
CHLORIDE SERPL-SCNC: 104 MMOL/L — SIGNIFICANT CHANGE UP (ref 96–108)
CO2 SERPL-SCNC: 28 MMOL/L — SIGNIFICANT CHANGE UP (ref 22–31)
CREAT SERPL-MCNC: 0.73 MG/DL — SIGNIFICANT CHANGE UP (ref 0.5–1.3)
EGFR: 93 ML/MIN/1.73M2 — SIGNIFICANT CHANGE UP
GLUCOSE BLDC GLUCOMTR-MCNC: 126 MG/DL — HIGH (ref 70–99)
GLUCOSE BLDC GLUCOMTR-MCNC: 136 MG/DL — HIGH (ref 70–99)
GLUCOSE BLDC GLUCOMTR-MCNC: 160 MG/DL — HIGH (ref 70–99)
GLUCOSE BLDC GLUCOMTR-MCNC: 166 MG/DL — HIGH (ref 70–99)
GLUCOSE SERPL-MCNC: 146 MG/DL — HIGH (ref 70–99)
HCT VFR BLD CALC: 33 % — LOW (ref 34.5–45)
HGB BLD-MCNC: 10.5 G/DL — LOW (ref 11.5–15.5)
MCHC RBC-ENTMCNC: 28.2 PG — SIGNIFICANT CHANGE UP (ref 27–34)
MCHC RBC-ENTMCNC: 31.8 G/DL — LOW (ref 32–36)
MCV RBC AUTO: 88.7 FL — SIGNIFICANT CHANGE UP (ref 80–100)
PLATELET # BLD AUTO: 169 K/UL — SIGNIFICANT CHANGE UP (ref 150–400)
POTASSIUM SERPL-MCNC: 4.2 MMOL/L — SIGNIFICANT CHANGE UP (ref 3.5–5.3)
POTASSIUM SERPL-SCNC: 4.2 MMOL/L — SIGNIFICANT CHANGE UP (ref 3.5–5.3)
RBC # BLD: 3.72 M/UL — LOW (ref 3.8–5.2)
RBC # FLD: 15.9 % — HIGH (ref 10.3–14.5)
SODIUM SERPL-SCNC: 137 MMOL/L — SIGNIFICANT CHANGE UP (ref 135–145)
WBC # BLD: 7.13 K/UL — SIGNIFICANT CHANGE UP (ref 3.8–10.5)
WBC # FLD AUTO: 7.13 K/UL — SIGNIFICANT CHANGE UP (ref 3.8–10.5)

## 2024-12-23 PROCEDURE — 99233 SBSQ HOSP IP/OBS HIGH 50: CPT

## 2024-12-23 RX ORDER — CLOPIDOGREL BISULFATE 75 MG/1
75 TABLET, FILM COATED ORAL DAILY
Refills: 0 | Status: DISCONTINUED | OUTPATIENT
Start: 2024-12-23 | End: 2024-12-27

## 2024-12-23 RX ORDER — HEPARIN SODIUM 1000 [USP'U]/ML
5000 INJECTION, SOLUTION INTRAVENOUS; SUBCUTANEOUS EVERY 8 HOURS
Refills: 0 | Status: DISCONTINUED | OUTPATIENT
Start: 2024-12-23 | End: 2024-12-25

## 2024-12-23 RX ORDER — METOCLOPRAMIDE 10 MG/1
5 TABLET ORAL
Refills: 0 | Status: DISCONTINUED | OUTPATIENT
Start: 2024-12-23 | End: 2024-12-27

## 2024-12-23 RX ORDER — HYDROMORPHONE HCL 4 MG
0.5 TABLET ORAL ONCE
Refills: 0 | Status: DISCONTINUED | OUTPATIENT
Start: 2024-12-23 | End: 2024-12-23

## 2024-12-23 RX ADMIN — PANTOPRAZOLE 40 MILLIGRAM(S): 40 TABLET, DELAYED RELEASE ORAL at 15:40

## 2024-12-23 RX ADMIN — HEPARIN SODIUM 5000 UNIT(S): 1000 INJECTION, SOLUTION INTRAVENOUS; SUBCUTANEOUS at 15:40

## 2024-12-23 RX ADMIN — Medication 0.5 MILLIGRAM(S): at 06:34

## 2024-12-23 RX ADMIN — ROSUVASTATIN 10 MILLIGRAM(S): 40 TABLET, FILM COATED ORAL at 22:47

## 2024-12-23 RX ADMIN — ESCITALOPRAM OXALATE 10 MILLIGRAM(S): 10 TABLET ORAL at 19:39

## 2024-12-23 RX ADMIN — Medication 0.5 MILLIGRAM(S): at 05:30

## 2024-12-23 RX ADMIN — ALPRAZOLAM 0.25 MILLIGRAM(S): 0.25 TABLET ORAL at 22:47

## 2024-12-23 RX ADMIN — METOCLOPRAMIDE 5 MILLIGRAM(S): 10 TABLET ORAL at 19:40

## 2024-12-23 RX ADMIN — IPRATROPIUM BROMIDE AND ALBUTEROL SULFATE 3 MILLILITER(S): .5; 2.5 SOLUTION RESPIRATORY (INHALATION) at 01:36

## 2024-12-23 RX ADMIN — ONDANSETRON 4 MILLIGRAM(S): 4 TABLET ORAL at 04:38

## 2024-12-23 RX ADMIN — Medication 0.5 MILLIGRAM(S): at 21:25

## 2024-12-23 RX ADMIN — Medication 1000 UNIT(S): at 19:39

## 2024-12-23 RX ADMIN — Medication 2: at 06:18

## 2024-12-23 RX ADMIN — IPRATROPIUM BROMIDE AND ALBUTEROL SULFATE 3 MILLILITER(S): .5; 2.5 SOLUTION RESPIRATORY (INHALATION) at 15:19

## 2024-12-23 RX ADMIN — CLOPIDOGREL BISULFATE 75 MILLIGRAM(S): 75 TABLET, FILM COATED ORAL at 19:39

## 2024-12-23 RX ADMIN — ONDANSETRON 4 MILLIGRAM(S): 4 TABLET ORAL at 10:30

## 2024-12-23 RX ADMIN — Medication 12.5 MILLIGRAM(S): at 22:47

## 2024-12-23 RX ADMIN — HEPARIN SODIUM 5000 UNIT(S): 1000 INJECTION, SOLUTION INTRAVENOUS; SUBCUTANEOUS at 22:11

## 2024-12-23 RX ADMIN — Medication 0.5 MILLIGRAM(S): at 21:07

## 2024-12-23 RX ADMIN — Medication 12.5 MILLIGRAM(S): at 19:40

## 2024-12-23 RX ADMIN — CHLORHEXIDINE GLUCONATE 1 APPLICATION(S): 1.2 RINSE ORAL at 05:21

## 2024-12-23 RX ADMIN — Medication 0.5 MILLIGRAM(S): at 04:37

## 2024-12-23 NOTE — PROGRESS NOTE ADULT - ASSESSMENT
A/P: 65 female P/W LGIB S/P IR Embolization  DM II  HTN  HLD  Asthma  CAD    Plan:  Will resume Plavix today, she will then need to be resumed on Her DOAC  Continue metropolol  RISS  Continue J tube feeds  G tune needs to be vented  Social work setting up home care  OOB  PT    Transfer to Med Surg

## 2024-12-23 NOTE — PROGRESS NOTE ADULT - SUBJECTIVE AND OBJECTIVE BOX
Interval History:  No recurrence of Rectal bleeding  MEDICATIONS  (STANDING):  albuterol/ipratropium for Nebulization 3 milliLiter(s) Nebulizer every 6 hours  ALPRAZolam 0.25 milliGRAM(s) Oral at bedtime  chlorhexidine 2% Cloths 1 Application(s) Topical <User Schedule>  cholecalciferol 1000 Unit(s) Oral daily  clopidogrel Tablet 75 milliGRAM(s) Oral daily  dextrose 5%. 1000 milliLiter(s) (50 mL/Hr) IV Continuous <Continuous>  dextrose 5%. 1000 milliLiter(s) (100 mL/Hr) IV Continuous <Continuous>  dextrose 50% Injectable 25 Gram(s) IV Push once  dextrose 50% Injectable 12.5 Gram(s) IV Push once  dextrose 50% Injectable 25 Gram(s) IV Push once  escitalopram 10 milliGRAM(s) Oral daily  glucagon  Injectable 1 milliGRAM(s) IntraMuscular once  heparin   Injectable 5000 Unit(s) SubCutaneous every 8 hours  insulin lispro (ADMELOG) corrective regimen sliding scale   SubCutaneous every 6 hours  metoclopramide 5 milliGRAM(s) Oral three times a day before meals  metoprolol tartrate 12.5 milliGRAM(s) Oral every 12 hours  pantoprazole  Injectable 40 milliGRAM(s) IV Push daily  rosuvastatin 10 milliGRAM(s) Oral at bedtime    MEDICATIONS  (PRN):  ALPRAZolam 0.25 milliGRAM(s) Oral every 8 hours PRN anxiety  dextrose Oral Gel 15 Gram(s) Oral once PRN Blood Glucose LESS THAN 70 milliGRAM(s)/deciliter  HYDROmorphone  Injectable 0.5 milliGRAM(s) IV Push every 4 hours PRN Severe Pain (7 - 10)  ondansetron Injectable 4 milliGRAM(s) IV Push every 4 hours PRN Nausea and/or Vomiting      Daily     Daily Weight in k.6 (23 Dec 2024 04:25)  BMI: 20.6 (12-19 @ 13:14)  Change in Weight:  Vital Signs Last 24 Hrs  T(C): 36.4 (23 Dec 2024 15:44), Max: 36.7 (23 Dec 2024 04:25)  T(F): 97.5 (23 Dec 2024 15:44), Max: 98.1 (23 Dec 2024 04:25)  HR: 95 (23 Dec 2024 15:19) (90 - 124)  BP: 114/82 (23 Dec 2024 10:00) (104/69 - 133/93)  BP(mean): 91 (23 Dec 2024 10:00) (82 - 106)  RR: 20 (23 Dec 2024 10:00) (15 - 23)  SpO2: 95% (23 Dec 2024 15:19) (92% - 96%)    Parameters below as of 23 Dec 2024 15:19  Patient On (Oxygen Delivery Method): room air      I&O's Detail    22 Dec 2024 07:01  -  23 Dec 2024 07:00  --------------------------------------------------------  IN:    Enteral Tube Flush: 480 mL    Glucerna 1.5: 803 mL    Oral Fluid: 550 mL  Total IN: 1833 mL    OUT:  Total OUT: 0 mL    Total NET: 1833 mL          PHYSICAL EXAM  .  HEENT:    Normal appearance of conjunctiva, ears, nose, lips, oropharynx, and oral mucosa, anicteric.  Neck:  No masses, no asymmetry.  Lymph Nodes:  No lymphadenopathy.   Cardiovascular:  RRR normal S1/S2, no murmur.  Respiratory:  CTA B/L, normal respiratory effort.   Abdominal:   soft, no masses or tenderness, normoactive BS, NT/ND, no HSM.      Lab Results:                        10.5   7.13  )-----------( 169      ( 23 Dec 2024 08:16 )             33.0         137  |  104  |  14  ----------------------------<  146[H]  4.2   |  28  |  0.73    Ca    9.5      23 Dec 2024 08:16  Phos  2.8     12-21  Mg     1.9     12-    TPro  6.5  /  Alb  2.8[L]  /  TBili  0.3  /  DBili  x   /  AST  19  /  ALT  21  /  AlkPhos  85  12-21    LIVER FUNCTIONS - ( 21 Dec 2024 22:10 )  Alb: 2.8 g/dL / Pro: 6.5 gm/dL / ALK PHOS: 85 U/L / ALT: 21 U/L / AST: 19 U/L / GGT: x                 Stool Results:          RADIOLOGY RESULTS:    SURGICAL PATHOLOGY:

## 2024-12-23 NOTE — PROGRESS NOTE ADULT - RESPIRATORY
airway patent/good air movement/respirations non-labored
clear to auscultation bilaterally/no wheezes/no rales/no rhonchi/airway patent/good air movement/respirations non-labored
airway patent/good air movement/respirations non-labored
airway patent/good air movement/respirations non-labored
clear to auscultation bilaterally

## 2024-12-23 NOTE — PROGRESS NOTE ADULT - SUBJECTIVE AND OBJECTIVE BOX
CCU Downgrade CCU Downgrade    Hospitalist Acceptance Note    Patient is a 64 y.o. female with PMH FAP s/p colectomy w/ ileorectal anastomosis (30+ y ago at [facility]), CAD s/p stents (on Plavix, last placed 6/2022), tachyarrhythmia (on Eliquis, last dose 12/18 PM), who p/w imaging c/w GIB proximal to ileorectal anastomosis admitted with generalized weakness, dizziness and BRBPR to CCU for acute acute blood loss anemia 2/2 to active GI bleed at the ileorectal anastomosis site. Patient is s/p one unit PRBC and s/p IR intervention with coil embolization on 12/19/24 to ileocolic artery and R CFA closed with mynx.  Patient was recently admitted 10/25 to 12/10 for PPN  for gastroparesis, also choledocholithiasis s/p ERCP with stent placement for CBD stone, and repeat ercp for stone extraction, sphincterotomy, stent placement, complicated with asthma exacerbation/pancreatitis, and sp GJ tube palcement.  Admitted again from 12/14 to 12/18 for possible disloged GJ tube.  And now present today 12/19 for BRBPR. Had vomiting overnight on 12/23.    CCU Downgrade    Hospitalist Acceptance Note    Patient is a 64 y.o. female with PMH FAP s/p colectomy w/ ileorectal anastomosis (30+ y ago at [facility]), CAD s/p stents (on Plavix, last placed 6/2022), tachyarrhythmia (on Eliquis, last dose 12/18 PM), who p/w imaging c/w GIB proximal to ileorectal anastomosis admitted with generalized weakness, dizziness and BRBPR to CCU for acute acute blood loss anemia 2/2 to active GI bleed at the ileorectal anastomosis site. Patient is s/p one unit PRBC and s/p IR intervention with coil embolization on 12/19/24 to ileocolic artery and R CFA closed with mynx.  Patient was recently admitted 10/25 to 12/10 for PPN  for gastroparesis, also choledocholithiasis s/p ERCP with stent placement for CBD stone, and repeat ercp for stone extraction, sphincterotomy, stent placement, complicated with asthma exacerbation/pancreatitis, and sp GJ tube palcement.  Admitted again from 12/14 to 12/18 for possible disloged GJ tube.  And now present today 12/19 for BRBPR. Had vomiting overnight on 12/23.     < from: US Duplex Arterial Lower Ext Ltd, Right (12.20.24 @ 15:54) >  IMPRESSION:    No evidence of a hematoma or a pseudoaneurysm involving the right groin.    --- End of Report ---    < end of copied text >  < from: IR Procedure (12.19.24 @ 15:59) >  FINDINGS:    1. Active arterial extravasation arising from a branch of the presumed   ileocolic artery (postsurgical anatomy).  2. Hemostasis following coil embolization.    IMPRESSION:  Successful angiography with coil embolization of a active extravasation   involving the likely ileocolic artery.    PLAN:      Dr.Anthony Sweta JACK, Attending Interventional Radiologist    --- End of Report ---    < end of copied text >  < from: CT Abdomen and Pelvis w/wo IV Cont (12.19.24 @ 10:49) >  IMPRESSION:  Active GI bleed just proximal to the ileorectal anastomosis.    This finding was discussed with Dr. SHANNAN JAQUEZ  on 12/19/2024   11:25 AM.    Inflammatory change associated with the pancreatic tail and splenic hilum   with tiny loculated perisplenic collection, similar to prior.    --- End of Report ---    < end of copied text >     CCU Downgrade    Hospitalist Acceptance Note    Patient is a 64 y.o. female with PMH FAP s/p colectomy w/ ileorectal anastomosis (30+ y ago at [place]), CAD s/p stents (on Plavix, last placed 6/2022), tachyarrhythmia (on Eliquis, last dose 12/18 PM), who p/w imaging c/w GIB proximal to ileorectal anastomosis admitted with generalized weakness, dizziness and BRBPR to CCU for acute acute blood loss anemia 2/2 to active GI bleed at the ileorectal anastomosis site. Patient is s/p one unit PRBC and s/p IR intervention with coil embolization on 12/19/24 to ileocolic artery and R CFA closed with mynx.  Patient was recently admitted 10/25 to 12/10 for PPN  for gastroparesis, also choledocholithiasis s/p ERCP with stent placement for CBD stone, and repeat ercp for stone extraction, sphincterotomy, stent placement, complicated with asthma exacerbation/pancreatitis, and sp GJ tube palcement.  Admitted again from 12/14 to 12/18 for possible disloged GJ tube.  And now present today 12/19 for BRBPR. Had vomiting overnight on 12/23.     < from: US Duplex Arterial Lower Ext Ltd, Right (12.20.24 @ 15:54) >  IMPRESSION:    No evidence of a hematoma or a pseudoaneurysm involving the right groin.    --- End of Report ---    < end of copied text >  < from: IR Procedure (12.19.24 @ 15:59) >  FINDINGS:    1. Active arterial extravasation arising from a branch of the presumed   ileocolic artery (postsurgical anatomy).  2. Hemostasis following coil embolization.    IMPRESSION:  Successful angiography with coil embolization of a active extravasation   involving the likely ileocolic artery.    PLAN:      Dr.Anthony Sweta JACK, Attending Interventional Radiologist    --- End of Report ---    < end of copied text >  < from: CT Abdomen and Pelvis w/wo IV Cont (12.19.24 @ 10:49) >  IMPRESSION:  Active GI bleed just proximal to the ileorectal anastomosis.    This finding was discussed with Dr. SHANNAN JAQUEZ  on 12/19/2024   11:25 AM.    Inflammatory change associated with the pancreatic tail and splenic hilum   with tiny loculated perisplenic collection, similar to prior.    --- End of Report ---    < end of copied text >    Vital Signs Last 24 Hrs  T(C): 36.4 (23 Dec 2024 07:20), Max: 36.7 (22 Dec 2024 16:22)  T(F): 97.6 (23 Dec 2024 07:20), Max: 98.1 (23 Dec 2024 04:25)  HR: 101 (23 Dec 2024 10:00) (90 - 124)  BP: 114/82 (23 Dec 2024 10:00) (104/69 - 133/93)  BP(mean): 91 (23 Dec 2024 10:00) (81 - 106)  RR: 20 (23 Dec 2024 10:00) (14 - 23)  SpO2: 96% (23 Dec 2024 10:00) (92% - 100%)    Parameters below as of 23 Dec 2024 06:00  Patient On (Oxygen Delivery Method): room air     CCU Downgrade    Hospitalist Acceptance Note    Patient is a 64 y.o. female with PMH FAP s/p colectomy w/ ileorectal anastomosis (30+ y ago at [place]), CAD s/p stents (on Plavix, last placed 6/2022), tachyarrhythmia (on Eliquis, last dose 12/18 PM), who p/w imaging c/w GIB proximal to ileorectal anastomosis admitted with generalized weakness, dizziness and BRBPR to CCU for acute acute blood loss anemia 2/2 to active GI bleed at the ileorectal anastomosis site. Patient is s/p one unit PRBC and s/p IR intervention with coil embolization on 12/19/24 to ileocolic artery and R CFA closed with mynx.  Patient was recently admitted 10/25 to 12/10 for PPN  for gastroparesis, also choledocholithiasis s/p ERCP with stent placement for CBD stone, and repeat ercp for stone extraction, sphincterotomy, stent placement, complicated with asthma exacerbation/pancreatitis, and sp GJ tube palcement.  Admitted again from 12/14 to 12/18 for possible disloged GJ tube.  And now present today 12/19 for BRBPR. Had vomiting in the am today at 6 am.     < from: US Duplex Arterial Lower Ext Ltd, Right (12.20.24 @ 15:54) >  IMPRESSION:    No evidence of a hematoma or a pseudoaneurysm involving the right groin.    --- End of Report ---    < end of copied text >  < from: IR Procedure (12.19.24 @ 15:59) >  FINDINGS:    1. Active arterial extravasation arising from a branch of the presumed   ileocolic artery (postsurgical anatomy).  2. Hemostasis following coil embolization.    IMPRESSION:  Successful angiography with coil embolization of a active extravasation   involving the likely ileocolic artery.    PLAN:      Dr.Anthony Sweta JACK, Attending Interventional Radiologist    --- End of Report ---    < end of copied text >  < from: CT Abdomen and Pelvis w/wo IV Cont (12.19.24 @ 10:49) >  IMPRESSION:  Active GI bleed just proximal to the ileorectal anastomosis.    This finding was discussed with Dr. SHANNAN JAQUEZ  on 12/19/2024   11:25 AM.    Inflammatory change associated with the pancreatic tail and splenic hilum   with tiny loculated perisplenic collection, similar to prior.    --- End of Report ---    < end of copied text >    Vital Signs Last 24 Hrs  T(C): 36.4 (23 Dec 2024 07:20), Max: 36.7 (22 Dec 2024 16:22)  T(F): 97.6 (23 Dec 2024 07:20), Max: 98.1 (23 Dec 2024 04:25)  HR: 101 (23 Dec 2024 10:00) (90 - 124)  BP: 114/82 (23 Dec 2024 10:00) (104/69 - 133/93)  BP(mean): 91 (23 Dec 2024 10:00) (81 - 106)  RR: 20 (23 Dec 2024 10:00) (14 - 23)  SpO2: 96% (23 Dec 2024 10:00) (92% - 100%)    Parameters below as of 23 Dec 2024 06:00  Patient On (Oxygen Delivery Method): room air

## 2024-12-23 NOTE — PROGRESS NOTE ADULT - ASSESSMENT
64 y.o. female with PMH FAP s/p colectomy w/ ileorectal anastomosis (30+ y ago at [place]), CAD s/p stents (on Plavix, last placed 6/2022), tachyarrhythmia (on Eliquis, last dose 12/18 PM), who p/w imaging c/w GIB proximal to ileorectal anastomosis admitted with generalized weakness, dizziness and BRBPR to CCU for acute acute blood loss anemia 2/2 to active GI bleed at the ileorectal anastomosis site.  64 y.o. female with PMH FAP s/p colectomy w/ ileorectal anastomosis (30+ y ago at [place]), CAD s/p stents (on Plavix, last placed 6/2022), tachyarrhythmia (on Eliquis, last dose 12/18 PM), who p/w imaging c/w GIB proximal to ileorectal anastomosis admitted with generalized weakness, dizziness and BRBPR to CCU for acute acute blood loss anemia 2/2 to active GI bleed at the ileorectal anastomosis site.     #Acute Blood Loss Anemia secondary to GIB at ileorectal anastomosis site  - GJ tube to be vented  - Monitor h/h  - Transfer to medical floor recommended, changed to TELE as HR still elevated    #DM-2  - ISS    #HTN  - Metoprolol 12.5 mg po q12h    #HLD  - Crestor 10 mg po qhs    Disposition: Transfer to Telemetry    Total time spent:      64 y.o. female with PMH FAP s/p colectomy w/ ileorectal anastomosis (30+ y ago at [place]), CAD s/p stents (on Plavix, last placed 6/2022), tachyarrhythmia (on Eliquis, last dose 12/18 PM), who p/w imaging c/w GIB proximal to ileorectal anastomosis admitted with generalized weakness, dizziness and BRBPR to CCU for acute acute blood loss anemia 2/2 to active GI bleed at the ileorectal anastomosis site.     #Acute Blood Loss Anemia secondary to GIB at ileorectal anastomosis site  - GJ tube to be vented  - Monitor h/h  - Transfer to medical floor recommended, changed to TELE as HR still elevated  - Physical Therapy assessment    #CAD s/p Stents  - Restart Plavix today, discussed with Dr. Odilon Suh  - consider restarting eliquis if h/h stable on Plavix    #DM-2  - ISS    #HTN  - Metoprolol 12.5 mg po q12h    #HLD  - Crestor 10 mg po qhs    Disposition: Transfer to Telemetry     Discussed case with Dr. Tru Valle and Dr. Odilon Suh    Total time spent: 55 minutes

## 2024-12-23 NOTE — PROGRESS NOTE ADULT - SUBJECTIVE AND OBJECTIVE BOX
CC:    HPI: 65 yo F w/ h/o FAP s/p colectomy w/ ileorectal anastomosis (30+ y ago at [facility]), CAD s/p stents (on Plavix, last placed 6/2022), tachyarrhythmia (on Eliquis, last dose 12/18 PM), who p/w imaging c/w GIB proximal to ileorectal anastomosis.     12/20 Case discussed on CCU rounds, clinically stable, no further bleeding.  12/21  Case discussed on CCU rounds, Stable.  12/22 Case discussed on CCU rounds, stable, no further bleeding    12/23: No bleeding, vomited over night.               PAST MEDICAL & SURGICAL HISTORY:  CAD (coronary artery disease)      DM (diabetes mellitus)      HTN (hypertension)      Asthma      Benign essential HTN      HLD (hyperlipidemia)      Type 2 diabetes mellitus      CAD (coronary artery disease)      Stented coronary artery      FAP (familial adenomatous polyposis)      Insomnia      GERD (gastroesophageal reflux disease)      H/O lichen planus      History of rectal bleeding      Gastroparesis      Pancreatitis      History of colon resection      Stented coronary artery      Gastrojejunal (GJ) tube in place      History of biliary stent insertion          FAMILY HISTORY:  FH: heart disease (Mother, Sibling)        Social Hx:    Allergies    No Known Allergies    Intolerances            ICU Vital Signs Last 24 Hrs  T(C): 36.4 (23 Dec 2024 07:20), Max: 36.7 (22 Dec 2024 16:22)  T(F): 97.6 (23 Dec 2024 07:20), Max: 98.1 (23 Dec 2024 04:25)  HR: 98 (23 Dec 2024 06:00) (90 - 124)  BP: 114/74 (23 Dec 2024 06:00) (104/69 - 133/93)  BP(mean): 87 (23 Dec 2024 06:00) (81 - 106)  ABP: --  ABP(mean): --  RR: 16 (23 Dec 2024 06:00) (14 - 23)  SpO2: 93% (23 Dec 2024 06:00) (92% - 100%)    O2 Parameters below as of 23 Dec 2024 06:00  Patient On (Oxygen Delivery Method): room air                I&O's Summary    22 Dec 2024 07:01  -  23 Dec 2024 07:00  --------------------------------------------------------  IN: 1833 mL / OUT: 0 mL / NET: 1833 mL                              10.5   7.13  )-----------( 169      ( 23 Dec 2024 08:16 )             33.0       12-23    137  |  104  |  14  ----------------------------<  146[H]  4.2   |  28  |  0.73    Ca    9.5      23 Dec 2024 08:16  Phos  2.8     12-21  Mg     1.9     12-21    TPro  6.5  /  Alb  2.8[L]  /  TBili  0.3  /  DBili  x   /  AST  19  /  ALT  21  /  AlkPhos  85  12-21                Urinalysis Basic - ( 23 Dec 2024 08:16 )    Color: x / Appearance: x / SG: x / pH: x  Gluc: 146 mg/dL / Ketone: x  / Bili: x / Urobili: x   Blood: x / Protein: x / Nitrite: x   Leuk Esterase: x / RBC: x / WBC x   Sq Epi: x / Non Sq Epi: x / Bacteria: x        MEDICATIONS  (STANDING):  albuterol/ipratropium for Nebulization 3 milliLiter(s) Nebulizer every 6 hours  ALPRAZolam 0.25 milliGRAM(s) Oral at bedtime  chlorhexidine 2% Cloths 1 Application(s) Topical <User Schedule>  cholecalciferol 1000 Unit(s) Oral daily  dextrose 5%. 1000 milliLiter(s) (50 mL/Hr) IV Continuous <Continuous>  dextrose 5%. 1000 milliLiter(s) (100 mL/Hr) IV Continuous <Continuous>  dextrose 50% Injectable 25 Gram(s) IV Push once  dextrose 50% Injectable 12.5 Gram(s) IV Push once  dextrose 50% Injectable 25 Gram(s) IV Push once  escitalopram 10 milliGRAM(s) Oral daily  glucagon  Injectable 1 milliGRAM(s) IntraMuscular once  insulin lispro (ADMELOG) corrective regimen sliding scale   SubCutaneous every 6 hours  metoprolol tartrate 12.5 milliGRAM(s) Oral every 12 hours  pantoprazole  Injectable 40 milliGRAM(s) IV Push daily  rosuvastatin 10 milliGRAM(s) Oral at bedtime    MEDICATIONS  (PRN):  ALPRAZolam 0.25 milliGRAM(s) Oral every 8 hours PRN anxiety  dextrose Oral Gel 15 Gram(s) Oral once PRN Blood Glucose LESS THAN 70 milliGRAM(s)/deciliter  HYDROmorphone  Injectable 0.5 milliGRAM(s) IV Push every 4 hours PRN Severe Pain (7 - 10)  ondansetron Injectable 4 milliGRAM(s) IV Push every 4 hours PRN Nausea and/or Vomiting      DVT Prophylaxis: V    Advanced Directives:  Discussed with:    Visit Information:    ** Time is exclusive of billed procedures and/or teaching and/or routine family updates.

## 2024-12-24 LAB
ANION GAP SERPL CALC-SCNC: 5 MMOL/L — SIGNIFICANT CHANGE UP (ref 5–17)
BUN SERPL-MCNC: 16 MG/DL — SIGNIFICANT CHANGE UP (ref 7–23)
CALCIUM SERPL-MCNC: 9.7 MG/DL — SIGNIFICANT CHANGE UP (ref 8.5–10.1)
CHLORIDE SERPL-SCNC: 102 MMOL/L — SIGNIFICANT CHANGE UP (ref 96–108)
CO2 SERPL-SCNC: 29 MMOL/L — SIGNIFICANT CHANGE UP (ref 22–31)
CREAT SERPL-MCNC: 0.67 MG/DL — SIGNIFICANT CHANGE UP (ref 0.5–1.3)
EGFR: 99 ML/MIN/1.73M2 — SIGNIFICANT CHANGE UP
GLUCOSE BLDC GLUCOMTR-MCNC: 110 MG/DL — HIGH (ref 70–99)
GLUCOSE BLDC GLUCOMTR-MCNC: 128 MG/DL — HIGH (ref 70–99)
GLUCOSE BLDC GLUCOMTR-MCNC: 141 MG/DL — HIGH (ref 70–99)
GLUCOSE BLDC GLUCOMTR-MCNC: 147 MG/DL — HIGH (ref 70–99)
GLUCOSE SERPL-MCNC: 137 MG/DL — HIGH (ref 70–99)
HCT VFR BLD CALC: 33 % — LOW (ref 34.5–45)
HGB BLD-MCNC: 10.5 G/DL — LOW (ref 11.5–15.5)
MAGNESIUM SERPL-MCNC: 1.9 MG/DL — SIGNIFICANT CHANGE UP (ref 1.6–2.6)
MCHC RBC-ENTMCNC: 28.1 PG — SIGNIFICANT CHANGE UP (ref 27–34)
MCHC RBC-ENTMCNC: 31.8 G/DL — LOW (ref 32–36)
MCV RBC AUTO: 88.2 FL — SIGNIFICANT CHANGE UP (ref 80–100)
PHOSPHATE SERPL-MCNC: 5 MG/DL — HIGH (ref 2.5–4.5)
PLATELET # BLD AUTO: 171 K/UL — SIGNIFICANT CHANGE UP (ref 150–400)
POTASSIUM SERPL-MCNC: 4 MMOL/L — SIGNIFICANT CHANGE UP (ref 3.5–5.3)
POTASSIUM SERPL-SCNC: 4 MMOL/L — SIGNIFICANT CHANGE UP (ref 3.5–5.3)
RBC # BLD: 3.74 M/UL — LOW (ref 3.8–5.2)
RBC # FLD: 16 % — HIGH (ref 10.3–14.5)
SODIUM SERPL-SCNC: 136 MMOL/L — SIGNIFICANT CHANGE UP (ref 135–145)
WBC # BLD: 5.47 K/UL — SIGNIFICANT CHANGE UP (ref 3.8–10.5)
WBC # FLD AUTO: 5.47 K/UL — SIGNIFICANT CHANGE UP (ref 3.8–10.5)

## 2024-12-24 PROCEDURE — 99233 SBSQ HOSP IP/OBS HIGH 50: CPT

## 2024-12-24 PROCEDURE — 49452 REPLACE G-J TUBE PERC: CPT

## 2024-12-24 RX ORDER — IPRATROPIUM BROMIDE AND ALBUTEROL SULFATE .5; 2.5 MG/3ML; MG/3ML
3 SOLUTION RESPIRATORY (INHALATION) EVERY 6 HOURS
Refills: 0 | Status: DISCONTINUED | OUTPATIENT
Start: 2024-12-24 | End: 2024-12-27

## 2024-12-24 RX ORDER — MAG HYDROX/ALUMINUM HYD/SIMETH 200-200-20
30 SUSPENSION, ORAL (FINAL DOSE FORM) ORAL EVERY 6 HOURS
Refills: 0 | Status: DISCONTINUED | OUTPATIENT
Start: 2024-12-24 | End: 2024-12-27

## 2024-12-24 RX ORDER — HYDROMORPHONE HCL 4 MG
0.5 TABLET ORAL ONCE
Refills: 0 | Status: DISCONTINUED | OUTPATIENT
Start: 2024-12-24 | End: 2024-12-24

## 2024-12-24 RX ADMIN — IPRATROPIUM BROMIDE AND ALBUTEROL SULFATE 3 MILLILITER(S): .5; 2.5 SOLUTION RESPIRATORY (INHALATION) at 01:11

## 2024-12-24 RX ADMIN — Medication 0.5 MILLIGRAM(S): at 22:49

## 2024-12-24 RX ADMIN — HEPARIN SODIUM 5000 UNIT(S): 1000 INJECTION, SOLUTION INTRAVENOUS; SUBCUTANEOUS at 21:58

## 2024-12-24 RX ADMIN — Medication 0.5 MILLIGRAM(S): at 06:14

## 2024-12-24 RX ADMIN — HEPARIN SODIUM 5000 UNIT(S): 1000 INJECTION, SOLUTION INTRAVENOUS; SUBCUTANEOUS at 15:33

## 2024-12-24 RX ADMIN — CLOPIDOGREL BISULFATE 75 MILLIGRAM(S): 75 TABLET, FILM COATED ORAL at 10:15

## 2024-12-24 RX ADMIN — Medication 0.5 MILLIGRAM(S): at 22:19

## 2024-12-24 RX ADMIN — METOCLOPRAMIDE 5 MILLIGRAM(S): 10 TABLET ORAL at 06:01

## 2024-12-24 RX ADMIN — Medication 12.5 MILLIGRAM(S): at 21:58

## 2024-12-24 RX ADMIN — Medication 1000 UNIT(S): at 10:14

## 2024-12-24 RX ADMIN — Medication 0.5 MILLIGRAM(S): at 14:05

## 2024-12-24 RX ADMIN — Medication 0.5 MILLIGRAM(S): at 13:50

## 2024-12-24 RX ADMIN — PANTOPRAZOLE 40 MILLIGRAM(S): 40 TABLET, DELAYED RELEASE ORAL at 07:57

## 2024-12-24 RX ADMIN — METOCLOPRAMIDE 5 MILLIGRAM(S): 10 TABLET ORAL at 15:34

## 2024-12-24 RX ADMIN — HEPARIN SODIUM 5000 UNIT(S): 1000 INJECTION, SOLUTION INTRAVENOUS; SUBCUTANEOUS at 06:00

## 2024-12-24 RX ADMIN — METOCLOPRAMIDE 5 MILLIGRAM(S): 10 TABLET ORAL at 10:16

## 2024-12-24 RX ADMIN — Medication 0.5 MILLIGRAM(S): at 10:15

## 2024-12-24 RX ADMIN — Medication 0.5 MILLIGRAM(S): at 18:38

## 2024-12-24 RX ADMIN — Medication 12.5 MILLIGRAM(S): at 10:14

## 2024-12-24 RX ADMIN — Medication 0.5 MILLIGRAM(S): at 18:53

## 2024-12-24 RX ADMIN — Medication 0.5 MILLIGRAM(S): at 10:30

## 2024-12-24 RX ADMIN — Medication 0.5 MILLIGRAM(S): at 07:00

## 2024-12-24 RX ADMIN — IPRATROPIUM BROMIDE AND ALBUTEROL SULFATE 3 MILLILITER(S): .5; 2.5 SOLUTION RESPIRATORY (INHALATION) at 07:55

## 2024-12-24 RX ADMIN — ALPRAZOLAM 0.25 MILLIGRAM(S): 0.25 TABLET ORAL at 21:58

## 2024-12-24 RX ADMIN — ESCITALOPRAM OXALATE 10 MILLIGRAM(S): 10 TABLET ORAL at 10:14

## 2024-12-24 RX ADMIN — Medication 30 MILLILITER(S): at 17:39

## 2024-12-24 RX ADMIN — ROSUVASTATIN 10 MILLIGRAM(S): 40 TABLET, FILM COATED ORAL at 21:58

## 2024-12-24 RX ADMIN — ONDANSETRON 4 MILLIGRAM(S): 4 TABLET ORAL at 17:40

## 2024-12-24 RX ADMIN — CHLORHEXIDINE GLUCONATE 1 APPLICATION(S): 1.2 RINSE ORAL at 05:42

## 2024-12-24 NOTE — PROCEDURE NOTE - PLAN
- back to floor  - J tube may be used for feeds immediately  - please continue to flush catheter after each use. 121

## 2024-12-24 NOTE — PROGRESS NOTE ADULT - ASSESSMENT
64 year-old woman with DM, HTN, HLD, CAD, hx of PCI with stents for which she's on Plavix, paroxysmal afib on Eliquis, asthma, familial adenomatous polyposis with remote hx of colectomy with ileorectal anastomosis, GERD, chronic anemia, chronic lichen planus with facial hyperpigmentation, anxiety and depression, presumed gastroparesis for which she had parenteral nutrition, CBD stone in 11/2024 s/p stone extraction, sphincterotomy and stent replacement, with post procedure course complicated by post-ERCP pancreatitis, s/p GJ tube placement, was re-admitted 12/14 for GJ tube malfunction, malposition, rectified endoscopically with collaboration between IR and GI, now returns 12/19 with bright red blood per rectum associated with generalized weakness and dizziness, found to have acute blood loss anemia, gastrointestinal bleeding near ileorectal anastomosis on CT. Admitted to ICU. Patient underwent superior mesenteric artery angiogram, ileocolicartery angiogram, coil embolization of branch of ileocolic artery 12/19 with Dr. Sol. R CFA closed with Mynx. RLE arterial duplex 12/20 with normal flow identified involving the visualized right femoral artery and vein, no evidence of any abnormal flow to suggest possibility of a pseudoaneurysm, no complex collection is seen to suggest a hematoma. Patient stable, downgraded to Medicine.        Asthma      MEDICATIONS  (STANDING):  albuterol/ipratropium for Nebulization 3 milliLiter(s) Nebulizer every 6 hours  ALPRAZolam 0.25 milliGRAM(s) Oral at bedtime  chlorhexidine 2% Cloths 1 Application(s) Topical <User Schedule>  cholecalciferol 1000 Unit(s) Oral daily  clopidogrel Tablet 75 milliGRAM(s) Oral daily  escitalopram 10 milliGRAM(s) Oral daily  heparin   Injectable 5000 Unit(s) SubCutaneous every 8 hours  insulin lispro (ADMELOG) corrective regimen sliding scale   SubCutaneous every 6 hours  metoclopramide 5 milliGRAM(s) Oral three times a day before meals  metoprolol tartrate 12.5 milliGRAM(s) Oral every 12 hours  pantoprazole  Injectable 40 milliGRAM(s) IV Push daily  rosuvastatin 10 milliGRAM(s) Oral at bedtime    Moderate protein calorie malnutrition  Appreciate dietician input. GJ feeds with Glucerna 1.5, 1440 mL over 24 hrs.  64 year-old woman with DM, HTN, HLD, CAD, hx of PCI with stents for which she's on Plavix, paroxysmal afib on Eliquis, asthma, familial adenomatous polyposis with remote hx of colectomy with ileorectal anastomosis, GERD, chronic anemia, chronic lichen planus with facial hyperpigmentation, anxiety and depression, presumed gastroparesis for which she had parenteral nutrition, CBD stone in 11/2024 s/p stone extraction, sphincterotomy and stent replacement, with post procedure course complicated by post-ERCP pancreatitis, s/p GJ tube placement, was re-admitted 12/14 for GJ tube malfunction, malposition, rectified endoscopically with collaboration between IR and GI, now returns 12/19 with bright red blood per rectum associated with generalized weakness and dizziness, found to have acute blood loss anemia, gastrointestinal bleeding near ileorectal anastomosis on CT. Admitted to ICU. Patient underwent superior mesenteric artery angiogram, ileocolic artery angiogram, coil embolization of branch of ileocolic artery 12/19 with Dr. Sol. R CFA closed with Mynx. RLE arterial duplex 12/20 with normal flow identified involving the visualized right femoral artery and vein, no evidence of any abnormal flow to suggest possibility of a pseudoaneurysm, no complex collection is seen to suggest a hematoma. Patient stable, downgraded to Medicine.      Acute blood loss anemia, gastrointestinal bleeding near ileorectal anastomosis  Stabilized following superior mesenteric artery angiogram, ileocolic artery angiogram, coil embolization of branch of ileocolic artery 12/19 with Dr. Sol. R CFA closed with Mynx. RLE arterial duplex 12/20 with normal flow identified involving the visualized right femoral artery and vein, no evidence of any abnormal flow to suggest possibility of a pseudoaneurysm, no complex collection is seen to suggest a hematoma. Patient stable, downgraded to Medicine. Continue to monitor H/H. Management as per GI and IR.     CAD with hx of stents  No angina. No CHF sx. Continue Plavix, metoprolol, statin.    HTN  BP controlled    HLD  Stable. Continue statin    Hx of paroxysmal afib  SR, 80s-100s. On metoprolol for rate control. DOAC on hold due to GI bleeding she had this presentation. Will discuss further with GI and Cardiology.    Asthma  No sign of exacerbation. Continue bronchodilators PRN    Diabetes mellitus  POCT glucose 147 today. Continue insulin correctional scale.     Gastroparesis  Continue metaclopramide TID.     Anxiety and depression  Continue Lexapro daily, Alprazolam at bedtime    Moderate protein calorie malnutrition  Appreciate dietician input. GJ feeds with Glucerna 1.5, 1440 mL over 24 hrs.       Dispo: Anticipate DC home with F2F when clinically improved and inpatient workup is complete 64 year-old woman with DM, HTN, HLD, CAD, hx of PCI with stents for which she's on Plavix, paroxysmal afib on Eliquis, asthma, familial adenomatous polyposis with remote hx of colectomy with ileorectal anastomosis, GERD, chronic anemia, chronic lichen planus with facial hyperpigmentation, anxiety and depression, presumed gastroparesis for which she had parenteral nutrition, CBD stone in 11/2024 s/p stone extraction, sphincterotomy and stent replacement, with post procedure course complicated by post-ERCP pancreatitis, s/p GJ tube placement, was re-admitted 12/14 for GJ tube malfunction, malposition, rectified endoscopically with collaboration between IR and GI, now returns 12/19 with bright red blood per rectum associated with generalized weakness and dizziness, found to have acute blood loss anemia, gastrointestinal bleeding near ileorectal anastomosis on CT. Admitted to ICU. Patient underwent superior mesenteric artery angiogram, ileocolic artery angiogram, coil embolization of branch of ileocolic artery 12/19 with Dr. Sol. R CFA closed with Mynx. RLE arterial duplex 12/20 with normal flow identified involving the visualized right femoral artery and vein, no evidence of any abnormal flow to suggest possibility of a pseudoaneurysm, no complex collection is seen to suggest a hematoma. Patient stable, downgraded to Medicine.      Acute blood loss anemia, gastrointestinal bleeding near ileorectal anastomosis  Stabilized following superior mesenteric artery angiogram, ileocolic artery angiogram, coil embolization of branch of ileocolic artery 12/19 with Dr. Sol. R CFA closed with Mynx. RLE arterial duplex 12/20 with normal flow identified involving the visualized right femoral artery and vein, no evidence of any abnormal flow to suggest possibility of a pseudoaneurysm, no complex collection is seen to suggest a hematoma. Patient stable, downgraded to Medicine. Continue to monitor H/H. Management as per GI and IR.     CAD with hx of stents  No angina. No CHF sx. Continue Plavix, metoprolol, statin.    HTN  BP controlled    HLD  Stable. Continue statin    Hx of paroxysmal afib  SR, 80s-100s. On metoprolol for rate control. DOAC on hold due to GI bleeding she had this presentation. Will discuss further with GI and Cardiology.    Asthma  No sign of exacerbation. Continue bronchodilators PRN    Diabetes mellitus  POCT glucose 147 today. Continue insulin correctional scale.     Gastroparesis  Continue metaclopramide TID.     Anxiety and depression  Continue Lexapro daily, Alprazolam at bedtime    GJ tube malfunction   S/p GJ tube exchange today by IR.     Moderate protein calorie malnutrition  Appreciate dietician input. GJ feeds with Glucerna 1.5, 1440 mL over 24 hrs.         Dispo: Anticipate DC home with F2F when clinically improved and inpatient workup is complete

## 2024-12-24 NOTE — PROGRESS NOTE ADULT - SUBJECTIVE AND OBJECTIVE BOX
Chief Complaint: Bright red blood per rectum associated with generalized weakness, dizziness    Interval Hx: Patient seen and examined. No acute complaints. No further bleeding. Generalized weakness has improved. Dizziness has resolved. No other complaints. No chest pain or tightness. No dyspnea. No palpitations. No abdominal pain. No nausea or vomiting. No urinary complaints. No fever or rash. No synovitis.     ROS: Multi system review is comprehensively negative x 10 systems except as above    Vitals:  T(F): 97.8 (23 Dec 2024 20:59), Max: 98.2 (23 Dec 2024 19:50)  HR: 82 (24 Dec 2024 01:12) (82 - 113)  BP: 111/72 (23 Dec 2024 20:59) (109/75 - 116/77)  RR: 19 (23 Dec 2024 20:59) (17 - 20)  SpO2: 98% (24 Dec 2024 01:12) (93% - 98%) on room air    Exam:  Gen: Comfortable  HEENT: NCAT PERRL EOMI MMM clear oropharynx  Neck: Supple, no JVD, no LAD  CVS: s1 s2 normal, RRR  Chest: Normal resp effort, lungs CTA B/L  Abd: Non distended, +BS, soft, non tender  Ext: No edema, no tenderness, intact peripheral pulses, normal cap refill  Skin: Warm, dry  Mood: Calm, pleasant  Neuro: A+Ox3, no gross deficits    Labs:                        10.5   5.47  )---------( 171                  33.0       137  |  104  |  14  ----------------------<  146  4.2   |   28   |  0.73    Ca    9.5       Tprot 6.5  /  Alb 2.8  /  Tbili 0.3  /  19  /  ALT 21  /  AlkPhos 85    Lactate 1.0    UA 12/20: Clear, yellow, -gluc, +ket, -bili, -prot, spec grav elevated, -N, -LE    Micro:  Urine culture 12/20: Negative    Imaging:  US arterial duplex RLE 12/20:  Normal flow is identified involving the visualized right femoral artery and vein. There is no evidence of any abnormal flow to suggest possibility of a pseudoaneurysm. No complex collection is seen to suggest a hematoma.    CT abd, pelvis w/ IV cont 12/29: Normal liver. Common bile duct is dilated up to 1.4 cm, similar to prior. Cholecystectomy. Normal spleen. Pancreas with increased stranding adjacent to the pancreatic tail with small loculated perisplenic fluid. Normal adrenals. Normal kidneys and ureters. Minimally distended bladder. Normal uterus and adnexa. Active bleeding noted just proximal to the ileorectal anastomosis. Percutaneous gastrostomy tube with the tip ending at the junction of the first/second portion of the duodenum. Status post colectomy with ileorectal anastomosis. Normal peritoneum and retroperitoneum. No lymphadenopathy. Normal abdominal wall. Degenerative bone changes.    Cardiac Testing:  EKG 12/20: Rate 91. NSR,     EKG 12/29: Rate 101. Sinus tachycardia     Prior visit testing:  TTE 11/21: Left ventricular systolic function is normal with an ejection fraction of 59 % by Pardo's method of disks. Normal left ventricular diastolic function. Normal right ventricular systolic function. Normal LA and RA. Interatrial septum appears intact. Normal aorta. Trace MR. Trace TR. No pericardial effusion. Dilated IVC with mildly elevated right atrial pressure estimate.     Procedures:  Superior mesenteric artery angiogram, ileocolicartery angiogram, coil embolization of branch of ileocolic artery  12/19/24 with Dr. Sweta Vences:  MEDICATIONS  (STANDING):  albuterol/ipratropium for Nebulization 3 milliLiter(s) Nebulizer every 6 hours  ALPRAZolam 0.25 milliGRAM(s) Oral at bedtime  chlorhexidine 2% Cloths 1 Application(s) Topical <User Schedule>  cholecalciferol 1000 Unit(s) Oral daily  clopidogrel Tablet 75 milliGRAM(s) Oral daily  escitalopram 10 milliGRAM(s) Oral daily  heparin   Injectable 5000 Unit(s) SubCutaneous every 8 hours  insulin lispro (ADMELOG) corrective regimen sliding scale   SubCutaneous every 6 hours  metoclopramide 5 milliGRAM(s) Oral three times a day before meals  metoprolol tartrate 12.5 milliGRAM(s) Oral every 12 hours  pantoprazole  Injectable 40 milliGRAM(s) IV Push daily  rosuvastatin 10 milliGRAM(s) Oral at bedtime    MEDICATIONS  (PRN):  ALPRAZolam 0.25 milliGRAM(s) Oral every 8 hours PRN anxiety  dextrose Oral Gel 15 Gram(s) Oral once PRN Blood Glucose LESS THAN 70 milliGRAM(s)/deciliter  HYDROmorphone  Injectable 0.5 milliGRAM(s) IV Push every 4 hours PRN Severe Pain (7 - 10)  ondansetron Injectable 4 milliGRAM(s) IV Push every 4 hours PRN Nausea and/or Vomiting                                         Chief Complaint: Bright red blood per rectum associated with generalized weakness, dizziness    Interval Hx: Patient seen and examined. No acute complaints. No further bleeding. Generalized weakness has improved. Dizziness has resolved. No other complaints. No chest pain or tightness. No dyspnea. No palpitations. No abdominal pain. No nausea or vomiting. No urinary complaints. No fever or rash. No synovitis.     ROS: Multi system review is comprehensively negative x 10 systems except as above    Vitals:  T(F): 97.8 (23 Dec 2024 20:59), Max: 98.2 (23 Dec 2024 19:50)  HR: 82 (24 Dec 2024 01:12) (82 - 113)  BP: 111/72 (23 Dec 2024 20:59) (109/75 - 116/77)  RR: 19 (23 Dec 2024 20:59) (17 - 20)  SpO2: 98% (24 Dec 2024 01:12) (93% - 98%) on room air    Exam:  Gen: Comfortable  HEENT: NCAT PERRL EOMI MMM clear oropharynx  Neck: Supple, no JVD, no LAD  CVS: s1 s2 normal, RRR  Chest: Normal resp effort, lungs CTA B/L  Abd: Non distended, +BS, soft, non tender  Ext: No edema, no tenderness, intact peripheral pulses, normal cap refill  Skin: Warm, dry  Mood: Calm, pleasant  Neuro: A+Ox3, no gross deficits    Labs:                        10.5   5.47  )---------( 171                  33.0       137  |  104  |  14  ----------------------<  146  4.2   |   28   |  0.73    Ca    9.5       Tprot 6.5  /  Alb 2.8  /  Tbili 0.3  /  19  /  ALT 21  /  AlkPhos 85    Lactate 1.0    UA 12/20: Clear, yellow, -gluc, +ket, -bili, -prot, spec grav elevated, -N, -LE    Micro:  Urine culture 12/20: Negative    Imaging:  US arterial duplex RLE 12/20:  Normal flow is identified involving the visualized right femoral artery and vein. There is no evidence of any abnormal flow to suggest possibility of a pseudoaneurysm. No complex collection is seen to suggest a hematoma.    CT abd, pelvis w/ IV cont 12/29: Normal liver. Common bile duct is dilated up to 1.4 cm, similar to prior. Cholecystectomy. Normal spleen. Pancreas with increased stranding adjacent to the pancreatic tail with small loculated perisplenic fluid. Normal adrenals. Normal kidneys and ureters. Minimally distended bladder. Normal uterus and adnexa. Active bleeding noted just proximal to the ileorectal anastomosis. Percutaneous gastrostomy tube with the tip ending at the junction of the first/second portion of the duodenum. Status post colectomy with ileorectal anastomosis. Normal peritoneum and retroperitoneum. No lymphadenopathy. Normal abdominal wall. Degenerative bone changes.    Cardiac Testing:  Tele 12/24: Personally reviewed. SR, rate 80s-100. No events    EKG 12/20: Rate 91. NSR    EKG 12/29: Rate 101. Sinus tachycardia     Prior visit testing:  TTE 11/21: Left ventricular systolic function is normal with an ejection fraction of 59 % by Pardo's method of disks. Normal left ventricular diastolic function. Normal right ventricular systolic function. Normal LA and RA. Interatrial septum appears intact. Normal aorta. Trace MR. Trace TR. No pericardial effusion. Dilated IVC with mildly elevated right atrial pressure estimate.     Procedures:  Superior mesenteric artery angiogram, ileocolic artery angiogram, coil embolization of branch of ileocolic artery  12/19/24 with Dr. Sweta Vences:  MEDICATIONS  (STANDING):  albuterol/ipratropium for Nebulization 3 milliLiter(s) Nebulizer every 6 hours  ALPRAZolam 0.25 milliGRAM(s) Oral at bedtime  chlorhexidine 2% Cloths 1 Application(s) Topical <User Schedule>  cholecalciferol 1000 Unit(s) Oral daily  clopidogrel Tablet 75 milliGRAM(s) Oral daily  escitalopram 10 milliGRAM(s) Oral daily  heparin   Injectable 5000 Unit(s) SubCutaneous every 8 hours  insulin lispro (ADMELOG) corrective regimen sliding scale   SubCutaneous every 6 hours  metoclopramide 5 milliGRAM(s) Oral three times a day before meals  metoprolol tartrate 12.5 milliGRAM(s) Oral every 12 hours  pantoprazole  Injectable 40 milliGRAM(s) IV Push daily  rosuvastatin 10 milliGRAM(s) Oral at bedtime    MEDICATIONS  (PRN):  ALPRAZolam 0.25 milliGRAM(s) Oral every 8 hours PRN anxiety  dextrose Oral Gel 15 Gram(s) Oral once PRN Blood Glucose LESS THAN 70 milliGRAM(s)/deciliter  HYDROmorphone  Injectable 0.5 milliGRAM(s) IV Push every 4 hours PRN Severe Pain (7 - 10)  ondansetron Injectable 4 milliGRAM(s) IV Push every 4 hours PRN Nausea and/or Vomiting                                         Chief Complaint: Bright red blood per rectum associated with generalized weakness, dizziness    Interval Hx: Patient seen and examined. No further bleeding. Still with some generalized weakness and dizziness. She is also reporting constant abdominal pain, moderate to severe, improved with pain medications, No associated symptoms today. Yesterday had some nausea and non bloody emesis but that has resolved. No other complaints. No chest pain or tightness. No dyspnea. No palpitations. No urinary complaints. No fever. Successful GJ tube exchange by IR today as it was clotted.     ROS: Multi system review is comprehensively negative x 10 systems except as above    Vitals:  T(F): 97.7 (24 Dec 2024 10:14), Max: 98.2 (23 Dec 2024 19:50)  HR: 78 (24 Dec 2024 10:14) (78 - 95)  BP: 110/66 (24 Dec 2024 10:14) (108/64 - 116/77)  RR: 16 (24 Dec 2024 10:14) (16 - 19)  SpO2: 97% (24 Dec 2024 10:14) (93% - 98%) on room air    Exam:  Gen: Comfortable  HEENT: NCAT PERRL EOMI MMM clear oropharynx  Neck: Supple, no JVD, no LAD  CVS: s1 s2 normal, RRR  Chest: Normal resp effort, lungs CTA B/L  Abd: Non distended, +BS, soft, non tender  Ext: No edema, no tenderness, intact peripheral pulses, normal cap refill  Skin: Warm, dry  Mood: Calm, pleasant  Neuro: A+Ox3, no gross deficits    Labs:                        10.5   5.47  )---------( 171                  33.0       137  |  104  |  14  ----------------------<  146  4.2   |   28   |  0.73    Ca    9.5       Tprot 6.5  /  Alb 2.8  /  Tbili 0.3  /  19  /  ALT 21  /  AlkPhos 85    Lactate 1.0    UA 12/20: Clear, yellow, -gluc, +ket, -bili, -prot, spec grav elevated, -N, -LE    Micro:  Urine culture 12/20: Negative    Imaging:  US arterial duplex RLE 12/20:  Normal flow is identified involving the visualized right femoral artery and vein. There is no evidence of any abnormal flow to suggest possibility of a pseudoaneurysm. No complex collection is seen to suggest a hematoma.    CT abd, pelvis w/ IV cont 12/29: Normal liver. Common bile duct is dilated up to 1.4 cm, similar to prior. Cholecystectomy. Normal spleen. Pancreas with increased stranding adjacent to the pancreatic tail with small loculated perisplenic fluid. Normal adrenals. Normal kidneys and ureters. Minimally distended bladder. Normal uterus and adnexa. Active bleeding noted just proximal to the ileorectal anastomosis. Percutaneous gastrostomy tube with the tip ending at the junction of the first/second portion of the duodenum. Status post colectomy with ileorectal anastomosis. Normal peritoneum and retroperitoneum. No lymphadenopathy. Normal abdominal wall. Degenerative bone changes.    Cardiac Testing:  Tele 12/24: Personally reviewed. SR, rate 80s-100. No events    EKG 12/20: Rate 91. NSR    EKG 12/29: Rate 101. Sinus tachycardia     Prior visit testing:  TTE 11/21: Left ventricular systolic function is normal with an ejection fraction of 59 % by Pardo's method of disks. Normal left ventricular diastolic function. Normal right ventricular systolic function. Normal LA and RA. Interatrial septum appears intact. Normal aorta. Trace MR. Trace TR. No pericardial effusion. Dilated IVC with mildly elevated right atrial pressure estimate.     Procedures:  Superior mesenteric artery angiogram, ileocolic artery angiogram, coil embolization of branch of ileocolic artery  12/19/24 with Dr. Sweta Vences:  MEDICATIONS  (STANDING):  albuterol/ipratropium for Nebulization 3 milliLiter(s) Nebulizer every 6 hours  ALPRAZolam 0.25 milliGRAM(s) Oral at bedtime  chlorhexidine 2% Cloths 1 Application(s) Topical <User Schedule>  cholecalciferol 1000 Unit(s) Oral daily  clopidogrel Tablet 75 milliGRAM(s) Oral daily  escitalopram 10 milliGRAM(s) Oral daily  heparin   Injectable 5000 Unit(s) SubCutaneous every 8 hours  insulin lispro (ADMELOG) corrective regimen sliding scale   SubCutaneous every 6 hours  metoclopramide 5 milliGRAM(s) Oral three times a day before meals  metoprolol tartrate 12.5 milliGRAM(s) Oral every 12 hours  pantoprazole  Injectable 40 milliGRAM(s) IV Push daily  rosuvastatin 10 milliGRAM(s) Oral at bedtime    MEDICATIONS  (PRN):  ALPRAZolam 0.25 milliGRAM(s) Oral every 8 hours PRN anxiety  dextrose Oral Gel 15 Gram(s) Oral once PRN Blood Glucose LESS THAN 70 milliGRAM(s)/deciliter  HYDROmorphone  Injectable 0.5 milliGRAM(s) IV Push every 4 hours PRN Severe Pain (7 - 10)  ondansetron Injectable 4 milliGRAM(s) IV Push every 4 hours PRN Nausea and/or Vomiting

## 2024-12-24 NOTE — PRE PROCEDURE NOTE - PRE PROCEDURE EVALUATION
Interventional Radiology Pre-Procedure Note    Diagnosis/Indication: Patient is a 64y old  Female who presents with a chief complaint of Acute blood loss anemia, gastrointestinal bleeding near ileorectal anastomosis. Patient now with clogged gastrojejunostomy tube. She is nervous about exchange of the gastrojejunostomy tube. Will attempt to unclog the tube however manipulation may dislodge the remainder of the tube fully into the stomach. If so, the tube would have to be exchanged. If the tube is successfully unclogged, and remains with tip in the small intestine, then we will not perform the exchange. She is aware that the tube may clog again if not exchanged today, which would then require replacement. She is in agreement with this plan.         PAST MEDICAL & SURGICAL HISTORY:  CAD (coronary artery disease)      DM (diabetes mellitus)      HTN (hypertension)      Asthma      Benign essential HTN      HLD (hyperlipidemia)      Type 2 diabetes mellitus      CAD (coronary artery disease)      Stented coronary artery      FAP (familial adenomatous polyposis)      Insomnia      GERD (gastroesophageal reflux disease)      H/O lichen planus      History of rectal bleeding      Gastroparesis      Pancreatitis      History of colon resection      Stented coronary artery      Gastrojejunal (GJ) tube in place      History of biliary stent insertion           Allergies: No Known Allergies      LABS:                        10.5   5.47  )-----------( 171      ( 24 Dec 2024 06:46 )             33.0     12-24    136  |  102  |  16  ----------------------------<  137[H]  4.0   |  29  |  0.67    Ca    9.7      24 Dec 2024 06:46  Phos  5.0     12-24  Mg     1.9     12-24        Procedure/ risks/ benefits were explained, informed consent obtained from patient, verbalizes understanding.

## 2024-12-24 NOTE — PROCEDURE NOTE - PROCEDURE FINDINGS AND DETAILS
GJ tube was obstructed. Unable to pass a wire through the tube. A tandem wire and catheter were used to access the intestines. The tube was cut and removed. A new 18F x45 cm GJ tube was advanced over the wire. Contrast shows appropriate position.

## 2024-12-25 LAB
ANION GAP SERPL CALC-SCNC: 6 MMOL/L — SIGNIFICANT CHANGE UP (ref 5–17)
BASOPHILS # BLD AUTO: 0.02 K/UL — SIGNIFICANT CHANGE UP (ref 0–0.2)
BASOPHILS NFR BLD AUTO: 0.4 % — SIGNIFICANT CHANGE UP (ref 0–2)
BUN SERPL-MCNC: 18 MG/DL — SIGNIFICANT CHANGE UP (ref 7–23)
CALCIUM SERPL-MCNC: 9.1 MG/DL — SIGNIFICANT CHANGE UP (ref 8.5–10.1)
CHLORIDE SERPL-SCNC: 97 MMOL/L — SIGNIFICANT CHANGE UP (ref 96–108)
CO2 SERPL-SCNC: 30 MMOL/L — SIGNIFICANT CHANGE UP (ref 22–31)
CREAT SERPL-MCNC: 0.78 MG/DL — SIGNIFICANT CHANGE UP (ref 0.5–1.3)
EGFR: 86 ML/MIN/1.73M2 — SIGNIFICANT CHANGE UP
EOSINOPHIL # BLD AUTO: 0.35 K/UL — SIGNIFICANT CHANGE UP (ref 0–0.5)
EOSINOPHIL NFR BLD AUTO: 6.3 % — HIGH (ref 0–6)
GLUCOSE BLDC GLUCOMTR-MCNC: 110 MG/DL — HIGH (ref 70–99)
GLUCOSE BLDC GLUCOMTR-MCNC: 130 MG/DL — HIGH (ref 70–99)
GLUCOSE BLDC GLUCOMTR-MCNC: 134 MG/DL — HIGH (ref 70–99)
GLUCOSE BLDC GLUCOMTR-MCNC: 155 MG/DL — HIGH (ref 70–99)
GLUCOSE BLDC GLUCOMTR-MCNC: 182 MG/DL — HIGH (ref 70–99)
GLUCOSE SERPL-MCNC: 177 MG/DL — HIGH (ref 70–99)
HCT VFR BLD CALC: 33.1 % — LOW (ref 34.5–45)
HGB BLD-MCNC: 10.5 G/DL — LOW (ref 11.5–15.5)
IMM GRANULOCYTES NFR BLD AUTO: 0.4 % — SIGNIFICANT CHANGE UP (ref 0–0.9)
LYMPHOCYTES # BLD AUTO: 1.38 K/UL — SIGNIFICANT CHANGE UP (ref 1–3.3)
LYMPHOCYTES # BLD AUTO: 24.8 % — SIGNIFICANT CHANGE UP (ref 13–44)
MAGNESIUM SERPL-MCNC: 2.1 MG/DL — SIGNIFICANT CHANGE UP (ref 1.6–2.6)
MCHC RBC-ENTMCNC: 27.9 PG — SIGNIFICANT CHANGE UP (ref 27–34)
MCHC RBC-ENTMCNC: 31.7 G/DL — LOW (ref 32–36)
MCV RBC AUTO: 88 FL — SIGNIFICANT CHANGE UP (ref 80–100)
MONOCYTES # BLD AUTO: 0.36 K/UL — SIGNIFICANT CHANGE UP (ref 0–0.9)
MONOCYTES NFR BLD AUTO: 6.5 % — SIGNIFICANT CHANGE UP (ref 2–14)
NEUTROPHILS # BLD AUTO: 3.43 K/UL — SIGNIFICANT CHANGE UP (ref 1.8–7.4)
NEUTROPHILS NFR BLD AUTO: 61.6 % — SIGNIFICANT CHANGE UP (ref 43–77)
PHOSPHATE SERPL-MCNC: 3.9 MG/DL — SIGNIFICANT CHANGE UP (ref 2.5–4.5)
PLATELET # BLD AUTO: 187 K/UL — SIGNIFICANT CHANGE UP (ref 150–400)
POTASSIUM SERPL-MCNC: 3.7 MMOL/L — SIGNIFICANT CHANGE UP (ref 3.5–5.3)
POTASSIUM SERPL-SCNC: 3.7 MMOL/L — SIGNIFICANT CHANGE UP (ref 3.5–5.3)
RBC # BLD: 3.76 M/UL — LOW (ref 3.8–5.2)
RBC # FLD: 15.9 % — HIGH (ref 10.3–14.5)
SODIUM SERPL-SCNC: 133 MMOL/L — LOW (ref 135–145)
WBC # BLD: 5.56 K/UL — SIGNIFICANT CHANGE UP (ref 3.8–10.5)
WBC # FLD AUTO: 5.56 K/UL — SIGNIFICANT CHANGE UP (ref 3.8–10.5)

## 2024-12-25 PROCEDURE — 99233 SBSQ HOSP IP/OBS HIGH 50: CPT

## 2024-12-25 RX ORDER — APIXABAN 5 MG/1
5 TABLET, FILM COATED ORAL EVERY 12 HOURS
Refills: 0 | Status: DISCONTINUED | OUTPATIENT
Start: 2024-12-25 | End: 2024-12-27

## 2024-12-25 RX ADMIN — Medication 1000 UNIT(S): at 08:53

## 2024-12-25 RX ADMIN — Medication 0.5 MILLIGRAM(S): at 22:44

## 2024-12-25 RX ADMIN — Medication 0.5 MILLIGRAM(S): at 18:01

## 2024-12-25 RX ADMIN — ONDANSETRON 4 MILLIGRAM(S): 4 TABLET ORAL at 23:32

## 2024-12-25 RX ADMIN — Medication 0.5 MILLIGRAM(S): at 22:08

## 2024-12-25 RX ADMIN — HEPARIN SODIUM 5000 UNIT(S): 1000 INJECTION, SOLUTION INTRAVENOUS; SUBCUTANEOUS at 06:04

## 2024-12-25 RX ADMIN — CLOPIDOGREL BISULFATE 75 MILLIGRAM(S): 75 TABLET, FILM COATED ORAL at 08:53

## 2024-12-25 RX ADMIN — CHLORHEXIDINE GLUCONATE 1 APPLICATION(S): 1.2 RINSE ORAL at 05:17

## 2024-12-25 RX ADMIN — Medication 2: at 06:14

## 2024-12-25 RX ADMIN — ONDANSETRON 4 MILLIGRAM(S): 4 TABLET ORAL at 08:52

## 2024-12-25 RX ADMIN — ONDANSETRON 4 MILLIGRAM(S): 4 TABLET ORAL at 18:01

## 2024-12-25 RX ADMIN — Medication 0.5 MILLIGRAM(S): at 05:21

## 2024-12-25 RX ADMIN — Medication 0.5 MILLIGRAM(S): at 08:53

## 2024-12-25 RX ADMIN — ESCITALOPRAM OXALATE 10 MILLIGRAM(S): 10 TABLET ORAL at 08:53

## 2024-12-25 RX ADMIN — PANTOPRAZOLE 40 MILLIGRAM(S): 40 TABLET, DELAYED RELEASE ORAL at 08:52

## 2024-12-25 RX ADMIN — METOCLOPRAMIDE 5 MILLIGRAM(S): 10 TABLET ORAL at 12:30

## 2024-12-25 RX ADMIN — Medication 0.5 MILLIGRAM(S): at 13:38

## 2024-12-25 RX ADMIN — Medication 12.5 MILLIGRAM(S): at 21:52

## 2024-12-25 RX ADMIN — Medication 0.5 MILLIGRAM(S): at 04:51

## 2024-12-25 RX ADMIN — Medication 2: at 12:31

## 2024-12-25 RX ADMIN — ONDANSETRON 4 MILLIGRAM(S): 4 TABLET ORAL at 13:38

## 2024-12-25 RX ADMIN — ALPRAZOLAM 0.25 MILLIGRAM(S): 0.25 TABLET ORAL at 21:53

## 2024-12-25 RX ADMIN — ROSUVASTATIN 10 MILLIGRAM(S): 40 TABLET, FILM COATED ORAL at 21:53

## 2024-12-25 RX ADMIN — ONDANSETRON 4 MILLIGRAM(S): 4 TABLET ORAL at 04:51

## 2024-12-25 RX ADMIN — METOCLOPRAMIDE 5 MILLIGRAM(S): 10 TABLET ORAL at 17:58

## 2024-12-25 RX ADMIN — APIXABAN 5 MILLIGRAM(S): 5 TABLET, FILM COATED ORAL at 17:58

## 2024-12-25 RX ADMIN — METOCLOPRAMIDE 5 MILLIGRAM(S): 10 TABLET ORAL at 06:04

## 2024-12-25 NOTE — PHYSICAL THERAPY INITIAL EVALUATION ADULT - MODALITIES TREATMENT COMMENTS
pt left in bed supine post session @ pt request; bed alarm on; HM intact; callbell in reach; pt instructed not to get up alone; call nursing for assist; selene well; pain 7/10; JO carlson

## 2024-12-25 NOTE — PROGRESS NOTE ADULT - ASSESSMENT
64 year-old woman with DM, HTN, HLD, CAD, hx of PCI with stents for which she's on Plavix, paroxysmal afib on Eliquis, asthma, familial adenomatous polyposis with remote hx of colectomy with ileorectal anastomosis, GERD, chronic anemia, chronic lichen planus with facial hyperpigmentation, anxiety and depression, presumed gastroparesis for which she had parenteral nutrition, CBD stone in 11/2024 s/p stone extraction, sphincterotomy and stent replacement, with post procedure course complicated by post-ERCP pancreatitis, s/p GJ tube placement, was re-admitted 12/14 for GJ tube malfunction, malposition, rectified endoscopically with collaboration between IR and GI, now returns 12/19 with bright red blood per rectum associated with generalized weakness and dizziness, found to have acute blood loss anemia, gastrointestinal bleeding near ileorectal anastomosis on CT. Admitted to ICU. Patient underwent superior mesenteric artery angiogram, ileocolic artery angiogram, coil embolization of branch of ileocolic artery 12/19 with Dr. Sol. R CFA closed with Mynx. RLE arterial duplex 12/20 with normal flow identified involving the visualized right femoral artery and vein, no evidence of any abnormal flow to suggest possibility of a pseudoaneurysm, no complex collection is seen to suggest a hematoma. Patient stable, downgraded to Medicine.      Acute blood loss anemia, gastrointestinal bleeding near ileorectal anastomosis  Stabilized following superior mesenteric artery angiogram, ileocolic artery angiogram, coil embolization of branch of ileocolic artery 12/19 with Dr. Sol. R CFA closed with Mynx. RLE arterial duplex 12/20 with normal flow identified involving the visualized right femoral artery and vein, no evidence of any abnormal flow to suggest possibility of a pseudoaneurysm, no complex collection is seen to suggest a hematoma. Patient stable, downgraded to Medicine. Continue to monitor H/H. Management as per GI and IR.     CAD with hx of stents  No angina. No CHF sx. Continue Plavix, metoprolol, statin.    HTN  BP controlled    HLD  Stable. Continue statin    Hx of paroxysmal afib  SR, 80s-100s. On metoprolol for rate control.   resume Eliquis. Discussed with GI     Asthma  No sign of exacerbation. Continue bronchodilators PRN    Diabetes mellitus  POCT glucose 147 today. Continue insulin correctional scale.     Gastroparesis  Continue metaclopramide TID.     Anxiety and depression  Continue Lexapro daily, Alprazolam at bedtime    GJ tube malfunction   S/p GJ tube exchange today by IR.     Moderate protein calorie malnutrition  Appreciate dietician input. GJ feeds with Glucerna 1.5, 1440 mL over 24 hrs.         Dispo: Anticipate DC home with F2F when clinically improved and inpatient workup is complete

## 2024-12-25 NOTE — PROGRESS NOTE ADULT - SUBJECTIVE AND OBJECTIVE BOX
Chief Complaint: Bright red blood per rectum associated with generalized weakness, dizziness    Interval Hx: Pt seen and evaluated. No further bleeding. Has some bloating. No other acute complaints.     ROS: Multi system review is comprehensively negative x 10 systems except as above    Vitals:  Vital Signs Last 24 Hrs  T(C): 36.6 (25 Dec 2024 08:08), Max: 36.8 (24 Dec 2024 15:41)  T(F): 97.8 (25 Dec 2024 08:08), Max: 98.3 (24 Dec 2024 15:41)  HR: 99 (25 Dec 2024 08:08) (93 - 99)  BP: 98/69 (25 Dec 2024 08:08) (98/69 - 119/68)  RR: 17 (25 Dec 2024 08:08) (17 - 18)  SpO2: 93% (25 Dec 2024 08:08) (93% - 99%)    Parameters below as of 25 Dec 2024 08:08  Patient On (Oxygen Delivery Method): room air        Exam:  Gen: Comfortable  HEENT: NCAT PERRL EOMI MMM clear oropharynx  Neck: Supple, no JVD, no LAD  CVS: s1 s2 normal, RRR  Chest: Normal resp effort, lungs CTA B/L  Abd: Non distended, +BS, soft, non tender, +GJ tube   Ext: No edema, no tenderness, intact peripheral pulses, normal cap refill  Skin: Warm, dry  Mood: Calm, pleasant  Neuro: A+Ox3, no gross deficits    Labs:                        10.5   5.56  )-----------( 187      ( 25 Dec 2024 07:13 )             33.1   12-25    133[L]  |  97  |  18  ----------------------------<  177[H]  3.7   |  30  |  0.78    Ca    9.1      25 Dec 2024 07:13  Phos  3.9     12-25  Mg     2.1     12-25                   Ca    9.5       Tprot 6.5  /  Alb 2.8  /  Tbili 0.3  /  19  /  ALT 21  /  AlkPhos 85    Lactate 1.0    UA 12/20: Clear, yellow, -gluc, +ket, -bili, -prot, spec grav elevated, -N, -LE    Micro:  Urine culture 12/20: Negative    Imaging:  US arterial duplex RLE 12/20:  Normal flow is identified involving the visualized right femoral artery and vein. There is no evidence of any abnormal flow to suggest possibility of a pseudoaneurysm. No complex collection is seen to suggest a hematoma.    CT abd, pelvis w/ IV cont 12/29: Normal liver. Common bile duct is dilated up to 1.4 cm, similar to prior. Cholecystectomy. Normal spleen. Pancreas with increased stranding adjacent to the pancreatic tail with small loculated perisplenic fluid. Normal adrenals. Normal kidneys and ureters. Minimally distended bladder. Normal uterus and adnexa. Active bleeding noted just proximal to the ileorectal anastomosis. Percutaneous gastrostomy tube with the tip ending at the junction of the first/second portion of the duodenum. Status post colectomy with ileorectal anastomosis. Normal peritoneum and retroperitoneum. No lymphadenopathy. Normal abdominal wall. Degenerative bone changes.    Cardiac Testing:  Tele 12/24: Personally reviewed. SR, rate 80s-100. No events    EKG 12/20: Rate 91. NSR    EKG 12/29: Rate 101. Sinus tachycardia     Prior visit testing:  TTE 11/21: Left ventricular systolic function is normal with an ejection fraction of 59 % by Pardo's method of disks. Normal left ventricular diastolic function. Normal right ventricular systolic function. Normal LA and RA. Interatrial septum appears intact. Normal aorta. Trace MR. Trace TR. No pericardial effusion. Dilated IVC with mildly elevated right atrial pressure estimate.     Procedures:  Superior mesenteric artery angiogram, ileocolic artery angiogram, coil embolization of branch of ileocolic artery  12/19/24 with Dr. Sweta Vences:  MEDICATIONS  (STANDING):  ALPRAZolam 0.25 milliGRAM(s) Oral at bedtime  chlorhexidine 2% Cloths 1 Application(s) Topical <User Schedule>  cholecalciferol 1000 Unit(s) Oral daily  clopidogrel Tablet 75 milliGRAM(s) Oral daily  dextrose 5%. 1000 milliLiter(s) (50 mL/Hr) IV Continuous <Continuous>  dextrose 5%. 1000 milliLiter(s) (100 mL/Hr) IV Continuous <Continuous>  dextrose 50% Injectable 25 Gram(s) IV Push once  dextrose 50% Injectable 12.5 Gram(s) IV Push once  dextrose 50% Injectable 25 Gram(s) IV Push once  escitalopram 10 milliGRAM(s) Oral daily  glucagon  Injectable 1 milliGRAM(s) IntraMuscular once  heparin   Injectable 5000 Unit(s) SubCutaneous every 8 hours  insulin lispro (ADMELOG) corrective regimen sliding scale   SubCutaneous every 6 hours  metoclopramide 5 milliGRAM(s) Oral three times a day before meals  metoprolol tartrate 12.5 milliGRAM(s) Oral every 12 hours  pantoprazole  Injectable 40 milliGRAM(s) IV Push daily  rosuvastatin 10 milliGRAM(s) Oral at bedtime    MEDICATIONS  (PRN):  albuterol/ipratropium for Nebulization 3 milliLiter(s) Nebulizer every 6 hours PRN Shortness of Breath and/or Wheezing  ALPRAZolam 0.25 milliGRAM(s) Oral every 8 hours PRN anxiety  aluminum hydroxide/magnesium hydroxide/simethicone Suspension 30 milliLiter(s) Oral every 6 hours PRN Dyspepsia  dextrose Oral Gel 15 Gram(s) Oral once PRN Blood Glucose LESS THAN 70 milliGRAM(s)/deciliter  HYDROmorphone  Injectable 0.5 milliGRAM(s) IV Push every 4 hours PRN Severe Pain (7 - 10)  ondansetron Injectable 4 milliGRAM(s) IV Push every 4 hours PRN Nausea and/or Vomiting

## 2024-12-25 NOTE — PHYSICAL THERAPY INITIAL EVALUATION ADULT - ORIENTATION, REHAB EVAL
Prema Dodd  : 1978  Primary: Griselda Robert Ppo (Medicare Managed)  Secondary:   [address] [address]  Phone: [phone]  Fax: [phone] Plan Frequency: 2 times a week for 90 days    Plan of Care/Certification Expiration Date: 23      >PT Visit Info:  Plan Frequency: 2 times a week for 90 days  Plan of Care/Certification Expiration Date: 23  Total # of Visits to Date: 22  Progress Note Counter: 3  No Show: 0  Canceled Appointment: 7      Visit Count:  22   OUTPATIENT PHYSICAL THERAPY:OP NOTE TYPE: Treatment Note 2023       Episode  }Appt Desk             Treatment Diagnosis:  Pain in Left Shoulder (M25.512)  Stiffness of Left Shoulder, Not elsewhere classified (M25.612)  Medical/Referring Diagnosis:  Osteoarthritis of left AC (acromioclavicular) joint [M19.012]  Left shoulder pain, unspecified chronicity [M25.512]  Referring Physician:  Eliza Reyes MD MD Orders:  PT Eval and Treat   Date of Onset:  Onset Date:  (Chronic)     Allergies:   Amoxicillin, Diazepam, Penicillins, Pineapple, Sumatriptan, Theophylline, Theophyllines, Cephalexin, Sulfa antibiotics, Topiramate, Tramadol, Verapamil, [place] oil, Augmentin [amoxicillin-pot clavulanate], Dermabond, Keflex [cephalexin], Kenalog [triamcinolone acetonide], Pneumococcal vaccine, Sulfa antibiotics, Topiramate, Triamcinolone acetonide, Ultram [tramadol], Valium [diazepam], and Vitamin b12  Restrictions/Precautions:  No data recorded  No data recorded   Interventions Planned (Treatment may consist of any combination of the following):    Current Treatment Recommendations: Strengthening; ROM; Neuromuscular re-education; Home exercise program; Pain management; Modalities; Manual     >Subjective Comments:  Patient reports mobilizing her clavicle last time really helped. \"I was able to reach over and roll in the bed without sharp pain\".   >Initial: Left
oriented to person, place, time and situation

## 2024-12-26 LAB
ADD ON TEST-SPECIMEN IN LAB: SIGNIFICANT CHANGE UP
ANION GAP SERPL CALC-SCNC: 4 MMOL/L — LOW (ref 5–17)
BUN SERPL-MCNC: 15 MG/DL — SIGNIFICANT CHANGE UP (ref 7–23)
CALCIUM SERPL-MCNC: 9.3 MG/DL — SIGNIFICANT CHANGE UP (ref 8.5–10.1)
CHLORIDE SERPL-SCNC: 101 MMOL/L — SIGNIFICANT CHANGE UP (ref 96–108)
CO2 SERPL-SCNC: 30 MMOL/L — SIGNIFICANT CHANGE UP (ref 22–31)
CREAT SERPL-MCNC: 0.66 MG/DL — SIGNIFICANT CHANGE UP (ref 0.5–1.3)
EGFR: 99 ML/MIN/1.73M2 — SIGNIFICANT CHANGE UP
GLUCOSE BLDC GLUCOMTR-MCNC: 138 MG/DL — HIGH (ref 70–99)
GLUCOSE BLDC GLUCOMTR-MCNC: 142 MG/DL — HIGH (ref 70–99)
GLUCOSE BLDC GLUCOMTR-MCNC: 147 MG/DL — HIGH (ref 70–99)
GLUCOSE BLDC GLUCOMTR-MCNC: 179 MG/DL — HIGH (ref 70–99)
GLUCOSE SERPL-MCNC: 159 MG/DL — HIGH (ref 70–99)
HCT VFR BLD CALC: 33.2 % — LOW (ref 34.5–45)
HGB BLD-MCNC: 10.7 G/DL — LOW (ref 11.5–15.5)
MAGNESIUM SERPL-MCNC: 2.1 MG/DL — SIGNIFICANT CHANGE UP (ref 1.6–2.6)
MCHC RBC-ENTMCNC: 28.5 PG — SIGNIFICANT CHANGE UP (ref 27–34)
MCHC RBC-ENTMCNC: 32.2 G/DL — SIGNIFICANT CHANGE UP (ref 32–36)
MCV RBC AUTO: 88.5 FL — SIGNIFICANT CHANGE UP (ref 80–100)
PHOSPHATE SERPL-MCNC: 3.7 MG/DL — SIGNIFICANT CHANGE UP (ref 2.5–4.5)
PLATELET # BLD AUTO: 191 K/UL — SIGNIFICANT CHANGE UP (ref 150–400)
POTASSIUM SERPL-MCNC: 3.8 MMOL/L — SIGNIFICANT CHANGE UP (ref 3.5–5.3)
POTASSIUM SERPL-SCNC: 3.8 MMOL/L — SIGNIFICANT CHANGE UP (ref 3.5–5.3)
RBC # BLD: 3.75 M/UL — LOW (ref 3.8–5.2)
RBC # FLD: 16 % — HIGH (ref 10.3–14.5)
SODIUM SERPL-SCNC: 135 MMOL/L — SIGNIFICANT CHANGE UP (ref 135–145)
WBC # BLD: 6.65 K/UL — SIGNIFICANT CHANGE UP (ref 3.8–10.5)
WBC # FLD AUTO: 6.65 K/UL — SIGNIFICANT CHANGE UP (ref 3.8–10.5)

## 2024-12-26 PROCEDURE — 99233 SBSQ HOSP IP/OBS HIGH 50: CPT

## 2024-12-26 PROCEDURE — 74018 RADEX ABDOMEN 1 VIEW: CPT | Mod: 26

## 2024-12-26 RX ORDER — HYDROMORPHONE HCL 4 MG
0.5 TABLET ORAL EVERY 4 HOURS
Refills: 0 | Status: DISCONTINUED | OUTPATIENT
Start: 2024-12-26 | End: 2024-12-26

## 2024-12-26 RX ORDER — POLYETHYLENE GLYCOL 3350 17 G/DOSE
17 POWDER (GRAM) ORAL ONCE
Refills: 0 | Status: COMPLETED | OUTPATIENT
Start: 2024-12-26 | End: 2024-12-26

## 2024-12-26 RX ADMIN — Medication 30 MILLILITER(S): at 14:51

## 2024-12-26 RX ADMIN — Medication 12.5 MILLIGRAM(S): at 14:46

## 2024-12-26 RX ADMIN — ONDANSETRON 4 MILLIGRAM(S): 4 TABLET ORAL at 15:44

## 2024-12-26 RX ADMIN — Medication 17 GRAM(S): at 14:46

## 2024-12-26 RX ADMIN — PANTOPRAZOLE 40 MILLIGRAM(S): 40 TABLET, DELAYED RELEASE ORAL at 14:46

## 2024-12-26 RX ADMIN — CHLORHEXIDINE GLUCONATE 1 APPLICATION(S): 1.2 RINSE ORAL at 05:22

## 2024-12-26 RX ADMIN — APIXABAN 5 MILLIGRAM(S): 5 TABLET, FILM COATED ORAL at 05:26

## 2024-12-26 RX ADMIN — ROSUVASTATIN 10 MILLIGRAM(S): 40 TABLET, FILM COATED ORAL at 21:37

## 2024-12-26 RX ADMIN — ALPRAZOLAM 0.25 MILLIGRAM(S): 0.25 TABLET ORAL at 21:37

## 2024-12-26 RX ADMIN — Medication 1000 UNIT(S): at 14:46

## 2024-12-26 RX ADMIN — Medication 0.5 MILLIGRAM(S): at 08:20

## 2024-12-26 RX ADMIN — ESCITALOPRAM OXALATE 10 MILLIGRAM(S): 10 TABLET ORAL at 14:47

## 2024-12-26 RX ADMIN — APIXABAN 5 MILLIGRAM(S): 5 TABLET, FILM COATED ORAL at 17:40

## 2024-12-26 RX ADMIN — Medication 12.5 MILLIGRAM(S): at 21:37

## 2024-12-26 RX ADMIN — ONDANSETRON 4 MILLIGRAM(S): 4 TABLET ORAL at 20:34

## 2024-12-26 RX ADMIN — Medication 2: at 05:36

## 2024-12-26 RX ADMIN — CLOPIDOGREL BISULFATE 75 MILLIGRAM(S): 75 TABLET, FILM COATED ORAL at 14:47

## 2024-12-26 RX ADMIN — METOCLOPRAMIDE 5 MILLIGRAM(S): 10 TABLET ORAL at 14:46

## 2024-12-26 RX ADMIN — METOCLOPRAMIDE 5 MILLIGRAM(S): 10 TABLET ORAL at 05:26

## 2024-12-26 RX ADMIN — Medication 0.5 MILLIGRAM(S): at 03:01

## 2024-12-26 NOTE — PROGRESS NOTE ADULT - REASON FOR ADMISSION
Acute blood loss anemia, gastrointestinal bleeding near ileorectal anastomosis
GIB
Rectal bleeding
Acute blood loss anemia, gastrointestinal bleeding near ileorectal anastomosis
Acute blood loss anemia, gastrointestinal bleeding near ileorectal anastomosis
GI Bleed
BRBPR

## 2024-12-26 NOTE — PROGRESS NOTE ADULT - ASSESSMENT
64 year-old woman with DM, HTN, HLD, CAD, hx of PCI with stents for which she's on Plavix, paroxysmal afib on Eliquis, asthma, familial adenomatous polyposis with remote hx of colectomy with ileorectal anastomosis, GERD, chronic anemia, chronic lichen planus with facial hyperpigmentation, anxiety and depression, presumed gastroparesis for which she had parenteral nutrition, CBD stone in 11/2024 s/p stone extraction, sphincterotomy and stent replacement, with post procedure course complicated by post-ERCP pancreatitis, s/p GJ tube placement, was re-admitted 12/14 for GJ tube malfunction, malposition, rectified endoscopically with collaboration between IR and GI, now returns 12/19 with bright red blood per rectum associated with generalized weakness and dizziness, found to have acute blood loss anemia, gastrointestinal bleeding near ileorectal anastomosis on CT. Admitted to ICU. Patient underwent superior mesenteric artery angiogram, ileocolic artery angiogram, coil embolization of branch of ileocolic artery 12/19 with Dr. Sol. R CFA closed with Mynx. RLE arterial duplex 12/20 with normal flow identified involving the visualized right femoral artery and vein, no evidence of any abnormal flow to suggest possibility of a pseudoaneurysm, no complex collection is seen to suggest a hematoma. Patient stable, downgraded to Medicine.      Acute blood loss anemia, gastrointestinal bleeding near ileorectal anastomosis  Stabilized following superior mesenteric artery angiogram, ileocolic artery angiogram, coil embolization of branch of ileocolic artery 12/19 with Dr. Sol. R CFA closed with Mynx. RLE arterial duplex 12/20 with normal flow identified involving the visualized right femoral artery and vein, no evidence of any abnormal flow to suggest possibility of a pseudoaneurysm, no complex collection is seen to suggest a hematoma. Patient stable, downgraded to Medicine. Continue to monitor H/H. Management as per GI and IR.     CAD with hx of stents  No angina. No CHF sx. Continue Plavix, metoprolol, statin.    HTN  BP controlled    HLD  Stable. Continue statin    Hx of paroxysmal afib  SR, 80s-100s. On metoprolol for rate control.   resume Eliquis. Discussed with GI     Asthma  No sign of exacerbation. Continue bronchodilators PRN    Diabetes mellitus  POCT glucose 147 today. Continue insulin correctional scale.     Gastroparesis  Continue metaclopramide TID.     Anxiety and depression  Continue Lexapro daily, Alprazolam at bedtime    GJ tube malfunction   S/p GJ tube exchange today by IR.     Moderate protein calorie malnutrition  Appreciate dietician input. GJ feeds with Glucerna 1.5, 1440 mL over 24 hrs.         Dispo: Anticipate DC home with F2F when clinically improved and inpatient workup is complete 64 year-old woman with DM, HTN, HLD, CAD, hx of PCI with stents for which she's on Plavix, paroxysmal afib on Eliquis, asthma, familial adenomatous polyposis with remote hx of colectomy with ileorectal anastomosis, GERD, chronic anemia, chronic lichen planus with facial hyperpigmentation, anxiety and depression, presumed gastroparesis for which she had parenteral nutrition, CBD stone in 11/2024 s/p stone extraction, sphincterotomy and stent replacement, with post procedure course complicated by post-ERCP pancreatitis, s/p GJ tube placement, was re-admitted 12/14 for GJ tube malfunction, malposition, rectified endoscopically with collaboration between IR and GI, now returns 12/19 with bright red blood per rectum associated with generalized weakness and dizziness, found to have acute blood loss anemia, gastrointestinal bleeding near ileorectal anastomosis on CT. Admitted to ICU. Patient underwent superior mesenteric artery angiogram, ileocolic artery angiogram, coil embolization of branch of ileocolic artery 12/19 with Dr. Sol. R CFA closed with Mynx. RLE arterial duplex 12/20 with normal flow identified involving the visualized right femoral artery and vein, no evidence of any abnormal flow to suggest possibility of a pseudoaneurysm, no complex collection is seen to suggest a hematoma. Patient stable, downgraded to Medicine.      Acute blood loss anemia, gastrointestinal bleeding near ileorectal anastomosis  Stabilized following superior mesenteric artery angiogram, ileocolic artery angiogram, coil embolization of branch of ileocolic artery 12/19 with Dr. Sol. R CFA closed with Mynx. RLE arterial duplex 12/20 with normal flow identified involving the visualized right femoral artery and vein, no evidence of any abnormal flow to suggest possibility of a pseudoaneurysm, no complex collection is seen to suggest a hematoma. Patient stable, downgraded to Medicine. Continue to monitor H/H. Management as per GI and IR.     CAD with hx of stents  No angina. No CHF sx. Continue Plavix, metoprolol, statin.    HTN  BP controlled    HLD  Stable. Continue statin    Hx of paroxysmal afib  SR, 80s-100s. On metoprolol for rate control.   Eliquis    Asthma  No sign of exacerbation. Continue bronchodilators PRN    Diabetes mellitus  POCT glucose 147 today. Continue insulin correctional scale.     Gastroparesis  Continue metaclopramide TID.   AXR reviewed   monitor electrolytes   d/c opioids   oob-->chair     Anxiety and depression  Continue Lexapro daily, Alprazolam at bedtime    GJ tube malfunction   S/p GJ tube exchange today by IR.   vent G part     Moderate protein calorie malnutrition  Appreciate dietician input. GJ feeds with Glucerna 1.5, 1440 mL over 24 hrs.         Dispo: Anticipate DC home with F2F when clinically improved and inpatient workup is complete

## 2024-12-26 NOTE — PROGRESS NOTE ADULT - TIME BILLING
30
extensive review of patient's medical chart including prior hospital encounters, current admission progress notes, labs, imaging and other testing, seeing and evaluating patient at bedside, explaining to patient regarding current condition and plan of care, then ordering tests and collaborating with specialty consultants including Gastroenterology and Interventional Radiology, and finally, documenting today's findings and plan.
,

## 2024-12-26 NOTE — PROGRESS NOTE ADULT - SUBJECTIVE AND OBJECTIVE BOX
Chief Complaint: Bright red blood per rectum associated with generalized weakness, dizziness    Interval Hx: Pt seen and evaluated. Has mild abdominal discomfort. Has no flatus. Last BM was yesterday. Denies any N/V. AXR ordered. H&H stable after resumption of Eliquis     ROS: Multi system review is comprehensively negative x 10 systems except as above    Vitals:  Vital Signs Last 24 Hrs  T(C): 36.6 (26 Dec 2024 07:45), Max: 36.9 (26 Dec 2024 00:00)  T(F): 97.8 (26 Dec 2024 07:45), Max: 98.4 (26 Dec 2024 00:00)  HR: 108 (26 Dec 2024 07:45) (90 - 108)  BP: 132/88 (26 Dec 2024 07:45) (103/67 - 132/88)  RR: 18 (26 Dec 2024 07:45) (18 - 18)  SpO2: 95% (26 Dec 2024 07:45) (94% - 96%)    Parameters below as of 26 Dec 2024 07:45  Patient On (Oxygen Delivery Method): room air          Exam:  Gen: Comfortable  HEENT: NCAT PERRL EOMI MMM clear oropharynx  Neck: Supple, no JVD, no LAD  CVS: s1 s2 normal, RRR  Chest: Normal resp effort, lungs CTA B/L  Abd: mildly distended, +BS, soft, non tender, +GJ tube   Ext: No edema, no tenderness, intact peripheral pulses, normal cap refill  Skin: Warm, dry  Mood: Calm, pleasant  Neuro: A+Ox3, no gross deficits    Labs:                                   10.7   6.65  )-----------( 191      ( 26 Dec 2024 10:06 )             33.2   12-26    135  |  101  |  15  ----------------------------<  159[H]  3.8   |  30  |  0.66    Ca    9.3      26 Dec 2024 10:06  Phos  3.7     12-26  Mg     2.1     12-26                       Ca    9.5       Tprot 6.5  /  Alb 2.8  /  Tbili 0.3  /  19  /  ALT 21  /  AlkPhos 85    Lactate 1.0    UA 12/20: Clear, yellow, -gluc, +ket, -bili, -prot, spec grav elevated, -N, -LE    Micro:  Urine culture 12/20: Negative    Imaging:  US arterial duplex RLE 12/20:  Normal flow is identified involving the visualized right femoral artery and vein. There is no evidence of any abnormal flow to suggest possibility of a pseudoaneurysm. No complex collection is seen to suggest a hematoma.    CT abd, pelvis w/ IV cont 12/29: Normal liver. Common bile duct is dilated up to 1.4 cm, similar to prior. Cholecystectomy. Normal spleen. Pancreas with increased stranding adjacent to the pancreatic tail with small loculated perisplenic fluid. Normal adrenals. Normal kidneys and ureters. Minimally distended bladder. Normal uterus and adnexa. Active bleeding noted just proximal to the ileorectal anastomosis. Percutaneous gastrostomy tube with the tip ending at the junction of the first/second portion of the duodenum. Status post colectomy with ileorectal anastomosis. Normal peritoneum and retroperitoneum. No lymphadenopathy. Normal abdominal wall. Degenerative bone changes.    Cardiac Testing:  Tele 12/24: Personally reviewed. SR, rate 80s-100. No events    EKG 12/20: Rate 91. NSR    EKG 12/29: Rate 101. Sinus tachycardia     Prior visit testing:  TTE 11/21: Left ventricular systolic function is normal with an ejection fraction of 59 % by Pardo's method of disks. Normal left ventricular diastolic function. Normal right ventricular systolic function. Normal LA and RA. Interatrial septum appears intact. Normal aorta. Trace MR. Trace TR. No pericardial effusion. Dilated IVC with mildly elevated right atrial pressure estimate.     Procedures:  Superior mesenteric artery angiogram, ileocolic artery angiogram, coil embolization of branch of ileocolic artery  12/19/24 with Dr. Sweta Vences:  MEDICATIONS  (STANDING):  ALPRAZolam 0.25 milliGRAM(s) Oral at bedtime  apixaban 5 milliGRAM(s) Oral every 12 hours  chlorhexidine 2% Cloths 1 Application(s) Topical <User Schedule>  cholecalciferol 1000 Unit(s) Oral daily  clopidogrel Tablet 75 milliGRAM(s) Oral daily  dextrose 5%. 1000 milliLiter(s) (50 mL/Hr) IV Continuous <Continuous>  dextrose 5%. 1000 milliLiter(s) (100 mL/Hr) IV Continuous <Continuous>  dextrose 50% Injectable 25 Gram(s) IV Push once  dextrose 50% Injectable 12.5 Gram(s) IV Push once  dextrose 50% Injectable 25 Gram(s) IV Push once  escitalopram 10 milliGRAM(s) Oral daily  glucagon  Injectable 1 milliGRAM(s) IntraMuscular once  insulin lispro (ADMELOG) corrective regimen sliding scale   SubCutaneous every 6 hours  metoclopramide 5 milliGRAM(s) Oral three times a day before meals  metoprolol tartrate 12.5 milliGRAM(s) Oral every 12 hours  pantoprazole  Injectable 40 milliGRAM(s) IV Push daily  polyethylene glycol 3350 17 Gram(s) Oral once  rosuvastatin 10 milliGRAM(s) Oral at bedtime    MEDICATIONS  (PRN):  albuterol/ipratropium for Nebulization 3 milliLiter(s) Nebulizer every 6 hours PRN Shortness of Breath and/or Wheezing  ALPRAZolam 0.25 milliGRAM(s) Oral every 8 hours PRN anxiety  aluminum hydroxide/magnesium hydroxide/simethicone Suspension 30 milliLiter(s) Oral every 6 hours PRN Dyspepsia  dextrose Oral Gel 15 Gram(s) Oral once PRN Blood Glucose LESS THAN 70 milliGRAM(s)/deciliter  ondansetron Injectable 4 milliGRAM(s) IV Push every 4 hours PRN Nausea and/or Vomiting                                       Chief Complaint: Bright red blood per rectum associated with generalized weakness, dizziness    Interval Hx: Pt seen and evaluated. Has mild abdominal discomfort. Has no flatus. Last BM was yesterday. Denies any N/V. AXR ordered. H&H stable after resumption of Eliquis. Will d/c opioids.     ROS: Multi system review is comprehensively negative x 10 systems except as above    Vitals:  Vital Signs Last 24 Hrs  T(C): 36.6 (26 Dec 2024 07:45), Max: 36.9 (26 Dec 2024 00:00)  T(F): 97.8 (26 Dec 2024 07:45), Max: 98.4 (26 Dec 2024 00:00)  HR: 108 (26 Dec 2024 07:45) (90 - 108)  BP: 132/88 (26 Dec 2024 07:45) (103/67 - 132/88)  RR: 18 (26 Dec 2024 07:45) (18 - 18)  SpO2: 95% (26 Dec 2024 07:45) (94% - 96%)    Parameters below as of 26 Dec 2024 07:45  Patient On (Oxygen Delivery Method): room air          Exam:  Gen: Comfortable  HEENT: NCAT PERRL EOMI MMM clear oropharynx  Neck: Supple, no JVD, no LAD  CVS: s1 s2 normal, RRR  Chest: Normal resp effort, lungs CTA B/L  Abd: mildly distended, +BS, soft, non tender, +GJ tube   Ext: No edema, no tenderness, intact peripheral pulses, normal cap refill  Skin: Warm, dry  Mood: Calm, pleasant  Neuro: A+Ox3, no gross deficits    Labs:                                   10.7   6.65  )-----------( 191      ( 26 Dec 2024 10:06 )             33.2   12-26    135  |  101  |  15  ----------------------------<  159[H]  3.8   |  30  |  0.66    Ca    9.3      26 Dec 2024 10:06  Phos  3.7     12-26  Mg     2.1     12-26                       Ca    9.5       Tprot 6.5  /  Alb 2.8  /  Tbili 0.3  /  19  /  ALT 21  /  AlkPhos 85    Lactate 1.0    UA 12/20: Clear, yellow, -gluc, +ket, -bili, -prot, spec grav elevated, -N, -LE    Micro:  Urine culture 12/20: Negative    Imaging:  US arterial duplex RLE 12/20:  Normal flow is identified involving the visualized right femoral artery and vein. There is no evidence of any abnormal flow to suggest possibility of a pseudoaneurysm. No complex collection is seen to suggest a hematoma.    CT abd, pelvis w/ IV cont 12/29: Normal liver. Common bile duct is dilated up to 1.4 cm, similar to prior. Cholecystectomy. Normal spleen. Pancreas with increased stranding adjacent to the pancreatic tail with small loculated perisplenic fluid. Normal adrenals. Normal kidneys and ureters. Minimally distended bladder. Normal uterus and adnexa. Active bleeding noted just proximal to the ileorectal anastomosis. Percutaneous gastrostomy tube with the tip ending at the junction of the first/second portion of the duodenum. Status post colectomy with ileorectal anastomosis. Normal peritoneum and retroperitoneum. No lymphadenopathy. Normal abdominal wall. Degenerative bone changes.    Cardiac Testing:  Tele 12/24: Personally reviewed. SR, rate 80s-100. No events    EKG 12/20: Rate 91. NSR    EKG 12/29: Rate 101. Sinus tachycardia     Prior visit testing:  TTE 11/21: Left ventricular systolic function is normal with an ejection fraction of 59 % by Pardo's method of disks. Normal left ventricular diastolic function. Normal right ventricular systolic function. Normal LA and RA. Interatrial septum appears intact. Normal aorta. Trace MR. Trace TR. No pericardial effusion. Dilated IVC with mildly elevated right atrial pressure estimate.     Procedures:  Superior mesenteric artery angiogram, ileocolic artery angiogram, coil embolization of branch of ileocolic artery  12/19/24 with Dr. Sweta Vences:  MEDICATIONS  (STANDING):  ALPRAZolam 0.25 milliGRAM(s) Oral at bedtime  apixaban 5 milliGRAM(s) Oral every 12 hours  chlorhexidine 2% Cloths 1 Application(s) Topical <User Schedule>  cholecalciferol 1000 Unit(s) Oral daily  clopidogrel Tablet 75 milliGRAM(s) Oral daily  dextrose 5%. 1000 milliLiter(s) (50 mL/Hr) IV Continuous <Continuous>  dextrose 5%. 1000 milliLiter(s) (100 mL/Hr) IV Continuous <Continuous>  dextrose 50% Injectable 25 Gram(s) IV Push once  dextrose 50% Injectable 12.5 Gram(s) IV Push once  dextrose 50% Injectable 25 Gram(s) IV Push once  escitalopram 10 milliGRAM(s) Oral daily  glucagon  Injectable 1 milliGRAM(s) IntraMuscular once  insulin lispro (ADMELOG) corrective regimen sliding scale   SubCutaneous every 6 hours  metoclopramide 5 milliGRAM(s) Oral three times a day before meals  metoprolol tartrate 12.5 milliGRAM(s) Oral every 12 hours  pantoprazole  Injectable 40 milliGRAM(s) IV Push daily  polyethylene glycol 3350 17 Gram(s) Oral once  rosuvastatin 10 milliGRAM(s) Oral at bedtime    MEDICATIONS  (PRN):  albuterol/ipratropium for Nebulization 3 milliLiter(s) Nebulizer every 6 hours PRN Shortness of Breath and/or Wheezing  ALPRAZolam 0.25 milliGRAM(s) Oral every 8 hours PRN anxiety  aluminum hydroxide/magnesium hydroxide/simethicone Suspension 30 milliLiter(s) Oral every 6 hours PRN Dyspepsia  dextrose Oral Gel 15 Gram(s) Oral once PRN Blood Glucose LESS THAN 70 milliGRAM(s)/deciliter  ondansetron Injectable 4 milliGRAM(s) IV Push every 4 hours PRN Nausea and/or Vomiting

## 2024-12-26 NOTE — PROGRESS NOTE ADULT - PROVIDER SPECIALTY LIST ADULT
Critical Care
Colorectal Surgery
Hospitalist
Hospitalist
Colorectal Surgery
Colorectal Surgery
Gastroenterology
Critical Care
Hospitalist
Intervent Radiology
Hospitalist
Critical Care

## 2024-12-26 NOTE — PROGRESS NOTE ADULT - NUTRITIONAL ASSESSMENT
This patient has been assessed with a concern for Malnutrition and has been determined to have a diagnosis/diagnoses of Moderate protein-calorie malnutrition.    This patient is being managed with:   Diet Consistent Carbohydrate/No Snacks-  Fiber/Residue Restricted (LOWFIBER)  Entered: Dec 20 2024  5:35PM  
This patient has been assessed with a concern for Malnutrition and has been determined to have a diagnosis/diagnoses of Moderate protein-calorie malnutrition.    This patient is being managed with:   Diet Full Liquid-  Tube Feeding Modality: Gastro-Jejunostomy  Glucerna 1.5 Hubert (GLUCERNA1.5)  Total Volume for 24 Hours (mL): 1440  Continuous  Starting Tube Feed Rate {mL per Hour}: 30  Increase Tube Feed Rate by (mL): 10  Until Goal Tube Feed Rate (mL per Hour): 60  Tube Feed Duration (in Hours): 24  Tube Feed Start Time: 14:00  Free Water Flush  Pump   Rate (mL per Hour): 40   Frequency: Every Hour    Start Time: 14:00  Entered: Dec 21 2024  1:54PM  

## 2024-12-27 ENCOUNTER — TRANSCRIPTION ENCOUNTER (OUTPATIENT)
Age: 64
End: 2024-12-27

## 2024-12-27 VITALS
DIASTOLIC BLOOD PRESSURE: 69 MMHG | HEART RATE: 98 BPM | OXYGEN SATURATION: 97 % | TEMPERATURE: 98 F | SYSTOLIC BLOOD PRESSURE: 122 MMHG | RESPIRATION RATE: 18 BRPM

## 2024-12-27 LAB
ANION GAP SERPL CALC-SCNC: 5 MMOL/L — SIGNIFICANT CHANGE UP (ref 5–17)
BUN SERPL-MCNC: 13 MG/DL — SIGNIFICANT CHANGE UP (ref 7–23)
CALCIUM SERPL-MCNC: 9 MG/DL — SIGNIFICANT CHANGE UP (ref 8.5–10.1)
CHLORIDE SERPL-SCNC: 102 MMOL/L — SIGNIFICANT CHANGE UP (ref 96–108)
CO2 SERPL-SCNC: 27 MMOL/L — SIGNIFICANT CHANGE UP (ref 22–31)
CREAT SERPL-MCNC: 0.66 MG/DL — SIGNIFICANT CHANGE UP (ref 0.5–1.3)
EGFR: 99 ML/MIN/1.73M2 — SIGNIFICANT CHANGE UP
GLUCOSE BLDC GLUCOMTR-MCNC: 121 MG/DL — HIGH (ref 70–99)
GLUCOSE BLDC GLUCOMTR-MCNC: 137 MG/DL — HIGH (ref 70–99)
GLUCOSE BLDC GLUCOMTR-MCNC: 210 MG/DL — HIGH (ref 70–99)
GLUCOSE SERPL-MCNC: 123 MG/DL — HIGH (ref 70–99)
HCT VFR BLD CALC: 33.9 % — LOW (ref 34.5–45)
HGB BLD-MCNC: 11 G/DL — LOW (ref 11.5–15.5)
MAGNESIUM SERPL-MCNC: 2.1 MG/DL — SIGNIFICANT CHANGE UP (ref 1.6–2.6)
MCHC RBC-ENTMCNC: 28.4 PG — SIGNIFICANT CHANGE UP (ref 27–34)
MCHC RBC-ENTMCNC: 32.4 G/DL — SIGNIFICANT CHANGE UP (ref 32–36)
MCV RBC AUTO: 87.6 FL — SIGNIFICANT CHANGE UP (ref 80–100)
PHOSPHATE SERPL-MCNC: 3.4 MG/DL — SIGNIFICANT CHANGE UP (ref 2.5–4.5)
PLATELET # BLD AUTO: 224 K/UL — SIGNIFICANT CHANGE UP (ref 150–400)
POTASSIUM SERPL-MCNC: 3.6 MMOL/L — SIGNIFICANT CHANGE UP (ref 3.5–5.3)
POTASSIUM SERPL-SCNC: 3.6 MMOL/L — SIGNIFICANT CHANGE UP (ref 3.5–5.3)
RBC # BLD: 3.87 M/UL — SIGNIFICANT CHANGE UP (ref 3.8–5.2)
RBC # FLD: 15.9 % — HIGH (ref 10.3–14.5)
SODIUM SERPL-SCNC: 134 MMOL/L — LOW (ref 135–145)
WBC # BLD: 7.31 K/UL — SIGNIFICANT CHANGE UP (ref 3.8–10.5)
WBC # FLD AUTO: 7.31 K/UL — SIGNIFICANT CHANGE UP (ref 3.8–10.5)

## 2024-12-27 PROCEDURE — 99239 HOSP IP/OBS DSCHRG MGMT >30: CPT

## 2024-12-27 RX ORDER — PANTOPRAZOLE 40 MG/1
1 TABLET, DELAYED RELEASE ORAL
Qty: 30 | Refills: 0
Start: 2024-12-27

## 2024-12-27 RX ORDER — ALPRAZOLAM 0.25 MG/1
0.25 TABLET ORAL AT BEDTIME
Refills: 0 | Status: DISCONTINUED | OUTPATIENT
Start: 2024-12-27 | End: 2024-12-27

## 2024-12-27 RX ORDER — METOPROLOL TARTRATE 50 MG
1 TABLET ORAL
Qty: 60 | Refills: 0
Start: 2024-12-27

## 2024-12-27 RX ADMIN — PANTOPRAZOLE 40 MILLIGRAM(S): 40 TABLET, DELAYED RELEASE ORAL at 10:35

## 2024-12-27 RX ADMIN — METOCLOPRAMIDE 5 MILLIGRAM(S): 10 TABLET ORAL at 05:17

## 2024-12-27 RX ADMIN — Medication 1000 UNIT(S): at 10:34

## 2024-12-27 RX ADMIN — CLOPIDOGREL BISULFATE 75 MILLIGRAM(S): 75 TABLET, FILM COATED ORAL at 10:35

## 2024-12-27 RX ADMIN — APIXABAN 5 MILLIGRAM(S): 5 TABLET, FILM COATED ORAL at 05:17

## 2024-12-27 RX ADMIN — METOCLOPRAMIDE 5 MILLIGRAM(S): 10 TABLET ORAL at 10:34

## 2024-12-27 RX ADMIN — Medication 12.5 MILLIGRAM(S): at 10:35

## 2024-12-27 RX ADMIN — ESCITALOPRAM OXALATE 10 MILLIGRAM(S): 10 TABLET ORAL at 10:34

## 2024-12-27 NOTE — DISCHARGE NOTE PROVIDER - NSDCFUSCHEDAPPT_GEN_ALL_CORE_FT
Shelton Lee  [facility] Physician Formerly [facility], [facility]  CARDIOLOGY [address]  Scheduled Appointment: 01/17/2025    Shannan Prasad  [facility] Physician Formerly [facility], [facility]  INTMED [address]  Scheduled Appointment: 03/18/2025

## 2024-12-27 NOTE — CHART NOTE - NSCHARTNOTEFT_GEN_A_CORE
PATIENT IS STILL ADMITTED AT THE HOSPITAL AND DOES NOT HAVE A DISCHARGE DATE. THE PATIENT ASKED THAT WE CALL HER ON MONDAY AGAIN TO SEE IF SHE IS HOME OR AT THE HOSPITAL AND WHEN TO SET UP THE FOLLOW-UP APPOINTMENT. SHE SAID THAT SHE NEEDS HELP WITH GI AND A NUTRIONIST.

## 2024-12-27 NOTE — DISCHARGE NOTE PROVIDER - NSDCCPCAREPLAN_GEN_ALL_CORE_FT
Spine appears normal, range of motion is not limited, no muscle or joint tenderness PRINCIPAL DISCHARGE DIAGNOSIS  Diagnosis: Rectal bleeding  Assessment and Plan of Treatment: Stabilized following superior mesenteric artery angiogram, ileocolic artery angiogram, coil embolization of branch of ileocolic artery 12/19 with Dr. Sol. R CFA closed with Mynx. RLE arterial duplex 12/20 with normal flow identified involving the visualized right femoral artery and vein, no evidence of any abnormal flow to suggest possibility of a pseudoaneurysm, no complex collection is seen to suggest a hematoma.

## 2024-12-27 NOTE — DISCHARGE NOTE PROVIDER - NSDCFUADDINST_GEN_ALL_CORE_FT
1) Initiate TF + Low fiber diet - Glucerna 1.5 @ 30 cc/hr, increase by 10 cc/hr q4 hours until goal rate of 60cc/hr met. Will provide ~ 1800 kcal, 100 g protein, and 911 mL free water.   2) monitor hydration status; adjust free water flushes prn, consider free water flushes of 40 cc/hr (which provides ~ 800mL of water per day)  3) monitor TF tolerance; keep back of bed > 35 degrees  4) monitor BM: if > 3 days without BM, add bowel regimen prn  5) daily wt checks to track/trend changes  6) Monitor and optimize BG levels between 140-180 mg/dL by medical management

## 2024-12-27 NOTE — DISCHARGE NOTE PROVIDER - HOSPITAL COURSE
64 year-old woman with DM, HTN, HLD, CAD, hx of PCI with stents for which she's on Plavix, paroxysmal afib on Eliquis, asthma, familial adenomatous polyposis with remote hx of colectomy with ileorectal anastomosis, GERD, chronic anemia, chronic lichen planus with facial hyperpigmentation, anxiety and depression, presumed gastroparesis for which she had parenteral nutrition, CBD stone in 11/2024 s/p stone extraction, sphincterotomy and stent replacement, with post procedure course complicated by post-ERCP pancreatitis, s/p GJ tube placement, was re-admitted 12/14 for GJ tube malfunction, malposition, rectified endoscopically with collaboration between IR and GI, now returns 12/19 with bright red blood per rectum associated with generalized weakness and dizziness, found to have acute blood loss anemia, gastrointestinal bleeding near ileorectal anastomosis on CT. Admitted to ICU. Patient underwent superior mesenteric artery angiogram, ileocolic artery angiogram, coil embolization of branch of ileocolic artery 12/19 with Dr. Sol. R CFA closed with Mynx. RLE arterial duplex 12/20 with normal flow identified involving the visualized right femoral artery and vein, no evidence of any abnormal flow to suggest possibility of a pseudoaneurysm, no complex collection is seen to suggest a hematoma. Patient stable, downgraded to Medicine.     Sub: Pt seen and evaluated. Bloating resolved after venting G part. Having BM. Tolerating diet. No other acute complaints. Pain resolved     Vital Signs Last 24 Hrs  T(C): 36.7 (27 Dec 2024 09:00), Max: 37.3 (27 Dec 2024 02:00)  T(F): 98 (27 Dec 2024 09:00), Max: 99.1 (27 Dec 2024 02:00)  HR: 98 (27 Dec 2024 09:00) (86 - 98)  BP: 122/69 (27 Dec 2024 09:00) (115/68 - 122/69)  RR: 18 (27 Dec 2024 09:00) (18 - 18)  SpO2: 97% (27 Dec 2024 09:00) (97% - 98%)      Exam:  Gen: Comfortable  HEENT: NCAT PERRL EOMI MMM clear oropharynx  Neck: Supple, no JVD, no LAD  CVS: s1 s2 normal, RRR  Chest: Normal resp effort, lungs CTA B/L  Abd: Non distended, +BS, soft, non tender, +GJ tube   Ext: No edema, no tenderness, intact peripheral pulses, normal cap refill  Skin: Warm, dry  Mood: Calm, pleasant  Neuro: A+Ox3, no gross deficits           Acute blood loss anemia, gastrointestinal bleeding near ileorectal anastomosis  Stabilized following superior mesenteric artery angiogram, ileocolic artery angiogram, coil embolization of branch of ileocolic artery 12/19 with Dr. Sol. R CFA closed with Mynx. RLE arterial duplex 12/20 with normal flow identified involving the visualized right femoral artery and vein, no evidence of any abnormal flow to suggest possibility of a pseudoaneurysm, no complex collection is seen to suggest a hematoma.   H&H stable (after resuming Eliquis and Plavix)       CAD with hx of stents  No angina. No CHF sx. Continue Plavix, metoprolol, statin.    HTN  BP controlled    HLD  Stable. Continue statin    Hx of paroxysmal afib  SR, 80s-100s. On metoprolol for rate control.   Eliquis    Asthma  No sign of exacerbation. Continue bronchodilators PRN    Diabetes mellitus  POCT glucose 147 today. Continue insulin correctional scale.     Gastroparesis  Continue metaclopramide TID.   AXR reviewed   monitor electrolytes   d/c opioids   oob-->chair     Anxiety and depression  Continue Lexapro daily, Alprazolam at bedtime    GJ tube malfunction   S/p GJ tube exchange (12/24/24)  vent G part     Moderate protein calorie malnutrition  Appreciate dietician input. GJ feeds with Glucerna 1.5, 1440 mL over 24 hrs.   tube feeds called via [facility].

## 2024-12-27 NOTE — DISCHARGE NOTE PROVIDER - NSDCMRMEDTOKEN_GEN_ALL_CORE_FT
Albuterol (Eqv-Proventil HFA) 90 mcg/inh inhalation aerosol: 1 puff(s) inhaled every 6 hours as needed for  shortness of breath and/or wheezing  ALPRAZolam 0.5 mg oral tablet: 1 tab(s) orally once a day (at bedtime) as needed for sleep  apixaban 5 mg oral tablet: 1 tab(s) orally every 12 hours  clopidogrel 75 mg oral tablet: 1 tab(s) orally once a day  escitalopram 10 mg oral tablet: 1 tab(s) orally once a day  Insulin Glargine Solostar Pen 100 units/mL subcutaneous solution: 10 unit(s) subcutaneous once a day (at bedtime)  metoclopramide 5 mg oral tablet: 1 tab(s) orally 3 times a day  metoprolol tartrate 50 mg oral tablet: 1 tab(s) orally 2 times a day  pantoprazole 40 mg oral granule, delayed release: 1 packet(s) orally once a day  rosuvastatin 10 mg oral tablet: 1 tab(s) orally once a day (at bedtime)

## 2024-12-27 NOTE — DISCHARGE NOTE PROVIDER - CARE PROVIDER_API CALL
Shannan Prasad  Internal Medicine  [address] [place] [id number]  Phone: [phone]  Fax: [phone]  Established Patient  Follow Up Time: 1 week

## 2024-12-27 NOTE — DISCHARGE NOTE NURSING/CASE MANAGEMENT/SOCIAL WORK - PATIENT PORTAL LINK FT
You can access the FollowMyHealth Patient Portal offered by [facility] by registering at the following website: [email or web address]. By joining [employer]s FollowMyHealth portal, you will also be able to view your health information using other applications (apps) compatible with our system.

## 2024-12-27 NOTE — DISCHARGE NOTE NURSING/CASE MANAGEMENT/SOCIAL WORK - NSDCPEFALRISK_GEN_ALL_CORE
For information on Fall & Injury Prevention, visit: [email or web address] OR  [email or web address] OR  https://www.cdc.gov/steadi/patient.html

## 2024-12-27 NOTE — DISCHARGE NOTE NURSING/CASE MANAGEMENT/SOCIAL WORK - FINANCIAL ASSISTANCE
[facility] provides services at a reduced cost to those who are determined to be eligible through [facility]’s financial assistance program. Information regarding [facility]’s financial assistance program can be found by going to [email or web address] or by calling [phone].

## 2025-01-02 ENCOUNTER — NON-APPOINTMENT (OUTPATIENT)
Age: 65
End: 2025-01-02

## 2025-01-03 DIAGNOSIS — Z79.02 LONG TERM (CURRENT) USE OF ANTITHROMBOTICS/ANTIPLATELETS: ICD-10-CM

## 2025-01-03 DIAGNOSIS — G47.00 INSOMNIA, UNSPECIFIED: ICD-10-CM

## 2025-01-03 DIAGNOSIS — Z79.4 LONG TERM (CURRENT) USE OF INSULIN: ICD-10-CM

## 2025-01-03 DIAGNOSIS — E87.6 HYPOKALEMIA: ICD-10-CM

## 2025-01-03 DIAGNOSIS — E78.5 HYPERLIPIDEMIA, UNSPECIFIED: ICD-10-CM

## 2025-01-03 DIAGNOSIS — E83.42 HYPOMAGNESEMIA: ICD-10-CM

## 2025-01-03 DIAGNOSIS — F41.9 ANXIETY DISORDER, UNSPECIFIED: ICD-10-CM

## 2025-01-03 DIAGNOSIS — T85.518A BREAKDOWN (MECHANICAL) OF OTHER GASTROINTESTINAL PROSTHETIC DEVICES, IMPLANTS AND GRAFTS, INITIAL ENCOUNTER: ICD-10-CM

## 2025-01-03 DIAGNOSIS — I48.0 PAROXYSMAL ATRIAL FIBRILLATION: ICD-10-CM

## 2025-01-03 DIAGNOSIS — E44.0 MODERATE PROTEIN-CALORIE MALNUTRITION: ICD-10-CM

## 2025-01-03 DIAGNOSIS — D13.91 FAMILIAL ADENOMATOUS POLYPOSIS: ICD-10-CM

## 2025-01-03 DIAGNOSIS — Z79.01 LONG TERM (CURRENT) USE OF ANTICOAGULANTS: ICD-10-CM

## 2025-01-03 DIAGNOSIS — Z95.5 PRESENCE OF CORONARY ANGIOPLASTY IMPLANT AND GRAFT: ICD-10-CM

## 2025-01-03 DIAGNOSIS — I25.10 ATHEROSCLEROTIC HEART DISEASE OF NATIVE CORONARY ARTERY WITHOUT ANGINA PECTORIS: ICD-10-CM
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
## 2025-01-03 DIAGNOSIS — I10 ESSENTIAL (PRIMARY) HYPERTENSION: ICD-10-CM

## 2025-01-03 DIAGNOSIS — K21.9 GASTRO-ESOPHAGEAL REFLUX DISEASE WITHOUT ESOPHAGITIS: ICD-10-CM

## 2025-01-03 DIAGNOSIS — E11.9 TYPE 2 DIABETES MELLITUS WITHOUT COMPLICATIONS: ICD-10-CM

## 2025-01-03 DIAGNOSIS — D62 ACUTE POSTHEMORRHAGIC ANEMIA: ICD-10-CM
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
## 2025-01-03 DIAGNOSIS — K62.5 HEMORRHAGE OF ANUS AND RECTUM: ICD-10-CM

## 2025-01-03 DIAGNOSIS — J45.909 UNSPECIFIED ASTHMA, UNCOMPLICATED: ICD-10-CM

## 2025-01-15 ENCOUNTER — APPOINTMENT (OUTPATIENT)
Dept: ENDOCRINOLOGY | Facility: CLINIC | Age: 65
End: 2025-01-15

## 2025-01-15 ENCOUNTER — RESULT CHARGE (OUTPATIENT)
Age: 65
End: 2025-01-15

## 2025-01-15 VITALS
BODY MASS INDEX: 25.8 KG/M2 | HEIGHT: 59 IN | SYSTOLIC BLOOD PRESSURE: 125 MMHG | WEIGHT: 128 LBS | DIASTOLIC BLOOD PRESSURE: 81 MMHG | HEART RATE: 97 BPM | OXYGEN SATURATION: 96 %

## 2025-01-15 DIAGNOSIS — E11.9 TYPE 2 DIABETES MELLITUS W/OUT COMPLICATIONS: ICD-10-CM

## 2025-01-15 PROCEDURE — 83036 HEMOGLOBIN GLYCOSYLATED A1C: CPT | Mod: QW

## 2025-01-15 PROCEDURE — 99215 OFFICE O/P EST HI 40 MIN: CPT

## 2025-01-17 ENCOUNTER — APPOINTMENT (OUTPATIENT)
Dept: CARDIOLOGY | Facility: CLINIC | Age: 65
End: 2025-01-17
Payer: MEDICAID

## 2025-01-17 ENCOUNTER — NON-APPOINTMENT (OUTPATIENT)
Age: 65
End: 2025-01-17

## 2025-01-17 VITALS
DIASTOLIC BLOOD PRESSURE: 70 MMHG | OXYGEN SATURATION: 98 % | SYSTOLIC BLOOD PRESSURE: 122 MMHG | WEIGHT: 124 LBS | HEIGHT: 59 IN | HEART RATE: 88 BPM | BODY MASS INDEX: 25 KG/M2

## 2025-01-17 DIAGNOSIS — I25.10 ATHEROSCLEROTIC HEART DISEASE OF NATIVE CORONARY ARTERY W/OUT ANGINA PECTORIS: ICD-10-CM

## 2025-01-17 DIAGNOSIS — E78.5 HYPERLIPIDEMIA, UNSPECIFIED: ICD-10-CM

## 2025-01-17 DIAGNOSIS — I10 ESSENTIAL (PRIMARY) HYPERTENSION: ICD-10-CM

## 2025-01-17 DIAGNOSIS — I48.0 PAROXYSMAL ATRIAL FIBRILLATION: ICD-10-CM

## 2025-01-17 PROCEDURE — 99214 OFFICE O/P EST MOD 30 MIN: CPT

## 2025-01-17 PROCEDURE — 93000 ELECTROCARDIOGRAM COMPLETE: CPT

## 2025-01-17 PROCEDURE — G2211 COMPLEX E/M VISIT ADD ON: CPT | Mod: NC

## 2025-01-17 RX ORDER — PANTOPRAZOLE 40 MG/1
40 TABLET, DELAYED RELEASE ORAL DAILY
Refills: 0 | Status: ACTIVE | COMMUNITY

## 2025-01-17 RX ORDER — METOCLOPRAMIDE 5 MG/1
5 TABLET ORAL 3 TIMES DAILY
Refills: 0 | Status: ACTIVE | COMMUNITY

## 2025-01-22 ENCOUNTER — APPOINTMENT (OUTPATIENT)
Dept: INTERNAL MEDICINE | Facility: CLINIC | Age: 65
End: 2025-01-22
Payer: MEDICAID

## 2025-01-22 ENCOUNTER — EMERGENCY (EMERGENCY)
Facility: HOSPITAL | Age: 65
LOS: 0 days | Discharge: HOME | End: 2025-01-22
Attending: EMERGENCY MEDICINE
Payer: MEDICAID

## 2025-01-22 VITALS
SYSTOLIC BLOOD PRESSURE: 126 MMHG | HEART RATE: 88 BPM | RESPIRATION RATE: 20 BRPM | TEMPERATURE: 98 F | DIASTOLIC BLOOD PRESSURE: 73 MMHG | OXYGEN SATURATION: 100 %

## 2025-01-22 VITALS
BODY MASS INDEX: 24.19 KG/M2 | DIASTOLIC BLOOD PRESSURE: 82 MMHG | HEART RATE: 78 BPM | TEMPERATURE: 97.2 F | WEIGHT: 120 LBS | HEIGHT: 59 IN | OXYGEN SATURATION: 99 % | SYSTOLIC BLOOD PRESSURE: 132 MMHG

## 2025-01-22 VITALS — HEIGHT: 59 IN

## 2025-01-22 DIAGNOSIS — E78.5 HYPERLIPIDEMIA, UNSPECIFIED: ICD-10-CM

## 2025-01-22 DIAGNOSIS — K31.84 GASTROPARESIS: ICD-10-CM

## 2025-01-22 DIAGNOSIS — T85.848A PAIN DUE TO OTHER INTERNAL PROSTHETIC DEVICES, IMPLANTS AND GRAFTS, INITIAL ENCOUNTER: ICD-10-CM

## 2025-01-22 DIAGNOSIS — K21.9 GASTRO-ESOPHAGEAL REFLUX DISEASE WITHOUT ESOPHAGITIS: ICD-10-CM

## 2025-01-22 DIAGNOSIS — Z98.890 OTHER SPECIFIED POSTPROCEDURAL STATES: Chronic | ICD-10-CM

## 2025-01-22 DIAGNOSIS — Z95.5 PRESENCE OF CORONARY ANGIOPLASTY IMPLANT AND GRAFT: ICD-10-CM

## 2025-01-22 DIAGNOSIS — G89.29 OTHER CHRONIC PAIN: ICD-10-CM

## 2025-01-22 DIAGNOSIS — I25.10 ATHEROSCLEROTIC HEART DISEASE OF NATIVE CORONARY ARTERY WITHOUT ANGINA PECTORIS: ICD-10-CM

## 2025-01-22 DIAGNOSIS — E11.43 TYPE 2 DIABETES MELLITUS WITH DIABETIC AUTONOMIC (POLY)NEUROPATHY: ICD-10-CM

## 2025-01-22 DIAGNOSIS — Z90.49 ACQUIRED ABSENCE OF OTHER SPECIFIED PARTS OF DIGESTIVE TRACT: Chronic | ICD-10-CM

## 2025-01-22 DIAGNOSIS — Z95.5 PRESENCE OF CORONARY ANGIOPLASTY IMPLANT AND GRAFT: Chronic | ICD-10-CM

## 2025-01-22 DIAGNOSIS — R10.9 UNSPECIFIED ABDOMINAL PAIN: ICD-10-CM

## 2025-01-22 DIAGNOSIS — I10 ESSENTIAL (PRIMARY) HYPERTENSION: ICD-10-CM

## 2025-01-22 DIAGNOSIS — Z93.1 GASTROSTOMY STATUS: Chronic | ICD-10-CM

## 2025-01-22 LAB
ALBUMIN SERPL ELPH-MCNC: 3.5 G/DL — SIGNIFICANT CHANGE UP (ref 3.3–5)
ALP SERPL-CCNC: 141 U/L — HIGH (ref 40–120)
ALT FLD-CCNC: 24 U/L — SIGNIFICANT CHANGE UP (ref 12–78)
ANION GAP SERPL CALC-SCNC: 5 MMOL/L — SIGNIFICANT CHANGE UP (ref 5–17)
APTT BLD: 27.6 SEC — SIGNIFICANT CHANGE UP (ref 24.5–35.6)
AST SERPL-CCNC: 37 U/L — SIGNIFICANT CHANGE UP (ref 15–37)
BASOPHILS # BLD AUTO: 0.02 K/UL — SIGNIFICANT CHANGE UP (ref 0–0.2)
BASOPHILS NFR BLD AUTO: 0.3 % — SIGNIFICANT CHANGE UP (ref 0–2)
BILIRUB SERPL-MCNC: 0.5 MG/DL — SIGNIFICANT CHANGE UP (ref 0.2–1.2)
BUN SERPL-MCNC: 20 MG/DL — SIGNIFICANT CHANGE UP (ref 7–23)
CALCIUM SERPL-MCNC: 9.7 MG/DL — SIGNIFICANT CHANGE UP (ref 8.5–10.1)
CHLORIDE SERPL-SCNC: 101 MMOL/L — SIGNIFICANT CHANGE UP (ref 96–108)
CO2 SERPL-SCNC: 28 MMOL/L — SIGNIFICANT CHANGE UP (ref 22–31)
CREAT SERPL-MCNC: 0.68 MG/DL — SIGNIFICANT CHANGE UP (ref 0.5–1.3)
EGFR: 98 ML/MIN/1.73M2 — SIGNIFICANT CHANGE UP
EOSINOPHIL # BLD AUTO: 0.34 K/UL — SIGNIFICANT CHANGE UP (ref 0–0.5)
EOSINOPHIL NFR BLD AUTO: 4.8 % — SIGNIFICANT CHANGE UP (ref 0–6)
GLUCOSE SERPL-MCNC: 179 MG/DL — HIGH (ref 70–99)
HCT VFR BLD CALC: 35.4 % — SIGNIFICANT CHANGE UP (ref 34.5–45)
HGB BLD-MCNC: 11.4 G/DL — LOW (ref 11.5–15.5)
IMM GRANULOCYTES NFR BLD AUTO: 0.3 % — SIGNIFICANT CHANGE UP (ref 0–0.9)
INR BLD: 0.96 RATIO — SIGNIFICANT CHANGE UP (ref 0.85–1.16)
LYMPHOCYTES # BLD AUTO: 2.06 K/UL — SIGNIFICANT CHANGE UP (ref 1–3.3)
LYMPHOCYTES # BLD AUTO: 29.3 % — SIGNIFICANT CHANGE UP (ref 13–44)
MCHC RBC-ENTMCNC: 29.2 PG — SIGNIFICANT CHANGE UP (ref 27–34)
MCHC RBC-ENTMCNC: 32.2 G/DL — SIGNIFICANT CHANGE UP (ref 32–36)
MCV RBC AUTO: 90.8 FL — SIGNIFICANT CHANGE UP (ref 80–100)
MONOCYTES # BLD AUTO: 0.61 K/UL — SIGNIFICANT CHANGE UP (ref 0–0.9)
MONOCYTES NFR BLD AUTO: 8.7 % — SIGNIFICANT CHANGE UP (ref 2–14)
NEUTROPHILS # BLD AUTO: 3.97 K/UL — SIGNIFICANT CHANGE UP (ref 1.8–7.4)
NEUTROPHILS NFR BLD AUTO: 56.6 % — SIGNIFICANT CHANGE UP (ref 43–77)
PLATELET # BLD AUTO: 241 K/UL — SIGNIFICANT CHANGE UP (ref 150–400)
POTASSIUM SERPL-MCNC: 4.9 MMOL/L — SIGNIFICANT CHANGE UP (ref 3.5–5.3)
POTASSIUM SERPL-SCNC: 4.9 MMOL/L — SIGNIFICANT CHANGE UP (ref 3.5–5.3)
PROT SERPL-MCNC: 8.4 GM/DL — HIGH (ref 6–8.3)
PROTHROM AB SERPL-ACNC: 11 SEC — SIGNIFICANT CHANGE UP (ref 9.9–13.4)
RBC # BLD: 3.9 M/UL — SIGNIFICANT CHANGE UP (ref 3.8–5.2)
RBC # FLD: 18 % — HIGH (ref 10.3–14.5)
SODIUM SERPL-SCNC: 134 MMOL/L — LOW (ref 135–145)
WBC # BLD: 7.02 K/UL — SIGNIFICANT CHANGE UP (ref 3.8–10.5)
WBC # FLD AUTO: 7.02 K/UL — SIGNIFICANT CHANGE UP (ref 3.8–10.5)

## 2025-01-22 PROCEDURE — 80053 COMPREHEN METABOLIC PANEL: CPT

## 2025-01-22 PROCEDURE — 85610 PROTHROMBIN TIME: CPT

## 2025-01-22 PROCEDURE — 99285 EMERGENCY DEPT VISIT HI MDM: CPT

## 2025-01-22 PROCEDURE — 85730 THROMBOPLASTIN TIME PARTIAL: CPT

## 2025-01-22 PROCEDURE — 36000 PLACE NEEDLE IN VEIN: CPT | Mod: XU

## 2025-01-22 PROCEDURE — 85025 COMPLETE CBC W/AUTO DIFF WBC: CPT

## 2025-01-22 PROCEDURE — 74177 CT ABD & PELVIS W/CONTRAST: CPT | Mod: 26

## 2025-01-22 PROCEDURE — 99284 EMERGENCY DEPT VISIT MOD MDM: CPT | Mod: 25

## 2025-01-22 PROCEDURE — 99214 OFFICE O/P EST MOD 30 MIN: CPT

## 2025-01-22 PROCEDURE — 74177 CT ABD & PELVIS W/CONTRAST: CPT | Mod: MC

## 2025-01-22 PROCEDURE — 36415 COLL VENOUS BLD VENIPUNCTURE: CPT

## 2025-01-22 RX ORDER — SODIUM CHLORIDE 9 MG/ML
1000 INJECTION, SOLUTION INTRAMUSCULAR; INTRAVENOUS; SUBCUTANEOUS ONCE
Refills: 0 | Status: COMPLETED | OUTPATIENT
Start: 2025-01-22 | End: 2025-01-22

## 2025-01-22 RX ADMIN — SODIUM CHLORIDE 1000 MILLILITER(S): 9 INJECTION, SOLUTION INTRAMUSCULAR; INTRAVENOUS; SUBCUTANEOUS at 13:11

## 2025-01-22 NOTE — ED STATDOCS - PHYSICAL EXAMINATION
Physical Exam:  Gen: NAD, non-toxic appearing, able to ambulate without assistance  Head: NCAT  HEENT: EOMI, PEERLA, normal conjunctiva, tongue midline, oral mucosa moist  Lung: CTAB, no respiratory distress, no wheezes/rhonchi/rales B/L, speaking in full sentences  CV: RRR, no murmurs, rubs or gallops, distal pulses 2+ b/l  Abd: feeding tube placed to LUQ, no erythema, no drainage, diffuse abd tenderness   MSK: no visible deformities, ROM normal in UE/LE  Skin: Warm, well perfused, no rash  Psych: normal affect, calm

## 2025-01-22 NOTE — ED STATDOCS - ATTENDING APP SHARED VISIT CONTRIBUTION OF CARE
I,Andrew Hernandez MD,  performed the initial face to face bedside interview with this patient regarding history of present illness, review of symptoms and relevant past medical, social and family history.  I completed an independent physical examination.  I was the initial provider who evaluated this patient. I have signed out the follow up of any pending tests (i.e. labs, radiological studies) to the ACP.  I have communicated the patient’s plan of care and disposition with the ACP.  The history, relevant review of systems, past medical and surgical history, medical decision making, and physical examination was documented by the scribe in my presence and I attest to the accuracy of the documentation.

## 2025-01-22 NOTE — ED ADULT TRIAGE NOTE - CHIEF COMPLAINT QUOTE
Pt sent to the ED to see Dr. Suh. Pt has a feeding tube and has been having pain. Pt was seen on Friday by Dr. Suh. Pt was at another MD appointment today and they called Dr. Suh and were instructed to come to the ED.

## 2025-01-22 NOTE — ED ADULT NURSE NOTE - NSFALLUNIVINTERV_ED_ALL_ED
Bed/Stretcher in lowest position, wheels locked, appropriate side rails in place/Call bell, personal items and telephone in reach/Instruct patient to call for assistance before getting out of bed/chair/stretcher/Non-slip footwear applied when patient is off stretcher/[place] to call system/Physically safe environment - no spills, clutter or unnecessary equipment/Purposeful proactive rounding/Room/bathroom lighting operational, light cord in reach

## 2025-01-22 NOTE — ED STATDOCS - CLINICAL SUMMARY MEDICAL DECISION MAKING FREE TEXT BOX
63 y/o F presents to the ED with feeding tube in place , has been having abd pain for awhile but pain worsened over past week. Pt was told to come to ED for workup, CT, and see Dr Suh. Check labs, CT, and consult with Dr Suh.

## 2025-01-22 NOTE — ED ADULT NURSE NOTE - OBJECTIVE STATEMENT
Patient is a 64y old female, alert and oriented X3, presenting to the ER with c/o see md note chief complaint quote. As per the daughter, "she has been complaining of abdominal pain around the feeding tube since Friday. The feeding tube was placed in November and revised in December. On Friday she saw her GI doctor where he pulled on the tube and since the pain has been worse. Went to her primary care doctor today who called over to the GI doctor and advised to come to the ER to have an ultrasound."  Patient denies CP, SOB, fevers, nausea, vomiting, diarrhea, burning on urination, dysuria, hematuria.   Takes medications crushed through the feeding tube.   20GA IV placed in the right AC, blood work collected and sent to the lab.

## 2025-01-22 NOTE — ED STATDOCS - OBJECTIVE STATEMENT
63 y/o F with PMHx of pancreatitis, gastroparesis, rectal bleeding, GERD, FAP, DM type II,, HTN, HLD, CAD s/p stents, partial colectomy presents to the ED to see Dr. Suh c/o feeding tube pain and abd pain for awhile but worse over the past week. Pt had a feeding tube placed in November 2024 for gastroparesis. Pt was seen by Dr. Suh 5 days ago. Pt was at another MD appointment Dr Prasad  today and they called Dr. Suh and were instructed to come to the ED.

## 2025-01-22 NOTE — ED STATDOCS - PROGRESS NOTE DETAILS
Labs and imaging reviewed.  No leukocytosis.  Sodium 134, glucose 179, no other actionable findings on labs.  CT abdomen pelvis demonstrates cutaneous G-tube in correct position, no other acute findings.  Discussed results with Dr. Suh, states patient can be discharged and follow-up in office if needed.  Discussed with patient.  She agrees to this plan.  Strict return precautions were discussed.  All questions and concerns were addressed. - Char Khan PA-C

## 2025-01-22 NOTE — ED STATDOCS - NSFOLLOWUPINSTRUCTIONS_ED_ALL_ED_FT
Please follow-up with your doctor(s) within the next 3 days, but see medical sooner if your symptoms persist or worsen.  Please call tomorrow for an appointment.    You were given a copy of your labs and/or imaging.  Please go-over these with your doctor(s).     If you have any worsening of symptoms or any other concerns please see your doctor or return to the ED immediately.    Please continue taking your home medications as directed    ANY PENDING RESULTS: You can access the [employer] Patient Portal offered by [facility] by registering at the following website: [email or web address]. By joining [employer]’s FollowMyHealth portal, you will also be able to view your health information using other applications (apps) compatible with our system.

## 2025-01-22 NOTE — CONSULT NOTE ADULT - SUBJECTIVE AND OBJECTIVE BOX
HPI:  Abdominal discomfort    PAST MEDICAL & SURGICAL HISTORY:  CAD (coronary artery disease)      DM (diabetes mellitus)      HTN (hypertension)      Asthma      Benign essential HTN      HLD (hyperlipidemia)      Type 2 diabetes mellitus      CAD (coronary artery disease)      Stented coronary artery      FAP (familial adenomatous polyposis)      Insomnia      GERD (gastroesophageal reflux disease)      H/O lichen planus      History of rectal bleeding      Gastroparesis      Pancreatitis      History of colon resection      Stented coronary artery      Gastrojejunal (GJ) tube in place      History of biliary stent insertion          MEDICATIONS  (STANDING):    MEDICATIONS  (PRN):      Allergies    No Known Allergies    Intolerances        SOCIAL HISTORY:    FAMILY HISTORY:  FH: heart disease (Mother, Sibling)     Non-contributory    REVIEW OF SYSTEMS      General:	    Respiratory and Thorax:  	  Cardiovascular:	    Gastrointestinal:	    Musculoskeletal:	   Vital Signs Last 24 Hrs  T(C): 36.7 (22 Jan 2025 15:19), Max: 36.7 (22 Jan 2025 15:19)  T(F): 98 (22 Jan 2025 15:19), Max: 98 (22 Jan 2025 15:19)  HR: 88 (22 Jan 2025 15:19) (86 - 88)  BP: 126/73 (22 Jan 2025 15:19) (125/82 - 126/73)  BP(mean): 93 (22 Jan 2025 15:19) (93 - 96)  RR: 20 (22 Jan 2025 15:19) (18 - 20)  SpO2: 100% (22 Jan 2025 15:19) (98% - 100%)    Parameters below as of 22 Jan 2025 15:19  Patient On (Oxygen Delivery Method): room air        HEENT :No Pallor.No icterus. EOMI,PERLAA  Chest : Clear to Auscultation  CVS : S1S2 Normal.No murmurs.  Abdomen: Soft.Non tender .Normal bowel sounds.No Organomegaly.  CNS: Alert.Oriented to Time,Place and Person.No focal deficit.  EXT: Normal Range of motion.No pitting edema.    LABS:                        11.4   7.02  )-----------( 241      ( 22 Jan 2025 12:53 )             35.4     01-22    134[L]  |  101  |  20  ----------------------------<  179[H]  4.9   |  28  |  0.68    Ca    9.7      22 Jan 2025 12:53    TPro  8.4[H]  /  Alb  3.5  /  TBili  0.5  /  DBili  x   /  AST  37  /  ALT  24  /  AlkPhos  141[H]  01-22    PT/INR - ( 22 Jan 2025 12:53 )   PT: 11.0 sec;   INR: 0.96 ratio         PTT - ( 22 Jan 2025 12:53 )  PTT:27.6 sec  LIVER FUNCTIONS - ( 22 Jan 2025 12:53 )  Alb: 3.5 g/dL / Pro: 8.4 gm/dL / ALK PHOS: 141 U/L / ALT: 24 U/L / AST: 37 U/L / GGT: x             RADIOLOGY & ADDITIONAL STUDIES: GJ tube in place

## 2025-01-22 NOTE — ED STATDOCS - PATIENT PORTAL LINK FT
You can access the FollowMyHealth Patient Portal offered by [facility] by registering at the following website: [email or web address]. By joining [employer]’s FollowMyHealth portal, you will also be able to view your health information using other applications (apps) compatible with our system.

## 2025-01-28 ENCOUNTER — APPOINTMENT (OUTPATIENT)
Dept: DERMATOLOGY | Facility: CLINIC | Age: 65
End: 2025-01-28

## 2025-01-28 DIAGNOSIS — L30.9 DERMATITIS, UNSPECIFIED: ICD-10-CM

## 2025-01-28 PROCEDURE — 11104 PUNCH BX SKIN SINGLE LESION: CPT

## 2025-01-29 DIAGNOSIS — Z12.83 ENCOUNTER FOR SCREENING FOR MALIGNANT NEOPLASM OF SKIN: ICD-10-CM

## 2025-01-30 ENCOUNTER — APPOINTMENT (OUTPATIENT)
Dept: ENDOCRINOLOGY | Facility: CLINIC | Age: 65
End: 2025-01-30
Payer: MEDICAID

## 2025-01-30 DIAGNOSIS — E11.9 TYPE 2 DIABETES MELLITUS W/OUT COMPLICATIONS: ICD-10-CM

## 2025-01-30 PROCEDURE — G0108 DIAB MANAGE TRN  PER INDIV: CPT

## 2025-02-03 LAB — DERMATOLOGY BIOPSY: NORMAL

## 2025-02-04 RX ORDER — GLIPIZIDE 5 MG/1
5 TABLET ORAL DAILY
Qty: 30 | Refills: 2 | Status: ACTIVE | COMMUNITY
Start: 2025-02-03 | End: 1900-01-01

## 2025-02-06 ENCOUNTER — APPOINTMENT (OUTPATIENT)
Dept: ENDOCRINOLOGY | Facility: CLINIC | Age: 65
End: 2025-02-06

## 2025-02-07 ENCOUNTER — NON-APPOINTMENT (OUTPATIENT)
Age: 65
End: 2025-02-07

## 2025-02-11 ENCOUNTER — APPOINTMENT (OUTPATIENT)
Dept: DERMATOLOGY | Facility: CLINIC | Age: 65
End: 2025-02-11

## 2025-02-14 NOTE — DIETITIAN INITIAL EVALUATION ADULT - PERTINENT LABORATORY DATA
Orders:  •  nitrofurantoin (MACROBID) 100 mg capsule; Take 1 capsule (100 mg total) by mouth 2 (two) times a day for 5 days  •  POCT urine dip  •  Urine culture; Future  •  Urinalysis with microscopic; Future   12-17    137  |  101  |  25[H]  ----------------------------<  91  3.7   |  19[L]  |  0.67    Ca    9.5      17 Dec 2024 07:27    TPro  7.8  /  Alb  3.8  /  TBili  0.5  /  DBili  x   /  AST  33  /  ALT  29  /  AlkPhos  110  12-16  POCT Blood Glucose.: 102 mg/dL (12-17-24 @ 11:26)  A1C with Estimated Average Glucose Result: 6.6 % (10-26-24 @ 06:47)  A1C with Estimated Average Glucose Result: 8.0 % (06-26-24 @ 07:33)  A1C with Estimated Average Glucose Result: 6.9 % (04-01-24 @ 06:46)

## 2025-02-25 ENCOUNTER — RX RENEWAL (OUTPATIENT)
Age: 65
End: 2025-02-25

## 2025-03-03 ENCOUNTER — APPOINTMENT (OUTPATIENT)
Dept: ENDOCRINOLOGY | Facility: CLINIC | Age: 65
End: 2025-03-03
Payer: MEDICAID

## 2025-03-03 VITALS
SYSTOLIC BLOOD PRESSURE: 120 MMHG | WEIGHT: 126 LBS | OXYGEN SATURATION: 98 % | BODY MASS INDEX: 25.4 KG/M2 | HEIGHT: 59 IN | HEART RATE: 72 BPM | DIASTOLIC BLOOD PRESSURE: 80 MMHG

## 2025-03-03 DIAGNOSIS — E78.5 HYPERLIPIDEMIA, UNSPECIFIED: ICD-10-CM

## 2025-03-03 PROCEDURE — 99214 OFFICE O/P EST MOD 30 MIN: CPT

## 2025-03-03 RX ORDER — INSULIN GLARGINE 100 [IU]/ML
100 INJECTION, SOLUTION SUBCUTANEOUS
Qty: 1 | Refills: 2 | Status: ACTIVE | COMMUNITY
Start: 2025-03-03 | End: 1900-01-01

## 2025-03-04 ENCOUNTER — APPOINTMENT (OUTPATIENT)
Dept: ENDOCRINOLOGY | Facility: CLINIC | Age: 65
End: 2025-03-04
Payer: MEDICAID

## 2025-03-04 DIAGNOSIS — E11.9 TYPE 2 DIABETES MELLITUS W/OUT COMPLICATIONS: ICD-10-CM

## 2025-03-04 PROCEDURE — G0108 DIAB MANAGE TRN  PER INDIV: CPT

## 2025-03-05 LAB
CREAT SPEC-SCNC: 43 MG/DL
MICROALBUMIN 24H UR DL<=1MG/L-MCNC: <1.2 MG/DL
MICROALBUMIN/CREAT 24H UR-RTO: NORMAL MG/G

## 2025-03-18 ENCOUNTER — APPOINTMENT (OUTPATIENT)
Dept: INTERNAL MEDICINE | Facility: CLINIC | Age: 65
End: 2025-03-18
Payer: MEDICAID

## 2025-03-18 VITALS
HEART RATE: 75 BPM | OXYGEN SATURATION: 95 % | SYSTOLIC BLOOD PRESSURE: 116 MMHG | HEIGHT: 59 IN | DIASTOLIC BLOOD PRESSURE: 82 MMHG | BODY MASS INDEX: 25.6 KG/M2 | WEIGHT: 127 LBS | TEMPERATURE: 97.5 F

## 2025-03-18 DIAGNOSIS — I10 ESSENTIAL (PRIMARY) HYPERTENSION: ICD-10-CM

## 2025-03-18 DIAGNOSIS — Z93.4 OTHER ARTIFICIAL OPENINGS OF GASTROINTESTINAL TRACT STATUS: ICD-10-CM

## 2025-03-18 DIAGNOSIS — I48.0 PAROXYSMAL ATRIAL FIBRILLATION: ICD-10-CM

## 2025-03-18 DIAGNOSIS — K31.84 GASTROPARESIS: ICD-10-CM

## 2025-03-18 DIAGNOSIS — T85.848A PAIN DUE TO OTHER INTERNAL PROSTHETIC DEVICES, IMPLANTS AND GRAFTS, INITIAL ENCOUNTER: ICD-10-CM

## 2025-03-18 DIAGNOSIS — F32.A DEPRESSION, UNSPECIFIED: ICD-10-CM

## 2025-03-18 DIAGNOSIS — I25.10 ATHEROSCLEROTIC HEART DISEASE OF NATIVE CORONARY ARTERY W/OUT ANGINA PECTORIS: ICD-10-CM

## 2025-03-18 PROCEDURE — G2211 COMPLEX E/M VISIT ADD ON: CPT | Mod: NC

## 2025-03-18 PROCEDURE — 99214 OFFICE O/P EST MOD 30 MIN: CPT

## 2025-03-18 RX ORDER — BUTALBITAL, ACETAMINOPHEN, AND CAFFEINE 50; 300; 40 MG/1; MG/1; MG/1
50-300-40 CAPSULE ORAL
Refills: 0 | Status: ACTIVE | COMMUNITY

## 2025-03-18 RX ORDER — GABAPENTIN 100 MG/1
100 CAPSULE ORAL
Qty: 30 | Refills: 3 | Status: ACTIVE | COMMUNITY
Start: 2025-03-18 | End: 1900-01-01

## 2025-03-25 ENCOUNTER — APPOINTMENT (OUTPATIENT)
Dept: DERMATOLOGY | Facility: CLINIC | Age: 65
End: 2025-03-25

## 2025-03-25 DIAGNOSIS — L30.9 DERMATITIS, UNSPECIFIED: ICD-10-CM

## 2025-03-25 DIAGNOSIS — L65.9 NONSCARRING HAIR LOSS, UNSPECIFIED: ICD-10-CM

## 2025-03-25 PROCEDURE — 99214 OFFICE O/P EST MOD 30 MIN: CPT

## 2025-03-26 LAB
24R-OH-CALCIDIOL SERPL-MCNC: 43.6 PG/ML
IRON SATN MFR SERPL: 7 %
IRON SERPL-MCNC: 26 UG/DL
T4 SERPL-MCNC: 5.2 UG/DL
TIBC SERPL-MCNC: 357 UG/DL
TSH SERPL-ACNC: 2.49 UIU/ML
UIBC SERPL-MCNC: 331 UG/DL

## 2025-03-27 ENCOUNTER — NON-APPOINTMENT (OUTPATIENT)
Age: 65
End: 2025-03-27

## 2025-03-28 ENCOUNTER — APPOINTMENT (OUTPATIENT)
Dept: INTERNAL MEDICINE | Facility: CLINIC | Age: 65
End: 2025-03-28
Payer: MEDICAID

## 2025-03-28 VITALS
WEIGHT: 126 LBS | HEIGHT: 59 IN | DIASTOLIC BLOOD PRESSURE: 64 MMHG | TEMPERATURE: 97.4 F | SYSTOLIC BLOOD PRESSURE: 108 MMHG | HEART RATE: 72 BPM | OXYGEN SATURATION: 97 % | BODY MASS INDEX: 25.4 KG/M2

## 2025-03-28 DIAGNOSIS — Z20.828 CONTACT WITH AND (SUSPECTED) EXPOSURE TO OTHER VIRAL COMMUNICABLE DISEASES: ICD-10-CM

## 2025-03-28 DIAGNOSIS — E11.9 TYPE 2 DIABETES MELLITUS W/OUT COMPLICATIONS: ICD-10-CM

## 2025-03-28 DIAGNOSIS — R68.89 OTHER GENERAL SYMPTOMS AND SIGNS: ICD-10-CM

## 2025-03-28 DIAGNOSIS — E61.1 IRON DEFICIENCY: ICD-10-CM

## 2025-03-28 PROCEDURE — 99214 OFFICE O/P EST MOD 30 MIN: CPT

## 2025-04-01 LAB
ALBUMIN SERPL ELPH-MCNC: 4.2 G/DL
ALP BLD-CCNC: 131 U/L
ALT SERPL-CCNC: 25 U/L
ANION GAP SERPL CALC-SCNC: 10 MMOL/L
AST SERPL-CCNC: 28 U/L
BASOPHILS # BLD AUTO: 0.03 K/UL
BASOPHILS NFR BLD AUTO: 0.4 %
BILIRUB SERPL-MCNC: 0.2 MG/DL
BUN SERPL-MCNC: 17 MG/DL
CALCIUM SERPL-MCNC: 9 MG/DL
CHLORIDE SERPL-SCNC: 103 MMOL/L
CO2 SERPL-SCNC: 26 MMOL/L
CREAT SERPL-MCNC: 0.71 MG/DL
EGFRCR SERPLBLD CKD-EPI 2021: 96 ML/MIN/1.73M2
EOSINOPHIL # BLD AUTO: 0.27 K/UL
EOSINOPHIL NFR BLD AUTO: 4 %
ESTIMATED AVERAGE GLUCOSE: 163 MG/DL
FERRITIN SERPL-MCNC: 11 NG/ML
GLUCOSE SERPL-MCNC: 163 MG/DL
HBA1C MFR BLD HPLC: 7.3 %
HCT VFR BLD CALC: 36.6 %
HGB BLD-MCNC: 11 G/DL
IMM GRANULOCYTES NFR BLD AUTO: 0.1 %
LYMPHOCYTES # BLD AUTO: 2.19 K/UL
LYMPHOCYTES NFR BLD AUTO: 32.5 %
MAN DIFF?: NORMAL
MCHC RBC-ENTMCNC: 27.5 PG
MCHC RBC-ENTMCNC: 30.1 G/DL
MCV RBC AUTO: 91.5 FL
MONOCYTES # BLD AUTO: 0.65 K/UL
MONOCYTES NFR BLD AUTO: 9.6 %
NEUTROPHILS # BLD AUTO: 3.59 K/UL
NEUTROPHILS NFR BLD AUTO: 53.4 %
PLATELET # BLD AUTO: 234 K/UL
POTASSIUM SERPL-SCNC: 4.4 MMOL/L
PROT SERPL-MCNC: 7.6 G/DL
RBC # BLD: 4 M/UL
RBC # FLD: 14.6 %
SODIUM SERPL-SCNC: 139 MMOL/L
WBC # FLD AUTO: 6.74 K/UL

## 2025-04-02 DIAGNOSIS — D50.9 IRON DEFICIENCY ANEMIA, UNSPECIFIED: ICD-10-CM

## 2025-04-02 DIAGNOSIS — R74.8 ABNORMAL LEVELS OF OTHER SERUM ENZYMES: ICD-10-CM

## 2025-04-04 ENCOUNTER — APPOINTMENT (OUTPATIENT)
Dept: ENDOCRINOLOGY | Facility: CLINIC | Age: 65
End: 2025-04-04

## 2025-04-11 LAB
ALP BLD-CCNC: 125 IU/L
ALP BONE CFR SERPL: 34 %
ALP INTEST CFR SERPL: 1 %
ALP LIVER CFR SERPL: 65 %

## 2025-04-14 ENCOUNTER — NON-APPOINTMENT (OUTPATIENT)
Age: 65
End: 2025-04-14

## 2025-04-15 ENCOUNTER — APPOINTMENT (OUTPATIENT)
Dept: ENDOCRINOLOGY | Facility: CLINIC | Age: 65
End: 2025-04-15

## 2025-04-15 ENCOUNTER — NON-APPOINTMENT (OUTPATIENT)
Age: 65
End: 2025-04-15

## 2025-04-25 ENCOUNTER — APPOINTMENT (OUTPATIENT)
Dept: CARDIOLOGY | Facility: CLINIC | Age: 65
End: 2025-04-25
Payer: MEDICAID

## 2025-04-25 ENCOUNTER — NON-APPOINTMENT (OUTPATIENT)
Age: 65
End: 2025-04-25

## 2025-04-25 VITALS
WEIGHT: 129 LBS | HEIGHT: 59 IN | DIASTOLIC BLOOD PRESSURE: 68 MMHG | BODY MASS INDEX: 26 KG/M2 | HEART RATE: 91 BPM | SYSTOLIC BLOOD PRESSURE: 110 MMHG | OXYGEN SATURATION: 98 %

## 2025-04-25 DIAGNOSIS — I10 ESSENTIAL (PRIMARY) HYPERTENSION: ICD-10-CM

## 2025-04-25 DIAGNOSIS — E11.9 TYPE 2 DIABETES MELLITUS W/OUT COMPLICATIONS: ICD-10-CM

## 2025-04-25 PROCEDURE — 93000 ELECTROCARDIOGRAM COMPLETE: CPT

## 2025-04-25 PROCEDURE — 99214 OFFICE O/P EST MOD 30 MIN: CPT

## 2025-04-25 PROCEDURE — G2211 COMPLEX E/M VISIT ADD ON: CPT | Mod: NC

## 2025-05-03 NOTE — DIETITIAN INITIAL EVALUATION ADULT - OTHER INFO
Yes 63 y/o F with a PMHx of CAD sp stents, DM, asthma, HLD, FAP sp colectomy with ileorectal anastomosis, sp GJ tube for gastroparesis who presented with BRBPR. CTA revealed active bleeding proximal to ileorectal anastomosis. Urgently to IR, angio revealed bleeding at prior ileorectal anastomosis, underwent ileocolic artery embolization (12/19). Admitted for acute blood loss anemia; admitted to CCU.    Pt well known to RD from previous admission; was on TPN/ PPN and TF. Pt consuming CLD at time of visits. Reports okay appetite/ intake and takes liquids for pleasure. Endorses weight loss over the last few months 2/2 prolonged hospitalization and previous Ozempic use. UBW was 133# in August 2024 and she weighed 107# when she was home this week. RD obtained bedscale wt on 12/20 - 115#; 1+ edema may be skewing weight. Weight hx reviewed: 121.6# (taken by RD on 10/26/24; met criteria for moderate malnutrition); weight loss of 18# (13.5%) x 4 mon per pt report - clinsig and severe. NFPE reveals mild muscle/ fat wasting, pt continues to meet criteria for PCM at this time. Discussed in IDR this morning, plan to initiate TF + CLD today. Please see additional recommendations below.

## 2025-05-14 ENCOUNTER — OUTPATIENT (OUTPATIENT)
Dept: OUTPATIENT SERVICES | Facility: HOSPITAL | Age: 65
LOS: 1 days | Discharge: HOME | End: 2025-05-14

## 2025-05-14 DIAGNOSIS — Z93.1 GASTROSTOMY STATUS: Chronic | ICD-10-CM

## 2025-05-14 DIAGNOSIS — Z95.5 PRESENCE OF CORONARY ANGIOPLASTY IMPLANT AND GRAFT: Chronic | ICD-10-CM

## 2025-05-14 DIAGNOSIS — Z90.49 ACQUIRED ABSENCE OF OTHER SPECIFIED PARTS OF DIGESTIVE TRACT: Chronic | ICD-10-CM

## 2025-05-14 DIAGNOSIS — Z98.890 OTHER SPECIFIED POSTPROCEDURAL STATES: Chronic | ICD-10-CM

## 2025-05-14 DIAGNOSIS — D64.9 ANEMIA, UNSPECIFIED: ICD-10-CM

## 2025-05-15 DIAGNOSIS — M25.561 PAIN IN RIGHT KNEE: ICD-10-CM

## 2025-05-15 DIAGNOSIS — R10.32 LEFT LOWER QUADRANT PAIN: ICD-10-CM

## 2025-05-15 DIAGNOSIS — Z12.39 ENCOUNTER FOR OTHER SCREENING FOR MALIGNANT NEOPLASM OF BREAST: ICD-10-CM

## 2025-05-15 DIAGNOSIS — Z11.59 ENCOUNTER FOR SCREENING FOR OTHER VIRAL DISEASES: ICD-10-CM

## 2025-05-15 DIAGNOSIS — M25.562 PAIN IN RIGHT KNEE: ICD-10-CM

## 2025-05-15 DIAGNOSIS — R10.30 LOWER ABDOMINAL PAIN, UNSPECIFIED: ICD-10-CM

## 2025-05-15 DIAGNOSIS — Z09 ENCOUNTER FOR FOLLOW-UP EXAMINATION AFTER COMPLETED TREATMENT FOR CONDITIONS OTHER THAN MALIGNANT NEOPLASM: ICD-10-CM

## 2025-05-15 DIAGNOSIS — M76.51 PATELLAR TENDINITIS, RIGHT KNEE: ICD-10-CM

## 2025-05-15 DIAGNOSIS — Z87.19 PERSONAL HISTORY OF OTHER DISEASES OF THE DIGESTIVE SYSTEM: ICD-10-CM

## 2025-05-15 DIAGNOSIS — M25.571 PAIN IN RIGHT ANKLE AND JOINTS OF RIGHT FOOT: ICD-10-CM

## 2025-05-15 DIAGNOSIS — M54.6 PAIN IN THORACIC SPINE: ICD-10-CM

## 2025-05-15 DIAGNOSIS — R68.89 OTHER GENERAL SYMPTOMS AND SIGNS: ICD-10-CM

## 2025-05-15 DIAGNOSIS — S86.309A UNSPECIFIED INJURY OF MUSCLE(S) AND TENDON(S) OF PERONEAL MUSCLE GROUP AT LOWER LEG LEVEL, UNSPECIFIED LEG, INITIAL ENCOUNTER: ICD-10-CM

## 2025-05-15 DIAGNOSIS — M79.672 PAIN IN LEFT FOOT: ICD-10-CM

## 2025-05-15 DIAGNOSIS — M79.643 PAIN IN UNSPECIFIED HAND: ICD-10-CM

## 2025-05-15 DIAGNOSIS — Z20.828 CONTACT WITH AND (SUSPECTED) EXPOSURE TO OTHER VIRAL COMMUNICABLE DISEASES: ICD-10-CM

## 2025-05-15 DIAGNOSIS — Z86.79 PERSONAL HISTORY OF OTHER DISEASES OF THE CIRCULATORY SYSTEM: ICD-10-CM

## 2025-05-15 DIAGNOSIS — Z87.09 PERSONAL HISTORY OF OTHER DISEASES OF THE RESPIRATORY SYSTEM: ICD-10-CM

## 2025-05-15 DIAGNOSIS — Z78.0 ASYMPTOMATIC MENOPAUSAL STATE: ICD-10-CM

## 2025-05-15 DIAGNOSIS — M76.52 PATELLAR TENDINITIS, RIGHT KNEE: ICD-10-CM

## 2025-05-15 PROBLEM — L65.9 HAIR LOSS: Status: RESOLVED | Noted: 2022-10-20 | Resolved: 2025-05-15

## 2025-05-21 ENCOUNTER — RESULT REVIEW (OUTPATIENT)
Age: 65
End: 2025-05-21

## 2025-05-21 ENCOUNTER — APPOINTMENT (OUTPATIENT)
Dept: HEMATOLOGY ONCOLOGY | Facility: CLINIC | Age: 65
End: 2025-05-21
Payer: MEDICAID

## 2025-05-21 VITALS
HEART RATE: 99 BPM | HEIGHT: 59 IN | OXYGEN SATURATION: 98 % | TEMPERATURE: 98.1 F | SYSTOLIC BLOOD PRESSURE: 119 MMHG | DIASTOLIC BLOOD PRESSURE: 66 MMHG | BODY MASS INDEX: 26.61 KG/M2 | WEIGHT: 132 LBS

## 2025-05-21 DIAGNOSIS — K31.84 GASTROPARESIS: ICD-10-CM

## 2025-05-21 DIAGNOSIS — Z92.89 PERSONAL HISTORY OF OTHER MEDICAL TREATMENT: ICD-10-CM

## 2025-05-21 DIAGNOSIS — L65.9 NONSCARRING HAIR LOSS, UNSPECIFIED: ICD-10-CM

## 2025-05-21 DIAGNOSIS — R79.89 OTHER SPECIFIED ABNORMAL FINDINGS OF BLOOD CHEMISTRY: ICD-10-CM

## 2025-05-21 DIAGNOSIS — D64.9 ANEMIA, UNSPECIFIED: ICD-10-CM

## 2025-05-21 DIAGNOSIS — Z79.01 LONG TERM (CURRENT) USE OF ANTICOAGULANTS: ICD-10-CM

## 2025-05-21 DIAGNOSIS — Z98.0 INTESTINAL BYPASS AND ANASTOMOSIS STATUS: ICD-10-CM

## 2025-05-21 DIAGNOSIS — Z87.39 PERSONAL HISTORY OF OTHER DISEASES OF THE MUSCULOSKELETAL SYSTEM AND CONNECTIVE TISSUE: ICD-10-CM

## 2025-05-21 DIAGNOSIS — Z93.4 OTHER ARTIFICIAL OPENINGS OF GASTROINTESTINAL TRACT STATUS: ICD-10-CM

## 2025-05-21 DIAGNOSIS — Z11.3 ENCOUNTER FOR SCREENING FOR INFECTIONS WITH A PREDOMINANTLY SEXUAL MODE OF TRANSMISSION: ICD-10-CM

## 2025-05-21 DIAGNOSIS — Z78.9 OTHER SPECIFIED HEALTH STATUS: ICD-10-CM

## 2025-05-21 DIAGNOSIS — Z87.898 PERSONAL HISTORY OF OTHER SPECIFIED CONDITIONS: ICD-10-CM

## 2025-05-21 DIAGNOSIS — T85.848A PAIN DUE TO OTHER INTERNAL PROSTHETIC DEVICES, IMPLANTS AND GRAFTS, INITIAL ENCOUNTER: ICD-10-CM

## 2025-05-21 DIAGNOSIS — K83.1 ACUTE PANCREATITIS WITHOUT NECROSIS OR INFECTION, UNSPECIFIED: ICD-10-CM

## 2025-05-21 DIAGNOSIS — Z87.59 PERSONAL HISTORY OF OTHER COMPLICATIONS OF PREGNANCY, CHILDBIRTH AND THE PUERPERIUM: ICD-10-CM

## 2025-05-21 DIAGNOSIS — D50.9 IRON DEFICIENCY ANEMIA, UNSPECIFIED: ICD-10-CM

## 2025-05-21 DIAGNOSIS — K85.90 ACUTE PANCREATITIS WITHOUT NECROSIS OR INFECTION, UNSPECIFIED: ICD-10-CM

## 2025-05-21 DIAGNOSIS — Z87.19 PERSONAL HISTORY OF OTHER DISEASES OF THE DIGESTIVE SYSTEM: ICD-10-CM

## 2025-05-21 DIAGNOSIS — Z95.5 PRESENCE OF CORONARY ANGIOPLASTY IMPLANT AND GRAFT: ICD-10-CM

## 2025-05-21 DIAGNOSIS — F50.89 OTHER SPECIFIED EATING DISORDER: ICD-10-CM

## 2025-05-21 DIAGNOSIS — Z98.890 OTHER SPECIFIED POSTPROCEDURAL STATES: ICD-10-CM

## 2025-05-21 DIAGNOSIS — E61.1 IRON DEFICIENCY: ICD-10-CM

## 2025-05-21 LAB
BASOPHILS # BLD AUTO: 0.04 K/UL — SIGNIFICANT CHANGE UP (ref 0–0.2)
BASOPHILS NFR BLD AUTO: 0.5 % — SIGNIFICANT CHANGE UP (ref 0–2)
EOSINOPHIL # BLD AUTO: 0.81 K/UL — HIGH (ref 0–0.5)
EOSINOPHIL NFR BLD AUTO: 11 % — HIGH (ref 0–6)
HCT VFR BLD CALC: 33.7 % — LOW (ref 34.5–45)
HGB BLD-MCNC: 10.5 G/DL — LOW (ref 11.5–15.5)
IMM GRANULOCYTES NFR BLD AUTO: 0.3 % — SIGNIFICANT CHANGE UP (ref 0–0.9)
LYMPHOCYTES # BLD AUTO: 2.33 K/UL — SIGNIFICANT CHANGE UP (ref 1–3.3)
LYMPHOCYTES # BLD AUTO: 31.6 % — SIGNIFICANT CHANGE UP (ref 13–44)
MCHC RBC-ENTMCNC: 26.7 PG — LOW (ref 27–34)
MCHC RBC-ENTMCNC: 31.2 G/DL — LOW (ref 32–36)
MCV RBC AUTO: 85.8 FL — SIGNIFICANT CHANGE UP (ref 80–100)
MONOCYTES # BLD AUTO: 0.56 K/UL — SIGNIFICANT CHANGE UP (ref 0–0.9)
MONOCYTES NFR BLD AUTO: 7.6 % — SIGNIFICANT CHANGE UP (ref 2–14)
NEUTROPHILS # BLD AUTO: 3.61 K/UL — SIGNIFICANT CHANGE UP (ref 1.8–7.4)
NEUTROPHILS NFR BLD AUTO: 49 % — SIGNIFICANT CHANGE UP (ref 43–77)
NRBC BLD AUTO-RTO: 0 /100 WBCS — SIGNIFICANT CHANGE UP (ref 0–0)
PLATELET # BLD AUTO: 253 K/UL — SIGNIFICANT CHANGE UP (ref 150–400)
RBC # BLD: 3.93 M/UL — SIGNIFICANT CHANGE UP (ref 3.8–5.2)
RBC # FLD: 17.1 % — HIGH (ref 10.3–14.5)
WBC # BLD: 7.37 K/UL — SIGNIFICANT CHANGE UP (ref 3.8–10.5)
WBC # FLD AUTO: 7.37 K/UL — SIGNIFICANT CHANGE UP (ref 3.8–10.5)

## 2025-05-21 PROCEDURE — 99203 OFFICE O/P NEW LOW 30 MIN: CPT

## 2025-05-22 LAB
CREAT SERPL-MCNC: 0.74 MG/DL
EGFRCR SERPLBLD CKD-EPI 2021: 91 ML/MIN/1.73M2
ERYTHROCYTE [SEDIMENTATION RATE] IN BLOOD BY WESTERGREN METHOD: 65 MM/HR
FERRITIN SERPL-MCNC: 13 NG/ML
FOLATE SERPL-MCNC: >20 NG/ML
HAPTOGLOB SERPL-MCNC: 153 MG/DL
IRON SATN MFR SERPL: 7 %
IRON SERPL-MCNC: 29 UG/DL
RBC # BLD: 3.99 M/UL
RETICS # AUTO: 2.8 %
RETICS AGGREG/RBC NFR: 113.3 K/UL
TIBC SERPL-MCNC: 398 UG/DL
UIBC SERPL-MCNC: 369 UG/DL
VIT B12 SERPL-MCNC: 527 PG/ML

## 2025-05-23 PROBLEM — Z87.59 HISTORY OF SPONTANEOUS ABORTION: Status: RESOLVED | Noted: 2025-05-23 | Resolved: 2025-05-23

## 2025-05-23 PROBLEM — Z79.01 CHRONIC ANTICOAGULATION: Status: ACTIVE | Noted: 2025-05-23

## 2025-05-23 PROBLEM — R79.89 ABNORMAL CBC: Status: RESOLVED | Noted: 2022-06-06 | Resolved: 2025-05-23

## 2025-05-23 PROBLEM — Z95.5 H/O HEART ARTERY STENT: Status: ACTIVE | Noted: 2025-05-23

## 2025-05-23 PROBLEM — Z78.9 NO HISTORY OF ALCOHOL USE: Status: ACTIVE | Noted: 2025-05-23

## 2025-05-23 PROBLEM — T85.848A PAIN AROUND PEG TUBE SITE: Status: RESOLVED | Noted: 2025-01-22 | Resolved: 2025-05-21

## 2025-05-23 PROBLEM — Z87.19 HISTORY OF LOWER GASTROINTESTINAL BLEEDING: Status: RESOLVED | Noted: 2025-05-23 | Resolved: 2025-05-23

## 2025-05-23 PROBLEM — K85.90 PANCREATITIS DUE TO BILIARY OBSTRUCTION: Status: RESOLVED | Noted: 2025-05-23 | Resolved: 2025-05-23

## 2025-05-23 PROBLEM — Z98.890 HISTORY OF BILIARY STENT INSERTION: Status: ACTIVE | Noted: 2025-05-23

## 2025-05-23 PROBLEM — F50.89 PICA: Status: ACTIVE | Noted: 2025-05-23

## 2025-05-23 PROBLEM — E61.1 LOW IRON: Status: RESOLVED | Noted: 2025-03-28 | Resolved: 2025-05-23

## 2025-05-23 PROBLEM — Z87.39 HISTORY OF LOW BACK PAIN: Status: RESOLVED | Noted: 2022-10-20 | Resolved: 2025-05-23

## 2025-05-23 PROBLEM — Z92.89 HISTORY OF BLOOD TRANSFUSION: Status: RESOLVED | Noted: 2025-05-23 | Resolved: 2025-05-23

## 2025-05-23 LAB
ALBUMIN MFR SERPL ELPH: 51.6 %
ALBUMIN SERPL-MCNC: 3.9 G/DL
ALBUMIN/GLOB SERPL: 1.1 RATIO
ALPHA1 GLOB MFR SERPL ELPH: 4.4 %
ALPHA1 GLOB SERPL ELPH-MCNC: 0.3 G/DL
ALPHA2 GLOB MFR SERPL ELPH: 10.2 %
ALPHA2 GLOB SERPL ELPH-MCNC: 0.8 G/DL
B-GLOBULIN MFR SERPL ELPH: 13.6 %
B-GLOBULIN SERPL ELPH-MCNC: 1 G/DL
DEPRECATED KAPPA LC FREE/LAMBDA SER: 1.48 RATIO
EPO SERPL-MCNC: 41.2 MIU/ML
GAMMA GLOB FLD ELPH-MCNC: 1.5 G/DL
GAMMA GLOB MFR SERPL ELPH: 20.2 %
IGA SERPL-MCNC: 359 MG/DL
IGG SERPL-MCNC: 1501 MG/DL
IGM SERPL-MCNC: 169 MG/DL
INTERPRETATION SERPL IEP-IMP: NORMAL
KAPPA LC CSF-MCNC: 2.52 MG/DL
KAPPA LC SERPL-MCNC: 3.72 MG/DL
M PROTEIN SPEC IFE-MCNC: NORMAL
PROT SERPL-MCNC: 7.5 G/DL
PROT SERPL-MCNC: 7.5 G/DL

## 2025-05-27 DIAGNOSIS — D50.9 IRON DEFICIENCY ANEMIA, UNSPECIFIED: ICD-10-CM

## 2025-06-03 ENCOUNTER — APPOINTMENT (OUTPATIENT)
Dept: ENDOCRINOLOGY | Facility: CLINIC | Age: 65
End: 2025-06-03
Payer: MEDICAID

## 2025-06-03 VITALS
SYSTOLIC BLOOD PRESSURE: 124 MMHG | HEART RATE: 90 BPM | BODY MASS INDEX: 25.6 KG/M2 | DIASTOLIC BLOOD PRESSURE: 84 MMHG | HEIGHT: 59 IN | OXYGEN SATURATION: 97 % | WEIGHT: 127 LBS

## 2025-06-03 DIAGNOSIS — E78.5 HYPERLIPIDEMIA, UNSPECIFIED: ICD-10-CM

## 2025-06-03 DIAGNOSIS — E11.9 TYPE 2 DIABETES MELLITUS W/OUT COMPLICATIONS: ICD-10-CM

## 2025-06-03 PROCEDURE — 99214 OFFICE O/P EST MOD 30 MIN: CPT

## 2025-06-03 PROCEDURE — G2211 COMPLEX E/M VISIT ADD ON: CPT | Mod: NC

## 2025-06-05 ENCOUNTER — APPOINTMENT (OUTPATIENT)
Dept: INFUSION THERAPY | Facility: CLINIC | Age: 65
End: 2025-06-05

## 2025-06-05 VITALS
SYSTOLIC BLOOD PRESSURE: 116 MMHG | WEIGHT: 129 LBS | TEMPERATURE: 98 F | BODY MASS INDEX: 26.05 KG/M2 | HEART RATE: 79 BPM | OXYGEN SATURATION: 96 % | DIASTOLIC BLOOD PRESSURE: 72 MMHG

## 2025-06-12 ENCOUNTER — APPOINTMENT (OUTPATIENT)
Dept: INFUSION THERAPY | Facility: CLINIC | Age: 65
End: 2025-06-12

## 2025-06-13 ENCOUNTER — APPOINTMENT (OUTPATIENT)
Dept: INFUSION THERAPY | Facility: CLINIC | Age: 65
End: 2025-06-13

## 2025-06-13 VITALS
SYSTOLIC BLOOD PRESSURE: 118 MMHG | TEMPERATURE: 97.2 F | BODY MASS INDEX: 26.05 KG/M2 | OXYGEN SATURATION: 97 % | HEART RATE: 72 BPM | WEIGHT: 129 LBS | DIASTOLIC BLOOD PRESSURE: 78 MMHG

## 2025-06-19 ENCOUNTER — APPOINTMENT (OUTPATIENT)
Dept: INFUSION THERAPY | Facility: CLINIC | Age: 65
End: 2025-06-19

## 2025-06-19 VITALS
BODY MASS INDEX: 26 KG/M2 | WEIGHT: 129 LBS | HEIGHT: 59 IN | SYSTOLIC BLOOD PRESSURE: 116 MMHG | TEMPERATURE: 97.7 F | HEART RATE: 83 BPM | OXYGEN SATURATION: 97 % | DIASTOLIC BLOOD PRESSURE: 75 MMHG

## 2025-06-20 ENCOUNTER — APPOINTMENT (OUTPATIENT)
Dept: DERMATOLOGY | Facility: CLINIC | Age: 65
End: 2025-06-20

## 2025-06-30 ENCOUNTER — APPOINTMENT (OUTPATIENT)
Dept: INFUSION THERAPY | Facility: CLINIC | Age: 65
End: 2025-06-30

## 2025-07-18 ENCOUNTER — APPOINTMENT (OUTPATIENT)
Dept: INTERNAL MEDICINE | Facility: CLINIC | Age: 65
End: 2025-07-18

## 2025-07-29 ENCOUNTER — APPOINTMENT (OUTPATIENT)
Dept: INTERNAL MEDICINE | Facility: CLINIC | Age: 65
End: 2025-07-29

## 2025-08-11 ENCOUNTER — APPOINTMENT (OUTPATIENT)
Dept: CARDIOLOGY | Facility: CLINIC | Age: 65
End: 2025-08-11

## 2025-08-21 ENCOUNTER — APPOINTMENT (OUTPATIENT)
Dept: INTERNAL MEDICINE | Facility: CLINIC | Age: 65
End: 2025-08-21

## 2025-08-21 VITALS
TEMPERATURE: 97.2 F | DIASTOLIC BLOOD PRESSURE: 76 MMHG | SYSTOLIC BLOOD PRESSURE: 110 MMHG | OXYGEN SATURATION: 99 % | HEIGHT: 59 IN | BODY MASS INDEX: 22.38 KG/M2 | WEIGHT: 111 LBS | HEART RATE: 115 BPM

## 2025-08-21 DIAGNOSIS — I10 ESSENTIAL (PRIMARY) HYPERTENSION: ICD-10-CM

## 2025-08-21 DIAGNOSIS — E11.9 TYPE 2 DIABETES MELLITUS W/OUT COMPLICATIONS: ICD-10-CM

## 2025-08-21 DIAGNOSIS — R79.89 OTHER SPECIFIED ABNORMAL FINDINGS OF BLOOD CHEMISTRY: ICD-10-CM

## 2025-08-21 DIAGNOSIS — Z93.2 ILEOSTOMY STATUS: ICD-10-CM

## 2025-08-21 PROCEDURE — G2211 COMPLEX E/M VISIT ADD ON: CPT | Mod: NC

## 2025-08-21 PROCEDURE — 99215 OFFICE O/P EST HI 40 MIN: CPT

## 2025-08-26 ENCOUNTER — APPOINTMENT (OUTPATIENT)
Dept: ORTHOPEDIC SURGERY | Facility: CLINIC | Age: 65
End: 2025-08-26
Payer: MEDICAID

## 2025-08-26 VITALS — BODY MASS INDEX: 22.38 KG/M2 | WEIGHT: 111 LBS | HEIGHT: 59 IN

## 2025-08-26 DIAGNOSIS — Z87.39 PERSONAL HISTORY OF OTHER DISEASES OF THE MUSCULOSKELETAL SYSTEM AND CONNECTIVE TISSUE: ICD-10-CM

## 2025-08-26 DIAGNOSIS — Z78.9 OTHER SPECIFIED HEALTH STATUS: ICD-10-CM

## 2025-08-26 DIAGNOSIS — Z86.39 PERSONAL HISTORY OF OTHER ENDOCRINE, NUTRITIONAL AND METABOLIC DISEASE: ICD-10-CM

## 2025-08-26 DIAGNOSIS — M18.11 UNILATERAL PRIMARY OSTEOARTHRITIS OF FIRST CARPOMETACARPAL JOINT, RIGHT HAND: ICD-10-CM

## 2025-08-26 DIAGNOSIS — Z86.79 PERSONAL HISTORY OF OTHER DISEASES OF THE CIRCULATORY SYSTEM: ICD-10-CM

## 2025-08-26 DIAGNOSIS — Z87.09 PERSONAL HISTORY OF OTHER DISEASES OF THE RESPIRATORY SYSTEM: ICD-10-CM

## 2025-08-26 PROCEDURE — 99202 OFFICE O/P NEW SF 15 MIN: CPT

## 2025-08-26 PROCEDURE — 73140 X-RAY EXAM OF FINGER(S): CPT | Mod: 26,RT

## 2025-08-29 ENCOUNTER — APPOINTMENT (OUTPATIENT)
Dept: INTERNAL MEDICINE | Facility: CLINIC | Age: 65
End: 2025-08-29

## 2025-09-02 ENCOUNTER — APPOINTMENT (OUTPATIENT)
Dept: ENDOCRINOLOGY | Facility: CLINIC | Age: 65
End: 2025-09-02

## 2025-09-09 ENCOUNTER — APPOINTMENT (OUTPATIENT)
Dept: ORTHOPEDIC SURGERY | Facility: CLINIC | Age: 65
End: 2025-09-09

## 2025-09-11 ENCOUNTER — APPOINTMENT (OUTPATIENT)
Dept: CARDIOLOGY | Facility: CLINIC | Age: 65
End: 2025-09-11
Payer: MEDICAID

## 2025-09-11 VITALS
WEIGHT: 119 LBS | HEART RATE: 76 BPM | DIASTOLIC BLOOD PRESSURE: 74 MMHG | OXYGEN SATURATION: 99 % | BODY MASS INDEX: 23.99 KG/M2 | HEIGHT: 59 IN | SYSTOLIC BLOOD PRESSURE: 114 MMHG

## 2025-09-11 DIAGNOSIS — I48.0 PAROXYSMAL ATRIAL FIBRILLATION: ICD-10-CM

## 2025-09-11 DIAGNOSIS — I25.10 ATHEROSCLEROTIC HEART DISEASE OF NATIVE CORONARY ARTERY W/OUT ANGINA PECTORIS: ICD-10-CM

## 2025-09-11 DIAGNOSIS — E78.5 HYPERLIPIDEMIA, UNSPECIFIED: ICD-10-CM

## 2025-09-11 PROCEDURE — 93000 ELECTROCARDIOGRAM COMPLETE: CPT

## 2025-09-11 PROCEDURE — G2211 COMPLEX E/M VISIT ADD ON: CPT | Mod: NC

## 2025-09-11 PROCEDURE — 99214 OFFICE O/P EST MOD 30 MIN: CPT

## 2025-09-11 RX ORDER — LOPERAMIDE HYDROCHLORIDE 2 MG/1
2 TABLET ORAL TWICE DAILY
Refills: 0 | Status: ACTIVE | COMMUNITY

## 2025-09-11 RX ORDER — ACETAMINOPHEN 325 MG/1
325 TABLET ORAL 4 TIMES DAILY
Refills: 0 | Status: ACTIVE | COMMUNITY

## 2025-09-11 RX ORDER — FLUOXETINE 20 MG/5ML
SOLUTION ORAL
Refills: 0 | Status: ACTIVE | COMMUNITY

## 2025-09-11 RX ORDER — OXYCODONE 5 MG/1
5 TABLET ORAL
Refills: 0 | Status: ACTIVE | COMMUNITY

## (undated) DEVICE — TUBING SUCTION 20FT

## (undated) DEVICE — CATH IV SAFE BC 20G X 1.16" (PINK)

## (undated) DEVICE — FOLEY HOLDER STATLOCK 2 WAY ADULT

## (undated) DEVICE — SUCTION YANKAUER NO CONTROL VENT

## (undated) DEVICE — BALLOON US ENDO

## (undated) DEVICE — CATH IV SAFE BC 22G X 1" (BLUE)

## (undated) DEVICE — BITE BLOCK ADULT 20 X 27MM (GREEN)

## (undated) DEVICE — PACK IV START WITH CHG

## (undated) DEVICE — SOL INJ NS 0.9% 500ML 2 PORT

## (undated) DEVICE — SYR ALLIANCE II INFLATION 60ML

## (undated) DEVICE — SENSOR O2 FINGER ADULT

## (undated) DEVICE — TUBING SUCTION CONN 6FT STERILE

## (undated) DEVICE — TUBING IV SET GRAVITY 3Y 100" MACRO